# Patient Record
Sex: FEMALE | Race: BLACK OR AFRICAN AMERICAN | NOT HISPANIC OR LATINO | Employment: FULL TIME | URBAN - METROPOLITAN AREA
[De-identification: names, ages, dates, MRNs, and addresses within clinical notes are randomized per-mention and may not be internally consistent; named-entity substitution may affect disease eponyms.]

---

## 2019-05-25 ENCOUNTER — HOSPITAL ENCOUNTER (INPATIENT)
Facility: HOSPITAL | Age: 49
LOS: 3 days | Discharge: HOME HEALTH | DRG: 337 | End: 2019-05-28
Attending: EMERGENCY MEDICINE | Admitting: SURGERY
Payer: COMMERCIAL

## 2019-05-25 ENCOUNTER — ANESTHESIA EVENT (OUTPATIENT)
Dept: SURGERY | Facility: HOSPITAL | Age: 49
DRG: 337 | End: 2019-05-25
Payer: COMMERCIAL

## 2019-05-25 DIAGNOSIS — R53.83 FATIGUE, UNSPECIFIED TYPE: ICD-10-CM

## 2019-05-25 DIAGNOSIS — R53.1 WEAKNESS: ICD-10-CM

## 2019-05-25 DIAGNOSIS — K65.1 INTRA-ABDOMINAL ABSCESS: ICD-10-CM

## 2019-05-25 DIAGNOSIS — R10.84 GENERALIZED ABDOMINAL PAIN: ICD-10-CM

## 2019-05-25 DIAGNOSIS — K65.1 ABSCESS OF ABDOMINAL CAVITY: Primary | ICD-10-CM

## 2019-05-25 DIAGNOSIS — R52 GENERALIZED BODY ACHES: ICD-10-CM

## 2019-05-25 PROBLEM — T81.43XA POSTPROCEDURAL INTRAABDOMINAL ABSCESS: Status: ACTIVE | Noted: 2019-05-25

## 2019-05-25 LAB
ABO + RH BLD: NORMAL
ALBUMIN SERPL BCP-MCNC: 1.7 G/DL (ref 3.5–5.2)
ALP SERPL-CCNC: 95 U/L (ref 55–135)
ALT SERPL W/O P-5'-P-CCNC: 17 U/L (ref 10–44)
ANION GAP SERPL CALC-SCNC: 14 MMOL/L (ref 8–16)
APTT BLDCRRT: 34.3 SEC (ref 21–32)
AST SERPL-CCNC: 23 U/L (ref 10–40)
B-HCG UR QL: NEGATIVE
BACTERIA #/AREA URNS HPF: ABNORMAL /HPF
BASOPHILS # BLD AUTO: ABNORMAL K/UL (ref 0–0.2)
BASOPHILS NFR BLD: 0 % (ref 0–1.9)
BILIRUB SERPL-MCNC: 0.5 MG/DL (ref 0.1–1)
BILIRUB UR QL STRIP: NEGATIVE
BLD GP AB SCN CELLS X3 SERPL QL: NORMAL
BUN SERPL-MCNC: 11 MG/DL (ref 6–20)
CALCIUM SERPL-MCNC: 9 MG/DL (ref 8.7–10.5)
CHLORIDE SERPL-SCNC: 95 MMOL/L (ref 95–110)
CLARITY UR: ABNORMAL
CO2 SERPL-SCNC: 24 MMOL/L (ref 23–29)
COLOR UR: YELLOW
CREAT SERPL-MCNC: 0.8 MG/DL (ref 0.5–1.4)
CTP QC/QA: YES
DIFFERENTIAL METHOD: ABNORMAL
EOSINOPHIL # BLD AUTO: ABNORMAL K/UL (ref 0–0.5)
EOSINOPHIL NFR BLD: 0 % (ref 0–8)
ERYTHROCYTE [DISTWIDTH] IN BLOOD BY AUTOMATED COUNT: 21.7 % (ref 11.5–14.5)
EST. GFR  (AFRICAN AMERICAN): >60 ML/MIN/1.73 M^2
EST. GFR  (NON AFRICAN AMERICAN): >60 ML/MIN/1.73 M^2
GLUCOSE SERPL-MCNC: 208 MG/DL (ref 70–110)
GLUCOSE UR QL STRIP: ABNORMAL
HCT VFR BLD AUTO: 29.1 % (ref 37–48.5)
HGB BLD-MCNC: 7.8 G/DL (ref 12–16)
HGB UR QL STRIP: ABNORMAL
HYALINE CASTS #/AREA URNS LPF: 0 /LPF
HYPOCHROMIA BLD QL SMEAR: ABNORMAL
INR PPP: 1.2 (ref 0.8–1.2)
KETONES UR QL STRIP: NEGATIVE
LEUKOCYTE ESTERASE UR QL STRIP: ABNORMAL
LIPASE SERPL-CCNC: 14 U/L (ref 4–60)
LYMPHOCYTES # BLD AUTO: ABNORMAL K/UL (ref 1–4.8)
LYMPHOCYTES NFR BLD: 22 % (ref 18–48)
MAGNESIUM SERPL-MCNC: 1.7 MG/DL (ref 1.6–2.6)
MCH RBC QN AUTO: 17.9 PG (ref 27–31)
MCHC RBC AUTO-ENTMCNC: 26.8 G/DL (ref 32–36)
MCV RBC AUTO: 67 FL (ref 82–98)
METAMYELOCYTES NFR BLD MANUAL: 4 %
MICROSCOPIC COMMENT: ABNORMAL
MONOCYTES # BLD AUTO: ABNORMAL K/UL (ref 0.3–1)
MONOCYTES NFR BLD: 5 % (ref 4–15)
NEUTROPHILS NFR BLD: 37 % (ref 38–73)
NEUTS BAND NFR BLD MANUAL: 32 %
NITRITE UR QL STRIP: NEGATIVE
PH UR STRIP: 5 [PH] (ref 5–8)
PLATELET # BLD AUTO: 501 K/UL (ref 150–350)
PLATELET BLD QL SMEAR: ABNORMAL
PMV BLD AUTO: 9.9 FL (ref 9.2–12.9)
POTASSIUM SERPL-SCNC: 4 MMOL/L (ref 3.5–5.1)
PROT SERPL-MCNC: 8.5 G/DL (ref 6–8.4)
PROT UR QL STRIP: ABNORMAL
PROTHROMBIN TIME: 12.6 SEC (ref 9–12.5)
RBC # BLD AUTO: 4.35 M/UL (ref 4–5.4)
RBC #/AREA URNS HPF: >100 /HPF (ref 0–4)
SODIUM SERPL-SCNC: 133 MMOL/L (ref 136–145)
SP GR UR STRIP: 1.02 (ref 1–1.03)
SQUAMOUS #/AREA URNS HPF: 10 /HPF
URN SPEC COLLECT METH UR: ABNORMAL
UROBILINOGEN UR STRIP-ACNC: ABNORMAL EU/DL
WBC # BLD AUTO: 13.73 K/UL (ref 3.9–12.7)
WBC #/AREA URNS HPF: 8 /HPF (ref 0–5)
WBC TOXIC VACUOLES BLD QL SMEAR: PRESENT

## 2019-05-25 PROCEDURE — 25500020 PHARM REV CODE 255: Performed by: SURGERY

## 2019-05-25 PROCEDURE — 25000003 PHARM REV CODE 250: Performed by: EMERGENCY MEDICINE

## 2019-05-25 PROCEDURE — 86920 COMPATIBILITY TEST SPIN: CPT

## 2019-05-25 PROCEDURE — 85025 COMPLETE CBC W/AUTO DIFF WBC: CPT

## 2019-05-25 PROCEDURE — 25000003 PHARM REV CODE 250: Performed by: STUDENT IN AN ORGANIZED HEALTH CARE EDUCATION/TRAINING PROGRAM

## 2019-05-25 PROCEDURE — 86301 IMMUNOASSAY TUMOR CA 19-9: CPT

## 2019-05-25 PROCEDURE — 81025 URINE PREGNANCY TEST: CPT | Performed by: EMERGENCY MEDICINE

## 2019-05-25 PROCEDURE — 86304 IMMUNOASSAY TUMOR CA 125: CPT

## 2019-05-25 PROCEDURE — 81000 URINALYSIS NONAUTO W/SCOPE: CPT

## 2019-05-25 PROCEDURE — 82378 CARCINOEMBRYONIC ANTIGEN: CPT

## 2019-05-25 PROCEDURE — 80053 COMPREHEN METABOLIC PANEL: CPT

## 2019-05-25 PROCEDURE — 96361 HYDRATE IV INFUSION ADD-ON: CPT

## 2019-05-25 PROCEDURE — 99291 CRITICAL CARE FIRST HOUR: CPT | Mod: 25

## 2019-05-25 PROCEDURE — 11000001 HC ACUTE MED/SURG PRIVATE ROOM

## 2019-05-25 PROCEDURE — 63600175 PHARM REV CODE 636 W HCPCS: Performed by: EMERGENCY MEDICINE

## 2019-05-25 PROCEDURE — 96375 TX/PRO/DX INJ NEW DRUG ADDON: CPT

## 2019-05-25 PROCEDURE — 85610 PROTHROMBIN TIME: CPT

## 2019-05-25 PROCEDURE — 82105 ALPHA-FETOPROTEIN SERUM: CPT

## 2019-05-25 PROCEDURE — 83735 ASSAY OF MAGNESIUM: CPT

## 2019-05-25 PROCEDURE — 83690 ASSAY OF LIPASE: CPT

## 2019-05-25 PROCEDURE — 96365 THER/PROPH/DIAG IV INF INIT: CPT

## 2019-05-25 PROCEDURE — 63600175 PHARM REV CODE 636 W HCPCS: Performed by: STUDENT IN AN ORGANIZED HEALTH CARE EDUCATION/TRAINING PROGRAM

## 2019-05-25 PROCEDURE — 87040 BLOOD CULTURE FOR BACTERIA: CPT

## 2019-05-25 PROCEDURE — 85730 THROMBOPLASTIN TIME PARTIAL: CPT

## 2019-05-25 PROCEDURE — 86850 RBC ANTIBODY SCREEN: CPT

## 2019-05-25 RX ORDER — HEPARIN SODIUM 5000 [USP'U]/ML
5000 INJECTION, SOLUTION INTRAVENOUS; SUBCUTANEOUS EVERY 8 HOURS
Status: DISCONTINUED | OUTPATIENT
Start: 2019-05-25 | End: 2019-05-27

## 2019-05-25 RX ORDER — MORPHINE SULFATE 10 MG/ML
2 INJECTION INTRAMUSCULAR; INTRAVENOUS; SUBCUTANEOUS EVERY 4 HOURS PRN
Status: DISCONTINUED | OUTPATIENT
Start: 2019-05-25 | End: 2019-05-27

## 2019-05-25 RX ORDER — SODIUM CHLORIDE, SODIUM LACTATE, POTASSIUM CHLORIDE, CALCIUM CHLORIDE 600; 310; 30; 20 MG/100ML; MG/100ML; MG/100ML; MG/100ML
INJECTION, SOLUTION INTRAVENOUS CONTINUOUS
Status: DISCONTINUED | OUTPATIENT
Start: 2019-05-25 | End: 2019-05-28

## 2019-05-25 RX ORDER — SODIUM CHLORIDE 0.9 % (FLUSH) 0.9 %
10 SYRINGE (ML) INJECTION
Status: DISCONTINUED | OUTPATIENT
Start: 2019-05-25 | End: 2019-05-28 | Stop reason: HOSPADM

## 2019-05-25 RX ORDER — ONDANSETRON 2 MG/ML
8 INJECTION INTRAMUSCULAR; INTRAVENOUS
Status: COMPLETED | OUTPATIENT
Start: 2019-05-25 | End: 2019-05-25

## 2019-05-25 RX ORDER — METOPROLOL TARTRATE 1 MG/ML
5 INJECTION, SOLUTION INTRAVENOUS EVERY 6 HOURS PRN
Status: DISCONTINUED | OUTPATIENT
Start: 2019-05-25 | End: 2019-05-28 | Stop reason: HOSPADM

## 2019-05-25 RX ORDER — LOSARTAN POTASSIUM 50 MG/1
50 TABLET ORAL DAILY
COMMUNITY
End: 2021-09-12

## 2019-05-25 RX ORDER — RAMELTEON 8 MG/1
8 TABLET ORAL NIGHTLY PRN
Status: DISCONTINUED | OUTPATIENT
Start: 2019-05-25 | End: 2019-05-27

## 2019-05-25 RX ORDER — ONDANSETRON 8 MG/1
8 TABLET, ORALLY DISINTEGRATING ORAL EVERY 8 HOURS PRN
Status: DISCONTINUED | OUTPATIENT
Start: 2019-05-25 | End: 2019-05-28 | Stop reason: HOSPADM

## 2019-05-25 RX ORDER — ACETAMINOPHEN 325 MG/1
650 TABLET ORAL EVERY 8 HOURS PRN
Status: DISCONTINUED | OUTPATIENT
Start: 2019-05-25 | End: 2019-05-27

## 2019-05-25 RX ORDER — DIPHENHYDRAMINE HYDROCHLORIDE 50 MG/ML
25 INJECTION INTRAMUSCULAR; INTRAVENOUS EVERY 4 HOURS PRN
Status: DISCONTINUED | OUTPATIENT
Start: 2019-05-25 | End: 2019-05-27

## 2019-05-25 RX ADMIN — PIPERACILLIN AND TAZOBACTAM 4.5 G: 4; .5 INJECTION, POWDER, LYOPHILIZED, FOR SOLUTION INTRAVENOUS; PARENTERAL at 03:05

## 2019-05-25 RX ADMIN — SODIUM CHLORIDE, SODIUM LACTATE, POTASSIUM CHLORIDE, AND CALCIUM CHLORIDE: .6; .31; .03; .02 INJECTION, SOLUTION INTRAVENOUS at 04:05

## 2019-05-25 RX ADMIN — SODIUM CHLORIDE 2000 ML: 0.9 INJECTION, SOLUTION INTRAVENOUS at 11:05

## 2019-05-25 RX ADMIN — ONDANSETRON 8 MG: 2 INJECTION INTRAMUSCULAR; INTRAVENOUS at 11:05

## 2019-05-25 RX ADMIN — IOHEXOL: 350 INJECTION, SOLUTION INTRAVENOUS at 05:05

## 2019-05-25 RX ADMIN — PIPERACILLIN AND TAZOBACTAM 4.5 G: 4; .5 INJECTION, POWDER, LYOPHILIZED, FOR SOLUTION INTRAVENOUS; PARENTERAL at 10:05

## 2019-05-25 RX ADMIN — HEPARIN SODIUM 5000 UNITS: 5000 INJECTION, SOLUTION INTRAVENOUS; SUBCUTANEOUS at 10:05

## 2019-05-25 RX ADMIN — SODIUM CHLORIDE 1000 ML: 0.9 INJECTION, SOLUTION INTRAVENOUS at 03:05

## 2019-05-25 RX ADMIN — SODIUM CHLORIDE, SODIUM LACTATE, POTASSIUM CHLORIDE, AND CALCIUM CHLORIDE 1000 ML: .6; .31; .03; .02 INJECTION, SOLUTION INTRAVENOUS at 05:05

## 2019-05-25 RX ADMIN — IOHEXOL: 300 INJECTION, SOLUTION INTRAVENOUS at 05:05

## 2019-05-25 RX ADMIN — IOHEXOL 30 ML: 300 INJECTION, SOLUTION INTRAVENOUS at 05:05

## 2019-05-25 RX ADMIN — SODIUM CHLORIDE, SODIUM LACTATE, POTASSIUM CHLORIDE, AND CALCIUM CHLORIDE: .6; .31; .03; .02 INJECTION, SOLUTION INTRAVENOUS at 10:05

## 2019-05-25 NOTE — ED NOTES
Pt resting comfortably and independently repositioned in stretcher with bed locked in lowest position for safety. NAD noted at this time. Respirations even and unlabored and visible chest rise noted.  Patient offered bathroom assistance and voided. Pt instructed to call if assistance is needed. Pt on continuous cardiac, BP, and O2 monitoring. Call light within reach. No needs at this time. Will continue to monitor.

## 2019-05-25 NOTE — ED PROVIDER NOTES
Encounter Date: 5/25/2019    SCRIBE #1 NOTE: I, Xiomara Pozo, am scribing for, and in the presence of,  Jer Albarado MD. I have scribed the following portions of the note - Other sections scribed: HPI, ROS, PE.       History     Chief Complaint   Patient presents with    Generalized Body Aches     Pt complaining of body aches and presents with shivering. Pulse = 130s upon arrival     CC: Generalized Body Aches    HPI: This 48 y.o female who has HTN presents to the ED for an evaluation of acute onset, constant, 6/10 generalized body aches with associated fatigue and generalized weakness for the past week.  She reports for the past week, she has been having intermittent abdominal pain as well.  She reports she was evaluated for a sore throat and fever with a temp max of 105 prior to onset of her current symptoms.  She reports during that evaluation she received an injection of penicillin and states she was diagnosed with a viral syndrome.  Patient denies nausea, emesis, diarrhea, dysuria, rash, chest pain, back pain, or any other associated symptoms.  No prior tx.  No alleviating factors.    The history is provided by the patient. No  was used.     Review of patient's allergies indicates:  No Known Allergies  Past Medical History:   Diagnosis Date    Hypertension      History reviewed. No pertinent surgical history.  History reviewed. No pertinent family history.  Social History     Tobacco Use    Smoking status: Never Smoker   Substance Use Topics    Alcohol use: Not Currently    Drug use: Not Currently     Review of Systems   Constitutional: Positive for fatigue. Negative for chills and fever.   HENT: Positive for sore throat. Negative for ear pain.    Eyes: Negative for pain.   Respiratory: Negative for cough and shortness of breath.    Cardiovascular: Negative for chest pain.   Gastrointestinal: Positive for abdominal pain. Negative for diarrhea, nausea and vomiting.   Genitourinary:  Negative for dysuria.   Musculoskeletal: Positive for myalgias. Negative for back pain.        (-) arm or leg problems   Skin: Negative for rash.   Neurological: Negative for headaches.       Physical Exam     Initial Vitals [05/25/19 1140]   BP Pulse Resp Temp SpO2   (!) 141/98 (!) 134 (!) 22 99.1 °F (37.3 °C) 96 %      MAP       --         Physical Exam  Physical Exam  The patient was examined specifically for the following:   General:No significant distress, Good color, Warm and dry. Head and neck:Scalp atraumatic, Neck supple. Neurological:Appropriate conversation, Gross motor deficits. Eyes:Conjugate gaze, Clear corneas. ENT: No epistaxis. Cardiac: Regular rate and rhythm, Grossly normal heart tones. Pulmonary: Wheezing, Rales. Gastrointestinal: Abdominal tenderness, Abdominal distention. Musculoskeletal: Extremity deformity, Apparent pain with range of motion of the joints. Skin: Rash.   The findings on examination were normal except for the following:  The patient has dry lips and mucous membranes.  The patient's heart rate is 134.  She seems nervous and anxious.  She will not permit a pharynx examination the neck is supple. Cranial nerves, motor and sensory examination grossly unremarkable. The lungs are clear.  The heart tones are remarkable for the regular tachycardia.  The abdomen is diffusely tender.  It may be slightly distended.  Extremities are nontender.  There is no pain with range of motion of any joints.    ED Course   Critical Care  Date/Time: 5/25/2019 4:09 PM  Performed by: Jer Albarado MD  Authorized by: Jose Luis Hanson MD   Direct patient critical care time: 22 minutes  Additional history critical care time: 11 minutes  Ordering / reviewing critical care time: 11 minutes  Documentation critical care time: 11 minutes  Consulting other physicians critical care time: 5 minutes  Consult with family critical care time: 3 minutes  Total critical care time (exclusive of procedural time) : 63  minutes  Critical care time was exclusive of separately billable procedures and treating other patients and teaching time.  Critical care was necessary to treat or prevent imminent or life-threatening deterioration of the following conditions: dehydration.  Critical care was time spent personally by me on the following activities: discussions with primary provider, evaluation of patient's response to treatment, obtaining history from patient or surrogate, ordering and review of laboratory studies, pulse oximetry, review of old charts, development of treatment plan with patient or surrogate, examination of patient, ordering and performing treatments and interventions, ordering and review of radiographic studies and re-evaluation of patient's condition.        Labs Reviewed   COMPREHENSIVE METABOLIC PANEL - Abnormal; Notable for the following components:       Result Value    Sodium 133 (*)     Glucose 208 (*)     Total Protein 8.5 (*)     Albumin 1.7 (*)     All other components within normal limits   URINALYSIS, REFLEX TO URINE CULTURE - Abnormal; Notable for the following components:    Appearance, UA Hazy (*)     Protein, UA 1+ (*)     Glucose, UA 1+ (*)     Occult Blood UA 3+ (*)     Urobilinogen, UA 4.0-6.0 (*)     Leukocytes, UA Trace (*)     All other components within normal limits    Narrative:     Preferred Collection Type->Urine, Clean Catch   CBC W/ AUTO DIFFERENTIAL - Abnormal; Notable for the following components:    WBC 13.73 (*)     Hemoglobin 7.8 (*)     Hematocrit 29.1 (*)     Mean Corpuscular Volume 67 (*)     Mean Corpuscular Hemoglobin 17.9 (*)     Mean Corpuscular Hemoglobin Conc 26.8 (*)     RDW 21.7 (*)     Platelets 501 (*)     Gran% 37.0 (*)     Platelet Estimate Increased (*)     All other components within normal limits   URINALYSIS MICROSCOPIC - Abnormal; Notable for the following components:    RBC, UA >100 (*)     WBC, UA 8 (*)     Bacteria Few (*)     All other components within normal  limits    Narrative:     Preferred Collection Type->Urine, Clean Catch   CULTURE, BLOOD   CULTURE, BLOOD   MAGNESIUM   LIPASE   PROTIME-INR   APTT   CEA   CANCER ANTIGEN 125   CANCER ANTIGEN 19-9   AFP TUMOR MARKER   POCT URINE PREGNANCY   TYPE & SCREEN   PREPARE RBC SOFT          Imaging Results          X-Ray Chest 1 View (In process)                CT Abdomen Pelvis  Without Contrast (Final result)  Result time 05/25/19 14:15:07    Final result by Terry Cook MD (05/25/19 14:15:07)                 Impression:      Abnormal examination findings of a midline 20.4 x 11.0 cm air-fluid collection with associated foci of free air in the abdomen and mass effect on adjacent structures.  The source remains unknown.  This is concerning for an abscess.  Component of tumor necrosis can have a similar appearance.   Surgical consultation and follow-up with both IV and oral contrast examination is suggested.    Distention of the small bowel loops no apparent transition point.  The findings may represent component of enteritis secondary to the inflammatory changes in the abdomen.    Case discussed with Dr. Albarado on 04/25/2019 at 14:11 hours.      Electronically signed by: Terry Cook MD  Date:    05/25/2019  Time:    14:15             Narrative:    EXAMINATION:  CT ABDOMEN PELVIS WITHOUT CONTRAST    CLINICAL HISTORY:  Abdominal pain, unspecified;Acute abdominal pain;    TECHNIQUE:  Axial CT scan of the abdomen and pelvis was obtained from the level of the hemidiaphragms to the pubic symphysis without intravenous contrast. Coronal and sagittal reformats were obtained.    COMPARISON:  None.    FINDINGS:  There are no pleural effusions.  There is no evidence of a pneumothorax.  There are ground-glass airspace opacities in the lung bases.  No discrete pulmonary nodule is identified.    The heart is unremarkable.  There is normal tapering of the abdominal aorta.  The aortic branch vessels are unremarkable.  Evaluation for  lymphadenopathy is significantly degraded given motion limitations.    The esophagus, stomach, and duodenum are within normal limits.  The small bowel loops are distended with no apparent transition point.  The appendix is not visualized.  There are no inflammatory changes in the space location of the appendix.  The large bowel is within normal limits.    The liver is within normal limits.  The gallbladder is unremarkable.  The biliary tree is within normal limits.  The spleen is unremarkable.  The pancreas is within normal limits.    The adrenal glands are unremarkable.  The kidneys are within normal limits.  There is no evidence of nephrolithiasis.  The ureters and urinary bladder are within normal limits.    There is heterogeneous appearance of the uterus.  Multiple uterine fibroids are present.  Calcified fibroids are present.  The adnexal structures appear unremarkable.    There is a large 20.4 x 11.0 cm air-fluid collection in the mid abdomen with mass effect on adjacent structures.  Associated foci of free air throughout the abdomen.  The source of this collection remains unknown.  There is also some free fluid in the pelvis.    The psoas margins are unremarkable.  The abdominal wall is within normal limits.  There are degenerative changes in the osseous structures.  There is no evidence of a fracture.                              Medical decision making:  Given the above, this patient presents to the emergency room with a history of fever and chills.  The patient was treated with penicillin for sore throat.  The patient has sore throat today.  She had a very tender abdomen.  CT reveals a large abscess.  We do not know the cause the abscess.  The patient is being evaluated and admitted by surgery.  The patient does have a slight leukocytosis.  She was seen and evaluated by , in the emergency room.  The patient will be re-scanned with oral IV and rectal contrast.                Scribe Attestation:    Scribe #1: I performed the above scribed service and the documentation accurately describes the services I performed. I attest to the accuracy of the note.    Attending Attestation:           Physician Attestation for Scribe:  Physician Attestation Statement for Scribe #1: I, Jer Albarado MD, reviewed documentation, as scribed by Xiomara Pozo in my presence, and it is both accurate and complete.                    Clinical Impression:       ICD-10-CM ICD-9-CM   1. Abscess of abdominal cavity K65.1 567.22   2. Generalized abdominal pain R10.84 789.07   3. Intra-abdominal abscess K65.1 567.22   4. Generalized body aches R52 780.96   5. Weakness R53.1 780.79   6. Fatigue, unspecified type R53.83 780.79                                Jer Albarado MD  05/25/19 1610       Jer Albarado MD  05/25/19 1614

## 2019-05-25 NOTE — ED NOTES
Pt resting comfortably and independently repositioned in stretcher with bed locked in lowest position for safety. NAD noted at this time. Respirations even and unlabored and visible chest rise noted.  Patient offered bathroom assistance and notified of need of urine sample to proceed with imaging, but denies need at this time. Pt instructed to call if assistance is needed. Pt on continuous cardiac, BP, and O2 monitoring. Call light within reach. No needs at this time. Will continue to monitor.

## 2019-05-25 NOTE — ED TRIAGE NOTES
"Pt reports to ED with c/o generalized weakness and body aches with nausea x1 week. Pt reports she was seen by family doctor, was told she had viral infection, and was given PCN shot and "hasn't felt right since." Pt denies fever/chills, abdominal pain/diarrhea, cough, changes in urinary habits.    Patient identifiers verified and correct for April Carter.    LOC: The patient is awake, alert and aware of environment with an appropriate affect, the patient is oriented x 3 and speaking appropriately.  APPEARANCE: Pt did not come to ED with shoes and also smells strongly of urine.   SKIN: The skin is warm and dry, color consistent with ethnicity, patient has normal skin turgor and moist mucus membranes, skin intact, no breakdown or bruising noted.  MUSCULOSKELETAL: Patient moving all extremities spontaneously, no obvious swelling or deformities noted.  RESPIRATORY: Airway is open and patent, respirations are spontaneous, patient has a normal effort and rate, no accessory muscle use noted, bilateral breath sounds **.  CARDIAC: Pt noted to be tachycardic with rate in 130's.  ABDOMEN: Soft and non tender to palpation, no distention noted, normoactive bowel sounds present in all four quadrants. Reports nausea.  NEUROLOGIC: PERRL, 3mm bilaterally, eyes open spontaneously, behavior appropriate to situation, follows commands, facial expression symmetrical, bilateral hand grasp equal and even, purposeful motor response noted, normal sensation in all extremities when touched with a finger.    "

## 2019-05-25 NOTE — H&P
CC: Generalized Body Aches     HPI: This 48 y.o otherwise healthy female who has HTN presents to the ED with CC of body aches however she was found to have a very large intraabdominal abscess on workup for her mild abdominal pain.  She denies abnormal bleeding although she is anemic as well, and reports recent weight loss.  Reports no hx of colonoscopy, trouble with bowel movements, and denies any significant family history of cancer or other medical issues.      She reports for the past week, she has been having intermittent abdominal pain as well.  She reports she was evaluated for a sore throat and fever with a temp max of 105 prior to onset of her current symptoms.  She reports during that evaluation she received an injection of penicillin and states she was diagnosed with a viral syndrome.  Patient denies nausea, emesis, diarrhea, dysuria, rash, chest pain, back pain, or any other associated symptoms.  No prior tx.  No alleviating factors.        History reviewed. No pertinent surgical history.    History reviewed. No pertinent family history.    Social History     Socioeconomic History    Marital status: Single     Spouse name: Not on file    Number of children: Not on file    Years of education: Not on file    Highest education level: Not on file   Occupational History    Not on file   Social Needs    Financial resource strain: Not on file    Food insecurity:     Worry: Not on file     Inability: Not on file    Transportation needs:     Medical: Not on file     Non-medical: Not on file   Tobacco Use    Smoking status: Never Smoker   Substance and Sexual Activity    Alcohol use: Not Currently    Drug use: Not Currently    Sexual activity: Not on file   Lifestyle    Physical activity:     Days per week: Not on file     Minutes per session: Not on file    Stress: Not on file   Relationships    Social connections:     Talks on phone: Not on file     Gets together: Not on file     Attends [employer]  service: Not on file     Active member of club or organization: Not on file     Attends meetings of clubs or organizations: Not on file     Relationship status: Not on file   Other Topics Concern    Not on file   Social History Narrative    Not on file       Current Facility-Administered Medications   Medication Dose Route Frequency Provider Last Rate Last Dose    acetaminophen tablet 650 mg  650 mg Oral Q8H PRN Sarabjit Moran MD        diphenhydrAMINE injection 25 mg  25 mg Intravenous Q4H PRN Sarabjit Moran MD        heparin (porcine) injection 5,000 Units  5,000 Units Subcutaneous Q8H Sarabjit Moran MD        lactated ringers bolus 1,000 mL  1,000 mL Intravenous Once Sarabjit Moran MD        lactated ringers infusion   Intravenous Continuous Sarabjit Moran MD        morphine injection 2 mg  2 mg Intravenous Q4H PRN Sarabjit Moran MD        ondansetron disintegrating tablet 8 mg  8 mg Oral Q8H PRN Sarabjit Moran MD        piperacillin-tazobactam 4.5 g in sodium chloride 0.9% 100 mL IVPB (ready to mix system)  4.5 g Intravenous Q8H Sarabjit Moran MD        promethazine (PHENERGAN) 6.25 mg in dextrose 5 % 50 mL IVPB  6.25 mg Intravenous Q6H PRN Sarabjit Moran MD        ramelteon tablet 8 mg  8 mg Oral Nightly PRN Sarabjit Moran MD        sodium chloride 0.9% flush 10 mL  10 mL Intravenous PRN Sarabjit Moran MD         Current Outpatient Medications   Medication Sig Dispense Refill    losartan (COZAAR) 50 MG tablet Take 50 mg by mouth once daily.      UNKNOWN TO PATIENT          Review of patient's allergies indicates:  No Known Allergies     Physical Exam:  Alert/oriented.  Somewhat flattened affect.    Pulmonary exam clear/BL with some tachypnea  CV exam with RRR, no MRG  Abdomen is distended, firm, without peritonitis. There is mild tenderness to deep palpation.      Normal skin turgor and color  Extremity with good distal pulses, no edema      A/P    49yo F with very large intraabdominal abscess found on  non-contrast CT scan.  Differential diagnosis is broad at this point, although she indeed warrants admission given her tachycardia, fevers, leukocytosis, and the air/fluid levels suggesting infection or at least innoculation of this very large cavity.      -obtain CT with IV/PO/Rectal contrast  -Perform diagnostic laparoscopy tommorow (around noon), will drain the abscess and biopsy any abnormal tissue  -send for tumor markers, coags, type and hold 2 units for OR   -admit to inpatient, start IVF, NPO, zosyn    Case discussed with Dr. Hanson, the attending.

## 2019-05-25 NOTE — NURSING
Pt arrived to floor via stretcher, aaox4. able to walk to bathroom with stand by assist. Denies pain, abdomen noted to be distended. Pt has multiple loose bowel movements. Call made to surgeon regarding pts VS, metropolol iv prn ordered. Call light w/i reach, bed in lowest position

## 2019-05-26 ENCOUNTER — ANESTHESIA (OUTPATIENT)
Dept: SURGERY | Facility: HOSPITAL | Age: 49
DRG: 337 | End: 2019-05-26
Payer: COMMERCIAL

## 2019-05-26 LAB
AFP SERPL-MCNC: 1.2 NG/ML (ref 0–8.4)
ANION GAP SERPL CALC-SCNC: 7 MMOL/L (ref 8–16)
ANISOCYTOSIS BLD QL SMEAR: ABNORMAL
BASOPHILS NFR BLD: 0 % (ref 0–1.9)
BLD PROD TYP BPU: NORMAL
BLD PROD TYP BPU: NORMAL
BLOOD UNIT EXPIRATION DATE: NORMAL
BLOOD UNIT EXPIRATION DATE: NORMAL
BLOOD UNIT TYPE CODE: 6200
BLOOD UNIT TYPE CODE: 6200
BLOOD UNIT TYPE: NORMAL
BLOOD UNIT TYPE: NORMAL
BUN SERPL-MCNC: 12 MG/DL (ref 6–20)
CALCIUM SERPL-MCNC: 8.2 MG/DL (ref 8.7–10.5)
CANCER AG125 SERPL-ACNC: 35 U/ML (ref 0–30)
CANCER AG19-9 SERPL-ACNC: 49 U/ML (ref 2–40)
CHLORIDE SERPL-SCNC: 106 MMOL/L (ref 95–110)
CO2 SERPL-SCNC: 27 MMOL/L (ref 23–29)
CODING SYSTEM: NORMAL
CODING SYSTEM: NORMAL
CREAT SERPL-MCNC: 0.7 MG/DL (ref 0.5–1.4)
DIFFERENTIAL METHOD: ABNORMAL
DISPENSE STATUS: NORMAL
DISPENSE STATUS: NORMAL
EOSINOPHIL NFR BLD: 1 % (ref 0–8)
ERYTHROCYTE [DISTWIDTH] IN BLOOD BY AUTOMATED COUNT: 21.6 % (ref 11.5–14.5)
EST. GFR  (AFRICAN AMERICAN): >60 ML/MIN/1.73 M^2
EST. GFR  (NON AFRICAN AMERICAN): >60 ML/MIN/1.73 M^2
GLUCOSE SERPL-MCNC: 108 MG/DL (ref 70–110)
HCG INTACT+B SERPL-ACNC: <1.2 MIU/ML
HCT VFR BLD AUTO: 24.2 % (ref 37–48.5)
HGB BLD-MCNC: 6.4 G/DL (ref 12–16)
HYPOCHROMIA BLD QL SMEAR: ABNORMAL
LYMPHOCYTES NFR BLD: 18 % (ref 18–48)
MCH RBC QN AUTO: 17.8 PG (ref 27–31)
MCHC RBC AUTO-ENTMCNC: 26.4 G/DL (ref 32–36)
MCV RBC AUTO: 67 FL (ref 82–98)
METAMYELOCYTES NFR BLD MANUAL: 3 %
MONOCYTES NFR BLD: 12 % (ref 4–15)
NEUTROPHILS NFR BLD: 16 % (ref 38–73)
NEUTS BAND NFR BLD MANUAL: 50 %
PLATELET # BLD AUTO: 420 K/UL (ref 150–350)
PLATELET BLD QL SMEAR: ABNORMAL
PMV BLD AUTO: 9.9 FL (ref 9.2–12.9)
POTASSIUM SERPL-SCNC: 3.5 MMOL/L (ref 3.5–5.1)
RBC # BLD AUTO: 3.59 M/UL (ref 4–5.4)
SODIUM SERPL-SCNC: 140 MMOL/L (ref 136–145)
TRANS ERYTHROCYTES VOL PATIENT: NORMAL ML
TRANS ERYTHROCYTES VOL PATIENT: NORMAL ML
WBC # BLD AUTO: 11.65 K/UL (ref 3.9–12.7)

## 2019-05-26 PROCEDURE — 27201423 OPTIME MED/SURG SUP & DEVICES STERILE SUPPLY: Performed by: SURGERY

## 2019-05-26 PROCEDURE — 80048 BASIC METABOLIC PNL TOTAL CA: CPT

## 2019-05-26 PROCEDURE — 11000001 HC ACUTE MED/SURG PRIVATE ROOM

## 2019-05-26 PROCEDURE — 63600175 PHARM REV CODE 636 W HCPCS: Performed by: STUDENT IN AN ORGANIZED HEALTH CARE EDUCATION/TRAINING PROGRAM

## 2019-05-26 PROCEDURE — D9220A PRA ANESTHESIA: ICD-10-PCS | Mod: CRNA,,, | Performed by: NURSE ANESTHETIST, CERTIFIED REGISTERED

## 2019-05-26 PROCEDURE — 37000008 HC ANESTHESIA 1ST 15 MINUTES: Performed by: SURGERY

## 2019-05-26 PROCEDURE — 63600175 PHARM REV CODE 636 W HCPCS: Performed by: ANESTHESIOLOGY

## 2019-05-26 PROCEDURE — 87205 SMEAR GRAM STAIN: CPT

## 2019-05-26 PROCEDURE — 25000003 PHARM REV CODE 250: Performed by: NURSE ANESTHETIST, CERTIFIED REGISTERED

## 2019-05-26 PROCEDURE — 25000003 PHARM REV CODE 250: Performed by: SURGERY

## 2019-05-26 PROCEDURE — 25000003 PHARM REV CODE 250: Performed by: STUDENT IN AN ORGANIZED HEALTH CARE EDUCATION/TRAINING PROGRAM

## 2019-05-26 PROCEDURE — 87077 CULTURE AEROBIC IDENTIFY: CPT

## 2019-05-26 PROCEDURE — 87075 CULTR BACTERIA EXCEPT BLOOD: CPT

## 2019-05-26 PROCEDURE — 37000009 HC ANESTHESIA EA ADD 15 MINS: Performed by: SURGERY

## 2019-05-26 PROCEDURE — 87186 SC STD MICRODIL/AGAR DIL: CPT

## 2019-05-26 PROCEDURE — D9220A PRA ANESTHESIA: Mod: ANES,,, | Performed by: ANESTHESIOLOGY

## 2019-05-26 PROCEDURE — 85007 BL SMEAR W/DIFF WBC COUNT: CPT

## 2019-05-26 PROCEDURE — 84702 CHORIONIC GONADOTROPIN TEST: CPT

## 2019-05-26 PROCEDURE — D9220A PRA ANESTHESIA: Mod: CRNA,,, | Performed by: NURSE ANESTHETIST, CERTIFIED REGISTERED

## 2019-05-26 PROCEDURE — 36415 COLL VENOUS BLD VENIPUNCTURE: CPT

## 2019-05-26 PROCEDURE — D9220A PRA ANESTHESIA: ICD-10-PCS | Mod: ANES,,, | Performed by: ANESTHESIOLOGY

## 2019-05-26 PROCEDURE — P9021 RED BLOOD CELLS UNIT: HCPCS

## 2019-05-26 PROCEDURE — 71000033 HC RECOVERY, INTIAL HOUR: Performed by: SURGERY

## 2019-05-26 PROCEDURE — C1729 CATH, DRAINAGE: HCPCS | Performed by: SURGERY

## 2019-05-26 PROCEDURE — 63600175 PHARM REV CODE 636 W HCPCS: Performed by: NURSE ANESTHETIST, CERTIFIED REGISTERED

## 2019-05-26 PROCEDURE — 36000709 HC OR TIME LEV III EA ADD 15 MIN: Performed by: SURGERY

## 2019-05-26 PROCEDURE — 85027 COMPLETE CBC AUTOMATED: CPT

## 2019-05-26 PROCEDURE — 87070 CULTURE OTHR SPECIMN AEROBIC: CPT | Mod: 59

## 2019-05-26 PROCEDURE — 36000708 HC OR TIME LEV III 1ST 15 MIN: Performed by: SURGERY

## 2019-05-26 RX ORDER — ACETAMINOPHEN 325 MG/1
650 TABLET ORAL EVERY 8 HOURS
Status: DISCONTINUED | OUTPATIENT
Start: 2019-05-26 | End: 2019-05-28 | Stop reason: HOSPADM

## 2019-05-26 RX ORDER — MIDAZOLAM HYDROCHLORIDE 1 MG/ML
INJECTION, SOLUTION INTRAMUSCULAR; INTRAVENOUS
Status: DISCONTINUED | OUTPATIENT
Start: 2019-05-26 | End: 2019-05-26

## 2019-05-26 RX ORDER — SUCCINYLCHOLINE CHLORIDE 20 MG/ML
INJECTION INTRAMUSCULAR; INTRAVENOUS
Status: DISCONTINUED | OUTPATIENT
Start: 2019-05-26 | End: 2019-05-26

## 2019-05-26 RX ORDER — FENTANYL CITRATE 50 UG/ML
INJECTION, SOLUTION INTRAMUSCULAR; INTRAVENOUS
Status: DISCONTINUED | OUTPATIENT
Start: 2019-05-26 | End: 2019-05-26

## 2019-05-26 RX ORDER — SODIUM CHLORIDE 9 MG/ML
INJECTION, SOLUTION INTRAVENOUS CONTINUOUS PRN
Status: DISCONTINUED | OUTPATIENT
Start: 2019-05-26 | End: 2019-05-26

## 2019-05-26 RX ORDER — LIDOCAINE HCL/PF 100 MG/5ML
SYRINGE (ML) INTRAVENOUS
Status: DISCONTINUED | OUTPATIENT
Start: 2019-05-26 | End: 2019-05-26

## 2019-05-26 RX ORDER — ONDANSETRON 2 MG/ML
INJECTION INTRAMUSCULAR; INTRAVENOUS
Status: DISCONTINUED | OUTPATIENT
Start: 2019-05-26 | End: 2019-05-26

## 2019-05-26 RX ORDER — BUPIVACAINE HYDROCHLORIDE AND EPINEPHRINE 2.5; 5 MG/ML; UG/ML
INJECTION, SOLUTION EPIDURAL; INFILTRATION; INTRACAUDAL; PERINEURAL
Status: DISCONTINUED | OUTPATIENT
Start: 2019-05-26 | End: 2019-05-26 | Stop reason: HOSPADM

## 2019-05-26 RX ORDER — ACETAMINOPHEN 10 MG/ML
INJECTION, SOLUTION INTRAVENOUS
Status: DISCONTINUED | OUTPATIENT
Start: 2019-05-26 | End: 2019-05-26

## 2019-05-26 RX ORDER — GLYCOPYRROLATE 0.2 MG/ML
INJECTION INTRAMUSCULAR; INTRAVENOUS
Status: DISCONTINUED | OUTPATIENT
Start: 2019-05-26 | End: 2019-05-26

## 2019-05-26 RX ORDER — KETOROLAC TROMETHAMINE 30 MG/ML
15 INJECTION, SOLUTION INTRAMUSCULAR; INTRAVENOUS EVERY 8 HOURS
Status: DISPENSED | OUTPATIENT
Start: 2019-05-26 | End: 2019-05-27

## 2019-05-26 RX ORDER — SODIUM CHLORIDE 0.9 % (FLUSH) 0.9 %
3 SYRINGE (ML) INJECTION
Status: DISCONTINUED | OUTPATIENT
Start: 2019-05-26 | End: 2019-05-27

## 2019-05-26 RX ORDER — HYDROCODONE BITARTRATE AND ACETAMINOPHEN 500; 5 MG/1; MG/1
TABLET ORAL
Status: DISCONTINUED | OUTPATIENT
Start: 2019-05-26 | End: 2019-05-28 | Stop reason: HOSPADM

## 2019-05-26 RX ORDER — FENTANYL CITRATE 50 UG/ML
25 INJECTION, SOLUTION INTRAMUSCULAR; INTRAVENOUS EVERY 5 MIN PRN
Status: DISCONTINUED | OUTPATIENT
Start: 2019-05-26 | End: 2019-05-27

## 2019-05-26 RX ORDER — SODIUM CHLORIDE, SODIUM LACTATE, POTASSIUM CHLORIDE, CALCIUM CHLORIDE 600; 310; 30; 20 MG/100ML; MG/100ML; MG/100ML; MG/100ML
INJECTION, SOLUTION INTRAVENOUS CONTINUOUS PRN
Status: DISCONTINUED | OUTPATIENT
Start: 2019-05-26 | End: 2019-05-26

## 2019-05-26 RX ORDER — NEOSTIGMINE METHYLSULFATE 1 MG/ML
INJECTION, SOLUTION INTRAVENOUS
Status: DISCONTINUED | OUTPATIENT
Start: 2019-05-26 | End: 2019-05-26

## 2019-05-26 RX ORDER — PROPOFOL 10 MG/ML
VIAL (ML) INTRAVENOUS
Status: DISCONTINUED | OUTPATIENT
Start: 2019-05-26 | End: 2019-05-26

## 2019-05-26 RX ORDER — HYDROMORPHONE HYDROCHLORIDE 2 MG/ML
0.2 INJECTION, SOLUTION INTRAMUSCULAR; INTRAVENOUS; SUBCUTANEOUS EVERY 5 MIN PRN
Status: DISCONTINUED | OUTPATIENT
Start: 2019-05-26 | End: 2019-05-27

## 2019-05-26 RX ORDER — ROCURONIUM BROMIDE 10 MG/ML
INJECTION, SOLUTION INTRAVENOUS
Status: DISCONTINUED | OUTPATIENT
Start: 2019-05-26 | End: 2019-05-26

## 2019-05-26 RX ADMIN — HYDROMORPHONE HYDROCHLORIDE 0.2 MG: 2 INJECTION, SOLUTION INTRAMUSCULAR; INTRAVENOUS; SUBCUTANEOUS at 07:05

## 2019-05-26 RX ADMIN — GLYCOPYRROLATE 0.8 MG: 0.2 INJECTION, SOLUTION INTRAMUSCULAR; INTRAVENOUS at 06:05

## 2019-05-26 RX ADMIN — LIDOCAINE HYDROCHLORIDE 100 MG: 20 INJECTION, SOLUTION INTRAVENOUS at 05:05

## 2019-05-26 RX ADMIN — NEOSTIGMINE METHYLSULFATE 4 MG: 1 INJECTION INTRAVENOUS at 06:05

## 2019-05-26 RX ADMIN — ACETAMINOPHEN 1000 MG: 10 INJECTION, SOLUTION INTRAVENOUS at 05:05

## 2019-05-26 RX ADMIN — SODIUM CHLORIDE, SODIUM LACTATE, POTASSIUM CHLORIDE, AND CALCIUM CHLORIDE: .6; .31; .03; .02 INJECTION, SOLUTION INTRAVENOUS at 05:05

## 2019-05-26 RX ADMIN — SUCCINYLCHOLINE CHLORIDE 120 MG: 20 INJECTION, SOLUTION INTRAMUSCULAR; INTRAVENOUS at 05:05

## 2019-05-26 RX ADMIN — ONDANSETRON 4 MG: 2 INJECTION, SOLUTION INTRAMUSCULAR; INTRAVENOUS at 05:05

## 2019-05-26 RX ADMIN — ROCURONIUM BROMIDE 20 MG: 10 INJECTION, SOLUTION INTRAVENOUS at 05:05

## 2019-05-26 RX ADMIN — HEPARIN SODIUM 5000 UNITS: 5000 INJECTION, SOLUTION INTRAVENOUS; SUBCUTANEOUS at 09:05

## 2019-05-26 RX ADMIN — HEPARIN SODIUM 5000 UNITS: 5000 INJECTION, SOLUTION INTRAVENOUS; SUBCUTANEOUS at 05:05

## 2019-05-26 RX ADMIN — FENTANYL CITRATE 50 MCG: 50 INJECTION INTRAMUSCULAR; INTRAVENOUS at 06:05

## 2019-05-26 RX ADMIN — FENTANYL CITRATE 50 MCG: 50 INJECTION INTRAMUSCULAR; INTRAVENOUS at 05:05

## 2019-05-26 RX ADMIN — SODIUM CHLORIDE: 0.9 INJECTION, SOLUTION INTRAVENOUS at 05:05

## 2019-05-26 RX ADMIN — PIPERACILLIN AND TAZOBACTAM 4.5 G: 4; .5 INJECTION, POWDER, LYOPHILIZED, FOR SOLUTION INTRAVENOUS; PARENTERAL at 07:05

## 2019-05-26 RX ADMIN — PIPERACILLIN AND TAZOBACTAM 4.5 G: 4; .5 INJECTION, POWDER, LYOPHILIZED, FOR SOLUTION INTRAVENOUS; PARENTERAL at 05:05

## 2019-05-26 RX ADMIN — MIDAZOLAM HYDROCHLORIDE 2 MG: 1 INJECTION, SOLUTION INTRAMUSCULAR; INTRAVENOUS at 05:05

## 2019-05-26 RX ADMIN — ACETAMINOPHEN 650 MG: 325 TABLET, FILM COATED ORAL at 09:05

## 2019-05-26 RX ADMIN — ROCURONIUM BROMIDE 10 MG: 10 INJECTION, SOLUTION INTRAVENOUS at 05:05

## 2019-05-26 RX ADMIN — SODIUM CHLORIDE, SODIUM LACTATE, POTASSIUM CHLORIDE, AND CALCIUM CHLORIDE: .6; .31; .03; .02 INJECTION, SOLUTION INTRAVENOUS at 07:05

## 2019-05-26 RX ADMIN — PROPOFOL 160 MG: 10 INJECTION, EMULSION INTRAVENOUS at 05:05

## 2019-05-26 NOTE — PLAN OF CARE
Problem: Adult Inpatient Plan of Care  Goal: Plan of Care Review  Outcome: Ongoing (interventions implemented as appropriate)     05/26/19 5243   Plan of Care Review   Plan of Care Reviewed With patient   Patient remains free from fall or injury. No c/o pain at this time. Patient ambulating to restroom with standby assist. IVF infusing as ordered. IV antibiotics administered as ordered. Patient remains NPO for procedure today. All due meds administered as ordered. Safety maintained. Bed in low locked position, bed alarm armed and audible with call light in reach. Will continue to monitor.

## 2019-05-26 NOTE — TRANSFER OF CARE
"Anesthesia Transfer of Care Note    Patient: April Jordan    Procedure(s) Performed: Procedure(s) (LRB):  LAPAROSCOPY, DIAGNOSTIC Drainage of abscess Possible Biopsy (N/A)    Patient location: PACU    Anesthesia Type: general    Transport from OR: Transported from OR on room air with adequate spontaneous ventilation    Post pain: adequate analgesia    Post assessment: no apparent anesthetic complications and tolerated procedure well    Post vital signs: stable    Level of consciousness: awake, alert and oriented    Nausea/Vomiting: no nausea/vomiting    Complications: none    Transfer of care protocol was followed      Last vitals:   Visit Vitals  /63 (BP Location: Left arm, Patient Position: Lying)   Pulse 110   Temp 37.1 °C (98.8 °F) (Oral)   Resp 19   Ht 5' 7" (1.702 m)   Wt 90.3 kg (199 lb)   LMP 05/11/2019   SpO2 100%   Breastfeeding? No   BMI 31.17 kg/m²     "

## 2019-05-26 NOTE — CONSULTS
See interop consult.  Dictation #1  MRN:[id number]  CSN:[id number]  [id number]    Summary:  Large cavity of puss in the mid abdomen.  Great deal of scarring.  Unable to determine source.  General surgery was able to drain abscess and place drain and perform some DASIA.  ID of bacteria will be helpful to know.  Will continue to work up this patient with further imaging.  My team will contininue to follow along with general surgery.        Tyrese Jodran MD  [phone]

## 2019-05-26 NOTE — ANESTHESIA PREPROCEDURE EVALUATION
05/25/2019  Shahida Ortega is a 48 y.o., female.    Anesthesia Evaluation     I have reviewed the Nursing Notes.      Review of Systems  Social:  Non-Smoker   Hematology/Oncology:         -- Anemia:   Cardiovascular:   Denies Pacemaker. Hypertension  Denies Valvular problems/Murmurs.  Denies MI.  Denies CAD.    Denies CABG/stent.  Denies Dysrhythmias.   Denies Angina.             denies PVD    Pulmonary:  Pulmonary Normal    Renal/:  Renal/ Normal     Hepatic/GI:   Denies PUD. Denies Liver Disease. Denies Hepatitis. Likely abdominal abscess   Neurological:  Neurology Normal    Endocrine:  Endocrine Normal        Physical Exam  General:  Obesity    Airway/Jaw/Neck:  AIRWAY FINDINGS: Normal      Chest/Lungs:  Chest/Lungs Clear    Heart/Vascular:  Heart Findings: Rate: Tachycardia  Rhythm: Regular Rhythm  Sounds: Normal        Mental Status:  Mental Status Findings: Normal        Anesthesia Plan  Type of Anesthesia, risks & benefits discussed:  Anesthesia Type:  general  Patient's Preference:   Intra-op Monitoring Plan: standard ASA monitors  Intra-op Monitoring Plan Comments:   Post Op Pain Control Plan:   Post Op Pain Control Plan Comments:   Induction:   IV  Beta Blocker:  Patient is on a Beta-Blocker and has received one dose within the past 24 hours (No further documentation required).       Informed Consent: Patient understands risks and agrees with Anesthesia plan.  Questions answered. Anesthesia consent signed with patient.  ASA Score: 3     Day of Surgery Review of History & Physical:  There are no significant changes.  H&P update referred to the provider.     Anesthesia Plan Notes: 48 yr old woman w/ PMH HTN presents w/ abdominal pain and CT evidence of Abdominal abscess.  Pt tachycardic now, oxygenation on room air is mid 90's, CT notes ground glass opacities. Pt is anemic and   2 units have been  cross-matched.        Ready For Surgery From Anesthesia Perspective.

## 2019-05-26 NOTE — NURSING
Pt transported to OR via stretcher with approximately 33ml of bood needing to be infused. No signs of distress noted

## 2019-05-26 NOTE — OR NURSING
Patient received in PACU, placed on simple face mask and cardiac monitiors. 3  abdominal dressings to abdomen with gauze and tegaderm in place. No drainage at site.

## 2019-05-26 NOTE — OP NOTE
This is Dr. Jose Luis Hanson  dictating an operative note on patient April Jordan, MRN [id number]    LOCATION  [facility] [address]  [place] [address]    DATE OF PROCEDURE  05/26/2019    PROCEDURE  1. Diagnostic laparoscopy  2. Lysis of adhesions  3. Drainage of intraabdominal abscess  4. Drain placement    PREOP DIAGNOSIS  1. Intraabdominal abscess, etiology unknown    POSTOP DX  1. same    SURGEON:  Jose Luis Hanson MD    ASSISTANT:  Avelino Moran MD (resident)    ANESTHESIA:  General anesthetic    SUMMARY OF SIGNIFICANT EVENTS & FINDINGS:  - Large cavity with copious foul-smelling milky purulent fluid  - No implicative gynecologic or gastrointestinal source; laparotomy deferred in lieu of continuing work-up    OPERATIVE DETAIL:  After review of appropriate documents and consents, the patient was brought to the operating room. They were sedated and intubated under general anesthesia. A surgical safety checklist was performed. A urinary catheter was placed. A preoperative dose of antibiotic medication was administered. A time out was performed. Abdominal insufflation was achieved via Veress needle technique in the left upper quadrant. A 5 mm trocar was placed here under direct vision via optical trocar technique; there was no visceral injury. An 11-mm trocar was placed in the left mid-abdomen and a 5mm trocar in the epigastrium. There were copious adhesions throughout the abdomen, excluding only the left upper quadrant. The liver was adhered directly to the anterior abdominal wall. The large mid-lower abscess cavity was bluntly entered and its contents suctioned. The foul-smelling milky white fluid was collected and sent for culture. Careful to avoid injury to adnexal or gastrointestinal organs, I gently probed the dense inflammatory tissue and was able to identify the uterus (with large fibroid), the adjacent sigmoid colon, and the cecum. Even these were difficult to further dissect due to  the dense rind over the entire lower abdomen and pelvis. Conferring with Dr Tyrese Jordan (Gynecology) on intraoperative consult, we elected to defer laparotomy at this time given our achievement of source control following placement of a 19 Fr Anselmo drain into the now drained abscess cavity; this was exteriorized through the left upper quadrant incision. The abdomen was deflated, local anesthetic injected, and the skin closed with 4 0 monocryl. The patient was awoken from anesthesia, extubated and transported to recovery in stable condition.    EBL:  < 50 mL    DRAINS:  19 Fr anselmo drain    SPECIMEN:  Abscess Fluid    COMPLICATIONS:  None    DISPOSITION  To PACU

## 2019-05-26 NOTE — PLAN OF CARE
05/26/19 1444   Discharge Assessment   Assessment Type Discharge Planning Assessment   Confirmed/corrected address and phone number on facesheet? Yes   Assessment information obtained from? Patient;Medical Record   Prior to hospitilization cognitive status: Alert/Oriented   Prior to hospitalization functional status: Independent   Current cognitive status: Alert/Oriented   Current Functional Status: Independent   Lives With parent(s)   Able to Return to Prior Arrangements yes   Is patient able to care for self after discharge? Yes   Patient's perception of discharge disposition admitted as an inpatient   Readmission Within the Last 30 Days no previous admission in last 30 days   Patient currently being followed by outpatient case management? No   Patient currently receives any other outside agency services? No   Equipment Currently Used at Home none   Do you have any problems affording any of your prescribed medications? No   Is the patient taking medications as prescribed? yes   Does the patient have transportation home? Yes   Transportation Anticipated family or friend will provide   Does the patient receive services at the [facility]? No   Discharge Plan A Home with family   Discharge Plan B Home with family   DME Needed Upon Discharge  none

## 2019-05-26 NOTE — CONSULTS
Consulted for 47 y/o AAF G0 with pelvic / Abdominal abscess.  See H&P from general surgery.  No surgical history.  No GYN history.  Scheduled for exploratory laparotomy later today.  Visited with patient and consented for possible ROCK / BSO (essentially consented to remove any pathological pelvic organ).  Will be happy to participate in this patient's care.  Consents are signed, witnessed and on the chart.  Patient is anemic and has blood products on reserve.       Tyrese Jordan MD

## 2019-05-27 LAB
ANION GAP SERPL CALC-SCNC: 6 MMOL/L (ref 8–16)
ANISOCYTOSIS BLD QL SMEAR: ABNORMAL
BASOPHILS NFR BLD: 0 % (ref 0–1.9)
BUN SERPL-MCNC: 9 MG/DL (ref 6–20)
CALCIUM SERPL-MCNC: 8.1 MG/DL (ref 8.7–10.5)
CEA SERPL-MCNC: <1 NG/ML (ref 0–5)
CHLORIDE SERPL-SCNC: 103 MMOL/L (ref 95–110)
CO2 SERPL-SCNC: 26 MMOL/L (ref 23–29)
CREAT SERPL-MCNC: 0.6 MG/DL (ref 0.5–1.4)
DIFFERENTIAL METHOD: ABNORMAL
EOSINOPHIL NFR BLD: 1 % (ref 0–8)
ERYTHROCYTE [DISTWIDTH] IN BLOOD BY AUTOMATED COUNT: 23.3 % (ref 11.5–14.5)
EST. GFR  (AFRICAN AMERICAN): >60 ML/MIN/1.73 M^2
EST. GFR  (NON AFRICAN AMERICAN): >60 ML/MIN/1.73 M^2
GIANT PLATELETS BLD QL SMEAR: PRESENT
GLUCOSE SERPL-MCNC: 136 MG/DL (ref 70–110)
GRAM STN SPEC: NORMAL
HCT VFR BLD AUTO: 33.2 % (ref 37–48.5)
HGB BLD-MCNC: 9.6 G/DL (ref 12–16)
HYPOCHROMIA BLD QL SMEAR: ABNORMAL
LYMPHOCYTES NFR BLD: 22 % (ref 18–48)
MAGNESIUM SERPL-MCNC: 1.2 MG/DL (ref 1.6–2.6)
MCH RBC QN AUTO: 20.5 PG (ref 27–31)
MCHC RBC AUTO-ENTMCNC: 28.9 G/DL (ref 32–36)
MCV RBC AUTO: 71 FL (ref 82–98)
METAMYELOCYTES NFR BLD MANUAL: 3 %
MONOCYTES NFR BLD: 5 % (ref 4–15)
MYELOCYTES NFR BLD MANUAL: 1 %
NEUTROPHILS NFR BLD: 33 % (ref 38–73)
NEUTS BAND NFR BLD MANUAL: 35 %
PHOSPHATE SERPL-MCNC: 2.7 MG/DL (ref 2.7–4.5)
PLATELET # BLD AUTO: 352 K/UL (ref 150–350)
PLATELET BLD QL SMEAR: ABNORMAL
PMV BLD AUTO: 9.8 FL (ref 9.2–12.9)
POIKILOCYTOSIS BLD QL SMEAR: SLIGHT
POLYCHROMASIA BLD QL SMEAR: ABNORMAL
POTASSIUM SERPL-SCNC: 3.6 MMOL/L (ref 3.5–5.1)
RBC # BLD AUTO: 4.69 M/UL (ref 4–5.4)
SODIUM SERPL-SCNC: 135 MMOL/L (ref 136–145)
WBC # BLD AUTO: 14.45 K/UL (ref 3.9–12.7)

## 2019-05-27 PROCEDURE — 63600175 PHARM REV CODE 636 W HCPCS: Performed by: STUDENT IN AN ORGANIZED HEALTH CARE EDUCATION/TRAINING PROGRAM

## 2019-05-27 PROCEDURE — 99900035 HC TECH TIME PER 15 MIN (STAT)

## 2019-05-27 PROCEDURE — 97165 OT EVAL LOW COMPLEX 30 MIN: CPT

## 2019-05-27 PROCEDURE — 94761 N-INVAS EAR/PLS OXIMETRY MLT: CPT

## 2019-05-27 PROCEDURE — 11000001 HC ACUTE MED/SURG PRIVATE ROOM

## 2019-05-27 PROCEDURE — 85027 COMPLETE CBC AUTOMATED: CPT

## 2019-05-27 PROCEDURE — 94799 UNLISTED PULMONARY SVC/PX: CPT

## 2019-05-27 PROCEDURE — 84100 ASSAY OF PHOSPHORUS: CPT

## 2019-05-27 PROCEDURE — 25000003 PHARM REV CODE 250: Performed by: STUDENT IN AN ORGANIZED HEALTH CARE EDUCATION/TRAINING PROGRAM

## 2019-05-27 PROCEDURE — 80048 BASIC METABOLIC PNL TOTAL CA: CPT

## 2019-05-27 PROCEDURE — 36415 COLL VENOUS BLD VENIPUNCTURE: CPT

## 2019-05-27 PROCEDURE — 83735 ASSAY OF MAGNESIUM: CPT

## 2019-05-27 PROCEDURE — 97161 PT EVAL LOW COMPLEX 20 MIN: CPT

## 2019-05-27 PROCEDURE — 85007 BL SMEAR W/DIFF WBC COUNT: CPT

## 2019-05-27 RX ORDER — ENOXAPARIN SODIUM 100 MG/ML
40 INJECTION SUBCUTANEOUS EVERY 24 HOURS
Status: DISCONTINUED | OUTPATIENT
Start: 2019-05-27 | End: 2019-05-28 | Stop reason: HOSPADM

## 2019-05-27 RX ORDER — MORPHINE SULFATE 10 MG/ML
2 INJECTION INTRAMUSCULAR; INTRAVENOUS; SUBCUTANEOUS EVERY 4 HOURS PRN
Status: DISCONTINUED | OUTPATIENT
Start: 2019-05-27 | End: 2019-05-28 | Stop reason: HOSPADM

## 2019-05-27 RX ORDER — OXYCODONE HYDROCHLORIDE 5 MG/1
10 TABLET ORAL EVERY 4 HOURS PRN
Status: DISCONTINUED | OUTPATIENT
Start: 2019-05-27 | End: 2019-05-28 | Stop reason: HOSPADM

## 2019-05-27 RX ORDER — POTASSIUM CHLORIDE 20 MEQ/1
40 TABLET, EXTENDED RELEASE ORAL ONCE
Status: COMPLETED | OUTPATIENT
Start: 2019-05-27 | End: 2019-05-27

## 2019-05-27 RX ORDER — OXYCODONE HYDROCHLORIDE 5 MG/1
5 TABLET ORAL EVERY 4 HOURS PRN
Status: DISCONTINUED | OUTPATIENT
Start: 2019-05-27 | End: 2019-05-28 | Stop reason: HOSPADM

## 2019-05-27 RX ADMIN — ACETAMINOPHEN 650 MG: 325 TABLET, FILM COATED ORAL at 09:05

## 2019-05-27 RX ADMIN — SODIUM CHLORIDE, SODIUM LACTATE, POTASSIUM CHLORIDE, AND CALCIUM CHLORIDE: .6; .31; .03; .02 INJECTION, SOLUTION INTRAVENOUS at 02:05

## 2019-05-27 RX ADMIN — MAGNESIUM SULFATE HEPTAHYDRATE 3 G: 500 INJECTION, SOLUTION INTRAMUSCULAR; INTRAVENOUS at 10:05

## 2019-05-27 RX ADMIN — ACETAMINOPHEN 650 MG: 325 TABLET, FILM COATED ORAL at 01:05

## 2019-05-27 RX ADMIN — HEPARIN SODIUM 5000 UNITS: 5000 INJECTION, SOLUTION INTRAVENOUS; SUBCUTANEOUS at 05:05

## 2019-05-27 RX ADMIN — PIPERACILLIN AND TAZOBACTAM 4.5 G: 4; .5 INJECTION, POWDER, LYOPHILIZED, FOR SOLUTION INTRAVENOUS; PARENTERAL at 09:05

## 2019-05-27 RX ADMIN — ACETAMINOPHEN 650 MG: 325 TABLET, FILM COATED ORAL at 05:05

## 2019-05-27 RX ADMIN — KETOROLAC TROMETHAMINE 15 MG: 30 INJECTION, SOLUTION INTRAMUSCULAR; INTRAVENOUS at 05:05

## 2019-05-27 RX ADMIN — PIPERACILLIN AND TAZOBACTAM 4.5 G: 4; .5 INJECTION, POWDER, LYOPHILIZED, FOR SOLUTION INTRAVENOUS; PARENTERAL at 05:05

## 2019-05-27 RX ADMIN — KETOROLAC TROMETHAMINE 15 MG: 30 INJECTION, SOLUTION INTRAMUSCULAR; INTRAVENOUS at 01:05

## 2019-05-27 RX ADMIN — POTASSIUM CHLORIDE 40 MEQ: 1500 TABLET, EXTENDED RELEASE ORAL at 09:05

## 2019-05-27 RX ADMIN — ENOXAPARIN SODIUM 40 MG: 100 INJECTION SUBCUTANEOUS at 05:05

## 2019-05-27 RX ADMIN — PIPERACILLIN AND TAZOBACTAM 4.5 G: 4; .5 INJECTION, POWDER, LYOPHILIZED, FOR SOLUTION INTRAVENOUS; PARENTERAL at 01:05

## 2019-05-27 NOTE — PLAN OF CARE
Problem: Occupational Therapy Goal  Goal: Occupational Therapy Goal  Goals to be met by: 6/1/19    Patient will increase functional independence with ADLs by performing:    UE Dressing with Modified Manly.  LE Dressing with Modified Man[place].  Grooming while standing at sink with Modified Man[place].  Toileting from toilet with Modified Manly for hygiene and clothing management.   Supine to sit with Modified Man[place].  Step transfer with Modified Manly  Toilet transfer to toilet with Modified Manly.    Outcome: Ongoing (interventions implemented as appropriate)  OT eval is complete. The patient is able to amb and perform self care with CGA. The patient with c/o dizziness and decreased enduranc ewith ambulation. Patient will benefit from OT to address functional deficits.    Comments: The patient was encouraged to sit in the chair and amb to the bathroom with nursing (S).

## 2019-05-27 NOTE — CONSULTS
DATE OF CONSULTATION:  05/26/2019    PREOPERATIVE DIAGNOSES:  1.  Intra-abdominal abscess, large.  2.  Unknown source of infection.  3.  Fibroid uterus.    POSTOPERATIVE DIAGNOSES:  1.  Intra-abdominal abscess, large.  2.  Unknown source of infection.  3.  Fibroid uterus.    PROCEDURE:  Diagnostic laparoscopy with drainage of abdominal abscess.    FINDINGS:  Very large intra-abdominal abscess in the mid abdomen at   approximately the umbilical level.    INDICATION:  This patient was counseled and consented for a diagnostic   laparoscopy by General Surgery for exploration and drainage of an   intra-abdominal abscess.  The source of the abscess was unclear and there were   some concerns that there may be a GYN possibility for organ of infection.  I was   consulted earlier today and asked to be present during the laparoscopy in case   there was any insight I could provide with the surgical team.  Please see Dr. Jose Luis Hanson's operative report regarding the surgery.  I did not scrub into   this case.  However, we did witness a great amount of pus in the abdomen, much   greater than 1 L.  This is in the mid abdomen potentially involving the pelvis.    Pelvic exam prior to the operation revealed some fullness in the posterior   cul-de-sac consistent with a possible pelvic mass or fibroid uterus.  During the   case, the abscess was drained and the pelvis was copiously washed.  We were   unable to determine if this was a GYN problem or a diverticular abscess or   potentially an appendiceal abscess.  However, we were successful in   decompressing the abscess, washing out the pelvis and placing a definitive   drain.  This patient will continue to have workup postoperatively and will   continue to receive antibiotics.  My team will be available to continue to   follow this patient.  We will follow up on the pathogen aspirated from the   abscess.  We will also order a pelvic ultrasound.      FERNANDO  dd: 05/26/2019 18:21:08  (CDT)  td: 05/26/2019 20:45:23 (PEDRO)  Doc ID   #[id number]  Job ID #[id number]    CC:

## 2019-05-27 NOTE — PLAN OF CARE
Problem: Adult Inpatient Plan of Care  Goal: Plan of Care Review  Outcome: Ongoing (interventions implemented as appropriate)  Pt free of accidental injury during shift. No s/s of distress noted. Denies pain at present. Able to make needs known. Pt ambulated x 2. IVF and antibiotics infusing as ordered. MARLEEN drain noted with copious amount of drainage. MD aware. Safety measures maintained. Call bell within reach. Will continue to monitor

## 2019-05-27 NOTE — NURSING
Patient arrived back to unit in bed with transport. AAOX4, no c/o pain. Jernigan catheter in place. Incisions to abdomen covered with gauze and tegaderm, no drainage to gauze noted. MARLEEN drain in place with moderate drainage. IVF infusing as ordered. Bed in low locked position, bed alarm armed and audible, with call light in reach. Will continue to monitor.

## 2019-05-27 NOTE — PT/OT/SLP EVAL
Occupational Therapy   Evaluation    Name: Shahida Ortega  MRN: [id number]  Admitting Diagnosis:  Postprocedural intraabdominal abscess 1 Day Post-Op    Recommendations:     Discharge Recommendations: home(with family assist)  Discharge Equipment Recommendations:  none  Barriers to discharge:  None    Assessment:     hSahida Ortega is a 48 y.o. female with a medical diagnosis of Postprocedural intraabdominal abscess.  She presents with decreased endurance and dizziness with activity.  Performance deficits affecting function: weakness, impaired endurance, impaired self care skills, impaired balance, gait instability, impaired functional mobilty, decreased safety awareness, impaired skin.      Rehab Prognosis: Good; patient would benefit from acute skilled OT services to address these deficits and reach maximum level of function.       Plan:     Patient to be seen 3 x/week to address the above listed problems via self-care/home management, therapeutic activities, therapeutic exercises  · Plan of Care Expires: 06/01/19  · Plan of Care Reviewed with: patient    Subjective     Chief Complaint: feels dizzy with amb  Patient/Family Comments/goals: return to PLOF    Occupational Profile:  Living Environment: The patient lives her mother in a SS house with no concerns.   Previous level of function: The patient was (I) with self care and amb without AD  Roles and Routines: The patient does not drive. The patient's sisiter provides transportation.  Equipment Used at Home:  none  Assistance upon Discharge: mother and sister    Pain/Comfort:  Pain Rating 1: 0/10    Patients cultural, spiritual, Alevism conflicts given the current situation: no    Objective:     Communicated with: nurse prior to session.  Patient found HOB elevated with MARLEEN drain, pulse ox (continuous), peripheral IV upon OT entry to room.    General Precautions: Standard, fall   Orthopedic Precautions:N/A   Braces: N/A     Occupational Performance:    Bed Mobility:     · Patient completed Supine to Sit with contact guard assistance and and verbal cues for log roll for s/p abdominal surgery safety and comfort    Functional Mobility/Transfers:  · Patient completed Sit <> Stand Transfer with stand by assistance  with  no assistive device   · Functional Mobility: The patient amb without AD with CGA in the hallway with PT. The patient c/o some dizziness while ambulating and fatigue.    Activities of Daily Living:  · Grooming: The patient declined to stand at the sink to brush her teeth 2* c/o fatigue after ambulating with PT. The patient was given a basin and toiletry item to brush her teeth from sitting.   · Upper Body Dressing: minimum assistance limited by IV line in the RUE    Cognitive/Visual Perceptual:  Cognitive/Psychosocial Skills:     -       Oriented to: Person, Place and Situation   -       Follows Commands/attention:Follows two-step commands  -       Communication: clear/fluent  -       Memory: No Deficits noted  -       Safety awareness/insight to disability: impaired   -       Mood/Affect/Coping skills/emotional control: Appropriate to situation    Physical Exam:  Balance: -       fair+/good  Postural examination/scapula alignment:    -       No postural abnormalities identified  Skin integrity: Visible skin intact and MARLEEN drain present in left abdomin and abdominal incision not visualized  Upper Extremity Range of Motion:     -       Right Upper Extremity: WFL  -       Left Upper Extremity: WFL  Upper Extremity Strength:    -       Right Upper Extremity: WFL  -       Left Upper Extremity: WFL    AMPAC 6 Click ADL:  AMPAC Total Score: 21    Treatment & Education:  The patient participated in OT eval. The patient was educated re: the need to sit in the chair for meals and request nursing assist to amb to the bathroom.  Education:    Patient left up in chair with all lines intact, call button in reach and nurseAnahi notified    GOALS:   Multidisciplinary Problems      Occupational Therapy Goals        Problem: Occupational Therapy Goal    Goal Priority Disciplines Outcome Interventions   Occupational Therapy Goal     OT, PT/OT Ongoing (interventions implemented as appropriate)    Description:  Goals to be met by: 6/1/19    Patient will increase functional independence with ADLs by performing:    UE Dressing with Modified Cole.  LE Dressing with Modified Cole.  Grooming while standing at sink with Modified Cole.  Toileting from toilet with Modified Cole for hygiene and clothing management.   Supine to sit with Modified Cole.  Step transfer with Modified Cole  Toilet transfer to toilet with Modified Cole.                      History:     Past Medical History:   Diagnosis Date    Hypertension          Past Surgical History:   Procedure Laterality Date    LAPAROSCOPY, DIAGNOSTIC Drainage of abscess  N/A 5/26/2019    Performed by Jose Luis [facility]on MD at [facility] OR    LYSIS, ADHESIONS, LAPAROSCOPIC  5/26/2019    Performed by Jose Luis Hanson MD at [facility] OR       Time Tracking:     OT Date of Treatment: 05/27/19  OT Start Time: 0941  OT Stop Time: 0954  OT Total Time (min): 13 min    Billable Minutes:Evaluation 13 (with PT)    Cheri Ramirez, OT  5/27/2019

## 2019-05-27 NOTE — PROGRESS NOTES
Received call from laboratory regarding pathology specimen received with no lab orders.  Telephone Dr. Hanson office and spoke with Dr. Avelino Moran who gave new orders for tissue specimen:    Gram stain  Aerobice culture  Anaerobe culture    Lab Requisitions printed and brought to the lab.

## 2019-05-27 NOTE — PROGRESS NOTES
[facility]  General Surgery  Progress Note     Subjective:      Interval History: Patient tolerated surgery well. Reports her abdominal pain is significantly improved. Tolerated clears. No nausea/vomiting. Afebrile. MARLEEN drain with 275 mL output.      Post-Op Info:  Diagnostic laparoscopy, drainage of intra-abdominal abscess, POD #1      Medications:    Current Facility-Administered Medications:     0.9%  NaCl infusion (for blood administration), , Intravenous, Q24H PRN, Sarabjit Moran MD    acetaminophen tablet 650 mg, 650 mg, Oral, Q8H PRN, Sarabjit Moran MD    acetaminophen tablet 650 mg, 650 mg, Oral, Q8H, Sarabjit Moran MD, 650 mg at 05/27/19 0516    diphenhydrAMINE injection 25 mg, 25 mg, Intravenous, Q4H PRN, Sarabjit Moran MD    heparin (porcine) injection 5,000 Units, 5,000 Units, Subcutaneous, Q8H, Sarabjit Moran MD, 5,000 Units at 05/27/19 0516    ketorolac injection 15 mg, 15 mg, Intravenous, Q8H, Sarabjit Moran MD, 15 mg at 05/27/19 0516    lactated ringers infusion, , Intravenous, Continuous, Mckenzie Carlos MD, Last Rate: 125 mL/hr at 05/27/19 0200    metoprolol injection 5 mg, 5 mg, Intravenous, Q6H PRN, Sarabjit Moran MD    morphine injection 2 mg, 2 mg, Intravenous, Q4H PRN, Sarabjit Moran MD    ondansetron disintegrating tablet 8 mg, 8 mg, Oral, Q8H PRN, Sarabjit Moran MD    piperacillin-tazobactam 4.5 g in sodium chloride 0.9% 100 mL IVPB (ready to mix system), 4.5 g, Intravenous, Q8H, Sarabjit Moran MD, Last Rate: 25 mL/hr at 05/27/19 0154, 4.5 g at 05/27/19 0154    promethazine (PHENERGAN) 6.25 mg in dextrose 5 % 50 mL IVPB, 6.25 mg, Intravenous, Q6H PRN, Sarabjit Moran MD    sodium chloride 0.9% flush 10 mL, 10 mL, Intravenous, PRN, Sarabjit Moran MD    sodium chloride 0.9% flush 3 mL, 3 mL, Intravenous, PRN, Jeet Rocha MD      Objective:   Vital Signs:   Vitals:    05/26/19 2019 05/26/19 2320 05/27/19 0504 05/27/19 0749   BP: 133/77 126/71 128/78 130/80   BP Location:  " Left arm Left arm Left arm   Patient Position: Lying Lying Lying Lying   Pulse: 102 108 105 98   Resp: 19 19 19 18   Temp: 97.9 °F (36.6 °C) 97.9 °F (36.6 °C) 99.1 °F (37.3 °C) 98.5 °F (36.9 °C)   TempSrc: Oral Oral Oral Oral   SpO2: 98% 99% 98% 95%   Weight: 92.2 kg (203 lb 4.2 oz)      Height: 5' 7" (1.702 m)        Intake/Output Summary (Last 24 hours):    Intake/Output Summary (Last 24 hours) at 5/27/2019 0811  Last data filed at 5/27/2019 0700  Gross per 24 hour   Intake 3400 ml   Output 1460 ml   Net 1940 ml     Physical Exam:  General appearance: awake, alert, no acute distress  HEENT: EOMI, anicteric, moist oral mucosal membranes  Pulm: symmetric chest expansion, normal WOB  Abd: soft, NTND, incision sites clean/dry, MARLEEN drain with cloudy serous drainage  Extremities: warm, well-perfused, no edema  Neuro: CN II-XII grossly intact, normal speech  Skin: warm, dry, no rashes  Psych: appropriate mood and affect     Significant Labs:  WBC 14.5, Hgb 9.6, Hct 33.2, plt 352  Na 135, K 3.6, BUN 9, Cr 0.6     Assessment/Plan:   48 year old woman with no significant past medical history who presented to the ED with a large intra-abdominal abscess, s/p diagnostic laparoscopy, lysis of adhesions, drainage of intra-abdominal abscess, POD #1    -Advance to regular diet  -Decrease IV fluids, will d/c once more reliably tolerating po intake  -Pelvic ultrasound, G/C pending  -ObGyn consulted, appreciate recommendations  -Continue Zosyn, day #3, f/u cultures  -Continue MARLEEN drain  -Multi-modality pain control  -Lovenox/SCDs for DVT prophylaxis  -OOB tid, ambulate in hallway, PT/OT consult  -Will need outpatient colonoscopy     Mckenzie Carlos MD  General Surgery  [facility]    "

## 2019-05-27 NOTE — NURSING
Pt noted with a significant amount of brown tinged drainage from MARLEEN drain. 500 ml total collected. Notified MD

## 2019-05-27 NOTE — PLAN OF CARE
Problem: Adult Inpatient Plan of Care  Goal: Plan of Care Review  Outcome: Ongoing (interventions implemented as appropriate)     05/27/19 1170   Plan of Care Review   Plan of Care Reviewed With patient   Patient resting most of this shift. Pain remains at a 3/10 on pain scale. Patient refused IV toradol X1. Dressings to abdomen remain c/d/i. MARLEEN drain draining and emptied as needed. IVF insusing as ordered. Remains on IV antibiotics as ordered. Safety maintained. Bed in low locked position, bed alarm armed and audible with call light in reach. Will continue to monitor.

## 2019-05-27 NOTE — PLAN OF CARE
Problem: Physical Therapy Goal  Goal: Physical Therapy Goal  Goals to be met by: 6/10/19     Patient will increase functional independence with mobility by performin. Supine to sit with Modified Sean[place]  2. Rolling to Left and Right with Modified Sean[place]  4. Sit to stand transfer with Sean[place]  5. Bed to chair transfer with Sean[place]   6. Gait >500 feet with Sean[place]   7. Lower extremity exercise program 3 sets x10 reps per handout, with independence    Pt ambulated ~250 ft with HHA and pushing IV pole.  Pt with c/o dizziness during ambulation.

## 2019-05-27 NOTE — PT/OT/SLP EVAL
Physical Therapy Evaluation    Patient Name:  Shahida Ortega   MRN:  [id number]    Recommendations:     Discharge Recommendations:  home   Discharge Equipment Recommendations: none   Barriers to discharge: None    Assessment:     Shahida Ortega is a 48 y.o. female admitted with a medical diagnosis of Postprocedural intraabdominal abscess.  She presents with the following impairments/functional limitations:  weakness, gait instability, impaired balance, impaired skin.    Rehab Prognosis: Good; patient would benefit from acute skilled PT services to address these deficits and reach maximum level of function.    Recent Surgery: Procedure(s) (LRB):  LAPAROSCOPY, DIAGNOSTIC Drainage of abscess  (N/A)  LYSIS, ADHESIONS, LAPAROSCOPIC 1 Day Post-Op    Plan:     During this hospitalization, patient to be seen 3 x/week(M-F) to address the identified rehab impairments via gait training, therapeutic activities, therapeutic exercises and progress toward the following goals:    · Plan of Care Expires:  06/10/19    Subjective     Chief Complaint: dizziness  Patient/Family Comments/goals: to get well   Pain/Comfort:  · Pain Rating 1: 0/10    Living Environment:  Pt lives with mother in a SS house with no concerns.   Prior to admission, patients level of function was independent.  Pt does not drive, sister provides transportation.  Equipment used at home: none.  Upon discharge, patient will have assistance from family.    Objective:     Patient found HOB elevated with peripheral IV, pulse ox (continuous), MARLEEN drain upon PT entry to room.    General Precautions: Standard, fall   Orthopedic Precautions:(abdominal precautions)   Braces: N/A     Exams:  · Cognitive Exam:  Patient was able to follow multiple commands.   · Gross Motor Coordination:  WFL  · Postural Exam:  Patient presented with the following abnormalities:    · -       No postural abnormalities identified  · Sensation:    · -       Intact  light/touch BLE  · Skin  Integrity/Edema:      · -       Skin integrity: Visible skin intact and abdominal dressings intact/dry  · -       Edema: None noted BLE  · BLE ROM: WFL  · BLE Strength: WFL    Functional Mobility:  · Bed Mobility:     · Rolling Right: contact guard assistance  · Scooting: stand by assistance  · Supine to Sit: contact guard assistance with HOB elevated   · Transfers:     · Sit to Stand:  stand by assistance with no AD  · Bed to Chair: stand by assistance with  no AD  using  Step Transfer  · Gait: Pt ambulated ~250 ft with HHA and using IV pole.  Pt with c/o dizziness, displayed mild unsteadiness otherwise no major gait deviations.  spO2 RA ~98% and HR ~110 bpm after ambulation.    · Balance: Pt with fair+ dynamic standing balance.       Therapeutic Activities and Exercises:  Pt encouraged to be OOB>chair and to ambulate in the hallway with nursing supervision while in the hospital.    AM-PAC 6 CLICK MOBILITY  Total Score:20     Patient left up in chair with all lines intact, call button in reach and nurse Anahi notified.    GOALS:   Multidisciplinary Problems     Physical Therapy Goals        Problem: Physical Therapy Goal    Goal Priority Disciplines Outcome Goal Variances Interventions   Physical Therapy Goal     PT, PT/OT      Description:  Goals to be met by: 6/10/19     Patient will increase functional independence with mobility by performin. Supine to sit with Modified Reva  2. Rolling to Left and Right with Modified Reva  4. Sit to stand transfer with Reva  5. Bed to chair transfer with Reva   6. Gait >500 feet with Reva   7. Lower extremity exercise program 3 sets x10 reps per handout, with independence                      History:     Past Medical History:   Diagnosis Date    Hypertension        Past Surgical History:   Procedure Laterality Date    LAPAROSCOPY, DIAGNOSTIC Drainage of abscess  N/A 2019    Performed by [facility]on MD at [facility] OR     LYSIS, ADHESIONS, LAPAROSCOPIC  5/26/2019    Performed by Jose Luis [facility]on MD at [facility] OR       Time Tracking:     PT Received On: 05/27/19  PT Start Time: 0941     PT Stop Time: 0954  PT Total Time (min): 13 min     Billable Minutes: Evaluation 13 min co-eval with OT      Brooke Lyman, PT  05/27/2019

## 2019-05-28 VITALS
HEIGHT: 67 IN | OXYGEN SATURATION: 98 % | RESPIRATION RATE: 18 BRPM | TEMPERATURE: 99 F | SYSTOLIC BLOOD PRESSURE: 118 MMHG | WEIGHT: 203.25 LBS | BODY MASS INDEX: 31.9 KG/M2 | DIASTOLIC BLOOD PRESSURE: 71 MMHG | HEART RATE: 98 BPM

## 2019-05-28 PROBLEM — K65.1 POSTPROCEDURAL INTRAABDOMINAL ABSCESS: Status: RESOLVED | Noted: 2019-05-25 | Resolved: 2019-05-28

## 2019-05-28 PROBLEM — T81.43XA POSTPROCEDURAL INTRAABDOMINAL ABSCESS: Status: RESOLVED | Noted: 2019-05-25 | Resolved: 2019-05-28

## 2019-05-28 LAB
ANION GAP SERPL CALC-SCNC: 6 MMOL/L (ref 8–16)
ANISOCYTOSIS BLD QL SMEAR: ABNORMAL
BACTERIA SPEC AEROBE CULT: NORMAL
BASOPHILS # BLD AUTO: 0.04 K/UL (ref 0–0.2)
BASOPHILS NFR BLD: 0.2 % (ref 0–1.9)
BUN SERPL-MCNC: 9 MG/DL (ref 6–20)
CALCIUM SERPL-MCNC: 8.1 MG/DL (ref 8.7–10.5)
CHLORIDE SERPL-SCNC: 103 MMOL/L (ref 95–110)
CO2 SERPL-SCNC: 27 MMOL/L (ref 23–29)
CREAT SERPL-MCNC: 0.7 MG/DL (ref 0.5–1.4)
DIFFERENTIAL METHOD: ABNORMAL
EOSINOPHIL # BLD AUTO: 0.1 K/UL (ref 0–0.5)
EOSINOPHIL NFR BLD: 0.5 % (ref 0–8)
ERYTHROCYTE [DISTWIDTH] IN BLOOD BY AUTOMATED COUNT: 23.8 % (ref 11.5–14.5)
EST. GFR  (AFRICAN AMERICAN): >60 ML/MIN/1.73 M^2
EST. GFR  (NON AFRICAN AMERICAN): >60 ML/MIN/1.73 M^2
GLUCOSE SERPL-MCNC: 90 MG/DL (ref 70–110)
HCT VFR BLD AUTO: 30.3 % (ref 37–48.5)
HGB BLD-MCNC: 8.8 G/DL (ref 12–16)
HYPOCHROMIA BLD QL SMEAR: ABNORMAL
LYMPHOCYTES # BLD AUTO: 2.4 K/UL (ref 1–4.8)
LYMPHOCYTES NFR BLD: 13.6 % (ref 18–48)
MAGNESIUM SERPL-MCNC: 1.9 MG/DL (ref 1.6–2.6)
MCH RBC QN AUTO: 20.7 PG (ref 27–31)
MCHC RBC AUTO-ENTMCNC: 29 G/DL (ref 32–36)
MCV RBC AUTO: 71 FL (ref 82–98)
MONOCYTES # BLD AUTO: 1.4 K/UL (ref 0.3–1)
MONOCYTES NFR BLD: 8.3 % (ref 4–15)
NEUTROPHILS # BLD AUTO: 13.4 K/UL (ref 1.8–7.7)
NEUTROPHILS NFR BLD: 82 % (ref 38–73)
PHOSPHATE SERPL-MCNC: 3.2 MG/DL (ref 2.7–4.5)
PLATELET # BLD AUTO: 324 K/UL (ref 150–350)
PLATELET BLD QL SMEAR: ABNORMAL
PMV BLD AUTO: 9.5 FL (ref 9.2–12.9)
POIKILOCYTOSIS BLD QL SMEAR: SLIGHT
POLYCHROMASIA BLD QL SMEAR: ABNORMAL
POTASSIUM SERPL-SCNC: 3.8 MMOL/L (ref 3.5–5.1)
RBC # BLD AUTO: 4.25 M/UL (ref 4–5.4)
SODIUM SERPL-SCNC: 136 MMOL/L (ref 136–145)
WBC # BLD AUTO: 17.32 K/UL (ref 3.9–12.7)

## 2019-05-28 PROCEDURE — 84100 ASSAY OF PHOSPHORUS: CPT

## 2019-05-28 PROCEDURE — 97116 GAIT TRAINING THERAPY: CPT

## 2019-05-28 PROCEDURE — 80048 BASIC METABOLIC PNL TOTAL CA: CPT

## 2019-05-28 PROCEDURE — 97535 SELF CARE MNGMENT TRAINING: CPT

## 2019-05-28 PROCEDURE — 94761 N-INVAS EAR/PLS OXIMETRY MLT: CPT

## 2019-05-28 PROCEDURE — 36415 COLL VENOUS BLD VENIPUNCTURE: CPT

## 2019-05-28 PROCEDURE — 94799 UNLISTED PULMONARY SVC/PX: CPT

## 2019-05-28 PROCEDURE — 25000003 PHARM REV CODE 250: Performed by: STUDENT IN AN ORGANIZED HEALTH CARE EDUCATION/TRAINING PROGRAM

## 2019-05-28 PROCEDURE — 83735 ASSAY OF MAGNESIUM: CPT

## 2019-05-28 PROCEDURE — 85025 COMPLETE CBC W/AUTO DIFF WBC: CPT

## 2019-05-28 PROCEDURE — 63600175 PHARM REV CODE 636 W HCPCS: Performed by: STUDENT IN AN ORGANIZED HEALTH CARE EDUCATION/TRAINING PROGRAM

## 2019-05-28 RX ORDER — OXYCODONE AND ACETAMINOPHEN 5; 325 MG/1; MG/1
1 TABLET ORAL EVERY 4 HOURS PRN
Qty: 20 TABLET | Refills: 0 | Status: SHIPPED | OUTPATIENT
Start: 2019-05-28 | End: 2021-01-08

## 2019-05-28 RX ORDER — CIPROFLOXACIN 500 MG/1
500 TABLET ORAL EVERY 12 HOURS
Qty: 20 TABLET | Refills: 0 | Status: SHIPPED | OUTPATIENT
Start: 2019-05-28 | End: 2020-12-30

## 2019-05-28 RX ORDER — METRONIDAZOLE 500 MG/1
500 TABLET ORAL EVERY 8 HOURS
Qty: 30 TABLET | Refills: 0 | Status: SHIPPED | OUTPATIENT
Start: 2019-05-28 | End: 2020-12-30

## 2019-05-28 RX ADMIN — ACETAMINOPHEN 650 MG: 325 TABLET, FILM COATED ORAL at 05:05

## 2019-05-28 RX ADMIN — PIPERACILLIN AND TAZOBACTAM 4.5 G: 4; .5 INJECTION, POWDER, LYOPHILIZED, FOR SOLUTION INTRAVENOUS; PARENTERAL at 09:05

## 2019-05-28 RX ADMIN — ACETAMINOPHEN 650 MG: 325 TABLET, FILM COATED ORAL at 02:05

## 2019-05-28 RX ADMIN — PIPERACILLIN AND TAZOBACTAM 4.5 G: 4; .5 INJECTION, POWDER, LYOPHILIZED, FOR SOLUTION INTRAVENOUS; PARENTERAL at 02:05

## 2019-05-28 NOTE — PROGRESS NOTES
[facility]  General Surgery  Progress Note     Subjective:      Interval History: No acute events overnight. Patient is tolerating regular diet. Passing flatus, no BM. Ambulating well. MARLEEN drain with 620 mL output. Afebrile.       Post-Op Info:  Diagnostic laparoscopy, drainage of intra-abdominal abscess, POD #2      Medications:    Current Facility-Administered Medications:     0.9%  NaCl infusion (for blood administration), , Intravenous, Q24H PRN, Sarabjit Moran MD    acetaminophen tablet 650 mg, 650 mg, Oral, Q8H, Sarabjit Moran MD, 650 mg at 05/28/19 0504    enoxaparin injection 40 mg, 40 mg, Subcutaneous, Daily, Mckenzie Carlos MD, 40 mg at 05/27/19 1758    metoprolol injection 5 mg, 5 mg, Intravenous, Q6H PRN, Sarabjit Moran MD    morphine injection 2 mg, 2 mg, Intravenous, Q4H PRN, Mckenzie Carlos MD    ondansetron disintegrating tablet 8 mg, 8 mg, Oral, Q8H PRN, Sarabjit Moran MD    oxyCODONE immediate release tablet 10 mg, 10 mg, Oral, Q4H PRN, Mckenzie Carlos MD    oxyCODONE immediate release tablet 5 mg, 5 mg, Oral, Q4H PRN, Mckenzie Carlos MD    piperacillin-tazobactam 4.5 g in sodium chloride 0.9% 100 mL IVPB (ready to mix system), 4.5 g, Intravenous, Q8H, Sarabjit Moran MD, Last Rate: 25 mL/hr at 05/28/19 0203, 4.5 g at 05/28/19 0203    sodium chloride 0.9% flush 10 mL, 10 mL, Intravenous, PRN, Sarabjit Moran MD      Objective:   Vital Signs:   Vitals:    05/27/19 2025 05/28/19 0021 05/28/19 0509 05/28/19 0716   BP: 135/76 127/71 125/70 (!) 151/79   BP Location: Left arm Left arm Left arm Left arm   Patient Position: Lying Lying Lying Lying   Pulse: 104 103 92 90   Resp: (!) 28 20 18 18   Temp: 99.1 °F (37.3 °C) 99.7 °F (37.6 °C) 98.4 °F (36.9 °C) 97.5 °F (36.4 °C)   TempSrc: Oral Oral Oral Oral   SpO2: 95% 98% 98% (!) 92%   Weight:       Height:         Intake/Output Summary (Last 24 hours):    Intake/Output Summary (Last 24 hours) at 5/28/2019 0829  Last data filed at  5/28/2019 0203  Gross per 24 hour   Intake 320 ml   Output 620 ml   Net -300 ml     Physical Exam:  General appearance: awake, alert, no acute distress  HEENT: EOMI, anicteric, moist oral mucosal membranes  Pulm: symmetric chest expansion, normal WOB  Abd: soft, NTND, incision sites clean/dry, MARLEEN drain with cloudy serous drainage  Extremities: warm, well-perfused, no edema  Neuro: CN II-XII grossly intact, normal speech  Skin: warm, dry, no rashes  Psych: appropriate mood and affect     Significant Labs:  WBC 17.3, Hgb 8.8, Hct 30.3, plt 324  Na 136, K 3.8, BUN 9, Cr 0.7  Phos 3.2, Mg 1.9     Assessment/Plan:   48 year old woman with no significant past medical history who presented to the ED with a large intra-abdominal abscess, s/p diagnostic laparoscopy, lysis of adhesions, drainage of intra-abdominal abscess, POD #2    -Regular diet  -D/c IV fluids  -F/u pelvic ultrasound, G/C pending  -ObGyn consulted, appreciate recommendations  -Continue Zosyn, day #4, f/u culture/sensitivities of intra-op culture  -Continue MARLEEN drain, monitor output  -Multi-modality pain control  -Lovenox/SCDs for DVT prophylaxis  -OOB tid, ambulate in hallway, PT/OT consulted  -Will need outpatient colonoscopy  -Case management consult for home health  -Likely d/c home today     Mckenzie Carlos MD  General Surgery  [facility] [place]

## 2019-05-28 NOTE — NURSING
Discharge instructions given to patient verbalized understanding. Patient left with transport by wheelchair to the family vehicle. No distress noted.

## 2019-05-28 NOTE — PT/OT/SLP PROGRESS
Occupational Therapy   Treatment/Discharge    Name: Shahida Ortega  MRN: [id number]  Admitting Diagnosis:  Postprocedural intraabdominal abscess  2 Days Post-Op    Recommendations:     Discharge Recommendations: home(with family assist)  Discharge Equipment Recommendations:  none  Barriers to discharge:  None    Assessment:     Shahida Ortega is a 48 y.o. female with a medical diagnosis of Postprocedural intraabdominal abscess. The patient was able to amb to the bathroom and sink Mod I. Performance deficits affecting function are (none).     Rehab Prognosis:  Good; patient would benefit from acute skilled OT services to address these deficits and reach maximum level of function.       Plan:     Patient will be D/C from OT  · Plan of Care Reviewed with: patient    Subjective     Pain/Comfort:  · Pain Rating 1: 0/10(at rest)  · Pain Addressed 1: Pre-medicate for activity(minimal c/o abdominal pain with mobility)    Objective:     Communicated with: nurse prior to session.  Patient found up in chair with peripheral IV, MARLEEN drain upon OT entry to room.    General Precautions: Standard, fall   Orthopedic Precautions:N/A   Braces: N/A     Occupational Performance:     Bed Mobility:    · N/T     Functional Mobility/Transfers:  · Patient completed Sit <> Stand Transfer with modified independence  with  no assistive device   · Patient completed Toilet Transfer with modified independence with  no AD. The patient was able to squat over the toilet to urinate.  · Functional Mobility: The patient amb mod I from the chair to the toilet and sink. The patient was able to manage the IV pole while ambulating.    Activities of Daily Living:  · Grooming: modified independence    · Toileting: modified independence to squat over the toilet and use wipes      [facility] 6 Click ADL: 24    Treatment & Education:  The patient was educated re: OT plan for D/C. The patient states she is ready for D/C to home.    Patient left up in chair with all lines  intact and call button in reachEducation:      GOALS:   Multidisciplinary Problems     Occupational Therapy Goals     Not on file          Multidisciplinary Problems (Resolved)        Problem: Occupational Therapy Goal    Goal Priority Disciplines Outcome Interventions   Occupational Therapy Goal   (Resolved)     OT, PT/OT Outcome(s) achieved    Description:  Goals to be met by: 6/1/19    Patient will increase functional independence with ADLs by performing:    UE Dressing with Modified [place].  LE Dressing with Modified [place].  Grooming while standing at sink with Modified [place].  Toileting from toilet with Modified [place] for hygiene and clothing management.   Supine to sit with Modified [place].  Step transfer with Modified [place]  Toilet transfer to toilet with Modified [place].                      Time Tracking:     OT Date of Treatment: 05/28/19  OT Start Time: 1155  OT Stop Time: 1203  OT Total Time (min): 8 min    Billable Minutes:Self Care/Home Management 8    Cheribebeto Ramirez OT  5/28/2019

## 2019-05-28 NOTE — PT/OT/SLP PROGRESS
Physical Therapy Treatment    Patient Name:  Shahida Ortega   MRN:  [id number]    Recommendations:     Discharge Recommendations:  home   Discharge Equipment Recommendations: none   Barriers to discharge: None    Assessment:     Shahida Ortega is a 48 y.o. female admitted with a medical diagnosis of Postprocedural intraabdominal abscess.  She presents with the following impairments/functional limitations:  weakness, gait instability, impaired balance, impaired skin.    Rehab Prognosis: Good; patient would benefit from acute skilled PT services to address these deficits and reach maximum level of function.    Recent Surgery: Procedure(s) (LRB):  LAPAROSCOPY, DIAGNOSTIC Drainage of abscess  (N/A)  LYSIS, ADHESIONS, LAPAROSCOPIC 2 Days Post-Op    Plan:     During this hospitalization, patient to be seen 3 x/week(M-F) to address the identified rehab impairments via gait training, therapeutic activities, therapeutic exercises and progress toward the following goals:    · Plan of Care Expires:  06/10/19    Subjective     Chief Complaint: Pt reported that she had a bowel accident.  Patient/Family Comments/goals: Pt stated that she's being D/C'ed today.   Pain/Comfort:  · Pain Rating 1: 4/10  · Location 1: abdomen  · Pain Addressed 1: (Pt declined pain meds. )      Objective:     Patient found HOB elevated with peripheral IV MARLEEN drain upon PT entry to room.     General Precautions: Standard, fall   Orthopedic Precautions:(abdominal precautions)   Braces: N/A    Functional Mobility:  · Bed Mobility:     · Scooting: modified independence  · Supine to Sit: modified independence with HOB elevated   · Transfers:     · Sit to Stand:  supervision with no AD  · Bed to Chair: supervision with  no AD  using  Step Transfer  · Gait: Pt ambulated ~500 ft with SBA using no AD.  Pt with mild unsteadiness and decreased yves.    · Balance: Pt with fair+ balance.       AM-PAC 6 CLICK MOBILITY  Turning over in bed (including adjusting bedclothes,  sheets and blankets)?: 4  Sitting down on and standing up from a chair with arms (e.g., wheelchair, bedside commode, etc.): 4  Moving from lying on back to sitting on the side of the bed?: 4  Moving to and from a bed to a chair (including a wheelchair)?: 4  Need to walk in hospital room?: 3  Climbing 3-5 steps with a railing?: 3  Basic Mobility Total Score: 22       Therapeutic Activities and Exercises:  BLE seated therex 20 reps: AP, LAQ, hip flex, and pillow squeezes/GS    Pt issued HEP for LE seated/supine therex.  Pt able to demonstrate LE SLR, hip abd/add, and HS.  Pt verbalized good understanding.     Patient left up in chair with all lines intact, call button in reach and nurse China notified.    GOALS:   Multidisciplinary Problems     Physical Therapy Goals        Problem: Physical Therapy Goal    Goal Priority Disciplines Outcome Goal Variances Interventions   Physical Therapy Goal     PT, PT/OT      Description:  Goals to be met by: 6/10/19     Patient will increase functional independence with mobility by performin. Supine to sit with Modified [place]  2. Rolling to Left and Right with Modified [place]  4. Sit to stand transfer with [place]  5. Bed to chair transfer with [place]   6. Gait >500 feet with [place]   7. Lower extremity exercise program 3 sets x10 reps per handout, with independence                      Time Tracking:     PT Received On: 19  PT Start Time: 1452     PT Stop Time: 1505  PT Total Time (min): 13 min     Billable Minutes: Gait Training 13 min                   Brooke Lyman, PT  2019

## 2019-05-28 NOTE — PROGRESS NOTES
[facility] CASE MANAGEMENT                  WRITTEN DISCHARGE INFORMATION      APPOINTMENTS AND RESOURCES TO HELP YOU MANAGE YOUR CARE AT HOME BASED ON YOUR PREFERENCES:  (If an appointment is not scheduled for you when you leave the hospital, call your doctor to schedule a follow up visit within a week)        Healthy Living Instructions to HELP MANAGE YOUR CARE AT HOME:  Things You are responsible for:  1.    Getting your prescriptions filled   2.    Taking your medications as directed, DO NOT MISS ANY DOSES!  3.    Following the diet and exercise recommended by your doctor  4.    Going to your follow-up doctor appointment. This is important because it allows the doctor to monitor your progress and determine if any changes need to made to your treatment plan.  5. If you have any questions about MANAGING YOUR CARE AT HOME Call the Nurse Care Line for 24/7 Assistance [phone]       Please answer any calls you may receive from [facility]. We want to continue to support you as you manage your healthcare needs. [facility] is happy to have the opportunity to serve you.      Thank you for choosing [facility] for your healthcare needs!  Your [facility] Case Management Team,    Lis Green   II  Care Management  [phone]                    Follow-up Information     Jose Luis Hanson MD. Go on 6/10/2019.    Specialty:  General Surgery  Why:  Post-op visit, at 2:15 pm   Contact information:  [address] [place] [address]  [address]  [facility]UR[facility]ICAL CLINIC [facility] [address]  [phone]             Jason Paul MD. Go on 6/7/2019.    Specialty:  Gastroenterology  Why:  for colonoscopy at 1:15 pm  Contact information:  [address] [place] [address]  [address]  [facility]  [facility] [id number] [phone]             Tyrese Jordan MD In 1 week.    Specialty:  Obstetrics and Gynecology  Why:  Possible ovarian  abscess, hospital discharge please call to schedule appointment as soon as possible  Contact information:  [address] [facility]  [address]  [place] [id number] [phone]

## 2019-05-28 NOTE — CONSULTS
SW met with patient to discuss preference for home health agency. Patient stated that she did not have any knowledge of home health agencies. SW informed patient that's SW  can look at her insurance and choose one that is in network. Patient was in agreement and said ok. JAY JAY sent referral to [facility]. Patient accepted by [facility].

## 2019-05-28 NOTE — CONSULTS
SW met with patient to discuss preference for home health agency. Patient stated that she did not have any knowledge of home health agencies. SW informed patient that's SW  can look at her insurance and choose one that is in network. Patient was in agreement and said ok. JAY JAY sent referral to [facility]. Patient accepted by [facility].

## 2019-05-28 NOTE — PLAN OF CARE
Problem: Adult Inpatient Plan of Care  Goal: Plan of Care Review  Outcome: Ongoing (interventions implemented as appropriate)     05/28/19 5034   Plan of Care Review   Plan of Care Reviewed With patient   Patient remains free from fall or injury. No c/o pain. Remains on IVF and IV antibiotics. MARLEEN drain remains in place with minimal drainage this shift. Lap sites remain c/d/i with gauze and tegaderm in place with no drainage noted. Patient resting majority of the shift. Safety maintained. Bed in low locked position, bed alarm armed and audible with call light in reach. Will continue to monitor.

## 2019-05-28 NOTE — PLAN OF CARE
Problem: Physical Therapy Goal  Goal: Physical Therapy Goal  Goals to be met by: 6/10/19     Patient will increase functional independence with mobility by performin. Supine to sit with Modified [place]  2. Rolling to Left and Right with Modified [place]  4. Sit to stand transfer with [place]  5. Bed to chair transfer with [place]   6. Gait >500 feet with [place]   7. Lower extremity exercise program 3 sets x10 reps per handout, with independence     Outcome: Ongoing (interventions implemented as appropriate)  Pt ambulated ~500 ft with SBA using no AD.

## 2019-05-28 NOTE — PLAN OF CARE
Problem: Occupational Therapy Goal  Goal: Occupational Therapy Goal  Goals to be met by: 6/1/19    Patient will increase functional independence with ADLs by performing:    UE Dressing with Modified [place].  LE Dressing with Modified [place].  Grooming while standing at sink with Modified [place].  Toileting from toilet with Modified [place] for hygiene and clothing management.   Supine to sit with Modified [place].  Step transfer with Modified [place]  Toilet transfer to toilet with Modified [place].     Outcome: Outcome(s) achieved Date Met: 05/28/19  The patient has met her OT goals and will be D/C from OT. The patient states she is ready for D/C to home.

## 2019-05-28 NOTE — PLAN OF CARE
"EDUCATION:  SW provided patient with educational information on Post Op.  Information was reviewed and placed in "My Healthcare Packet" to be brought home  to use as a resource after discharge.  Information included: signs and symptoms to look for and call the doctor if experiencing, and symptoms that may indicate a medical emergency: CALL 911.  All questions answered.  Teach back method used. Patient stated that diarrhea and fever are signs and symptoms to look for. SW and patient discussed follow up appointments. SW spoke with nurse Atkinson and informed her that patient was ready for discharge from  standpoint.        05/28/19 7495   Final Note   Assessment Type Final Discharge Note   Anticipated Discharge Disposition [facility]   Hospital Follow Up  Appt(s) scheduled? Yes   Discharge plans and expectations educations in teach back method with documentation complete? Yes   Right Care Referral Info   Post Acute Recommendation Home-care   Referral Type Home Health   Facility Name [facility] [address] [place] [id number]                                                   "

## 2019-05-28 NOTE — PLAN OF CARE
Problem: Adult Inpatient Plan of Care  Goal: Plan of Care Review  Outcome: Ongoing (interventions implemented as appropriate)     05/28/19 1455   Plan of Care Review   Plan of Care Reviewed With patient       Problem: Fall Injury Risk  Goal: Absence of Fall and Fall-Related Injury    Intervention: Identify and Manage Contributors to Fall Injury Risk     05/28/19 1455   Manage Acute Allergic Reaction   Medication Review/Management medications reviewed   Identify and Manage Contributors to Fall Injury Risk   Self-Care Promotion BADL personal objects within reach;BADL personal routines maintained     Intervention: Promote Injury-Free Environment     05/28/19 1455   Optimize [place] and Functional Mobility   Environmental Safety Modification assistive device/personal items within reach;clutter free environment maintained;room organization consistent   Optimize Balance and Safe Activity   Safety Promotion/Fall Prevention assistive device/personal item within reach;Fall Risk reviewed with patient/family;Fall Risk signage in place;medications reviewed;nonskid shoes/socks when out of bed;side rails raised x 2

## 2019-05-29 LAB
BLD PROD TYP BPU: NORMAL
BLD PROD TYP BPU: NORMAL
BLOOD UNIT EXPIRATION DATE: NORMAL
BLOOD UNIT EXPIRATION DATE: NORMAL
BLOOD UNIT TYPE CODE: 6200
BLOOD UNIT TYPE CODE: 6200
BLOOD UNIT TYPE: NORMAL
BLOOD UNIT TYPE: NORMAL
CODING SYSTEM: NORMAL
CODING SYSTEM: NORMAL
DISPENSE STATUS: NORMAL
DISPENSE STATUS: NORMAL
TRANS ERYTHROCYTES VOL PATIENT: NORMAL ML
TRANS ERYTHROCYTES VOL PATIENT: NORMAL ML

## 2019-05-29 NOTE — PT/OT/SLP DISCHARGE
Physical Therapy Discharge Summary    Name: Shahida Ortega  MRN: [id number]   Principal Problem: Postprocedural intraabdominal abscess     Patient Discharged from acute Physical Therapy on 19.  Please refer to prior PT notes for functional status.     Assessment:     Patient appropriate for care in another setting.    Objective:     GOALS:   Multidisciplinary Problems     Physical Therapy Goals        Problem: Physical Therapy Goal    Goal Priority Disciplines Outcome Goal Variances Interventions   Physical Therapy Goal     PT, PT/OT Ongoing (interventions implemented as appropriate)     Description:  Goals to be met by: 6/10/19     Patient will increase functional independence with mobility by performin. Supine to sit with Modified [place]  2. Rolling to Left and Right with Modified [place]  4. Sit to stand transfer with [place]  5. Bed to chair transfer with [place]   6. Gait >500 feet with [place]   7. Lower extremity exercise program 3 sets x10 reps per handout, with independence                      Reasons for Discontinuation of Therapy Services  Transfer to alternate level of care.      Plan:     Patient Discharged to: Home with Home Health Service.    Brooke Lyman, PT  2019

## 2019-05-30 LAB
BACTERIA BLD CULT: NORMAL
BACTERIA BLD CULT: NORMAL
BACTERIA SPEC AEROBE CULT: NO GROWTH
BACTERIA SPEC ANAEROBE CULT: NORMAL
BACTERIA SPEC ANAEROBE CULT: NORMAL

## 2019-05-30 NOTE — ANESTHESIA POSTPROCEDURE EVALUATION
Anesthesia Post Evaluation    Patient: April Carter    Procedure(s) Performed: Procedure(s) (LRB):  LAPAROSCOPY, DIAGNOSTIC Drainage of abscess  (N/A)  LYSIS, ADHESIONS, LAPAROSCOPIC    Final Anesthesia Type: general  Patient location during evaluation: PACU  Patient participation: Yes- Able to Participate  Level of consciousness: awake and alert  Post-procedure vital signs: reviewed and stable  Pain management: adequate  Airway patency: patent  PONV status at discharge: No PONV  Anesthetic complications: no      Cardiovascular status: blood pressure returned to baseline and hemodynamically stable  Respiratory status: unassisted and spontaneous ventilation  Hydration status: euvolemic  Follow-up not needed.          Vitals Value Taken Time   /71 5/28/2019 12:06 PM   Temp 37 °C (98.6 °F) 5/28/2019 12:06 PM   Pulse 98 5/28/2019 12:06 PM   Resp 18 5/28/2019 12:06 PM   SpO2 98 % 5/28/2019 12:06 PM         Event Time     Out of Recovery 05/26/2019 19:21:13          Pain/Celsa Score: No data recorded

## 2019-12-09 NOTE — DISCHARGE SUMMARY
[facility]  Discharge Summary     Patient ID: [id number]    Admit Date: 5/25/19    Discharge Date: 5/28/19    Admitting Physician: Jose Luis Hanson MD    Discharge Provider: Jose Luis Hanson MD    Reason for Admission: Intra-abdominal abscess    Admission Condition: Stable    Procedures Performed: Diagnostic laparoscopy, lysis of adhesions, drainage of intra-abdominal abscess- 5/26/19    Hospital Course: Ms. Ortega is a 48 year old woman with history oh HTN who presented to the ED with a large intra-abdominal abscess of unclear etiology. She underwent a diagnostic laparoscopy, lysis of adhesions, drainage of intra-abdominal abscess.  ObGyn was consulted intra-operatively and recommended additional imaging after surgery. She tolerated the procedure well. Please see full operative report for further details. She had a transvaginal ultrasound which showed large uterine fibroids, and a left ovarian cyst. The patient progressed well in the post-operative period. Her pain was well-controlled. She was tolerating oral intake. Her intra-operative cultures grew out E. coli which was sensitive to Cipro/flagyl, which she wad discharged home with an additional 10 day course.  She will be discharged home with her drain in place and home health for assistance. She will need additional work up as an outpatient, including a colonoscopy.      Final Diagnoses:    Principal Problem: Intra-abdominal abscess   Secondary Diagnoses: Fibroids, HTN    Discharged Condition: stable    Discharge Exam:  General appearance: awake, alert, no acute distress  HEENT: EOMI, anicteric, moist oral mucosal membranes  Pulm: symmetric chest expansion, normal WOB  Abd: soft, NTND, incision sites clean/dry, MARLEEN drain with cloudy serous drainage  Extremities: warm, well-perfused, no edema  Neuro: CN II-XII grossly intact, normal speech  Skin: warm, dry, no rashes  Psych: appropriate mood and affect    Disposition: Home or Self Care    Emergency  Instructions:   -Contact clinic immediately if you have any fever/chills, redness/drainage from your incision site, nausea/vomiting, inability to tolerate oral intake, chest pain, or shortness of breath  -No driving while taking narcotics    Medications:   Jordan, April   Home Medication Instructions BLARI:[id number]    Printed on:05/28/19 1002   Medication Information                      ciprofloxacin HCl (CIPRO) 500 MG tablet  Take 1 tablet (500 mg total) by mouth every 12 (twelve) hours.             losartan (COZAAR) 50 MG tablet  Take 50 mg by mouth once daily.             metroNIDAZOLE (FLAGYL) 500 MG tablet  Take 1 tablet (500 mg total) by mouth every 8 (eight) hours.             oxyCODONE-acetaminophen (PERCOCET) 5-325 mg per tablet  Take 1 tablet by mouth every 4 (four) hours as needed for Pain.             UNKNOWN TO PATIENT                 Activity: no lifting >10 lbs, driving, or strenuous exercise until seen in clinic  Diet: Heart healthy  Wound Care: keep wound clean and dry, ok to shower. No soaking in bath tubs or pools. Empty MARLEEN drain as needed and record output.    Follow-up with Dr. Hanson in 1 week  Follow-up with Dr. Jordan in 1 week  Follow-up with GI for colonoscopy, request submitted to [facility]    Signed:  Mckenzie Carlos MD  General Surgery       Normal

## 2020-12-26 ENCOUNTER — HOSPITAL ENCOUNTER (INPATIENT)
Facility: HOSPITAL | Age: 50
LOS: 3 days | Discharge: HOME | DRG: 358 | End: 2020-12-29
Attending: EMERGENCY MEDICINE | Admitting: SURGERY
Payer: COMMERCIAL

## 2020-12-26 DIAGNOSIS — R06.02 SOB (SHORTNESS OF BREATH): ICD-10-CM

## 2020-12-26 DIAGNOSIS — R10.13 EPIGASTRIC PAIN: ICD-10-CM

## 2020-12-26 DIAGNOSIS — K65.1 INTRA-ABDOMINAL ABSCESS: Primary | ICD-10-CM

## 2020-12-26 DIAGNOSIS — Z01.810 PREOP CARDIOVASCULAR EXAM: ICD-10-CM

## 2020-12-26 LAB
ALBUMIN SERPL BCP-MCNC: 2.4 G/DL (ref 3.5–5.2)
ALP SERPL-CCNC: 139 U/L (ref 55–135)
ALT SERPL W/O P-5'-P-CCNC: 20 U/L (ref 10–44)
ANION GAP SERPL CALC-SCNC: 14 MMOL/L (ref 8–16)
AST SERPL-CCNC: 24 U/L (ref 10–40)
B-HCG UR QL: NEGATIVE
BACTERIA #/AREA URNS HPF: ABNORMAL /HPF
BASOPHILS # BLD AUTO: 0.16 K/UL (ref 0–0.2)
BASOPHILS NFR BLD: 0.6 % (ref 0–1.9)
BILIRUB SERPL-MCNC: 0.3 MG/DL (ref 0.1–1)
BILIRUB UR QL STRIP: NEGATIVE
BUN SERPL-MCNC: 18 MG/DL (ref 6–20)
CALCIUM SERPL-MCNC: 8.9 MG/DL (ref 8.7–10.5)
CEA SERPL-MCNC: <1 NG/ML (ref 0–5)
CHLORIDE SERPL-SCNC: 89 MMOL/L (ref 95–110)
CLARITY UR: ABNORMAL
CO2 SERPL-SCNC: 29 MMOL/L (ref 23–29)
COLOR UR: ABNORMAL
CREAT SERPL-MCNC: 1 MG/DL (ref 0.5–1.4)
CTP QC/QA: YES
CTP QC/QA: YES
DIFFERENTIAL METHOD: ABNORMAL
EOSINOPHIL # BLD AUTO: 0.1 K/UL (ref 0–0.5)
EOSINOPHIL NFR BLD: 0.2 % (ref 0–8)
ERYTHROCYTE [DISTWIDTH] IN BLOOD BY AUTOMATED COUNT: 14.8 % (ref 11.5–14.5)
EST. GFR  (AFRICAN AMERICAN): >60 ML/MIN/1.73 M^2
EST. GFR  (NON AFRICAN AMERICAN): >60 ML/MIN/1.73 M^2
GLUCOSE SERPL-MCNC: 118 MG/DL (ref 70–110)
GLUCOSE UR QL STRIP: ABNORMAL
HCT VFR BLD AUTO: 37.1 % (ref 37–48.5)
HGB BLD-MCNC: 12 G/DL (ref 12–16)
HGB UR QL STRIP: ABNORMAL
HYALINE CASTS #/AREA URNS LPF: 15 /LPF
IMM GRANULOCYTES # BLD AUTO: 1.14 K/UL (ref 0–0.04)
IMM GRANULOCYTES NFR BLD AUTO: 4.5 % (ref 0–0.5)
INR PPP: 1.1 (ref 0.8–1.2)
KETONES UR QL STRIP: NEGATIVE
LACTATE SERPL-SCNC: 1.2 MMOL/L (ref 0.5–2.2)
LEUKOCYTE ESTERASE UR QL STRIP: ABNORMAL
LIPASE SERPL-CCNC: 11 U/L (ref 4–60)
LYMPHOCYTES # BLD AUTO: 2.4 K/UL (ref 1–4.8)
LYMPHOCYTES NFR BLD: 9.6 % (ref 18–48)
MCH RBC QN AUTO: 26.7 PG (ref 27–31)
MCHC RBC AUTO-ENTMCNC: 32.3 G/DL (ref 32–36)
MCV RBC AUTO: 83 FL (ref 82–98)
MICROSCOPIC COMMENT: ABNORMAL
MONOCYTES # BLD AUTO: 1.6 K/UL (ref 0.3–1)
MONOCYTES NFR BLD: 6.4 % (ref 4–15)
NEUTROPHILS # BLD AUTO: 19.9 K/UL (ref 1.8–7.7)
NEUTROPHILS NFR BLD: 78.7 % (ref 38–73)
NITRITE UR QL STRIP: NEGATIVE
NON-SQ EPI CELLS #/AREA URNS HPF: 5 /HPF
NRBC BLD-RTO: 0 /100 WBC
PH UR STRIP: 5 [PH] (ref 5–8)
PLATELET # BLD AUTO: 295 K/UL (ref 150–350)
PMV BLD AUTO: 10 FL (ref 9.2–12.9)
POTASSIUM SERPL-SCNC: 3.8 MMOL/L (ref 3.5–5.1)
PROT SERPL-MCNC: 9.5 G/DL (ref 6–8.4)
PROT UR QL STRIP: ABNORMAL
PROTHROMBIN TIME: 12.5 SEC (ref 9–12.5)
RBC # BLD AUTO: 4.49 M/UL (ref 4–5.4)
RBC #/AREA URNS HPF: 5 /HPF (ref 0–4)
SARS-COV-2 RDRP RESP QL NAA+PROBE: NEGATIVE
SODIUM SERPL-SCNC: 132 MMOL/L (ref 136–145)
SP GR UR STRIP: 1.02 (ref 1–1.03)
SQUAMOUS #/AREA URNS HPF: 17 /HPF
URN SPEC COLLECT METH UR: ABNORMAL
UROBILINOGEN UR STRIP-ACNC: ABNORMAL EU/DL
WBC # BLD AUTO: 25.27 K/UL (ref 3.9–12.7)
WBC #/AREA URNS HPF: 15 /HPF (ref 0–5)

## 2020-12-26 PROCEDURE — 87086 URINE CULTURE/COLONY COUNT: CPT

## 2020-12-26 PROCEDURE — 83605 ASSAY OF LACTIC ACID: CPT

## 2020-12-26 PROCEDURE — 85610 PROTHROMBIN TIME: CPT

## 2020-12-26 PROCEDURE — 86304 IMMUNOASSAY TUMOR CA 125: CPT

## 2020-12-26 PROCEDURE — 96361 HYDRATE IV INFUSION ADD-ON: CPT

## 2020-12-26 PROCEDURE — 96375 TX/PRO/DX INJ NEW DRUG ADDON: CPT

## 2020-12-26 PROCEDURE — 25000003 PHARM REV CODE 250: Performed by: PHYSICIAN ASSISTANT

## 2020-12-26 PROCEDURE — 96365 THER/PROPH/DIAG IV INF INIT: CPT

## 2020-12-26 PROCEDURE — 82378 CARCINOEMBRYONIC ANTIGEN: CPT

## 2020-12-26 PROCEDURE — U0002 COVID-19 LAB TEST NON-CDC: HCPCS | Performed by: PHYSICIAN ASSISTANT

## 2020-12-26 PROCEDURE — 63600175 PHARM REV CODE 636 W HCPCS: Performed by: STUDENT IN AN ORGANIZED HEALTH CARE EDUCATION/TRAINING PROGRAM

## 2020-12-26 PROCEDURE — 63600175 PHARM REV CODE 636 W HCPCS: Performed by: PHYSICIAN ASSISTANT

## 2020-12-26 PROCEDURE — 93010 EKG 12-LEAD: ICD-10-PCS | Mod: ,,, | Performed by: INTERNAL MEDICINE

## 2020-12-26 PROCEDURE — 80053 COMPREHEN METABOLIC PANEL: CPT

## 2020-12-26 PROCEDURE — 85025 COMPLETE CBC W/AUTO DIFF WBC: CPT

## 2020-12-26 PROCEDURE — 99285 EMERGENCY DEPT VISIT HI MDM: CPT | Mod: 25

## 2020-12-26 PROCEDURE — 87040 BLOOD CULTURE FOR BACTERIA: CPT | Mod: 59

## 2020-12-26 PROCEDURE — 25000003 PHARM REV CODE 250: Performed by: STUDENT IN AN ORGANIZED HEALTH CARE EDUCATION/TRAINING PROGRAM

## 2020-12-26 PROCEDURE — 25500020 PHARM REV CODE 255: Performed by: EMERGENCY MEDICINE

## 2020-12-26 PROCEDURE — 93010 ELECTROCARDIOGRAM REPORT: CPT | Mod: ,,, | Performed by: INTERNAL MEDICINE

## 2020-12-26 PROCEDURE — 81000 URINALYSIS NONAUTO W/SCOPE: CPT

## 2020-12-26 PROCEDURE — 93005 ELECTROCARDIOGRAM TRACING: CPT

## 2020-12-26 PROCEDURE — 86301 IMMUNOASSAY TUMOR CA 19-9: CPT

## 2020-12-26 PROCEDURE — 83690 ASSAY OF LIPASE: CPT

## 2020-12-26 PROCEDURE — 21400001 HC TELEMETRY ROOM

## 2020-12-26 RX ORDER — ENOXAPARIN SODIUM 100 MG/ML
40 INJECTION SUBCUTANEOUS EVERY 24 HOURS
Status: DISCONTINUED | OUTPATIENT
Start: 2020-12-27 | End: 2020-12-29 | Stop reason: HOSPADM

## 2020-12-26 RX ORDER — ACETAMINOPHEN 325 MG/1
650 TABLET ORAL EVERY 6 HOURS
Status: DISCONTINUED | OUTPATIENT
Start: 2020-12-27 | End: 2020-12-29 | Stop reason: HOSPADM

## 2020-12-26 RX ORDER — MORPHINE SULFATE 4 MG/ML
4 INJECTION, SOLUTION INTRAMUSCULAR; INTRAVENOUS
Status: COMPLETED | OUTPATIENT
Start: 2020-12-26 | End: 2020-12-26

## 2020-12-26 RX ORDER — SODIUM CHLORIDE 0.9 % (FLUSH) 0.9 %
10 SYRINGE (ML) INJECTION
Status: DISCONTINUED | OUTPATIENT
Start: 2020-12-26 | End: 2020-12-27

## 2020-12-26 RX ORDER — TALC
6 POWDER (GRAM) TOPICAL NIGHTLY PRN
Status: DISCONTINUED | OUTPATIENT
Start: 2020-12-26 | End: 2020-12-26 | Stop reason: SDUPTHER

## 2020-12-26 RX ORDER — ONDANSETRON 2 MG/ML
4 INJECTION INTRAMUSCULAR; INTRAVENOUS EVERY 6 HOURS PRN
Status: DISCONTINUED | OUTPATIENT
Start: 2020-12-26 | End: 2020-12-29 | Stop reason: HOSPADM

## 2020-12-26 RX ORDER — HYDROMORPHONE HYDROCHLORIDE 2 MG/ML
0.5 INJECTION, SOLUTION INTRAMUSCULAR; INTRAVENOUS; SUBCUTANEOUS EVERY 4 HOURS PRN
Status: DISCONTINUED | OUTPATIENT
Start: 2020-12-26 | End: 2020-12-29 | Stop reason: HOSPADM

## 2020-12-26 RX ORDER — HYDROCHLOROTHIAZIDE 12.5 MG/1
12.5 TABLET ORAL DAILY
COMMUNITY
End: 2022-04-18

## 2020-12-26 RX ORDER — ONDANSETRON 2 MG/ML
4 INJECTION INTRAMUSCULAR; INTRAVENOUS
Status: COMPLETED | OUTPATIENT
Start: 2020-12-26 | End: 2020-12-26

## 2020-12-26 RX ORDER — TALC
6 POWDER (GRAM) TOPICAL NIGHTLY PRN
Status: DISCONTINUED | OUTPATIENT
Start: 2020-12-26 | End: 2020-12-29 | Stop reason: HOSPADM

## 2020-12-26 RX ORDER — IPRATROPIUM BROMIDE AND ALBUTEROL SULFATE 2.5; .5 MG/3ML; MG/3ML
3 SOLUTION RESPIRATORY (INHALATION) EVERY 6 HOURS PRN
Status: DISCONTINUED | OUTPATIENT
Start: 2020-12-26 | End: 2020-12-29 | Stop reason: HOSPADM

## 2020-12-26 RX ORDER — MEROPENEM AND SODIUM CHLORIDE 1 G/50ML
1 INJECTION, SOLUTION INTRAVENOUS
Status: DISCONTINUED | OUTPATIENT
Start: 2020-12-26 | End: 2020-12-29 | Stop reason: HOSPADM

## 2020-12-26 RX ORDER — SODIUM CHLORIDE 9 MG/ML
INJECTION, SOLUTION INTRAVENOUS CONTINUOUS
Status: DISCONTINUED | OUTPATIENT
Start: 2020-12-26 | End: 2020-12-29 | Stop reason: HOSPADM

## 2020-12-26 RX ADMIN — MEROPENEM AND SODIUM CHLORIDE 1 G: 1 INJECTION, SOLUTION INTRAVENOUS at 11:12

## 2020-12-26 RX ADMIN — ONDANSETRON 4 MG: 2 INJECTION INTRAMUSCULAR; INTRAVENOUS at 08:12

## 2020-12-26 RX ADMIN — IOHEXOL 100 ML: 350 INJECTION, SOLUTION INTRAVENOUS at 06:12

## 2020-12-26 RX ADMIN — VANCOMYCIN HYDROCHLORIDE 2000 MG: 10 INJECTION, POWDER, LYOPHILIZED, FOR SOLUTION INTRAVENOUS at 10:12

## 2020-12-26 RX ADMIN — SODIUM CHLORIDE 100 ML/HR: 0.9 INJECTION, SOLUTION INTRAVENOUS at 11:12

## 2020-12-26 RX ADMIN — CEFTRIAXONE 1 G: 1 INJECTION, SOLUTION INTRAVENOUS at 05:12

## 2020-12-26 RX ADMIN — MORPHINE SULFATE 4 MG: 4 INJECTION INTRAVENOUS at 08:12

## 2020-12-26 RX ADMIN — SODIUM CHLORIDE 1000 ML: 0.9 INJECTION, SOLUTION INTRAVENOUS at 04:12

## 2020-12-26 RX ADMIN — ACETAMINOPHEN 650 MG: 325 TABLET ORAL at 11:12

## 2020-12-26 RX ADMIN — SODIUM CHLORIDE 1000 ML: 0.9 INJECTION, SOLUTION INTRAVENOUS at 10:12

## 2020-12-27 ENCOUNTER — ANESTHESIA EVENT (OUTPATIENT)
Dept: SURGERY | Facility: HOSPITAL | Age: 50
DRG: 358 | End: 2020-12-27
Payer: COMMERCIAL

## 2020-12-27 ENCOUNTER — ANESTHESIA (OUTPATIENT)
Dept: SURGERY | Facility: HOSPITAL | Age: 50
DRG: 358 | End: 2020-12-27
Payer: COMMERCIAL

## 2020-12-27 LAB
ANION GAP SERPL CALC-SCNC: 9 MMOL/L (ref 8–16)
BASOPHILS # BLD AUTO: 0.06 K/UL (ref 0–0.2)
BASOPHILS NFR BLD: 0.4 % (ref 0–1.9)
BUN SERPL-MCNC: 15 MG/DL (ref 6–20)
CALCIUM SERPL-MCNC: 7.9 MG/DL (ref 8.7–10.5)
CANCER AG125 SERPL-ACNC: 9 U/ML (ref 0–30)
CANCER AG19-9 SERPL-ACNC: 24 U/ML (ref 2–40)
CHLORIDE SERPL-SCNC: 97 MMOL/L (ref 95–110)
CO2 SERPL-SCNC: 27 MMOL/L (ref 23–29)
CREAT SERPL-MCNC: 0.7 MG/DL (ref 0.5–1.4)
DIFFERENTIAL METHOD: ABNORMAL
EOSINOPHIL # BLD AUTO: 0 K/UL (ref 0–0.5)
EOSINOPHIL NFR BLD: 0.2 % (ref 0–8)
ERYTHROCYTE [DISTWIDTH] IN BLOOD BY AUTOMATED COUNT: 14.9 % (ref 11.5–14.5)
EST. GFR  (AFRICAN AMERICAN): >60 ML/MIN/1.73 M^2
EST. GFR  (NON AFRICAN AMERICAN): >60 ML/MIN/1.73 M^2
GLUCOSE SERPL-MCNC: 116 MG/DL (ref 70–110)
GRAM STN SPEC: NORMAL
GRAM STN SPEC: NORMAL
HCT VFR BLD AUTO: 29.6 % (ref 37–48.5)
HGB BLD-MCNC: 9.8 G/DL (ref 12–16)
IMM GRANULOCYTES # BLD AUTO: 0.54 K/UL (ref 0–0.04)
IMM GRANULOCYTES NFR BLD AUTO: 3.6 % (ref 0–0.5)
LYMPHOCYTES # BLD AUTO: 1.4 K/UL (ref 1–4.8)
LYMPHOCYTES NFR BLD: 9.4 % (ref 18–48)
MAGNESIUM SERPL-MCNC: 2.1 MG/DL (ref 1.6–2.6)
MCH RBC QN AUTO: 26.8 PG (ref 27–31)
MCHC RBC AUTO-ENTMCNC: 33.1 G/DL (ref 32–36)
MCV RBC AUTO: 81 FL (ref 82–98)
MONOCYTES # BLD AUTO: 1 K/UL (ref 0.3–1)
MONOCYTES NFR BLD: 6.8 % (ref 4–15)
NEUTROPHILS # BLD AUTO: 11.9 K/UL (ref 1.8–7.7)
NEUTROPHILS NFR BLD: 79.6 % (ref 38–73)
NRBC BLD-RTO: 0 /100 WBC
PHOSPHATE SERPL-MCNC: 2.2 MG/DL (ref 2.7–4.5)
PLATELET # BLD AUTO: 238 K/UL (ref 150–350)
PMV BLD AUTO: 10.2 FL (ref 9.2–12.9)
POTASSIUM SERPL-SCNC: 3.3 MMOL/L (ref 3.5–5.1)
RBC # BLD AUTO: 3.65 M/UL (ref 4–5.4)
SODIUM SERPL-SCNC: 133 MMOL/L (ref 136–145)
WBC # BLD AUTO: 14.94 K/UL (ref 3.9–12.7)

## 2020-12-27 PROCEDURE — 63600175 PHARM REV CODE 636 W HCPCS: Performed by: ANESTHESIOLOGY

## 2020-12-27 PROCEDURE — 80048 BASIC METABOLIC PNL TOTAL CA: CPT

## 2020-12-27 PROCEDURE — 87070 CULTURE OTHR SPECIMN AEROBIC: CPT

## 2020-12-27 PROCEDURE — D9220A PRA ANESTHESIA: Mod: ANES,,, | Performed by: ANESTHESIOLOGY

## 2020-12-27 PROCEDURE — 85025 COMPLETE CBC W/AUTO DIFF WBC: CPT

## 2020-12-27 PROCEDURE — 49320 PR LAP,DIAGNOSTIC ABDOMEN: ICD-10-PCS | Mod: ,,, | Performed by: SURGERY

## 2020-12-27 PROCEDURE — 83735 ASSAY OF MAGNESIUM: CPT

## 2020-12-27 PROCEDURE — 36000708 HC OR TIME LEV III 1ST 15 MIN: Performed by: SURGERY

## 2020-12-27 PROCEDURE — 37000008 HC ANESTHESIA 1ST 15 MINUTES: Performed by: SURGERY

## 2020-12-27 PROCEDURE — 63600175 PHARM REV CODE 636 W HCPCS: Performed by: NURSE ANESTHETIST, CERTIFIED REGISTERED

## 2020-12-27 PROCEDURE — 87075 CULTR BACTERIA EXCEPT BLOOD: CPT

## 2020-12-27 PROCEDURE — 25000003 PHARM REV CODE 250: Performed by: SURGERY

## 2020-12-27 PROCEDURE — D9220A PRA ANESTHESIA: Mod: CRNA,,, | Performed by: NURSE ANESTHETIST, CERTIFIED REGISTERED

## 2020-12-27 PROCEDURE — 25000003 PHARM REV CODE 250: Performed by: STUDENT IN AN ORGANIZED HEALTH CARE EDUCATION/TRAINING PROGRAM

## 2020-12-27 PROCEDURE — D9220A PRA ANESTHESIA: ICD-10-PCS | Mod: CRNA,,, | Performed by: NURSE ANESTHETIST, CERTIFIED REGISTERED

## 2020-12-27 PROCEDURE — 63600175 PHARM REV CODE 636 W HCPCS: Performed by: STUDENT IN AN ORGANIZED HEALTH CARE EDUCATION/TRAINING PROGRAM

## 2020-12-27 PROCEDURE — D9220A PRA ANESTHESIA: ICD-10-PCS | Mod: ANES,,, | Performed by: ANESTHESIOLOGY

## 2020-12-27 PROCEDURE — 84100 ASSAY OF PHOSPHORUS: CPT

## 2020-12-27 PROCEDURE — 87205 SMEAR GRAM STAIN: CPT

## 2020-12-27 PROCEDURE — 37000009 HC ANESTHESIA EA ADD 15 MINS: Performed by: SURGERY

## 2020-12-27 PROCEDURE — 36415 COLL VENOUS BLD VENIPUNCTURE: CPT

## 2020-12-27 PROCEDURE — 49320 DIAG LAPARO SEPARATE PROC: CPT | Mod: ,,, | Performed by: SURGERY

## 2020-12-27 PROCEDURE — 21400001 HC TELEMETRY ROOM

## 2020-12-27 PROCEDURE — 87102 FUNGUS ISOLATION CULTURE: CPT

## 2020-12-27 PROCEDURE — 63600175 PHARM REV CODE 636 W HCPCS: Performed by: SURGERY

## 2020-12-27 PROCEDURE — 71000033 HC RECOVERY, INTIAL HOUR: Performed by: SURGERY

## 2020-12-27 PROCEDURE — 25000003 PHARM REV CODE 250: Performed by: NURSE ANESTHETIST, CERTIFIED REGISTERED

## 2020-12-27 PROCEDURE — 36000709 HC OR TIME LEV III EA ADD 15 MIN: Performed by: SURGERY

## 2020-12-27 RX ORDER — DEXAMETHASONE SODIUM PHOSPHATE 4 MG/ML
INJECTION, SOLUTION INTRA-ARTICULAR; INTRALESIONAL; INTRAMUSCULAR; INTRAVENOUS; SOFT TISSUE
Status: DISCONTINUED | OUTPATIENT
Start: 2020-12-27 | End: 2020-12-27

## 2020-12-27 RX ORDER — NEOSTIGMINE METHYLSULFATE 0.5 MG/ML
INJECTION, SOLUTION INTRAVENOUS
Status: DISCONTINUED | OUTPATIENT
Start: 2020-12-27 | End: 2020-12-27

## 2020-12-27 RX ORDER — PROPOFOL 10 MG/ML
VIAL (ML) INTRAVENOUS
Status: DISCONTINUED | OUTPATIENT
Start: 2020-12-27 | End: 2020-12-27

## 2020-12-27 RX ORDER — BUPIVACAINE HYDROCHLORIDE AND EPINEPHRINE 2.5; 5 MG/ML; UG/ML
INJECTION, SOLUTION EPIDURAL; INFILTRATION; INTRACAUDAL; PERINEURAL
Status: DISCONTINUED | OUTPATIENT
Start: 2020-12-27 | End: 2020-12-27 | Stop reason: HOSPADM

## 2020-12-27 RX ORDER — MIDAZOLAM HYDROCHLORIDE 1 MG/ML
INJECTION, SOLUTION INTRAMUSCULAR; INTRAVENOUS
Status: DISCONTINUED | OUTPATIENT
Start: 2020-12-27 | End: 2020-12-27

## 2020-12-27 RX ORDER — OXYCODONE HYDROCHLORIDE 5 MG/1
10 TABLET ORAL EVERY 4 HOURS PRN
Status: DISCONTINUED | OUTPATIENT
Start: 2020-12-27 | End: 2020-12-29 | Stop reason: HOSPADM

## 2020-12-27 RX ORDER — OXYCODONE HYDROCHLORIDE 5 MG/1
5 TABLET ORAL EVERY 4 HOURS PRN
Status: DISCONTINUED | OUTPATIENT
Start: 2020-12-27 | End: 2020-12-29 | Stop reason: HOSPADM

## 2020-12-27 RX ORDER — SODIUM CHLORIDE 9 MG/ML
INJECTION, SOLUTION INTRAVENOUS CONTINUOUS PRN
Status: DISCONTINUED | OUTPATIENT
Start: 2020-12-27 | End: 2020-12-27

## 2020-12-27 RX ORDER — ACETAMINOPHEN 10 MG/ML
1000 INJECTION, SOLUTION INTRAVENOUS ONCE
Status: COMPLETED | OUTPATIENT
Start: 2020-12-27 | End: 2020-12-27

## 2020-12-27 RX ORDER — HYDROMORPHONE HYDROCHLORIDE 2 MG/ML
0.2 INJECTION, SOLUTION INTRAMUSCULAR; INTRAVENOUS; SUBCUTANEOUS EVERY 5 MIN PRN
Status: DISCONTINUED | OUTPATIENT
Start: 2020-12-27 | End: 2020-12-27

## 2020-12-27 RX ORDER — LIDOCAINE HYDROCHLORIDE 20 MG/ML
INJECTION INTRAVENOUS
Status: DISCONTINUED | OUTPATIENT
Start: 2020-12-27 | End: 2020-12-27

## 2020-12-27 RX ORDER — MORPHINE SULFATE 4 MG/ML
2 INJECTION, SOLUTION INTRAMUSCULAR; INTRAVENOUS EVERY 5 MIN PRN
Status: DISCONTINUED | OUTPATIENT
Start: 2020-12-27 | End: 2020-12-27

## 2020-12-27 RX ORDER — ONDANSETRON 2 MG/ML
INJECTION INTRAMUSCULAR; INTRAVENOUS
Status: DISCONTINUED | OUTPATIENT
Start: 2020-12-27 | End: 2020-12-27

## 2020-12-27 RX ORDER — ROCURONIUM BROMIDE 10 MG/ML
INJECTION, SOLUTION INTRAVENOUS
Status: DISCONTINUED | OUTPATIENT
Start: 2020-12-27 | End: 2020-12-27

## 2020-12-27 RX ORDER — SODIUM CHLORIDE 0.9 % (FLUSH) 0.9 %
3 SYRINGE (ML) INJECTION
Status: DISCONTINUED | OUTPATIENT
Start: 2020-12-27 | End: 2020-12-27

## 2020-12-27 RX ORDER — SUCCINYLCHOLINE CHLORIDE 20 MG/ML
INJECTION INTRAMUSCULAR; INTRAVENOUS
Status: DISCONTINUED | OUTPATIENT
Start: 2020-12-27 | End: 2020-12-27

## 2020-12-27 RX ORDER — FENTANYL CITRATE 50 UG/ML
INJECTION, SOLUTION INTRAMUSCULAR; INTRAVENOUS
Status: DISCONTINUED | OUTPATIENT
Start: 2020-12-27 | End: 2020-12-27

## 2020-12-27 RX ADMIN — VANCOMYCIN HYDROCHLORIDE 1250 MG: 10 INJECTION, POWDER, LYOPHILIZED, FOR SOLUTION INTRAVENOUS at 11:12

## 2020-12-27 RX ADMIN — GLYCOPYRROLATE 0.6 MG: 0.2 INJECTION, SOLUTION INTRAMUSCULAR; INTRAVITREAL at 01:12

## 2020-12-27 RX ADMIN — FENTANYL CITRATE 100 MCG: 50 INJECTION INTRAMUSCULAR; INTRAVENOUS at 12:12

## 2020-12-27 RX ADMIN — MIDAZOLAM HYDROCHLORIDE 2 MG: 1 INJECTION, SOLUTION INTRAMUSCULAR; INTRAVENOUS at 12:12

## 2020-12-27 RX ADMIN — ENOXAPARIN SODIUM 40 MG: 40 INJECTION SUBCUTANEOUS at 04:12

## 2020-12-27 RX ADMIN — MEROPENEM AND SODIUM CHLORIDE 1 G: 1 INJECTION, SOLUTION INTRAVENOUS at 03:12

## 2020-12-27 RX ADMIN — ACETAMINOPHEN 650 MG: 325 TABLET ORAL at 11:12

## 2020-12-27 RX ADMIN — SODIUM CHLORIDE: 0.9 INJECTION, SOLUTION INTRAVENOUS at 12:12

## 2020-12-27 RX ADMIN — PROPOFOL 50 MG: 10 INJECTION, EMULSION INTRAVENOUS at 01:12

## 2020-12-27 RX ADMIN — SUCCINYLCHOLINE CHLORIDE 140 MG: 20 INJECTION, SOLUTION INTRAMUSCULAR; INTRAVENOUS at 12:12

## 2020-12-27 RX ADMIN — VANCOMYCIN HYDROCHLORIDE 1250 MG: 10 INJECTION, POWDER, LYOPHILIZED, FOR SOLUTION INTRAVENOUS at 10:12

## 2020-12-27 RX ADMIN — PROPOFOL 200 MG: 10 INJECTION, EMULSION INTRAVENOUS at 12:12

## 2020-12-27 RX ADMIN — Medication 100 MG: at 12:12

## 2020-12-27 RX ADMIN — DEXAMETHASONE SODIUM PHOSPHATE 4 MG: 4 INJECTION, SOLUTION INTRA-ARTICULAR; INTRALESIONAL; INTRAMUSCULAR; INTRAVENOUS; SOFT TISSUE at 12:12

## 2020-12-27 RX ADMIN — ONDANSETRON 4 MG: 2 INJECTION INTRAMUSCULAR; INTRAVENOUS at 12:12

## 2020-12-27 RX ADMIN — FENTANYL CITRATE 50 MCG: 50 INJECTION INTRAMUSCULAR; INTRAVENOUS at 12:12

## 2020-12-27 RX ADMIN — ACETAMINOPHEN 1000 MG: 10 INJECTION, SOLUTION INTRAVENOUS at 02:12

## 2020-12-27 RX ADMIN — ROCURONIUM BROMIDE 10 MG: 10 INJECTION, SOLUTION INTRAVENOUS at 12:12

## 2020-12-27 RX ADMIN — FENTANYL CITRATE 25 MCG: 50 INJECTION INTRAMUSCULAR; INTRAVENOUS at 01:12

## 2020-12-27 RX ADMIN — ROCURONIUM BROMIDE 20 MG: 10 INJECTION, SOLUTION INTRAVENOUS at 12:12

## 2020-12-27 RX ADMIN — NEOSTIGMINE METHYLSULFATE 5 MG: 0.5 INJECTION INTRAVENOUS at 01:12

## 2020-12-27 RX ADMIN — MEROPENEM AND SODIUM CHLORIDE 1 G: 1 INJECTION, SOLUTION INTRAVENOUS at 11:12

## 2020-12-27 RX ADMIN — MEROPENEM AND SODIUM CHLORIDE 1 G: 1 INJECTION, SOLUTION INTRAVENOUS at 08:12

## 2020-12-27 RX ADMIN — ACETAMINOPHEN 650 MG: 325 TABLET ORAL at 05:12

## 2020-12-27 RX ADMIN — SODIUM CHLORIDE 100 ML/HR: 0.9 INJECTION, SOLUTION INTRAVENOUS at 06:12

## 2020-12-27 NOTE — ANESTHESIA PROCEDURE NOTES
Intubation  Performed by: Mary Medrano CRNA  Authorized by: Jeet Rocha MD     Intubation:     Induction:  Intravenous    Intubated:  Postinduction    Mask Ventilation:  Easy with oral airway    Attempts:  1    Attempted By:  CRNA    Method of Intubation:  Direct    Blade:  Nieto 2    Laryngeal View Grade: Grade I - full view of chords      Difficult Airway Encountered?: No      Complications:  None    Airway Device:  Oral endotracheal tube    Airway Device Size:  7.0    Style/Cuff Inflation:  Cuffed (inflated to minimal occlusive pressure)    Tube secured:  22    Secured at:  The lips    Placement Verified By:  Capnometry    Complicating Factors:  None    Findings Post-Intubation:  BS equal bilateral and atraumatic/condition of teeth unchanged

## 2020-12-27 NOTE — ANESTHESIA PREPROCEDURE EVALUATION
12/27/2020  Shahida Ortega is a 50 y.o., female for laparascopic intraabdominal abscess drainage.  Pt presented to ED with 2 days abdominal pain, distention, nausea w/o vomiting.  These symptoms are similar to symptoms she experienced with May 2019 where an intra abdominal abscess of unknown source was found.    Past Medical History:   Diagnosis Date    Hypertension          Anesthesia Evaluation    I have reviewed the Patient Summary Reports.    I have reviewed the Nursing Notes.    I have reviewed the Medications.     Review of Systems  Anesthesia Hx:  No problems with previous Anesthesia Denies Hx of Anesthetic complications  Neg history of prior surgery. Denies Family Hx of Anesthesia complications.   Denies Personal Hx of Anesthesia complications.   Social:  Non-Smoker, No Alcohol Use    Hematology/Oncology:  Hematology Normal   Oncology Normal     EENT/Dental:EENT/Dental Normal   Cardiovascular:   Exercise tolerance: good Hypertension    Pulmonary:  Pulmonary Normal    Renal/:  Renal/ Normal     Hepatic/GI:   No Bowel Prep. Denies PUD. Denies Hiatal Hernia.  Denies GERD. Denies Liver Disease.  Denies Hepatitis. Intra abdominal abscess   Musculoskeletal:  Musculoskeletal Normal    Neurological:  Neurology Normal    Endocrine:  Endocrine Normal    Dermatological:  Skin Normal    Psych:  Psychiatric Normal           Chemistry        Component Value Date/Time     (L) 12/26/2020 1600    K 3.8 12/26/2020 1600    CL 89 (L) 12/26/2020 1600    CO2 29 12/26/2020 1600    BUN 18 12/26/2020 1600    CREATININE 1.0 12/26/2020 1600     (H) 12/26/2020 1600        Component Value Date/Time    CALCIUM 8.9 12/26/2020 1600    ALKPHOS 139 (H) 12/26/2020 1600    AST 24 12/26/2020 1600    ALT 20 12/26/2020 1600    BILITOT 0.3 12/26/2020 1600    ESTGFRAFRICA >60 12/26/2020 1600    EGFRNONAA >60 12/26/2020 1600         Lab Results   Component Value Date    WBC 25.27 (H) 12/26/2020    HGB 12.0 12/26/2020    HCT 37.1 12/26/2020    MCV 83 12/26/2020     12/26/2020           Physical Exam  General:  Obesity    Airway/Jaw/Neck:  AIRWAY FINDINGS: Normal      Chest/Lungs:  Chest/Lungs Clear    Heart/Vascular:  Heart Findings: Normal       Mental Status:  Mental Status Findings: Normal        Anesthesia Plan  Type of Anesthesia, risks & benefits discussed:  Anesthesia Type:  spinal, general  Patient's Preference:   Intra-op Monitoring Plan: standard ASA monitors  Intra-op Monitoring Plan Comments:   Post Op Pain Control Plan:   Post Op Pain Control Plan Comments:   Induction:   IV  Beta Blocker:  Patient is not currently on a Beta-Blocker (No further documentation required).       Informed Consent: Patient understands risks and agrees with Anesthesia plan.  Questions answered. Anesthesia consent signed with patient.  ASA Score: 2     Day of Surgery Review of History & Physical:  There are no significant changes.  H&P update referred to the provider.         Ready For Surgery From Anesthesia Perspective.

## 2020-12-27 NOTE — TRANSFER OF CARE
"Anesthesia Transfer of Care Note    Patient: April Jordan    Procedure(s) Performed: Procedure(s) (LRB):  Diagnostic laparoscopy, drainage of intra-abdominal abscess (N/A)    Patient location: PACU    Anesthesia Type: general    Transport from OR: Transported from OR on 6-10 L/min O2 by face mask with adequate spontaneous ventilation    Post pain: adequate analgesia    Post assessment: no apparent anesthetic complications and tolerated procedure well    Post vital signs: stable    Level of consciousness: sedated    Nausea/Vomiting: no nausea/vomiting    Complications: none    Transfer of care protocol was followed      Last vitals:   Visit Vitals  /71 (Patient Position: Lying)   Pulse 87   Temp 36.9 °C (98.5 °F) (Oral)   Resp 16   Ht 5' 7" (1.702 m)   Wt 96 kg (211 lb 10.3 oz)   LMP 12/24/2020   SpO2 (!) 93%   Breastfeeding No   BMI 33.15 kg/m²     "

## 2020-12-28 LAB
ANION GAP SERPL CALC-SCNC: 11 MMOL/L (ref 8–16)
BACTERIA UR CULT: NORMAL
BASOPHILS # BLD AUTO: ABNORMAL K/UL (ref 0–0.2)
BASOPHILS NFR BLD: 0 % (ref 0–1.9)
BUN SERPL-MCNC: 12 MG/DL (ref 6–20)
CALCIUM SERPL-MCNC: 8.3 MG/DL (ref 8.7–10.5)
CHLORIDE SERPL-SCNC: 99 MMOL/L (ref 95–110)
CO2 SERPL-SCNC: 29 MMOL/L (ref 23–29)
CREAT SERPL-MCNC: 0.7 MG/DL (ref 0.5–1.4)
DIFFERENTIAL METHOD: ABNORMAL
EOSINOPHIL # BLD AUTO: ABNORMAL K/UL (ref 0–0.5)
EOSINOPHIL NFR BLD: 0 % (ref 0–8)
ERYTHROCYTE [DISTWIDTH] IN BLOOD BY AUTOMATED COUNT: 14.9 % (ref 11.5–14.5)
EST. GFR  (AFRICAN AMERICAN): >60 ML/MIN/1.73 M^2
EST. GFR  (NON AFRICAN AMERICAN): >60 ML/MIN/1.73 M^2
GLUCOSE SERPL-MCNC: 112 MG/DL (ref 70–110)
HCT VFR BLD AUTO: 32.9 % (ref 37–48.5)
HGB BLD-MCNC: 10.8 G/DL (ref 12–16)
HYPOCHROMIA BLD QL SMEAR: ABNORMAL
IMM GRANULOCYTES # BLD AUTO: ABNORMAL K/UL (ref 0–0.04)
IMM GRANULOCYTES NFR BLD AUTO: ABNORMAL % (ref 0–0.5)
LYMPHOCYTES # BLD AUTO: ABNORMAL K/UL (ref 1–4.8)
LYMPHOCYTES NFR BLD: 5 % (ref 18–48)
MAGNESIUM SERPL-MCNC: 2.3 MG/DL (ref 1.6–2.6)
MCH RBC QN AUTO: 26.9 PG (ref 27–31)
MCHC RBC AUTO-ENTMCNC: 32.8 G/DL (ref 32–36)
MCV RBC AUTO: 82 FL (ref 82–98)
MONOCYTES # BLD AUTO: ABNORMAL K/UL (ref 0.3–1)
MONOCYTES NFR BLD: 3 % (ref 4–15)
MYELOCYTES NFR BLD MANUAL: 1 %
NEUTROPHILS NFR BLD: 88 % (ref 38–73)
NEUTS BAND NFR BLD MANUAL: 3 %
NRBC BLD-RTO: 0 /100 WBC
PHOSPHATE SERPL-MCNC: 3.3 MG/DL (ref 2.7–4.5)
PLATELET # BLD AUTO: 253 K/UL (ref 150–350)
PLATELET BLD QL SMEAR: ABNORMAL
PMV BLD AUTO: 10.5 FL (ref 9.2–12.9)
POTASSIUM SERPL-SCNC: 3.8 MMOL/L (ref 3.5–5.1)
RBC # BLD AUTO: 4.01 M/UL (ref 4–5.4)
SODIUM SERPL-SCNC: 139 MMOL/L (ref 136–145)
VANCOMYCIN TROUGH SERPL-MCNC: 12.1 UG/ML (ref 10–22)
WBC # BLD AUTO: 15.18 K/UL (ref 3.9–12.7)

## 2020-12-28 PROCEDURE — 63600175 PHARM REV CODE 636 W HCPCS: Performed by: SURGERY

## 2020-12-28 PROCEDURE — 84100 ASSAY OF PHOSPHORUS: CPT

## 2020-12-28 PROCEDURE — 21400001 HC TELEMETRY ROOM

## 2020-12-28 PROCEDURE — 85027 COMPLETE CBC AUTOMATED: CPT

## 2020-12-28 PROCEDURE — 63600175 PHARM REV CODE 636 W HCPCS: Performed by: STUDENT IN AN ORGANIZED HEALTH CARE EDUCATION/TRAINING PROGRAM

## 2020-12-28 PROCEDURE — 94799 UNLISTED PULMONARY SVC/PX: CPT

## 2020-12-28 PROCEDURE — 94761 N-INVAS EAR/PLS OXIMETRY MLT: CPT

## 2020-12-28 PROCEDURE — 36415 COLL VENOUS BLD VENIPUNCTURE: CPT

## 2020-12-28 PROCEDURE — 83735 ASSAY OF MAGNESIUM: CPT

## 2020-12-28 PROCEDURE — 80202 ASSAY OF VANCOMYCIN: CPT

## 2020-12-28 PROCEDURE — 25000003 PHARM REV CODE 250: Performed by: SURGERY

## 2020-12-28 PROCEDURE — 25000003 PHARM REV CODE 250: Performed by: STUDENT IN AN ORGANIZED HEALTH CARE EDUCATION/TRAINING PROGRAM

## 2020-12-28 PROCEDURE — 80048 BASIC METABOLIC PNL TOTAL CA: CPT

## 2020-12-28 PROCEDURE — 85007 BL SMEAR W/DIFF WBC COUNT: CPT

## 2020-12-28 RX ADMIN — VANCOMYCIN HYDROCHLORIDE 1250 MG: 10 INJECTION, POWDER, LYOPHILIZED, FOR SOLUTION INTRAVENOUS at 10:12

## 2020-12-28 RX ADMIN — SODIUM CHLORIDE 75 ML/HR: 0.9 INJECTION, SOLUTION INTRAVENOUS at 07:12

## 2020-12-28 RX ADMIN — ACETAMINOPHEN 650 MG: 325 TABLET ORAL at 12:12

## 2020-12-28 RX ADMIN — ACETAMINOPHEN 650 MG: 325 TABLET ORAL at 05:12

## 2020-12-28 RX ADMIN — ACETAMINOPHEN 650 MG: 325 TABLET ORAL at 11:12

## 2020-12-28 RX ADMIN — MEROPENEM AND SODIUM CHLORIDE 1 G: 1 INJECTION, SOLUTION INTRAVENOUS at 08:12

## 2020-12-28 RX ADMIN — ENOXAPARIN SODIUM 40 MG: 40 INJECTION SUBCUTANEOUS at 04:12

## 2020-12-28 RX ADMIN — MEROPENEM AND SODIUM CHLORIDE 1 G: 1 INJECTION, SOLUTION INTRAVENOUS at 04:12

## 2020-12-28 RX ADMIN — MEROPENEM AND SODIUM CHLORIDE 1 G: 1 INJECTION, SOLUTION INTRAVENOUS at 10:12

## 2020-12-28 RX ADMIN — VANCOMYCIN HYDROCHLORIDE 1250 MG: 10 INJECTION, POWDER, LYOPHILIZED, FOR SOLUTION INTRAVENOUS at 09:12

## 2020-12-28 NOTE — ANESTHESIA POSTPROCEDURE EVALUATION
Anesthesia Post Evaluation    Patient: April Carter    Procedure(s) Performed: Procedure(s) (LRB):  Diagnostic laparoscopy, drainage of intra-abdominal abscess (N/A)    Final Anesthesia Type: general      Patient location during evaluation: PACU  Patient participation: Yes- Able to Participate  Level of consciousness: awake and alert  Post-procedure vital signs: reviewed and stable  Pain management: adequate  Airway patency: patent    PONV status at discharge: No PONV  Anesthetic complications: no      Cardiovascular status: blood pressure returned to baseline and hemodynamically stable  Respiratory status: unassisted and spontaneous ventilation  Hydration status: euvolemic  Follow-up not needed.          Vitals Value Taken Time   /70 12/28/20 0732   Temp 36.7 °C (98 °F) 12/28/20 0732   Pulse 70 12/28/20 0732   Resp 18 12/28/20 0732   SpO2 96 % 12/28/20 0808         Event Time   Out of Recovery 12/27/2020 14:41:03         Pain/Celsa Score: Pain Rating Prior to Med Admin: 2 (12/28/2020  5:13 AM)  Celsa Score: 10 (12/27/2020  2:15 PM)

## 2020-12-29 VITALS
HEIGHT: 67 IN | HEART RATE: 67 BPM | RESPIRATION RATE: 16 BRPM | OXYGEN SATURATION: 97 % | TEMPERATURE: 98 F | BODY MASS INDEX: 35.98 KG/M2 | DIASTOLIC BLOOD PRESSURE: 80 MMHG | WEIGHT: 229.25 LBS | SYSTOLIC BLOOD PRESSURE: 143 MMHG

## 2020-12-29 LAB
ANION GAP SERPL CALC-SCNC: 7 MMOL/L (ref 8–16)
BASOPHILS # BLD AUTO: ABNORMAL K/UL (ref 0–0.2)
BASOPHILS NFR BLD: 0 % (ref 0–1.9)
BUN SERPL-MCNC: 12 MG/DL (ref 6–20)
CALCIUM SERPL-MCNC: 7.8 MG/DL (ref 8.7–10.5)
CHLORIDE SERPL-SCNC: 104 MMOL/L (ref 95–110)
CO2 SERPL-SCNC: 29 MMOL/L (ref 23–29)
CREAT SERPL-MCNC: 0.7 MG/DL (ref 0.5–1.4)
DIFFERENTIAL METHOD: ABNORMAL
EOSINOPHIL # BLD AUTO: ABNORMAL K/UL (ref 0–0.5)
EOSINOPHIL NFR BLD: 0 % (ref 0–8)
ERYTHROCYTE [DISTWIDTH] IN BLOOD BY AUTOMATED COUNT: 15 % (ref 11.5–14.5)
EST. GFR  (AFRICAN AMERICAN): >60 ML/MIN/1.73 M^2
EST. GFR  (NON AFRICAN AMERICAN): >60 ML/MIN/1.73 M^2
GLUCOSE SERPL-MCNC: 110 MG/DL (ref 70–110)
HCT VFR BLD AUTO: 33.6 % (ref 37–48.5)
HGB BLD-MCNC: 10.4 G/DL (ref 12–16)
IMM GRANULOCYTES # BLD AUTO: ABNORMAL K/UL (ref 0–0.04)
IMM GRANULOCYTES NFR BLD AUTO: ABNORMAL % (ref 0–0.5)
LYMPHOCYTES # BLD AUTO: ABNORMAL K/UL (ref 1–4.8)
LYMPHOCYTES NFR BLD: 28 % (ref 18–48)
MAGNESIUM SERPL-MCNC: 2.1 MG/DL (ref 1.6–2.6)
MCH RBC QN AUTO: 26.5 PG (ref 27–31)
MCHC RBC AUTO-ENTMCNC: 31 G/DL (ref 32–36)
MCV RBC AUTO: 86 FL (ref 82–98)
METAMYELOCYTES NFR BLD MANUAL: 2 %
MONOCYTES # BLD AUTO: ABNORMAL K/UL (ref 0.3–1)
MONOCYTES NFR BLD: 10 % (ref 4–15)
MYELOCYTES NFR BLD MANUAL: 1 %
NEUTROPHILS # BLD AUTO: ABNORMAL K/UL (ref 1.8–7.7)
NEUTROPHILS NFR BLD: 58 % (ref 38–73)
NEUTS BAND NFR BLD MANUAL: 1 %
NRBC BLD-RTO: 0 /100 WBC
PHOSPHATE SERPL-MCNC: 2.8 MG/DL (ref 2.7–4.5)
PLATELET # BLD AUTO: 252 K/UL (ref 150–350)
PLATELET BLD QL SMEAR: ABNORMAL
PMV BLD AUTO: 10.1 FL (ref 9.2–12.9)
POTASSIUM SERPL-SCNC: 3.5 MMOL/L (ref 3.5–5.1)
RBC # BLD AUTO: 3.92 M/UL (ref 4–5.4)
SODIUM SERPL-SCNC: 140 MMOL/L (ref 136–145)
VANCOMYCIN TROUGH SERPL-MCNC: 12.5 UG/ML (ref 10–22)
WBC # BLD AUTO: 10.08 K/UL (ref 3.9–12.7)

## 2020-12-29 PROCEDURE — 63600175 PHARM REV CODE 636 W HCPCS: Performed by: STUDENT IN AN ORGANIZED HEALTH CARE EDUCATION/TRAINING PROGRAM

## 2020-12-29 PROCEDURE — 90471 IMMUNIZATION ADMIN: CPT | Performed by: SURGERY

## 2020-12-29 PROCEDURE — 25000003 PHARM REV CODE 250: Performed by: SURGERY

## 2020-12-29 PROCEDURE — 80202 ASSAY OF VANCOMYCIN: CPT

## 2020-12-29 PROCEDURE — 85007 BL SMEAR W/DIFF WBC COUNT: CPT

## 2020-12-29 PROCEDURE — 84100 ASSAY OF PHOSPHORUS: CPT

## 2020-12-29 PROCEDURE — 36415 COLL VENOUS BLD VENIPUNCTURE: CPT

## 2020-12-29 PROCEDURE — 80048 BASIC METABOLIC PNL TOTAL CA: CPT

## 2020-12-29 PROCEDURE — 83735 ASSAY OF MAGNESIUM: CPT

## 2020-12-29 PROCEDURE — 85027 COMPLETE CBC AUTOMATED: CPT

## 2020-12-29 PROCEDURE — 94761 N-INVAS EAR/PLS OXIMETRY MLT: CPT

## 2020-12-29 PROCEDURE — 25000003 PHARM REV CODE 250: Performed by: STUDENT IN AN ORGANIZED HEALTH CARE EDUCATION/TRAINING PROGRAM

## 2020-12-29 PROCEDURE — 90670 PCV13 VACCINE IM: CPT | Performed by: SURGERY

## 2020-12-29 PROCEDURE — 63600175 PHARM REV CODE 636 W HCPCS: Performed by: SURGERY

## 2020-12-29 RX ORDER — OXYCODONE AND ACETAMINOPHEN 7.5; 325 MG/1; MG/1
1 TABLET ORAL EVERY 4 HOURS PRN
Status: DISCONTINUED | OUTPATIENT
Start: 2020-12-29 | End: 2020-12-29 | Stop reason: HOSPADM

## 2020-12-29 RX ADMIN — MEROPENEM AND SODIUM CHLORIDE 1 G: 1 INJECTION, SOLUTION INTRAVENOUS at 08:12

## 2020-12-29 RX ADMIN — ACETAMINOPHEN 650 MG: 325 TABLET ORAL at 06:12

## 2020-12-29 RX ADMIN — VANCOMYCIN HYDROCHLORIDE 1250 MG: 10 INJECTION, POWDER, LYOPHILIZED, FOR SOLUTION INTRAVENOUS at 10:12

## 2020-12-29 RX ADMIN — PNEUMOCOCCAL 13-VALENT CONJUGATE VACCINE 0.5 ML: 2.2; 2.2; 2.2; 2.2; 2.2; 4.4; 2.2; 2.2; 2.2; 2.2; 2.2; 2.2; 2.2 INJECTION, SUSPENSION INTRAMUSCULAR at 12:12

## 2020-12-30 RX ORDER — METRONIDAZOLE 500 MG/1
500 TABLET ORAL EVERY 8 HOURS
Qty: 21 TABLET | Refills: 0 | Status: SHIPPED | OUTPATIENT
Start: 2020-12-30 | End: 2021-01-08

## 2020-12-30 RX ORDER — CIPROFLOXACIN 500 MG/1
500 TABLET ORAL EVERY 12 HOURS
Qty: 14 TABLET | Refills: 0 | Status: SHIPPED | OUTPATIENT
Start: 2020-12-30 | End: 2021-01-08

## 2020-12-31 ENCOUNTER — PATIENT OUTREACH (OUTPATIENT)
Dept: ADMINISTRATIVE | Facility: CLINIC | Age: 50
End: 2020-12-31

## 2020-12-31 LAB
BACTERIA BLD CULT: NORMAL
BACTERIA BLD CULT: NORMAL
BACTERIA SPEC AEROBE CULT: NO GROWTH
BACTERIA SPEC ANAEROBE CULT: NORMAL

## 2020-12-31 NOTE — PROGRESS NOTES
C3 nurse attempted to contact patient. No answer. The following message was left for the patient to return the call:  Good morning I am a nurse calling on behalf of [facility] from the [facility].  This is a Transitional Care Call for April. When you have a moment please contact us at [phone] or [phone] Monday through Friday, between the hours of 8 am to 4 pm. We look forward to speaking with you. On behalf of [facility] have a nice day.    The patient does not have a scheduled HOSFU appointment within 7-14 days post hospital discharge date 12/29/20. Message sent to Physician staff to assist with HOSFU appointment scheduling.

## 2021-01-04 NOTE — PATIENT INSTRUCTIONS
Unknown Causes of Abdominal Pain (Female)    The exact cause of your abdominal (stomach) pain is not clear. This does not mean that this is something to worry about. Everyone likes to know the exact cause of the problem, but sometimes with abdominal pain, there is no clear-cut cause, and this could be a good thing. The good news is that your symptoms can be treated, and you will feel better.   Your condition does not seem serious now; however, sometimes the signs of a serious problem may take more time to appear. For this reason, it is important for you to watch for any new symptoms, problems, or worsening of your condition.  Over the next few days, the abdominal pain may come and go, or be continuous. Other common symptoms can include nausea and vomiting. Sometimes it can be difficult to tell if you feel nauseous, you may just feel bad and not associate that feeling with nausea. Constipation, diarrhea, and a fever may go along with the pain.  The pain may continue even if treated correctly over the following days. Depending on how things go, sometimes the cause can become clear and may require further or different treatment. Additional evaluations, medications, or tests may also be needed.  Home care  Your healthcare provider may prescribe medicine for pain, symptoms, or an infection.  Follow the healthcare provider's instructions for taking these medicines.  General care  · Rest as much as you can until your next exam. No strenuous activities.  · Try to find positions that ease discomfort. A small pillow placed on the abdomen may help relieve pain.  · Something warm on your abdomen (such as a heating pad) may help, but be careful not to burn yourself.  Diet  · Do not force yourself to eat, especially if having cramps, vomiting, or diarrhea.  · Water is important so you do not get dehydrated. Soup may also be good. Sports drinks may also help, especially if they are not too acidic. Make sure you don't drink  sugary drinks as this can make things worse. Take liquids in small amounts. Do not guzzle them.  · Caffeine sometimes makes the pain and cramping worse.  · Avoid dairy products if you have vomiting or diarrhea.  · Don't eat large amounts at a time. Wait a few minutes between bites.  · Eat a diet low in fiber (called a low-residue diet). Foods allowed include refined breads, white rice, fruit and vegetable juices without pulp, tender meats. These foods will pass more easily through the intestine.  · Avoid whole-grain foods, whole fruits and vegetables, meats, seeds and nuts, fried or fatty foods, dairy, alcohol and spicy foods until your symptoms go away.  Follow-up care  Follow up with your healthcare provider, or as advised, if your pain does not begin to improve in the next 24 hours.  Call 911  Call 911 if any of these occur:  · Trouble breathing  · Confusion  · Fainting or loss of consciousness  · Rapid heart rate  · Seizure  When to seek medical advice  Call your healthcare provider right away if any of these occur:  · Pain gets worse or moves to the right lower abdomen  · New or worsening vomiting or diarrhea  · Swelling of the abdomen  · Unable to pass stool for more than 3 days  · Fever of 100.4ºF (38ºC) or higher, or as directed by your healthcare provider.  · Blood in vomit or bowel movements (dark red or black color)  · Jaundice (yellow color of eyes and skin)  · Weakness, dizziness  · Chest, arm, back, neck or jaw pain  · Unexpected vaginal bleeding or missed period  · Can't keep down liquids or water and are getting dehydrated  Date Last Reviewed: 12/30/2015  © 1909-8012 [employer] [address] [place] [address], [place] [address]. All rights reserved. This information is not intended as a substitute for professional medical care. Always follow your healthcare professional's instructions.

## 2021-01-08 ENCOUNTER — OFFICE VISIT (OUTPATIENT)
Dept: SURGERY | Facility: CLINIC | Age: 51
End: 2021-01-08
Payer: COMMERCIAL

## 2021-01-08 VITALS
WEIGHT: 229 LBS | HEIGHT: 67 IN | BODY MASS INDEX: 35.94 KG/M2 | DIASTOLIC BLOOD PRESSURE: 83 MMHG | HEART RATE: 92 BPM | SYSTOLIC BLOOD PRESSURE: 147 MMHG

## 2021-01-08 DIAGNOSIS — K65.1 INTRA-ABDOMINAL ABSCESS: Primary | ICD-10-CM

## 2021-01-08 PROCEDURE — 3008F BODY MASS INDEX DOCD: CPT | Mod: CPTII,S$GLB,, | Performed by: SURGERY

## 2021-01-08 PROCEDURE — 99999 PR PBB SHADOW E&M-EST. PATIENT-LVL III: CPT | Mod: PBBFAC,,, | Performed by: SURGERY

## 2021-01-08 PROCEDURE — 1126F AMNT PAIN NOTED NONE PRSNT: CPT | Mod: S$GLB,,, | Performed by: SURGERY

## 2021-01-08 PROCEDURE — 1126F PR PAIN SEVERITY QUANTIFIED, NO PAIN PRESENT: ICD-10-PCS | Mod: S$GLB,,, | Performed by: SURGERY

## 2021-01-08 PROCEDURE — 99999 PR PBB SHADOW E&M-EST. PATIENT-LVL III: ICD-10-PCS | Mod: PBBFAC,,, | Performed by: SURGERY

## 2021-01-08 PROCEDURE — 99024 PR POST-OP FOLLOW-UP VISIT: ICD-10-PCS | Mod: S$GLB,,, | Performed by: SURGERY

## 2021-01-08 PROCEDURE — 99024 POSTOP FOLLOW-UP VISIT: CPT | Mod: S$GLB,,, | Performed by: SURGERY

## 2021-01-08 PROCEDURE — 3008F PR BODY MASS INDEX (BMI) DOCUMENTED: ICD-10-PCS | Mod: CPTII,S$GLB,, | Performed by: SURGERY

## 2021-01-21 ENCOUNTER — NURSE TRIAGE (OUTPATIENT)
Dept: ADMINISTRATIVE | Facility: CLINIC | Age: 51
End: 2021-01-21

## 2021-01-22 ENCOUNTER — TELEPHONE (OUTPATIENT)
Dept: SURGERY | Facility: CLINIC | Age: 51
End: 2021-01-22

## 2021-01-26 LAB — FUNGUS SPEC CULT: NORMAL

## 2021-04-05 ENCOUNTER — HOSPITAL ENCOUNTER (EMERGENCY)
Facility: HOSPITAL | Age: 51
Discharge: HOME | End: 2021-04-05
Attending: EMERGENCY MEDICINE
Payer: COMMERCIAL

## 2021-04-05 VITALS
SYSTOLIC BLOOD PRESSURE: 155 MMHG | HEIGHT: 67 IN | WEIGHT: 220 LBS | HEART RATE: 94 BPM | RESPIRATION RATE: 18 BRPM | TEMPERATURE: 100 F | DIASTOLIC BLOOD PRESSURE: 78 MMHG | OXYGEN SATURATION: 96 % | BODY MASS INDEX: 34.53 KG/M2

## 2021-04-05 DIAGNOSIS — D72.829 LEUKOCYTOSIS, UNSPECIFIED TYPE: ICD-10-CM

## 2021-04-05 DIAGNOSIS — R14.0 BLOATING: Primary | ICD-10-CM

## 2021-04-05 DIAGNOSIS — K43.9 HERNIA OF ABDOMINAL WALL: ICD-10-CM

## 2021-04-05 LAB
ALBUMIN SERPL BCP-MCNC: 3.8 G/DL (ref 3.5–5.2)
ALP SERPL-CCNC: 86 U/L (ref 55–135)
ALT SERPL W/O P-5'-P-CCNC: 13 U/L (ref 10–44)
ANION GAP SERPL CALC-SCNC: 11 MMOL/L (ref 8–16)
AST SERPL-CCNC: 13 U/L (ref 10–40)
B-HCG UR QL: NEGATIVE
BACTERIA #/AREA URNS HPF: ABNORMAL /HPF
BASOPHILS # BLD AUTO: 0.02 K/UL (ref 0–0.2)
BASOPHILS NFR BLD: 0.1 % (ref 0–1.9)
BILIRUB SERPL-MCNC: 0.5 MG/DL (ref 0.1–1)
BILIRUB UR QL STRIP: NEGATIVE
BUN SERPL-MCNC: 13 MG/DL (ref 6–20)
CALCIUM SERPL-MCNC: 9.8 MG/DL (ref 8.7–10.5)
CHLORIDE SERPL-SCNC: 99 MMOL/L (ref 95–110)
CLARITY UR: ABNORMAL
CO2 SERPL-SCNC: 27 MMOL/L (ref 23–29)
COLOR UR: ABNORMAL
CREAT SERPL-MCNC: 0.8 MG/DL (ref 0.5–1.4)
DIFFERENTIAL METHOD: ABNORMAL
EOSINOPHIL # BLD AUTO: 0 K/UL (ref 0–0.5)
EOSINOPHIL NFR BLD: 0 % (ref 0–8)
ERYTHROCYTE [DISTWIDTH] IN BLOOD BY AUTOMATED COUNT: 15.6 % (ref 11.5–14.5)
EST. GFR  (AFRICAN AMERICAN): >60 ML/MIN/1.73 M^2
EST. GFR  (NON AFRICAN AMERICAN): >60 ML/MIN/1.73 M^2
GLUCOSE SERPL-MCNC: 131 MG/DL (ref 70–110)
GLUCOSE UR QL STRIP: ABNORMAL
HCT VFR BLD AUTO: 34.9 % (ref 37–48.5)
HGB BLD-MCNC: 11.1 G/DL (ref 12–16)
HGB UR QL STRIP: ABNORMAL
IMM GRANULOCYTES # BLD AUTO: 0.07 K/UL (ref 0–0.04)
IMM GRANULOCYTES NFR BLD AUTO: 0.4 % (ref 0–0.5)
KETONES UR QL STRIP: NEGATIVE
LEUKOCYTE ESTERASE UR QL STRIP: ABNORMAL
LIPASE SERPL-CCNC: 12 U/L (ref 4–60)
LYMPHOCYTES # BLD AUTO: 1 K/UL (ref 1–4.8)
LYMPHOCYTES NFR BLD: 6.4 % (ref 18–48)
MCH RBC QN AUTO: 27.3 PG (ref 27–31)
MCHC RBC AUTO-ENTMCNC: 31.8 G/DL (ref 32–36)
MCV RBC AUTO: 86 FL (ref 82–98)
MICROSCOPIC COMMENT: ABNORMAL
MONOCYTES # BLD AUTO: 0.7 K/UL (ref 0.3–1)
MONOCYTES NFR BLD: 4.1 % (ref 4–15)
NEUTROPHILS # BLD AUTO: 14.5 K/UL (ref 1.8–7.7)
NEUTROPHILS NFR BLD: 89 % (ref 38–73)
NITRITE UR QL STRIP: NEGATIVE
NRBC BLD-RTO: 0 /100 WBC
PH UR STRIP: 6 [PH] (ref 5–8)
PLATELET # BLD AUTO: 222 K/UL (ref 150–450)
PMV BLD AUTO: 10.6 FL (ref 9.2–12.9)
POTASSIUM SERPL-SCNC: 3.8 MMOL/L (ref 3.5–5.1)
PROT SERPL-MCNC: 8.8 G/DL (ref 6–8.4)
PROT UR QL STRIP: ABNORMAL
RBC # BLD AUTO: 4.07 M/UL (ref 4–5.4)
RBC #/AREA URNS HPF: 0 /HPF (ref 0–4)
SODIUM SERPL-SCNC: 137 MMOL/L (ref 136–145)
SP GR UR STRIP: 1.02 (ref 1–1.03)
SQUAMOUS #/AREA URNS HPF: 3 /HPF
UNIDENT CRYS URNS QL MICRO: ABNORMAL
URN SPEC COLLECT METH UR: ABNORMAL
UROBILINOGEN UR STRIP-ACNC: NEGATIVE EU/DL
WBC # BLD AUTO: 16.3 K/UL (ref 3.9–12.7)
WBC #/AREA URNS HPF: 0 /HPF (ref 0–5)

## 2021-04-05 PROCEDURE — 25500020 PHARM REV CODE 255: Performed by: EMERGENCY MEDICINE

## 2021-04-05 PROCEDURE — 81000 URINALYSIS NONAUTO W/SCOPE: CPT | Performed by: PHYSICIAN ASSISTANT

## 2021-04-05 PROCEDURE — 96361 HYDRATE IV INFUSION ADD-ON: CPT

## 2021-04-05 PROCEDURE — 81025 URINE PREGNANCY TEST: CPT | Performed by: PHYSICIAN ASSISTANT

## 2021-04-05 PROCEDURE — 80053 COMPREHEN METABOLIC PANEL: CPT | Performed by: PHYSICIAN ASSISTANT

## 2021-04-05 PROCEDURE — 25000003 PHARM REV CODE 250: Performed by: PHYSICIAN ASSISTANT

## 2021-04-05 PROCEDURE — 96360 HYDRATION IV INFUSION INIT: CPT

## 2021-04-05 PROCEDURE — 99285 EMERGENCY DEPT VISIT HI MDM: CPT | Mod: 25

## 2021-04-05 PROCEDURE — 83690 ASSAY OF LIPASE: CPT | Performed by: PHYSICIAN ASSISTANT

## 2021-04-05 PROCEDURE — 85025 COMPLETE CBC W/AUTO DIFF WBC: CPT | Performed by: PHYSICIAN ASSISTANT

## 2021-04-05 RX ADMIN — SODIUM CHLORIDE 1000 ML: 0.9 INJECTION, SOLUTION INTRAVENOUS at 08:04

## 2021-04-05 RX ADMIN — IOHEXOL 75 ML: 350 INJECTION, SOLUTION INTRAVENOUS at 09:04

## 2021-04-12 ENCOUNTER — OFFICE VISIT (OUTPATIENT)
Dept: SURGERY | Facility: CLINIC | Age: 51
End: 2021-04-12
Payer: COMMERCIAL

## 2021-04-12 VITALS
WEIGHT: 221.56 LBS | DIASTOLIC BLOOD PRESSURE: 70 MMHG | HEART RATE: 130 BPM | HEIGHT: 67 IN | OXYGEN SATURATION: 99 % | BODY MASS INDEX: 34.78 KG/M2 | SYSTOLIC BLOOD PRESSURE: 118 MMHG

## 2021-04-12 DIAGNOSIS — K65.1 INTRA-ABDOMINAL ABSCESS: Primary | ICD-10-CM

## 2021-04-12 PROCEDURE — 99999 PR PBB SHADOW E&M-EST. PATIENT-LVL III: ICD-10-PCS | Mod: PBBFAC,,, | Performed by: SURGERY

## 2021-04-12 PROCEDURE — 1125F PR PAIN SEVERITY QUANTIFIED, PAIN PRESENT: ICD-10-PCS | Mod: S$GLB,,, | Performed by: SURGERY

## 2021-04-12 PROCEDURE — 1125F AMNT PAIN NOTED PAIN PRSNT: CPT | Mod: S$GLB,,, | Performed by: SURGERY

## 2021-04-12 PROCEDURE — 99024 POSTOP FOLLOW-UP VISIT: CPT | Mod: S$GLB,,, | Performed by: SURGERY

## 2021-04-12 PROCEDURE — 3008F PR BODY MASS INDEX (BMI) DOCUMENTED: ICD-10-PCS | Mod: CPTII,S$GLB,, | Performed by: SURGERY

## 2021-04-12 PROCEDURE — 3008F BODY MASS INDEX DOCD: CPT | Mod: CPTII,S$GLB,, | Performed by: SURGERY

## 2021-04-12 PROCEDURE — 99024 PR POST-OP FOLLOW-UP VISIT: ICD-10-PCS | Mod: S$GLB,,, | Performed by: SURGERY

## 2021-04-12 PROCEDURE — 99999 PR PBB SHADOW E&M-EST. PATIENT-LVL III: CPT | Mod: PBBFAC,,, | Performed by: SURGERY

## 2021-04-12 RX ORDER — TRIAMTERENE AND HYDROCHLOROTHIAZIDE 37.5; 25 MG/1; MG/1
1 CAPSULE ORAL
Status: ON HOLD | COMMUNITY
Start: 2020-10-16 | End: 2023-06-01 | Stop reason: HOSPADM

## 2021-09-12 ENCOUNTER — HOSPITAL ENCOUNTER (INPATIENT)
Facility: HOSPITAL | Age: 51
LOS: 3 days | Discharge: HOME | DRG: 862 | End: 2021-09-15
Attending: EMERGENCY MEDICINE | Admitting: SURGERY
Payer: COMMERCIAL

## 2021-09-12 DIAGNOSIS — N17.9 AKI (ACUTE KIDNEY INJURY): ICD-10-CM

## 2021-09-12 DIAGNOSIS — R10.13 EPIGASTRIC PAIN: ICD-10-CM

## 2021-09-12 DIAGNOSIS — R97.8 ELEVATED CA 19-9 LEVEL: ICD-10-CM

## 2021-09-12 DIAGNOSIS — N30.00 ACUTE CYSTITIS WITHOUT HEMATURIA: ICD-10-CM

## 2021-09-12 DIAGNOSIS — E86.0 DEHYDRATION: ICD-10-CM

## 2021-09-12 DIAGNOSIS — T81.43XA POSTPROCEDURAL INTRAABDOMINAL ABSCESS: ICD-10-CM

## 2021-09-12 DIAGNOSIS — E87.6 HYPOKALEMIA: ICD-10-CM

## 2021-09-12 DIAGNOSIS — N83.209 CYST OF OVARY, UNSPECIFIED LATERALITY: ICD-10-CM

## 2021-09-12 DIAGNOSIS — T81.43XA ABSCESS, INTRA-ABDOMINAL, POSTOPERATIVE: Primary | ICD-10-CM

## 2021-09-12 DIAGNOSIS — K65.1 INTRA-ABDOMINAL ABSCESS: ICD-10-CM

## 2021-09-12 DIAGNOSIS — R10.9 ABDOMINAL PAIN: ICD-10-CM

## 2021-09-12 DIAGNOSIS — N91.5 OLIGOMENORRHEA, UNSPECIFIED TYPE: ICD-10-CM

## 2021-09-12 LAB
ALBUMIN SERPL BCP-MCNC: 2.5 G/DL (ref 3.5–5.2)
ALP SERPL-CCNC: 105 U/L (ref 55–135)
ALT SERPL W/O P-5'-P-CCNC: 13 U/L (ref 10–44)
ANION GAP SERPL CALC-SCNC: 11 MMOL/L (ref 8–16)
AST SERPL-CCNC: 12 U/L (ref 10–40)
B-HCG UR QL: NEGATIVE
BACTERIA #/AREA URNS HPF: ABNORMAL /HPF
BASOPHILS # BLD AUTO: 0.07 K/UL (ref 0–0.2)
BASOPHILS NFR BLD: 0.4 % (ref 0–1.9)
BILIRUB SERPL-MCNC: 0.6 MG/DL (ref 0.1–1)
BILIRUB UR QL STRIP: NEGATIVE
BUN SERPL-MCNC: 20 MG/DL (ref 6–20)
CALCIUM SERPL-MCNC: 9.9 MG/DL (ref 8.7–10.5)
CHLORIDE SERPL-SCNC: 96 MMOL/L (ref 95–110)
CLARITY UR: CLEAR
CO2 SERPL-SCNC: 27 MMOL/L (ref 23–29)
COLOR UR: YELLOW
CREAT SERPL-MCNC: 1.2 MG/DL (ref 0.5–1.4)
CTP QC/QA: YES
CTP QC/QA: YES
DIFFERENTIAL METHOD: ABNORMAL
EOSINOPHIL # BLD AUTO: 0 K/UL (ref 0–0.5)
EOSINOPHIL NFR BLD: 0.1 % (ref 0–8)
ERYTHROCYTE [DISTWIDTH] IN BLOOD BY AUTOMATED COUNT: 14.7 % (ref 11.5–14.5)
EST. GFR  (AFRICAN AMERICAN): >60 ML/MIN/1.73 M^2
EST. GFR  (NON AFRICAN AMERICAN): 54 ML/MIN/1.73 M^2
GLUCOSE SERPL-MCNC: 125 MG/DL (ref 70–110)
GLUCOSE UR QL STRIP: ABNORMAL
HCT VFR BLD AUTO: 38.2 % (ref 37–48.5)
HGB BLD-MCNC: 12.5 G/DL (ref 12–16)
HGB UR QL STRIP: NEGATIVE
HYALINE CASTS #/AREA URNS LPF: 9 /LPF
IMM GRANULOCYTES # BLD AUTO: 0.64 K/UL (ref 0–0.04)
IMM GRANULOCYTES NFR BLD AUTO: 3.2 % (ref 0–0.5)
KETONES UR QL STRIP: ABNORMAL
LEUKOCYTE ESTERASE UR QL STRIP: NEGATIVE
LIPASE SERPL-CCNC: 7 U/L (ref 4–60)
LYMPHOCYTES # BLD AUTO: 1.9 K/UL (ref 1–4.8)
LYMPHOCYTES NFR BLD: 9.7 % (ref 18–48)
MCH RBC QN AUTO: 27.2 PG (ref 27–31)
MCHC RBC AUTO-ENTMCNC: 32.7 G/DL (ref 32–36)
MCV RBC AUTO: 83 FL (ref 82–98)
MICROSCOPIC COMMENT: ABNORMAL
MONOCYTES # BLD AUTO: 1.4 K/UL (ref 0.3–1)
MONOCYTES NFR BLD: 7.1 % (ref 4–15)
NEUTROPHILS # BLD AUTO: 15.8 K/UL (ref 1.8–7.7)
NEUTROPHILS NFR BLD: 79.5 % (ref 38–73)
NITRITE UR QL STRIP: NEGATIVE
NRBC BLD-RTO: 0 /100 WBC
PH UR STRIP: 6 [PH] (ref 5–8)
PLATELET # BLD AUTO: 291 K/UL (ref 150–450)
PMV BLD AUTO: 10.4 FL (ref 9.2–12.9)
POTASSIUM SERPL-SCNC: 3.2 MMOL/L (ref 3.5–5.1)
PROT SERPL-MCNC: 8.5 G/DL (ref 6–8.4)
PROT UR QL STRIP: ABNORMAL
RBC # BLD AUTO: 4.6 M/UL (ref 4–5.4)
RBC #/AREA URNS HPF: 5 /HPF (ref 0–4)
SARS-COV-2 RDRP RESP QL NAA+PROBE: NEGATIVE
SODIUM SERPL-SCNC: 134 MMOL/L (ref 136–145)
SP GR UR STRIP: >1.03 (ref 1–1.03)
URN SPEC COLLECT METH UR: ABNORMAL
UROBILINOGEN UR STRIP-ACNC: NEGATIVE EU/DL
WBC # BLD AUTO: 19.85 K/UL (ref 3.9–12.7)
WBC #/AREA URNS HPF: 12 /HPF (ref 0–5)

## 2021-09-12 PROCEDURE — 63600175 PHARM REV CODE 636 W HCPCS: Performed by: EMERGENCY MEDICINE

## 2021-09-12 PROCEDURE — 83690 ASSAY OF LIPASE: CPT | Performed by: EMERGENCY MEDICINE

## 2021-09-12 PROCEDURE — 11000001 HC ACUTE MED/SURG PRIVATE ROOM

## 2021-09-12 PROCEDURE — U0002 COVID-19 LAB TEST NON-CDC: HCPCS | Performed by: EMERGENCY MEDICINE

## 2021-09-12 PROCEDURE — 25000003 PHARM REV CODE 250: Performed by: EMERGENCY MEDICINE

## 2021-09-12 PROCEDURE — 85025 COMPLETE CBC W/AUTO DIFF WBC: CPT | Performed by: EMERGENCY MEDICINE

## 2021-09-12 PROCEDURE — 81025 URINE PREGNANCY TEST: CPT | Performed by: EMERGENCY MEDICINE

## 2021-09-12 PROCEDURE — 82378 CARCINOEMBRYONIC ANTIGEN: CPT | Performed by: EMERGENCY MEDICINE

## 2021-09-12 PROCEDURE — 86304 IMMUNOASSAY TUMOR CA 125: CPT | Performed by: EMERGENCY MEDICINE

## 2021-09-12 PROCEDURE — 80053 COMPREHEN METABOLIC PANEL: CPT | Performed by: EMERGENCY MEDICINE

## 2021-09-12 PROCEDURE — 87186 SC STD MICRODIL/AGAR DIL: CPT | Performed by: EMERGENCY MEDICINE

## 2021-09-12 PROCEDURE — 87086 URINE CULTURE/COLONY COUNT: CPT | Performed by: EMERGENCY MEDICINE

## 2021-09-12 PROCEDURE — S0030 INJECTION, METRONIDAZOLE: HCPCS | Performed by: EMERGENCY MEDICINE

## 2021-09-12 PROCEDURE — 27000207 HC ISOLATION

## 2021-09-12 PROCEDURE — 93005 ELECTROCARDIOGRAM TRACING: CPT

## 2021-09-12 PROCEDURE — 87088 URINE BACTERIA CULTURE: CPT | Performed by: EMERGENCY MEDICINE

## 2021-09-12 PROCEDURE — 87077 CULTURE AEROBIC IDENTIFY: CPT | Performed by: EMERGENCY MEDICINE

## 2021-09-12 PROCEDURE — 93010 ELECTROCARDIOGRAM REPORT: CPT | Mod: ,,, | Performed by: INTERNAL MEDICINE

## 2021-09-12 PROCEDURE — 81000 URINALYSIS NONAUTO W/SCOPE: CPT | Performed by: EMERGENCY MEDICINE

## 2021-09-12 PROCEDURE — 99285 EMERGENCY DEPT VISIT HI MDM: CPT | Mod: 25

## 2021-09-12 PROCEDURE — 96365 THER/PROPH/DIAG IV INF INIT: CPT

## 2021-09-12 PROCEDURE — 86301 IMMUNOASSAY TUMOR CA 19-9: CPT | Performed by: EMERGENCY MEDICINE

## 2021-09-12 PROCEDURE — 93010 EKG 12-LEAD: ICD-10-PCS | Mod: ,,, | Performed by: INTERNAL MEDICINE

## 2021-09-12 RX ORDER — METRONIDAZOLE 500 MG/100ML
500 INJECTION, SOLUTION INTRAVENOUS
Status: COMPLETED | OUTPATIENT
Start: 2021-09-12 | End: 2021-09-12

## 2021-09-12 RX ORDER — METRONIDAZOLE 500 MG/100ML
500 INJECTION, SOLUTION INTRAVENOUS
Status: DISCONTINUED | OUTPATIENT
Start: 2021-09-12 | End: 2021-09-14

## 2021-09-12 RX ORDER — SODIUM CHLORIDE 9 MG/ML
INJECTION, SOLUTION INTRAVENOUS CONTINUOUS
Status: DISCONTINUED | OUTPATIENT
Start: 2021-09-12 | End: 2021-09-15 | Stop reason: HOSPADM

## 2021-09-12 RX ORDER — SODIUM CHLORIDE 9 MG/ML
INJECTION, SOLUTION INTRAVENOUS
Status: COMPLETED | OUTPATIENT
Start: 2021-09-12 | End: 2021-09-12

## 2021-09-12 RX ORDER — ONDANSETRON 2 MG/ML
4 INJECTION INTRAMUSCULAR; INTRAVENOUS
Status: COMPLETED | OUTPATIENT
Start: 2021-09-12 | End: 2021-09-12

## 2021-09-12 RX ORDER — ACETAMINOPHEN 325 MG/1
325 TABLET ORAL EVERY 6 HOURS PRN
COMMUNITY

## 2021-09-12 RX ORDER — CIPROFLOXACIN 2 MG/ML
400 INJECTION, SOLUTION INTRAVENOUS
Status: DISCONTINUED | OUTPATIENT
Start: 2021-09-12 | End: 2021-09-14

## 2021-09-12 RX ORDER — HYDROMORPHONE HYDROCHLORIDE 2 MG/ML
1 INJECTION, SOLUTION INTRAMUSCULAR; INTRAVENOUS; SUBCUTANEOUS
Status: COMPLETED | OUTPATIENT
Start: 2021-09-12 | End: 2021-09-12

## 2021-09-12 RX ORDER — POTASSIUM CHLORIDE 20 MEQ/1
40 TABLET, EXTENDED RELEASE ORAL ONCE
Status: COMPLETED | OUTPATIENT
Start: 2021-09-12 | End: 2021-09-12

## 2021-09-12 RX ADMIN — POTASSIUM CHLORIDE 40 MEQ: 20 TABLET, EXTENDED RELEASE ORAL at 07:09

## 2021-09-12 RX ADMIN — SODIUM CHLORIDE: 0.9 INJECTION, SOLUTION INTRAVENOUS at 06:09

## 2021-09-12 RX ADMIN — CIPROFLOXACIN 400 MG: 2 INJECTION, SOLUTION INTRAVENOUS at 07:09

## 2021-09-12 RX ADMIN — SODIUM CHLORIDE 1000 ML: 0.9 INJECTION, SOLUTION INTRAVENOUS at 02:09

## 2021-09-12 RX ADMIN — METRONIDAZOLE 500 MG: 500 INJECTION, SOLUTION INTRAVENOUS at 02:09

## 2021-09-12 RX ADMIN — HYDROMORPHONE HYDROCHLORIDE 1 MG: 2 INJECTION INTRAMUSCULAR; INTRAVENOUS; SUBCUTANEOUS at 06:09

## 2021-09-12 RX ADMIN — ONDANSETRON 4 MG: 2 INJECTION INTRAMUSCULAR; INTRAVENOUS at 06:09

## 2021-09-13 PROBLEM — R10.13 EPIGASTRIC PAIN: Status: ACTIVE | Noted: 2021-09-13

## 2021-09-13 PROBLEM — R97.8 ELEVATED CA 19-9 LEVEL: Status: ACTIVE | Noted: 2021-09-13

## 2021-09-13 LAB
ANION GAP SERPL CALC-SCNC: 11 MMOL/L (ref 8–16)
BASOPHILS # BLD AUTO: 0.04 K/UL (ref 0–0.2)
BASOPHILS NFR BLD: 0.3 % (ref 0–1.9)
BUN SERPL-MCNC: 13 MG/DL (ref 6–20)
CALCIUM SERPL-MCNC: 9.1 MG/DL (ref 8.7–10.5)
CANCER AG125 SERPL-ACNC: 14 U/ML (ref 0–30)
CANCER AG19-9 SERPL-ACNC: 60.8 U/ML (ref 0–40)
CEA SERPL-MCNC: <1 NG/ML (ref 0–5)
CHLORIDE SERPL-SCNC: 103 MMOL/L (ref 95–110)
CO2 SERPL-SCNC: 22 MMOL/L (ref 23–29)
CREAT SERPL-MCNC: 0.7 MG/DL (ref 0.5–1.4)
DIFFERENTIAL METHOD: ABNORMAL
EOSINOPHIL # BLD AUTO: 0 K/UL (ref 0–0.5)
EOSINOPHIL NFR BLD: 0.1 % (ref 0–8)
ERYTHROCYTE [DISTWIDTH] IN BLOOD BY AUTOMATED COUNT: 15 % (ref 11.5–14.5)
EST. GFR  (AFRICAN AMERICAN): >60 ML/MIN/1.73 M^2
EST. GFR  (NON AFRICAN AMERICAN): >60 ML/MIN/1.73 M^2
GLUCOSE SERPL-MCNC: 108 MG/DL (ref 70–110)
HCT VFR BLD AUTO: 34 % (ref 37–48.5)
HGB BLD-MCNC: 10.6 G/DL (ref 12–16)
IMM GRANULOCYTES # BLD AUTO: 0.51 K/UL (ref 0–0.04)
IMM GRANULOCYTES NFR BLD AUTO: 3.5 % (ref 0–0.5)
LYMPHOCYTES # BLD AUTO: 2 K/UL (ref 1–4.8)
LYMPHOCYTES NFR BLD: 13.4 % (ref 18–48)
MAGNESIUM SERPL-MCNC: 1.9 MG/DL (ref 1.6–2.6)
MCH RBC QN AUTO: 26.8 PG (ref 27–31)
MCHC RBC AUTO-ENTMCNC: 31.2 G/DL (ref 32–36)
MCV RBC AUTO: 86 FL (ref 82–98)
MONOCYTES # BLD AUTO: 1.2 K/UL (ref 0.3–1)
MONOCYTES NFR BLD: 8.3 % (ref 4–15)
NEUTROPHILS # BLD AUTO: 11 K/UL (ref 1.8–7.7)
NEUTROPHILS NFR BLD: 74.4 % (ref 38–73)
NRBC BLD-RTO: 0 /100 WBC
PHOSPHATE SERPL-MCNC: 2.3 MG/DL (ref 2.7–4.5)
PLATELET # BLD AUTO: 284 K/UL (ref 150–450)
PMV BLD AUTO: 10.4 FL (ref 9.2–12.9)
POTASSIUM SERPL-SCNC: 3.7 MMOL/L (ref 3.5–5.1)
RBC # BLD AUTO: 3.96 M/UL (ref 4–5.4)
SODIUM SERPL-SCNC: 136 MMOL/L (ref 136–145)
WBC # BLD AUTO: 14.75 K/UL (ref 3.9–12.7)

## 2021-09-13 PROCEDURE — 84100 ASSAY OF PHOSPHORUS: CPT | Performed by: STUDENT IN AN ORGANIZED HEALTH CARE EDUCATION/TRAINING PROGRAM

## 2021-09-13 PROCEDURE — 36415 COLL VENOUS BLD VENIPUNCTURE: CPT | Performed by: STUDENT IN AN ORGANIZED HEALTH CARE EDUCATION/TRAINING PROGRAM

## 2021-09-13 PROCEDURE — 85025 COMPLETE CBC W/AUTO DIFF WBC: CPT | Performed by: STUDENT IN AN ORGANIZED HEALTH CARE EDUCATION/TRAINING PROGRAM

## 2021-09-13 PROCEDURE — 80048 BASIC METABOLIC PNL TOTAL CA: CPT | Performed by: STUDENT IN AN ORGANIZED HEALTH CARE EDUCATION/TRAINING PROGRAM

## 2021-09-13 PROCEDURE — S0030 INJECTION, METRONIDAZOLE: HCPCS | Performed by: STUDENT IN AN ORGANIZED HEALTH CARE EDUCATION/TRAINING PROGRAM

## 2021-09-13 PROCEDURE — 99233 SBSQ HOSP IP/OBS HIGH 50: CPT | Mod: ,,, | Performed by: OBSTETRICS & GYNECOLOGY

## 2021-09-13 PROCEDURE — 11000001 HC ACUTE MED/SURG PRIVATE ROOM

## 2021-09-13 PROCEDURE — 25000003 PHARM REV CODE 250: Performed by: STUDENT IN AN ORGANIZED HEALTH CARE EDUCATION/TRAINING PROGRAM

## 2021-09-13 PROCEDURE — 27000207 HC ISOLATION

## 2021-09-13 PROCEDURE — 63600175 PHARM REV CODE 636 W HCPCS: Performed by: EMERGENCY MEDICINE

## 2021-09-13 PROCEDURE — 99233 PR SUBSEQUENT HOSPITAL CARE,LEVL III: ICD-10-PCS | Mod: ,,, | Performed by: OBSTETRICS & GYNECOLOGY

## 2021-09-13 PROCEDURE — 83735 ASSAY OF MAGNESIUM: CPT | Performed by: STUDENT IN AN ORGANIZED HEALTH CARE EDUCATION/TRAINING PROGRAM

## 2021-09-13 RX ORDER — TALC
6 POWDER (GRAM) TOPICAL NIGHTLY PRN
Status: DISCONTINUED | OUTPATIENT
Start: 2021-09-13 | End: 2021-09-15 | Stop reason: HOSPADM

## 2021-09-13 RX ORDER — KETOROLAC TROMETHAMINE 30 MG/ML
15 INJECTION, SOLUTION INTRAMUSCULAR; INTRAVENOUS EVERY 6 HOURS PRN
Status: DISCONTINUED | OUTPATIENT
Start: 2021-09-13 | End: 2021-09-15 | Stop reason: HOSPADM

## 2021-09-13 RX ORDER — METRONIDAZOLE 500 MG/100ML
500 INJECTION, SOLUTION INTRAVENOUS
Status: DISCONTINUED | OUTPATIENT
Start: 2021-09-13 | End: 2021-09-13 | Stop reason: SDUPTHER

## 2021-09-13 RX ORDER — HYDROMORPHONE HYDROCHLORIDE 2 MG/ML
1 INJECTION, SOLUTION INTRAMUSCULAR; INTRAVENOUS; SUBCUTANEOUS EVERY 4 HOURS PRN
Status: DISCONTINUED | OUTPATIENT
Start: 2021-09-13 | End: 2021-09-15 | Stop reason: HOSPADM

## 2021-09-13 RX ORDER — SODIUM CHLORIDE 0.9 % (FLUSH) 0.9 %
10 SYRINGE (ML) INJECTION
Status: DISCONTINUED | OUTPATIENT
Start: 2021-09-13 | End: 2021-09-15 | Stop reason: HOSPADM

## 2021-09-13 RX ORDER — ONDANSETRON 2 MG/ML
4 INJECTION INTRAMUSCULAR; INTRAVENOUS EVERY 8 HOURS PRN
Status: DISCONTINUED | OUTPATIENT
Start: 2021-09-13 | End: 2021-09-15 | Stop reason: HOSPADM

## 2021-09-13 RX ADMIN — METRONIDAZOLE 500 MG: 500 INJECTION, SOLUTION INTRAVENOUS at 09:09

## 2021-09-13 RX ADMIN — KETOROLAC TROMETHAMINE 15 MG: 30 INJECTION, SOLUTION INTRAMUSCULAR at 05:09

## 2021-09-13 RX ADMIN — SODIUM CHLORIDE: 0.9 INJECTION, SOLUTION INTRAVENOUS at 04:09

## 2021-09-13 RX ADMIN — METRONIDAZOLE 500 MG: 500 INJECTION, SOLUTION INTRAVENOUS at 04:09

## 2021-09-13 RX ADMIN — METRONIDAZOLE 500 MG: 500 INJECTION, SOLUTION INTRAVENOUS at 12:09

## 2021-09-13 RX ADMIN — CIPROFLOXACIN 400 MG: 2 INJECTION, SOLUTION INTRAVENOUS at 07:09

## 2021-09-13 RX ADMIN — CIPROFLOXACIN 400 MG: 2 INJECTION, SOLUTION INTRAVENOUS at 10:09

## 2021-09-14 LAB
ANION GAP SERPL CALC-SCNC: 9 MMOL/L (ref 8–16)
BACTERIA UR CULT: ABNORMAL
BASOPHILS # BLD AUTO: ABNORMAL K/UL (ref 0–0.2)
BASOPHILS NFR BLD: 0 % (ref 0–1.9)
BUN SERPL-MCNC: 7 MG/DL (ref 6–20)
CALCIUM SERPL-MCNC: 8.8 MG/DL (ref 8.7–10.5)
CHLORIDE SERPL-SCNC: 104 MMOL/L (ref 95–110)
CO2 SERPL-SCNC: 24 MMOL/L (ref 23–29)
CREAT SERPL-MCNC: 0.7 MG/DL (ref 0.5–1.4)
DIFFERENTIAL METHOD: ABNORMAL
EOSINOPHIL # BLD AUTO: ABNORMAL K/UL (ref 0–0.5)
EOSINOPHIL NFR BLD: 0 % (ref 0–8)
ERYTHROCYTE [DISTWIDTH] IN BLOOD BY AUTOMATED COUNT: 15 % (ref 11.5–14.5)
EST. GFR  (AFRICAN AMERICAN): >60 ML/MIN/1.73 M^2
EST. GFR  (NON AFRICAN AMERICAN): >60 ML/MIN/1.73 M^2
ESTRADIOL SERPL-MCNC: 93 PG/ML
FSH SERPL-ACNC: 24.48 MIU/ML
GLUCOSE SERPL-MCNC: 138 MG/DL (ref 70–110)
HCT VFR BLD AUTO: 34.6 % (ref 37–48.5)
HGB BLD-MCNC: 11 G/DL (ref 12–16)
IMM GRANULOCYTES # BLD AUTO: ABNORMAL K/UL (ref 0–0.04)
IMM GRANULOCYTES NFR BLD AUTO: ABNORMAL % (ref 0–0.5)
LH SERPL-ACNC: 15.2 MIU/ML
LYMPHOCYTES # BLD AUTO: ABNORMAL K/UL (ref 1–4.8)
LYMPHOCYTES NFR BLD: 12 % (ref 18–48)
MAGNESIUM SERPL-MCNC: 1.9 MG/DL (ref 1.6–2.6)
MCH RBC QN AUTO: 27 PG (ref 27–31)
MCHC RBC AUTO-ENTMCNC: 31.8 G/DL (ref 32–36)
MCV RBC AUTO: 85 FL (ref 82–98)
METAMYELOCYTES NFR BLD MANUAL: 2 %
MONOCYTES # BLD AUTO: ABNORMAL K/UL (ref 0.3–1)
MONOCYTES NFR BLD: 5 % (ref 4–15)
MYELOCYTES NFR BLD MANUAL: 1 %
NEUTROPHILS NFR BLD: 78 % (ref 38–73)
NEUTS BAND NFR BLD MANUAL: 2 %
NRBC BLD-RTO: 0 /100 WBC
PHOSPHATE SERPL-MCNC: 2.6 MG/DL (ref 2.7–4.5)
PLATELET # BLD AUTO: 329 K/UL (ref 150–450)
PMV BLD AUTO: 10.2 FL (ref 9.2–12.9)
POTASSIUM SERPL-SCNC: 3.5 MMOL/L (ref 3.5–5.1)
RBC # BLD AUTO: 4.07 M/UL (ref 4–5.4)
SODIUM SERPL-SCNC: 137 MMOL/L (ref 136–145)
TOXIC GRANULES BLD QL SMEAR: PRESENT
WBC # BLD AUTO: 13.7 K/UL (ref 3.9–12.7)

## 2021-09-14 PROCEDURE — 84100 ASSAY OF PHOSPHORUS: CPT | Performed by: STUDENT IN AN ORGANIZED HEALTH CARE EDUCATION/TRAINING PROGRAM

## 2021-09-14 PROCEDURE — 36415 COLL VENOUS BLD VENIPUNCTURE: CPT | Performed by: OBSTETRICS & GYNECOLOGY

## 2021-09-14 PROCEDURE — 83002 ASSAY OF GONADOTROPIN (LH): CPT | Performed by: OBSTETRICS & GYNECOLOGY

## 2021-09-14 PROCEDURE — 85027 COMPLETE CBC AUTOMATED: CPT | Performed by: STUDENT IN AN ORGANIZED HEALTH CARE EDUCATION/TRAINING PROGRAM

## 2021-09-14 PROCEDURE — 25000003 PHARM REV CODE 250: Performed by: STUDENT IN AN ORGANIZED HEALTH CARE EDUCATION/TRAINING PROGRAM

## 2021-09-14 PROCEDURE — 36415 COLL VENOUS BLD VENIPUNCTURE: CPT | Performed by: STUDENT IN AN ORGANIZED HEALTH CARE EDUCATION/TRAINING PROGRAM

## 2021-09-14 PROCEDURE — 63600175 PHARM REV CODE 636 W HCPCS: Performed by: EMERGENCY MEDICINE

## 2021-09-14 PROCEDURE — 80048 BASIC METABOLIC PNL TOTAL CA: CPT | Performed by: STUDENT IN AN ORGANIZED HEALTH CARE EDUCATION/TRAINING PROGRAM

## 2021-09-14 PROCEDURE — 82670 ASSAY OF TOTAL ESTRADIOL: CPT | Performed by: OBSTETRICS & GYNECOLOGY

## 2021-09-14 PROCEDURE — 85007 BL SMEAR W/DIFF WBC COUNT: CPT | Performed by: STUDENT IN AN ORGANIZED HEALTH CARE EDUCATION/TRAINING PROGRAM

## 2021-09-14 PROCEDURE — S0030 INJECTION, METRONIDAZOLE: HCPCS | Performed by: STUDENT IN AN ORGANIZED HEALTH CARE EDUCATION/TRAINING PROGRAM

## 2021-09-14 PROCEDURE — 83001 ASSAY OF GONADOTROPIN (FSH): CPT | Performed by: OBSTETRICS & GYNECOLOGY

## 2021-09-14 PROCEDURE — 83735 ASSAY OF MAGNESIUM: CPT | Performed by: STUDENT IN AN ORGANIZED HEALTH CARE EDUCATION/TRAINING PROGRAM

## 2021-09-14 PROCEDURE — 11000001 HC ACUTE MED/SURG PRIVATE ROOM

## 2021-09-14 RX ORDER — TRIAMTERENE AND HYDROCHLOROTHIAZIDE 37.5; 25 MG/1; MG/1
1 CAPSULE ORAL DAILY
Status: DISCONTINUED | OUTPATIENT
Start: 2021-09-15 | End: 2021-09-15 | Stop reason: HOSPADM

## 2021-09-14 RX ORDER — METRONIDAZOLE 500 MG/1
500 TABLET ORAL EVERY 8 HOURS
Status: DISCONTINUED | OUTPATIENT
Start: 2021-09-14 | End: 2021-09-15 | Stop reason: HOSPADM

## 2021-09-14 RX ORDER — CIPROFLOXACIN 500 MG/1
500 TABLET ORAL EVERY 12 HOURS
Status: DISCONTINUED | OUTPATIENT
Start: 2021-09-14 | End: 2021-09-15 | Stop reason: HOSPADM

## 2021-09-14 RX ORDER — HYDRALAZINE HYDROCHLORIDE 10 MG/1
10 TABLET, FILM COATED ORAL EVERY 8 HOURS PRN
Status: DISCONTINUED | OUTPATIENT
Start: 2021-09-14 | End: 2021-09-15 | Stop reason: HOSPADM

## 2021-09-14 RX ADMIN — HYDRALAZINE HYDROCHLORIDE 10 MG: 10 TABLET, FILM COATED ORAL at 08:09

## 2021-09-14 RX ADMIN — CIPROFLOXACIN HYDROCHLORIDE 500 MG: 500 TABLET, FILM COATED ORAL at 08:09

## 2021-09-14 RX ADMIN — SODIUM CHLORIDE: 0.9 INJECTION, SOLUTION INTRAVENOUS at 09:09

## 2021-09-14 RX ADMIN — METRONIDAZOLE 500 MG: 500 INJECTION, SOLUTION INTRAVENOUS at 10:09

## 2021-09-14 RX ADMIN — CIPROFLOXACIN HYDROCHLORIDE 500 MG: 500 TABLET, FILM COATED ORAL at 12:09

## 2021-09-14 RX ADMIN — SODIUM CHLORIDE: 0.9 INJECTION, SOLUTION INTRAVENOUS at 10:09

## 2021-09-14 RX ADMIN — ONDANSETRON 4 MG: 2 INJECTION INTRAMUSCULAR; INTRAVENOUS at 04:09

## 2021-09-14 RX ADMIN — METRONIDAZOLE 500 MG: 500 INJECTION, SOLUTION INTRAVENOUS at 12:09

## 2021-09-14 RX ADMIN — POTASSIUM PHOSPHATE, MONOBASIC AND POTASSIUM PHOSPHATE, DIBASIC 15 MMOL: 224; 236 INJECTION, SOLUTION, CONCENTRATE INTRAVENOUS at 04:09

## 2021-09-14 RX ADMIN — METRONIDAZOLE 500 MG: 500 TABLET ORAL at 09:09

## 2021-09-14 RX ADMIN — METRONIDAZOLE 500 MG: 500 TABLET ORAL at 01:09

## 2021-09-15 VITALS
RESPIRATION RATE: 20 BRPM | BODY MASS INDEX: 29.07 KG/M2 | TEMPERATURE: 98 F | SYSTOLIC BLOOD PRESSURE: 168 MMHG | WEIGHT: 185.19 LBS | DIASTOLIC BLOOD PRESSURE: 88 MMHG | HEIGHT: 67 IN | HEART RATE: 80 BPM | OXYGEN SATURATION: 97 %

## 2021-09-15 PROBLEM — R10.13 EPIGASTRIC PAIN: Chronic | Status: ACTIVE | Noted: 2021-09-13

## 2021-09-15 PROBLEM — N30.00 ACUTE CYSTITIS WITHOUT HEMATURIA: Status: ACTIVE | Noted: 2021-09-15

## 2021-09-15 LAB
ANION GAP SERPL CALC-SCNC: 8 MMOL/L (ref 8–16)
BASOPHILS # BLD AUTO: 0.06 K/UL (ref 0–0.2)
BASOPHILS NFR BLD: 0.4 % (ref 0–1.9)
BUN SERPL-MCNC: 5 MG/DL (ref 6–20)
CALCIUM SERPL-MCNC: 8.7 MG/DL (ref 8.7–10.5)
CHLORIDE SERPL-SCNC: 105 MMOL/L (ref 95–110)
CO2 SERPL-SCNC: 25 MMOL/L (ref 23–29)
CREAT SERPL-MCNC: 0.7 MG/DL (ref 0.5–1.4)
DIFFERENTIAL METHOD: ABNORMAL
EOSINOPHIL # BLD AUTO: 0 K/UL (ref 0–0.5)
EOSINOPHIL NFR BLD: 0.2 % (ref 0–8)
ERYTHROCYTE [DISTWIDTH] IN BLOOD BY AUTOMATED COUNT: 15.1 % (ref 11.5–14.5)
EST. GFR  (AFRICAN AMERICAN): >60 ML/MIN/1.73 M^2
EST. GFR  (NON AFRICAN AMERICAN): >60 ML/MIN/1.73 M^2
GLUCOSE SERPL-MCNC: 111 MG/DL (ref 70–110)
HCT VFR BLD AUTO: 33.5 % (ref 37–48.5)
HGB BLD-MCNC: 10.9 G/DL (ref 12–16)
IMM GRANULOCYTES # BLD AUTO: 0.66 K/UL (ref 0–0.04)
IMM GRANULOCYTES NFR BLD AUTO: 4.5 % (ref 0–0.5)
LYMPHOCYTES # BLD AUTO: 1.7 K/UL (ref 1–4.8)
LYMPHOCYTES NFR BLD: 11.4 % (ref 18–48)
MAGNESIUM SERPL-MCNC: 1.7 MG/DL (ref 1.6–2.6)
MCH RBC QN AUTO: 27.2 PG (ref 27–31)
MCHC RBC AUTO-ENTMCNC: 32.5 G/DL (ref 32–36)
MCV RBC AUTO: 84 FL (ref 82–98)
MONOCYTES # BLD AUTO: 1 K/UL (ref 0.3–1)
MONOCYTES NFR BLD: 6.9 % (ref 4–15)
NEUTROPHILS # BLD AUTO: 11.2 K/UL (ref 1.8–7.7)
NEUTROPHILS NFR BLD: 76.6 % (ref 38–73)
NRBC BLD-RTO: 0 /100 WBC
PHOSPHATE SERPL-MCNC: 2.7 MG/DL (ref 2.7–4.5)
PLATELET # BLD AUTO: 311 K/UL (ref 150–450)
PMV BLD AUTO: 9.8 FL (ref 9.2–12.9)
POTASSIUM SERPL-SCNC: 3.5 MMOL/L (ref 3.5–5.1)
RBC # BLD AUTO: 4.01 M/UL (ref 4–5.4)
SODIUM SERPL-SCNC: 138 MMOL/L (ref 136–145)
WBC # BLD AUTO: 14.61 K/UL (ref 3.9–12.7)

## 2021-09-15 PROCEDURE — 85025 COMPLETE CBC W/AUTO DIFF WBC: CPT | Performed by: STUDENT IN AN ORGANIZED HEALTH CARE EDUCATION/TRAINING PROGRAM

## 2021-09-15 PROCEDURE — 84100 ASSAY OF PHOSPHORUS: CPT | Performed by: STUDENT IN AN ORGANIZED HEALTH CARE EDUCATION/TRAINING PROGRAM

## 2021-09-15 PROCEDURE — 83735 ASSAY OF MAGNESIUM: CPT | Performed by: STUDENT IN AN ORGANIZED HEALTH CARE EDUCATION/TRAINING PROGRAM

## 2021-09-15 PROCEDURE — 63600175 PHARM REV CODE 636 W HCPCS: Performed by: EMERGENCY MEDICINE

## 2021-09-15 PROCEDURE — 99238 PR HOSPITAL DISCHARGE DAY,<30 MIN: ICD-10-PCS | Mod: ,,, | Performed by: SURGERY

## 2021-09-15 PROCEDURE — 36415 COLL VENOUS BLD VENIPUNCTURE: CPT | Performed by: STUDENT IN AN ORGANIZED HEALTH CARE EDUCATION/TRAINING PROGRAM

## 2021-09-15 PROCEDURE — 25000003 PHARM REV CODE 250: Performed by: STUDENT IN AN ORGANIZED HEALTH CARE EDUCATION/TRAINING PROGRAM

## 2021-09-15 PROCEDURE — 99238 HOSP IP/OBS DSCHRG MGMT 30/<: CPT | Mod: ,,, | Performed by: SURGERY

## 2021-09-15 PROCEDURE — 80048 BASIC METABOLIC PNL TOTAL CA: CPT | Performed by: STUDENT IN AN ORGANIZED HEALTH CARE EDUCATION/TRAINING PROGRAM

## 2021-09-15 RX ORDER — METRONIDAZOLE 500 MG/1
500 TABLET ORAL EVERY 8 HOURS
Qty: 30 TABLET | Refills: 0 | Status: SHIPPED | OUTPATIENT
Start: 2021-09-15 | End: 2021-09-25

## 2021-09-15 RX ORDER — CIPROFLOXACIN 500 MG/1
500 TABLET ORAL EVERY 12 HOURS
Qty: 20 TABLET | Refills: 0 | Status: SHIPPED | OUTPATIENT
Start: 2021-09-15 | End: 2021-09-25

## 2021-09-15 RX ADMIN — HYDRALAZINE HYDROCHLORIDE 10 MG: 10 TABLET, FILM COATED ORAL at 05:09

## 2021-09-15 RX ADMIN — CIPROFLOXACIN HYDROCHLORIDE 500 MG: 500 TABLET, FILM COATED ORAL at 09:09

## 2021-09-15 RX ADMIN — TRIAMTERENE AND HYDROCHLOROTHIAZIDE 1 CAPSULE: 37.5; 25 CAPSULE ORAL at 08:09

## 2021-09-15 RX ADMIN — KETOROLAC TROMETHAMINE 15 MG: 30 INJECTION, SOLUTION INTRAMUSCULAR at 05:09

## 2021-09-15 RX ADMIN — METRONIDAZOLE 500 MG: 500 TABLET ORAL at 05:09

## 2021-09-19 ENCOUNTER — NURSE TRIAGE (OUTPATIENT)
Dept: ADMINISTRATIVE | Facility: CLINIC | Age: 51
End: 2021-09-19

## 2021-09-29 ENCOUNTER — OFFICE VISIT (OUTPATIENT)
Dept: SURGERY | Facility: CLINIC | Age: 51
End: 2021-09-29
Payer: COMMERCIAL

## 2021-09-29 VITALS
DIASTOLIC BLOOD PRESSURE: 88 MMHG | HEIGHT: 67 IN | HEART RATE: 88 BPM | SYSTOLIC BLOOD PRESSURE: 141 MMHG | WEIGHT: 185 LBS | BODY MASS INDEX: 29.03 KG/M2

## 2021-09-29 DIAGNOSIS — K65.1 INTRA-ABDOMINAL ABSCESS: Primary | ICD-10-CM

## 2021-09-29 PROCEDURE — 3079F DIAST BP 80-89 MM HG: CPT | Mod: CPTII,S$GLB,, | Performed by: SURGERY

## 2021-09-29 PROCEDURE — 99212 OFFICE O/P EST SF 10 MIN: CPT | Mod: S$GLB,,, | Performed by: SURGERY

## 2021-09-29 PROCEDURE — 3008F PR BODY MASS INDEX (BMI) DOCUMENTED: ICD-10-PCS | Mod: CPTII,S$GLB,, | Performed by: SURGERY

## 2021-09-29 PROCEDURE — 3077F PR MOST RECENT SYSTOLIC BLOOD PRESSURE >= 140 MM HG: ICD-10-PCS | Mod: CPTII,S$GLB,, | Performed by: SURGERY

## 2021-09-29 PROCEDURE — 3079F PR MOST RECENT DIASTOLIC BLOOD PRESSURE 80-89 MM HG: ICD-10-PCS | Mod: CPTII,S$GLB,, | Performed by: SURGERY

## 2021-09-29 PROCEDURE — 3077F SYST BP >= 140 MM HG: CPT | Mod: CPTII,S$GLB,, | Performed by: SURGERY

## 2021-09-29 PROCEDURE — 3008F BODY MASS INDEX DOCD: CPT | Mod: CPTII,S$GLB,, | Performed by: SURGERY

## 2021-09-29 PROCEDURE — 99999 PR PBB SHADOW E&M-EST. PATIENT-LVL III: CPT | Mod: PBBFAC,,, | Performed by: SURGERY

## 2021-09-29 PROCEDURE — 1159F MED LIST DOCD IN RCRD: CPT | Mod: CPTII,S$GLB,, | Performed by: SURGERY

## 2021-09-29 PROCEDURE — 99212 PR OFFICE/OUTPT VISIT, EST, LEVL II, 10-19 MIN: ICD-10-PCS | Mod: S$GLB,,, | Performed by: SURGERY

## 2021-09-29 PROCEDURE — 99999 PR PBB SHADOW E&M-EST. PATIENT-LVL III: ICD-10-PCS | Mod: PBBFAC,,, | Performed by: SURGERY

## 2021-09-29 PROCEDURE — 1159F PR MEDICATION LIST DOCUMENTED IN MEDICAL RECORD: ICD-10-PCS | Mod: CPTII,S$GLB,, | Performed by: SURGERY

## 2022-01-01 NOTE — NURSING
Pt aaox3 ambulates to bathroom independently. Denies pain, n/v. She is currently getting second unit of blood. Remains NPO. Awaiting to go to OR. States she feels anxious. Call light w/i reach, bed in lowest position   13

## 2022-02-10 ENCOUNTER — TELEPHONE (OUTPATIENT)
Dept: SURGERY | Facility: CLINIC | Age: 52
End: 2022-02-10
Payer: COMMERCIAL

## 2022-02-10 NOTE — TELEPHONE ENCOUNTER
----- Message from Radha Lincoln sent at 2/10/2022 12:14 PM CST -----  Regarding: self  .Type:  Needs Medical Advice    Who Called: self   Symptoms (please be specific): bloated, can eat but only soup and mashed potatoes   How long has patient had these symptoms:  2/7/22 - neg covid test   Pharmacy name and phone #:       [id number] [place] [address] [facility] [address] [facility]  [address]  [place] [address]  Phone: [phone] Fax: [phone]      Would the patient rather a call back or a response via [facility]? Call  Best Call Back Number:  [phone]

## 2022-03-16 ENCOUNTER — HOSPITAL ENCOUNTER (EMERGENCY)
Facility: HOSPITAL | Age: 52
Discharge: HOME | End: 2022-03-16
Attending: EMERGENCY MEDICINE
Payer: COMMERCIAL

## 2022-03-16 VITALS
WEIGHT: 225 LBS | RESPIRATION RATE: 18 BRPM | TEMPERATURE: 97 F | SYSTOLIC BLOOD PRESSURE: 170 MMHG | DIASTOLIC BLOOD PRESSURE: 85 MMHG | HEART RATE: 80 BPM | OXYGEN SATURATION: 98 % | BODY MASS INDEX: 35.24 KG/M2

## 2022-03-16 DIAGNOSIS — R73.9 HYPERGLYCEMIA: Primary | ICD-10-CM

## 2022-03-16 DIAGNOSIS — E11.65 UNCONTROLLED TYPE 2 DIABETES MELLITUS WITH HYPERGLYCEMIA: ICD-10-CM

## 2022-03-16 LAB
ALBUMIN SERPL BCP-MCNC: 3.9 G/DL (ref 3.5–5.2)
ALLENS TEST: ABNORMAL
ALP SERPL-CCNC: 128 U/L (ref 55–135)
ALT SERPL W/O P-5'-P-CCNC: 21 U/L (ref 10–44)
ANION GAP SERPL CALC-SCNC: 11 MMOL/L (ref 8–16)
AST SERPL-CCNC: 15 U/L (ref 10–40)
B-OH-BUTYR BLD STRIP-SCNC: 0.6 MMOL/L (ref 0–0.5)
BACTERIA #/AREA URNS HPF: ABNORMAL /HPF
BASOPHILS # BLD AUTO: 0.03 K/UL (ref 0–0.2)
BASOPHILS NFR BLD: 0.4 % (ref 0–1.9)
BILIRUB SERPL-MCNC: 0.5 MG/DL (ref 0.1–1)
BILIRUB UR QL STRIP: NEGATIVE
BUN SERPL-MCNC: 11 MG/DL (ref 6–20)
CALCIUM SERPL-MCNC: 10.3 MG/DL (ref 8.7–10.5)
CHLORIDE SERPL-SCNC: 100 MMOL/L (ref 95–110)
CLARITY UR: CLEAR
CO2 SERPL-SCNC: 31 MMOL/L (ref 23–29)
COLOR UR: YELLOW
CREAT SERPL-MCNC: 1.1 MG/DL (ref 0.5–1.4)
DELSYS: ABNORMAL
DIFFERENTIAL METHOD: NORMAL
EOSINOPHIL # BLD AUTO: 0.2 K/UL (ref 0–0.5)
EOSINOPHIL NFR BLD: 2 % (ref 0–8)
ERYTHROCYTE [DISTWIDTH] IN BLOOD BY AUTOMATED COUNT: 13.7 % (ref 11.5–14.5)
EST. GFR  (AFRICAN AMERICAN): >60 ML/MIN/1.73 M^2
EST. GFR  (NON AFRICAN AMERICAN): 59 ML/MIN/1.73 M^2
GLUCOSE SERPL-MCNC: 365 MG/DL (ref 70–110)
GLUCOSE UR QL STRIP: ABNORMAL
HCO3 UR-SCNC: 38.1 MMOL/L (ref 24–28)
HCT VFR BLD AUTO: 42.2 % (ref 37–48.5)
HGB BLD-MCNC: 13.8 G/DL (ref 12–16)
HGB UR QL STRIP: ABNORMAL
IMM GRANULOCYTES # BLD AUTO: 0.03 K/UL (ref 0–0.04)
IMM GRANULOCYTES NFR BLD AUTO: 0.4 % (ref 0–0.5)
KETONES UR QL STRIP: ABNORMAL
LEUKOCYTE ESTERASE UR QL STRIP: ABNORMAL
LYMPHOCYTES # BLD AUTO: 2 K/UL (ref 1–4.8)
LYMPHOCYTES NFR BLD: 24.8 % (ref 18–48)
MCH RBC QN AUTO: 28.3 PG (ref 27–31)
MCHC RBC AUTO-ENTMCNC: 32.7 G/DL (ref 32–36)
MCV RBC AUTO: 87 FL (ref 82–98)
MICROSCOPIC COMMENT: ABNORMAL
MONOCYTES # BLD AUTO: 0.5 K/UL (ref 0.3–1)
MONOCYTES NFR BLD: 6.5 % (ref 4–15)
NEUTROPHILS # BLD AUTO: 5.4 K/UL (ref 1.8–7.7)
NEUTROPHILS NFR BLD: 65.9 % (ref 38–73)
NITRITE UR QL STRIP: NEGATIVE
NRBC BLD-RTO: 0 /100 WBC
PCO2 BLDA: 55.4 MMHG (ref 35–45)
PH SMN: 7.45 [PH] (ref 7.35–7.45)
PH UR STRIP: 6 [PH] (ref 5–8)
PLATELET # BLD AUTO: 197 K/UL (ref 150–450)
PMV BLD AUTO: 11.6 FL (ref 9.2–12.9)
PO2 BLDA: 27 MMHG (ref 40–60)
POC BE: 12 MMOL/L
POC SATURATED O2: 51 % (ref 95–100)
POC TCO2: 40 MMOL/L (ref 24–29)
POCT GLUCOSE: 261 MG/DL (ref 70–110)
POCT GLUCOSE: 358 MG/DL (ref 70–110)
POCT GLUCOSE: 390 MG/DL (ref 70–110)
POTASSIUM SERPL-SCNC: 4.4 MMOL/L (ref 3.5–5.1)
PROT SERPL-MCNC: 8.9 G/DL (ref 6–8.4)
PROT UR QL STRIP: ABNORMAL
RBC # BLD AUTO: 4.87 M/UL (ref 4–5.4)
RBC #/AREA URNS HPF: 72 /HPF (ref 0–4)
SAMPLE: ABNORMAL
SITE: ABNORMAL
SODIUM SERPL-SCNC: 142 MMOL/L (ref 136–145)
SP GR UR STRIP: >1.03 (ref 1–1.03)
SQUAMOUS #/AREA URNS HPF: 2 /HPF
URN SPEC COLLECT METH UR: ABNORMAL
UROBILINOGEN UR STRIP-ACNC: NEGATIVE EU/DL
WBC # BLD AUTO: 8.19 K/UL (ref 3.9–12.7)
WBC #/AREA URNS HPF: 49 /HPF (ref 0–5)
WBC CLUMPS URNS QL MICRO: ABNORMAL
YEAST URNS QL MICRO: ABNORMAL

## 2022-03-16 PROCEDURE — 83036 HEMOGLOBIN GLYCOSYLATED A1C: CPT | Performed by: EMERGENCY MEDICINE

## 2022-03-16 PROCEDURE — 93005 ELECTROCARDIOGRAM TRACING: CPT

## 2022-03-16 PROCEDURE — 93010 ELECTROCARDIOGRAM REPORT: CPT | Mod: ,,, | Performed by: INTERNAL MEDICINE

## 2022-03-16 PROCEDURE — 63600175 PHARM REV CODE 636 W HCPCS: Performed by: EMERGENCY MEDICINE

## 2022-03-16 PROCEDURE — 25000003 PHARM REV CODE 250: Performed by: EMERGENCY MEDICINE

## 2022-03-16 PROCEDURE — 94761 N-INVAS EAR/PLS OXIMETRY MLT: CPT

## 2022-03-16 PROCEDURE — 82962 GLUCOSE BLOOD TEST: CPT

## 2022-03-16 PROCEDURE — 99900035 HC TECH TIME PER 15 MIN (STAT)

## 2022-03-16 PROCEDURE — 87086 URINE CULTURE/COLONY COUNT: CPT | Performed by: EMERGENCY MEDICINE

## 2022-03-16 PROCEDURE — 82010 KETONE BODYS QUAN: CPT | Performed by: EMERGENCY MEDICINE

## 2022-03-16 PROCEDURE — 81000 URINALYSIS NONAUTO W/SCOPE: CPT | Performed by: EMERGENCY MEDICINE

## 2022-03-16 PROCEDURE — 96361 HYDRATE IV INFUSION ADD-ON: CPT

## 2022-03-16 PROCEDURE — 80053 COMPREHEN METABOLIC PANEL: CPT | Performed by: EMERGENCY MEDICINE

## 2022-03-16 PROCEDURE — 93010 EKG 12-LEAD: ICD-10-PCS | Mod: ,,, | Performed by: INTERNAL MEDICINE

## 2022-03-16 PROCEDURE — 96374 THER/PROPH/DIAG INJ IV PUSH: CPT

## 2022-03-16 PROCEDURE — 85025 COMPLETE CBC W/AUTO DIFF WBC: CPT | Performed by: EMERGENCY MEDICINE

## 2022-03-16 PROCEDURE — 99284 EMERGENCY DEPT VISIT MOD MDM: CPT | Mod: 25

## 2022-03-16 PROCEDURE — 82800 BLOOD PH: CPT

## 2022-03-16 RX ORDER — METFORMIN HYDROCHLORIDE 500 MG/1
500 TABLET ORAL
Qty: 30 TABLET | Refills: 0 | Status: SHIPPED | OUTPATIENT
Start: 2022-03-16 | End: 2022-03-16 | Stop reason: SDUPTHER

## 2022-03-16 RX ORDER — METFORMIN HYDROCHLORIDE 500 MG/1
500 TABLET ORAL 2 TIMES DAILY WITH MEALS
Qty: 60 TABLET | Refills: 1 | Status: SHIPPED | OUTPATIENT
Start: 2022-03-16 | End: 2022-04-18 | Stop reason: SDUPTHER

## 2022-03-16 RX ADMIN — INSULIN HUMAN 4 UNITS: 100 INJECTION, SOLUTION PARENTERAL at 02:03

## 2022-03-16 RX ADMIN — SODIUM CHLORIDE 2000 ML: 0.9 INJECTION, SOLUTION INTRAVENOUS at 11:03

## 2022-03-16 NOTE — ED PROVIDER NOTES
Encounter Date: 3/16/2022    SCRIBE #1 NOTE: I, Jason Carlos, am scribing for, and in the presence of,  Spring Cui MD. I have scribed the following portions of the note - Other sections scribed: HPI, ROS, PE.       History     Chief Complaint   Patient presents with    Hyperglycemia     Patient was seen today at urgent care for a UTI. Upon assessment, patient's  blood glucose was 416. Patient advised to come into ED for further evaluation. Patient's CBG in triage is 390. Patient has not been diagnosed with diabetes. Paternal family history of diabetes.      51 y.o. female with a PMHx of HTN presents to the ED for hyperglycemia. Patient reports she had glycosuria at an urgent care visit today and endorses 2 days of polyuria, polydipsia and dry oral mucosa. Patient was prescribed Macrobid while at urgent care. Pt states she did not take her HTN medications today (losartan, triamterene-HCTZ) . Reports a paternal history of diabetes mellitus. Denies fever, dysuria, or other associated symptoms. No other exacerbating or alleviating factors.     The history is provided by the patient.     Review of patient's allergies indicates:   Allergen Reactions    Penicillins Hives    Amoxicillin     Grass pollen-yun grass standard Other (See Comments)     Past Medical History:   Diagnosis Date    Hypertension      Past Surgical History:   Procedure Laterality Date    ABDOMINAL SURGERY      DIAGNOSTIC LAPAROSCOPY N/A 5/26/2019    Procedure: LAPAROSCOPY, DIAGNOSTIC Drainage of abscess ;  Surgeon: Jose Luis Hanson MD;  Location: [facility] OR;  Service: General;  Laterality: N/A;  Drain Placement    DIAGNOSTIC LAPAROSCOPY N/A 12/27/2020    Procedure: Diagnostic laparoscopy, drainage of intra-abdominal abscess;  Surgeon: Bhupendra Banda MD;  Location: [facility] OR;  Service: General;  Laterality: N/A;    LAPAROSCOPIC LYSIS OF ADHESIONS  5/26/2019    Procedure: LYSIS, ADHESIONS, LAPAROSCOPIC;  Surgeon: Jose Luis Hanson MD;   Location: [facility] OR;  Service: General;;     No family history on file.  Social History     Tobacco Use    Smoking status: Never Smoker    Smokeless tobacco: Never Used   Substance Use Topics    Alcohol use: Not Currently    Drug use: Not Currently     Review of Systems   Constitutional: Negative for chills and fever.   HENT: Negative for congestion and sore throat.    Eyes: Negative for visual disturbance.   Respiratory: Negative for cough and shortness of breath.    Cardiovascular: Negative for chest pain.   Gastrointestinal: Negative for abdominal pain, nausea and vomiting.   Endocrine: Positive for polydipsia and polyuria.   Genitourinary: Positive for frequency. Negative for dysuria.   Skin: Negative for rash.   Neurological: Negative for headaches.   Psychiatric/Behavioral: Negative for decreased concentration.       Physical Exam     Initial Vitals [03/16/22 1101]   BP Pulse Resp Temp SpO2   (!) 145/101 99 17 98.8 °F (37.1 °C) 99 %      MAP       --         Physical Exam    Nursing note and vitals reviewed.  Constitutional: She appears well-developed and well-nourished. She is not diaphoretic. No distress.   HENT:   Mouth/Throat: Oropharynx is clear and moist.   Eyes: Conjunctivae are normal. Pupils are equal, round, and reactive to light.   Neck: Neck supple.   Cardiovascular: Normal rate and regular rhythm.   Pulmonary/Chest: Breath sounds normal.   Abdominal: Abdomen is soft. There is no abdominal tenderness.   Musculoskeletal:         General: No edema.      Cervical back: Neck supple.     Neurological: She is alert and oriented to person, place, and time. GCS score is 15. GCS eye subscore is 4. GCS verbal subscore is 5. GCS motor subscore is 6.   Skin: Skin is warm and dry.   Psychiatric: She has a normal mood and affect.         ED Course   Procedures  Labs Reviewed   BETA - HYDROXYBUTYRATE, SERUM - Abnormal; Notable for the following components:       Result Value    Beta-Hydroxybutyrate 0.6 (*)      All other components within normal limits   URINALYSIS, REFLEX TO URINE CULTURE - Abnormal; Notable for the following components:    Specific Gravity, UA >1.030 (*)     Protein, UA Trace (*)     Glucose, UA 4+ (*)     Ketones, UA Trace (*)     Occult Blood UA Trace (*)     Leukocytes, UA 3+ (*)     All other components within normal limits    Narrative:     Specimen Source->Urine   COMPREHENSIVE METABOLIC PANEL - Abnormal; Notable for the following components:    CO2 31 (*)     Glucose 365 (*)     Total Protein 8.9 (*)     eGFR if non  59 (*)     All other components within normal limits   URINALYSIS MICROSCOPIC - Abnormal; Notable for the following components:    RBC, UA 72 (*)     WBC, UA 49 (*)     Yeast, UA Rare (*)     All other components within normal limits    Narrative:     Specimen Source->Urine   POCT GLUCOSE - Abnormal; Notable for the following components:    POCT Glucose 390 (*)     All other components within normal limits   ISTAT PROCEDURE - Abnormal; Notable for the following components:    POC PCO2 55.4 (*)     POC PO2 27 (*)     POC HCO3 38.1 (*)     POC SATURATED O2 51 (*)     POC TCO2 40 (*)     All other components within normal limits   POCT GLUCOSE - Abnormal; Notable for the following components:    POCT Glucose 358 (*)     All other components within normal limits   POCT GLUCOSE - Abnormal; Notable for the following components:    POCT Glucose 261 (*)     All other components within normal limits   CULTURE, URINE   CBC W/ AUTO DIFFERENTIAL   HEMOGLOBIN A1C        ECG Results          EKG 12-lead (Preliminary result)  Result time 03/16/22 12:07:29    ED Interpretation by Spring Cui MD (03/16/22 12:07:29, [facility], Emergency Medicine)    Normal sinus rhythm, rate 91 beats per minute, normal [place] interval,  milliseconds, no STEMI.  There are T-wave inversions in lead 3 and AVF.                            Imaging Results    None           Medications   sodium chloride 0.9% bolus 2,000 mL (0 mLs Intravenous Stopped 3/16/22 1356)   insulin regular injection 4 Units (4 Units Intravenous Given 3/16/22 9594)     Medical Decision Making:   Initial Assessment:   51-year-old female presents from urgent care for evaluation of hyperglycemia.  She reports she went to the urgent care due to frequent urination over the past 2 days, symptoms associated with increased thirst.  She denies dysuria, abdominal pain, nausea, vomiting, fever.  Her blood sugar was elevated at the Urgent Care, she was started on Macrobid and sent to the ER for further assessment.  She reports that her primary care physician has been monitoring her for prediabetes.  On exam, the patient is very well-appearing in no distress.  Heart rate in the low 100s.  Blood pressure is elevated.  Otherwise, exam nonfocal.  I suspect new onset diabetes.  Will get labs to ensure no evidence of DKA or severe electrolyte derangement.  ED Management:  Patient with hyperglycemia, no elevated ion gap, pH wnl. Hydrated and small dose of insulin with improvement in blood glucose. Counseled on dietary changes, importance of follow up. Lemon tart metformin, reviewed possible side effects. Advised to continue BP meds and macrobid rx from Urgent Care. Patient states she understands and agrees with plan.           Scribe Attestation:   Scribe #1: I performed the above scribed service and the documentation accurately describes the services I performed. I attest to the accuracy of the note.        ED Course as of 03/16/22 2049   Wed Mar 16, 2022   1251 CMP hemoloyzed and needs recollect. [LH]      ED Course User Index  [LH] Spring Cui MD             Clinical Impression:   Final diagnoses:  [R73.9] Hyperglycemia (Primary)  [E11.65] Uncontrolled type 2 diabetes mellitus with hyperglycemia          ED Disposition Condition    Discharge Stable        ED Prescriptions     Medication Sig Dispense Start Date End Date  Auth. Provider    metFORMIN (GLUCOPHAGE) 500 MG tablet  (Status: Discontinued) Take 1 tablet (500 mg total) by mouth daily with breakfast. 30 tablet 3/16/2022 3/16/2022 Spring Cui MD    metFORMIN (GLUCOPHAGE) 500 MG tablet Take 1 tablet (500 mg total) by mouth 2 (two) times daily with meals. 60 tablet 3/16/2022 3/16/2023 Spring Cui MD        Follow-up Information     Follow up With Specialties Details Why Contact Info    Janusz Staton MD Internal Medicine Schedule an appointment as soon as possible for a visit   [address] [facility] [address] [phone]      Angie Hunter MD Endocrinology Schedule an appointment as soon as possible for a visit   [address] [facility] [address]  [facility] [address] [phone]      [facility] Emergency Dept Emergency Medicine  As needed, If symptoms worsen [address] [facility] [address] [phone]       I, Spring Cui MD, personally performed the services described in this documentation. All medical record entries made by the scribe were at my direction and in my presence. I have reviewed the chart and agree that the record reflects my personal performance and is accurate and complete.    This dictation has been generated using M-Modal Fluency Direct dictation; some phonetic errors may occur.          Spring Cui MD  03/16/22 2049

## 2022-03-16 NOTE — ED TRIAGE NOTES
"Pt reports to the ED with C/O hyperglycemia,  frequent urination and increased thirst x3 days. Pt reports "I was seen at urgent care and they told me my blood sugar was 416." Pt denies any hx of diabetes. Pt denies N/V, ABD pain, chest pain, SOB, pain with urination, changes in bowel habits, or blurry vision.  Pt AAOx4, RESP easy and unlabored, NSR, HTN. ED workup in progress.   "

## 2022-03-16 NOTE — CARE UPDATE
Latest Reference Range & Units 03/16/22 11:38   POC PH 7.35 - 7.45  7.445   POC PCO2 35 - 45 mmHg 55.4 (H)   POC PO2 40 - 60 mmHg 27 (L)   POC BE -2 to 2 mmol/L 12   POC HCO3 24 - 28 mmol/L 38.1 (H)   POC SATURATED O2 95 - 100 % 51 (L)   POC TCO2 24 - 29 mmol/L 40 (H)   Sample  VENOUS   DelSys  Room Air   Allens Test  N/A   Site  Other   (H): Data is abnormally high  (L): Data is abnormally low

## 2022-03-17 LAB
ESTIMATED AVG GLUCOSE: 278 MG/DL (ref 68–131)
HBA1C MFR BLD: 11.3 % (ref 4–5.6)

## 2022-03-18 LAB — BACTERIA UR CULT: NORMAL

## 2022-04-18 ENCOUNTER — OFFICE VISIT (OUTPATIENT)
Dept: ENDOCRINOLOGY | Facility: CLINIC | Age: 52
End: 2022-04-18
Payer: COMMERCIAL

## 2022-04-18 VITALS
TEMPERATURE: 98 F | HEART RATE: 73 BPM | WEIGHT: 227.88 LBS | DIASTOLIC BLOOD PRESSURE: 81 MMHG | BODY MASS INDEX: 35.69 KG/M2 | SYSTOLIC BLOOD PRESSURE: 130 MMHG

## 2022-04-18 DIAGNOSIS — R73.9 HYPERGLYCEMIA: ICD-10-CM

## 2022-04-18 DIAGNOSIS — I10 HYPERTENSION, UNSPECIFIED TYPE: ICD-10-CM

## 2022-04-18 DIAGNOSIS — E11.9 NEW ONSET TYPE 2 DIABETES MELLITUS: Primary | ICD-10-CM

## 2022-04-18 DIAGNOSIS — E66.01 SEVERE OBESITY (BMI 35.0-39.9) WITH COMORBIDITY: ICD-10-CM

## 2022-04-18 LAB — GLUCOSE SERPL-MCNC: 124 MG/DL (ref 70–110)

## 2022-04-18 PROCEDURE — 3075F SYST BP GE 130 - 139MM HG: CPT | Mod: CPTII,S$GLB,, | Performed by: NURSE PRACTITIONER

## 2022-04-18 PROCEDURE — 4010F PR ACE/ARB THEARPY RXD/TAKEN: ICD-10-PCS | Mod: CPTII,S$GLB,, | Performed by: NURSE PRACTITIONER

## 2022-04-18 PROCEDURE — 4010F ACE/ARB THERAPY RXD/TAKEN: CPT | Mod: CPTII,S$GLB,, | Performed by: NURSE PRACTITIONER

## 2022-04-18 PROCEDURE — 3046F PR MOST RECENT HEMOGLOBIN A1C LEVEL > 9.0%: ICD-10-PCS | Mod: CPTII,S$GLB,, | Performed by: NURSE PRACTITIONER

## 2022-04-18 PROCEDURE — 1160F PR REVIEW ALL MEDS BY PRESCRIBER/CLIN PHARMACIST DOCUMENTED: ICD-10-PCS | Mod: CPTII,S$GLB,, | Performed by: NURSE PRACTITIONER

## 2022-04-18 PROCEDURE — 3008F BODY MASS INDEX DOCD: CPT | Mod: CPTII,S$GLB,, | Performed by: NURSE PRACTITIONER

## 2022-04-18 PROCEDURE — 99204 OFFICE O/P NEW MOD 45 MIN: CPT | Mod: S$GLB,,, | Performed by: NURSE PRACTITIONER

## 2022-04-18 PROCEDURE — 3075F PR MOST RECENT SYSTOLIC BLOOD PRESS GE 130-139MM HG: ICD-10-PCS | Mod: CPTII,S$GLB,, | Performed by: NURSE PRACTITIONER

## 2022-04-18 PROCEDURE — 1160F RVW MEDS BY RX/DR IN RCRD: CPT | Mod: CPTII,S$GLB,, | Performed by: NURSE PRACTITIONER

## 2022-04-18 PROCEDURE — 1159F MED LIST DOCD IN RCRD: CPT | Mod: CPTII,S$GLB,, | Performed by: NURSE PRACTITIONER

## 2022-04-18 PROCEDURE — 3046F HEMOGLOBIN A1C LEVEL >9.0%: CPT | Mod: CPTII,S$GLB,, | Performed by: NURSE PRACTITIONER

## 2022-04-18 PROCEDURE — 82962 GLUCOSE BLOOD TEST: CPT | Mod: S$GLB,,, | Performed by: NURSE PRACTITIONER

## 2022-04-18 PROCEDURE — 1159F PR MEDICATION LIST DOCUMENTED IN MEDICAL RECORD: ICD-10-PCS | Mod: CPTII,S$GLB,, | Performed by: NURSE PRACTITIONER

## 2022-04-18 PROCEDURE — 3079F DIAST BP 80-89 MM HG: CPT | Mod: CPTII,S$GLB,, | Performed by: NURSE PRACTITIONER

## 2022-04-18 PROCEDURE — 3008F PR BODY MASS INDEX (BMI) DOCUMENTED: ICD-10-PCS | Mod: CPTII,S$GLB,, | Performed by: NURSE PRACTITIONER

## 2022-04-18 PROCEDURE — 82962 POCT GLUCOSE, HAND-HELD DEVICE: ICD-10-PCS | Mod: S$GLB,,, | Performed by: NURSE PRACTITIONER

## 2022-04-18 PROCEDURE — 99999 PR PBB SHADOW E&M-EST. PATIENT-LVL V: CPT | Mod: PBBFAC,,, | Performed by: NURSE PRACTITIONER

## 2022-04-18 PROCEDURE — 99204 PR OFFICE/OUTPT VISIT, NEW, LEVL IV, 45-59 MIN: ICD-10-PCS | Mod: S$GLB,,, | Performed by: NURSE PRACTITIONER

## 2022-04-18 PROCEDURE — 3079F PR MOST RECENT DIASTOLIC BLOOD PRESSURE 80-89 MM HG: ICD-10-PCS | Mod: CPTII,S$GLB,, | Performed by: NURSE PRACTITIONER

## 2022-04-18 PROCEDURE — 99999 PR PBB SHADOW E&M-EST. PATIENT-LVL V: ICD-10-PCS | Mod: PBBFAC,,, | Performed by: NURSE PRACTITIONER

## 2022-04-18 RX ORDER — METFORMIN HYDROCHLORIDE 500 MG/1
500 TABLET ORAL 2 TIMES DAILY WITH MEALS
Qty: 180 TABLET | Refills: 0 | Status: SHIPPED | OUTPATIENT
Start: 2022-04-18 | End: 2022-09-01

## 2022-04-18 RX ORDER — LANCETS
EACH MISCELLANEOUS
Qty: 200 EACH | Refills: 3 | Status: ON HOLD | OUTPATIENT
Start: 2022-04-18 | End: 2023-06-01 | Stop reason: HOSPADM

## 2022-04-18 RX ORDER — INSULIN PUMP SYRINGE, 3 ML
EACH MISCELLANEOUS
Qty: 1 EACH | Refills: 0 | Status: SHIPPED | OUTPATIENT
Start: 2022-04-18 | End: 2023-04-18

## 2022-04-18 NOTE — PROGRESS NOTES
CC: This 51 y.o. Black or  female  is here for evaluation of  T2DM along with comorbidities indicated in the Visit Diagnosis section of this encounter.    HPI: Shahida Ortega was diagnosed with T2DM in March 20222 upon presentation to ED, upon workup for polyuria, polydipsia, dry mouth, and UTI.      DM COMPLICATIONS: none      New to Endocrine. Referred by ED provider after pt was dx'd with DM there. She was started on metformin in the hospital and feels better now after starting metformin and improving diet.     LAST DIABETES EDUCATION: none     HOSPITALIZED FOR DIABETES OR RELATED COMPLICATIONS -  No. Presented to ED upon diagnosis     SIGNIFICANT DIABETES MED HISTORY: n/a     PRESCRIBED DIABETES MEDICATIONS:   metformin 500 mg bid       Misses medication doses - No    SELF MONITORING BLOOD GLUCOSE: Checks blood glucose at home -none. She does not know how to test BG.    poct glucose 124 fasting     HYPOGLYCEMIC EPISODES:      CURRENT DIET: drinking beverages without sugar. Confused about how to eat and limit carbs.   Eats 3 meals/day. Has given up potato chips and other junk food.       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       BP (!) 140/83   Pulse 77   Temp 98.2 °F (36.8 °C)   Wt 103.4 kg (227 lb 14.4 oz)   LMP 12/24/2020   BMI 35.69 kg/m²       ROS:   CONSTITUTIONAL: Appetite good, denies fatigue  SKIN: No rash or pruritis   EYES: No visual disturbances  RESPIRATORY: No shortness of breath or cough  CARDIAC: No chest pain or palpitations  GI: No nausea, vomiting, or diarrhea  : No urinary frequency or dysuria   MS: No arthralgias or mylagias   NEURO: No paresthesias or tremors.   OTHER: no polydipsia     PHYSICAL EXAM:  GENERAL: Well developed, well nourished. No acute distress.   PSYCH: AAOx3, appropriate mood and affect, conversant, well-groomed. Judgement and insight good.   NEURO: Cranial nerves grossly intact.  Speech clear, no tremor.   NECK: Trachea midline, no thyromegaly or lymphadenopathy.   CHEST: Respirations even and unlabored. CTA bilaterally.  CARDIOVASCULAR: Regular rate and rhythm. No bruits. No murmur. No edema.   ABDOMEN: Soft, non-tender, non-distended. Bowel sounds present.   MS: Gait steady. No clubbing.   SKIN: Normal skin turgor. Skin warm and dry. No areas of breakdown. + nuchal acanthosis nigricans.  FEET: Footware appropriate.       Hemoglobin A1C   Date Value Ref Range Status   03/16/2022 11.3 (H) 4.0 - 5.6 % Final     Comment:     ADA Screening Guidelines:  5.7-6.4%  Consistent with prediabetes  >or=6.5%  Consistent with diabetes    High levels of fetal hemoglobin interfere with the HbA1C  assay. Heterozygous hemoglobin variants (HbS, HgC, etc)do  not significantly interfere with this assay.   However, presence of multiple variants may affect accuracy.         No results found for: CPEPTIDE, GLUTAMICACID, ISLETCELLANT, FRUCTOSAMINE     No results found for: CHOL  No results found for: HDL  No results found for: LDLCALC  No results found for: TRIG  No results found for: CHOLHDL      Chemistry        Component Value Date/Time     03/16/2022 1239    K 4.4 03/16/2022 1239     03/16/2022 1239    CO2 31 (H) 03/16/2022 1239    BUN 11 03/16/2022 1239    CREATININE 1.1 03/16/2022 1239     (H) 03/16/2022 1239        Component Value Date/Time    CALCIUM 10.3 03/16/2022 1239    ALKPHOS 128 03/16/2022 1239    AST 15 03/16/2022 1239    ALT 21 03/16/2022 1239    BILITOT 0.5 03/16/2022 1239    ESTGFRAFRICA >60 03/16/2022 1239    EGFRNONAA 59 (A) 03/16/2022 1239              No results found for: LABMICR, CREATRANDUR, MICALBCREAT            ASSESSMENT and PLAN:    A1C GOAL: < 7 %     1. New onset type 2 diabetes mellitus  Discussed progression of DM disease, long term complications, and tx options. Reviewed A1c and BG goals.     Continue metformin 500 mg twice daily.   Test glucose 1x per day  before or 2 hours after a meal. Keep a log. Patient declines Freestyle Daisy sensors.   See Diabetes Educator/Registered Dietician for Medical Nutrition Therapy.   Return to clinic in 2 months with labs prior.       Ambulatory referral/consult to Endocrinology    Ambulatory referral/consult to Diabetes Education    POCT Glucose, Hand-Held Device    Hemoglobin A1C    Microalbumin/Creatinine Ratio, Urine   2. Hyperglycemia  Ambulatory referral/consult to Endocrinology   3. Severe obesity (BMI 35.0-39.9) with comorbidity  Increases insulin resistance.   MNT   4. Hypertension, unspecified type  Microalbumin/Creatinine Ratio, Urine       Orders Placed This Encounter   Procedures    Hemoglobin A1C     Standing Status:   Future     Standing Expiration Date:   6/17/2023    Microalbumin/Creatinine Ratio, Urine     Standing Status:   Future     Standing Expiration Date:   6/17/2023     Order Specific Question:   Specimen Source     Answer:   Urine    Ambulatory referral/consult to Diabetes Education     Standing Status:   Future     Standing Expiration Date:   4/18/2023     Referral Priority:   Routine     Referral Type:   Consultation     Referral Reason:   Specialty Services Required     Requested Specialty:   Endocrinology     Number of Visits Requested:   1     Expiration Date:   4/18/2023    POCT Glucose, Hand-Held Device        Follow up in about 2 months (around 6/18/2022).     Thank you very much for allowing me to participate in Shahida Ortega's care.

## 2022-04-25 ENCOUNTER — CLINICAL SUPPORT (OUTPATIENT)
Dept: DIABETES | Facility: CLINIC | Age: 52
End: 2022-04-25
Payer: COMMERCIAL

## 2022-04-25 VITALS — WEIGHT: 231.5 LBS | BODY MASS INDEX: 36.26 KG/M2

## 2022-04-25 DIAGNOSIS — E11.9 NEW ONSET TYPE 2 DIABETES MELLITUS: ICD-10-CM

## 2022-04-25 PROCEDURE — G0108 DIAB MANAGE TRN  PER INDIV: HCPCS | Mod: S$GLB,,, | Performed by: DIETITIAN, REGISTERED

## 2022-04-25 PROCEDURE — 99999 PR PBB SHADOW E&M-EST. PATIENT-LVL II: ICD-10-PCS | Mod: PBBFAC,,, | Performed by: DIETITIAN, REGISTERED

## 2022-04-25 PROCEDURE — G0108 PR DIAB MANAGE TRN  PER INDIV: ICD-10-PCS | Mod: S$GLB,,, | Performed by: DIETITIAN, REGISTERED

## 2022-04-25 PROCEDURE — 99999 PR PBB SHADOW E&M-EST. PATIENT-LVL II: CPT | Mod: PBBFAC,,, | Performed by: DIETITIAN, REGISTERED

## 2022-04-25 NOTE — PROGRESS NOTES
Diabetes Care Specialist Progress Note  Author: Genevieve Phillips RD  Date: 4/25/2022    Program Intake  Reason for Diabetes Program Visit:: Initial Diabetes Assessment (New DM dx)  Current diabetes risk level:: high  In the last 12 months, have you:: been admitted to a hospital  Was the ER or hospital admission related to diabetes?: Yes  Permission to speak with others about care:: yes    Lab Results   Component Value Date    HGBA1C 11.3 (H) 03/16/2022     Clinical  Problem Review  Reviewed Problem List with Patient: yes  Active comorbidities affecting diabetes self-care.: no    Clinical Assessment  Current Diabetes Treatment: Diet, Oral Medication  Have you ever experienced hypoglycemia (low blood sugar)?: no  Have you ever experienced hyperglycemia (high blood sugar)?: yes  Have you ever been hospitalized because your blood sugar was high?: yes (see comments)    Medication Information  How do you obtain your medications?: Patient drives    Labs  Lab Compliance Barriers: No    Nutritional Status  Diet: Regular, Diabetic diet (states eats 3 meals but had removed simple sugar, SSB from meals)  Change in appetite?: Yes  Dentation:: Intact  Recent Changes in Weight: No Recent Weight Change  Current nutritional status an area of need that is impacting patient's ability to self-manage diabetes?: No    Additional Social History    Support  Does anyone support you with your diabetes care?: yes  Who supports you?: family member  Who takes you to your medical appointments?: self  Does the current support meet the patient's needs?: Yes  Is Support an area impacting ability to self-manage diabetes?: No    Access to Mass Media & Technology  Does the patient have access to any of the following devices or technologies?: Smart phone, Home computer  Media or technology needs impacting ability to self-manage diabetes?: No    Cognitive/Behavioral Health  Alert and Oriented: Yes  Difficulty Thinking: No  Requires Prompting: No  Requires  assistance for routine expression?: No  Cognitive or behavioral barriers impacting ability to self-manage diabetes?: No    Culture/Quaker  Culture or Sikh beliefs that may impact ability to access healthcare: No    Communication  Language preference: English  Hearing Problems: No  Communication needs impacting ability to self-manage diabetes?: No    Health Literacy  Preferred Learning Method: Face to Face, Demonstration, Reading Materials, Group Education  How often do you need to have someone help you read instructions, pamphlets, or written material from your doctor or pharmacy?: Never  Health literacy needs impacting ability to self-manage diabetes?: No    Diabetes Self-Management Skills Assessment    Diabetes Disease Process/Treatment Options  Patient/caregiver able to state what happens when someone has diabetes.: somewhat  Patient/caregiver knows what type of diabetes they have.: yes  Diabetes Type : Type II  Diabetes Disease Process/Treatment Options: Skills Assessment Completed: Yes  Assessment indicates:: Adequate understanding  Area of need?: No    Nutrition/Healthy Eating  Challenges to healthy eating:: portion control, snacking between meals and at night  Method of carbohydrate measurement:: eyeballing/guessing, no knowledge of what carbs are, no method  Patient can identify foods that impact blood sugar.: no (see comments)  Nutrition/Healthy Eating Skills Assessment Completed:: Yes  Assessment indicates:: Knowledge deficit, Instruction Needed  Area of need?: Yes    Physical Activity/Exercise  Patient's daily activity level:: lightly active  Patient formally exercises outside of work.: yes  Exercise Type: walking  Intensity: Low  Frequency: three times a week  Duration: 30 min  Physical Activity/Exercise Skills Assessment Completed: : Yes  Assessment indicates:: Adequate understanding  Area of need?: Yes    Medications  Patient is able to describe current diabetes management routine.:  yes  Diabetes management routine:: oral medications  Patient is able to identify current diabetes medications, dosages, and appropriate timing of medications.: yes  Patient understands the purpose of the medications taken for diabetes.: yes  Patient reports problems or concerns with current medication regimen.: no  Medication Skills Assessment Completed:: Yes  Assessment indicates:: Adequate understanding  Area of need?: No    Home Blood Glucose Monitoring  Patient states that blood sugar is checked at home daily.: yes  Monitoring Method:: home glucometer  How often do you check your blood sugar?: Once a day  When do you check your blood sugar?: Before breakfast  Blood glucose logs reviewed today?: no  Area of need?: No    Acute Complications  Patient is able to identify types of acute complications: No  Acute Complications Skills Assessment Completed: : Yes  Assessment indicates:: Adequate understanding, Knowledge deficit  Area of need?: No    Chronic Complications  Chronic Complications Skills Assessment Completed: : Yes  Assessment indicates:: Adequate understanding  Area of need?: No    Psychosocial/Coping  Psychosocial/Coping Skills Assessment Completed: : No  Deffered due to:: Time    Diabetes Self Support Plan  Assessment Summary and Plan    Based on today's diabetes care assessment, the following areas of need were identified:      Diabetes Self-Management Skills 4/25/2022   Diabetes Disease Process/Treatment Options No   Nutrition/Healthy Eating Yes-very carb unaware; not lorene labels; see care plan   Physical Activity/Exercise Yes-low intensity PA currently; stressed increase; see care plan   Medication No   Home Blood Glucose Monitoring No   Acute Complications No   Chronic Complications No      Today's interventions were provided through individual discussion, instruction, and written materials were provided.      Patient verbalized understanding of instruction and written materials.  Pt was able to  "return back demonstration of instructions today. Patient understood key points, needs reinforcement and further instruction.     Diabetes Self-Management Care Plan:    Today's Diabetes Self-Management Care Plan was developed with April's input. April has agreed to work toward the following goal(s) to improve his/her overall diabetes control.      Care Plan: Diabetes Management   Updates made since 3/26/2022 12:00 AM      Problem: Healthy Eating       Goal: To achieve long-term weight loss and improved BG , pt agrees to eat 3 evenly spaced meals/day containing 30-45 g carb/2-3 servings of carb/each meal. Snacks limited to 0-15 g carb each.    Start Date: 4/25/2022   Priority: High   Barriers: No Barriers Identified   Note:    Diet recall notes pt is very carb unaware sighting mostly sugar and "white stuff" as carb sources. Pt does not currently read food labelsfor carb info nor does she carb count. She is trying to add veg to meals; does like fruit and milk     -Discussed carb vs non-carb foods, appropriate amount of carbs to have at meals/snacks and acceptable serving sizes of individual carb items.  - Instructed on CONSISTENT CARB INTAKE with appropriate label reading and serving sizes of specific carb containing foods., portion control (hand) and using the plate method of meal planning.   -Encouraged carb sources primarily from whole grains, fresh/frozen fruits, and low-fat milk and yogurt.   -Discussed meal plans and snack ideas amenable to pt.    -Instructed pt to aim for 3 evenly spaced meals with 30-45 gm carb/meal and 0-15 gm at snacks.  Discussed carb counting apps and using internet for carb info when eating out or meal planning  Emphasized importance of eliminating all SSB. Discussed SF drinks and low carb juice options.      Task: Provided visual examples using dry measuring cups, food models, and other familiar objects such as computer mouse, deck or cards, tennis ball etc. to help with visualization of " "portions. Completed 4/25/2022      Task: Explained how to count carbohydrates using the food label and the use of dry measuring cups for accurate carb counting. Completed 4/25/2022      Task: Discussed strategies for choosing healthier menu options when dining out. Completed 4/25/2022      Task: Recommended replacing beverages containing high sugar content with noncaloric/sugar free options and/or water. Completed 4/25/2022      Task: Review the importance of balancing carbohydrates with each meal using portion control techniques to count servings of carbohydrate and label reading to identify serving size and amount of total carbs per serving. Completed 4/25/2022      Problem: Physical Activity and Exercise       Goal: Patient agrees to increase physical activity to a goal of 3-5 times per week for 30 minutes/session    Priority: High   Barriers: No Barriers Identified   Note:    Pt states she has started walking around the school [employer] later in day with yard duty    Reviewed difference between active lifestyle and structured physical activity and role exercise plays in weight loss and blood glucose control. Discussed benefits of physical activity on BG control and encouraged pt to start a walking program for a total of 150 minutes per week while keeping 3 exercise components in mind: Frequency- 3-5x/wk, Duration- 30 min, Intensity- can say name but "not sing a song"     Task: Discussed role of physical activity as it relates to weight loss Completed 4/25/2022      Task: Offered suggestions on how patient could increase their regular physical activity Completed 4/25/2022          Follow Up Plan     Follow up in about 3 months (around 7/25/2022).    Today's care plan and follow up schedule was discussed with patient.  April verbalized understanding of the care plan, goals, and agrees to follow up plan.        The patient was encouraged to communicate with his/her health care provider/physician and care team regarding " his/her condition(s) and treatment.  I provided the patient with my contact information today and encouraged to contact me via phone or [facility] Patient Portal as needed.     Length of Visit   Total Time: 60 Minutes

## 2022-06-20 ENCOUNTER — PATIENT MESSAGE (OUTPATIENT)
Dept: ENDOCRINOLOGY | Facility: CLINIC | Age: 52
End: 2022-06-20
Payer: COMMERCIAL

## 2022-09-01 RX ORDER — METFORMIN HYDROCHLORIDE 500 MG/1
TABLET ORAL
Qty: 180 TABLET | Refills: 0 | Status: SHIPPED | OUTPATIENT
Start: 2022-09-01 | End: 2022-12-06

## 2022-12-06 RX ORDER — METFORMIN HYDROCHLORIDE 500 MG/1
TABLET ORAL
Qty: 180 TABLET | Refills: 0 | Status: ON HOLD | OUTPATIENT
Start: 2022-12-06 | End: 2023-06-01 | Stop reason: SDUPTHER

## 2023-01-15 ENCOUNTER — NURSE TRIAGE (OUTPATIENT)
Dept: ADMINISTRATIVE | Facility: CLINIC | Age: 53
End: 2023-01-15
Payer: COMMERCIAL

## 2023-01-15 NOTE — TELEPHONE ENCOUNTER
Pt stated that she was constipated and had a BM today. No c/o chills, abdominal swelling and temp 97.3. Denies abdominal pain. Care advice to be seen within 24 hours per protocol. [facility] offered and declined. Pt stated that she will go to urgent care on tomorrow.       Reason for Disposition   Abdomen is more swollen than usual    Additional Information   Negative: Patient sounds very sick or weak to the triager   Negative: [1] Vomiting AND [2] abdomen looks much more swollen than usual   Negative: [1] Vomiting AND [2] contains bile (green color)   Negative: [1] Constant abdominal pain AND [2] present > 2 hours   Negative: [1] Rectal pain or fullness from fecal impaction (rectum full of stool) AND [2] NOT better after SITZ bath, suppository or enema   Negative: [1] Intermittent mild abdominal pain AND [2] fever    Protocols used: Constipation-A-AH

## 2023-05-28 ENCOUNTER — HOSPITAL ENCOUNTER (INPATIENT)
Facility: HOSPITAL | Age: 53
LOS: 3 days | Discharge: HOME | DRG: 872 | End: 2023-06-01
Attending: EMERGENCY MEDICINE | Admitting: HOSPITALIST
Payer: COMMERCIAL

## 2023-05-28 DIAGNOSIS — R07.9 CHEST PAIN: ICD-10-CM

## 2023-05-28 DIAGNOSIS — N12 PYELONEPHRITIS: Primary | ICD-10-CM

## 2023-05-28 DIAGNOSIS — Z01.810 PREOP CARDIOVASCULAR EXAM: ICD-10-CM

## 2023-05-28 DIAGNOSIS — R97.8 ELEVATED CA 19-9 LEVEL: ICD-10-CM

## 2023-05-28 DIAGNOSIS — I48.91 A-FIB: ICD-10-CM

## 2023-05-28 DIAGNOSIS — A41.9 SEPSIS: ICD-10-CM

## 2023-05-28 PROBLEM — N39.0 SEPSIS DUE TO URINARY TRACT INFECTION: Status: ACTIVE | Noted: 2023-05-28

## 2023-05-28 PROBLEM — R79.89 ELEVATED D-DIMER: Status: ACTIVE | Noted: 2023-05-28

## 2023-05-28 PROBLEM — E11.9 DM2 (DIABETES MELLITUS, TYPE 2): Status: ACTIVE | Noted: 2023-05-28

## 2023-05-28 PROBLEM — R79.89 ELEVATED PROCALCITONIN: Status: ACTIVE | Noted: 2023-05-28

## 2023-05-28 PROBLEM — I10 HYPERTENSION: Status: ACTIVE | Noted: 2023-05-28

## 2023-05-28 LAB
ALBUMIN SERPL BCP-MCNC: 3.4 G/DL (ref 3.5–5.2)
ALLENS TEST: NORMAL
ALP SERPL-CCNC: 84 U/L (ref 55–135)
ALT SERPL W/O P-5'-P-CCNC: 19 U/L (ref 10–44)
ANION GAP SERPL CALC-SCNC: 11 MMOL/L (ref 8–16)
AST SERPL-CCNC: 23 U/L (ref 10–40)
B-OH-BUTYR BLD STRIP-SCNC: 0.3 MMOL/L (ref 0–0.5)
BACTERIA #/AREA URNS HPF: ABNORMAL /HPF
BASOPHILS # BLD AUTO: 0.01 K/UL (ref 0–0.2)
BASOPHILS NFR BLD: 0.2 % (ref 0–1.9)
BILIRUB SERPL-MCNC: 0.6 MG/DL (ref 0.1–1)
BILIRUB UR QL STRIP: NEGATIVE
BUN SERPL-MCNC: 13 MG/DL (ref 6–20)
CALCIUM SERPL-MCNC: 9.3 MG/DL (ref 8.7–10.5)
CHLORIDE SERPL-SCNC: 102 MMOL/L (ref 95–110)
CLARITY UR: ABNORMAL
CO2 SERPL-SCNC: 24 MMOL/L (ref 23–29)
COLOR UR: YELLOW
CREAT SERPL-MCNC: 0.9 MG/DL (ref 0.5–1.4)
CTP QC/QA: YES
CTP QC/QA: YES
D DIMER PPP IA.FEU-MCNC: 1.56 MG/L FEU
DIFFERENTIAL METHOD: ABNORMAL
EOSINOPHIL # BLD AUTO: 0 K/UL (ref 0–0.5)
EOSINOPHIL NFR BLD: 0.6 % (ref 0–8)
ERYTHROCYTE [DISTWIDTH] IN BLOOD BY AUTOMATED COUNT: 13.2 % (ref 11.5–14.5)
EST. GFR  (NO RACE VARIABLE): >60 ML/MIN/1.73 M^2
GLUCOSE SERPL-MCNC: 226 MG/DL (ref 70–110)
GLUCOSE UR QL STRIP: ABNORMAL
HCT VFR BLD AUTO: 36.4 % (ref 37–48.5)
HGB BLD-MCNC: 12.2 G/DL (ref 12–16)
HGB UR QL STRIP: ABNORMAL
IMM GRANULOCYTES # BLD AUTO: 0.07 K/UL (ref 0–0.04)
IMM GRANULOCYTES NFR BLD AUTO: 1.1 % (ref 0–0.5)
KETONES UR QL STRIP: ABNORMAL
LACTATE SERPL-SCNC: 0.9 MMOL/L (ref 0.5–2.2)
LACTATE SERPL-SCNC: 1.2 MMOL/L (ref 0.5–2.2)
LDH SERPL L TO P-CCNC: 1.03 MMOL/L (ref 0.5–2.2)
LEUKOCYTE ESTERASE UR QL STRIP: ABNORMAL
LYMPHOCYTES # BLD AUTO: 1 K/UL (ref 1–4.8)
LYMPHOCYTES NFR BLD: 16.1 % (ref 18–48)
MCH RBC QN AUTO: 28.1 PG (ref 27–31)
MCHC RBC AUTO-ENTMCNC: 33.5 G/DL (ref 32–36)
MCV RBC AUTO: 84 FL (ref 82–98)
MICROSCOPIC COMMENT: ABNORMAL
MONOCYTES # BLD AUTO: 0.1 K/UL (ref 0.3–1)
MONOCYTES NFR BLD: 1.3 % (ref 4–15)
NEUTROPHILS # BLD AUTO: 5.2 K/UL (ref 1.8–7.7)
NEUTROPHILS NFR BLD: 80.7 % (ref 38–73)
NITRITE UR QL STRIP: NEGATIVE
NRBC BLD-RTO: 0 /100 WBC
PH UR STRIP: 6 [PH] (ref 5–8)
PLATELET # BLD AUTO: 118 K/UL (ref 150–450)
PMV BLD AUTO: 10.9 FL (ref 9.2–12.9)
POC MOLECULAR INFLUENZA A AGN: NEGATIVE
POC MOLECULAR INFLUENZA B AGN: NEGATIVE
POTASSIUM SERPL-SCNC: 4.1 MMOL/L (ref 3.5–5.1)
PROCALCITONIN SERPL IA-MCNC: 10.56 NG/ML
PROT SERPL-MCNC: 8.2 G/DL (ref 6–8.4)
PROT UR QL STRIP: NEGATIVE
RBC # BLD AUTO: 4.34 M/UL (ref 4–5.4)
RBC #/AREA URNS HPF: 7 /HPF (ref 0–4)
SAMPLE: NORMAL
SARS-COV-2 RDRP RESP QL NAA+PROBE: NEGATIVE
SITE: NORMAL
SODIUM SERPL-SCNC: 137 MMOL/L (ref 136–145)
SP GR UR STRIP: 1.03 (ref 1–1.03)
TROPONIN I SERPL DL<=0.01 NG/ML-MCNC: <0.006 NG/ML (ref 0–0.03)
URN SPEC COLLECT METH UR: ABNORMAL
UROBILINOGEN UR STRIP-ACNC: NEGATIVE EU/DL
WBC # BLD AUTO: 6.38 K/UL (ref 3.9–12.7)
WBC #/AREA URNS HPF: 6 /HPF (ref 0–5)
YEAST URNS QL MICRO: ABNORMAL

## 2023-05-28 PROCEDURE — 96365 THER/PROPH/DIAG IV INF INIT: CPT

## 2023-05-28 PROCEDURE — 85025 COMPLETE CBC W/AUTO DIFF WBC: CPT

## 2023-05-28 PROCEDURE — 99285 EMERGENCY DEPT VISIT HI MDM: CPT | Mod: 25

## 2023-05-28 PROCEDURE — 87077 CULTURE AEROBIC IDENTIFY: CPT

## 2023-05-28 PROCEDURE — 87186 SC STD MICRODIL/AGAR DIL: CPT

## 2023-05-28 PROCEDURE — 96361 HYDRATE IV INFUSION ADD-ON: CPT

## 2023-05-28 PROCEDURE — 83605 ASSAY OF LACTIC ACID: CPT

## 2023-05-28 PROCEDURE — 93005 ELECTROCARDIOGRAM TRACING: CPT

## 2023-05-28 PROCEDURE — 96366 THER/PROPH/DIAG IV INF ADDON: CPT

## 2023-05-28 PROCEDURE — G0378 HOSPITAL OBSERVATION PER HR: HCPCS

## 2023-05-28 PROCEDURE — 25000003 PHARM REV CODE 250

## 2023-05-28 PROCEDURE — 80053 COMPREHEN METABOLIC PANEL: CPT

## 2023-05-28 PROCEDURE — 84145 PROCALCITONIN (PCT): CPT

## 2023-05-28 PROCEDURE — 96367 TX/PROPH/DG ADDL SEQ IV INF: CPT

## 2023-05-28 PROCEDURE — 87502 INFLUENZA DNA AMP PROBE: CPT

## 2023-05-28 PROCEDURE — 25500020 PHARM REV CODE 255: Performed by: HOSPITALIST

## 2023-05-28 PROCEDURE — 93010 EKG 12-LEAD: ICD-10-PCS | Mod: ,,, | Performed by: INTERNAL MEDICINE

## 2023-05-28 PROCEDURE — 84484 ASSAY OF TROPONIN QUANT: CPT

## 2023-05-28 PROCEDURE — 25000003 PHARM REV CODE 250: Performed by: NURSE PRACTITIONER

## 2023-05-28 PROCEDURE — 82010 KETONE BODYS QUAN: CPT

## 2023-05-28 PROCEDURE — 99900035 HC TECH TIME PER 15 MIN (STAT)

## 2023-05-28 PROCEDURE — 96375 TX/PRO/DX INJ NEW DRUG ADDON: CPT

## 2023-05-28 PROCEDURE — 83605 ASSAY OF LACTIC ACID: CPT | Mod: 91

## 2023-05-28 PROCEDURE — 63600175 PHARM REV CODE 636 W HCPCS

## 2023-05-28 PROCEDURE — 81000 URINALYSIS NONAUTO W/SCOPE: CPT

## 2023-05-28 PROCEDURE — S0030 INJECTION, METRONIDAZOLE: HCPCS

## 2023-05-28 PROCEDURE — 85379 FIBRIN DEGRADATION QUANT: CPT

## 2023-05-28 PROCEDURE — 87040 BLOOD CULTURE FOR BACTERIA: CPT | Mod: 59

## 2023-05-28 PROCEDURE — 93010 ELECTROCARDIOGRAM REPORT: CPT | Mod: ,,, | Performed by: INTERNAL MEDICINE

## 2023-05-28 PROCEDURE — S0073 INJECTION, AZTREONAM, 500 MG: HCPCS

## 2023-05-28 PROCEDURE — 25500020 PHARM REV CODE 255: Performed by: INTERNAL MEDICINE

## 2023-05-28 RX ORDER — GLUCAGON 1 MG
1 KIT INJECTION
Status: DISCONTINUED | OUTPATIENT
Start: 2023-05-28 | End: 2023-06-01 | Stop reason: HOSPADM

## 2023-05-28 RX ORDER — IBUPROFEN 200 MG
24 TABLET ORAL
Status: DISCONTINUED | OUTPATIENT
Start: 2023-05-28 | End: 2023-06-01 | Stop reason: HOSPADM

## 2023-05-28 RX ORDER — CLONIDINE HYDROCHLORIDE 0.1 MG/1
0.1 TABLET ORAL
Status: DISCONTINUED | OUTPATIENT
Start: 2023-05-28 | End: 2023-05-28

## 2023-05-28 RX ORDER — IBUPROFEN 200 MG
16 TABLET ORAL
Status: DISCONTINUED | OUTPATIENT
Start: 2023-05-28 | End: 2023-06-01 | Stop reason: HOSPADM

## 2023-05-28 RX ORDER — INSULIN ASPART 100 [IU]/ML
0-5 INJECTION, SOLUTION INTRAVENOUS; SUBCUTANEOUS
Status: DISCONTINUED | OUTPATIENT
Start: 2023-05-28 | End: 2023-06-01 | Stop reason: HOSPADM

## 2023-05-28 RX ORDER — MAG HYDROX/ALUMINUM HYD/SIMETH 200-200-20
30 SUSPENSION, ORAL (FINAL DOSE FORM) ORAL EVERY 6 HOURS PRN
Status: DISCONTINUED | OUTPATIENT
Start: 2023-05-28 | End: 2023-06-01 | Stop reason: HOSPADM

## 2023-05-28 RX ORDER — IPRATROPIUM BROMIDE AND ALBUTEROL SULFATE 2.5; .5 MG/3ML; MG/3ML
3 SOLUTION RESPIRATORY (INHALATION) EVERY 4 HOURS PRN
Status: DISCONTINUED | OUTPATIENT
Start: 2023-05-28 | End: 2023-06-01 | Stop reason: HOSPADM

## 2023-05-28 RX ORDER — METRONIDAZOLE 500 MG/100ML
500 INJECTION, SOLUTION INTRAVENOUS
Status: COMPLETED | OUTPATIENT
Start: 2023-05-28 | End: 2023-05-29

## 2023-05-28 RX ORDER — SODIUM CHLORIDE 0.9 % (FLUSH) 0.9 %
10 SYRINGE (ML) INJECTION EVERY 12 HOURS PRN
Status: DISCONTINUED | OUTPATIENT
Start: 2023-05-28 | End: 2023-06-01 | Stop reason: HOSPADM

## 2023-05-28 RX ORDER — NALOXONE HCL 0.4 MG/ML
0.02 VIAL (ML) INJECTION
Status: DISCONTINUED | OUTPATIENT
Start: 2023-05-28 | End: 2023-06-01 | Stop reason: HOSPADM

## 2023-05-28 RX ORDER — ONDANSETRON 2 MG/ML
8 INJECTION INTRAMUSCULAR; INTRAVENOUS
Status: COMPLETED | OUTPATIENT
Start: 2023-05-28 | End: 2023-05-28

## 2023-05-28 RX ORDER — AMOXICILLIN 250 MG
1 CAPSULE ORAL DAILY PRN
Status: DISCONTINUED | OUTPATIENT
Start: 2023-05-28 | End: 2023-06-01 | Stop reason: HOSPADM

## 2023-05-28 RX ORDER — ONDANSETRON 2 MG/ML
4 INJECTION INTRAMUSCULAR; INTRAVENOUS EVERY 6 HOURS PRN
Status: DISCONTINUED | OUTPATIENT
Start: 2023-05-28 | End: 2023-05-31

## 2023-05-28 RX ORDER — ACETAMINOPHEN 500 MG
1000 TABLET ORAL
Status: COMPLETED | OUTPATIENT
Start: 2023-05-28 | End: 2023-05-28

## 2023-05-28 RX ORDER — ACETAMINOPHEN 325 MG/1
650 TABLET ORAL EVERY 6 HOURS PRN
Status: DISCONTINUED | OUTPATIENT
Start: 2023-05-28 | End: 2023-06-01 | Stop reason: HOSPADM

## 2023-05-28 RX ORDER — SODIUM CHLORIDE 9 MG/ML
INJECTION, SOLUTION INTRAVENOUS CONTINUOUS
Status: DISCONTINUED | OUTPATIENT
Start: 2023-05-28 | End: 2023-05-30

## 2023-05-28 RX ADMIN — SODIUM CHLORIDE, POTASSIUM CHLORIDE, SODIUM LACTATE AND CALCIUM CHLORIDE 1848 ML: 600; 310; 30; 20 INJECTION, SOLUTION INTRAVENOUS at 05:05

## 2023-05-28 RX ADMIN — METRONIDAZOLE 500 MG: 500 INJECTION, SOLUTION INTRAVENOUS at 05:05

## 2023-05-28 RX ADMIN — AZTREONAM 2000 MG: 2 INJECTION, POWDER, LYOPHILIZED, FOR SOLUTION INTRAMUSCULAR; INTRAVENOUS at 05:05

## 2023-05-28 RX ADMIN — IOHEXOL 75 ML: 350 INJECTION, SOLUTION INTRAVENOUS at 10:05

## 2023-05-28 RX ADMIN — IOHEXOL 75 ML: 350 INJECTION, SOLUTION INTRAVENOUS at 06:05

## 2023-05-28 RX ADMIN — VANCOMYCIN HYDROCHLORIDE 2000 MG: 500 INJECTION, POWDER, LYOPHILIZED, FOR SOLUTION INTRAVENOUS at 07:05

## 2023-05-28 RX ADMIN — ONDANSETRON 8 MG: 2 INJECTION INTRAMUSCULAR; INTRAVENOUS at 05:05

## 2023-05-28 RX ADMIN — ACETAMINOPHEN 1000 MG: 500 TABLET ORAL at 05:05

## 2023-05-28 RX ADMIN — SODIUM CHLORIDE: 9 INJECTION, SOLUTION INTRAVENOUS at 11:05

## 2023-05-29 LAB
ALBUMIN SERPL BCP-MCNC: 3 G/DL (ref 3.5–5.2)
ALP SERPL-CCNC: 70 U/L (ref 55–135)
ALT SERPL W/O P-5'-P-CCNC: 16 U/L (ref 10–44)
ANION GAP SERPL CALC-SCNC: 7 MMOL/L (ref 8–16)
AST SERPL-CCNC: 18 U/L (ref 10–40)
BASOPHILS # BLD AUTO: 0.02 K/UL (ref 0–0.2)
BASOPHILS NFR BLD: 0.2 % (ref 0–1.9)
BILIRUB SERPL-MCNC: 0.4 MG/DL (ref 0.1–1)
BUN SERPL-MCNC: 8 MG/DL (ref 6–20)
CALCIUM SERPL-MCNC: 8.9 MG/DL (ref 8.7–10.5)
CHLORIDE SERPL-SCNC: 104 MMOL/L (ref 95–110)
CO2 SERPL-SCNC: 28 MMOL/L (ref 23–29)
CREAT SERPL-MCNC: 0.8 MG/DL (ref 0.5–1.4)
DIFFERENTIAL METHOD: ABNORMAL
EOSINOPHIL # BLD AUTO: 0 K/UL (ref 0–0.5)
EOSINOPHIL NFR BLD: 0.2 % (ref 0–8)
ERYTHROCYTE [DISTWIDTH] IN BLOOD BY AUTOMATED COUNT: 13.4 % (ref 11.5–14.5)
EST. GFR  (NO RACE VARIABLE): >60 ML/MIN/1.73 M^2
GLUCOSE SERPL-MCNC: 181 MG/DL (ref 70–110)
HCT VFR BLD AUTO: 35.1 % (ref 37–48.5)
HGB BLD-MCNC: 11.3 G/DL (ref 12–16)
IMM GRANULOCYTES # BLD AUTO: 0.03 K/UL (ref 0–0.04)
IMM GRANULOCYTES NFR BLD AUTO: 0.3 % (ref 0–0.5)
LYMPHOCYTES # BLD AUTO: 1 K/UL (ref 1–4.8)
LYMPHOCYTES NFR BLD: 10.8 % (ref 18–48)
MAGNESIUM SERPL-MCNC: 1.6 MG/DL (ref 1.6–2.6)
MCH RBC QN AUTO: 28.2 PG (ref 27–31)
MCHC RBC AUTO-ENTMCNC: 32.2 G/DL (ref 32–36)
MCV RBC AUTO: 88 FL (ref 82–98)
MONOCYTES # BLD AUTO: 0.8 K/UL (ref 0.3–1)
MONOCYTES NFR BLD: 8.2 % (ref 4–15)
NEUTROPHILS # BLD AUTO: 7.4 K/UL (ref 1.8–7.7)
NEUTROPHILS NFR BLD: 80.3 % (ref 38–73)
NRBC BLD-RTO: 0 /100 WBC
PHOSPHATE SERPL-MCNC: 3.7 MG/DL (ref 2.7–4.5)
PLATELET # BLD AUTO: 120 K/UL (ref 150–450)
PMV BLD AUTO: 10.6 FL (ref 9.2–12.9)
POCT GLUCOSE: 127 MG/DL (ref 70–110)
POCT GLUCOSE: 153 MG/DL (ref 70–110)
POCT GLUCOSE: 163 MG/DL (ref 70–110)
POCT GLUCOSE: 171 MG/DL (ref 70–110)
POCT GLUCOSE: 231 MG/DL (ref 70–110)
POTASSIUM SERPL-SCNC: 3.2 MMOL/L (ref 3.5–5.1)
PROT SERPL-MCNC: 6.7 G/DL (ref 6–8.4)
RBC # BLD AUTO: 4.01 M/UL (ref 4–5.4)
SODIUM SERPL-SCNC: 139 MMOL/L (ref 136–145)
WBC # BLD AUTO: 9.19 K/UL (ref 3.9–12.7)

## 2023-05-29 PROCEDURE — 96367 TX/PROPH/DG ADDL SEQ IV INF: CPT

## 2023-05-29 PROCEDURE — 25000003 PHARM REV CODE 250: Performed by: STUDENT IN AN ORGANIZED HEALTH CARE EDUCATION/TRAINING PROGRAM

## 2023-05-29 PROCEDURE — 87086 URINE CULTURE/COLONY COUNT: CPT | Performed by: STUDENT IN AN ORGANIZED HEALTH CARE EDUCATION/TRAINING PROGRAM

## 2023-05-29 PROCEDURE — S0073 INJECTION, AZTREONAM, 500 MG: HCPCS

## 2023-05-29 PROCEDURE — 87088 URINE BACTERIA CULTURE: CPT | Performed by: STUDENT IN AN ORGANIZED HEALTH CARE EDUCATION/TRAINING PROGRAM

## 2023-05-29 PROCEDURE — 84100 ASSAY OF PHOSPHORUS: CPT | Performed by: NURSE PRACTITIONER

## 2023-05-29 PROCEDURE — 63600175 PHARM REV CODE 636 W HCPCS: Performed by: STUDENT IN AN ORGANIZED HEALTH CARE EDUCATION/TRAINING PROGRAM

## 2023-05-29 PROCEDURE — 96361 HYDRATE IV INFUSION ADD-ON: CPT

## 2023-05-29 PROCEDURE — 87040 BLOOD CULTURE FOR BACTERIA: CPT | Performed by: STUDENT IN AN ORGANIZED HEALTH CARE EDUCATION/TRAINING PROGRAM

## 2023-05-29 PROCEDURE — 96366 THER/PROPH/DIAG IV INF ADDON: CPT

## 2023-05-29 PROCEDURE — 63600175 PHARM REV CODE 636 W HCPCS: Performed by: HOSPITALIST

## 2023-05-29 PROCEDURE — S0030 INJECTION, METRONIDAZOLE: HCPCS

## 2023-05-29 PROCEDURE — 80053 COMPREHEN METABOLIC PANEL: CPT | Performed by: NURSE PRACTITIONER

## 2023-05-29 PROCEDURE — 36415 COLL VENOUS BLD VENIPUNCTURE: CPT | Performed by: NURSE PRACTITIONER

## 2023-05-29 PROCEDURE — 25000003 PHARM REV CODE 250: Performed by: NURSE PRACTITIONER

## 2023-05-29 PROCEDURE — 25000003 PHARM REV CODE 250: Performed by: HOSPITALIST

## 2023-05-29 PROCEDURE — 36415 COLL VENOUS BLD VENIPUNCTURE: CPT | Performed by: STUDENT IN AN ORGANIZED HEALTH CARE EDUCATION/TRAINING PROGRAM

## 2023-05-29 PROCEDURE — 83735 ASSAY OF MAGNESIUM: CPT | Performed by: NURSE PRACTITIONER

## 2023-05-29 PROCEDURE — 25000003 PHARM REV CODE 250

## 2023-05-29 PROCEDURE — 96372 THER/PROPH/DIAG INJ SC/IM: CPT | Performed by: NURSE PRACTITIONER

## 2023-05-29 PROCEDURE — 85025 COMPLETE CBC W/AUTO DIFF WBC: CPT | Performed by: NURSE PRACTITIONER

## 2023-05-29 PROCEDURE — 11000001 HC ACUTE MED/SURG PRIVATE ROOM

## 2023-05-29 PROCEDURE — 63600175 PHARM REV CODE 636 W HCPCS: Performed by: NURSE PRACTITIONER

## 2023-05-29 RX ORDER — POTASSIUM CHLORIDE 20 MEQ/1
40 TABLET, EXTENDED RELEASE ORAL ONCE
Status: COMPLETED | OUTPATIENT
Start: 2023-05-29 | End: 2023-05-29

## 2023-05-29 RX ORDER — HYDRALAZINE HYDROCHLORIDE 20 MG/ML
10 INJECTION INTRAMUSCULAR; INTRAVENOUS EVERY 8 HOURS PRN
Status: DISCONTINUED | OUTPATIENT
Start: 2023-05-29 | End: 2023-06-01 | Stop reason: HOSPADM

## 2023-05-29 RX ORDER — CEFTRIAXONE 2 G/50ML
2 INJECTION, SOLUTION INTRAVENOUS
Status: DISCONTINUED | OUTPATIENT
Start: 2023-05-29 | End: 2023-06-01 | Stop reason: HOSPADM

## 2023-05-29 RX ORDER — BISACODYL 5 MG
10 TABLET, DELAYED RELEASE (ENTERIC COATED) ORAL DAILY PRN
Status: DISCONTINUED | OUTPATIENT
Start: 2023-05-29 | End: 2023-06-01 | Stop reason: HOSPADM

## 2023-05-29 RX ORDER — DOCUSATE SODIUM 100 MG/1
100 CAPSULE, LIQUID FILLED ORAL DAILY
Status: DISCONTINUED | OUTPATIENT
Start: 2023-05-29 | End: 2023-06-01 | Stop reason: HOSPADM

## 2023-05-29 RX ORDER — POLYETHYLENE GLYCOL 3350 17 G/17G
17 POWDER, FOR SOLUTION ORAL DAILY
Status: DISCONTINUED | OUTPATIENT
Start: 2023-05-29 | End: 2023-06-01 | Stop reason: HOSPADM

## 2023-05-29 RX ADMIN — DOCUSATE SODIUM 100 MG: 100 CAPSULE, LIQUID FILLED ORAL at 02:05

## 2023-05-29 RX ADMIN — POTASSIUM CHLORIDE 40 MEQ: 1500 TABLET, EXTENDED RELEASE ORAL at 08:05

## 2023-05-29 RX ADMIN — INSULIN ASPART 1 UNITS: 100 INJECTION, SOLUTION INTRAVENOUS; SUBCUTANEOUS at 12:05

## 2023-05-29 RX ADMIN — VANCOMYCIN HYDROCHLORIDE 1500 MG: 1.5 INJECTION, POWDER, LYOPHILIZED, FOR SOLUTION INTRAVENOUS at 07:05

## 2023-05-29 RX ADMIN — AZTREONAM 2000 MG: 2 INJECTION, POWDER, LYOPHILIZED, FOR SOLUTION INTRAMUSCULAR; INTRAVENOUS at 03:05

## 2023-05-29 RX ADMIN — SODIUM CHLORIDE: 9 INJECTION, SOLUTION INTRAVENOUS at 04:05

## 2023-05-29 RX ADMIN — AZTREONAM 2000 MG: 2 INJECTION, POWDER, LYOPHILIZED, FOR SOLUTION INTRAMUSCULAR; INTRAVENOUS at 10:05

## 2023-05-29 RX ADMIN — METRONIDAZOLE 500 MG: 500 INJECTION, SOLUTION INTRAVENOUS at 12:05

## 2023-05-29 RX ADMIN — VANCOMYCIN HYDROCHLORIDE 1500 MG: 1.5 INJECTION, POWDER, LYOPHILIZED, FOR SOLUTION INTRAVENOUS at 06:05

## 2023-05-29 RX ADMIN — CEFTRIAXONE 2 G: 2 INJECTION, SOLUTION INTRAVENOUS at 08:05

## 2023-05-29 RX ADMIN — POLYETHYLENE GLYCOL 3350 17 G: 17 POWDER, FOR SOLUTION ORAL at 02:05

## 2023-05-29 RX ADMIN — METRONIDAZOLE 500 MG: 500 INJECTION, SOLUTION INTRAVENOUS at 09:05

## 2023-05-29 RX ADMIN — ACETAMINOPHEN 650 MG: 325 TABLET ORAL at 08:05

## 2023-05-29 NOTE — H&P
[facility] Medicine  History & Physical    Patient Name: Shahida Ortega  MRN: [id number]  Patient Class: OP- Observation  Admission Date: 5/28/2023  Attending Physician: Roshni Lyman DO   Primary Care Provider: Janusz Staton MD         Patient information was obtained from patient, caregiver / friend, past medical records and ER records.     Subjective:     Principal Problem:Sepsis due to urinary tract infection    Chief Complaint:   Chief Complaint   Patient presents with    Vomiting     Patient comes in with N/V that started today states vomited 4 x's today.         HPI: 52-year-old female with past medical history of hypertension and type 2 diabetes who presents to the emergency department with a chief complaint of emesis.  She was accompanied by her daughter who lives with her and helps provide this history.  She was in her normal state of health until earlier yesterday morning when she started having nausea.  Subsequently, she had 4 episodes of nonbloody, nonbilious emesis at work and her daughter was called to come and take her to the emergency department.  She denies any abdominal pain, chest pain, shortness of breath, or urinary symptoms including frequency, urgency, or dysuria. She has had cough for the last week or so. Daughter reports that yesterday she was acting at her baseline and she has been compliant with hypertension and diabetes medications as prescribed.  Daughter reports that at baseline patient is awake alert oriented x4.  Patient was initially lethargic, slow to answer questions, not at baseline. Initially patient with temp of 103.3, tachycardia 110s-114s, tachypnea 26-30s, elevated BP. No medications prior to arrival to attempt to alleviate symptoms. On my examination of patient, patient now at baseline, AAOx4.  Vitals signs improved, now afebrile HR and RR WNL. Workup CT of head negative for acute findings, revealed no leukocytosis, h/h stable, platelets 118,  procalcitonin 10.56, d-dimer elevated 1.56, glucose 226, lactic WNL, covid negative, u/a concerning for infectious process, blood cultures pending, CT of abdomen/pelvis without acute or infectious findings, question fecal impaction.  She reports last BM yesterday, no issues with BM pattern.  CTA of chest negative for PE, chest xray without acute process.  Patient admitted to hospital medicine observation unit for further medical management.        Past Medical History:   Diagnosis Date    DM2 (diabetes mellitus, type 2) 5/28/2023    Hypertension        Past Surgical History:   Procedure Laterality Date    ABDOMINAL SURGERY      DIAGNOSTIC LAPAROSCOPY N/A 5/26/2019    Procedure: LAPAROSCOPY, DIAGNOSTIC Drainage of abscess ;  Surgeon: Jose Luis Hanson MD;  Location: [facility] OR;  Service: General;  Laterality: N/A;  Drain Placement    DIAGNOSTIC LAPAROSCOPY N/A 12/27/2020    Procedure: Diagnostic laparoscopy, drainage of intra-abdominal abscess;  Surgeon: Bhupendra Banda MD;  Location: [facility] OR;  Service: General;  Laterality: N/A;    LAPAROSCOPIC LYSIS OF ADHESIONS  5/26/2019    Procedure: LYSIS, ADHESIONS, LAPAROSCOPIC;  Surgeon: Jose Luis Hanson MD;  Location: [facility] OR;  Service: General;;       Review of patient's allergies indicates:   Allergen Reactions    Penicillins Hives    Amoxicillin     Grass pollen-june grass standard Other (See Comments)       No current facility-administered medications on file prior to encounter.     Current Outpatient Medications on File Prior to Encounter   Medication Sig    LOSARTAN POTASSIUM, BULK, MISC 100 mg by Misc.(Non-Drug; Combo Route) route.    metFORMIN (GLUCOPHAGE) 500 MG tablet TAKE 1 TABLET BY MOUTH TWICE DAILY WITH MEALS    triamterene-hydrochlorothiazide 37.5-25 mg (DYAZIDE) 37.5-25 mg per capsule Take 1 capsule by mouth.    acetaminophen (TYLENOL) 325 MG tablet Take 325 mg by mouth every 6 (six) hours as needed for Pain.    blood sugar diagnostic Strp To  check BG 2 times daily, to use with insurance preferred meter    blood-glucose meter kit To check BG 2 times daily, to use with insurance preferred meter    lancets Misc To check BG 2 times daily, to use with insurance preferred meter     Family History       Problem Relation (Age of Onset)    Diabetes Father          Tobacco Use    Smoking status: Never    Smokeless tobacco: Never   Substance and Sexual Activity    Alcohol use: Not Currently    Drug use: Not Currently    Sexual activity: Not Currently     Review of Systems   Constitutional:  Negative for chills and fever.   HENT:  Negative for congestion, postnasal drip and rhinorrhea.    Eyes:  Negative for visual disturbance.   Respiratory:  Negative for chest tightness and shortness of breath.    Gastrointestinal:  Positive for nausea and vomiting. Negative for abdominal pain, constipation and diarrhea.   Genitourinary:  Negative for difficulty urinating.   Musculoskeletal:  Negative for arthralgias and myalgias.   Skin:  Negative for color change.   Neurological:  Negative for dizziness, light-headedness and headaches.   Hematological:  Does not bruise/bleed easily.   Psychiatric/Behavioral:  Negative for agitation.    Objective:     Vital Signs (Most Recent):  Temp: 97.5 °F (36.4 °C) (05/28/23 2356)  Pulse: 81 (05/28/23 2356)  Resp: 16 (05/28/23 2356)  BP: (!) 155/72 (05/28/23 2356)  SpO2: 100 % (05/28/23 2356) Vital Signs (24h Range):  Temp:  [97.5 °F (36.4 °C)-103.3 °F (39.6 °C)] 97.5 °F (36.4 °C)  Pulse:  [] 81  Resp:  [16-30] 16  SpO2:  [94 %-100 %] 100 %  BP: (146-195)/(69-96) 155/72     Weight: 108 kg (238 lb 1.6 oz)  Body mass index is 37.29 kg/m².     Physical Exam  Constitutional:       Appearance: She is ill-appearing.   HENT:      Head: Normocephalic and atraumatic.      Nose: No congestion or rhinorrhea.      Mouth/Throat:      Mouth: Mucous membranes are moist.   Cardiovascular:      Rate and Rhythm: Normal rate.      Heart sounds:  Murmur heard.   Pulmonary:      Effort: No respiratory distress.      Comments: Diminished breath sound throughout, no respiratory distress.  Abdominal:      General: Bowel sounds are normal.      Palpations: Abdomen is soft.   Skin:     General: Skin is warm.   Neurological:      Mental Status: She is alert and oriented to person, place, and time.   Psychiatric:         Mood and Affect: Mood normal.              Significant Labs: All pertinent labs within the past 24 hours have been reviewed.    Significant Imaging: I have reviewed all pertinent imaging results/findings within the past 24 hours.    Assessment/Plan:     * Sepsis due to urinary tract infection  Elevated procalcitonin    procalcitonin elevated  Tachycardia, tachypnea and fever on admission but now resolved  Chest xray and CT of chest negative   U/A concerning for infectious process  Blood cultures pending   Continue vanc and aztreonam   Continue IVFs  Monitor labs   Prn antiemetics  Prn antipyretics       DM2 (diabetes mellitus, type 2)  Recent A1c 3/2022 at 11.3  Repeat A1c pending  Treat with SSI ac/hs during hospital stay  Hold oral antihyperglycemic agents  Resume home regimen at discharge  Hypoglycemia protocol PRN      Hypertension  Hold bp meds secondary to possible sepsis  Hydralazine prn       Elevated d-dimer  CTA of chest negative for PE  Currently denies chest pain, SOB        VTE Risk Mitigation (From admission, onward)         Ordered     IP VTE HIGH RISK PATIENT  Once         05/28/23 2205     Place sequential compression device  Until discontinued         05/28/23 2205                     On 05/29/2023, patient should be placed in hospital observation services under my care in collaboration with Dr. castellano.      Hedy Woodson NP  Department of Hospital Medicine  [facility]

## 2023-05-29 NOTE — PROGRESS NOTES
Pharmacokinetic Initial Assessment: IV Vancomycin    Assessment/Plan:    Initiate intravenous vancomycin with loading dose of 2000 mg once followed by a maintenance dose of vancomycin 1500 mg IV every 12 hours  Desired empiric serum trough concentration is 10 to 20 mcg/mL  Draw vancomycin trough level 60 min prior to fourth dose on 5/30/23 at approximately 0600  Pharmacy will continue to follow and monitor vancomycin.      Please contact pharmacy at extension [phone] with any questions regarding this assessment.     Thank you for the consult,   wKame Steele       Patient brief summary:  Shahida Ortega is a 52 y.o. female initiated on antimicrobial therapy with IV Vancomycin for treatment of suspected sepsis    Drug Allergies:   Review of patient's allergies indicates:   Allergen Reactions    Penicillins Hives    Amoxicillin     Grass pollen-june grass standard Other (See Comments)       Actual Body Weight:   104.3 kg    Renal Function:   Estimated Creatinine Clearance: 92.9 mL/min (based on SCr of 0.9 mg/dL).,     Dialysis Method (if applicable):  N/A    CBC (last 72 hours):  Recent Labs   Lab Result Units 05/28/23  1650   WBC K/uL 6.38   Hemoglobin g/dL 12.2   Hematocrit % 36.4*   Platelets K/uL 118*   Gran % % 80.7*   Lymph % % 16.1*   Mono % % 1.3*   Eosinophil % % 0.6   Basophil % % 0.2   Differential Method  Automated       Metabolic Panel (last 72 hours):  Recent Labs   Lab Result Units 05/28/23  1650 05/28/23  2054   Sodium mmol/L 137  --    Potassium mmol/L 4.1  --    Chloride mmol/L 102  --    CO2 mmol/L 24  --    Glucose mg/dL 226*  --    Glucose, UA   --  4+*   BUN mg/dL 13  --    Creatinine mg/dL 0.9  --    Albumin g/dL 3.4*  --    Total Bilirubin mg/dL 0.6  --    Alkaline Phosphatase U/L 84  --    AST U/L 23  --    ALT U/L 19  --        Drug levels (last 3 results):  No results for input(s): VANCOMYCINRA, VANCORANDOM, VANCOMYCINPE, VANCOPEAK, VANCOMYCINTR, VANCOTROUGH in the last 72  hours.    Microbiologic Results:  Microbiology Results (last 7 days)       Procedure Component Value Units Date/Time    Blood culture x two cultures. Draw prior to antibiotics. [[id number]] Collected: 05/28/23 1703    Order Status: Sent Specimen: Blood from Peripheral, Hand, Left Updated: 05/28/23 1707    Blood culture x two cultures. Draw prior to antibiotics. [[id number]] Collected: 05/28/23 1647    Order Status: Sent Specimen: Blood from Peripheral, Antecubital, Right Updated: 05/28/23 1650

## 2023-05-29 NOTE — NURSING
[facility], [facility]  Nurses Note -- 4 Eyes      5/29/2023       Skin assessed on: Admit      [x] No Pressure Injuries Present    []Prevention Measures Documented    [] Yes LDA  for Pressure Injury Previously documented     [] Yes New Pressure Injury Discovered   [] LDA for New Pressure Injury Added      Attending RN:  Clarice Ko, RN     Second RN:  Aretha Camacho RN

## 2023-05-29 NOTE — CARE UPDATE
52-year-old female with past medical history of hypertension and type 2 diabetes admitted to observation for further evaluation of nausea and vomiting on 05/29/23. Found to be febrile, tachycardic and elevated procalcitonin. UA with +1 leukocytosis, does not appear grossly infected. Urine cx pending. Blood cx now with GNR. Antibiotics changed to Rocephin 2g daily.  Imaging with no acute process but noted constipation. Started bowel regimen.      Will advance diet as tolerated. Continue IV CTX and vanc. Repeat blood cultures ordered.  F/u urine and blood cultures.     I reviewed the patient's medications, past medical/surgical history and the H&P note. I agree with the physical exam and assessment and plan.

## 2023-05-29 NOTE — ASSESSMENT & PLAN NOTE
Recent A1c 3/2022 at 11.3  Repeat A1c pending  Treat with SSI ac/hs during hospital stay  Hold oral antihyperglycemic agents  Resume home regimen at discharge  Hypoglycemia protocol PRN

## 2023-05-29 NOTE — ED PROVIDER NOTES
Encounter Date: 5/28/2023       History     Chief Complaint   Patient presents with    Vomiting     Patient comes in with N/V that started today states vomited 4 x's today.      Shahida Ortega Is a 52-year-old female with past medical history of hypertension and type 2 diabetes who presents to the emergency department with a chief complaint of emesis.  She was accompanied by her daughter who lives with the patient who helped to provide this history.  She was in her normal state of health until earlier this morning when she started having nausea.  Subsequently, she had 4 episodes of nonbloody, nonbilious emesis at work and her daughter was called to come and take her to the emergency department.  She denies any abdominal pain, chest pain, shortness of breath, or urinary symptoms including frequency, urgency, or dysuria. The only symptom the daughter can think of is an occasional cough for the last week or so. Daughter reports that yesterday she was acting at her baseline and she has been taking all of her hypertension and diabetes medications as prescribed.  Reports that currently her mother is not acting at her normal mental baseline.  No medications prior to arrival to attempt to alleviate symptoms.    The history is provided by the patient and a relative. No  was used.    Review of patient's allergies indicates:   Allergen Reactions    Penicillins Hives    Amoxicillin     Grass pollen-june grass standard Other (See Comments)     Past Medical History:   Diagnosis Date    DM2 (diabetes mellitus, type 2) 5/28/2023    Hypertension      Past Surgical History:   Procedure Laterality Date    ABDOMINAL SURGERY      DIAGNOSTIC LAPAROSCOPY N/A 5/26/2019    Procedure: LAPAROSCOPY, DIAGNOSTIC Drainage of abscess ;  Surgeon: Jose Luis Hanson MD;  Location: [facility];  Service: General;  Laterality: N/A;  Drain Placement    DIAGNOSTIC LAPAROSCOPY N/A 12/27/2020    Procedure: Diagnostic laparoscopy, drainage of  intra-abdominal abscess;  Surgeon: Bhupendra Banda MD;  Location: [facility] OR;  Service: General;  Laterality: N/A;    LAPAROSCOPIC LYSIS OF ADHESIONS  5/26/2019    Procedure: LYSIS, ADHESIONS, LAPAROSCOPIC;  Surgeon: Jose Luis Hanson MD;  Location: [facility] OR;  Service: General;;     Family History   Problem Relation Age of Onset    Diabetes Father      Social History     Tobacco Use    Smoking status: Never    Smokeless tobacco: Never   Substance Use Topics    Alcohol use: Not Currently    Drug use: Not Currently     Review of Systems   Constitutional:  Negative for chills and fever.   HENT:  Negative for sore throat.    Respiratory:  Negative for shortness of breath and wheezing.    Cardiovascular:  Negative for chest pain and palpitations.   Gastrointestinal:  Positive for nausea and vomiting. Negative for abdominal pain.   Genitourinary:  Negative for dysuria, flank pain, frequency and urgency.   Musculoskeletal:  Negative for back pain.   Skin:  Negative for rash.   Neurological:  Negative for weakness.   Hematological:  Does not bruise/bleed easily.     Physical Exam     Initial Vitals [05/28/23 1621]   BP Pulse Resp Temp SpO2   (!) 195/96 110 20 (!) 103.3 °F (39.6 °C) 95 %      MAP       --         Physical Exam    Nursing note and vitals reviewed.  Constitutional: Vital signs are normal. She appears well-developed and well-nourished. She is not diaphoretic. She is active and cooperative. She appears ill. No distress.   Patient is not diaphoretic but she was sweaty.  Warm to the touch.   HENT:   Head: Normocephalic and atraumatic.   Right Ear: Hearing, tympanic membrane, external ear and ear canal normal.   Left Ear: Hearing, tympanic membrane, external ear and ear canal normal.   Nose: No mucosal edema, rhinorrhea or sinus tenderness. Right sinus exhibits no maxillary sinus tenderness and no frontal sinus tenderness. Left sinus exhibits no maxillary sinus tenderness and no frontal sinus tenderness.    Mouth/Throat: Uvula is midline, oropharynx is clear and moist and mucous membranes are normal. Mucous membranes are not dry. No posterior oropharyngeal edema, posterior oropharyngeal erythema or tonsillar abscesses.   Eyes: Conjunctivae and EOM are normal. Pupils are equal, round, and reactive to light.   Neck: Neck supple.    Full passive range of motion without pain.     Cardiovascular:  Normal rate, regular rhythm, S1 normal, S2 normal and normal pulses.     Exam reveals no distant heart sounds and no friction rub.       Murmur heard.  Systolic murmur is present with a grade of 4/6.  Pulses:       Radial pulses are 2+ on the right side and 2+ on the left side.        Dorsalis pedis pulses are 2+ on the right side and 2+ on the left side.   Early systolic 4/6 murmur, best heard at the left upper sternal border.   Pulmonary/Chest: Effort normal. No accessory muscle usage. Tachypnea noted. No respiratory distress. She has no decreased breath sounds. She has no wheezes. She has rhonchi in the left middle field and the left lower field.   Lots of upper airway sounds.  Questionable rhonchi in the left middle and lower lung fields.  Slightly diminished air movement.  No respiratory distress.  Patient coughed multiple times during my exam.   Abdominal: Abdomen is soft and flat. Bowel sounds are normal. She exhibits no distension. There is no abdominal tenderness.   Normal abdominal exam.  Abdomen is soft, nontender, and nondistended.  Bowel sounds are present.  No rebound or guarding.  No CVA tenderness.   No right CVA tenderness.  No left CVA tenderness. There is no rebound, no guarding and negative Troncoso's sign.   Musculoskeletal:      Cervical back: Full passive range of motion without pain and neck supple. No edema or rigidity. No muscular tenderness. Normal range of motion.      Right lower leg: No tenderness. No edema.      Left lower leg: No tenderness. No edema.      Right ankle: No swelling. No tenderness.       Left ankle: No swelling. No tenderness.      Comments: No lower extremity swelling.  DP and PT pulses are intact and symmetrical bilaterally.     Neurological: She is alert.   Skin: Skin is warm and dry. Capillary refill takes less than 2 seconds. No abrasion, no lesion and no rash noted.       ED Course   Critical Care    Date/Time: 5/28/2023 10:33 PM  Performed by: Darinel Lewis PA-C  Authorized by: Jer Wiggins MD   Direct patient critical care time: 8 minutes  Additional history critical care time: 8 minutes  Ordering / reviewing critical care time: 8 minutes  Documentation critical care time: 8 minutes  Other critical care time: 8 minutes  Total critical care time (exclusive of procedural time) : 40 minutes  Critical care time was exclusive of separately billable procedures and treating other patients and teaching time.  Critical care was necessary to treat or prevent imminent or life-threatening deterioration of the following conditions: sepsis.  Critical care was time spent personally by me on the following activities: evaluation of patient's response to treatment, examination of patient, obtaining history from patient or surrogate, ordering and performing treatments and interventions, ordering and review of laboratory studies, ordering and review of radiographic studies, re-evaluation of patient's condition and review of old charts.      Labs Reviewed   CBC W/ AUTO DIFFERENTIAL - Abnormal; Notable for the following components:       Result Value    Hematocrit 36.4 (*)     Platelets 118 (*)     Immature Granulocytes 1.1 (*)     Immature Grans (Abs) 0.07 (*)     Mono # 0.1 (*)     Gran % 80.7 (*)     Lymph % 16.1 (*)     Mono % 1.3 (*)     All other components within normal limits   COMPREHENSIVE METABOLIC PANEL - Abnormal; Notable for the following components:    Glucose 226 (*)     Albumin 3.4 (*)     All other components within normal limits   URINALYSIS, REFLEX TO URINE CULTURE - Abnormal; Notable  for the following components:    Appearance, UA Hazy (*)     Glucose, UA 4+ (*)     Ketones, UA Trace (*)     Occult Blood UA 2+ (*)     Leukocytes, UA 1+ (*)     All other components within normal limits    Narrative:     Specimen Source->Urine   D DIMER, QUANTITATIVE - Abnormal; Notable for the following components:    D-Dimer 1.56 (*)     All other components within normal limits   PROCALCITONIN - Abnormal; Notable for the following components:    Procalcitonin 10.56 (*)     All other components within normal limits   URINALYSIS MICROSCOPIC - Abnormal; Notable for the following components:    RBC, UA 7 (*)     WBC, UA 6 (*)     All other components within normal limits    Narrative:     Specimen Source->Urine   CULTURE, BLOOD   CULTURE, BLOOD   LACTIC ACID, PLASMA   LACTIC ACID, PLASMA   TROPONIN I   BETA - HYDROXYBUTYRATE, SERUM   PROCALCITONIN   SARS-COV-2 RDRP GENE   POCT INFLUENZA A/B MOLECULAR   ISTAT LACTATE     EKG Readings: (Independently Interpreted)   Initial Reading: No STEMI. Previous EKG: Compared with most recent EKG Previous EKG Date: March 26, 2022. Rhythm: Sinus Tachycardia.   Sinus tachycardia with a ventricular rate of 101 beats per minute.  Intervals are all within normal limits.  T-wave inversions in leads V4 V5 and V6.  No ST segment elevations.  No STEMI.     Imaging Results              CTA Chest Non-Coronary (PE Studies) (In process)  Result time 05/28/23 22:52:24                     CT Abdomen Pelvis With Contrast (Final result)  Result time 05/28/23 18:39:34      Final result by Jenna De Dios MD (05/28/23 18:39:34)                   Impression:      No acute abdominal or pelvic pathology CT of the abdomen and pelvis with contrast.    Mildly elongated spleen.    Moderate fat containing ventral abdominal hernia.  Small fat containing umbilical hernia.    Fibroid uterus.    Moderately large stool in the patulous rectum.  Question fecal impaction.      Electronically signed by: Jenna  Lanre  Date:    05/28/2023  Time:    18:39               Narrative:    EXAMINATION:  CT OF ABDOMEN PELVIS WITH    CLINICAL HISTORY:  Sepsis;    TECHNIQUE:  5 mm enhanced axial images were obtained from the lung bases through the greater trochanters.  Seventy-five mL of Omnipaque 350 was injected.    COMPARISON:  09/12/2021    FINDINGS:  The liver, pancreas, kidneys, and adrenal glands are unremarkable. The gallbladder contains no calcified gallstones.    The spleen is mildly elongated measuring 12.4 x 3.9 cm (series 2 axial image 42).    There is no definite evidence for abdominal adenopathy or ascites.  A moderate fat containing midline ventral abdominal hernia is present.  A small fat containing umbilical hernia is again seen.    There is a moderately large lobular uterus containing multiple calcified and noncalcified uterine fibroids.  The appendix is not enlarged.  The rectum is patulous and contains partly large stool..    There is no free fluid in the pelvis.    There is mild bibasilar atelectasis.                                       CT Head Without Contrast (Final result)  Result time 05/28/23 18:21:49      Final result by Jenna De Dios MD (05/28/23 18:21:49)                   Impression:      No acute intracranial abnormality detected.    Mild sinus disease.      Electronically signed by: Jenna De Dios  Date:    05/28/2023  Time:    18:21               Narrative:    EXAMINATION:  CT OF THE HEAD WITHOUT    CLINICAL HISTORY:  Mental status change, unknown cause;    TECHNIQUE:  5 mm unenhanced axial images were obtained from the skull base to the vertex.    COMPARISON:  None.    FINDINGS:  The ventricles, basal cisterns, and cortical sulci are within normal limits for patient's stated age. There is no acute intracranial hemorrhage, territorial infarct or mass effect, or midline shift. In the visualized paranasal sinuses, there is mild mucoperiosteal thickening in bilateral maxillary sinuses and in  the left ethmoid air cells.                                       X-Ray Chest AP Portable (Final result)  Result time 05/28/23 17:29:30      Final result by Terry Cook MD (05/28/23 17:29:30)                   Impression:      No acute process.      Electronically signed by: Terry Cook MD  Date:    05/28/2023  Time:    17:29               Narrative:    EXAMINATION:  XR CHEST AP PORTABLE    CLINICAL HISTORY:  Sepsis;    TECHNIQUE:  Single frontal view of the chest was performed.    COMPARISON:  09/12/2021.    FINDINGS:  Monitoring EKG leads are present.  The trachea is unremarkable.  The cardiomediastinal silhouette is within normal limits.  There is no evidence of free air beneath the hemidiaphragms.  There are no pleural effusions.  There is no evidence of a pneumothorax.  There is no evidence of pneumomediastinum.  No airspace opacity is present.  The osseous structures are unremarkable.                                    X-Rays:   Independently Interpreted Readings:   Chest X-Ray: Normal heart size. Trachea is midline.  Lungs symmetrically expanded.  No focal consolidative process.  Bilateral costophrenic angles are clear.  Cardiac silhouette is within normal limits.  Bones and soft tissues are unremarkable.    Medications   aztreonam (AZACTAM) 2,000 mg in dextrose 5 % in water (D5W) 5 % 100 mL IVPB (MB+) (0 mg Intravenous Stopped 5/28/23 1857)   metronidazole IVPB 500 mg (0 mg Intravenous Stopped 5/28/23 1857)   vancomycin - pharmacy to dose (has no administration in time range)   albuterol-ipratropium 2.5 mg-0.5 mg/3 mL nebulizer solution 3 mL (has no administration in time range)   acetaminophen tablet 650 mg (has no administration in time range)   senna-docusate 8.6-50 mg per tablet 1 tablet (has no administration in time range)   aluminum-magnesium hydroxide-simethicone 200-200-20 mg/5 mL suspension 30 mL (has no administration in time range)   ondansetron injection 4 mg (has no administration in time  range)   sodium chloride 0.9% flush 10 mL (has no administration in time range)   naloxone 0.4 mg/mL injection 0.02 mg (has no administration in time range)   glucose chewable tablet 16 g (has no administration in time range)   glucose chewable tablet 24 g (has no administration in time range)   dextrose 50% injection 12.5 g (has no administration in time range)   dextrose 50% injection 25 g (has no administration in time range)   glucagon (human recombinant) injection 1 mg (has no administration in time range)   insulin aspart U-100 pen 0-5 Units (has no administration in time range)   0.9%  NaCl infusion (has no administration in time range)   vancomycin 1,500 mg in dextrose 5 % (D5W) 250 mL IVPB (Vial-Mate) (1,500 mg Intravenous Trough Due As Scheduled Before Dose 5/30/23 0600)   lactated ringers bolus 1,848 mL (0 mLs Intravenous Stopped 5/28/23 2103)   vancomycin (VANCOCIN) 2,000 mg in dextrose 5 % (D5W) 500 mL IVPB (0 mg Intravenous Stopped 5/28/23 2141)   ondansetron injection 8 mg (8 mg Intravenous Given 5/28/23 1711)   acetaminophen tablet 1,000 mg (1,000 mg Oral Given 5/28/23 1725)   iohexoL (OMNIPAQUE 350) injection 75 mL (75 mLs Intravenous Given 5/28/23 1800)   iohexoL (OMNIPAQUE 350) injection 100 mL (75 mLs Intravenous Given 5/28/23 2249)     Medical Decision Making:   Initial Assessment:   52-year-old female presenting to the emergency department with a chief complaint of emesis.  Symptoms present for 1 day, no known sick contacts, abdominal pain, fever, chest pain, or shortness of breath.  On physical exam, patient was ill-appearing and sweaty.  Warm to the touch.  Febrile, tachycardic, and tachypneic.  Oxygen saturations between 93 and 94%, although hypoxia was highly dependent on patient positioning.  No abdominal tenderness to palpation.  Occasional rhonchi in the left middle and lower lung fields.  Cardiac exam with early systolic murmur.  Differential Diagnosis:   Differential diagnosis is broad  "and includes but is not limited to sepsis, cystitis, pyelonephritis, gastroenteritis, gastritis, appendicitis, cholecystitis, pulmonary embolism, acute coronary syndrome  Independently Interpreted Test(s):   I have ordered and independently interpreted X-rays - see prior notes.  I have ordered and independently interpreted EKG Reading(s) - see prior notes  Clinical Tests:   Lab Tests: Ordered and Reviewed       <> Summary of Lab: CBC with no leukocytosis, minimal anemia with hematocrit of 36.4%.    CMP with glucose 226, albumin 3.4.  POCT lactate 1.03   Beta hydroxybutyrate within normal limits.    Procalcitonin 10.56  Quantitative D-dimer 1.56.    Urinalysis with hazy appearance, 4+ glucose, trace ketones, 2+ blood, 1+ leukocytes.    Microscopic UA with 7 RBCs and 6 WBCs per high-powered field.  Troponin within normal limits.  COVID and flu negative.  Blood cultures and urine culture pending.  Radiological Study: Ordered and Reviewed  Medical Tests: Ordered and Reviewed  Sepsis Perfusion Assessment: "I attest a sepsis perfusion exam was performed within 6 hours of sepsis, severe sepsis, or septic shock presentation, following fluid resuscitation."    Sepsis Perfusion Assessment Complete: 5/28/2023 9:04 PM    ED Management:  Patient presenting to the emergency department with chief complaint of 4 episodes of emesis.  History and physical exam findings as above.  Patient was ill-appearing and somewhat lethargic when she arrived.  Initial concern was for sepsis, sepsis workup was initiated, IV fluids, empiric antibiotics, and Tylenol given.  Labs with no white count but elevated procalcitonin.  CMP essentially within normal limits.  Troponin and lactate both within normal limits.  With an ill-appearing patient and no obvious abnormalities on very early workup, CT of the abdomen pelvis, CT of the head, and chest x-ray were obtained but all returned essentially within normal limits or at least unchanged from prior " exams.    At this point in the workup, patient was still febrile, tachycardic, and tachypneic.  Oxygenation 93-94% on room air.  Reassess the patient and repositioned the patient with almost immediate increase of blood oxygen to % on room air.  I believe that her hypoxia throughout her stay in the emergency department was primarily secondary to patient positioning.  However, patient was still tachycardic and tachypneic, raising some concern for pulmonary embolism.  Obtained D-dimer, D-dimer was elevated.  However, I still did not scan this patient's chest because shortly after ordering D-dimer, new vitals showed resolution of fever and tachycardia.  With this development, it appears that the patient's vital sign abnormalities were secondary to fever, which is likely secondary to an infectious process given elevated procalcitonin.  Patient never reported chest pain.  No signs of DVT on exam.  Wells score for pulmonary embolism is 1.5, which is low risk for pulmonary embolism.    Patient's urinalysis was delayed for almost 4 hours of her stay in the emergency department.  Patient had urinated just before she came to the emergency department.  Her 1st urination in the emergency department was on the bedside commode and was contaminated by a large bowel movement.  When urinalysis was finally obtained, some signs of infection but no clear urinary tract infection.  1+ leukocytes, nitrite negative.  With this equivocal urinalysis, unsure the exact source of this patient's fever, tachycardia, and tachypnea.  We will treat as sepsis of unknown source versus urosepsis versus pyelonephritis.  Patient would benefit from continued monitoring and IV fluids.    Patient will be placed in observation, Dr. Jer iWggins as attending physician.  Discussed this case with Hedy Woodson hospitalist NP.    This patient does have evidence of infective focus  My overall impression is sepsis.  Source: Unknown, possibly  "urinary  Antibiotics given- Antibiotics (72h ago, onward)    Start     Stop Route Frequency Ordered    05/29/23 0700  vancomycin 1,500 mg in dextrose 5 % (D5W) 250 mL IVPB   (Vial-Mate)         -- IV Every 12 hours (non-standard times) 05/28/23 2214 05/28/23 2300  vancomycin - pharmacy to dose  (vancomycin IVPB)        See Hyperspace for full Linked Orders Report.    -- IV pharmacy to manage frequency 05/28/23 2203 05/28/23 1800  aztreonam (AZACTAM) 2,000 mg in dextrose 5 % in water   (D5W) 5 % 100 mL IVPB (MB+)  (ED Adult Sepsis Treatment)         05/29 1759 IV Every 8 hours (non-standard times) 05/28/23 1642    05/28/23 1700  metronidazole IVPB 500 mg  (ED Adult Sepsis Treatment)           05/29 1659 IV Every 8 hours (non-standard times) 05/28/23 1642      Latest lactate reviewed-  @[id number](lactate:3,)@  Organ dysfunction indicated by fever, elevated D-dimer    Fluid challenge Ideal Body Weight- The patient's ideal body weight is [unfilled] which will be used to calculate fluid bolus of 30 ml/kg for treatment of septic shock.      Post- resuscitation assessment Yes Perfusion exam was performed within 6 hours of septic shock presentation after bolus shows Adequate tissue perfusion assessed by non-invasive monitoring       Will Not start Pressors- Levophed for MAP of 65  Source control achieved by:  Intravenous antibiotics.    Other:   I have discussed this case with another health care provider.       <> Summary of the Discussion: Discussed this case with Dr. Alo Sethi, ED attending physician.           Sepsis Perfusion Assessment: "I attest a sepsis perfusion exam was performed within 6 hours of sepsis, severe sepsis, or septic shock presentation, following fluid resuscitation."            Clinical Impression:   Final diagnoses:  [A41.9] Sepsis  [N12] Pyelonephritis (Primary)        ED Disposition Condition    Observation Stable                Darinel Lewis PA-C  05/28/23 8642    "

## 2023-05-29 NOTE — PLAN OF CARE
NPO. IV Fluids and IV antibiotics as ordered. Bed alarm on, call light in reach. Instructed to call for assistance. Free of falls. Continue with plan of care as ordered. No distress noted  Problem: Adult Inpatient Plan of Care  Goal: Plan of Care Review  Outcome: Ongoing, Progressing  Goal: Optimal Comfort and Wellbeing  Outcome: Ongoing, Progressing  Goal: Readiness for Transition of Care  Outcome: Ongoing, Progressing     Problem: Diabetes Comorbidity  Goal: Blood Glucose Level Within Targeted Range  Outcome: Ongoing, Progressing     Problem: Infection Progression (Sepsis/Septic Shock)  Goal: Absence of Infection Signs and Symptoms  Outcome: Ongoing, Progressing

## 2023-05-29 NOTE — NURSING
[facility]  Nurses Note -- 4 Eyes      5/29/2023       Skin assessed on: Q Shift      [x] No Pressure Injuries Present    []Prevention Measures Documented    [] Yes LDA  for Pressure Injury Previously documented     [] Yes New Pressure Injury Discovered   [] LDA for New Pressure Injury Added      Attending RN:  Yulia Robin RN     Second RN:  Clarice ROACH

## 2023-05-29 NOTE — SUBJECTIVE & OBJECTIVE
Past Medical History:   Diagnosis Date    DM2 (diabetes mellitus, type 2) 5/28/2023    Hypertension        Past Surgical History:   Procedure Laterality Date    ABDOMINAL SURGERY      DIAGNOSTIC LAPAROSCOPY N/A 5/26/2019    Procedure: LAPAROSCOPY, DIAGNOSTIC Drainage of abscess ;  Surgeon: Jose Luis Hanson MD;  Location: [facility] OR;  Service: General;  Laterality: N/A;  Drain Placement    DIAGNOSTIC LAPAROSCOPY N/A 12/27/2020    Procedure: Diagnostic laparoscopy, drainage of intra-abdominal abscess;  Surgeon: Bhupendra Banda MD;  Location: [facility] OR;  Service: General;  Laterality: N/A;    LAPAROSCOPIC LYSIS OF ADHESIONS  5/26/2019    Procedure: LYSIS, ADHESIONS, LAPAROSCOPIC;  Surgeon: Jose Luis Hanson MD;  Location: [facility] OR;  Service: General;;       Review of patient's allergies indicates:   Allergen Reactions    Penicillins Hives    Amoxicillin     Grass pollen-june grass standard Other (See Comments)       No current facility-administered medications on file prior to encounter.     Current Outpatient Medications on File Prior to Encounter   Medication Sig    LOSARTAN POTASSIUM, BULK, MISC 100 mg by Misc.(Non-Drug; Combo Route) route.    metFORMIN (GLUCOPHAGE) 500 MG tablet TAKE 1 TABLET BY MOUTH TWICE DAILY WITH MEALS    triamterene-hydrochlorothiazide 37.5-25 mg (DYAZIDE) 37.5-25 mg per capsule Take 1 capsule by mouth.    acetaminophen (TYLENOL) 325 MG tablet Take 325 mg by mouth every 6 (six) hours as needed for Pain.    blood sugar diagnostic Strp To check BG 2 times daily, to use with insurance preferred meter    blood-glucose meter kit To check BG 2 times daily, to use with insurance preferred meter    lancets Misc To check BG 2 times daily, to use with insurance preferred meter     Family History       Problem Relation (Age of Onset)    Diabetes Father          Tobacco Use    Smoking status: Never    Smokeless tobacco: Never   Substance and Sexual Activity    Alcohol use: Not Currently    Drug use: Not  Currently    Sexual activity: Not Currently     Review of Systems   Constitutional:  Negative for chills and fever.   HENT:  Negative for congestion, postnasal drip and rhinorrhea.    Eyes:  Negative for visual disturbance.   Respiratory:  Negative for chest tightness and shortness of breath.    Gastrointestinal:  Positive for nausea and vomiting. Negative for abdominal pain, constipation and diarrhea.   Genitourinary:  Negative for difficulty urinating.   Musculoskeletal:  Negative for arthralgias and myalgias.   Skin:  Negative for color change.   Neurological:  Negative for dizziness, light-headedness and headaches.   Hematological:  Does not bruise/bleed easily.   Psychiatric/Behavioral:  Negative for agitation.    Objective:     Vital Signs (Most Recent):  Temp: 97.5 °F (36.4 °C) (05/28/23 2356)  Pulse: 81 (05/28/23 2356)  Resp: 16 (05/28/23 2356)  BP: (!) 155/72 (05/28/23 2356)  SpO2: 100 % (05/28/23 2356) Vital Signs (24h Range):  Temp:  [97.5 °F (36.4 °C)-103.3 °F (39.6 °C)] 97.5 °F (36.4 °C)  Pulse:  [] 81  Resp:  [16-30] 16  SpO2:  [94 %-100 %] 100 %  BP: (146-195)/(69-96) 155/72     Weight: 108 kg (238 lb 1.6 oz)  Body mass index is 37.29 kg/m².     Physical Exam  Constitutional:       Appearance: She is ill-appearing.   HENT:      Head: Normocephalic and atraumatic.      Nose: No congestion or rhinorrhea.      Mouth/Throat:      Mouth: Mucous membranes are moist.   Cardiovascular:      Rate and Rhythm: Normal rate.      Heart sounds: Murmur heard.   Pulmonary:      Effort: No respiratory distress.      Comments: Diminished breath sound throughout, no respiratory distress.  Abdominal:      General: Bowel sounds are normal.      Palpations: Abdomen is soft.   Skin:     General: Skin is warm.   Neurological:      Mental Status: She is alert and oriented to person, place, and time.   Psychiatric:         Mood and Affect: Mood normal.              Significant Labs: All pertinent labs within the past 24  hours have been reviewed.    Significant Imaging: I have reviewed all pertinent imaging results/findings within the past 24 hours.

## 2023-05-29 NOTE — PLAN OF CARE
Problem: Glycemic Control Impaired (Sepsis/Septic Shock)  Goal: Blood Glucose Level Within Desired Range  Outcome: Ongoing, Progressing  Intervention: Optimize Glycemic Control  Flowsheets (Taken 5/29/2023 1633)  Glycemic Management:   blood glucose monitored   supplemental insulin given     Problem: Glycemic Control Impaired (Sepsis/Septic Shock)  Goal: Blood Glucose Level Within Desired Range  Outcome: Ongoing, Progressing  Intervention: Optimize Glycemic Control  Flowsheets (Taken 5/29/2023 1633)  Glycemic Management:   blood glucose monitored   supplemental insulin given     Problem: Glycemic Control Impaired (Sepsis/Septic Shock)  Goal: Blood Glucose Level Within Desired Range  Intervention: Optimize Glycemic Control  Flowsheets (Taken 5/29/2023 1633)  Glycemic Management:   blood glucose monitored   supplemental insulin given     Problem: Infection Progression (Sepsis/Septic Shock)  Goal: Absence of Infection Signs and Symptoms  Outcome: Ongoing, Progressing  Intervention: Promote Stabilization  Flowsheets (Taken 5/29/2023 1633)  Fluid/Electrolyte Management:   electrolyte-binding therapy initiated   fluids provided   oral rehydration therapy initiated   electrolyte supplement initiated  Intervention: Promote Recovery  Flowsheets (Taken 5/29/2023 1633)  Activity Management:   Ambulated -L4   Ambulated to bathroom - L4     Problem: Infection Progression (Sepsis/Septic Shock)  Goal: Absence of Infection Signs and Symptoms  Outcome: Ongoing, Progressing  Intervention: Promote Stabilization  Flowsheets (Taken 5/29/2023 1633)  Fluid/Electrolyte Management:   electrolyte-binding therapy initiated   fluids provided   oral rehydration therapy initiated   electrolyte supplement initiated  Intervention: Promote Recovery  Flowsheets (Taken 5/29/2023 1633)  Activity Management:   Ambulated -L4   Ambulated to bathroom - L4     Problem: Infection Progression (Sepsis/Septic Shock)  Goal: Absence of Infection Signs and  Symptoms  Intervention: Promote Stabilization  Flowsheets (Taken 5/29/2023 1633)  Fluid/Electrolyte Management:   electrolyte-binding therapy initiated   fluids provided   oral rehydration therapy initiated   electrolyte supplement initiated     Problem: Infection Progression (Sepsis/Septic Shock)  Goal: Absence of Infection Signs and Symptoms  Intervention: Promote Recovery  Flowsheets (Taken 5/29/2023 1633)  Activity Management:   Ambulated -L4   Ambulated to bathroom - L4

## 2023-05-29 NOTE — PLAN OF CARE
05/29/23 1008   Discharge Planning   Assessment Type Discharge Planning Brief Assessment   Resource/Environmental Concerns none   Support Systems Family members;Parent   Equipment Currently Used at Home none   Patient/Family Anticipates Transition to home   Patient/Family Anticipated Services at Transition none   DME Needed Upon Discharge  none   Discharge Plan A Home with family       [id number] [place] - [address]  [address]  [place] [address]  Phone: [phone] Fax: [phone]

## 2023-05-29 NOTE — PROGRESS NOTES
Vancomycin consult follow-up:    Patient reviewed, renal function stable, no new levels, continue current therapy; Next levels due: trough due 5/30/2023 at 0600

## 2023-05-29 NOTE — ASSESSMENT & PLAN NOTE
Elevated procalcitonin    procalcitonin elevated  Tachycardia, tachypnea and fever on admission but now resolved  Chest xray and CT of chest negative   U/A concerning for infectious process  Blood cultures pending   Continue vanc and aztreonam   Continue IVFs  Monitor labs   Prn antiemetics  Prn antipyretics

## 2023-05-29 NOTE — HPI
52-year-old female with past medical history of hypertension and type 2 diabetes who presents to the emergency department with a chief complaint of emesis.  She was accompanied by her daughter who lives with her and helps provide this history.  She was in her normal state of health until earlier yesterday morning when she started having nausea.  Subsequently, she had 4 episodes of nonbloody, nonbilious emesis at work and her daughter was called to come and take her to the emergency department.  She denies any abdominal pain, chest pain, shortness of breath, or urinary symptoms including frequency, urgency, or dysuria. She has had cough for the last week or so. Daughter reports that yesterday she was acting at her baseline and she has been compliant with hypertension and diabetes medications as prescribed.  Daughter reports that at baseline patient is awake alert oriented x4.  Patient was initially lethargic, slow to answer questions, not at baseline. Initially patient with temp of 103.3, tachycardia 110s-114s, tachypnea 26-30s, elevated BP. No medications prior to arrival to attempt to alleviate symptoms. On my examination of patient, patient now at baseline, AAOx4.  Vitals signs improved, now afebrile HR and RR WNL. Workup CT of head negative for acute findings, revealed no leukocytosis, h/h stable, platelets 118, procalcitonin 10.56, d-dimer elevated 1.56, glucose 226, lactic WNL, covid negative, u/a concerning for infectious process, blood cultures pending, CT of abdomen/pelvis without acute or infectious findings, question fecal impaction.  She reports last BM yesterday, no issues with BM pattern.  CTA of chest negative for PE, chest xray without acute process.  Patient admitted to hospital medicine observation unit for further medical management.

## 2023-05-30 PROBLEM — A41.51 SEPSIS DUE TO ESCHERICHIA COLI (E. COLI): Status: ACTIVE | Noted: 2023-05-30

## 2023-05-30 LAB
ALBUMIN SERPL BCP-MCNC: 2.9 G/DL (ref 3.5–5.2)
ALP SERPL-CCNC: 80 U/L (ref 55–135)
ALT SERPL W/O P-5'-P-CCNC: 17 U/L (ref 10–44)
ANION GAP SERPL CALC-SCNC: 7 MMOL/L (ref 8–16)
AST SERPL-CCNC: 17 U/L (ref 10–40)
BASOPHILS # BLD AUTO: 0.01 K/UL (ref 0–0.2)
BASOPHILS NFR BLD: 0.2 % (ref 0–1.9)
BILIRUB SERPL-MCNC: 0.4 MG/DL (ref 0.1–1)
BUN SERPL-MCNC: 6 MG/DL (ref 6–20)
CALCIUM SERPL-MCNC: 8.8 MG/DL (ref 8.7–10.5)
CANCER AG19-9 SERPL-ACNC: 142.7 U/ML (ref 0–40)
CHLORIDE SERPL-SCNC: 103 MMOL/L (ref 95–110)
CO2 SERPL-SCNC: 28 MMOL/L (ref 23–29)
CREAT SERPL-MCNC: 0.7 MG/DL (ref 0.5–1.4)
DIFFERENTIAL METHOD: ABNORMAL
EOSINOPHIL # BLD AUTO: 0.1 K/UL (ref 0–0.5)
EOSINOPHIL NFR BLD: 1.8 % (ref 0–8)
ERYTHROCYTE [DISTWIDTH] IN BLOOD BY AUTOMATED COUNT: 13.3 % (ref 11.5–14.5)
EST. GFR  (NO RACE VARIABLE): >60 ML/MIN/1.73 M^2
GLUCOSE SERPL-MCNC: 128 MG/DL (ref 70–110)
HCT VFR BLD AUTO: 34.5 % (ref 37–48.5)
HGB BLD-MCNC: 11.3 G/DL (ref 12–16)
IMM GRANULOCYTES # BLD AUTO: 0.03 K/UL (ref 0–0.04)
IMM GRANULOCYTES NFR BLD AUTO: 0.5 % (ref 0–0.5)
LYMPHOCYTES # BLD AUTO: 1.4 K/UL (ref 1–4.8)
LYMPHOCYTES NFR BLD: 24.4 % (ref 18–48)
MAGNESIUM SERPL-MCNC: 1.7 MG/DL (ref 1.6–2.6)
MCH RBC QN AUTO: 28.1 PG (ref 27–31)
MCHC RBC AUTO-ENTMCNC: 32.8 G/DL (ref 32–36)
MCV RBC AUTO: 86 FL (ref 82–98)
MONOCYTES # BLD AUTO: 0.5 K/UL (ref 0.3–1)
MONOCYTES NFR BLD: 9.1 % (ref 4–15)
NEUTROPHILS # BLD AUTO: 3.6 K/UL (ref 1.8–7.7)
NEUTROPHILS NFR BLD: 64 % (ref 38–73)
NRBC BLD-RTO: 0 /100 WBC
PHOSPHATE SERPL-MCNC: 2.2 MG/DL (ref 2.7–4.5)
PLATELET # BLD AUTO: 131 K/UL (ref 150–450)
PMV BLD AUTO: 10.2 FL (ref 9.2–12.9)
POCT GLUCOSE: 115 MG/DL (ref 70–110)
POCT GLUCOSE: 119 MG/DL (ref 70–110)
POCT GLUCOSE: 122 MG/DL (ref 70–110)
POCT GLUCOSE: 136 MG/DL (ref 70–110)
POTASSIUM SERPL-SCNC: 3.1 MMOL/L (ref 3.5–5.1)
PROT SERPL-MCNC: 7.1 G/DL (ref 6–8.4)
RBC # BLD AUTO: 4.02 M/UL (ref 4–5.4)
SODIUM SERPL-SCNC: 138 MMOL/L (ref 136–145)
VANCOMYCIN TROUGH SERPL-MCNC: 6.2 UG/ML (ref 10–22)
WBC # BLD AUTO: 5.69 K/UL (ref 3.9–12.7)

## 2023-05-30 PROCEDURE — 94761 N-INVAS EAR/PLS OXIMETRY MLT: CPT

## 2023-05-30 PROCEDURE — 86301 IMMUNOASSAY TUMOR CA 19-9: CPT | Performed by: STUDENT IN AN ORGANIZED HEALTH CARE EDUCATION/TRAINING PROGRAM

## 2023-05-30 PROCEDURE — 80053 COMPREHEN METABOLIC PANEL: CPT | Performed by: STUDENT IN AN ORGANIZED HEALTH CARE EDUCATION/TRAINING PROGRAM

## 2023-05-30 PROCEDURE — 25000003 PHARM REV CODE 250: Performed by: STUDENT IN AN ORGANIZED HEALTH CARE EDUCATION/TRAINING PROGRAM

## 2023-05-30 PROCEDURE — 36415 COLL VENOUS BLD VENIPUNCTURE: CPT | Performed by: STUDENT IN AN ORGANIZED HEALTH CARE EDUCATION/TRAINING PROGRAM

## 2023-05-30 PROCEDURE — 63600175 PHARM REV CODE 636 W HCPCS: Performed by: NURSE PRACTITIONER

## 2023-05-30 PROCEDURE — 11000001 HC ACUTE MED/SURG PRIVATE ROOM

## 2023-05-30 PROCEDURE — 63600175 PHARM REV CODE 636 W HCPCS: Performed by: STUDENT IN AN ORGANIZED HEALTH CARE EDUCATION/TRAINING PROGRAM

## 2023-05-30 PROCEDURE — 36415 COLL VENOUS BLD VENIPUNCTURE: CPT | Performed by: HOSPITALIST

## 2023-05-30 PROCEDURE — 84100 ASSAY OF PHOSPHORUS: CPT | Performed by: STUDENT IN AN ORGANIZED HEALTH CARE EDUCATION/TRAINING PROGRAM

## 2023-05-30 PROCEDURE — 83735 ASSAY OF MAGNESIUM: CPT | Performed by: STUDENT IN AN ORGANIZED HEALTH CARE EDUCATION/TRAINING PROGRAM

## 2023-05-30 PROCEDURE — 85025 COMPLETE CBC W/AUTO DIFF WBC: CPT | Performed by: STUDENT IN AN ORGANIZED HEALTH CARE EDUCATION/TRAINING PROGRAM

## 2023-05-30 PROCEDURE — 80202 ASSAY OF VANCOMYCIN: CPT | Performed by: HOSPITALIST

## 2023-05-30 PROCEDURE — 25000003 PHARM REV CODE 250: Performed by: NURSE PRACTITIONER

## 2023-05-30 RX ORDER — LOSARTAN POTASSIUM 25 MG/1
50 TABLET ORAL ONCE
Status: COMPLETED | OUTPATIENT
Start: 2023-05-30 | End: 2023-05-30

## 2023-05-30 RX ORDER — LOSARTAN POTASSIUM 25 MG/1
100 TABLET ORAL DAILY
Status: DISCONTINUED | OUTPATIENT
Start: 2023-05-31 | End: 2023-06-01 | Stop reason: HOSPADM

## 2023-05-30 RX ORDER — HYDROCHLOROTHIAZIDE 25 MG/1
25 TABLET ORAL DAILY
Status: DISCONTINUED | OUTPATIENT
Start: 2023-05-30 | End: 2023-06-01 | Stop reason: HOSPADM

## 2023-05-30 RX ORDER — LOSARTAN POTASSIUM 25 MG/1
25 TABLET ORAL DAILY
Status: DISCONTINUED | OUTPATIENT
Start: 2023-05-30 | End: 2023-05-30

## 2023-05-30 RX ADMIN — LOSARTAN POTASSIUM 50 MG: 25 TABLET, FILM COATED ORAL at 03:05

## 2023-05-30 RX ADMIN — HYDRALAZINE HYDROCHLORIDE 10 MG: 20 INJECTION INTRAMUSCULAR; INTRAVENOUS at 03:05

## 2023-05-30 RX ADMIN — DOCUSATE SODIUM 100 MG: 100 CAPSULE, LIQUID FILLED ORAL at 09:05

## 2023-05-30 RX ADMIN — SODIUM CHLORIDE: 9 INJECTION, SOLUTION INTRAVENOUS at 04:05

## 2023-05-30 RX ADMIN — LOSARTAN POTASSIUM 25 MG: 25 TABLET, FILM COATED ORAL at 09:05

## 2023-05-30 RX ADMIN — CEFTRIAXONE 2 G: 2 INJECTION, SOLUTION INTRAVENOUS at 07:05

## 2023-05-30 RX ADMIN — HYDROCHLOROTHIAZIDE 25 MG: 25 TABLET ORAL at 09:05

## 2023-05-30 NOTE — ASSESSMENT & PLAN NOTE
This patient does have evidence of infective focus  My overall impression is sepsis.  Source: Abdominal  Antibiotics given-   Antibiotics (72h ago, onward)    Start     Stop Route Frequency Ordered    05/29/23 0753  cefTRIAXone 2 gram/50 mL IVPB 2 g         -- IV Every 24 hours (non-standard times) 05/29/23 0754        Latest lactate reviewed-  Fluid challenge Actual Body weight- Patient will receive 30ml/kg actual body weight to calculate fluid bolus for treatment of septic shock.   Post- resuscitation assessment Yes Perfusion exam was performed within 6 hours of septic shock presentation after bolus shows Adequate tissue perfusion assessed by non-invasive monitoring   Will Not start Pressors- Levophed for MAP of 65    · Patient admitted with 4/4 sirs, sepsis without septic shock, bacteremic with GNR.    · 103.3 F, , RR 30, WBC WNL.  Procalcitonin 10.6.    · UA does not look particularly infectious, +4 glucose, diabetes uncontrolled, which makes her susceptible to infections however.    · She appears to have fecal impaction/constipation, likely a bit obstructed and caused translocation of bacteria into the blood stream.  · GNR presumptive E coli, will discontinue vancomycin.  · Has had intra-abdominal abscesses in the past, CT with contrast did not reveal any abscesses  · Her CA 19 9 has oddly been elevated since 2019, no pancreatic lesions seen on CT

## 2023-05-30 NOTE — SUBJECTIVE & OBJECTIVE
Interval History:   NAEON.  No new issues.   Denies complaints.  All questions answered and updates on care given.       Review of Systems   Constitutional:  Positive for activity change and fever.   Respiratory:  Negative for shortness of breath.    Cardiovascular:  Negative for chest pain.     Objective:     Vital Signs (Most Recent):  Temp: 99 °F (37.2 °C) (05/30/23 0747)  Pulse: 90 (05/30/23 0747)  Resp: 18 (05/30/23 0747)  BP: (!) 194/96 (05/30/23 0747)  SpO2: 98 % (05/30/23 0747) Vital Signs (24h Range):  Temp:  [98.1 °F (36.7 °C)-99.4 °F (37.4 °C)] 99 °F (37.2 °C)  Pulse:  [85-97] 90  Resp:  [18-20] 18  SpO2:  [93 %-98 %] 98 %  BP: (131-194)/(81-96) 194/96     Weight: 108 kg (238 lb 1.6 oz)  Body mass index is 37.29 kg/m².    Intake/Output Summary (Last 24 hours) at 5/30/2023 0849  Last data filed at 5/30/2023 0635  Gross per 24 hour   Intake 1477.28 ml   Output 850 ml   Net 627.28 ml         Physical Exam  Vitals and nursing note reviewed.   Constitutional:       General: She is not in acute distress.     Appearance: She is well-developed and normal weight. She is ill-appearing. She is not diaphoretic.   HENT:      Head: Normocephalic and atraumatic.   Eyes:      General: No scleral icterus.     Pupils: Pupils are equal, round, and reactive to light.   Neck:      Thyroid: No thyromegaly.   Cardiovascular:      Rate and Rhythm: Normal rate and regular rhythm.      Heart sounds: Murmur heard.   Pulmonary:      Effort: Pulmonary effort is normal.      Breath sounds: Normal breath sounds. No stridor. No wheezing or rales.   Abdominal:      General: There is no distension.      Palpations: Abdomen is soft. There is no mass.      Tenderness: There is no guarding.   Musculoskeletal:         General: No swelling or deformity. Normal range of motion.      Cervical back: Normal range of motion and neck supple.   Skin:     General: Skin is warm and dry.      Capillary Refill: Capillary refill takes less than 2 seconds.       Coloration: Skin is not jaundiced.      Findings: No lesion.   Neurological:      Mental Status: She is alert and oriented to person, place, and time. Mental status is at baseline.      Cranial Nerves: No cranial nerve deficit.      Gait: Gait normal.   Psychiatric:         Mood and Affect: Mood normal.         Behavior: Behavior normal.               Recent Results (from the past 24 hour(s))   POCT glucose    Collection Time: 05/29/23 11:24 AM   Result Value Ref Range    POCT Glucose 171 (H) 70 - 110 mg/dL   POCT glucose    Collection Time: 05/29/23  4:33 PM   Result Value Ref Range    POCT Glucose 127 (H) 70 - 110 mg/dL   POCT glucose    Collection Time: 05/29/23  7:26 PM   Result Value Ref Range    POCT Glucose 153 (H) 70 - 110 mg/dL   VANCOMYCIN, TROUGH    Collection Time: 05/30/23  6:02 AM   Result Value Ref Range    Vancomycin-Trough 6.2 (L) 10.0 - 22.0 ug/mL   POCT glucose    Collection Time: 05/30/23  7:46 AM   Result Value Ref Range    POCT Glucose 122 (H) 70 - 110 mg/dL       Microbiology Results (last 7 days)       Procedure Component Value Units Date/Time    Blood culture x two cultures. Draw prior to antibiotics. [[id number]]  (Abnormal) Collected: 05/28/23 1703    Order Status: Completed Specimen: Blood from Peripheral, Hand, Left Updated: 05/30/23 0800     Blood Culture, Routine Gram stain cameron bottle: Gram negative rods      Results called to and read back by: Yulia Robin 05/29/2023  07:38      PRESUMPTIVE E COLI  Identification and susceptibility pending      Narrative:      Aerobic and anaerobic    Blood culture x two cultures. Draw prior to antibiotics. [[id number]] Collected: 05/28/23 1647    Order Status: Completed Specimen: Blood from Peripheral, Antecubital, Right Updated: 05/29/23 1703     Blood Culture, Routine No Growth to date      No Growth to date    Narrative:      Aerobic and anaerobic    Blood culture [[id number]] Collected: 05/29/23 0834    Order Status: Completed Specimen: Blood  Updated: 05/29/23 1512     Blood Culture, Routine No Growth to date    Blood culture [[id number]] Collected: 05/29/23 0834    Order Status: Completed Specimen: Blood Updated: 05/29/23 1512     Blood Culture, Routine No Growth to date    Urine Culture High Risk [[id number]] Collected: 05/29/23 1034    Order Status: Sent Specimen: Urine, Clean Catch Updated: 05/29/23 1043             Imaging Results              CTA Chest Non-Coronary (PE Studies) (Final result)  Result time 05/28/23 23:10:01      Final result by Jenna De Dios MD (05/28/23 23:10:01)                   Impression:      No evidence of acute pulmonary embolism. Mosaic pattern of the lung parenchyma in the lower lung fields.    Trace pericardial effusion versus mild pericardial wall thickening.    Hepatic steatosis.    Prominent spleen.      Electronically signed by: Jenna De Dios  Date:    05/28/2023  Time:    23:10               Narrative:    EXAMINATION:  CT PULMONARY ANGIOGRAM WITH CONTRAST    CLINICAL HISTORY:  Nausea vomiting.  Positive D-dimer.    TECHNIQUE:  CT of the chest with intravenous contrast for pulmonary artery angiogram was performed. Contiguous axial 1.25 mm images followed by 10 mm reconstructions with multiplanar and MIP reformations of the pulmonary arteries. No 3D post-angiographic imaging was performed on an independent workstation and reviewed.  75 ml of Omnipaque 350 was injected.    COMPARISON:  None.    FINDINGS:  There is no evidence of pulmonary artery filling defect to suggest pulmonary embolism. There is no aortic aneurysm or aortic dissection.  Mosaic pattern of the lung parenchyma in lower lung fields.    There is no evidence of mediastinal, hilar, or axillary adenopathy.    There is trace pericardial effusion versus mild pericardial wall thickening.  No pleural effusion is detected.    The heart size is within normal limits.    In the visualized upper abdomen, there is fatty infiltration of the liver.  The spleen is  prominent.    Spondylitic changes are present.                                       CT Abdomen Pelvis With Contrast (Final result)  Result time 05/28/23 18:39:34      Final result by Jenna De Dios MD (05/28/23 18:39:34)                   Impression:      No acute abdominal or pelvic pathology CT of the abdomen and pelvis with contrast.    Mildly elongated spleen.    Moderate fat containing ventral abdominal hernia.  Small fat containing umbilical hernia.    Fibroid uterus.    Moderately large stool in the patulous rectum.  Question fecal impaction.      Electronically signed by: Jenna De Dios  Date:    05/28/2023  Time:    18:39               Narrative:    EXAMINATION:  CT OF ABDOMEN PELVIS WITH    CLINICAL HISTORY:  Sepsis;    TECHNIQUE:  5 mm enhanced axial images were obtained from the lung bases through the greater trochanters.  Seventy-five mL of Omnipaque 350 was injected.    COMPARISON:  09/12/2021    FINDINGS:  The liver, pancreas, kidneys, and adrenal glands are unremarkable. The gallbladder contains no calcified gallstones.    The spleen is mildly elongated measuring 12.4 x 3.9 cm (series 2 axial image 42).    There is no definite evidence for abdominal adenopathy or ascites.  A moderate fat containing midline ventral abdominal hernia is present.  A small fat containing umbilical hernia is again seen.    There is a moderately large lobular uterus containing multiple calcified and noncalcified uterine fibroids.  The appendix is not enlarged.  The rectum is patulous and contains partly large stool..    There is no free fluid in the pelvis.    There is mild bibasilar atelectasis.                                       CT Head Without Contrast (Final result)  Result time 05/28/23 18:21:49      Final result by Jenna De Dios MD (05/28/23 18:21:49)                   Impression:      No acute intracranial abnormality detected.    Mild sinus disease.      Electronically signed by: Jenna  Lanre  Date:    05/28/2023  Time:    18:21               Narrative:    EXAMINATION:  CT OF THE HEAD WITHOUT    CLINICAL HISTORY:  Mental status change, unknown cause;    TECHNIQUE:  5 mm unenhanced axial images were obtained from the skull base to the vertex.    COMPARISON:  None.    FINDINGS:  The ventricles, basal cisterns, and cortical sulci are within normal limits for patient's stated age. There is no acute intracranial hemorrhage, territorial infarct or mass effect, or midline shift. In the visualized paranasal sinuses, there is mild mucoperiosteal thickening in bilateral maxillary sinuses and in the left ethmoid air cells.                                       X-Ray Chest AP Portable (Final result)  Result time 05/28/23 17:29:30      Final result by Terry Cook MD (05/28/23 17:29:30)                   Impression:      No acute process.      Electronically signed by: Terry Cook MD  Date:    05/28/2023  Time:    17:29               Narrative:    EXAMINATION:  XR CHEST AP PORTABLE    CLINICAL HISTORY:  Sepsis;    TECHNIQUE:  Single frontal view of the chest was performed.    COMPARISON:  09/12/2021.    FINDINGS:  Monitoring EKG leads are present.  The trachea is unremarkable.  The cardiomediastinal silhouette is within normal limits.  There is no evidence of free air beneath the hemidiaphragms.  There are no pleural effusions.  There is no evidence of a pneumothorax.  There is no evidence of pneumomediastinum.  No airspace opacity is present.  The osseous structures are unremarkable.

## 2023-05-30 NOTE — PROGRESS NOTES
Therapy with vancomycin complete and/or consult discontinued by provider.  Pharmacy will sign off, please re-consult as needed.    Thank you for the consult,   Gema Young  05/30/2023

## 2023-05-30 NOTE — HOSPITAL COURSE
52-year-old female with past medical history of hypertension and type 2 diabetes admitted to observation for further evaluation of nausea and vomiting on 05/29/23. Found to be febrile, tachycardic and elevated procalcitonin. UA with +1 leukocytosis, does not appear grossly infected. Urine cx pending. Blood cx now with GNR. Antibiotics changed to Rocephin 2g daily.  Imaging with no acute process but noted constipation. Started bowel regimen. Will advance diet as tolerated. Continue IV CTX and vanc. Repeat blood cultures ordered.  F/u urine and blood cultures.    Patient admitted with 3/4 sirs, sepsis without septic shock, bacteremic with GNR.  103.3 F, , RR 30, WBC WNL.  Procalcitonin 10.6.  UA does not look particularly infectious, +4 glucose, diabetes uncontrolled, which makes her susceptible to infections however.  She appears to have fecal impaction/constipation, likely a bit obstructed and caused translocation of bacteria into the blood stream. GNR presumptive E coli, will discontinue vancomycin.    Patient's CA 19 9 elevated at 142, concerned that it has been elevated for 4 years now.  Nothing seen on CT with contrast, perhaps an MRCP.  Will consult Oncology and GI for recommendations.    Patient only had 1 episode of Afib that self resolved in the setting of GNR bacteremia/sepsis, now back in NSR and stable, would favor aspirin at discharge, with follow-up with Cardiology to wear a Holter monitor, if AFib is appreciated again then would initiate anticoagulation. C-scope today 6/1/23, can discharge afterward pending results.

## 2023-05-30 NOTE — PROGRESS NOTES
[facility]  Progress Note    Patient Name: Shahida Ortega  MRN: [id number]  Patient Class: IP- Inpatient   Admission Date: 5/28/2023  Length of Stay: 1 days  Attending Physician: Tyrese Wang MD  Primary Care Provider: Janusz Staton MD        Subjective:     Principal Problem:Sepsis due to Escherichia coli (E. coli)        HPI:  52-year-old female with past medical history of hypertension and type 2 diabetes who presents to the emergency department with a chief complaint of emesis.  She was accompanied by her daughter who lives with her and helps provide this history.  She was in her normal state of health until earlier yesterday morning when she started having nausea.  Subsequently, she had 4 episodes of nonbloody, nonbilious emesis at work and her daughter was called to come and take her to the emergency department.  She denies any abdominal pain, chest pain, shortness of breath, or urinary symptoms including frequency, urgency, or dysuria. She has had cough for the last week or so. Daughter reports that yesterday she was acting at her baseline and she has been compliant with hypertension and diabetes medications as prescribed.  Daughter reports that at baseline patient is awake alert oriented x4.  Patient was initially lethargic, slow to answer questions, not at baseline. Initially patient with temp of 103.3, tachycardia 110s-114s, tachypnea 26-30s, elevated BP. No medications prior to arrival to attempt to alleviate symptoms. On my examination of patient, patient now at baseline, AAOx4.  Vitals signs improved, now afebrile HR and RR WNL. Workup CT of head negative for acute findings, revealed no leukocytosis, h/h stable, platelets 118, procalcitonin 10.56, d-dimer elevated 1.56, glucose 226, lactic WNL, covid negative, u/a concerning for infectious process, blood cultures pending, CT of abdomen/pelvis without acute or infectious findings, question fecal impaction.  She reports last BM  yesterday, no issues with BM pattern.  CTA of chest negative for PE, chest xray without acute process.  Patient admitted to hospital medicine observation unit for further medical management.        Overview/Hospital Course:  52-year-old female with past medical history of hypertension and type 2 diabetes admitted to observation for further evaluation of nausea and vomiting on 05/29/23. Found to be febrile, tachycardic and elevated procalcitonin. UA with +1 leukocytosis, does not appear grossly infected. Urine cx pending. Blood cx now with GNR. Antibiotics changed to Rocephin 2g daily.  Imaging with no acute process but noted constipation. Started bowel regimen. Will advance diet as tolerated. Continue IV CTX and vanc. Repeat blood cultures ordered.  F/u urine and blood cultures.    Patient admitted with 3/4 sirs, sepsis without septic shock, bacteremic with GNR.  103.3 F, , RR 30, WBC WNL.  Procalcitonin 10.6.  UA does not look particularly infectious, +4 glucose, diabetes uncontrolled, which makes her susceptible to infections however.  She appears to have fecal impaction/constipation, likely a bit obstructed and caused translocation of bacteria into the blood stream. GNR presumptive E coli, will discontinue vancomycin.           Interval History:   NAEON.  No new issues.   Denies complaints.  All questions answered and updates on care given.       Review of Systems   Constitutional:  Positive for activity change and fever.   Respiratory:  Negative for shortness of breath.    Cardiovascular:  Negative for chest pain.     Objective:     Vital Signs (Most Recent):  Temp: 99 °F (37.2 °C) (05/30/23 0747)  Pulse: 90 (05/30/23 0747)  Resp: 18 (05/30/23 0747)  BP: (!) 194/96 (05/30/23 0747)  SpO2: 98 % (05/30/23 0747) Vital Signs (24h Range):  Temp:  [98.1 °F (36.7 °C)-99.4 °F (37.4 °C)] 99 °F (37.2 °C)  Pulse:  [85-97] 90  Resp:  [18-20] 18  SpO2:  [93 %-98 %] 98 %  BP: (131-194)/(81-96) 194/96     Weight: 108 kg  (238 lb 1.6 oz)  Body mass index is 37.29 kg/m².    Intake/Output Summary (Last 24 hours) at 5/30/2023 0849  Last data filed at 5/30/2023 0635  Gross per 24 hour   Intake 1477.28 ml   Output 850 ml   Net 627.28 ml         Physical Exam  Vitals and nursing note reviewed.   Constitutional:       General: She is not in acute distress.     Appearance: She is well-developed and normal weight. She is ill-appearing. She is not diaphoretic.   HENT:      Head: Normocephalic and atraumatic.   Eyes:      General: No scleral icterus.     Pupils: Pupils are equal, round, and reactive to light.   Neck:      Thyroid: No thyromegaly.   Cardiovascular:      Rate and Rhythm: Normal rate and regular rhythm.      Heart sounds: Murmur heard.   Pulmonary:      Effort: Pulmonary effort is normal.      Breath sounds: Normal breath sounds. No stridor. No wheezing or rales.   Abdominal:      General: There is no distension.      Palpations: Abdomen is soft. There is no mass.      Tenderness: There is no guarding.   Musculoskeletal:         General: No swelling or deformity. Normal range of motion.      Cervical back: Normal range of motion and neck supple.   Skin:     General: Skin is warm and dry.      Capillary Refill: Capillary refill takes less than 2 seconds.      Coloration: Skin is not jaundiced.      Findings: No lesion.   Neurological:      Mental Status: She is alert and oriented to person, place, and time. Mental status is at baseline.      Cranial Nerves: No cranial nerve deficit.      Gait: Gait normal.   Psychiatric:         Mood and Affect: Mood normal.         Behavior: Behavior normal.               Recent Results (from the past 24 hour(s))   POCT glucose    Collection Time: 05/29/23 11:24 AM   Result Value Ref Range    POCT Glucose 171 (H) 70 - 110 mg/dL   POCT glucose    Collection Time: 05/29/23  4:33 PM   Result Value Ref Range    POCT Glucose 127 (H) 70 - 110 mg/dL   POCT glucose    Collection Time: 05/29/23  7:26 PM    Result Value Ref Range    POCT Glucose 153 (H) 70 - 110 mg/dL   VANCOMYCIN, TROUGH    Collection Time: 05/30/23  6:02 AM   Result Value Ref Range    Vancomycin-Trough 6.2 (L) 10.0 - 22.0 ug/mL   POCT glucose    Collection Time: 05/30/23  7:46 AM   Result Value Ref Range    POCT Glucose 122 (H) 70 - 110 mg/dL       Microbiology Results (last 7 days)       Procedure Component Value Units Date/Time    Blood culture x two cultures. Draw prior to antibiotics. [[id number]]  (Abnormal) Collected: 05/28/23 1703    Order Status: Completed Specimen: Blood from Peripheral, Hand, Left Updated: 05/30/23 0800     Blood Culture, Routine Gram stain cameron bottle: Gram negative rods      Results called to and read back by: Yulia Roibn 05/29/2023  07:38      PRESUMPTIVE E COLI  Identification and susceptibility pending      Narrative:      Aerobic and anaerobic    Blood culture x two cultures. Draw prior to antibiotics. [[id number]] Collected: 05/28/23 1647    Order Status: Completed Specimen: Blood from Peripheral, Antecubital, Right Updated: 05/29/23 1703     Blood Culture, Routine No Growth to date      No Growth to date    Narrative:      Aerobic and anaerobic    Blood culture [[id number]] Collected: 05/29/23 0834    Order Status: Completed Specimen: Blood Updated: 05/29/23 1512     Blood Culture, Routine No Growth to date    Blood culture [[id number]] Collected: 05/29/23 0834    Order Status: Completed Specimen: Blood Updated: 05/29/23 1512     Blood Culture, Routine No Growth to date    Urine Culture High Risk [[id number]] Collected: 05/29/23 1034    Order Status: Sent Specimen: Urine, Clean Catch Updated: 05/29/23 1043             Imaging Results              CTA Chest Non-Coronary (PE Studies) (Final result)  Result time 05/28/23 23:10:01      Final result by Jenna De Dios MD (05/28/23 23:10:01)                   Impression:      No evidence of acute pulmonary embolism. Mosaic pattern of the lung parenchyma in the lower  lung fields.    Trace pericardial effusion versus mild pericardial wall thickening.    Hepatic steatosis.    Prominent spleen.      Electronically signed by: Jenna De Dios  Date:    05/28/2023  Time:    23:10               Narrative:    EXAMINATION:  CT PULMONARY ANGIOGRAM WITH CONTRAST    CLINICAL HISTORY:  Nausea vomiting.  Positive D-dimer.    TECHNIQUE:  CT of the chest with intravenous contrast for pulmonary artery angiogram was performed. Contiguous axial 1.25 mm images followed by 10 mm reconstructions with multiplanar and MIP reformations of the pulmonary arteries. No 3D post-angiographic imaging was performed on an independent workstation and reviewed.  75 ml of Omnipaque 350 was injected.    COMPARISON:  None.    FINDINGS:  There is no evidence of pulmonary artery filling defect to suggest pulmonary embolism. There is no aortic aneurysm or aortic dissection.  Mosaic pattern of the lung parenchyma in lower lung fields.    There is no evidence of mediastinal, hilar, or axillary adenopathy.    There is trace pericardial effusion versus mild pericardial wall thickening.  No pleural effusion is detected.    The heart size is within normal limits.    In the visualized upper abdomen, there is fatty infiltration of the liver.  The spleen is prominent.    Spondylitic changes are present.                                       CT Abdomen Pelvis With Contrast (Final result)  Result time 05/28/23 18:39:34      Final result by Jenna De Dios MD (05/28/23 18:39:34)                   Impression:      No acute abdominal or pelvic pathology CT of the abdomen and pelvis with contrast.    Mildly elongated spleen.    Moderate fat containing ventral abdominal hernia.  Small fat containing umbilical hernia.    Fibroid uterus.    Moderately large stool in the patulous rectum.  Question fecal impaction.      Electronically signed by: Jenna De Dios  Date:    05/28/2023  Time:    18:39               Narrative:     EXAMINATION:  CT OF ABDOMEN PELVIS WITH    CLINICAL HISTORY:  Sepsis;    TECHNIQUE:  5 mm enhanced axial images were obtained from the lung bases through the greater trochanters.  Seventy-five mL of Omnipaque 350 was injected.    COMPARISON:  09/12/2021    FINDINGS:  The liver, pancreas, kidneys, and adrenal glands are unremarkable. The gallbladder contains no calcified gallstones.    The spleen is mildly elongated measuring 12.4 x 3.9 cm (series 2 axial image 42).    There is no definite evidence for abdominal adenopathy or ascites.  A moderate fat containing midline ventral abdominal hernia is present.  A small fat containing umbilical hernia is again seen.    There is a moderately large lobular uterus containing multiple calcified and noncalcified uterine fibroids.  The appendix is not enlarged.  The rectum is patulous and contains partly large stool..    There is no free fluid in the pelvis.    There is mild bibasilar atelectasis.                                       CT Head Without Contrast (Final result)  Result time 05/28/23 18:21:49      Final result by Jenna De Dios MD (05/28/23 18:21:49)                   Impression:      No acute intracranial abnormality detected.    Mild sinus disease.      Electronically signed by: Jenna De Dios  Date:    05/28/2023  Time:    18:21               Narrative:    EXAMINATION:  CT OF THE HEAD WITHOUT    CLINICAL HISTORY:  Mental status change, unknown cause;    TECHNIQUE:  5 mm unenhanced axial images were obtained from the skull base to the vertex.    COMPARISON:  None.    FINDINGS:  The ventricles, basal cisterns, and cortical sulci are within normal limits for patient's stated age. There is no acute intracranial hemorrhage, territorial infarct or mass effect, or midline shift. In the visualized paranasal sinuses, there is mild mucoperiosteal thickening in bilateral maxillary sinuses and in the left ethmoid air cells.                                       X-Ray  Chest AP Portable (Final result)  Result time 05/28/23 17:29:30      Final result by Terry Cook MD (05/28/23 17:29:30)                   Impression:      No acute process.      Electronically signed by: Terry Cook MD  Date:    05/28/2023  Time:    17:29               Narrative:    EXAMINATION:  XR CHEST AP PORTABLE    CLINICAL HISTORY:  Sepsis;    TECHNIQUE:  Single frontal view of the chest was performed.    COMPARISON:  09/12/2021.    FINDINGS:  Monitoring EKG leads are present.  The trachea is unremarkable.  The cardiomediastinal silhouette is within normal limits.  There is no evidence of free air beneath the hemidiaphragms.  There are no pleural effusions.  There is no evidence of a pneumothorax.  There is no evidence of pneumomediastinum.  No airspace opacity is present.  The osseous structures are unremarkable.                                            Assessment/Plan:      * Sepsis due to Escherichia coli (E. coli)  This patient does have evidence of infective focus  My overall impression is sepsis.  Source: Abdominal  Antibiotics given-   Antibiotics (72h ago, onward)      Start     Stop Route Frequency Ordered    05/29/23 0753  cefTRIAXone 2 gram/50 mL IVPB 2 g         -- IV Every 24 hours (non-standard times) 05/29/23 0754          Latest lactate reviewed-  Fluid challenge Actual Body weight- Patient will receive 30ml/kg actual body weight to calculate fluid bolus for treatment of septic shock.   Post- resuscitation assessment Yes Perfusion exam was performed within 6 hours of septic shock presentation after bolus shows Adequate tissue perfusion assessed by non-invasive monitoring   Will Not start Pressors- Levophed for MAP of 65    Patient admitted with 3/4 sirs, sepsis without septic shock, bacteremic with GNR.    103.3 F, , RR 30, WBC WNL.  Procalcitonin 10.6.    UA does not look particularly infectious, +4 glucose, diabetes uncontrolled, which makes her susceptible to infections however.     She appears to have fecal impaction/constipation, likely a bit obstructed and caused translocation of bacteria into the blood stream.  GNR presumptive E coli, will discontinue vancomycin.  Has had intra-abdominal abscesses in the past, CT with contrast did not reveal any abscesses  Her CA 19 9 has oddly been elevated since 2019, no pancreatic lesions seen on CT    DM2 (diabetes mellitus, type 2)  Recent A1c 3/2022 at 11.3  Repeat A1c pending  Treat with SSI ac/hs during hospital stay  Hold oral antihyperglycemic agents  Resume home regimen at discharge  Hypoglycemia protocol PRN      Hypertension  Hold bp meds secondary to possible sepsis  Hydralazine prn       Elevated d-dimer  CTA of chest negative for PE  Currently denies chest pain, SOB        VTE Risk Mitigation (From admission, onward)           Ordered     IP VTE HIGH RISK PATIENT  Once         05/28/23 2205     Place sequential compression device  Until discontinued         05/28/23 2205                    Discharge Planning   NAYELI:      Code Status: Full Code   Is the patient medically ready for discharge?:     Reason for patient still in hospital (select all that apply): Patient trending condition and Treatment  Discharge Plan A: Home with family                  Tyrese Wang MD  Department of Hospital Medicine   [facility] [place] [facility] [place]

## 2023-05-30 NOTE — ASSESSMENT & PLAN NOTE
This patient does have evidence of infective focus  My overall impression is sepsis.  Source: Abdominal  Antibiotics given-   Antibiotics (72h ago, onward)    Start     Stop Route Frequency Ordered    05/29/23 0753  cefTRIAXone 2 gram/50 mL IVPB 2 g         -- IV Every 24 hours (non-standard times) 05/29/23 0754        Latest lactate reviewed-  Fluid challenge Actual Body weight- Patient will receive 30ml/kg actual body weight to calculate fluid bolus for treatment of septic shock.   Post- resuscitation assessment Yes Perfusion exam was performed within 6 hours of septic shock presentation after bolus shows Adequate tissue perfusion assessed by non-invasive monitoring   Will Not start Pressors- Levophed for MAP of 65    · Patient admitted with 3/4 sirs, sepsis without septic shock, bacteremic with GNR.    · 103.3 F, , RR 30, WBC WNL.  Procalcitonin 10.6.    · UA does not look particularly infectious, +4 glucose, diabetes uncontrolled, which makes her susceptible to infections however.    · She appears to have fecal impaction/constipation, likely a bit obstructed and caused translocation of bacteria into the blood stream.  · GNR presumptive E coli, will discontinue vancomycin.  · Has had intra-abdominal abscesses in the past, CT with contrast did not reveal any abscesses  · Her CA 19 9 has oddly been elevated since 2019, no pancreatic lesions seen on CT

## 2023-05-30 NOTE — PLAN OF CARE
Case Management Assessment     PCP: Janusz Staton MD  Pharmacy: [facility] [place] [address]     Patient Arrived From: home  Existing Help at Home: Kwame Ortega (mother) Brooklynn Rojas (sister)    Barriers to Discharge: none    Discharge Plan:    A. home   B. Home with family    Pt from home and lives with mother and sister. Pt has previously scheduled pcp appointment, listed on avs. [place] to continue to follow for dc needs.      05/30/23 1017   Discharge Assessment   Assessment Type Discharge Planning Assessment   Confirmed/corrected address, phone number and insurance Yes   Confirmed Demographics Correct on Facesheet   Source of Information patient   Communicated NAYELI with patient/caregiver Date not available/Unable to determine   People in Home parent(s);sibling(s)   Do you expect to return to your current living situation? Yes   Do you have help at home or someone to help you manage your care at home? Yes   Who are your caregiver(s) and their phone number(s)? kwame ortega (Mother)   [phone]   Prior to hospitilization cognitive status: Alert/Oriented   Current cognitive status: Alert/Oriented   Equipment Currently Used at Home none   Readmission within 30 days? No   Patient currently being followed by outpatient case management? No   Do you currently have service(s) that help you manage your care at home? No   Do you take prescription medications? Yes   Do you have prescription coverage? Yes   Coverage [employer]   Do you have any problems affording any of your prescribed medications? No   Is the patient taking medications as prescribed? yes   Who is going to help you get home at discharge? Brooklynn Rojas (sister) [phone]   How do you get to doctors appointments? family or friend will provide   Are you on dialysis? No   Do you take coumadin? No   DME Needed Upon Discharge  none   Discharge Plan discussed with: Patient   Transition of Care Barriers None   Physical Activity   On average, how many days per  week do you engage in moderate to strenuous exercise (like a brisk walk)? 7 days   On average, how many minutes do you engage in exercise at this level? 30 min   Financial Resource Strain   How hard is it for you to pay for the very basics like food, housing, medical care, and heating? Somewhat   Housing Stability   In the last 12 months, was there a time when you were not able to pay the mortgage or rent on time? N   In the last 12 months, how many places have you lived? 1   In the last 12 months, was there a time when you did not have a steady place to sleep or slept in a shelter (including now)? N   Transportation Needs   In the past 12 months, has lack of transportation kept you from medical appointments or from getting medications? no   In the past 12 months, has lack of transportation kept you from meetings, work, or from getting things needed for daily living? No   Food Insecurity   Within the past 12 months, you worried that your food would run out before you got the money to buy more. Never true   Within the past 12 months, the food you bought just didn't last and you didn't have money to get more. Never true   Stress   Do you feel stress - tense, restless, nervous, or anxious, or unable to sleep at night because your mind is troubled all the time - these days? Not at all   Social Connections   In a typical week, how many times do you talk on the phone with family, friends, or neighbors? More than 3   How often do you get together with friends or relatives? Once   How often do you attend Temple or Baptism services? More than 4   Do you belong to any clubs or organizations such as Temple groups, unions, fraternal or athletic groups, or school groups? No   How often do you attend meetings of the clubs or organizations you belong to? Never   Are you , , , , never , or living with a partner? Never marrie   Alcohol Use   Q1: How often do you have a drink containing  alcohol? Monthly or l   Q2: How many drinks containing alcohol do you have on a typical day when you are drinking? 1 or 2   Q3: How often do you have six or more drinks on one occasion? Never

## 2023-05-31 ENCOUNTER — ANESTHESIA EVENT (OUTPATIENT)
Dept: ENDOSCOPY | Facility: HOSPITAL | Age: 53
DRG: 872 | End: 2023-05-31
Payer: COMMERCIAL

## 2023-05-31 PROBLEM — R94.31 PROLONGED Q-T INTERVAL ON ECG: Status: ACTIVE | Noted: 2023-05-31

## 2023-05-31 PROBLEM — I48.91 A-FIB: Status: ACTIVE | Noted: 2023-05-31

## 2023-05-31 LAB
ALBUMIN SERPL BCP-MCNC: 2.8 G/DL (ref 3.5–5.2)
ALP SERPL-CCNC: 65 U/L (ref 55–135)
ALT SERPL W/O P-5'-P-CCNC: 16 U/L (ref 10–44)
ANION GAP SERPL CALC-SCNC: 11 MMOL/L (ref 8–16)
AST SERPL-CCNC: 16 U/L (ref 10–40)
BACTERIA BLD CULT: ABNORMAL
BACTERIA UR CULT: ABNORMAL
BASOPHILS # BLD AUTO: 0.01 K/UL (ref 0–0.2)
BASOPHILS NFR BLD: 0.2 % (ref 0–1.9)
BILIRUB SERPL-MCNC: 0.6 MG/DL (ref 0.1–1)
BUN SERPL-MCNC: 5 MG/DL (ref 6–20)
CALCIUM SERPL-MCNC: 9.3 MG/DL (ref 8.7–10.5)
CHLORIDE SERPL-SCNC: 100 MMOL/L (ref 95–110)
CO2 SERPL-SCNC: 26 MMOL/L (ref 23–29)
CREAT SERPL-MCNC: 0.6 MG/DL (ref 0.5–1.4)
DIFFERENTIAL METHOD: ABNORMAL
EOSINOPHIL # BLD AUTO: 0.1 K/UL (ref 0–0.5)
EOSINOPHIL NFR BLD: 2.3 % (ref 0–8)
ERYTHROCYTE [DISTWIDTH] IN BLOOD BY AUTOMATED COUNT: 13.2 % (ref 11.5–14.5)
EST. GFR  (NO RACE VARIABLE): >60 ML/MIN/1.73 M^2
GLUCOSE SERPL-MCNC: 142 MG/DL (ref 70–110)
HCT VFR BLD AUTO: 34 % (ref 37–48.5)
HGB BLD-MCNC: 10.9 G/DL (ref 12–16)
IMM GRANULOCYTES # BLD AUTO: 0.02 K/UL (ref 0–0.04)
IMM GRANULOCYTES NFR BLD AUTO: 0.4 % (ref 0–0.5)
LYMPHOCYTES # BLD AUTO: 1.3 K/UL (ref 1–4.8)
LYMPHOCYTES NFR BLD: 26.2 % (ref 18–48)
MAGNESIUM SERPL-MCNC: 1.7 MG/DL (ref 1.6–2.6)
MCH RBC QN AUTO: 27 PG (ref 27–31)
MCHC RBC AUTO-ENTMCNC: 32.1 G/DL (ref 32–36)
MCV RBC AUTO: 84 FL (ref 82–98)
MONOCYTES # BLD AUTO: 0.5 K/UL (ref 0.3–1)
MONOCYTES NFR BLD: 10.7 % (ref 4–15)
NEUTROPHILS # BLD AUTO: 2.9 K/UL (ref 1.8–7.7)
NEUTROPHILS NFR BLD: 60.2 % (ref 38–73)
NRBC BLD-RTO: 0 /100 WBC
PHOSPHATE SERPL-MCNC: 2.9 MG/DL (ref 2.7–4.5)
PLATELET # BLD AUTO: 139 K/UL (ref 150–450)
PMV BLD AUTO: 10.3 FL (ref 9.2–12.9)
POCT GLUCOSE: 108 MG/DL (ref 70–110)
POCT GLUCOSE: 141 MG/DL (ref 70–110)
POCT GLUCOSE: 142 MG/DL (ref 70–110)
POCT GLUCOSE: 145 MG/DL (ref 70–110)
POTASSIUM SERPL-SCNC: 2.6 MMOL/L (ref 3.5–5.1)
PROT SERPL-MCNC: 6.7 G/DL (ref 6–8.4)
RBC # BLD AUTO: 4.04 M/UL (ref 4–5.4)
SODIUM SERPL-SCNC: 137 MMOL/L (ref 136–145)
WBC # BLD AUTO: 4.88 K/UL (ref 3.9–12.7)

## 2023-05-31 PROCEDURE — 99900035 HC TECH TIME PER 15 MIN (STAT)

## 2023-05-31 PROCEDURE — 83735 ASSAY OF MAGNESIUM: CPT | Performed by: STUDENT IN AN ORGANIZED HEALTH CARE EDUCATION/TRAINING PROGRAM

## 2023-05-31 PROCEDURE — 93010 EKG 12-LEAD: ICD-10-PCS | Mod: ,,, | Performed by: INTERNAL MEDICINE

## 2023-05-31 PROCEDURE — 63600175 PHARM REV CODE 636 W HCPCS: Performed by: NURSE PRACTITIONER

## 2023-05-31 PROCEDURE — 63600175 PHARM REV CODE 636 W HCPCS: Performed by: STUDENT IN AN ORGANIZED HEALTH CARE EDUCATION/TRAINING PROGRAM

## 2023-05-31 PROCEDURE — 93010 ELECTROCARDIOGRAM REPORT: CPT | Mod: ,,, | Performed by: INTERNAL MEDICINE

## 2023-05-31 PROCEDURE — 84100 ASSAY OF PHOSPHORUS: CPT | Performed by: STUDENT IN AN ORGANIZED HEALTH CARE EDUCATION/TRAINING PROGRAM

## 2023-05-31 PROCEDURE — 11000001 HC ACUTE MED/SURG PRIVATE ROOM

## 2023-05-31 PROCEDURE — 63600175 PHARM REV CODE 636 W HCPCS

## 2023-05-31 PROCEDURE — 94760 N-INVAS EAR/PLS OXIMETRY 1: CPT

## 2023-05-31 PROCEDURE — 99223 PR INITIAL HOSPITAL CARE,LEVL III: ICD-10-PCS | Mod: ,,, | Performed by: INTERNAL MEDICINE

## 2023-05-31 PROCEDURE — 85025 COMPLETE CBC W/AUTO DIFF WBC: CPT | Performed by: STUDENT IN AN ORGANIZED HEALTH CARE EDUCATION/TRAINING PROGRAM

## 2023-05-31 PROCEDURE — 25000003 PHARM REV CODE 250: Performed by: STUDENT IN AN ORGANIZED HEALTH CARE EDUCATION/TRAINING PROGRAM

## 2023-05-31 PROCEDURE — 25000003 PHARM REV CODE 250: Performed by: INTERNAL MEDICINE

## 2023-05-31 PROCEDURE — 80053 COMPREHEN METABOLIC PANEL: CPT | Performed by: STUDENT IN AN ORGANIZED HEALTH CARE EDUCATION/TRAINING PROGRAM

## 2023-05-31 PROCEDURE — 99223 1ST HOSP IP/OBS HIGH 75: CPT | Mod: ,,, | Performed by: INTERNAL MEDICINE

## 2023-05-31 PROCEDURE — 36415 COLL VENOUS BLD VENIPUNCTURE: CPT | Performed by: STUDENT IN AN ORGANIZED HEALTH CARE EDUCATION/TRAINING PROGRAM

## 2023-05-31 PROCEDURE — 25000003 PHARM REV CODE 250

## 2023-05-31 PROCEDURE — 93005 ELECTROCARDIOGRAM TRACING: CPT

## 2023-05-31 RX ORDER — CLONIDINE HYDROCHLORIDE 0.1 MG/1
0.2 TABLET ORAL 3 TIMES DAILY
Status: DISCONTINUED | OUTPATIENT
Start: 2023-05-31 | End: 2023-05-31

## 2023-05-31 RX ORDER — CARVEDILOL 3.12 MG/1
3.12 TABLET ORAL 2 TIMES DAILY
Status: DISCONTINUED | OUTPATIENT
Start: 2023-05-31 | End: 2023-06-01 | Stop reason: HOSPADM

## 2023-05-31 RX ORDER — POTASSIUM CHLORIDE 7.45 MG/ML
10 INJECTION INTRAVENOUS
Status: COMPLETED | OUTPATIENT
Start: 2023-05-31 | End: 2023-05-31

## 2023-05-31 RX ORDER — ENOXAPARIN SODIUM 100 MG/ML
40 INJECTION SUBCUTANEOUS EVERY 24 HOURS
Status: DISCONTINUED | OUTPATIENT
Start: 2023-05-31 | End: 2023-06-01 | Stop reason: HOSPADM

## 2023-05-31 RX ORDER — POLYETHYLENE GLYCOL 3350, SODIUM SULFATE ANHYDROUS, SODIUM BICARBONATE, SODIUM CHLORIDE, POTASSIUM CHLORIDE 236; 22.74; 6.74; 5.86; 2.97 G/4L; G/4L; G/4L; G/4L; G/4L
4000 POWDER, FOR SOLUTION ORAL ONCE
Status: COMPLETED | OUTPATIENT
Start: 2023-05-31 | End: 2023-05-31

## 2023-05-31 RX ORDER — POTASSIUM CHLORIDE 20 MEQ/1
40 TABLET, EXTENDED RELEASE ORAL ONCE
Status: COMPLETED | OUTPATIENT
Start: 2023-05-31 | End: 2023-05-31

## 2023-05-31 RX ORDER — POTASSIUM CHLORIDE 20 MEQ/1
40 TABLET, EXTENDED RELEASE ORAL
Status: COMPLETED | OUTPATIENT
Start: 2023-05-31 | End: 2023-05-31

## 2023-05-31 RX ORDER — MAGNESIUM SULFATE HEPTAHYDRATE 40 MG/ML
2 INJECTION, SOLUTION INTRAVENOUS ONCE
Status: COMPLETED | OUTPATIENT
Start: 2023-05-31 | End: 2023-05-31

## 2023-05-31 RX ORDER — ASPIRIN 81 MG/1
81 TABLET ORAL DAILY
Status: DISCONTINUED | OUTPATIENT
Start: 2023-05-31 | End: 2023-06-01 | Stop reason: HOSPADM

## 2023-05-31 RX ORDER — CLONIDINE HYDROCHLORIDE 0.1 MG/1
0.1 TABLET ORAL 3 TIMES DAILY
Status: DISCONTINUED | OUTPATIENT
Start: 2023-06-01 | End: 2023-06-01 | Stop reason: HOSPADM

## 2023-05-31 RX ADMIN — SODIUM PHOSPHATE, DIBASIC AND SODIUM PHOSPHATE, MONOBASIC 1 ENEMA: 7; 19 ENEMA RECTAL at 12:05

## 2023-05-31 RX ADMIN — ENOXAPARIN SODIUM 40 MG: 40 INJECTION SUBCUTANEOUS at 09:05

## 2023-05-31 RX ADMIN — ASPIRIN 81 MG: 81 TABLET, COATED ORAL at 06:05

## 2023-05-31 RX ADMIN — MAGNESIUM SULFATE HEPTAHYDRATE 2 G: 40 INJECTION, SOLUTION INTRAVENOUS at 11:05

## 2023-05-31 RX ADMIN — POLYETHYLENE GLYCOL 3350, SODIUM SULFATE ANHYDROUS, SODIUM BICARBONATE, SODIUM CHLORIDE, POTASSIUM CHLORIDE 4000 ML: 236; 22.74; 6.74; 5.86; 2.97 POWDER, FOR SOLUTION ORAL at 06:05

## 2023-05-31 RX ADMIN — POTASSIUM CHLORIDE 40 MEQ: 1500 TABLET, EXTENDED RELEASE ORAL at 10:05

## 2023-05-31 RX ADMIN — POTASSIUM CHLORIDE 40 MEQ: 1500 TABLET, EXTENDED RELEASE ORAL at 08:05

## 2023-05-31 RX ADMIN — LOSARTAN POTASSIUM 100 MG: 25 TABLET, FILM COATED ORAL at 08:05

## 2023-05-31 RX ADMIN — HYDROCHLOROTHIAZIDE 25 MG: 25 TABLET ORAL at 08:05

## 2023-05-31 RX ADMIN — CEFTRIAXONE 2 G: 2 INJECTION, SOLUTION INTRAVENOUS at 06:05

## 2023-05-31 RX ADMIN — CARVEDILOL 3.12 MG: 3.12 TABLET, FILM COATED ORAL at 04:05

## 2023-05-31 RX ADMIN — HYDRALAZINE HYDROCHLORIDE 10 MG: 20 INJECTION INTRAMUSCULAR; INTRAVENOUS at 11:05

## 2023-05-31 RX ADMIN — POTASSIUM CHLORIDE 10 MEQ: 7.46 INJECTION, SOLUTION INTRAVENOUS at 08:05

## 2023-05-31 RX ADMIN — CLONIDINE HYDROCHLORIDE 0.2 MG: 0.1 TABLET ORAL at 08:05

## 2023-05-31 RX ADMIN — POTASSIUM CHLORIDE 10 MEQ: 7.46 INJECTION, SOLUTION INTRAVENOUS at 07:05

## 2023-05-31 RX ADMIN — DOCUSATE SODIUM 100 MG: 100 CAPSULE, LIQUID FILLED ORAL at 08:05

## 2023-05-31 RX ADMIN — CLONIDINE HYDROCHLORIDE 0.2 MG: 0.1 TABLET ORAL at 06:05

## 2023-05-31 NOTE — ANESTHESIA PREPROCEDURE EVALUATION
06/01/2023  Shahida Ortega is a 52 y.o., female.  Pre-operative evaluation for Procedure(s) (LRB):  COLONOSCOPY (N/A)    NPO >8 instruction        Vitals:    05/31/23 1638 05/31/23 2017 05/31/23 2347 06/01/23 0443   BP: (!) 183/88 127/71 (!) 144/76 (!) 144/68   BP Location:  Right arm Right arm Right arm   Patient Position:  Lying Lying Lying   Pulse:  74 80 74   Resp:  18 17 18   Temp:  36.9 °C (98.4 °F) 37.1 °C (98.7 °F) 36.8 °C (98.2 °F)   TempSrc:  Oral Oral Oral   SpO2:  100% 98% 98%   Weight:       Height:           Patient Active Problem List   Diagnosis    Intra-abdominal abscess    Postprocedural intraabdominal abscess    Epigastric pain    Elevated CA 19-9 level    Acute cystitis without hematuria    Elevated d-dimer    Elevated procalcitonin    Hypertension    DM2 (diabetes mellitus, type 2)    Sepsis due to Escherichia coli (E. coli)    Prolonged Q-T interval on ECG    A-fib       Review of patient's allergies indicates:   Allergen Reactions    Penicillins Hives    Amoxicillin     Grass pollen-june grass standard Other (See Comments)       No current facility-administered medications on file prior to encounter.     Current Outpatient Medications on File Prior to Encounter   Medication Sig Dispense Refill    LOSARTAN POTASSIUM, BULK, MISC 100 mg by Misc.(Non-Drug; Combo Route) route.      metFORMIN (GLUCOPHAGE) 500 MG tablet TAKE 1 TABLET BY MOUTH TWICE DAILY WITH MEALS 180 tablet 0    triamterene-hydrochlorothiazide 37.5-25 mg (DYAZIDE) 37.5-25 mg per capsule Take 1 capsule by mouth.      acetaminophen (TYLENOL) 325 MG tablet Take 325 mg by mouth every 6 (six) hours as needed for Pain.      blood sugar diagnostic Strp To check BG 2 times daily, to use with insurance preferred meter 200 each 3    blood-glucose meter kit To check BG 2 times daily, to use with insurance preferred  meter 1 each 0    lancets Misc To check BG 2 times daily, to use with insurance preferred meter 200 each 3       Past Surgical History:   Procedure Laterality Date    ABDOMINAL SURGERY      DIAGNOSTIC LAPAROSCOPY N/A 5/26/2019    Procedure: LAPAROSCOPY, DIAGNOSTIC Drainage of abscess ;  Surgeon: Jose Luis Hanson MD;  Location: [facility] OR;  Service: General;  Laterality: N/A;  Drain Placement    DIAGNOSTIC LAPAROSCOPY N/A 12/27/2020    Procedure: Diagnostic laparoscopy, drainage of intra-abdominal abscess;  Surgeon: Bhupendra Banda MD;  Location: [facility] OR;  Service: General;  Laterality: N/A;    LAPAROSCOPIC LYSIS OF ADHESIONS  5/26/2019    Procedure: LYSIS, ADHESIONS, LAPAROSCOPIC;  Surgeon: Jose Luis Hanson MD;  Location: [facility] OR;  Service: General;;     LABS  CBC:   Recent Labs     05/31/23 0428 06/01/23 0348   WBC 4.88 7.02   RBC 4.04 4.34   HGB 10.9* 12.0   HCT 34.0* 36.3*   * 162   MCV 84 84   MCH 27.0 27.6   MCHC 32.1 33.1       CMP:   Recent Labs     05/31/23 0428 06/01/23 0348    136   K 2.6* 3.7    100   CO2 26 24   BUN 5* 6   CREATININE 0.6 0.7   * 134*   MG 1.7 1.8   PHOS 2.9 3.0   CALCIUM 9.3 9.5   ALBUMIN 2.8* 3.2*   PROT 6.7 7.7   ALKPHOS 65 91   ALT 16 18   AST 16 18   BILITOT 0.6 0.6       Hgb A1c 11.3%      D- Dimer 1.56 5/28/23    CA 19-9 Elevated @ 142.7      Diagnostic Studies:   CTA chest. PE study 5/28/23  Impression:     No evidence of acute pulmonary embolism. Mosaic pattern of the lung parenchyma in the lower lung fields.     Trace pericardial effusion versus mild pericardial wall thickening.     Hepatic steatosis.     Prominent spleen.    EKG:  Vent. Rate : 118 BPM     Atrial Rate : 258 BPM      P-R Int : 000 ms          QRS Dur : 100 ms       QT Int : 322 ms       P-R-T Axes : 000 -12 029 degrees      QTc Int : 451 ms     Atrial fibrillation with rapid ventricular response   Nonspecific ST abnormality   Abnormal ECG   When compared with ECG of  28-MAY-2023 17:05,   Significant changes have occurred   Confirmed by Yuri Rojas MD ([id number]) on 5/30/2023 10:19:08 PM     2D Echo:  No results found for this or any previous visit.      Pre-op Assessment    I have reviewed the Patient Summary Reports.       I have reviewed the Medications.     Review of Systems  Anesthesia Hx:  No problems with previous Anesthesia  History of prior surgery of interest to airway management or planning: Denies Family Hx of Anesthesia complications.   Denies Personal Hx of Anesthesia complications.   Social:  Non-Smoker, No Alcohol Use    Hematology/Oncology:         -- Anemia:   Cardiovascular:   Hypertension Dysrhythmias atrial fibrillation ECG has been reviewed.    Hepatic/GI:   Bowel Prep. +E. Coli sepsis, fecal impaction, Hx mult Ex laps for adhesions   Musculoskeletal:  Musculoskeletal Normal    Neurological:  Neurology Normal    Endocrine:   Diabetes, poorly controlled, type 2  Obesity / BMI > 30  Dermatological:  Skin Normal    Psych:  Psychiatric Normal           Physical Exam  General: Well nourished, Cooperative, Alert and Oriented    Airway:  Mallampati: II   Mouth Opening: Normal  TM Distance: Normal  Tongue: Normal  Neck ROM: Normal ROM    Dental:  Intact    Chest/Lungs:  Normal Respiratory Rate    Heart:  Rate: Normal  Rhythm: Regular Rhythm  Sounds: Normal        Anesthesia Plan  Type of Anesthesia, risks & benefits discussed:    Anesthesia Type: Gen Natural Airway  Intra-op Monitoring Plan: Standard ASA Monitors  Induction:  IV  Informed Consent: Informed consent signed with the Patient and all parties understand the risks and agree with anesthesia plan.  All questions answered.   ASA Score: 3  Anesthesia Plan Notes:       Ready For Surgery From Anesthesia Perspective.     .

## 2023-05-31 NOTE — CARE UPDATE
Patient in NSR now, but had an episode on 5/29/23 of Afib/flutter  GNR sepsis with high procal load, prone to incite new onset Afib due to catecholamine release.   Also with biphasic p-wave, so likely with enlarged left atria making her prone to afib as well. ECHO ordered to assess structure.  Given patient is now in NSR, would favor holding an anticoagulation until after GI procedure, aspirin okay.   Does have a prolonged QTc at 525, likely from hypokalemia, K 2.6 this morning. Has since received 80 mEq this am, and will receive another 40 this evening. Should be at goal K in the am. Antinausea PRNs removed.   Low dose correg added, do not want to control SBP too much in setting of bacteremia, Goal -180.       Will consult Cardiology, per Anesthesiology request.         Tyrese Wang MD  Internal Medicine Staff

## 2023-05-31 NOTE — PROGRESS NOTES
[facility] Medicine  Progress Note    Patient Name: Shahida Ortega  MRN: [id number]  Patient Class: IP- Inpatient   Admission Date: 5/28/2023  Length of Stay: 2 days  Attending Physician: Tyrese Wang MD  Primary Care Provider: Janusz Staton MD        Subjective:     Principal Problem:Sepsis due to Escherichia coli (E. coli)        HPI:  52-year-old female with past medical history of hypertension and type 2 diabetes who presents to the emergency department with a chief complaint of emesis.  She was accompanied by her daughter who lives with her and helps provide this history.  She was in her normal state of health until earlier yesterday morning when she started having nausea.  Subsequently, she had 4 episodes of nonbloody, nonbilious emesis at work and her daughter was called to come and take her to the emergency department.  She denies any abdominal pain, chest pain, shortness of breath, or urinary symptoms including frequency, urgency, or dysuria. She has had cough for the last week or so. Daughter reports that yesterday she was acting at her baseline and she has been compliant with hypertension and diabetes medications as prescribed.  Daughter reports that at baseline patient is awake alert oriented x4.  Patient was initially lethargic, slow to answer questions, not at baseline. Initially patient with temp of 103.3, tachycardia 110s-114s, tachypnea 26-30s, elevated BP. No medications prior to arrival to attempt to alleviate symptoms. On my examination of patient, patient now at baseline, AAOx4.  Vitals signs improved, now afebrile HR and RR WNL. Workup CT of head negative for acute findings, revealed no leukocytosis, h/h stable, platelets 118, procalcitonin 10.56, d-dimer elevated 1.56, glucose 226, lactic WNL, covid negative, u/a concerning for infectious process, blood cultures pending, CT of abdomen/pelvis without acute or infectious findings, question fecal impaction.  She reports last BM  yesterday, no issues with BM pattern.  CTA of chest negative for PE, chest xray without acute process.  Patient admitted to hospital medicine observation unit for further medical management.        Overview/Hospital Course:  52-year-old female with past medical history of hypertension and type 2 diabetes admitted to observation for further evaluation of nausea and vomiting on 05/29/23. Found to be febrile, tachycardic and elevated procalcitonin. UA with +1 leukocytosis, does not appear grossly infected. Urine cx pending. Blood cx now with GNR. Antibiotics changed to Rocephin 2g daily.  Imaging with no acute process but noted constipation. Started bowel regimen. Will advance diet as tolerated. Continue IV CTX and vanc. Repeat blood cultures ordered.  F/u urine and blood cultures.    Patient admitted with 3/4 sirs, sepsis without septic shock, bacteremic with GNR.  103.3 F, , RR 30, WBC WNL.  Procalcitonin 10.6.  UA does not look particularly infectious, +4 glucose, diabetes uncontrolled, which makes her susceptible to infections however.  She appears to have fecal impaction/constipation, likely a bit obstructed and caused translocation of bacteria into the blood stream. GNR presumptive E coli, will discontinue vancomycin.    Patient's CA 19 9 elevated at 142, concerned that it has been elevated for 4 years now.  Nothing seen on CT with contrast, perhaps an MRCP.  Will consult Oncology and GI for recommendations.      Interval History:   NAEON.  No new issues.   Denies complaints.  All questions answered and updates on care given.       Review of Systems   Constitutional:  Positive for activity change and fever.   Respiratory:  Negative for shortness of breath.    Cardiovascular:  Negative for chest pain.     Objective:     Vital Signs (Most Recent):  Temp: 98.4 °F (36.9 °C) (05/31/23 0709)  Pulse: 72 (05/31/23 0709)  Resp: 18 (05/31/23 0709)  BP: (!) 168/88 (05/31/23 0709)  SpO2: 97 % (05/31/23 0709) Vital  Signs (24h Range):  Temp:  [98.1 °F (36.7 °C)-99.1 °F (37.3 °C)] 98.4 °F (36.9 °C)  Pulse:  [72-88] 72  Resp:  [18-20] 18  SpO2:  [96 %-98 %] 97 %  BP: (160-185)/(75-95) 168/88     Weight: 108 kg (238 lb 1.6 oz)  Body mass index is 37.29 kg/m².    Intake/Output Summary (Last 24 hours) at 5/31/2023 0848  Last data filed at 5/30/2023 1730  Gross per 24 hour   Intake 1200 ml   Output 1000 ml   Net 200 ml           Physical Exam  Vitals and nursing note reviewed.   Constitutional:       General: She is not in acute distress.     Appearance: She is well-developed and normal weight. She is ill-appearing. She is not diaphoretic.   HENT:      Head: Normocephalic and atraumatic.   Eyes:      General: No scleral icterus.     Pupils: Pupils are equal, round, and reactive to light.   Neck:      Thyroid: No thyromegaly.   Cardiovascular:      Rate and Rhythm: Normal rate and regular rhythm.      Heart sounds: Murmur heard.   Pulmonary:      Effort: Pulmonary effort is normal.      Breath sounds: Normal breath sounds. No stridor. No wheezing or rales.   Abdominal:      General: There is no distension.      Palpations: Abdomen is soft. There is no mass.      Tenderness: There is no guarding.   Musculoskeletal:         General: No swelling or deformity. Normal range of motion.      Cervical back: Normal range of motion and neck supple.   Skin:     General: Skin is warm and dry.      Capillary Refill: Capillary refill takes less than 2 seconds.      Coloration: Skin is not jaundiced.      Findings: No lesion.   Neurological:      Mental Status: She is alert and oriented to person, place, and time. Mental status is at baseline.      Cranial Nerves: No cranial nerve deficit.      Gait: Gait normal.   Psychiatric:         Mood and Affect: Mood normal.         Behavior: Behavior normal.               Recent Results (from the past 24 hour(s))   Cancer antigen 19-9    Collection Time: 05/30/23 11:35 AM   Result Value Ref Range    CA 19-9  142.7 (H) 0.0 - 40.0 U/mL   CBC Auto Differential    Collection Time: 05/30/23 11:35 AM   Result Value Ref Range    WBC 5.69 3.90 - 12.70 K/uL    RBC 4.02 4.00 - 5.40 M/uL    Hemoglobin 11.3 (L) 12.0 - 16.0 g/dL    Hematocrit 34.5 (L) 37.0 - 48.5 %    MCV 86 82 - 98 fL    MCH 28.1 27.0 - 31.0 pg    MCHC 32.8 32.0 - 36.0 g/dL    RDW 13.3 11.5 - 14.5 %    Platelets 131 (L) 150 - 450 K/uL    MPV 10.2 9.2 - 12.9 fL    Immature Granulocytes 0.5 0.0 - 0.5 %    Gran # (ANC) 3.6 1.8 - 7.7 K/uL    Immature Grans (Abs) 0.03 0.00 - 0.04 K/uL    Lymph # 1.4 1.0 - 4.8 K/uL    Mono # 0.5 0.3 - 1.0 K/uL    Eos # 0.1 0.0 - 0.5 K/uL    Baso # 0.01 0.00 - 0.20 K/uL    nRBC 0 0 /100 WBC    Gran % 64.0 38.0 - 73.0 %    Lymph % 24.4 18.0 - 48.0 %    Mono % 9.1 4.0 - 15.0 %    Eosinophil % 1.8 0.0 - 8.0 %    Basophil % 0.2 0.0 - 1.9 %    Differential Method Automated    Comprehensive Metabolic Panel    Collection Time: 05/30/23 11:35 AM   Result Value Ref Range    Sodium 138 136 - 145 mmol/L    Potassium 3.1 (L) 3.5 - 5.1 mmol/L    Chloride 103 95 - 110 mmol/L    CO2 28 23 - 29 mmol/L    Glucose 128 (H) 70 - 110 mg/dL    BUN 6 6 - 20 mg/dL    Creatinine 0.7 0.5 - 1.4 mg/dL    Calcium 8.8 8.7 - 10.5 mg/dL    Total Protein 7.1 6.0 - 8.4 g/dL    Albumin 2.9 (L) 3.5 - 5.2 g/dL    Total Bilirubin 0.4 0.1 - 1.0 mg/dL    Alkaline Phosphatase 80 55 - 135 U/L    AST 17 10 - 40 U/L    ALT 17 10 - 44 U/L    Anion Gap 7 (L) 8 - 16 mmol/L    eGFR >60 >60 mL/min/1.73 m^2   Magnesium    Collection Time: 05/30/23 11:35 AM   Result Value Ref Range    Magnesium 1.7 1.6 - 2.6 mg/dL   Phosphorus    Collection Time: 05/30/23 11:35 AM   Result Value Ref Range    Phosphorus 2.2 (L) 2.7 - 4.5 mg/dL   POCT glucose    Collection Time: 05/30/23 11:59 AM   Result Value Ref Range    POCT Glucose 119 (H) 70 - 110 mg/dL   POCT glucose    Collection Time: 05/30/23  4:38 PM   Result Value Ref Range    POCT Glucose 115 (H) 70 - 110 mg/dL   POCT glucose    Collection  Time: 05/30/23  7:16 PM   Result Value Ref Range    POCT Glucose 136 (H) 70 - 110 mg/dL   CBC Auto Differential    Collection Time: 05/31/23  4:28 AM   Result Value Ref Range    WBC 4.88 3.90 - 12.70 K/uL    RBC 4.04 4.00 - 5.40 M/uL    Hemoglobin 10.9 (L) 12.0 - 16.0 g/dL    Hematocrit 34.0 (L) 37.0 - 48.5 %    MCV 84 82 - 98 fL    MCH 27.0 27.0 - 31.0 pg    MCHC 32.1 32.0 - 36.0 g/dL    RDW 13.2 11.5 - 14.5 %    Platelets 139 (L) 150 - 450 K/uL    MPV 10.3 9.2 - 12.9 fL    Immature Granulocytes 0.4 0.0 - 0.5 %    Gran # (ANC) 2.9 1.8 - 7.7 K/uL    Immature Grans (Abs) 0.02 0.00 - 0.04 K/uL    Lymph # 1.3 1.0 - 4.8 K/uL    Mono # 0.5 0.3 - 1.0 K/uL    Eos # 0.1 0.0 - 0.5 K/uL    Baso # 0.01 0.00 - 0.20 K/uL    nRBC 0 0 /100 WBC    Gran % 60.2 38.0 - 73.0 %    Lymph % 26.2 18.0 - 48.0 %    Mono % 10.7 4.0 - 15.0 %    Eosinophil % 2.3 0.0 - 8.0 %    Basophil % 0.2 0.0 - 1.9 %    Differential Method Automated    Comprehensive Metabolic Panel    Collection Time: 05/31/23  4:28 AM   Result Value Ref Range    Sodium 137 136 - 145 mmol/L    Potassium 2.6 (LL) 3.5 - 5.1 mmol/L    Chloride 100 95 - 110 mmol/L    CO2 26 23 - 29 mmol/L    Glucose 142 (H) 70 - 110 mg/dL    BUN 5 (L) 6 - 20 mg/dL    Creatinine 0.6 0.5 - 1.4 mg/dL    Calcium 9.3 8.7 - 10.5 mg/dL    Total Protein 6.7 6.0 - 8.4 g/dL    Albumin 2.8 (L) 3.5 - 5.2 g/dL    Total Bilirubin 0.6 0.1 - 1.0 mg/dL    Alkaline Phosphatase 65 55 - 135 U/L    AST 16 10 - 40 U/L    ALT 16 10 - 44 U/L    Anion Gap 11 8 - 16 mmol/L    eGFR >60 >60 mL/min/1.73 m^2   Magnesium    Collection Time: 05/31/23  4:28 AM   Result Value Ref Range    Magnesium 1.7 1.6 - 2.6 mg/dL   Phosphorus    Collection Time: 05/31/23  4:28 AM   Result Value Ref Range    Phosphorus 2.9 2.7 - 4.5 mg/dL   POCT glucose    Collection Time: 05/31/23  7:11 AM   Result Value Ref Range    POCT Glucose 141 (H) 70 - 110 mg/dL       Microbiology Results (last 7 days)       Procedure Component Value Units Date/Time     Urine Culture High Risk [[id number]]  (Abnormal) Collected: 05/29/23 1034    Order Status: Completed Specimen: Urine, Clean Catch Updated: 05/31/23 0751     Urine Culture, Routine AURELIA ALBICANS  10,000 - 49,999 cfu/ml  Treatment of asymptomatic candiduria is not recommended (except for   specific populations). Candida isolated in the urine typically   represents colonization. If an indwelling urinary catheter is present  it should be removed or replaced.      Narrative:      Indicated criteria for high risk culture:->Other  Other (specify):->altered mental status    Blood culture x two cultures. Draw prior to antibiotics. [[id number]]  (Abnormal)  (Susceptibility) Collected: 05/28/23 1703    Order Status: Completed Specimen: Blood from Peripheral, Hand, Left Updated: 05/31/23 0716     Blood Culture, Routine Gram stain cameron bottle: Gram negative rods      Results called to and read back by: Yulia Robin 05/29/2023  07:38      ESCHERICHIA COLI    Narrative:      Aerobic and anaerobic    Blood culture x two cultures. Draw prior to antibiotics. [[id number]] Collected: 05/28/23 1647    Order Status: Completed Specimen: Blood from Peripheral, Antecubital, Right Updated: 05/30/23 1703     Blood Culture, Routine No Growth to date      No Growth to date      No Growth to date    Narrative:      Aerobic and anaerobic    Blood culture [[id number]] Collected: 05/29/23 0834    Order Status: Completed Specimen: Blood Updated: 05/30/23 0903     Blood Culture, Routine No Growth to date      No Growth to date    Blood culture [[id number]] Collected: 05/29/23 0834    Order Status: Completed Specimen: Blood Updated: 05/30/23 0903     Blood Culture, Routine No Growth to date      No Growth to date             Imaging Results              CTA Chest Non-Coronary (PE Studies) (Final result)  Result time 05/28/23 23:10:01      Final result by Jenna De Dios MD (05/28/23 23:10:01)                   Impression:      No evidence of  acute pulmonary embolism. Mosaic pattern of the lung parenchyma in the lower lung fields.    Trace pericardial effusion versus mild pericardial wall thickening.    Hepatic steatosis.    Prominent spleen.      Electronically signed by: Jenna De Dios  Date:    05/28/2023  Time:    23:10               Narrative:    EXAMINATION:  CT PULMONARY ANGIOGRAM WITH CONTRAST    CLINICAL HISTORY:  Nausea vomiting.  Positive D-dimer.    TECHNIQUE:  CT of the chest with intravenous contrast for pulmonary artery angiogram was performed. Contiguous axial 1.25 mm images followed by 10 mm reconstructions with multiplanar and MIP reformations of the pulmonary arteries. No 3D post-angiographic imaging was performed on an independent workstation and reviewed.  75 ml of Omnipaque 350 was injected.    COMPARISON:  None.    FINDINGS:  There is no evidence of pulmonary artery filling defect to suggest pulmonary embolism. There is no aortic aneurysm or aortic dissection.  Mosaic pattern of the lung parenchyma in lower lung fields.    There is no evidence of mediastinal, hilar, or axillary adenopathy.    There is trace pericardial effusion versus mild pericardial wall thickening.  No pleural effusion is detected.    The heart size is within normal limits.    In the visualized upper abdomen, there is fatty infiltration of the liver.  The spleen is prominent.    Spondylitic changes are present.                                       CT Abdomen Pelvis With Contrast (Final result)  Result time 05/28/23 18:39:34      Final result by Jenna De Dios MD (05/28/23 18:39:34)                   Impression:      No acute abdominal or pelvic pathology CT of the abdomen and pelvis with contrast.    Mildly elongated spleen.    Moderate fat containing ventral abdominal hernia.  Small fat containing umbilical hernia.    Fibroid uterus.    Moderately large stool in the patulous rectum.  Question fecal impaction.      Electronically signed by: Jenna  Lanre  Date:    05/28/2023  Time:    18:39               Narrative:    EXAMINATION:  CT OF ABDOMEN PELVIS WITH    CLINICAL HISTORY:  Sepsis;    TECHNIQUE:  5 mm enhanced axial images were obtained from the lung bases through the greater trochanters.  Seventy-five mL of Omnipaque 350 was injected.    COMPARISON:  09/12/2021    FINDINGS:  The liver, pancreas, kidneys, and adrenal glands are unremarkable. The gallbladder contains no calcified gallstones.    The spleen is mildly elongated measuring 12.4 x 3.9 cm (series 2 axial image 42).    There is no definite evidence for abdominal adenopathy or ascites.  A moderate fat containing midline ventral abdominal hernia is present.  A small fat containing umbilical hernia is again seen.    There is a moderately large lobular uterus containing multiple calcified and noncalcified uterine fibroids.  The appendix is not enlarged.  The rectum is patulous and contains partly large stool..    There is no free fluid in the pelvis.    There is mild bibasilar atelectasis.                                       CT Head Without Contrast (Final result)  Result time 05/28/23 18:21:49      Final result by Jenna De Dios MD (05/28/23 18:21:49)                   Impression:      No acute intracranial abnormality detected.    Mild sinus disease.      Electronically signed by: Jenna De Dios  Date:    05/28/2023  Time:    18:21               Narrative:    EXAMINATION:  CT OF THE HEAD WITHOUT    CLINICAL HISTORY:  Mental status change, unknown cause;    TECHNIQUE:  5 mm unenhanced axial images were obtained from the skull base to the vertex.    COMPARISON:  None.    FINDINGS:  The ventricles, basal cisterns, and cortical sulci are within normal limits for patient's stated age. There is no acute intracranial hemorrhage, territorial infarct or mass effect, or midline shift. In the visualized paranasal sinuses, there is mild mucoperiosteal thickening in bilateral maxillary sinuses and in  the left ethmoid air cells.                                       X-Ray Chest AP Portable (Final result)  Result time 05/28/23 17:29:30      Final result by Terry Cook MD (05/28/23 17:29:30)                   Impression:      No acute process.      Electronically signed by: Terry Cook MD  Date:    05/28/2023  Time:    17:29               Narrative:    EXAMINATION:  XR CHEST AP PORTABLE    CLINICAL HISTORY:  Sepsis;    TECHNIQUE:  Single frontal view of the chest was performed.    COMPARISON:  09/12/2021.    FINDINGS:  Monitoring EKG leads are present.  The trachea is unremarkable.  The cardiomediastinal silhouette is within normal limits.  There is no evidence of free air beneath the hemidiaphragms.  There are no pleural effusions.  There is no evidence of a pneumothorax.  There is no evidence of pneumomediastinum.  No airspace opacity is present.  The osseous structures are unremarkable.                                            Assessment/Plan:      * Sepsis due to Escherichia coli (E. coli)  This patient does have evidence of infective focus  My overall impression is sepsis.  Source: Abdominal  Antibiotics given-   Antibiotics (72h ago, onward)    Start     Stop Route Frequency Ordered    05/29/23 0753  cefTRIAXone 2 gram/50 mL IVPB 2 g         -- IV Every 24 hours (non-standard times) 05/29/23 0754        Latest lactate reviewed-  Fluid challenge Actual Body weight- Patient will receive 30ml/kg actual body weight to calculate fluid bolus for treatment of septic shock.   Post- resuscitation assessment Yes Perfusion exam was performed within 6 hours of septic shock presentation after bolus shows Adequate tissue perfusion assessed by non-invasive monitoring   Will Not start Pressors- Levophed for MAP of 65    · Patient admitted with 3/4 sirs, sepsis without septic shock, bacteremic with GNR.    · 103.3 F, , RR 30, WBC WNL.  Procalcitonin 10.6.    · UA does not look particularly infectious, +4 glucose,  diabetes uncontrolled, which makes her susceptible to infections however.    · She appears to have fecal impaction/constipation, likely a bit obstructed and caused translocation of bacteria into the blood stream.  · GNR presumptive E coli, will discontinue vancomycin.  · Has had intra-abdominal abscesses in the past, CT with contrast did not reveal any abscesses  · Her CA 19 9 has oddly been elevated since 2019, no pancreatic lesions seen on CT    DM2 (diabetes mellitus, type 2)  Recent A1c 3/2022 at 11.3  Repeat A1c pending  Treat with SSI ac/hs during hospital stay  Hold oral antihyperglycemic agents  Resume home regimen at discharge  Hypoglycemia protocol PRN      Hypertension  Hold bp meds secondary to possible sepsis  Hydralazine prn       Elevated d-dimer  CTA of chest negative for PE  Currently denies chest pain, SOB      Elevated CA 19-9 level  · Patient's CA 19 9 elevated at 142, concerned that it has been elevated for 4 years now.    · Nothing seen on CT with contrast, perhaps an MRCP.    · Will consult Oncology and GI for recommendations.        VTE Risk Mitigation (From admission, onward)         Ordered     enoxaparin injection 40 mg  Every 24 hours         05/31/23 0850     IP VTE HIGH RISK PATIENT  Once         05/28/23 2205     Place sequential compression device  Until discontinued         05/28/23 2205                Discharge Planning   NAYELI: 5/31/2023     Code Status: Full Code   Is the patient medically ready for discharge?: No    Reason for patient still in hospital (select all that apply): Patient trending condition and Treatment  Discharge Plan A: Home with family                  Tyrese Wang MD  Department of Hospital Medicine   [facility]

## 2023-05-31 NOTE — ASSESSMENT & PLAN NOTE
· Patient's CA 19 9 elevated at 142, concerned that it has been elevated for 4 years now.    · Nothing seen on CT with contrast, perhaps an MRCP.    · Will consult Oncology and GI for recommendations.

## 2023-05-31 NOTE — SUBJECTIVE & OBJECTIVE
Interval History:   NAEON.  No new issues.   Denies complaints.  All questions answered and updates on care given.       Review of Systems   Constitutional:  Positive for activity change and fever.   Respiratory:  Negative for shortness of breath.    Cardiovascular:  Negative for chest pain.     Objective:     Vital Signs (Most Recent):  Temp: 98.4 °F (36.9 °C) (05/31/23 0709)  Pulse: 72 (05/31/23 0709)  Resp: 18 (05/31/23 0709)  BP: (!) 168/88 (05/31/23 0709)  SpO2: 97 % (05/31/23 0709) Vital Signs (24h Range):  Temp:  [98.1 °F (36.7 °C)-99.1 °F (37.3 °C)] 98.4 °F (36.9 °C)  Pulse:  [72-88] 72  Resp:  [18-20] 18  SpO2:  [96 %-98 %] 97 %  BP: (160-185)/(75-95) 168/88     Weight: 108 kg (238 lb 1.6 oz)  Body mass index is 37.29 kg/m².    Intake/Output Summary (Last 24 hours) at 5/31/2023 0848  Last data filed at 5/30/2023 1730  Gross per 24 hour   Intake 1200 ml   Output 1000 ml   Net 200 ml           Physical Exam  Vitals and nursing note reviewed.   Constitutional:       General: She is not in acute distress.     Appearance: She is well-developed and normal weight. She is ill-appearing. She is not diaphoretic.   HENT:      Head: Normocephalic and atraumatic.   Eyes:      General: No scleral icterus.     Pupils: Pupils are equal, round, and reactive to light.   Neck:      Thyroid: No thyromegaly.   Cardiovascular:      Rate and Rhythm: Normal rate and regular rhythm.      Heart sounds: Murmur heard.   Pulmonary:      Effort: Pulmonary effort is normal.      Breath sounds: Normal breath sounds. No stridor. No wheezing or rales.   Abdominal:      General: There is no distension.      Palpations: Abdomen is soft. There is no mass.      Tenderness: There is no guarding.   Musculoskeletal:         General: No swelling or deformity. Normal range of motion.      Cervical back: Normal range of motion and neck supple.   Skin:     General: Skin is warm and dry.      Capillary Refill: Capillary refill takes less than 2  seconds.      Coloration: Skin is not jaundiced.      Findings: No lesion.   Neurological:      Mental Status: She is alert and oriented to person, place, and time. Mental status is at baseline.      Cranial Nerves: No cranial nerve deficit.      Gait: Gait normal.   Psychiatric:         Mood and Affect: Mood normal.         Behavior: Behavior normal.               Recent Results (from the past 24 hour(s))   Cancer antigen 19-9    Collection Time: 05/30/23 11:35 AM   Result Value Ref Range    CA 19-9 142.7 (H) 0.0 - 40.0 U/mL   CBC Auto Differential    Collection Time: 05/30/23 11:35 AM   Result Value Ref Range    WBC 5.69 3.90 - 12.70 K/uL    RBC 4.02 4.00 - 5.40 M/uL    Hemoglobin 11.3 (L) 12.0 - 16.0 g/dL    Hematocrit 34.5 (L) 37.0 - 48.5 %    MCV 86 82 - 98 fL    MCH 28.1 27.0 - 31.0 pg    MCHC 32.8 32.0 - 36.0 g/dL    RDW 13.3 11.5 - 14.5 %    Platelets 131 (L) 150 - 450 K/uL    MPV 10.2 9.2 - 12.9 fL    Immature Granulocytes 0.5 0.0 - 0.5 %    Gran # (ANC) 3.6 1.8 - 7.7 K/uL    Immature Grans (Abs) 0.03 0.00 - 0.04 K/uL    Lymph # 1.4 1.0 - 4.8 K/uL    Mono # 0.5 0.3 - 1.0 K/uL    Eos # 0.1 0.0 - 0.5 K/uL    Baso # 0.01 0.00 - 0.20 K/uL    nRBC 0 0 /100 WBC    Gran % 64.0 38.0 - 73.0 %    Lymph % 24.4 18.0 - 48.0 %    Mono % 9.1 4.0 - 15.0 %    Eosinophil % 1.8 0.0 - 8.0 %    Basophil % 0.2 0.0 - 1.9 %    Differential Method Automated    Comprehensive Metabolic Panel    Collection Time: 05/30/23 11:35 AM   Result Value Ref Range    Sodium 138 136 - 145 mmol/L    Potassium 3.1 (L) 3.5 - 5.1 mmol/L    Chloride 103 95 - 110 mmol/L    CO2 28 23 - 29 mmol/L    Glucose 128 (H) 70 - 110 mg/dL    BUN 6 6 - 20 mg/dL    Creatinine 0.7 0.5 - 1.4 mg/dL    Calcium 8.8 8.7 - 10.5 mg/dL    Total Protein 7.1 6.0 - 8.4 g/dL    Albumin 2.9 (L) 3.5 - 5.2 g/dL    Total Bilirubin 0.4 0.1 - 1.0 mg/dL    Alkaline Phosphatase 80 55 - 135 U/L    AST 17 10 - 40 U/L    ALT 17 10 - 44 U/L    Anion Gap 7 (L) 8 - 16 mmol/L    eGFR >60 >60  mL/min/1.73 m^2   Magnesium    Collection Time: 05/30/23 11:35 AM   Result Value Ref Range    Magnesium 1.7 1.6 - 2.6 mg/dL   Phosphorus    Collection Time: 05/30/23 11:35 AM   Result Value Ref Range    Phosphorus 2.2 (L) 2.7 - 4.5 mg/dL   POCT glucose    Collection Time: 05/30/23 11:59 AM   Result Value Ref Range    POCT Glucose 119 (H) 70 - 110 mg/dL   POCT glucose    Collection Time: 05/30/23  4:38 PM   Result Value Ref Range    POCT Glucose 115 (H) 70 - 110 mg/dL   POCT glucose    Collection Time: 05/30/23  7:16 PM   Result Value Ref Range    POCT Glucose 136 (H) 70 - 110 mg/dL   CBC Auto Differential    Collection Time: 05/31/23  4:28 AM   Result Value Ref Range    WBC 4.88 3.90 - 12.70 K/uL    RBC 4.04 4.00 - 5.40 M/uL    Hemoglobin 10.9 (L) 12.0 - 16.0 g/dL    Hematocrit 34.0 (L) 37.0 - 48.5 %    MCV 84 82 - 98 fL    MCH 27.0 27.0 - 31.0 pg    MCHC 32.1 32.0 - 36.0 g/dL    RDW 13.2 11.5 - 14.5 %    Platelets 139 (L) 150 - 450 K/uL    MPV 10.3 9.2 - 12.9 fL    Immature Granulocytes 0.4 0.0 - 0.5 %    Gran # (ANC) 2.9 1.8 - 7.7 K/uL    Immature Grans (Abs) 0.02 0.00 - 0.04 K/uL    Lymph # 1.3 1.0 - 4.8 K/uL    Mono # 0.5 0.3 - 1.0 K/uL    Eos # 0.1 0.0 - 0.5 K/uL    Baso # 0.01 0.00 - 0.20 K/uL    nRBC 0 0 /100 WBC    Gran % 60.2 38.0 - 73.0 %    Lymph % 26.2 18.0 - 48.0 %    Mono % 10.7 4.0 - 15.0 %    Eosinophil % 2.3 0.0 - 8.0 %    Basophil % 0.2 0.0 - 1.9 %    Differential Method Automated    Comprehensive Metabolic Panel    Collection Time: 05/31/23  4:28 AM   Result Value Ref Range    Sodium 137 136 - 145 mmol/L    Potassium 2.6 (LL) 3.5 - 5.1 mmol/L    Chloride 100 95 - 110 mmol/L    CO2 26 23 - 29 mmol/L    Glucose 142 (H) 70 - 110 mg/dL    BUN 5 (L) 6 - 20 mg/dL    Creatinine 0.6 0.5 - 1.4 mg/dL    Calcium 9.3 8.7 - 10.5 mg/dL    Total Protein 6.7 6.0 - 8.4 g/dL    Albumin 2.8 (L) 3.5 - 5.2 g/dL    Total Bilirubin 0.6 0.1 - 1.0 mg/dL    Alkaline Phosphatase 65 55 - 135 U/L    AST 16 10 - 40 U/L    ALT  16 10 - 44 U/L    Anion Gap 11 8 - 16 mmol/L    eGFR >60 >60 mL/min/1.73 m^2   Magnesium    Collection Time: 05/31/23  4:28 AM   Result Value Ref Range    Magnesium 1.7 1.6 - 2.6 mg/dL   Phosphorus    Collection Time: 05/31/23  4:28 AM   Result Value Ref Range    Phosphorus 2.9 2.7 - 4.5 mg/dL   POCT glucose    Collection Time: 05/31/23  7:11 AM   Result Value Ref Range    POCT Glucose 141 (H) 70 - 110 mg/dL       Microbiology Results (last 7 days)       Procedure Component Value Units Date/Time    Urine Culture High Risk [[id number]]  (Abnormal) Collected: 05/29/23 1034    Order Status: Completed Specimen: Urine, Clean Catch Updated: 05/31/23 0751     Urine Culture, Routine AURELIA ALBICANS  10,000 - 49,999 cfu/ml  Treatment of asymptomatic candiduria is not recommended (except for   specific populations). Candida isolated in the urine typically   represents colonization. If an indwelling urinary catheter is present  it should be removed or replaced.      Narrative:      Indicated criteria for high risk culture:->Other  Other (specify):->altered mental status    Blood culture x two cultures. Draw prior to antibiotics. [[id number]]  (Abnormal)  (Susceptibility) Collected: 05/28/23 1703    Order Status: Completed Specimen: Blood from Peripheral, Hand, Left Updated: 05/31/23 0716     Blood Culture, Routine Gram stain cameron bottle: Gram negative rods      Results called to and read back by: Yulia Robin 05/29/2023  07:38      ESCHERICHIA COLI    Narrative:      Aerobic and anaerobic    Blood culture x two cultures. Draw prior to antibiotics. [[id number]] Collected: 05/28/23 1647    Order Status: Completed Specimen: Blood from Peripheral, Antecubital, Right Updated: 05/30/23 1703     Blood Culture, Routine No Growth to date      No Growth to date      No Growth to date    Narrative:      Aerobic and anaerobic    Blood culture [[id number]] Collected: 05/29/23 0834    Order Status: Completed Specimen: Blood Updated: 05/30/23  0903     Blood Culture, Routine No Growth to date      No Growth to date    Blood culture [[id number]] Collected: 05/29/23 0834    Order Status: Completed Specimen: Blood Updated: 05/30/23 0903     Blood Culture, Routine No Growth to date      No Growth to date             Imaging Results              CTA Chest Non-Coronary (PE Studies) (Final result)  Result time 05/28/23 23:10:01      Final result by Jenna De Dios MD (05/28/23 23:10:01)                   Impression:      No evidence of acute pulmonary embolism. Mosaic pattern of the lung parenchyma in the lower lung fields.    Trace pericardial effusion versus mild pericardial wall thickening.    Hepatic steatosis.    Prominent spleen.      Electronically signed by: Jenna De Dios  Date:    05/28/2023  Time:    23:10               Narrative:    EXAMINATION:  CT PULMONARY ANGIOGRAM WITH CONTRAST    CLINICAL HISTORY:  Nausea vomiting.  Positive D-dimer.    TECHNIQUE:  CT of the chest with intravenous contrast for pulmonary artery angiogram was performed. Contiguous axial 1.25 mm images followed by 10 mm reconstructions with multiplanar and MIP reformations of the pulmonary arteries. No 3D post-angiographic imaging was performed on an independent workstation and reviewed.  75 ml of Omnipaque 350 was injected.    COMPARISON:  None.    FINDINGS:  There is no evidence of pulmonary artery filling defect to suggest pulmonary embolism. There is no aortic aneurysm or aortic dissection.  Mosaic pattern of the lung parenchyma in lower lung fields.    There is no evidence of mediastinal, hilar, or axillary adenopathy.    There is trace pericardial effusion versus mild pericardial wall thickening.  No pleural effusion is detected.    The heart size is within normal limits.    In the visualized upper abdomen, there is fatty infiltration of the liver.  The spleen is prominent.    Spondylitic changes are present.                                       CT Abdomen Pelvis With  Contrast (Final result)  Result time 05/28/23 18:39:34      Final result by Jenna De Dios MD (05/28/23 18:39:34)                   Impression:      No acute abdominal or pelvic pathology CT of the abdomen and pelvis with contrast.    Mildly elongated spleen.    Moderate fat containing ventral abdominal hernia.  Small fat containing umbilical hernia.    Fibroid uterus.    Moderately large stool in the patulous rectum.  Question fecal impaction.      Electronically signed by: Jenna De Dios  Date:    05/28/2023  Time:    18:39               Narrative:    EXAMINATION:  CT OF ABDOMEN PELVIS WITH    CLINICAL HISTORY:  Sepsis;    TECHNIQUE:  5 mm enhanced axial images were obtained from the lung bases through the greater trochanters.  Seventy-five mL of Omnipaque 350 was injected.    COMPARISON:  09/12/2021    FINDINGS:  The liver, pancreas, kidneys, and adrenal glands are unremarkable. The gallbladder contains no calcified gallstones.    The spleen is mildly elongated measuring 12.4 x 3.9 cm (series 2 axial image 42).    There is no definite evidence for abdominal adenopathy or ascites.  A moderate fat containing midline ventral abdominal hernia is present.  A small fat containing umbilical hernia is again seen.    There is a moderately large lobular uterus containing multiple calcified and noncalcified uterine fibroids.  The appendix is not enlarged.  The rectum is patulous and contains partly large stool..    There is no free fluid in the pelvis.    There is mild bibasilar atelectasis.                                       CT Head Without Contrast (Final result)  Result time 05/28/23 18:21:49      Final result by Jenna De Dios MD (05/28/23 18:21:49)                   Impression:      No acute intracranial abnormality detected.    Mild sinus disease.      Electronically signed by: Jenna De Dios  Date:    05/28/2023  Time:    18:21               Narrative:    EXAMINATION:  CT OF THE HEAD  WITHOUT    CLINICAL HISTORY:  Mental status change, unknown cause;    TECHNIQUE:  5 mm unenhanced axial images were obtained from the skull base to the vertex.    COMPARISON:  None.    FINDINGS:  The ventricles, basal cisterns, and cortical sulci are within normal limits for patient's stated age. There is no acute intracranial hemorrhage, territorial infarct or mass effect, or midline shift. In the visualized paranasal sinuses, there is mild mucoperiosteal thickening in bilateral maxillary sinuses and in the left ethmoid air cells.                                       X-Ray Chest AP Portable (Final result)  Result time 05/28/23 17:29:30      Final result by Terry Cook MD (05/28/23 17:29:30)                   Impression:      No acute process.      Electronically signed by: Terry Cook MD  Date:    05/28/2023  Time:    17:29               Narrative:    EXAMINATION:  XR CHEST AP PORTABLE    CLINICAL HISTORY:  Sepsis;    TECHNIQUE:  Single frontal view of the chest was performed.    COMPARISON:  09/12/2021.    FINDINGS:  Monitoring EKG leads are present.  The trachea is unremarkable.  The cardiomediastinal silhouette is within normal limits.  There is no evidence of free air beneath the hemidiaphragms.  There are no pleural effusions.  There is no evidence of a pneumothorax.  There is no evidence of pneumomediastinum.  No airspace opacity is present.  The osseous structures are unremarkable.

## 2023-05-31 NOTE — PLAN OF CARE
Plan for discharge with home health and home iv infusion. Pt will need 2 weeks iv abx. GI and hem/onc consulted.     Referrals sent to [facility] and home infusion for review. [place] to continue to follow for dc needs.    05/31/23 1137   Post-Acute Status   Post-Acute Authorization IV Infusion;Home Health   Home Health Status Pending medical clearance/testing   Coverage [employer]BS   IV Infusion Status Post acute provider reviewing for acceptance   Discharge Delays None known at this time   Discharge Plan   Discharge Plan A Home Health   Discharge Plan B Home with family

## 2023-05-31 NOTE — CONSULTS
[facility] Surg  Gastroenterology  Consult Note    Patient Name: Shahida Ortega  MRN: [id number]  Admission Date: 5/28/2023  Hospital Length of Stay: 2 days  Code Status: Full Code   Attending Provider: Dr Wang  Consulting Provider: Thaddeus Carlos MD  Primary Care Physician: Janusz Staton MD  Principal Problem:Sepsis due to Escherichia coli (E. coli)    Inpatient consult to Gastroenterology  Consult performed by: Thaddeus Carlos MD  Consult ordered by: Tyrese Wang MD  Reason for consult: Sepsis and GNR bacteremia  Assessment/Recommendations: Admitted with bacteremia, now feeling better  CT scan demonstrates possible fecal impaction.  She is never had colonoscopy, might be reasonable to do while she is in the hospital      Subjective:     HPI:  52-year-old woman admitted with the sudden onset of emesis with high fever to 103.  She thought this was food poisoning based on the sudden onset of the vomiting.  There was no diarrhea.  Again she had a high fever upon presentation to the emergency room with some changes in mental status.  She denies any abdominal pain.  She denies any change in bowel habits and says she has normal formed stools on a regular basis.  Denies any issues with constipation.  And does not normally have heartburn indigestion nausea or vomiting.    While in the emergency room she was found to have positive blood cultures with Gram-negative louie.  Started on antibiotics.  Had some electrolyte difficulties including hypokalemia and that is being addressed now.  She is had improvement with the IV antibiotics and IV fluids and feels much better today.  She did have a CT scan which demonstrated possible fecal impaction.    Curiously in her history there is a 2019 exploratory surgery for intra-abdominal abscess and was never determine where the abscess was coming from.  And likewise a 2nd exploratory surgery where a large amount of intra-abdominal fluid was removed.  Both of these laparotomies report  extensive abdominal adhesions.    Apparent at that time she was referred to a gastroenterologist at [facility].  No additional tests were done and she does not know who that doctor was and we do not have those records.    Past Medical History:   Diagnosis Date    DM2 (diabetes mellitus, type 2) 5/28/2023    Hypertension        Past Surgical History:   Procedure Laterality Date    ABDOMINAL SURGERY      DIAGNOSTIC LAPAROSCOPY N/A 5/26/2019    Procedure: LAPAROSCOPY, DIAGNOSTIC Drainage of abscess ;  Surgeon: Jose Luis Hanson MD;  Location: [facility] [place] OR;  Service: General;  Laterality: N/A;  Drain Placement    DIAGNOSTIC LAPAROSCOPY N/A 12/27/2020    Procedure: Diagnostic laparoscopy, drainage of intra-abdominal abscess;  Surgeon: Bhupendra Banda MD;  Location: [facility] [place] OR;  Service: General;  Laterality: N/A;    LAPAROSCOPIC LYSIS OF ADHESIONS  5/26/2019    Procedure: LYSIS, ADHESIONS, LAPAROSCOPIC;  Surgeon: Jose Luis Hanson MD;  Location: [facility] [place] OR;  Service: General;;       Review of patient's allergies indicates:   Allergen Reactions    Penicillins Hives    Amoxicillin     Grass pollen-yun grass standard Other (See Comments)     Family History       Problem Relation (Age of Onset)    Diabetes Father          Tobacco Use    Smoking status: Never    Smokeless tobacco: Never   Substance and Sexual Activity    Alcohol use: Not Currently    Drug use: Not Currently    Sexual activity: Not Currently     Review of Systems   Constitutional:  Positive for fever.   Respiratory: Negative.     Cardiovascular: Negative.    Objective:     Vital Signs (Most Recent):  Temp: 98.4 °F (36.9 °C) (05/31/23 0709)  Pulse: 72 (05/31/23 0900)  Resp: 18 (05/31/23 0709)  BP: (!) 168/88 (05/31/23 0709)  SpO2: 97 % (05/31/23 0900) Vital Signs (24h Range):  Temp:  [98.1 °F (36.7 °C)-99.1 °F (37.3 °C)] 98.4 °F (36.9 °C)  Pulse:  [72-88] 72  Resp:  [18-20] 18  SpO2:  [96 %-98 %] 97 %  BP: (160-185)/(75-95) 168/88     Weight: 108 kg (238 lb 1.6 oz)  (05/28/23 2322)  Body mass index is 37.29 kg/m².      Intake/Output Summary (Last 24 hours) at 5/31/2023 1030  Last data filed at 5/31/2023 0800  Gross per 24 hour   Intake 1320 ml   Output 1000 ml   Net 320 ml       Lines/Drains/Airways       Peripheral Intravenous Line  Duration                  Peripheral IV - Single Lumen 05/30/23 2245 22 G Left Antecubital <1 day                    Physical Exam  Abdominal:      General: Abdomen is protuberant. Bowel sounds are normal. There is no distension or abdominal bruit. There are no signs of injury.      Palpations: Abdomen is soft. There is no shifting dullness, hepatomegaly or mass.      Tenderness: There is no abdominal tenderness.       Significant Labs:  Blood Culture: No results for input(s): LABBLOO in the last 48 hours.  CBC:   Recent Labs   Lab 05/30/23  1135 05/31/23  0428   WBC 5.69 4.88   HGB 11.3* 10.9*   HCT 34.5* 34.0*   * 139*     BMP:   Recent Labs   Lab 05/31/23  0428   *      K 2.6*      CO2 26   BUN 5*   CREATININE 0.6   CALCIUM 9.3   MG 1.7       Significant Imaging:  CT: I have reviewed all results within the past 24 hours and my personal findings are:  Possible fecal impaction    Assessment/Plan:     Active Diagnoses:    Diagnosis Date Noted POA    PRINCIPAL PROBLEM:  Sepsis due to Escherichia coli (E. coli) [A41.51] 05/30/2023 Yes    Elevated d-dimer [R79.89] 05/28/2023 Yes    Elevated procalcitonin [R79.89] 05/28/2023 Yes    Hypertension [I10] 05/28/2023 Yes    DM2 (diabetes mellitus, type 2) [E11.9] 05/28/2023 Yes    Elevated CA 19-9 level [R97.8] 09/13/2021 Yes      Problems Resolved During this Admission:       Assessment.  Admission for sepsis and Gram-negative louie bacteremia unknown source of infection.  CT scan suggestive fecal impaction although the patient reports having normal bowel movements.  I think it is reasonable to do a colonoscopy which she is never had while she is in the hospital, she is very  concerned about the possibility of cancer.  Apparently a doctor checked a CA 19 9 level on her years ago and it has been abnormal since that time.  It was checked again in the emergency room here and was quite elevated but that may reflects elevation because of the infection.  There is no indication on the CT scan that the pancreas is abnormal.  Will discuss this plan with the primary team    Thank you for your consult. I will follow-up with patient. Please contact us if you have any additional questions.    Thaddeus Carlos MD  Gastroenterology  [facility] S[facility]

## 2023-05-31 NOTE — NURSING
[facility] [place]  Nurses Note -- 4 Eyes      5/30/2023      Skin assessed on: Q Shift      [x] No Pressure Injuries Present    [x]Prevention Measures Documented    [] Yes LDA  for Pressure Injury Previously documented     [] Yes New Pressure Injury Discovered   [] LDA for New Pressure Injury Added      Attending RN:  Frankie Herzog RN     Second RN:  Shonna Norman LPN

## 2023-05-31 NOTE — PLAN OF CARE
Patient slept well overnight. Patient did not meet parameters for IV blood pressure medication overnight, see flowsheets. Potassium level low this morning, provider notified. Awaiting new orders.     Problem: Adult Inpatient Plan of Care  Goal: Absence of Hospital-Acquired Illness or Injury  Outcome: Ongoing, Progressing  Goal: Optimal Comfort and Wellbeing  Outcome: Ongoing, Progressing     Problem: Diabetes Comorbidity  Goal: Blood Glucose Level Within Targeted Range  Outcome: Ongoing, Progressing     Problem: Bleeding (Sepsis/Septic Shock)  Goal: Absence of Bleeding  Outcome: Ongoing, Progressing

## 2023-05-31 NOTE — PROGRESS NOTES
[facility] nurse educator met with patient to discuss discharge plan for home IVATB. Shahida Ortega will dc home with family support. Patient will infuse medication via Elastomeric Pump. Patient educated on S.A.S.H procedure. Written instruction on S.A.S.H mat provided and reviewed. Patient also sent instruction video for home infusion. Patient education checklist reviewed and acknowledged by above person(s) and are agreeable to discharge with home infusion plan of care. IV administration process using aspetic technique was reviewed with successful return demonstration. Patient feels comfortable with infusion. Patient will dc home with Ceftriaxone 2 grams daily. Pending final orders with exact stop date but was told patient will need CTX for approximately 2 weeks. She pending line placement. Tube gauze and extension tubing left at bedside. [facility]  will follow patient for weekly dressing changes and lab draws. Time allotted for questions. Patients nurse and case management team notified teaching has been completed. Will continue to follow patient.     Medication delivery will be made to home    Millie Rasheed RN, BSN  Clinical Liaison   [facility]  Cell [phone]  Available M-F 8:30-5pm  Office [phone]  Available 24/7

## 2023-06-01 ENCOUNTER — ANESTHESIA (OUTPATIENT)
Dept: ENDOSCOPY | Facility: HOSPITAL | Age: 53
DRG: 872 | End: 2023-06-01
Payer: COMMERCIAL

## 2023-06-01 VITALS
DIASTOLIC BLOOD PRESSURE: 73 MMHG | RESPIRATION RATE: 20 BRPM | OXYGEN SATURATION: 99 % | HEART RATE: 81 BPM | WEIGHT: 238 LBS | TEMPERATURE: 98 F | SYSTOLIC BLOOD PRESSURE: 157 MMHG | HEIGHT: 67 IN | BODY MASS INDEX: 37.35 KG/M2

## 2023-06-01 LAB
ALBUMIN SERPL BCP-MCNC: 3.2 G/DL (ref 3.5–5.2)
ALP SERPL-CCNC: 91 U/L (ref 55–135)
ALT SERPL W/O P-5'-P-CCNC: 18 U/L (ref 10–44)
ANION GAP SERPL CALC-SCNC: 12 MMOL/L (ref 8–16)
AORTIC ROOT ANNULUS: 3.24 CM
AORTIC VALVE CUSP SEPERATION: 2.63 CM
ASCENDING AORTA: 2.73 CM
AST SERPL-CCNC: 18 U/L (ref 10–40)
AV INDEX (PROSTH): 0.75
AV MEAN GRADIENT: 7 MMHG
AV PEAK GRADIENT: 14 MMHG
AV VALVE AREA: 2.94 CM2
AV VELOCITY RATIO: 0.63
BACTERIA BLD CULT: NORMAL
BASOPHILS # BLD AUTO: 0.01 K/UL (ref 0–0.2)
BASOPHILS NFR BLD: 0.1 % (ref 0–1.9)
BILIRUB SERPL-MCNC: 0.6 MG/DL (ref 0.1–1)
BSA FOR ECHO PROCEDURE: 2.26 M2
BUN SERPL-MCNC: 6 MG/DL (ref 6–20)
CALCIUM SERPL-MCNC: 9.5 MG/DL (ref 8.7–10.5)
CHLORIDE SERPL-SCNC: 100 MMOL/L (ref 95–110)
CO2 SERPL-SCNC: 24 MMOL/L (ref 23–29)
CREAT SERPL-MCNC: 0.7 MG/DL (ref 0.5–1.4)
CV ECHO LV RWT: 0.45 CM
DIFFERENTIAL METHOD: ABNORMAL
DOP CALC AO PEAK VEL: 1.86 M/S
DOP CALC AO VTI: 32.5 CM
DOP CALC LVOT AREA: 3.9 CM2
DOP CALC LVOT DIAMETER: 2.23 CM
DOP CALC LVOT PEAK VEL: 1.18 M/S
DOP CALC LVOT STROKE VOLUME: 95.64 CM3
DOP CALCLVOT PEAK VEL VTI: 24.5 CM
E WAVE DECELERATION TIME: 196.45 MSEC
E/A RATIO: 0.79
E/E' RATIO: 8.63 M/S
ECHO LV POSTERIOR WALL: 1.12 CM (ref 0.6–1.1)
EJECTION FRACTION: 65 %
EOSINOPHIL # BLD AUTO: 0.1 K/UL (ref 0–0.5)
EOSINOPHIL NFR BLD: 0.9 % (ref 0–8)
ERYTHROCYTE [DISTWIDTH] IN BLOOD BY AUTOMATED COUNT: 13.2 % (ref 11.5–14.5)
EST. GFR  (NO RACE VARIABLE): >60 ML/MIN/1.73 M^2
FRACTIONAL SHORTENING: 28 % (ref 28–44)
GLUCOSE SERPL-MCNC: 134 MG/DL (ref 70–110)
HCT VFR BLD AUTO: 36.3 % (ref 37–48.5)
HGB BLD-MCNC: 12 G/DL (ref 12–16)
IMM GRANULOCYTES # BLD AUTO: 0.03 K/UL (ref 0–0.04)
IMM GRANULOCYTES NFR BLD AUTO: 0.4 % (ref 0–0.5)
INTERVENTRICULAR SEPTUM: 1.08 CM (ref 0.6–1.1)
IVRT: 97.05 MSEC
LA MAJOR: 6.05 CM
LA MINOR: 5.66 CM
LA WIDTH: 4.6 CM
LEFT ATRIUM SIZE: 3.37 CM
LEFT ATRIUM VOLUME INDEX: 35.4 ML/M2
LEFT ATRIUM VOLUME: 77.06 CM3
LEFT INTERNAL DIMENSION IN SYSTOLE: 3.61 CM (ref 2.1–4)
LEFT VENTRICLE DIASTOLIC VOLUME INDEX: 55.07 ML/M2
LEFT VENTRICLE DIASTOLIC VOLUME: 120.05 ML
LEFT VENTRICLE MASS INDEX: 96 G/M2
LEFT VENTRICLE SYSTOLIC VOLUME INDEX: 25.2 ML/M2
LEFT VENTRICLE SYSTOLIC VOLUME: 54.9 ML
LEFT VENTRICULAR INTERNAL DIMENSION IN DIASTOLE: 5.03 CM (ref 3.5–6)
LEFT VENTRICULAR MASS: 209.16 G
LV LATERAL E/E' RATIO: 8.63 M/S
LV SEPTAL E/E' RATIO: 8.63 M/S
LVOT MG: 2.94 MMHG
LVOT MV: 0.79 CM/S
LYMPHOCYTES # BLD AUTO: 1.4 K/UL (ref 1–4.8)
LYMPHOCYTES NFR BLD: 19.8 % (ref 18–48)
MAGNESIUM SERPL-MCNC: 1.8 MG/DL (ref 1.6–2.6)
MCH RBC QN AUTO: 27.6 PG (ref 27–31)
MCHC RBC AUTO-ENTMCNC: 33.1 G/DL (ref 32–36)
MCV RBC AUTO: 84 FL (ref 82–98)
MONOCYTES # BLD AUTO: 0.5 K/UL (ref 0.3–1)
MONOCYTES NFR BLD: 7.4 % (ref 4–15)
MV PEAK A VEL: 0.87 M/S
MV PEAK E VEL: 0.69 M/S
MV STENOSIS PRESSURE HALF TIME: 56.97 MS
MV VALVE AREA P 1/2 METHOD: 3.86 CM2
NEUTROPHILS # BLD AUTO: 5 K/UL (ref 1.8–7.7)
NEUTROPHILS NFR BLD: 71.4 % (ref 38–73)
NRBC BLD-RTO: 0 /100 WBC
PHOSPHATE SERPL-MCNC: 3 MG/DL (ref 2.7–4.5)
PISA TR MAX VEL: 1.75 M/S
PLATELET # BLD AUTO: 162 K/UL (ref 150–450)
PMV BLD AUTO: 11 FL (ref 9.2–12.9)
POCT GLUCOSE: 104 MG/DL (ref 70–110)
POTASSIUM SERPL-SCNC: 3.7 MMOL/L (ref 3.5–5.1)
PROT SERPL-MCNC: 7.7 G/DL (ref 6–8.4)
PULM VEIN S/D RATIO: 1.33
PV PEAK D VEL: 0.51 M/S
PV PEAK S VEL: 0.68 M/S
PV PEAK VELOCITY: 0.89 CM/S
RA MAJOR: 5.29 CM
RA PRESSURE: 3 MMHG
RA WIDTH: 3.4 CM
RBC # BLD AUTO: 4.34 M/UL (ref 4–5.4)
RIGHT VENTRICULAR END-DIASTOLIC DIMENSION: 3.42 CM
RV TISSUE DOPPLER FREE WALL SYSTOLIC VELOCITY 1 (APICAL 4 CHAMBER VIEW): 0.02 CM/S
SINUS: 3.17 CM
SODIUM SERPL-SCNC: 136 MMOL/L (ref 136–145)
STJ: 2.8 CM
TDI LATERAL: 0.08 M/S
TDI SEPTAL: 0.08 M/S
TDI: 0.08 M/S
TR MAX PG: 12 MMHG
TRICUSPID ANNULAR PLANE SYSTOLIC EXCURSION: 2.36 CM
TV PEAK GRADIENT: 1.49 MMHG
TV REST PULMONARY ARTERY PRESSURE: 15 MMHG
WBC # BLD AUTO: 7.02 K/UL (ref 3.9–12.7)

## 2023-06-01 PROCEDURE — D9220A PRA ANESTHESIA: Mod: 33,CRNA,, | Performed by: STUDENT IN AN ORGANIZED HEALTH CARE EDUCATION/TRAINING PROGRAM

## 2023-06-01 PROCEDURE — 80053 COMPREHEN METABOLIC PANEL: CPT | Performed by: STUDENT IN AN ORGANIZED HEALTH CARE EDUCATION/TRAINING PROGRAM

## 2023-06-01 PROCEDURE — 45385 PR COLONOSCOPY,REMV LESN,SNARE: ICD-10-PCS | Mod: 33,,, | Performed by: INTERNAL MEDICINE

## 2023-06-01 PROCEDURE — D9220A PRA ANESTHESIA: ICD-10-PCS | Mod: 33,ANES,, | Performed by: ANESTHESIOLOGY

## 2023-06-01 PROCEDURE — 63600175 PHARM REV CODE 636 W HCPCS: Performed by: STUDENT IN AN ORGANIZED HEALTH CARE EDUCATION/TRAINING PROGRAM

## 2023-06-01 PROCEDURE — D9220A PRA ANESTHESIA: Mod: 33,ANES,, | Performed by: ANESTHESIOLOGY

## 2023-06-01 PROCEDURE — 45385 COLONOSCOPY W/LESION REMOVAL: CPT | Mod: 33,,, | Performed by: INTERNAL MEDICINE

## 2023-06-01 PROCEDURE — 36415 COLL VENOUS BLD VENIPUNCTURE: CPT | Performed by: STUDENT IN AN ORGANIZED HEALTH CARE EDUCATION/TRAINING PROGRAM

## 2023-06-01 PROCEDURE — 25000003 PHARM REV CODE 250: Performed by: STUDENT IN AN ORGANIZED HEALTH CARE EDUCATION/TRAINING PROGRAM

## 2023-06-01 PROCEDURE — 37000008 HC ANESTHESIA 1ST 15 MINUTES: Performed by: INTERNAL MEDICINE

## 2023-06-01 PROCEDURE — 99233 PR SUBSEQUENT HOSPITAL CARE,LEVL III: ICD-10-PCS | Mod: 25,,, | Performed by: INTERNAL MEDICINE

## 2023-06-01 PROCEDURE — 88305 TISSUE EXAM BY PATHOLOGIST: ICD-10-PCS | Mod: 26,,, | Performed by: PATHOLOGY

## 2023-06-01 PROCEDURE — 37000009 HC ANESTHESIA EA ADD 15 MINS: Performed by: INTERNAL MEDICINE

## 2023-06-01 PROCEDURE — 25000003 PHARM REV CODE 250: Performed by: INTERNAL MEDICINE

## 2023-06-01 PROCEDURE — 63600175 PHARM REV CODE 636 W HCPCS: Performed by: NURSE PRACTITIONER

## 2023-06-01 PROCEDURE — 88305 TISSUE EXAM BY PATHOLOGIST: CPT | Mod: 26,,, | Performed by: PATHOLOGY

## 2023-06-01 PROCEDURE — 99233 SBSQ HOSP IP/OBS HIGH 50: CPT | Mod: 25,,, | Performed by: INTERNAL MEDICINE

## 2023-06-01 PROCEDURE — 83735 ASSAY OF MAGNESIUM: CPT | Performed by: STUDENT IN AN ORGANIZED HEALTH CARE EDUCATION/TRAINING PROGRAM

## 2023-06-01 PROCEDURE — 84100 ASSAY OF PHOSPHORUS: CPT | Performed by: STUDENT IN AN ORGANIZED HEALTH CARE EDUCATION/TRAINING PROGRAM

## 2023-06-01 PROCEDURE — 99900035 HC TECH TIME PER 15 MIN (STAT)

## 2023-06-01 PROCEDURE — 85025 COMPLETE CBC W/AUTO DIFF WBC: CPT | Performed by: STUDENT IN AN ORGANIZED HEALTH CARE EDUCATION/TRAINING PROGRAM

## 2023-06-01 PROCEDURE — 88305 TISSUE EXAM BY PATHOLOGIST: CPT | Performed by: PATHOLOGY

## 2023-06-01 PROCEDURE — D9220A PRA ANESTHESIA: ICD-10-PCS | Mod: 33,CRNA,, | Performed by: STUDENT IN AN ORGANIZED HEALTH CARE EDUCATION/TRAINING PROGRAM

## 2023-06-01 PROCEDURE — 45385 COLONOSCOPY W/LESION REMOVAL: CPT | Mod: PT | Performed by: INTERNAL MEDICINE

## 2023-06-01 PROCEDURE — 27201089 HC SNARE, DISP (ANY): Performed by: INTERNAL MEDICINE

## 2023-06-01 RX ORDER — HYDROCHLOROTHIAZIDE 25 MG/1
25 TABLET ORAL DAILY
Qty: 90 TABLET | Refills: 3 | Status: SHIPPED | OUTPATIENT
Start: 2023-06-01 | End: 2024-05-31

## 2023-06-01 RX ORDER — METFORMIN HYDROCHLORIDE 500 MG/1
500 TABLET ORAL 2 TIMES DAILY WITH MEALS
Qty: 180 TABLET | Refills: 3 | Status: SHIPPED | OUTPATIENT
Start: 2023-06-01 | End: 2024-05-31

## 2023-06-01 RX ORDER — LOSARTAN POTASSIUM 100 MG/1
100 TABLET ORAL DAILY
Qty: 90 TABLET | Refills: 3 | Status: SHIPPED | OUTPATIENT
Start: 2023-06-01 | End: 2024-05-31

## 2023-06-01 RX ORDER — CARVEDILOL 3.12 MG/1
6.25 TABLET ORAL 2 TIMES DAILY
Qty: 360 TABLET | Refills: 3 | Status: SHIPPED | OUTPATIENT
Start: 2023-06-01 | End: 2024-05-31

## 2023-06-01 RX ORDER — ASPIRIN 81 MG/1
81 TABLET ORAL DAILY
Qty: 90 TABLET | Refills: 3 | Status: SHIPPED | OUTPATIENT
Start: 2023-06-01 | End: 2024-05-31

## 2023-06-01 RX ORDER — PROPOFOL 10 MG/ML
VIAL (ML) INTRAVENOUS
Status: DISCONTINUED | OUTPATIENT
Start: 2023-06-01 | End: 2023-06-01

## 2023-06-01 RX ORDER — CEFDINIR 300 MG/1
300 CAPSULE ORAL 2 TIMES DAILY
Qty: 20 CAPSULE | Refills: 0 | Status: SHIPPED | OUTPATIENT
Start: 2023-06-02 | End: 2023-06-12

## 2023-06-01 RX ORDER — PROPOFOL 10 MG/ML
INJECTION, EMULSION INTRAVENOUS
Status: DISCONTINUED
Start: 2023-06-01 | End: 2023-06-01 | Stop reason: HOSPADM

## 2023-06-01 RX ORDER — LIDOCAINE HYDROCHLORIDE 20 MG/ML
INJECTION, SOLUTION EPIDURAL; INFILTRATION; INTRACAUDAL; PERINEURAL
Status: DISCONTINUED
Start: 2023-06-01 | End: 2023-06-01 | Stop reason: HOSPADM

## 2023-06-01 RX ORDER — LIDOCAINE HYDROCHLORIDE 20 MG/ML
INJECTION INTRAVENOUS
Status: DISCONTINUED | OUTPATIENT
Start: 2023-06-01 | End: 2023-06-01

## 2023-06-01 RX ORDER — HYDRALAZINE HYDROCHLORIDE 20 MG/ML
INJECTION INTRAMUSCULAR; INTRAVENOUS
Status: DISCONTINUED
Start: 2023-06-01 | End: 2023-06-01 | Stop reason: HOSPADM

## 2023-06-01 RX ORDER — SODIUM CHLORIDE 9 MG/ML
INJECTION, SOLUTION INTRAVENOUS CONTINUOUS
Status: DISCONTINUED | OUTPATIENT
Start: 2023-06-01 | End: 2023-06-01 | Stop reason: HOSPADM

## 2023-06-01 RX ADMIN — PROPOFOL 30 MG: 10 INJECTION, EMULSION INTRAVENOUS at 09:06

## 2023-06-01 RX ADMIN — CLONIDINE HYDROCHLORIDE 0.1 MG: 0.1 TABLET ORAL at 11:06

## 2023-06-01 RX ADMIN — SODIUM CHLORIDE: 9 INJECTION, SOLUTION INTRAVENOUS at 08:06

## 2023-06-01 RX ADMIN — HYDRALAZINE HYDROCHLORIDE 10 MG: 20 INJECTION INTRAMUSCULAR; INTRAVENOUS at 10:06

## 2023-06-01 RX ADMIN — HYDROCHLOROTHIAZIDE 25 MG: 25 TABLET ORAL at 11:06

## 2023-06-01 RX ADMIN — PROPOFOL 20 MG: 10 INJECTION, EMULSION INTRAVENOUS at 09:06

## 2023-06-01 RX ADMIN — LOSARTAN POTASSIUM 100 MG: 25 TABLET, FILM COATED ORAL at 11:06

## 2023-06-01 RX ADMIN — CARVEDILOL 3.12 MG: 3.12 TABLET, FILM COATED ORAL at 11:06

## 2023-06-01 RX ADMIN — LIDOCAINE HYDROCHLORIDE 60 MG: 20 INJECTION, SOLUTION INTRAVENOUS at 09:06

## 2023-06-01 RX ADMIN — ASPIRIN 81 MG: 81 TABLET, COATED ORAL at 11:06

## 2023-06-01 RX ADMIN — CEFTRIAXONE 2 G: 2 INJECTION, SOLUTION INTRAVENOUS at 11:06

## 2023-06-01 RX ADMIN — PROPOFOL 100 MG: 10 INJECTION, EMULSION INTRAVENOUS at 09:06

## 2023-06-01 NOTE — SUBJECTIVE & OBJECTIVE
Interval History:  No further AFib noted on tele monitoring.      Past Medical History:   Diagnosis Date    DM2 (diabetes mellitus, type 2) 5/28/2023    Hypertension     Prolonged Q-T interval on ECG 5/31/2023    Qtc 525 5/31/23       Past Surgical History:   Procedure Laterality Date    ABDOMINAL SURGERY      DIAGNOSTIC LAPAROSCOPY N/A 5/26/2019    Procedure: LAPAROSCOPY, DIAGNOSTIC Drainage of abscess ;  Surgeon: Jose Luis Hanson MD;  Location: [facility] OR;  Service: General;  Laterality: N/A;  Drain Placement    DIAGNOSTIC LAPAROSCOPY N/A 12/27/2020    Procedure: Diagnostic laparoscopy, drainage of intra-abdominal abscess;  Surgeon: Bhupendra Banda MD;  Location: [facility] OR;  Service: General;  Laterality: N/A;    LAPAROSCOPIC LYSIS OF ADHESIONS  5/26/2019    Procedure: LYSIS, ADHESIONS, LAPAROSCOPIC;  Surgeon: Jose Luis Hanson MD;  Location: [facility] OR;  Service: General;;       Review of patient's allergies indicates:   Allergen Reactions    Penicillins Hives    Amoxicillin     Grass pollen-yun grass standard Other (See Comments)       No current facility-administered medications on file prior to encounter.     Current Outpatient Medications on File Prior to Encounter   Medication Sig    [DISCONTINUED] LOSARTAN POTASSIUM, BULK, MISC 100 mg by Misc.(Non-Drug; Combo Route) route.    [DISCONTINUED] metFORMIN (GLUCOPHAGE) 500 MG tablet TAKE 1 TABLET BY MOUTH TWICE DAILY WITH MEALS    [DISCONTINUED] triamterene-hydrochlorothiazide 37.5-25 mg (DYAZIDE) 37.5-25 mg per capsule Take 1 capsule by mouth.    acetaminophen (TYLENOL) 325 MG tablet Take 325 mg by mouth every 6 (six) hours as needed for Pain.    blood-glucose meter kit To check BG 2 times daily, to use with insurance preferred meter    [DISCONTINUED] blood sugar diagnostic Strp To check BG 2 times daily, to use with insurance preferred meter    [DISCONTINUED] lancets Misc To check BG 2 times daily, to use with insurance preferred meter     Family History        Problem Relation (Age of Onset)    Diabetes Father          Tobacco Use    Smoking status: Never    Smokeless tobacco: Never   Substance and Sexual Activity    Alcohol use: Not Currently    Drug use: Not Currently    Sexual activity: Not Currently     Review of Systems   Constitutional: Positive for malaise/fatigue.   HENT: Negative.     Eyes: Negative.    Cardiovascular: Negative.    Respiratory: Negative.     Endocrine: Negative.    Hematologic/Lymphatic: Negative.    Skin: Negative.    Musculoskeletal: Negative.    Gastrointestinal: Negative.    Genitourinary: Negative.    Neurological: Negative.    Psychiatric/Behavioral: Negative.     Allergic/Immunologic: Negative.    Objective:     Vital Signs (Most Recent):  Temp: 98 °F (36.7 °C) (06/01/23 0845)  Pulse: 76 (06/01/23 0845)  Resp: 19 (06/01/23 0845)  BP: (!) 147/73 (06/01/23 0845)  SpO2: 96 % (06/01/23 0845) Vital Signs (24h Range):  Temp:  [98 °F (36.7 °C)-98.7 °F (37.1 °C)] 98 °F (36.7 °C)  Pulse:  [67-84] 76  Resp:  [17-20] 19  SpO2:  [93 %-100 %] 96 %  BP: (127-183)/(68-92) 147/73     Weight: 108 kg (238 lb 1.6 oz)  Body mass index is 37.29 kg/m².    SpO2: 96 %       No intake or output data in the 24 hours ending 06/01/23 0914      Lines/Drains/Airways       Peripheral Intravenous Line  Duration                  Peripheral IV - Single Lumen 05/30/23 2245 22 G Left Antecubital 1 day                     Physical Exam  Constitutional:       Appearance: Normal appearance. She is well-developed.   HENT:      Head: Normocephalic.   Eyes:      Pupils: Pupils are equal, round, and reactive to light.   Cardiovascular:      Rate and Rhythm: Normal rate and regular rhythm.   Pulmonary:      Effort: Pulmonary effort is normal.      Breath sounds: Normal breath sounds.   Abdominal:      General: Bowel sounds are normal.      Palpations: Abdomen is soft.      Tenderness: There is no abdominal tenderness.   Musculoskeletal:         General: Normal range of motion.       Cervical back: Normal range of motion and neck supple.   Skin:     General: Skin is warm.   Neurological:      Mental Status: She is alert and oriented to person, place, and time.        Significant Labs: BMP:   Recent Labs   Lab 05/30/23 1135 05/31/23 0428 06/01/23  0348   * 142* 134*    137 136   K 3.1* 2.6* 3.7    100 100   CO2 28 26 24   BUN 6 5* 6   CREATININE 0.7 0.6 0.7   CALCIUM 8.8 9.3 9.5   MG 1.7 1.7 1.8     , CMP   Recent Labs   Lab 05/30/23  1135 05/31/23 0428 06/01/23  0348    137 136   K 3.1* 2.6* 3.7    100 100   CO2 28 26 24   * 142* 134*   BUN 6 5* 6   CREATININE 0.7 0.6 0.7   CALCIUM 8.8 9.3 9.5   PROT 7.1 6.7 7.7   ALBUMIN 2.9* 2.8* 3.2*   BILITOT 0.4 0.6 0.6   ALKPHOS 80 65 91   AST 17 16 18   ALT 17 16 18   ANIONGAP 7* 11 12     , CBC   Recent Labs   Lab 05/30/23 1135 05/31/23 0428 06/01/23 0348   WBC 5.69 4.88 7.02   HGB 11.3* 10.9* 12.0   HCT 34.5* 34.0* 36.3*   * 139* 162     , INR No results for input(s): INR, PROTIME in the last 48 hours., Lipid Panel No results for input(s): CHOL, HDL, LDLCALC, TRIG, CHOLHDL in the last 48 hours., Troponin No results for input(s): TROPONINI in the last 48 hours., and All pertinent lab results from the last 24 hours have been reviewed.    Significant Imaging: Echocardiogram: Transthoracic echo (TTE) complete (Cupid Only): No results found for this or any previous visit.

## 2023-06-01 NOTE — SUBJECTIVE & OBJECTIVE
Past Medical History:   Diagnosis Date    DM2 (diabetes mellitus, type 2) 5/28/2023    Hypertension     Prolonged Q-T interval on ECG 5/31/2023    Qtc 525 5/31/23       Past Surgical History:   Procedure Laterality Date    ABDOMINAL SURGERY      DIAGNOSTIC LAPAROSCOPY N/A 5/26/2019    Procedure: LAPAROSCOPY, DIAGNOSTIC Drainage of abscess ;  Surgeon: Jose Luis Hanson MD;  Location: [facility] OR;  Service: General;  Laterality: N/A;  Drain Placement    DIAGNOSTIC LAPAROSCOPY N/A 12/27/2020    Procedure: Diagnostic laparoscopy, drainage of intra-abdominal abscess;  Surgeon: Bhupendra Banda MD;  Location: [facility] OR;  Service: General;  Laterality: N/A;    LAPAROSCOPIC LYSIS OF ADHESIONS  5/26/2019    Procedure: LYSIS, ADHESIONS, LAPAROSCOPIC;  Surgeon: Jose Luis Hanson MD;  Location: [facility] OR;  Service: General;;       Review of patient's allergies indicates:   Allergen Reactions    Penicillins Hives    Amoxicillin     Grass pollen-june grass standard Other (See Comments)       No current facility-administered medications on file prior to encounter.     Current Outpatient Medications on File Prior to Encounter   Medication Sig    LOSARTAN POTASSIUM, BULK, MISC 100 mg by Misc.(Non-Drug; Combo Route) route.    metFORMIN (GLUCOPHAGE) 500 MG tablet TAKE 1 TABLET BY MOUTH TWICE DAILY WITH MEALS    triamterene-hydrochlorothiazide 37.5-25 mg (DYAZIDE) 37.5-25 mg per capsule Take 1 capsule by mouth.    acetaminophen (TYLENOL) 325 MG tablet Take 325 mg by mouth every 6 (six) hours as needed for Pain.    blood sugar diagnostic Strp To check BG 2 times daily, to use with insurance preferred meter    blood-glucose meter kit To check BG 2 times daily, to use with insurance preferred meter    lancets Misc To check BG 2 times daily, to use with insurance preferred meter     Family History       Problem Relation (Age of Onset)    Diabetes Father          Tobacco Use    Smoking status: Never    Smokeless tobacco: Never   Substance and  Sexual Activity    Alcohol use: Not Currently    Drug use: Not Currently    Sexual activity: Not Currently     Review of Systems   Constitutional: Positive for malaise/fatigue.   HENT: Negative.     Eyes: Negative.    Cardiovascular: Negative.    Respiratory: Negative.     Endocrine: Negative.    Hematologic/Lymphatic: Negative.    Skin: Negative.    Musculoskeletal: Negative.    Gastrointestinal: Negative.    Genitourinary: Negative.    Neurological: Negative.    Psychiatric/Behavioral: Negative.     Allergic/Immunologic: Negative.    Objective:     Vital Signs (Most Recent):  Temp: 98.7 °F (37.1 °C) (05/31/23 1608)  Pulse: 84 (05/31/23 1608)  Resp: 20 (05/31/23 1608)  BP: (!) 183/88 (05/31/23 1638)  SpO2: 97 % (05/31/23 1608) Vital Signs (24h Range):  Temp:  [98.4 °F (36.9 °C)-99.1 °F (37.3 °C)] 98.7 °F (37.1 °C)  Pulse:  [67-85] 84  Resp:  [18-20] 20  SpO2:  [96 %-98 %] 97 %  BP: (160-185)/(75-92) 183/88     Weight: 108 kg (238 lb 1.6 oz)  Body mass index is 37.29 kg/m².    SpO2: 97 %         Intake/Output Summary (Last 24 hours) at 5/31/2023 1915  Last data filed at 5/31/2023 0800  Gross per 24 hour   Intake 120 ml   Output --   Net 120 ml       Lines/Drains/Airways       Peripheral Intravenous Line  Duration                  Peripheral IV - Single Lumen 05/30/23 2245 22 G Left Antecubital <1 day                     Physical Exam  Constitutional:       Appearance: Normal appearance. She is well-developed.   HENT:      Head: Normocephalic.   Eyes:      Pupils: Pupils are equal, round, and reactive to light.   Cardiovascular:      Rate and Rhythm: Normal rate and regular rhythm.   Pulmonary:      Effort: Pulmonary effort is normal.      Breath sounds: Normal breath sounds.   Abdominal:      General: Bowel sounds are normal.      Palpations: Abdomen is soft.      Tenderness: There is no abdominal tenderness.   Musculoskeletal:         General: Normal range of motion.      Cervical back: Normal range of motion and  neck supple.   Skin:     General: Skin is warm.   Neurological:      Mental Status: She is alert and oriented to person, place, and time.        Significant Labs: BMP:   Recent Labs   Lab 05/30/23  1135 05/31/23  0428   * 142*    137   K 3.1* 2.6*    100   CO2 28 26   BUN 6 5*   CREATININE 0.7 0.6   CALCIUM 8.8 9.3   MG 1.7 1.7   , CMP   Recent Labs   Lab 05/30/23  1135 05/31/23  0428    137   K 3.1* 2.6*    100   CO2 28 26   * 142*   BUN 6 5*   CREATININE 0.7 0.6   CALCIUM 8.8 9.3   PROT 7.1 6.7   ALBUMIN 2.9* 2.8*   BILITOT 0.4 0.6   ALKPHOS 80 65   AST 17 16   ALT 17 16   ANIONGAP 7* 11   , CBC   Recent Labs   Lab 05/30/23  1135 05/31/23  0428   WBC 5.69 4.88   HGB 11.3* 10.9*   HCT 34.5* 34.0*   * 139*   , INR No results for input(s): INR, PROTIME in the last 48 hours., Lipid Panel No results for input(s): CHOL, HDL, LDLCALC, TRIG, CHOLHDL in the last 48 hours., Troponin No results for input(s): TROPONINI in the last 48 hours., and All pertinent lab results from the last 24 hours have been reviewed.    Significant Imaging: Echocardiogram: Transthoracic echo (TTE) complete (Cupid Only): No results found for this or any previous visit.

## 2023-06-01 NOTE — PROVATION PATIENT INSTRUCTIONS
Discharge Summary/Instructions after an Endoscopic Procedure  Patient Name: Shahida Ortega  Patient MRN: [id number]  Patient YOB: 1970  Thursday, June 1, 2023  Thaddeus Carlos MD  Dear patient,  As a result of recent federal legislation (The Federal Cures Act), you may   receive lab or pathology results from your procedure in your [facility]   account before your physician is able to contact you. Your physician or   their representative will relay the results to you with their   recommendations at their soonest availability.  Thank you,  RESTRICTIONS:  During your procedure today, you received medications for sedation.  These   medications may affect your judgment, balance and coordination.  Therefore,   for 24 hours, you have the following restrictions:   - DO NOT drive a car, operate machinery, make legal/financial decisions,   sign important papers or drink alcohol.    ACTIVITY:  Today: no heavy lifting, straining or running due to procedural   sedation/anesthesia.  The following day: return to full activity including work.  DIET:  Eat and drink normally unless instructed otherwise.     TREATMENT FOR COMMON SIDE EFFECTS:  - Mild abdominal pain, nausea, belching, bloating or excessive gas:  rest,   eat lightly and use a heating pad.  - Sore Throat: treat with throat lozenges and/or gargle with warm salt   water.  - Because air was used during the procedure, expelling large amounts of air   from your rectum or belching is normal.  - If a bowel prep was taken, you may not have a bowel movement for 1-3 days.    This is normal.  SYMPTOMS TO WATCH FOR AND REPORT TO YOUR PHYSICIAN:  1. Abdominal pain or bloating, other than gas cramps.  2. Chest pain.  3. Back pain.  4. Signs of infection such as: chills or fever occurring within 24 hours   after the procedure.  5. Rectal bleeding, which would show as bright red, maroon, or black stools.   (A tablespoon of blood from the rectum is not serious, especially if    hemorrhoids are present.)  6. Vomiting.  7. Weakness or dizziness.  GO DIRECTLY TO THE NEAREST EMERGENCY ROOM IF YOU HAVE ANY OF THE FOLLOWING:      Difficulty breathing              Chills and/or fever over 101 F   Persistent vomiting and/or vomiting blood   Severe abdominal pain   Severe chest pain   Black, tarry stools   Bleeding- more than one tablespoon   Any other symptom or condition that you feel may need urgent attention  Your doctor recommends these additional instructions:  If any biopsies were taken, your doctors clinic will contact you in 1 to 2   weeks with any results.  - Discharge patient to home (ambulatory).   - Patient has a contact number available for emergencies.  The signs and   symptoms of potential delayed complications were discussed with the   patient.  Return to normal activities tomorrow.  Written discharge   instructions were provided to the patient.   - Resume previous diet.   - Continue present medications.   - Return to primary care physician as previously scheduled.   - Repeat colonoscopy in 7 years for surveillance.   - Return to GI clinic at appointment to be scheduled.  For questions, problems or results please call your physician - Thaddeus Carlos MD at Work:  [phone].  [facility] can be reached at [phone]     IF A COMPLICATION OR EMERGENCY SITUATION ARISES AND YOU ARE UNABLE TO REACH   YOUR PHYSICIAN - GO DIRECTLY TO THE EMERGENCY ROOM.  Thaddeus Carlos MD  6/1/2023 10:02:06 AM  This report has been verified and signed electronically.  Dear patient,  As a result of recent federal legislation (The Federal Cures Act), you may   receive lab or pathology results from your procedure in your [facility]   account before your physician is able to contact you. Your physician or   their representative will relay the results to you with their   recommendations at their soonest availability.  Thank you,  PROVATION

## 2023-06-01 NOTE — ANESTHESIA POSTPROCEDURE EVALUATION
Anesthesia Post Evaluation    Patient: April Carter    Procedure(s) Performed: Procedure(s) (LRB):  COLONOSCOPY (N/A)    Final Anesthesia Type: general      Patient location during evaluation: GI PACU  Patient participation: Yes- Able to Participate  Level of consciousness: awake and alert and oriented  Post-procedure vital signs: reviewed and stable  Pain management: adequate  Airway patency: patent    PONV status at discharge: No PONV  Anesthetic complications: no      Cardiovascular status: blood pressure returned to baseline and hemodynamically stable  Respiratory status: unassisted, spontaneous ventilation and room air  Hydration status: euvolemic  Follow-up not needed.          Vitals Value Taken Time   /63 06/01/23 1017   Temp 36.2 °C (97.1 °F) 06/01/23 1002   Pulse 76 06/01/23 1017   Resp 19 06/01/23 1017   SpO2 99 % 06/01/23 1017         No case tracking events are documented in the log.      Pain/Celsa Score: Celsa Score: 10 (6/1/2023 10:17 AM)

## 2023-06-01 NOTE — ASSESSMENT & PLAN NOTE
Brief episodes of paroxysmal AFib in the setting of sepsis.  Currently in sinus rhythm.  Continue to monitor on tele monitoring.  If after resolution of sepsis, patient continues to have paroxysmal AFib, may benefit from long-term anticoagulation.  For the current time because she is undergoing colonoscopy during hospital stay, okay to hold off anticoagulation and continue to monitor on tele monitoring.   Event monitoring as an outpatient

## 2023-06-01 NOTE — ASSESSMENT & PLAN NOTE
Keep potassium around 4 and magnesium around 2.  Avoid QT prolonging agents.  monitor with regular EKGs

## 2023-06-01 NOTE — NURSING
Report received and care assumed. Discussed plan of care and safety with patient . Reviewed call system. No acute distress noted Denies pain Cardiac monitor intact

## 2023-06-01 NOTE — CONSULTS
[facility]  Cardiology  Consult Note    Patient Name: Shahida Ortega  MRN: [id number]  Admission Date: 5/28/2023  Hospital Length of Stay: 2 days  Code Status: Full Code   Attending Provider: Tyrese Wang MD   Consulting Provider: Yuri Rojas MD  Primary Care Physician: Janusz Staton MD  Principal Problem:Sepsis due to Escherichia coli (E. coli)    Patient information was obtained from patient and ER records.     Inpatient consult to Cardiology  Consult performed by: Yuri Rojas MD  Consult ordered by: Tyrese Wang MD        Subjective:     Chief Complaint:  afib     HPI:   Cardiology has been consulted for paroxysmal atrial fibrillation in this patient.  Patient is a pleasant 52-year-old lady.  Has been admitted for Gram-negative bacteremia.  Was noted to have AFib on 05/29.  Brief episode.  Resolved on its own.  Patient did not feel it.  Patient denies any past history of strokes or mini strokes.  Main complaint has been dyspnea on exertion which has been progressing over the past few months.  Needs to go for colonoscopy tomorrow.  Cardiology has been consulted for management of paroxysmal AFib.         HPI:  52-year-old female with past medical history of hypertension and type 2 diabetes who presents to the emergency department with a chief complaint of emesis.  She was accompanied by her daughter who lives with her and helps provide this history.  She was in her normal state of health until earlier yesterday morning when she started having nausea.  Subsequently, she had 4 episodes of nonbloody, nonbilious emesis at work and her daughter was called to come and take her to the emergency department.  She denies any abdominal pain, chest pain, shortness of breath, or urinary symptoms including frequency, urgency, or dysuria. She has had cough for the last week or so. Daughter reports that yesterday she was acting at her baseline and she has been compliant with hypertension and diabetes medications as  prescribed.  Daughter reports that at baseline patient is awake alert oriented x4.  Patient was initially lethargic, slow to answer questions, not at baseline. Initially patient with temp of 103.3, tachycardia 110s-114s, tachypnea 26-30s, elevated BP. No medications prior to arrival to attempt to alleviate symptoms. On my examination of patient, patient now at baseline, AAOx4.  Vitals signs improved, now afebrile HR and RR WNL. Workup CT of head negative for acute findings, revealed no leukocytosis, h/h stable, platelets 118, procalcitonin 10.56, d-dimer elevated 1.56, glucose 226, lactic WNL, covid negative, u/a concerning for infectious process, blood cultures pending, CT of abdomen/pelvis without acute or infectious findings, question fecal impaction.  She reports last BM yesterday, no issues with BM pattern.  CTA of chest negative for PE, chest xray without acute process.  Patient admitted to hospital medicine observation unit for further medical management.          Overview/Hospital Course:  52-year-old female with past medical history of hypertension and type 2 diabetes admitted to observation for further evaluation of nausea and vomiting on 05/29/23. Found to be febrile, tachycardic and elevated procalcitonin. UA with +1 leukocytosis, does not appear grossly infected. Urine cx pending. Blood cx now with GNR. Antibiotics changed to Rocephin 2g daily.  Imaging with no acute process but noted constipation. Started bowel regimen. Will advance diet as tolerated. Continue IV CTX and vanc. Repeat blood cultures ordered.  F/u urine and blood cultures.     Patient admitted with 3/4 sirs, sepsis without septic shock, bacteremic with GNR.  103.3 F, , RR 30, WBC WNL.  Procalcitonin 10.6.  UA does not look particularly infectious, +4 glucose, diabetes uncontrolled, which makes her susceptible to infections however.  She appears to have fecal impaction/constipation, likely a bit obstructed and caused translocation  of bacteria into the blood stream. GNR presumptive E coli, will discontinue vancomycin.         Past Medical History:   Diagnosis Date    DM2 (diabetes mellitus, type 2) 5/28/2023    Hypertension     Prolonged Q-T interval on ECG 5/31/2023    Qtc 525 5/31/23       Past Surgical History:   Procedure Laterality Date    ABDOMINAL SURGERY      DIAGNOSTIC LAPAROSCOPY N/A 5/26/2019    Procedure: LAPAROSCOPY, DIAGNOSTIC Drainage of abscess ;  Surgeon: Jose Luis Hanson MD;  Location: [facility] OR;  Service: General;  Laterality: N/A;  Drain Placement    DIAGNOSTIC LAPAROSCOPY N/A 12/27/2020    Procedure: Diagnostic laparoscopy, drainage of intra-abdominal abscess;  Surgeon: Bhupendra Banda MD;  Location: [facility] OR;  Service: General;  Laterality: N/A;    LAPAROSCOPIC LYSIS OF ADHESIONS  5/26/2019    Procedure: LYSIS, ADHESIONS, LAPAROSCOPIC;  Surgeon: Jose Luis Hanson MD;  Location: [facility] OR;  Service: General;;       Review of patient's allergies indicates:   Allergen Reactions    Penicillins Hives    Amoxicillin     Grass pollen-june grass standard Other (See Comments)       No current facility-administered medications on file prior to encounter.     Current Outpatient Medications on File Prior to Encounter   Medication Sig    LOSARTAN POTASSIUM, BULK, MISC 100 mg by Misc.(Non-Drug; Combo Route) route.    metFORMIN (GLUCOPHAGE) 500 MG tablet TAKE 1 TABLET BY MOUTH TWICE DAILY WITH MEALS    triamterene-hydrochlorothiazide 37.5-25 mg (DYAZIDE) 37.5-25 mg per capsule Take 1 capsule by mouth.    acetaminophen (TYLENOL) 325 MG tablet Take 325 mg by mouth every 6 (six) hours as needed for Pain.    blood sugar diagnostic Strp To check BG 2 times daily, to use with insurance preferred meter    blood-glucose meter kit To check BG 2 times daily, to use with insurance preferred meter    lancets Misc To check BG 2 times daily, to use with insurance preferred meter     Family History       Problem Relation (Age of  Onset)    Diabetes Father          Tobacco Use    Smoking status: Never    Smokeless tobacco: Never   Substance and Sexual Activity    Alcohol use: Not Currently    Drug use: Not Currently    Sexual activity: Not Currently     Review of Systems   Constitutional: Positive for malaise/fatigue.   HENT: Negative.     Eyes: Negative.    Cardiovascular: Negative.    Respiratory: Negative.     Endocrine: Negative.    Hematologic/Lymphatic: Negative.    Skin: Negative.    Musculoskeletal: Negative.    Gastrointestinal: Negative.    Genitourinary: Negative.    Neurological: Negative.    Psychiatric/Behavioral: Negative.     Allergic/Immunologic: Negative.    Objective:     Vital Signs (Most Recent):  Temp: 98.7 °F (37.1 °C) (05/31/23 1608)  Pulse: 84 (05/31/23 1608)  Resp: 20 (05/31/23 1608)  BP: (!) 183/88 (05/31/23 1638)  SpO2: 97 % (05/31/23 1608) Vital Signs (24h Range):  Temp:  [98.4 °F (36.9 °C)-99.1 °F (37.3 °C)] 98.7 °F (37.1 °C)  Pulse:  [67-85] 84  Resp:  [18-20] 20  SpO2:  [96 %-98 %] 97 %  BP: (160-185)/(75-92) 183/88     Weight: 108 kg (238 lb 1.6 oz)  Body mass index is 37.29 kg/m².    SpO2: 97 %         Intake/Output Summary (Last 24 hours) at 5/31/2023 1915  Last data filed at 5/31/2023 0800  Gross per 24 hour   Intake 120 ml   Output --   Net 120 ml       Lines/Drains/Airways       Peripheral Intravenous Line  Duration                  Peripheral IV - Single Lumen 05/30/23 2245 22 G Left Antecubital <1 day                     Physical Exam  Constitutional:       Appearance: Normal appearance. She is well-developed.   HENT:      Head: Normocephalic.   Eyes:      Pupils: Pupils are equal, round, and reactive to light.   Cardiovascular:      Rate and Rhythm: Normal rate and regular rhythm.   Pulmonary:      Effort: Pulmonary effort is normal.      Breath sounds: Normal breath sounds.   Abdominal:      General: Bowel sounds are normal.      Palpations: Abdomen is soft.      Tenderness: There is no abdominal  tenderness.   Musculoskeletal:         General: Normal range of motion.      Cervical back: Normal range of motion and neck supple.   Skin:     General: Skin is warm.   Neurological:      Mental Status: She is alert and oriented to person, place, and time.        Significant Labs: BMP:   Recent Labs   Lab 05/30/23  1135 05/31/23  0428   * 142*    137   K 3.1* 2.6*    100   CO2 28 26   BUN 6 5*   CREATININE 0.7 0.6   CALCIUM 8.8 9.3   MG 1.7 1.7   , CMP   Recent Labs   Lab 05/30/23  1135 05/31/23  0428    137   K 3.1* 2.6*    100   CO2 28 26   * 142*   BUN 6 5*   CREATININE 0.7 0.6   CALCIUM 8.8 9.3   PROT 7.1 6.7   ALBUMIN 2.9* 2.8*   BILITOT 0.4 0.6   ALKPHOS 80 65   AST 17 16   ALT 17 16   ANIONGAP 7* 11   , CBC   Recent Labs   Lab 05/30/23  1135 05/31/23  0428   WBC 5.69 4.88   HGB 11.3* 10.9*   HCT 34.5* 34.0*   * 139*   , INR No results for input(s): INR, PROTIME in the last 48 hours., Lipid Panel No results for input(s): CHOL, HDL, LDLCALC, TRIG, CHOLHDL in the last 48 hours., Troponin No results for input(s): TROPONINI in the last 48 hours., and All pertinent lab results from the last 24 hours have been reviewed.    Significant Imaging: Echocardiogram: Transthoracic echo (TTE) complete (Cupid Only): No results found for this or any previous visit.    Assessment and Plan:     * Sepsis due to Escherichia coli (E. coli)        A-fib  Brief episodes of paroxysmal AFib in the setting of sepsis.  Currently in sinus rhythm.  Continue to monitor on tele monitoring.  If after resolution of sepsis, patient continues to have paroxysmal AFib, may benefit from long-term anticoagulation.  For the current time because she is undergoing colonoscopy during hospital stay, okay to hold off anticoagulation and continue to monitor on tele monitoring.   Event monitoring as an outpatient    Prolonged Q-T interval on ECG  Keep potassium around 4 and magnesium around 2.  Avoid QT  prolonging agents.  monitor with regular EKGs     DM2 (diabetes mellitus, type 2)            VTE Risk Mitigation (From admission, onward)         Ordered     enoxaparin injection 40 mg  Every 24 hours         05/31/23 0850     IP VTE HIGH RISK PATIENT  Once         05/28/23 2205     Place sequential compression device  Until discontinued         05/28/23 2205                Thank you for your consult. I will follow-up with patient. Please contact us if you have any additional questions.    Yuri Rojas MD  Cardiology   [facility]

## 2023-06-01 NOTE — SUBJECTIVE & OBJECTIVE
Interval History:   NAEON.  No new issues.   Denies complaints.  All questions answered and updates on care given.       Review of Systems   Respiratory:  Negative for shortness of breath.    Cardiovascular:  Negative for chest pain.   Gastrointestinal:  Negative for abdominal pain.     Objective:     Vital Signs (Most Recent):  Temp: 98.3 °F (36.8 °C) (06/01/23 0738)  Pulse: 73 (06/01/23 0738)  Resp: 20 (06/01/23 0738)  BP: (!) 166/90 (06/01/23 0738)  SpO2: (!) 93 % (06/01/23 0738) Vital Signs (24h Range):  Temp:  [98.2 °F (36.8 °C)-98.7 °F (37.1 °C)] 98.3 °F (36.8 °C)  Pulse:  [67-84] 73  Resp:  [17-20] 20  SpO2:  [93 %-100 %] 93 %  BP: (127-183)/(68-92) 166/90     Weight: 108 kg (238 lb 1.6 oz)  Body mass index is 37.29 kg/m².  No intake or output data in the 24 hours ending 06/01/23 0845        Physical Exam  Vitals and nursing note reviewed.   Constitutional:       General: She is not in acute distress.     Appearance: She is well-developed and normal weight. She is ill-appearing. She is not diaphoretic.   HENT:      Head: Normocephalic and atraumatic.   Eyes:      General: No scleral icterus.     Pupils: Pupils are equal, round, and reactive to light.   Neck:      Thyroid: No thyromegaly.   Cardiovascular:      Rate and Rhythm: Normal rate and regular rhythm.      Heart sounds: Murmur heard.   Pulmonary:      Effort: Pulmonary effort is normal.      Breath sounds: Normal breath sounds. No stridor. No wheezing or rales.   Abdominal:      General: There is no distension.      Palpations: Abdomen is soft. There is no mass.      Tenderness: There is no guarding.   Musculoskeletal:         General: No swelling or deformity. Normal range of motion.      Cervical back: Normal range of motion and neck supple.   Skin:     General: Skin is warm and dry.      Capillary Refill: Capillary refill takes less than 2 seconds.      Coloration: Skin is not jaundiced.      Findings: No lesion.   Neurological:      Mental Status:  She is alert and oriented to person, place, and time. Mental status is at baseline.      Cranial Nerves: No cranial nerve deficit.      Gait: Gait normal.   Psychiatric:         Mood and Affect: Mood normal.         Behavior: Behavior normal.               Recent Results (from the past 24 hour(s))   POCT glucose    Collection Time: 05/31/23 11:04 AM   Result Value Ref Range    POCT Glucose 142 (H) 70 - 110 mg/dL   POCT glucose    Collection Time: 05/31/23  4:05 PM   Result Value Ref Range    POCT Glucose 108 70 - 110 mg/dL   POCT glucose    Collection Time: 05/31/23  8:19 PM   Result Value Ref Range    POCT Glucose 145 (H) 70 - 110 mg/dL   CBC Auto Differential    Collection Time: 06/01/23  3:48 AM   Result Value Ref Range    WBC 7.02 3.90 - 12.70 K/uL    RBC 4.34 4.00 - 5.40 M/uL    Hemoglobin 12.0 12.0 - 16.0 g/dL    Hematocrit 36.3 (L) 37.0 - 48.5 %    MCV 84 82 - 98 fL    MCH 27.6 27.0 - 31.0 pg    MCHC 33.1 32.0 - 36.0 g/dL    RDW 13.2 11.5 - 14.5 %    Platelets 162 150 - 450 K/uL    MPV 11.0 9.2 - 12.9 fL    Immature Granulocytes 0.4 0.0 - 0.5 %    Gran # (ANC) 5.0 1.8 - 7.7 K/uL    Immature Grans (Abs) 0.03 0.00 - 0.04 K/uL    Lymph # 1.4 1.0 - 4.8 K/uL    Mono # 0.5 0.3 - 1.0 K/uL    Eos # 0.1 0.0 - 0.5 K/uL    Baso # 0.01 0.00 - 0.20 K/uL    nRBC 0 0 /100 WBC    Gran % 71.4 38.0 - 73.0 %    Lymph % 19.8 18.0 - 48.0 %    Mono % 7.4 4.0 - 15.0 %    Eosinophil % 0.9 0.0 - 8.0 %    Basophil % 0.1 0.0 - 1.9 %    Differential Method Automated    Comprehensive Metabolic Panel    Collection Time: 06/01/23  3:48 AM   Result Value Ref Range    Sodium 136 136 - 145 mmol/L    Potassium 3.7 3.5 - 5.1 mmol/L    Chloride 100 95 - 110 mmol/L    CO2 24 23 - 29 mmol/L    Glucose 134 (H) 70 - 110 mg/dL    BUN 6 6 - 20 mg/dL    Creatinine 0.7 0.5 - 1.4 mg/dL    Calcium 9.5 8.7 - 10.5 mg/dL    Total Protein 7.7 6.0 - 8.4 g/dL    Albumin 3.2 (L) 3.5 - 5.2 g/dL    Total Bilirubin 0.6 0.1 - 1.0 mg/dL    Alkaline Phosphatase 91 55  - 135 U/L    AST 18 10 - 40 U/L    ALT 18 10 - 44 U/L    Anion Gap 12 8 - 16 mmol/L    eGFR >60 >60 mL/min/1.73 m^2   Magnesium    Collection Time: 06/01/23  3:48 AM   Result Value Ref Range    Magnesium 1.8 1.6 - 2.6 mg/dL   Phosphorus    Collection Time: 06/01/23  3:48 AM   Result Value Ref Range    Phosphorus 3.0 2.7 - 4.5 mg/dL   POCT glucose    Collection Time: 06/01/23  7:39 AM   Result Value Ref Range    POCT Glucose 104 70 - 110 mg/dL       Microbiology Results (last 7 days)       Procedure Component Value Units Date/Time    Blood culture x two cultures. Draw prior to antibiotics. [[id number]] Collected: 05/28/23 1647    Order Status: Completed Specimen: Blood from Peripheral, Antecubital, Right Updated: 05/31/23 1703     Blood Culture, Routine No Growth to date      No Growth to date      No Growth to date      No Growth to date    Narrative:      Aerobic and anaerobic    Blood culture [[id number]] Collected: 05/29/23 0834    Order Status: Completed Specimen: Blood Updated: 05/31/23 0903     Blood Culture, Routine No Growth to date      No Growth to date      No Growth to date    Blood culture [[id number]] Collected: 05/29/23 0834    Order Status: Completed Specimen: Blood Updated: 05/31/23 0903     Blood Culture, Routine No Growth to date      No Growth to date      No Growth to date    Urine Culture High Risk [[id number]]  (Abnormal) Collected: 05/29/23 1034    Order Status: Completed Specimen: Urine, Clean Catch Updated: 05/31/23 0751     Urine Culture, Routine AURELIA ALBICANS  10,000 - 49,999 cfu/ml  Treatment of asymptomatic candiduria is not recommended (except for   specific populations). Candida isolated in the urine typically   represents colonization. If an indwelling urinary catheter is present  it should be removed or replaced.      Narrative:      Indicated criteria for high risk culture:->Other  Other (specify):->altered mental status    Blood culture x two cultures. Draw prior to antibiotics.  [[id number]]  (Abnormal)  (Susceptibility) Collected: 05/28/23 1703    Order Status: Completed Specimen: Blood from Peripheral, Hand, Left Updated: 05/31/23 0716     Blood Culture, Routine Gram stain cameron bottle: Gram negative rods      Results called to and read back by: Yulia Robin 05/29/2023  07:38      ESCHERICHIA COLI    Narrative:      Aerobic and anaerobic             Imaging Results              CTA Chest Non-Coronary (PE Studies) (Final result)  Result time 05/28/23 23:10:01      Final result by Jenna De Dios MD (05/28/23 23:10:01)                   Impression:      No evidence of acute pulmonary embolism. Mosaic pattern of the lung parenchyma in the lower lung fields.    Trace pericardial effusion versus mild pericardial wall thickening.    Hepatic steatosis.    Prominent spleen.      Electronically signed by: Jenna De Dios  Date:    05/28/2023  Time:    23:10               Narrative:    EXAMINATION:  CT PULMONARY ANGIOGRAM WITH CONTRAST    CLINICAL HISTORY:  Nausea vomiting.  Positive D-dimer.    TECHNIQUE:  CT of the chest with intravenous contrast for pulmonary artery angiogram was performed. Contiguous axial 1.25 mm images followed by 10 mm reconstructions with multiplanar and MIP reformations of the pulmonary arteries. No 3D post-angiographic imaging was performed on an independent workstation and reviewed.  75 ml of Omnipaque 350 was injected.    COMPARISON:  None.    FINDINGS:  There is no evidence of pulmonary artery filling defect to suggest pulmonary embolism. There is no aortic aneurysm or aortic dissection.  Mosaic pattern of the lung parenchyma in lower lung fields.    There is no evidence of mediastinal, hilar, or axillary adenopathy.    There is trace pericardial effusion versus mild pericardial wall thickening.  No pleural effusion is detected.    The heart size is within normal limits.    In the visualized upper abdomen, there is fatty infiltration of the liver.  The spleen is  prominent.    Spondylitic changes are present.                                       CT Abdomen Pelvis With Contrast (Final result)  Result time 05/28/23 18:39:34      Final result by Jenna De Dios MD (05/28/23 18:39:34)                   Impression:      No acute abdominal or pelvic pathology CT of the abdomen and pelvis with contrast.    Mildly elongated spleen.    Moderate fat containing ventral abdominal hernia.  Small fat containing umbilical hernia.    Fibroid uterus.    Moderately large stool in the patulous rectum.  Question fecal impaction.      Electronically signed by: Jenna De Dios  Date:    05/28/2023  Time:    18:39               Narrative:    EXAMINATION:  CT OF ABDOMEN PELVIS WITH    CLINICAL HISTORY:  Sepsis;    TECHNIQUE:  5 mm enhanced axial images were obtained from the lung bases through the greater trochanters.  Seventy-five mL of Omnipaque 350 was injected.    COMPARISON:  09/12/2021    FINDINGS:  The liver, pancreas, kidneys, and adrenal glands are unremarkable. The gallbladder contains no calcified gallstones.    The spleen is mildly elongated measuring 12.4 x 3.9 cm (series 2 axial image 42).    There is no definite evidence for abdominal adenopathy or ascites.  A moderate fat containing midline ventral abdominal hernia is present.  A small fat containing umbilical hernia is again seen.    There is a moderately large lobular uterus containing multiple calcified and noncalcified uterine fibroids.  The appendix is not enlarged.  The rectum is patulous and contains partly large stool..    There is no free fluid in the pelvis.    There is mild bibasilar atelectasis.                                       CT Head Without Contrast (Final result)  Result time 05/28/23 18:21:49      Final result by Jenna De Dios MD (05/28/23 18:21:49)                   Impression:      No acute intracranial abnormality detected.    Mild sinus disease.      Electronically signed by: Jenna  Lanre  Date:    05/28/2023  Time:    18:21               Narrative:    EXAMINATION:  CT OF THE HEAD WITHOUT    CLINICAL HISTORY:  Mental status change, unknown cause;    TECHNIQUE:  5 mm unenhanced axial images were obtained from the skull base to the vertex.    COMPARISON:  None.    FINDINGS:  The ventricles, basal cisterns, and cortical sulci are within normal limits for patient's stated age. There is no acute intracranial hemorrhage, territorial infarct or mass effect, or midline shift. In the visualized paranasal sinuses, there is mild mucoperiosteal thickening in bilateral maxillary sinuses and in the left ethmoid air cells.                                       X-Ray Chest AP Portable (Final result)  Result time 05/28/23 17:29:30      Final result by Terry Cook MD (05/28/23 17:29:30)                   Impression:      No acute process.      Electronically signed by: Terry Cook MD  Date:    05/28/2023  Time:    17:29               Narrative:    EXAMINATION:  XR CHEST AP PORTABLE    CLINICAL HISTORY:  Sepsis;    TECHNIQUE:  Single frontal view of the chest was performed.    COMPARISON:  09/12/2021.    FINDINGS:  Monitoring EKG leads are present.  The trachea is unremarkable.  The cardiomediastinal silhouette is within normal limits.  There is no evidence of free air beneath the hemidiaphragms.  There are no pleural effusions.  There is no evidence of a pneumothorax.  There is no evidence of pneumomediastinum.  No airspace opacity is present.  The osseous structures are unremarkable.

## 2023-06-01 NOTE — PROGRESS NOTES
[facility] Surg  Cardiology  Progress Note    Patient Name: Shahida Ortega  MRN: [id number]  Admission Date: 5/28/2023  Hospital Length of Stay: 3 days  Code Status: Full Code   Attending Physician: Tyrese Wang MD   Primary Care Physician: Janusz Staton MD  Expected Discharge Date: 6/1/2023  Principal Problem:Sepsis due to Escherichia coli (E. coli)    Subjective:       Interval History:  No further AFib noted on tele monitoring.      Past Medical History:   Diagnosis Date    DM2 (diabetes mellitus, type 2) 5/28/2023    Hypertension     Prolonged Q-T interval on ECG 5/31/2023    Qtc 525 5/31/23       Past Surgical History:   Procedure Laterality Date    ABDOMINAL SURGERY      DIAGNOSTIC LAPAROSCOPY N/A 5/26/2019    Procedure: LAPAROSCOPY, DIAGNOSTIC Drainage of abscess ;  Surgeon: Jose Luis Hanson MD;  Location: [facility] OR;  Service: General;  Laterality: N/A;  Drain Placement    DIAGNOSTIC LAPAROSCOPY N/A 12/27/2020    Procedure: Diagnostic laparoscopy, drainage of intra-abdominal abscess;  Surgeon: Bhupendra Banda MD;  Location: [facility] OR;  Service: General;  Laterality: N/A;    LAPAROSCOPIC LYSIS OF ADHESIONS  5/26/2019    Procedure: LYSIS, ADHESIONS, LAPAROSCOPIC;  Surgeon: Jose Luis Hanson MD;  Location: [facility] OR;  Service: General;;       Review of patient's allergies indicates:   Allergen Reactions    Penicillins Hives    Amoxicillin     Grass pollen-yun grass standard Other (See Comments)       No current facility-administered medications on file prior to encounter.     Current Outpatient Medications on File Prior to Encounter   Medication Sig    [DISCONTINUED] LOSARTAN POTASSIUM, BULK, MISC 100 mg by Misc.(Non-Drug; Combo Route) route.    [DISCONTINUED] metFORMIN (GLUCOPHAGE) 500 MG tablet TAKE 1 TABLET BY MOUTH TWICE DAILY WITH MEALS    [DISCONTINUED] triamterene-hydrochlorothiazide 37.5-25 mg (DYAZIDE) 37.5-25 mg per capsule Take 1 capsule by mouth.    acetaminophen (TYLENOL) 325 MG  tablet Take 325 mg by mouth every 6 (six) hours as needed for Pain.    blood-glucose meter kit To check BG 2 times daily, to use with insurance preferred meter    [DISCONTINUED] blood sugar diagnostic Strp To check BG 2 times daily, to use with insurance preferred meter    [DISCONTINUED] lancets Misc To check BG 2 times daily, to use with insurance preferred meter     Family History       Problem Relation (Age of Onset)    Diabetes Father          Tobacco Use    Smoking status: Never    Smokeless tobacco: Never   Substance and Sexual Activity    Alcohol use: Not Currently    Drug use: Not Currently    Sexual activity: Not Currently     Review of Systems   Constitutional: Positive for malaise/fatigue.   HENT: Negative.     Eyes: Negative.    Cardiovascular: Negative.    Respiratory: Negative.     Endocrine: Negative.    Hematologic/Lymphatic: Negative.    Skin: Negative.    Musculoskeletal: Negative.    Gastrointestinal: Negative.    Genitourinary: Negative.    Neurological: Negative.    Psychiatric/Behavioral: Negative.     Allergic/Immunologic: Negative.    Objective:     Vital Signs (Most Recent):  Temp: 98 °F (36.7 °C) (06/01/23 0845)  Pulse: 76 (06/01/23 0845)  Resp: 19 (06/01/23 0845)  BP: (!) 147/73 (06/01/23 0845)  SpO2: 96 % (06/01/23 0845) Vital Signs (24h Range):  Temp:  [98 °F (36.7 °C)-98.7 °F (37.1 °C)] 98 °F (36.7 °C)  Pulse:  [67-84] 76  Resp:  [17-20] 19  SpO2:  [93 %-100 %] 96 %  BP: (127-183)/(68-92) 147/73     Weight: 108 kg (238 lb 1.6 oz)  Body mass index is 37.29 kg/m².    SpO2: 96 %       No intake or output data in the 24 hours ending 06/01/23 0914      Lines/Drains/Airways       Peripheral Intravenous Line  Duration                  Peripheral IV - Single Lumen 05/30/23 2245 22 G Left Antecubital 1 day                     Physical Exam  Constitutional:       Appearance: Normal appearance. She is well-developed.   HENT:      Head: Normocephalic.   Eyes:      Pupils: Pupils are equal,  round, and reactive to light.   Cardiovascular:      Rate and Rhythm: Normal rate and regular rhythm.   Pulmonary:      Effort: Pulmonary effort is normal.      Breath sounds: Normal breath sounds.   Abdominal:      General: Bowel sounds are normal.      Palpations: Abdomen is soft.      Tenderness: There is no abdominal tenderness.   Musculoskeletal:         General: Normal range of motion.      Cervical back: Normal range of motion and neck supple.   Skin:     General: Skin is warm.   Neurological:      Mental Status: She is alert and oriented to person, place, and time.        Significant Labs: BMP:   Recent Labs   Lab 05/30/23 1135 05/31/23 0428 06/01/23 0348   * 142* 134*    137 136   K 3.1* 2.6* 3.7    100 100   CO2 28 26 24   BUN 6 5* 6   CREATININE 0.7 0.6 0.7   CALCIUM 8.8 9.3 9.5   MG 1.7 1.7 1.8     , CMP   Recent Labs   Lab 05/30/23 1135 05/31/23 0428 06/01/23 0348    137 136   K 3.1* 2.6* 3.7    100 100   CO2 28 26 24   * 142* 134*   BUN 6 5* 6   CREATININE 0.7 0.6 0.7   CALCIUM 8.8 9.3 9.5   PROT 7.1 6.7 7.7   ALBUMIN 2.9* 2.8* 3.2*   BILITOT 0.4 0.6 0.6   ALKPHOS 80 65 91   AST 17 16 18   ALT 17 16 18   ANIONGAP 7* 11 12     , CBC   Recent Labs   Lab 05/30/23 1135 05/31/23 0428 06/01/23 0348   WBC 5.69 4.88 7.02   HGB 11.3* 10.9* 12.0   HCT 34.5* 34.0* 36.3*   * 139* 162     , INR No results for input(s): INR, PROTIME in the last 48 hours., Lipid Panel No results for input(s): CHOL, HDL, LDLCALC, TRIG, CHOLHDL in the last 48 hours., Troponin No results for input(s): TROPONINI in the last 48 hours., and All pertinent lab results from the last 24 hours have been reviewed.    Significant Imaging: Echocardiogram: Transthoracic echo (TTE) complete (Cupid Only): No results found for this or any previous visit.    Assessment and Plan:     Brief HPI:     * Sepsis due to Escherichia coli (E. coli)        A-fib  Brief episodes of paroxysmal AFib in the  setting of sepsis.  Currently in sinus rhythm.  Continue to monitor on tele monitoring.  If after resolution of sepsis, patient continues to have paroxysmal AFib, may benefit from long-term anticoagulation.  For the current time because she is undergoing colonoscopy during hospital stay, okay to hold off anticoagulation and continue to monitor on tele monitoring.   Event monitoring as an outpatient    Prolonged Q-T interval on ECG  Keep potassium around 4 and magnesium around 2.  Avoid QT prolonging agents.  monitor with regular EKGs     DM2 (diabetes mellitus, type 2)            VTE Risk Mitigation (From admission, onward)         Ordered     enoxaparin injection 40 mg  Every 24 hours         05/31/23 0850     IP VTE HIGH RISK PATIENT  Once         05/28/23 2205     Place sequential compression device  Until discontinued         05/28/23 2205                Yuri Rojas MD  Cardiology  [facility]

## 2023-06-01 NOTE — PROGRESS NOTES
[facility] Medicine  Progress Note    Patient Name: Shahida Ortega  MRN: [id number]  Patient Class: IP- Inpatient   Admission Date: 5/28/2023  Length of Stay: 3 days  Attending Physician: Tyrese Wang MD  Primary Care Provider: Janusz Staton MD        Subjective:     Principal Problem:Sepsis due to Escherichia coli (E. coli)        HPI:  52-year-old female with past medical history of hypertension and type 2 diabetes who presents to the emergency department with a chief complaint of emesis.  She was accompanied by her daughter who lives with her and helps provide this history.  She was in her normal state of health until earlier yesterday morning when she started having nausea.  Subsequently, she had 4 episodes of nonbloody, nonbilious emesis at work and her daughter was called to come and take her to the emergency department.  She denies any abdominal pain, chest pain, shortness of breath, or urinary symptoms including frequency, urgency, or dysuria. She has had cough for the last week or so. Daughter reports that yesterday she was acting at her baseline and she has been compliant with hypertension and diabetes medications as prescribed.  Daughter reports that at baseline patient is awake alert oriented x4.  Patient was initially lethargic, slow to answer questions, not at baseline. Initially patient with temp of 103.3, tachycardia 110s-114s, tachypnea 26-30s, elevated BP. No medications prior to arrival to attempt to alleviate symptoms. On my examination of patient, patient now at baseline, AAOx4.  Vitals signs improved, now afebrile HR and RR WNL. Workup CT of head negative for acute findings, revealed no leukocytosis, h/h stable, platelets 118, procalcitonin 10.56, d-dimer elevated 1.56, glucose 226, lactic WNL, covid negative, u/a concerning for infectious process, blood cultures pending, CT of abdomen/pelvis without acute or infectious findings, question fecal impaction.  She reports last BM  yesterday, no issues with BM pattern.  CTA of chest negative for PE, chest xray without acute process.  Patient admitted to hospital medicine observation unit for further medical management.        Overview/Hospital Course:  52-year-old female with past medical history of hypertension and type 2 diabetes admitted to observation for further evaluation of nausea and vomiting on 05/29/23. Found to be febrile, tachycardic and elevated procalcitonin. UA with +1 leukocytosis, does not appear grossly infected. Urine cx pending. Blood cx now with GNR. Antibiotics changed to Rocephin 2g daily.  Imaging with no acute process but noted constipation. Started bowel regimen. Will advance diet as tolerated. Continue IV CTX and vanc. Repeat blood cultures ordered.  F/u urine and blood cultures.    Patient admitted with 3/4 sirs, sepsis without septic shock, bacteremic with GNR.  103.3 F, , RR 30, WBC WNL.  Procalcitonin 10.6.  UA does not look particularly infectious, +4 glucose, diabetes uncontrolled, which makes her susceptible to infections however.  She appears to have fecal impaction/constipation, likely a bit obstructed and caused translocation of bacteria into the blood stream. GNR presumptive E coli, will discontinue vancomycin.    Patient's CA 19 9 elevated at 142, concerned that it has been elevated for 4 years now.  Nothing seen on CT with contrast, perhaps an MRCP.  Will consult Oncology and GI for recommendations.    Patient only had 1 episode of Afib that self resolved in the setting of GNR bacteremia/sepsis, now back in NSR and stable, would favor aspirin at discharge, with follow-up with Cardiology to wear a Holter monitor, if AFib is appreciated again then would initiate anticoagulation. C-scope today 6/1/23, can discharge afterward pending results.       Interval History:   NAEON.  No new issues.   Denies complaints.  All questions answered and updates on care given.       Review of Systems   Respiratory:   Negative for shortness of breath.    Cardiovascular:  Negative for chest pain.   Gastrointestinal:  Negative for abdominal pain.     Objective:     Vital Signs (Most Recent):  Temp: 98.3 °F (36.8 °C) (06/01/23 0738)  Pulse: 73 (06/01/23 0738)  Resp: 20 (06/01/23 0738)  BP: (!) 166/90 (06/01/23 0738)  SpO2: (!) 93 % (06/01/23 0738) Vital Signs (24h Range):  Temp:  [98.2 °F (36.8 °C)-98.7 °F (37.1 °C)] 98.3 °F (36.8 °C)  Pulse:  [67-84] 73  Resp:  [17-20] 20  SpO2:  [93 %-100 %] 93 %  BP: (127-183)/(68-92) 166/90     Weight: 108 kg (238 lb 1.6 oz)  Body mass index is 37.29 kg/m².  No intake or output data in the 24 hours ending 06/01/23 0845        Physical Exam  Vitals and nursing note reviewed.   Constitutional:       General: She is not in acute distress.     Appearance: She is well-developed and normal weight. She is ill-appearing. She is not diaphoretic.   HENT:      Head: Normocephalic and atraumatic.   Eyes:      General: No scleral icterus.     Pupils: Pupils are equal, round, and reactive to light.   Neck:      Thyroid: No thyromegaly.   Cardiovascular:      Rate and Rhythm: Normal rate and regular rhythm.      Heart sounds: Murmur heard.   Pulmonary:      Effort: Pulmonary effort is normal.      Breath sounds: Normal breath sounds. No stridor. No wheezing or rales.   Abdominal:      General: There is no distension.      Palpations: Abdomen is soft. There is no mass.      Tenderness: There is no guarding.   Musculoskeletal:         General: No swelling or deformity. Normal range of motion.      Cervical back: Normal range of motion and neck supple.   Skin:     General: Skin is warm and dry.      Capillary Refill: Capillary refill takes less than 2 seconds.      Coloration: Skin is not jaundiced.      Findings: No lesion.   Neurological:      Mental Status: She is alert and oriented to person, place, and time. Mental status is at baseline.      Cranial Nerves: No cranial nerve deficit.      Gait: Gait normal.    Psychiatric:         Mood and Affect: Mood normal.         Behavior: Behavior normal.               Recent Results (from the past 24 hour(s))   POCT glucose    Collection Time: 05/31/23 11:04 AM   Result Value Ref Range    POCT Glucose 142 (H) 70 - 110 mg/dL   POCT glucose    Collection Time: 05/31/23  4:05 PM   Result Value Ref Range    POCT Glucose 108 70 - 110 mg/dL   POCT glucose    Collection Time: 05/31/23  8:19 PM   Result Value Ref Range    POCT Glucose 145 (H) 70 - 110 mg/dL   CBC Auto Differential    Collection Time: 06/01/23  3:48 AM   Result Value Ref Range    WBC 7.02 3.90 - 12.70 K/uL    RBC 4.34 4.00 - 5.40 M/uL    Hemoglobin 12.0 12.0 - 16.0 g/dL    Hematocrit 36.3 (L) 37.0 - 48.5 %    MCV 84 82 - 98 fL    MCH 27.6 27.0 - 31.0 pg    MCHC 33.1 32.0 - 36.0 g/dL    RDW 13.2 11.5 - 14.5 %    Platelets 162 150 - 450 K/uL    MPV 11.0 9.2 - 12.9 fL    Immature Granulocytes 0.4 0.0 - 0.5 %    Gran # (ANC) 5.0 1.8 - 7.7 K/uL    Immature Grans (Abs) 0.03 0.00 - 0.04 K/uL    Lymph # 1.4 1.0 - 4.8 K/uL    Mono # 0.5 0.3 - 1.0 K/uL    Eos # 0.1 0.0 - 0.5 K/uL    Baso # 0.01 0.00 - 0.20 K/uL    nRBC 0 0 /100 WBC    Gran % 71.4 38.0 - 73.0 %    Lymph % 19.8 18.0 - 48.0 %    Mono % 7.4 4.0 - 15.0 %    Eosinophil % 0.9 0.0 - 8.0 %    Basophil % 0.1 0.0 - 1.9 %    Differential Method Automated    Comprehensive Metabolic Panel    Collection Time: 06/01/23  3:48 AM   Result Value Ref Range    Sodium 136 136 - 145 mmol/L    Potassium 3.7 3.5 - 5.1 mmol/L    Chloride 100 95 - 110 mmol/L    CO2 24 23 - 29 mmol/L    Glucose 134 (H) 70 - 110 mg/dL    BUN 6 6 - 20 mg/dL    Creatinine 0.7 0.5 - 1.4 mg/dL    Calcium 9.5 8.7 - 10.5 mg/dL    Total Protein 7.7 6.0 - 8.4 g/dL    Albumin 3.2 (L) 3.5 - 5.2 g/dL    Total Bilirubin 0.6 0.1 - 1.0 mg/dL    Alkaline Phosphatase 91 55 - 135 U/L    AST 18 10 - 40 U/L    ALT 18 10 - 44 U/L    Anion Gap 12 8 - 16 mmol/L    eGFR >60 >60 mL/min/1.73 m^2   Magnesium    Collection Time:  06/01/23  3:48 AM   Result Value Ref Range    Magnesium 1.8 1.6 - 2.6 mg/dL   Phosphorus    Collection Time: 06/01/23  3:48 AM   Result Value Ref Range    Phosphorus 3.0 2.7 - 4.5 mg/dL   POCT glucose    Collection Time: 06/01/23  7:39 AM   Result Value Ref Range    POCT Glucose 104 70 - 110 mg/dL       Microbiology Results (last 7 days)       Procedure Component Value Units Date/Time    Blood culture x two cultures. Draw prior to antibiotics. [[id number]] Collected: 05/28/23 1647    Order Status: Completed Specimen: Blood from Peripheral, Antecubital, Right Updated: 05/31/23 1703     Blood Culture, Routine No Growth to date      No Growth to date      No Growth to date      No Growth to date    Narrative:      Aerobic and anaerobic    Blood culture [[id number]] Collected: 05/29/23 0834    Order Status: Completed Specimen: Blood Updated: 05/31/23 0903     Blood Culture, Routine No Growth to date      No Growth to date      No Growth to date    Blood culture [[id number]] Collected: 05/29/23 0834    Order Status: Completed Specimen: Blood Updated: 05/31/23 0903     Blood Culture, Routine No Growth to date      No Growth to date      No Growth to date    Urine Culture High Risk [[id number]]  (Abnormal) Collected: 05/29/23 1034    Order Status: Completed Specimen: Urine, Clean Catch Updated: 05/31/23 0751     Urine Culture, Routine AURELIA ALBICANS  10,000 - 49,999 cfu/ml  Treatment of asymptomatic candiduria is not recommended (except for   specific populations). Candida isolated in the urine typically   represents colonization. If an indwelling urinary catheter is present  it should be removed or replaced.      Narrative:      Indicated criteria for high risk culture:->Other  Other (specify):->altered mental status    Blood culture x two cultures. Draw prior to antibiotics. [[id number]]  (Abnormal)  (Susceptibility) Collected: 05/28/23 1703    Order Status: Completed Specimen: Blood from Peripheral, Hand, Left Updated:  05/31/23 0716     Blood Culture, Routine Gram stain cameron bottle: Gram negative rods      Results called to and read back by: Yulia Robin 05/29/2023  07:38      ESCHERICHIA COLI    Narrative:      Aerobic and anaerobic             Imaging Results              CTA Chest Non-Coronary (PE Studies) (Final result)  Result time 05/28/23 23:10:01      Final result by Jenna De Dios MD (05/28/23 23:10:01)                   Impression:      No evidence of acute pulmonary embolism. Mosaic pattern of the lung parenchyma in the lower lung fields.    Trace pericardial effusion versus mild pericardial wall thickening.    Hepatic steatosis.    Prominent spleen.      Electronically signed by: Jenna De Dios  Date:    05/28/2023  Time:    23:10               Narrative:    EXAMINATION:  CT PULMONARY ANGIOGRAM WITH CONTRAST    CLINICAL HISTORY:  Nausea vomiting.  Positive D-dimer.    TECHNIQUE:  CT of the chest with intravenous contrast for pulmonary artery angiogram was performed. Contiguous axial 1.25 mm images followed by 10 mm reconstructions with multiplanar and MIP reformations of the pulmonary arteries. No 3D post-angiographic imaging was performed on an independent workstation and reviewed.  75 ml of Omnipaque 350 was injected.    COMPARISON:  None.    FINDINGS:  There is no evidence of pulmonary artery filling defect to suggest pulmonary embolism. There is no aortic aneurysm or aortic dissection.  Mosaic pattern of the lung parenchyma in lower lung fields.    There is no evidence of mediastinal, hilar, or axillary adenopathy.    There is trace pericardial effusion versus mild pericardial wall thickening.  No pleural effusion is detected.    The heart size is within normal limits.    In the visualized upper abdomen, there is fatty infiltration of the liver.  The spleen is prominent.    Spondylitic changes are present.                                       CT Abdomen Pelvis With Contrast (Final result)  Result time  05/28/23 18:39:34      Final result by Jenna De Dios MD (05/28/23 18:39:34)                   Impression:      No acute abdominal or pelvic pathology CT of the abdomen and pelvis with contrast.    Mildly elongated spleen.    Moderate fat containing ventral abdominal hernia.  Small fat containing umbilical hernia.    Fibroid uterus.    Moderately large stool in the patulous rectum.  Question fecal impaction.      Electronically signed by: Jenna De Dios  Date:    05/28/2023  Time:    18:39               Narrative:    EXAMINATION:  CT OF ABDOMEN PELVIS WITH    CLINICAL HISTORY:  Sepsis;    TECHNIQUE:  5 mm enhanced axial images were obtained from the lung bases through the greater trochanters.  Seventy-five mL of Omnipaque 350 was injected.    COMPARISON:  09/12/2021    FINDINGS:  The liver, pancreas, kidneys, and adrenal glands are unremarkable. The gallbladder contains no calcified gallstones.    The spleen is mildly elongated measuring 12.4 x 3.9 cm (series 2 axial image 42).    There is no definite evidence for abdominal adenopathy or ascites.  A moderate fat containing midline ventral abdominal hernia is present.  A small fat containing umbilical hernia is again seen.    There is a moderately large lobular uterus containing multiple calcified and noncalcified uterine fibroids.  The appendix is not enlarged.  The rectum is patulous and contains partly large stool..    There is no free fluid in the pelvis.    There is mild bibasilar atelectasis.                                       CT Head Without Contrast (Final result)  Result time 05/28/23 18:21:49      Final result by Jenna De Dios MD (05/28/23 18:21:49)                   Impression:      No acute intracranial abnormality detected.    Mild sinus disease.      Electronically signed by: Jenna De Dios  Date:    05/28/2023  Time:    18:21               Narrative:    EXAMINATION:  CT OF THE HEAD WITHOUT    CLINICAL HISTORY:  Mental status change,  unknown cause;    TECHNIQUE:  5 mm unenhanced axial images were obtained from the skull base to the vertex.    COMPARISON:  None.    FINDINGS:  The ventricles, basal cisterns, and cortical sulci are within normal limits for patient's stated age. There is no acute intracranial hemorrhage, territorial infarct or mass effect, or midline shift. In the visualized paranasal sinuses, there is mild mucoperiosteal thickening in bilateral maxillary sinuses and in the left ethmoid air cells.                                       X-Ray Chest AP Portable (Final result)  Result time 05/28/23 17:29:30      Final result by Terry Cook MD (05/28/23 17:29:30)                   Impression:      No acute process.      Electronically signed by: Terry Cook MD  Date:    05/28/2023  Time:    17:29               Narrative:    EXAMINATION:  XR CHEST AP PORTABLE    CLINICAL HISTORY:  Sepsis;    TECHNIQUE:  Single frontal view of the chest was performed.    COMPARISON:  09/12/2021.    FINDINGS:  Monitoring EKG leads are present.  The trachea is unremarkable.  The cardiomediastinal silhouette is within normal limits.  There is no evidence of free air beneath the hemidiaphragms.  There are no pleural effusions.  There is no evidence of a pneumothorax.  There is no evidence of pneumomediastinum.  No airspace opacity is present.  The osseous structures are unremarkable.                                            Assessment/Plan:      * Sepsis due to Escherichia coli (E. coli)  This patient does have evidence of infective focus  My overall impression is sepsis.  Source: Abdominal  Antibiotics given-   Antibiotics (72h ago, onward)      Start     Stop Route Frequency Ordered    05/29/23 0753  cefTRIAXone 2 gram/50 mL IVPB 2 g         -- IV Every 24 hours (non-standard times) 05/29/23 0754          Latest lactate reviewed-  Fluid challenge Actual Body weight- Patient will receive 30ml/kg actual body weight to calculate fluid bolus for treatment of  septic shock.   Post- resuscitation assessment Yes Perfusion exam was performed within 6 hours of septic shock presentation after bolus shows Adequate tissue perfusion assessed by non-invasive monitoring   Will Not start Pressors- Levophed for MAP of 65    Patient admitted with 3/4 sirs, sepsis without septic shock, bacteremic with GNR.    103.3 F, , RR 30, WBC WNL.  Procalcitonin 10.6.    UA does not look particularly infectious, +4 glucose, diabetes uncontrolled, which makes her susceptible to infections however.    She appears to have fecal impaction/constipation, likely a bit obstructed and caused translocation of bacteria into the blood stream.  GNR presumptive E coli, will discontinue vancomycin.  Has had intra-abdominal abscesses in the past, CT with contrast did not reveal any abscesses  Her CA 19 9 has oddly been elevated since 2019, no pancreatic lesions seen on CT    A-fib  Patient with Paroxysmal (<7 days) atrial fibrillation which is controlled currently with Beta Blocker. Patient is currently in sinus rhythm.[id number] Score: 2. HASBLED Score: ?. Anticoagulation not indicated due to low score.  Patient in NSR now, but had an episode on 5/29/23 of Afib/flutter  GNR sepsis with high procal load, prone to incite new onset Afib due to catecholamine release.   Also with biphasic p-wave, so likely with enlarged left atria making her prone to afib as well. ECHO ordered to assess structure.  Given patient is now in NSR, would favor holding an anticoagulation until after GI procedure, aspirin okay.   Does have a prolonged QTc at 525, likely from hypokalemia, K 2.6 this morning. Has since received 80 mEq this am, and will receive another 40 this evening. Should be at goal K in the am. Antinausea PRNs removed.   Low dose correg added, do not want to control SBP too much in setting of bacteremia, Goal -180.   Will consult Cardiology, per Anesthesiology request.   Low-dose Coreg added  Patient only had  1 episode in the setting of GNR bacteremia/sepsis, now back in NSR and stable, would favor aspirin at discharge, with follow-up with Cardiology to wear a Holter monitor, if AFib is appreciated again then would initiate anticoagulation.        Prolonged Q-T interval on ECG  Qtc 525 5/31/23  Fix electrolytes  Zofran removed  Avoid prolonging agents      DM2 (diabetes mellitus, type 2)  Recent A1c 3/2022 at 11.3  Repeat A1c pending  Treat with SSI ac/hs during hospital stay  Hold oral antihyperglycemic agents  Resume home regimen at discharge  Hypoglycemia protocol PRN      Hypertension  Hold bp meds secondary to possible sepsis  Hydralazine prn       Elevated d-dimer  CTA of chest negative for PE  Currently denies chest pain, SOB      Elevated CA 19-9 level  Patient's CA 19 9 elevated at 142, concerned that it has been elevated for 4 years now.    Nothing seen on CT with contrast, perhaps an MRCP.    Will consult Oncology and GI for recommendations.      VTE Risk Mitigation (From admission, onward)           Ordered     enoxaparin injection 40 mg  Every 24 hours         05/31/23 0850     IP VTE HIGH RISK PATIENT  Once         05/28/23 2205     Place sequential compression device  Until discontinued         05/28/23 2205                    Discharge Planning   NAYELI: 6/1/2023     Code Status: Full Code   Is the patient medically ready for discharge?: No    Reason for patient still in hospital (select all that apply): Patient trending condition and Treatment  Discharge Plan A: Home Health   Discharge Delays: None known at this time              Tyrese Wang MD  Department of Hospital Medicine   [facility]

## 2023-06-01 NOTE — PLAN OF CARE
Procedure and recovery complete. Awake and alert. No c/o pain or discomfort. Resp. Even and unlabored. SAT's 97% on room air. Report given to primary nurse Louisa. No acute distress noted.

## 2023-06-01 NOTE — TRANSFER OF CARE
"Anesthesia Transfer of Care Note    Patient: April Jordan    Procedure(s) Performed: Procedure(s) (LRB):  COLONOSCOPY (N/A)    Patient location: GI    Anesthesia Type: general    Transport from OR: Transported from OR on room air with adequate spontaneous ventilation    Post pain: adequate analgesia    Post assessment: no apparent anesthetic complications and tolerated procedure well    Post vital signs: stable    Level of consciousness: awake and alert    Nausea/Vomiting: no nausea/vomiting    Complications: none    Transfer of care protocol was followed      Last vitals:   Visit Vitals  BP (!) 144/75   Pulse 78   Temp 36.2 °C (97.1 °F) (Oral)   Resp 18   Ht 5' 7" (1.702 m)   Wt 108 kg (238 lb 1.6 oz)   LMP 12/14/2020   SpO2 96%   BMI 37.29 kg/m²     "

## 2023-06-01 NOTE — DISCHARGE INSTRUCTIONS
You have an arrhythmia of the heart called AFib, start taking aspirin and Coreg  Follow-up with Cardiology to wear a monitor, to see if it is ongoing  You may need a blood thinner if the heart monitor picks up on this outpatient  Take losartan 100 mg  Take hydrochlorothiazide 25 mg  Finish the antibiotic cefdinir 300 mg twice a day for 10 more days  The infection was in your blood, so please finish out all these antibiotic pills  Follow-up with your PCP in Hematology/Oncology as well    Thank you for trusting [facility] and me with your care.  We are honored that you entrusted us with your healthcare needs. Your satisfaction is very important to us and we hope you have been very pleased with your experience at [facility]. After your discharge you may receive a survey asking you to rate your hospital experience. We encourage you to take the time to complete the survey as your feedback allows us to identify areas for improvement as well as recognize our staff for their care. We hope that you have received the very best care possible during your hospitalization at [facility], as your satisfaction is our top priority.    Let me know if there is anything more I can do!!        Tyrese Wang MD  Internal Medicine Staff        PATIENT GENERAL DISCHARGE INFORMATION   Things that YOU are responsible for to Manage Your Care At Home:  1. Getting your prescriptions filled.  2. Taking you medications as directed. (DO NOT MISS ANY DOSES!)  3. Going to your follow-up doctor appointments.                 *This is important because it allows the doctor to monitor your progress and make changes.      If you are unable to make your follow up appointments, please call the number listed and reschedule this appointment.   After discharge, if you need assistance, you can call [facility] On [facility] Nurse Care Line for 24/7 assistance at [phone]  If you are experience any signs or symptoms, Call your doctor or  Call 911 and come to your nearest Emergency Room.    You should receive a call from [facility] Discharge [facility] within 48-72 hours to help manage your care after discharge.   Please try to make sure that you answer your phone for this important phone call.

## 2023-06-01 NOTE — HPI
Cardiology has been consulted for paroxysmal atrial fibrillation in this patient.  Patient is a pleasant 52-year-old lady.  Has been admitted for Gram-negative bacteremia.  Was noted to have AFib on 05/29.  Brief episode.  Resolved on its own.  Patient did not feel it.  Patient denies any past history of strokes or mini strokes.  Main complaint has been dyspnea on exertion which has been progressing over the past few months.  Needs to go for colonoscopy tomorrow.  Cardiology has been consulted for management of paroxysmal AFib.         HPI:  52-year-old female with past medical history of hypertension and type 2 diabetes who presents to the emergency department with a chief complaint of emesis.  She was accompanied by her daughter who lives with her and helps provide this history.  She was in her normal state of health until earlier yesterday morning when she started having nausea.  Subsequently, she had 4 episodes of nonbloody, nonbilious emesis at work and her daughter was called to come and take her to the emergency department.  She denies any abdominal pain, chest pain, shortness of breath, or urinary symptoms including frequency, urgency, or dysuria. She has had cough for the last week or so. Daughter reports that yesterday she was acting at her baseline and she has been compliant with hypertension and diabetes medications as prescribed.  Daughter reports that at baseline patient is awake alert oriented x4.  Patient was initially lethargic, slow to answer questions, not at baseline. Initially patient with temp of 103.3, tachycardia 110s-114s, tachypnea 26-30s, elevated BP. No medications prior to arrival to attempt to alleviate symptoms. On my examination of patient, patient now at baseline, AAOx4.  Vitals signs improved, now afebrile HR and RR WNL. Workup CT of head negative for acute findings, revealed no leukocytosis, h/h stable, platelets 118, procalcitonin 10.56, d-dimer elevated 1.56, glucose 226, lactic  WNL, covid negative, u/a concerning for infectious process, blood cultures pending, CT of abdomen/pelvis without acute or infectious findings, question fecal impaction.  She reports last BM yesterday, no issues with BM pattern.  CTA of chest negative for PE, chest xray without acute process.  Patient admitted to hospital medicine observation unit for further medical management.          Overview/Hospital Course:  52-year-old female with past medical history of hypertension and type 2 diabetes admitted to observation for further evaluation of nausea and vomiting on 05/29/23. Found to be febrile, tachycardic and elevated procalcitonin. UA with +1 leukocytosis, does not appear grossly infected. Urine cx pending. Blood cx now with GNR. Antibiotics changed to Rocephin 2g daily.  Imaging with no acute process but noted constipation. Started bowel regimen. Will advance diet as tolerated. Continue IV CTX and vanc. Repeat blood cultures ordered.  F/u urine and blood cultures.     Patient admitted with 3/4 sirs, sepsis without septic shock, bacteremic with GNR.  103.3 F, , RR 30, WBC WNL.  Procalcitonin 10.6.  UA does not look particularly infectious, +4 glucose, diabetes uncontrolled, which makes her susceptible to infections however.  She appears to have fecal impaction/constipation, likely a bit obstructed and caused translocation of bacteria into the blood stream. GNR presumptive E coli, will discontinue vancomycin.

## 2023-06-01 NOTE — PLAN OF CARE
Patient called and said that the last few days her blood pressure has been between 150-155/90-95 and she can't seem to get it down. Patient also said that it is making her anxious which she knows is not helping. Patient would like a call back from Nora to let her know what she should do or if she should increase her medication.   Patient discharged per MD order. Patient afebrile, vitals stable. IV to be removed. Education to be provided on discharge.

## 2023-06-01 NOTE — ASSESSMENT & PLAN NOTE
· Patient with Paroxysmal (<7 days) atrial fibrillation which is controlled currently with Beta Blocker. Patient is currently in sinus rhythm.[id number] Score: 2. HASBLED Score: ?. Anticoagulation not indicated due to low score.   Patient in NSR now, but had an episode on 5/29/23 of Afib/flutter   GNR sepsis with high procal load, prone to incite new onset Afib due to catecholamine release.    Also with biphasic p-wave, so likely with enlarged left atria making her prone to afib as well. ECHO ordered to assess structure.   Given patient is now in NSR, would favor holding an anticoagulation until after GI procedure, aspirin okay.    Does have a prolonged QTc at 525, likely from hypokalemia, K 2.6 this morning. Has since received 80 mEq this am, and will receive another 40 this evening. Should be at goal K in the am. Antinausea PRNs removed.    Low dose correg added, do not want to control SBP too much in setting of bacteremia, Goal -180.    Will consult Cardiology, per Anesthesiology request.   · Low-dose Coreg added  · Patient only had 1 episode in the setting of GNR bacteremia/sepsis, now back in NSR and stable, would favor aspirin at discharge, with follow-up with Cardiology to wear a Holter monitor, if AFib is appreciated again then would initiate anticoagulation.

## 2023-06-01 NOTE — PLAN OF CARE
[facility] - Med Surg  Discharge Final Note    Pt cleared from case management stand point. Pt f/u hattie with cardio and PCP has been scheduled in basket message sent to Hemo/Onc for f/u hattie. Pt care plan changed pt will sent home with PO ABX pt will no longer need home health and  home iv infusion    Primary Care Provider: Janusz Staton MD    Expected Discharge Date: 6/1/2023    Final Discharge Note (most recent)       Final Note - 06/01/23 1248          Final Note    Assessment Type Final Discharge Note (P)      Anticipated Discharge Disposition Home or Self Care (P)      What phone number can be called within the next 1-3 days to see how you are doing after discharge? [phone] (P)      Hospital Resources/Appts/Education Provided Provided patient/caregiver with written discharge plan information;Appointments scheduled and added to AVS (P)         Post-Acute Status    Discharge Delays None known at this time (P)                      Important Message from Medicare             Contact Info       Janusz Staton MD   Specialty: Internal Medicine   Relationship: PCP - General    [address] [place] [address]  [place] [id number]   Phone: [phone]       Next Steps: Follow up on 6/2/2023    Instructions: Appointment scheduled for 9:45am   Arrive 15 mins prior

## 2023-06-02 LAB
BACTERIA BLD CULT: NORMAL
BACTERIA BLD CULT: NORMAL
FINAL PATHOLOGIC DIAGNOSIS: NORMAL
GROSS: NORMAL
Lab: NORMAL

## 2023-06-06 ENCOUNTER — TELEPHONE (OUTPATIENT)
Dept: HEMATOLOGY/ONCOLOGY | Facility: CLINIC | Age: 53
End: 2023-06-06
Payer: COMMERCIAL

## 2023-06-06 NOTE — TELEPHONE ENCOUNTER
Ashleigh benson MD    Upon receipt of your referral Dr Carmona reviewed chart and is recommending that this patient be referred to GI to determine if an oncological  evaluation is indicated.  Please refer to GI Thank you Debbie Gonzalez RN Hem/Onc [facility]     Ashleigh Barrios, MSW  Debbie Gonzalez, RN  I spoke with Ely in  PCP's office (Dr. Staton) to advise her of the need for a GI Referral.  Stated that she would pass the information on to Dr. Staton.  6/6/2023

## 2023-06-07 ENCOUNTER — PATIENT MESSAGE (OUTPATIENT)
Dept: GASTROENTEROLOGY | Facility: HOSPITAL | Age: 53
End: 2023-06-07
Payer: COMMERCIAL

## 2023-07-10 ENCOUNTER — OFFICE VISIT (OUTPATIENT)
Dept: CARDIOLOGY | Facility: CLINIC | Age: 53
End: 2023-07-10
Payer: COMMERCIAL

## 2023-07-10 VITALS
RESPIRATION RATE: 15 BRPM | BODY MASS INDEX: 36.59 KG/M2 | HEART RATE: 73 BPM | HEIGHT: 67 IN | DIASTOLIC BLOOD PRESSURE: 92 MMHG | SYSTOLIC BLOOD PRESSURE: 162 MMHG | OXYGEN SATURATION: 98 % | WEIGHT: 233.13 LBS

## 2023-07-10 DIAGNOSIS — R79.89 ELEVATED PROCALCITONIN: ICD-10-CM

## 2023-07-10 DIAGNOSIS — I10 HYPERTENSION, UNSPECIFIED TYPE: ICD-10-CM

## 2023-07-10 DIAGNOSIS — R94.31 PROLONGED Q-T INTERVAL ON ECG: ICD-10-CM

## 2023-07-10 DIAGNOSIS — I48.91 ATRIAL FIBRILLATION, UNSPECIFIED TYPE: Primary | ICD-10-CM

## 2023-07-10 DIAGNOSIS — R97.8 ELEVATED CA 19-9 LEVEL: ICD-10-CM

## 2023-07-10 PROCEDURE — 1159F PR MEDICATION LIST DOCUMENTED IN MEDICAL RECORD: ICD-10-PCS | Mod: CPTII,S$GLB,, | Performed by: INTERNAL MEDICINE

## 2023-07-10 PROCEDURE — 3077F SYST BP >= 140 MM HG: CPT | Mod: CPTII,S$GLB,, | Performed by: INTERNAL MEDICINE

## 2023-07-10 PROCEDURE — 4010F ACE/ARB THERAPY RXD/TAKEN: CPT | Mod: CPTII,S$GLB,, | Performed by: INTERNAL MEDICINE

## 2023-07-10 PROCEDURE — 4010F PR ACE/ARB THEARPY RXD/TAKEN: ICD-10-PCS | Mod: CPTII,S$GLB,, | Performed by: INTERNAL MEDICINE

## 2023-07-10 PROCEDURE — 99214 PR OFFICE/OUTPT VISIT, EST, LEVL IV, 30-39 MIN: ICD-10-PCS | Mod: S$GLB,,, | Performed by: INTERNAL MEDICINE

## 2023-07-10 PROCEDURE — 3008F PR BODY MASS INDEX (BMI) DOCUMENTED: ICD-10-PCS | Mod: CPTII,S$GLB,, | Performed by: INTERNAL MEDICINE

## 2023-07-10 PROCEDURE — 1159F MED LIST DOCD IN RCRD: CPT | Mod: CPTII,S$GLB,, | Performed by: INTERNAL MEDICINE

## 2023-07-10 PROCEDURE — 99214 OFFICE O/P EST MOD 30 MIN: CPT | Mod: S$GLB,,, | Performed by: INTERNAL MEDICINE

## 2023-07-10 PROCEDURE — 3080F PR MOST RECENT DIASTOLIC BLOOD PRESSURE >= 90 MM HG: ICD-10-PCS | Mod: CPTII,S$GLB,, | Performed by: INTERNAL MEDICINE

## 2023-07-10 PROCEDURE — 3080F DIAST BP >= 90 MM HG: CPT | Mod: CPTII,S$GLB,, | Performed by: INTERNAL MEDICINE

## 2023-07-10 PROCEDURE — 1160F RVW MEDS BY RX/DR IN RCRD: CPT | Mod: CPTII,S$GLB,, | Performed by: INTERNAL MEDICINE

## 2023-07-10 PROCEDURE — 3008F BODY MASS INDEX DOCD: CPT | Mod: CPTII,S$GLB,, | Performed by: INTERNAL MEDICINE

## 2023-07-10 PROCEDURE — 99999 PR PBB SHADOW E&M-EST. PATIENT-LVL IV: ICD-10-PCS | Mod: PBBFAC,,, | Performed by: INTERNAL MEDICINE

## 2023-07-10 PROCEDURE — 3077F PR MOST RECENT SYSTOLIC BLOOD PRESSURE >= 140 MM HG: ICD-10-PCS | Mod: CPTII,S$GLB,, | Performed by: INTERNAL MEDICINE

## 2023-07-10 PROCEDURE — 1160F PR REVIEW ALL MEDS BY PRESCRIBER/CLIN PHARMACIST DOCUMENTED: ICD-10-PCS | Mod: CPTII,S$GLB,, | Performed by: INTERNAL MEDICINE

## 2023-07-10 PROCEDURE — 99999 PR PBB SHADOW E&M-EST. PATIENT-LVL IV: CPT | Mod: PBBFAC,,, | Performed by: INTERNAL MEDICINE

## 2023-07-10 NOTE — PROGRESS NOTES
CARDIOVASCULAR CONSULTATION    REASON FOR CONSULT:   Shahida Ortega is a 52 y.o. female who presents for patient here for follow-up    HISTORY OF PRESENT ILLNESS:     Patient is a pleasant 52-year-old lady.  Recently admitted with sepsis.  Found to have atrial fibrillation.  Paroxysmal.  Brief episodes of AFib.  Does not feel any palpitations.  Walks around regularly at work and does not have any chest pain shortness of breath or palpitations        The left ventricle is normal in size with normal systolic function.  Moderate left atrial enlargement.  The estimated ejection fraction is 65%.  Indeterminate left ventricular diastolic function.  Normal right ventricular size with normal right ventricular systolic function.  Mild right atrial enlargement.  Normal central venous pressure (3 mmHg).  The estimated PA systolic pressure is 15 mmHg.      PAST MEDICAL HISTORY:     Past Medical History:   Diagnosis Date    DM2 (diabetes mellitus, type 2) 5/28/2023    Hypertension     Prolonged Q-T interval on ECG 5/31/2023    Qtc 525 5/31/23       PAST SURGICAL HISTORY:     Past Surgical History:   Procedure Laterality Date    ABDOMINAL SURGERY      COLONOSCOPY N/A 6/1/2023    Procedure: COLONOSCOPY;  Surgeon: Thaddeus Carlos MD;  [facility];  Service: Endoscopy;  Laterality: N/A;    DIAGNOSTIC LAPAROSCOPY N/A 5/26/2019    Procedure: LAPAROSCOPY, DIAGNOSTIC Drainage of abscess ;  Surgeon: Jose Luis Hanson MD;  Location: [facility] OR;  Service: General;  Laterality: N/A;  Drain Placement    DIAGNOSTIC LAPAROSCOPY N/A 12/27/2020    Procedure: Diagnostic laparoscopy, drainage of intra-abdominal abscess;  Surgeon: Bhupendra Banda MD;  Location: [facility] OR;  Service: General;  Laterality: N/A;    LAPAROSCOPIC LYSIS OF ADHESIONS  5/26/2019    Procedure: LYSIS, ADHESIONS, LAPAROSCOPIC;  Surgeon: Jose Luis Hanson MD;  Location: [facility] OR;  Service: General;;       ALLERGIES AND MEDICATION:     Review of patient's allergies indicates:    Allergen Reactions    Penicillins Hives    Amoxicillin     Grass pollen-june grass standard Other (See Comments)        Medication List            Accurate as of July 10, 2023  9:12 AM. If you have any questions, ask your nurse or doctor.                CONTINUE taking these medications      acetaminophen 325 MG tablet  Commonly known as: TYLENOL     aspirin 81 MG EC tablet  Commonly known as: ECOTRIN  Take 1 tablet (81 mg total) by mouth once daily.     blood-glucose meter kit  To check BG 2 times daily, to use with insurance preferred meter     carvediloL 3.125 MG tablet  Commonly known as: COREG  Take 2 tablets (6.25 mg total) by mouth 2 (two) times daily.     hydroCHLOROthiazide 25 MG tablet  Commonly known as: HYDRODIURIL  Take 1 tablet (25 mg total) by mouth once daily.     losartan 100 MG tablet  Commonly known as: COZAAR  Take 1 tablet (100 mg total) by mouth once daily.     metFORMIN 500 MG tablet  Commonly known as: GLUCOPHAGE  Take 1 tablet (500 mg total) by mouth 2 (two) times daily with meals.              SOCIAL HISTORY:     Social History     Socioeconomic History    Marital status: Single   Tobacco Use    Smoking status: Never    Smokeless tobacco: Never   Substance and Sexual Activity    Alcohol use: Not Currently    Drug use: Not Currently    Sexual activity: Not Currently     Social Determinants of Health     Financial Resource Strain: Medium Risk    Difficulty of Paying Living Expenses: Somewhat hard   Food Insecurity: No Food Insecurity    Worried About Running Out of Food in the Last Year: Never true    Ran Out of Food in the Last Year: Never true   Transportation Needs: No Transportation Needs    Lack of Transportation (Medical): No    Lack of Transportation (Non-Medical): No   Physical Activity: Sufficiently Active    Days of Exercise per Week: 7 days    Minutes of Exercise per Session: 30 min   Stress: No Stress Concern Present    Feeling of Stress : Not at all   Social Connections:  "Moderately Isolated    Frequency of Communication with Friends and Family: More than three times a week    Frequency of Social Gatherings with Friends and Family: Once a week    Attends Synagogue Services: More than 4 times per year    Active Member of Clubs or Organizations: No    Attends Club or Organization Meetings: Never    Marital Status: Never    Housing Stability: Low Risk     Unable to Pay for Housing in the Last Year: No    Number of Places Lived in the Last Year: 1    Unstable Housing in the Last Year: No       FAMILY HISTORY:     Family History   Problem Relation Age of Onset    Diabetes Father        REVIEW OF SYSTEMS:   Review of Systems   Constitutional: Negative.   HENT: Negative.     Eyes: Negative.    Cardiovascular: Negative.    Respiratory: Negative.     Endocrine: Negative.    Hematologic/Lymphatic: Negative.    Skin: Negative.    Musculoskeletal: Negative.    Gastrointestinal: Negative.    Genitourinary: Negative.    Neurological: Negative.    Psychiatric/Behavioral: Negative.     Allergic/Immunologic: Negative.      A 10 point review of systems was performed and all the pertinent positives have been mentioned. Rest of review of systems was negative.        PHYSICAL EXAM:     Vitals:    07/10/23 0835   BP: (!) 162/92   Pulse: 73   Resp: 15    Body mass index is 36.51 kg/m².  Weight: 105.7 kg (233 lb 2.2 oz)   Height: 5' 7" (170.2 cm)     Physical Exam  Constitutional:       Appearance: Normal appearance. She is well-developed.   HENT:      Head: Normocephalic.   Eyes:      Pupils: Pupils are equal, round, and reactive to light.   Cardiovascular:      Rate and Rhythm: Normal rate and regular rhythm.   Pulmonary:      Effort: Pulmonary effort is normal.      Breath sounds: Normal breath sounds.   Abdominal:      General: Bowel sounds are normal.      Palpations: Abdomen is soft.      Tenderness: There is no abdominal tenderness.   Musculoskeletal:         General: Normal range of motion. "      Cervical back: Normal range of motion and neck supple.   Skin:     General: Skin is warm.   Neurological:      Mental Status: She is alert and oriented to person, place, and time.         DATA:     Laboratory:  CBC:  Recent Labs   Lab 05/30/23  1135 05/31/23  0428 06/01/23  0348   WBC 5.69 4.88 7.02   Hemoglobin 11.3 L 10.9 L 12.0   Hematocrit 34.5 L 34.0 L 36.3 L   Platelets 131 L 139 L 162       CHEMISTRIES:  Recent Labs   Lab 09/14/21  1050 09/15/21  0503 03/16/22  1239 05/28/23  1650 05/30/23  1135 05/31/23  0428 06/01/23  0348   Glucose 138 H 111 H 365 H   < > 128 H 142 H 134 H   Sodium 137 138 142   < > 138 137 136   Potassium 3.5 3.5 4.4   < > 3.1 L 2.6 LL 3.7   BUN 7 5 L 11   < > 6 5 L 6   Creatinine 0.7 0.7 1.1   < > 0.7 0.6 0.7   eGFR if  >60 >60 >60  --   --   --   --    eGFR if non  >60 >60 59 A  --   --   --   --    Calcium 8.8 8.7 10.3   < > 8.8 9.3 9.5   Magnesium 1.9 1.7  --    < > 1.7 1.7 1.8    < > = values in this interval not displayed.       CARDIAC BIOMARKERS:  Recent Labs   Lab 05/28/23  1650   Troponin I <0.006       COAGS:  Recent Labs   Lab 12/26/20  2118   INR 1.1       LIPIDS/LFTS:  Recent Labs   Lab 05/30/23  1135 05/31/23  0428 06/01/23  0348   AST 17 16 18   ALT 17 16 18       Hemoglobin A1C   Date Value Ref Range Status   03/16/2022 11.3 (H) 4.0 - 5.6 % Final     Comment:     ADA Screening Guidelines:  5.7-6.4%  Consistent with prediabetes  >or=6.5%  Consistent with diabetes    High levels of fetal hemoglobin interfere with the HbA1C  assay. Heterozygous hemoglobin variants (HbS, HgC, etc)do  not significantly interfere with this assay.   However, presence of multiple variants may affect accuracy.         TSH        The ASCVD Risk score (Marie MODI, et al., 2019) failed to calculate for the following reasons:    Cannot find a previous HDL lab    Cannot find a previous total cholesterol lab       BNP    No results found for: BNP          ASSESSMENT  AND PLAN     Patient Active Problem List   Diagnosis    Intra-abdominal abscess    Postprocedural intraabdominal abscess    Epigastric pain    Elevated CA 19-9 level    Acute cystitis without hematuria    Elevated d-dimer    Elevated procalcitonin    Hypertension    DM2 (diabetes mellitus, type 2)    Sepsis due to Escherichia coli (E. coli)    Prolonged Q-T interval on ECG    A-fib       Paroxysmal AFib.  During episode of sepsis.  Not on anticoagulation because it was a brief episode.  We will look for recurrence with the help of event monitor.  If a recurrence noted on event monitor, consider adding anticoagulation    Follow-up in 2-3 months          Thank you very much for involving me in the care of your patient.  Please do not hesitate to contact me if there are any questions.      Yuri Rojas MD, FACC, [facility]  Interventional Cardiologist, [facility].           This note was dictated with the help of speech recognition software.  There might be un-intended errors and/or substitutions.

## 2023-09-04 PROBLEM — A41.51 SEPSIS DUE TO ESCHERICHIA COLI (E. COLI): Status: RESOLVED | Noted: 2023-05-30 | Resolved: 2023-09-04

## 2023-12-10 NOTE — DISCHARGE SUMMARY
[facility]  Discharge Summary      Patient Name: Shahiad Ortega  MRN: [id number]  [facility]: [id number]  Patient Class: IP- Inpatient  Admission Date: 5/28/2023  Hospital Length of Stay: 3 days  Discharge Date and Time:  06/01/2023 8:58 AM  Attending Physician: Tyrese Wang MD   Discharging Provider: Tyrese Wang MD  Primary Care Provider: Janusz Staton MD    Primary Care Team: Networked reference to record PCT     HPI:   52-year-old female with past medical history of hypertension and type 2 diabetes who presents to the emergency department with a chief complaint of emesis.  She was accompanied by her daughter who lives with her and helps provide this history.  She was in her normal state of health until earlier yesterday morning when she started having nausea.  Subsequently, she had 4 episodes of nonbloody, nonbilious emesis at work and her daughter was called to come and take her to the emergency department.  She denies any abdominal pain, chest pain, shortness of breath, or urinary symptoms including frequency, urgency, or dysuria. She has had cough for the last week or so. Daughter reports that yesterday she was acting at her baseline and she has been compliant with hypertension and diabetes medications as prescribed.  Daughter reports that at baseline patient is awake alert oriented x4.  Patient was initially lethargic, slow to answer questions, not at baseline. Initially patient with temp of 103.3, tachycardia 110s-114s, tachypnea 26-30s, elevated BP. No medications prior to arrival to attempt to alleviate symptoms. On my examination of patient, patient now at baseline, AAOx4.  Vitals signs improved, now afebrile HR and RR WNL. Workup CT of head negative for acute findings, revealed no leukocytosis, h/h stable, platelets 118, procalcitonin 10.56, d-dimer elevated 1.56, glucose 226, lactic WNL, covid negative, u/a concerning for infectious process, blood cultures pending, CT of  abdomen/pelvis without acute or infectious findings, question fecal impaction.  She reports last BM yesterday, no issues with BM pattern.  CTA of chest negative for PE, chest xray without acute process.  Patient admitted to hospital medicine observation unit for further medical management.        Procedure(s) (LRB):  COLONOSCOPY (N/A)      Hospital Course:   52-year-old female with past medical history of hypertension and type 2 diabetes admitted to observation for further evaluation of nausea and vomiting on 05/29/23. Found to be febrile, tachycardic and elevated procalcitonin. UA with +1 leukocytosis, does not appear grossly infected. Urine cx pending. Blood cx now with GNR. Antibiotics changed to Rocephin 2g daily.  Imaging with no acute process but noted constipation. Started bowel regimen. Will advance diet as tolerated. Continue IV CTX and vanc. Repeat blood cultures ordered.  F/u urine and blood cultures.    Patient admitted with 3/4 sirs, sepsis without septic shock, bacteremic with GNR.  103.3 F, , RR 30, WBC WNL.  Procalcitonin 10.6.  UA does not look particularly infectious, +4 glucose, diabetes uncontrolled, which makes her susceptible to infections however.  She appears to have fecal impaction/constipation, likely a bit obstructed and caused translocation of bacteria into the blood stream. GNR presumptive E coli, will discontinue vancomycin.    Patient's CA 19 9 elevated at 142, concerned that it has been elevated for 4 years now.  Nothing seen on CT with contrast, perhaps an MRCP.  Will consult Oncology and GI for recommendations.    Patient only had 1 episode of Afib that self resolved in the setting of GNR bacteremia/sepsis, now back in NSR and stable, would favor aspirin at discharge, with follow-up with Cardiology to wear a Holter monitor, if AFib is appreciated again then would initiate anticoagulation. C-scope today 6/1/23, can discharge afterward pending results.      You have an  arrhythmia of the heart called AFib, start taking aspirin and Coreg  Follow-up with Cardiology to wear a monitor, to see if it is ongoing  You may need a blood thinner if the heart monitor picks up on this outpatient  Take losartan 100 mg  Take hydrochlorothiazide 25 mg  Finish the antibiotic cefdinir 300 mg twice a day for 10 more days  The infection was in your blood, so please finish out all these antibiotic pills  Follow-up with your PCP in Hematology/Oncology as well    Thank you for trusting [facility] and me with your care.  We are honored that you entrusted us with your healthcare needs. Your satisfaction is very important to us and we hope you have been very pleased with your experience at [facility]. After your discharge you may receive a survey asking you to rate your hospital experience. We encourage you to take the time to complete the survey as your feedback allows us to identify areas for improvement as well as recognize our staff for their care. We hope that you have received the very best care possible during your hospitalization at [facility], as your satisfaction is our top priority.    Let me know if there is anything more I can do!!        Tyrese Wang MD  Internal Medicine Staff      Goals of Care Treatment Preferences:  Code Status: Full Code    ROS:  General: Negative for fevers or chills.  Cardiac: Negative for chest pain or orthopnea   Pulmonary: Negative for dyspnea or wheezing.  GI: Negative for abdominal distention or pain     Vitals:    05/31/23 2347 06/01/23 0443 06/01/23 0738 06/01/23 0845   BP: (!) 144/76 (!) 144/68 (!) 166/90 (!) 147/73   BP Location: Right arm Right arm Right arm Right arm   Patient Position: Lying Lying Lying Lying   Pulse: 80 74 73 76   Resp: 17 18 20 19   Temp: 98.7 °F (37.1 °C) 98.2 °F (36.8 °C) 98.3 °F (36.8 °C) 98 °F (36.7 °C)   TempSrc: Oral Oral Oral Oral   SpO2: 98% 98% (!) 93% 96%   Weight:       Height:              Body  mass index is 37.29 kg/m².      PHYSICAL EXAM:  GENERAL APPEARANCE: alert and cooperative, and appears to be in no acute distress.  HEENT:     HEAD: NC/AT     EYES: PERRL, EOMI.  Vision is grossly intact.  NECK: Neck supple, non-tender without LAD, masses or thyromegaly.  CARDIAC: There is no peripheral edema, cyanosis or pallor.   LUNGS: Clear to auscultation and percussion without rales or wheezing  ABDOMEN: Non-distended. No guarding.  MSK: No joint erythema or tenderness.   EXTREMITIES: No significant deformity or joint abnormality. No edema.   NEUROLOGICAL: CN II-XII grossly intact.   SKIN: Skin normal color, texture and turgor with no lesions or eruptions.  PSYCHIATRIC: Oriented. No tangential speech. No Hyperactive features.    Consults:   Consults (From admission, onward)          Status Ordering Provider     Inpatient consult to Cardiology  Once        Provider:  Yuri Rojas MD    Completed CHARI SUTTON     Inpatient consult to Gastroenterology  Once        Provider:  Thaddeus Carlos MD    Completed CHARI SUTTON            No new Assessment & Plan notes have been filed under this hospital service since the last note was generated.  Service: Hospital Medicine    Final Active Diagnoses:    Diagnosis Date Noted POA    PRINCIPAL PROBLEM:  Sepsis due to Escherichia coli (E. coli) [A41.51] 05/30/2023 Yes    A-fib [I48.91] 05/31/2023 Yes    Prolonged Q-T interval on ECG [R94.31] 05/31/2023 Yes    Elevated d-dimer [R79.89] 05/28/2023 Yes    Elevated procalcitonin [R79.89] 05/28/2023 Yes    Hypertension [I10] 05/28/2023 Yes    DM2 (diabetes mellitus, type 2) [E11.9] 05/28/2023 Yes    Elevated CA 19-9 level [R97.8] 09/13/2021 Yes      Problems Resolved During this Admission:       Discharged Condition: good    Disposition: home    Follow Up:   Follow-up Information       Janusz Staton MD Follow up on 6/2/2023.    Specialty: Internal Medicine  Why: Appointment scheduled for 9:45am   Arrive 15 mins prior  Contact  information:  [address] [facility]  [place] [id number]  [phone]                           Patient Instructions:      Ambulatory referral/consult to Internal Medicine   Standing Status: Future   Referral Priority: Routine Referral Type: Consultation   Referral Reason: Specialty Services Required   Requested Specialty: Internal Medicine   Number of Visits Requested: 1     Ambulatory referral/consult to Oncology   Standing Status: Future   Referral Priority: Urgent Referral Type: Consultation   Referral Reason: Specialty Services Required   Requested Specialty: Oncology   Number of Visits Requested: 1     Ambulatory referral/consult to Cardiology   Standing Status: Future   Referral Priority: Urgent Referral Type: Consultation   Referral Reason: Specialty Services Required   Requested Specialty: Cardiology   Number of Visits Requested: 1     Cardiac Monitor - 3-15 Day Adult (Cupid Only)   Standing Status: Future Standing Exp. Date: 06/01/24     Order Specific Question Answer Comments   Duration: 14 days    Release to patient Immediate        Significant Diagnostic Studies:     Recent Results (from the past 100 hour(s))   Blood culture x two cultures. Draw prior to antibiotics.    Collection Time: 05/28/23  4:47 PM    Specimen: Peripheral, Antecubital, Right; Blood   Result Value Ref Range    Blood Culture, Routine No Growth to date     Blood Culture, Routine No Growth to date     Blood Culture, Routine No Growth to date     Blood Culture, Routine No Growth to date    CBC auto differential    Collection Time: 05/28/23  4:50 PM   Result Value Ref Range    WBC 6.38 3.90 - 12.70 K/uL    RBC 4.34 4.00 - 5.40 M/uL    Hemoglobin 12.2 12.0 - 16.0 g/dL    Hematocrit 36.4 (L) 37.0 - 48.5 %    MCV 84 82 - 98 fL    MCH 28.1 27.0 - 31.0 pg    MCHC 33.5 32.0 - 36.0 g/dL    RDW 13.2 11.5 - 14.5 %    Platelets 118 (L) 150 - 450 K/uL    MPV 10.9 9.2 - 12.9 fL    Immature Granulocytes 1.1 (H) 0.0 - 0.5 %    Gran # (ANC) 5.2 1.8 - 7.7  K/uL    Immature Grans (Abs) 0.07 (H) 0.00 - 0.04 K/uL    Lymph # 1.0 1.0 - 4.8 K/uL    Mono # 0.1 (L) 0.3 - 1.0 K/uL    Eos # 0.0 0.0 - 0.5 K/uL    Baso # 0.01 0.00 - 0.20 K/uL    nRBC 0 0 /100 WBC    Gran % 80.7 (H) 38.0 - 73.0 %    Lymph % 16.1 (L) 18.0 - 48.0 %    Mono % 1.3 (L) 4.0 - 15.0 %    Eosinophil % 0.6 0.0 - 8.0 %    Basophil % 0.2 0.0 - 1.9 %    Differential Method Automated    Comprehensive metabolic panel    Collection Time: 05/28/23  4:50 PM   Result Value Ref Range    Sodium 137 136 - 145 mmol/L    Potassium 4.1 3.5 - 5.1 mmol/L    Chloride 102 95 - 110 mmol/L    CO2 24 23 - 29 mmol/L    Glucose 226 (H) 70 - 110 mg/dL    BUN 13 6 - 20 mg/dL    Creatinine 0.9 0.5 - 1.4 mg/dL    Calcium 9.3 8.7 - 10.5 mg/dL    Total Protein 8.2 6.0 - 8.4 g/dL    Albumin 3.4 (L) 3.5 - 5.2 g/dL    Total Bilirubin 0.6 0.1 - 1.0 mg/dL    Alkaline Phosphatase 84 55 - 135 U/L    AST 23 10 - 40 U/L    ALT 19 10 - 44 U/L    Anion Gap 11 8 - 16 mmol/L    eGFR >60 >60 mL/min/1.73 m^2   Lactic acid, plasma #1    Collection Time: 05/28/23  4:50 PM   Result Value Ref Range    Lactate (Lactic Acid) 1.2 0.5 - 2.2 mmol/L   Troponin I    Collection Time: 05/28/23  4:50 PM   Result Value Ref Range    Troponin I <0.006 0.000 - 0.026 ng/mL   ISTAT Lactate    Collection Time: 05/28/23  4:51 PM   Result Value Ref Range    POC Lactate 1.03 0.5 - 2.2 mmol/L    Sample VENOUS     Site Other     Allens Test N/A    Blood culture x two cultures. Draw prior to antibiotics.    Collection Time: 05/28/23  5:03 PM    Specimen: Peripheral, Hand, Left; Blood   Result Value Ref Range    Blood Culture, Routine Gram stain cameron bottle: Gram negative rods     Blood Culture, Routine       Results called to and read back by: Yulia Robin 05/29/2023  07:38    Blood Culture, Routine ESCHERICHIA COLI (A)        Susceptibility    Escherichia coli - CULTURE, BLOOD     Amp/Sulbactam <=8/4 Sensitive mcg/mL     Ampicillin <=8 Sensitive mcg/mL     Amox/K Clav'ate  <=8/4 Sensitive mcg/mL     Ceftriaxone <=1 Sensitive mcg/mL     Cefazolin <=2 Sensitive mcg/mL     Ciprofloxacin <=1 Sensitive mcg/mL     Cefepime <=2 Sensitive mcg/mL     Ertapenem <=0.5 Sensitive mcg/mL     Gentamicin <=4 Sensitive mcg/mL     Levofloxacin <=2 Sensitive mcg/mL     Meropenem <=1 Sensitive mcg/mL     Minocycline <=4 Sensitive mcg/mL     Piperacillin/Tazo <=16 Sensitive mcg/mL     Trimeth/Sulfa <=2/38 Sensitive mcg/mL     Tetracycline <=4 Sensitive mcg/mL     Tobramycin <=4 Sensitive mcg/mL   Beta - Hydroxybutyrate, Serum    Collection Time: 05/28/23  5:04 PM   Result Value Ref Range    Beta-Hydroxybutyrate 0.3 0.0 - 0.5 mmol/L   POCT COVID-19 Rapid Screening    Collection Time: 05/28/23  5:58 PM   Result Value Ref Range    POC Rapid COVID Negative Negative     Acceptable Yes    POCT Influenza A/B Molecular    Collection Time: 05/28/23  5:58 PM   Result Value Ref Range    POC Molecular Influenza A Ag Negative Negative, Not Reported    POC Molecular Influenza B Ag Negative Negative, Not Reported     Acceptable Yes    D dimer, quantitative    Collection Time: 05/28/23  7:09 PM   Result Value Ref Range    D-Dimer 1.56 (H) <0.50 mg/L FEU   Procalcitonin    Collection Time: 05/28/23  7:09 PM   Result Value Ref Range    Procalcitonin 10.56 (H) <0.25 ng/mL   Lactic acid, plasma #2    Collection Time: 05/28/23  8:36 PM   Result Value Ref Range    Lactate (Lactic Acid) 0.9 0.5 - 2.2 mmol/L   Urinalysis, Reflex to Urine Culture Urine, Clean Catch    Collection Time: 05/28/23  8:54 PM    Specimen: Urine   Result Value Ref Range    Specimen UA Urine, Clean Catch     Color, UA Yellow Yellow, Straw, Rebekah    Appearance, UA Hazy (A) Clear    pH, UA 6.0 5.0 - 8.0    Specific Gravity, UA 1.030 1.005 - 1.030    Protein, UA Negative Negative    Glucose, UA 4+ (A) Negative    Ketones, UA Trace (A) Negative    Bilirubin (UA) Negative Negative    Occult Blood UA 2+ (A) Negative    Nitrite, UA  Negative Negative    Urobilinogen, UA Negative <2.0 EU/dL    Leukocytes, UA 1+ (A) Negative   Urinalysis Microscopic    Collection Time: 05/28/23  8:54 PM   Result Value Ref Range    RBC, UA 7 (H) 0 - 4 /hpf    WBC, UA 6 (H) 0 - 5 /hpf    Bacteria Occasional None-Occ /hpf    Yeast, UA None None    Microscopic Comment SEE COMMENT    POCT glucose    Collection Time: 05/28/23 11:59 PM   Result Value Ref Range    POCT Glucose 231 (H) 70 - 110 mg/dL   CBC Auto Differential    Collection Time: 05/29/23  3:48 AM   Result Value Ref Range    WBC 9.19 3.90 - 12.70 K/uL    RBC 4.01 4.00 - 5.40 M/uL    Hemoglobin 11.3 (L) 12.0 - 16.0 g/dL    Hematocrit 35.1 (L) 37.0 - 48.5 %    MCV 88 82 - 98 fL    MCH 28.2 27.0 - 31.0 pg    MCHC 32.2 32.0 - 36.0 g/dL    RDW 13.4 11.5 - 14.5 %    Platelets 120 (L) 150 - 450 K/uL    MPV 10.6 9.2 - 12.9 fL    Immature Granulocytes 0.3 0.0 - 0.5 %    Gran # (ANC) 7.4 1.8 - 7.7 K/uL    Immature Grans (Abs) 0.03 0.00 - 0.04 K/uL    Lymph # 1.0 1.0 - 4.8 K/uL    Mono # 0.8 0.3 - 1.0 K/uL    Eos # 0.0 0.0 - 0.5 K/uL    Baso # 0.02 0.00 - 0.20 K/uL    nRBC 0 0 /100 WBC    Gran % 80.3 (H) 38.0 - 73.0 %    Lymph % 10.8 (L) 18.0 - 48.0 %    Mono % 8.2 4.0 - 15.0 %    Eosinophil % 0.2 0.0 - 8.0 %    Basophil % 0.2 0.0 - 1.9 %    Differential Method Automated    Comprehensive Metabolic Panel    Collection Time: 05/29/23  3:48 AM   Result Value Ref Range    Sodium 139 136 - 145 mmol/L    Potassium 3.2 (L) 3.5 - 5.1 mmol/L    Chloride 104 95 - 110 mmol/L    CO2 28 23 - 29 mmol/L    Glucose 181 (H) 70 - 110 mg/dL    BUN 8 6 - 20 mg/dL    Creatinine 0.8 0.5 - 1.4 mg/dL    Calcium 8.9 8.7 - 10.5 mg/dL    Total Protein 6.7 6.0 - 8.4 g/dL    Albumin 3.0 (L) 3.5 - 5.2 g/dL    Total Bilirubin 0.4 0.1 - 1.0 mg/dL    Alkaline Phosphatase 70 55 - 135 U/L    AST 18 10 - 40 U/L    ALT 16 10 - 44 U/L    Anion Gap 7 (L) 8 - 16 mmol/L    eGFR >60 >60 mL/min/1.73 m^2   Magnesium    Collection Time: 05/29/23  3:48 AM   Result  Value Ref Range    Magnesium 1.6 1.6 - 2.6 mg/dL   Phosphorus    Collection Time: 05/29/23  3:48 AM   Result Value Ref Range    Phosphorus 3.7 2.7 - 4.5 mg/dL   POCT glucose    Collection Time: 05/29/23  7:21 AM   Result Value Ref Range    POCT Glucose 163 (H) 70 - 110 mg/dL   Blood culture    Collection Time: 05/29/23  8:34 AM    Specimen: Blood   Result Value Ref Range    Blood Culture, Routine No Growth to date     Blood Culture, Routine No Growth to date     Blood Culture, Routine No Growth to date    Blood culture    Collection Time: 05/29/23  8:34 AM    Specimen: Blood   Result Value Ref Range    Blood Culture, Routine No Growth to date     Blood Culture, Routine No Growth to date     Blood Culture, Routine No Growth to date    Urine Culture High Risk    Collection Time: 05/29/23 10:34 AM    Specimen: Urine, Clean Catch   Result Value Ref Range    Urine Culture, Routine (A)      CANDIDA ALBICANS  10,000 - 49,999 cfu/ml  Treatment of asymptomatic candiduria is not recommended (except for   specific populations). Candida isolated in the urine typically   represents colonization. If an indwelling urinary catheter is present  it should be removed or replaced.     POCT glucose    Collection Time: 05/29/23 11:24 AM   Result Value Ref Range    POCT Glucose 171 (H) 70 - 110 mg/dL   POCT glucose    Collection Time: 05/29/23  4:33 PM   Result Value Ref Range    POCT Glucose 127 (H) 70 - 110 mg/dL   POCT glucose    Collection Time: 05/29/23  7:26 PM   Result Value Ref Range    POCT Glucose 153 (H) 70 - 110 mg/dL   VANCOMYCIN, TROUGH    Collection Time: 05/30/23  6:02 AM   Result Value Ref Range    Vancomycin-Trough 6.2 (L) 10.0 - 22.0 ug/mL   POCT glucose    Collection Time: 05/30/23  7:46 AM   Result Value Ref Range    POCT Glucose 122 (H) 70 - 110 mg/dL   Cancer antigen 19-9    Collection Time: 05/30/23 11:35 AM   Result Value Ref Range    CA 19-9 142.7 (H) 0.0 - 40.0 U/mL   CBC Auto Differential    Collection Time:  05/30/23 11:35 AM   Result Value Ref Range    WBC 5.69 3.90 - 12.70 K/uL    RBC 4.02 4.00 - 5.40 M/uL    Hemoglobin 11.3 (L) 12.0 - 16.0 g/dL    Hematocrit 34.5 (L) 37.0 - 48.5 %    MCV 86 82 - 98 fL    MCH 28.1 27.0 - 31.0 pg    MCHC 32.8 32.0 - 36.0 g/dL    RDW 13.3 11.5 - 14.5 %    Platelets 131 (L) 150 - 450 K/uL    MPV 10.2 9.2 - 12.9 fL    Immature Granulocytes 0.5 0.0 - 0.5 %    Gran # (ANC) 3.6 1.8 - 7.7 K/uL    Immature Grans (Abs) 0.03 0.00 - 0.04 K/uL    Lymph # 1.4 1.0 - 4.8 K/uL    Mono # 0.5 0.3 - 1.0 K/uL    Eos # 0.1 0.0 - 0.5 K/uL    Baso # 0.01 0.00 - 0.20 K/uL    nRBC 0 0 /100 WBC    Gran % 64.0 38.0 - 73.0 %    Lymph % 24.4 18.0 - 48.0 %    Mono % 9.1 4.0 - 15.0 %    Eosinophil % 1.8 0.0 - 8.0 %    Basophil % 0.2 0.0 - 1.9 %    Differential Method Automated    Comprehensive Metabolic Panel    Collection Time: 05/30/23 11:35 AM   Result Value Ref Range    Sodium 138 136 - 145 mmol/L    Potassium 3.1 (L) 3.5 - 5.1 mmol/L    Chloride 103 95 - 110 mmol/L    CO2 28 23 - 29 mmol/L    Glucose 128 (H) 70 - 110 mg/dL    BUN 6 6 - 20 mg/dL    Creatinine 0.7 0.5 - 1.4 mg/dL    Calcium 8.8 8.7 - 10.5 mg/dL    Total Protein 7.1 6.0 - 8.4 g/dL    Albumin 2.9 (L) 3.5 - 5.2 g/dL    Total Bilirubin 0.4 0.1 - 1.0 mg/dL    Alkaline Phosphatase 80 55 - 135 U/L    AST 17 10 - 40 U/L    ALT 17 10 - 44 U/L    Anion Gap 7 (L) 8 - 16 mmol/L    eGFR >60 >60 mL/min/1.73 m^2   Magnesium    Collection Time: 05/30/23 11:35 AM   Result Value Ref Range    Magnesium 1.7 1.6 - 2.6 mg/dL   Phosphorus    Collection Time: 05/30/23 11:35 AM   Result Value Ref Range    Phosphorus 2.2 (L) 2.7 - 4.5 mg/dL   POCT glucose    Collection Time: 05/30/23 11:59 AM   Result Value Ref Range    POCT Glucose 119 (H) 70 - 110 mg/dL   POCT glucose    Collection Time: 05/30/23  4:38 PM   Result Value Ref Range    POCT Glucose 115 (H) 70 - 110 mg/dL   POCT glucose    Collection Time: 05/30/23  7:16 PM   Result Value Ref Range    POCT Glucose 136 (H)  70 - 110 mg/dL   CBC Auto Differential    Collection Time: 05/31/23  4:28 AM   Result Value Ref Range    WBC 4.88 3.90 - 12.70 K/uL    RBC 4.04 4.00 - 5.40 M/uL    Hemoglobin 10.9 (L) 12.0 - 16.0 g/dL    Hematocrit 34.0 (L) 37.0 - 48.5 %    MCV 84 82 - 98 fL    MCH 27.0 27.0 - 31.0 pg    MCHC 32.1 32.0 - 36.0 g/dL    RDW 13.2 11.5 - 14.5 %    Platelets 139 (L) 150 - 450 K/uL    MPV 10.3 9.2 - 12.9 fL    Immature Granulocytes 0.4 0.0 - 0.5 %    Gran # (ANC) 2.9 1.8 - 7.7 K/uL    Immature Grans (Abs) 0.02 0.00 - 0.04 K/uL    Lymph # 1.3 1.0 - 4.8 K/uL    Mono # 0.5 0.3 - 1.0 K/uL    Eos # 0.1 0.0 - 0.5 K/uL    Baso # 0.01 0.00 - 0.20 K/uL    nRBC 0 0 /100 WBC    Gran % 60.2 38.0 - 73.0 %    Lymph % 26.2 18.0 - 48.0 %    Mono % 10.7 4.0 - 15.0 %    Eosinophil % 2.3 0.0 - 8.0 %    Basophil % 0.2 0.0 - 1.9 %    Differential Method Automated    Comprehensive Metabolic Panel    Collection Time: 05/31/23  4:28 AM   Result Value Ref Range    Sodium 137 136 - 145 mmol/L    Potassium 2.6 (LL) 3.5 - 5.1 mmol/L    Chloride 100 95 - 110 mmol/L    CO2 26 23 - 29 mmol/L    Glucose 142 (H) 70 - 110 mg/dL    BUN 5 (L) 6 - 20 mg/dL    Creatinine 0.6 0.5 - 1.4 mg/dL    Calcium 9.3 8.7 - 10.5 mg/dL    Total Protein 6.7 6.0 - 8.4 g/dL    Albumin 2.8 (L) 3.5 - 5.2 g/dL    Total Bilirubin 0.6 0.1 - 1.0 mg/dL    Alkaline Phosphatase 65 55 - 135 U/L    AST 16 10 - 40 U/L    ALT 16 10 - 44 U/L    Anion Gap 11 8 - 16 mmol/L    eGFR >60 >60 mL/min/1.73 m^2   Magnesium    Collection Time: 05/31/23  4:28 AM   Result Value Ref Range    Magnesium 1.7 1.6 - 2.6 mg/dL   Phosphorus    Collection Time: 05/31/23  4:28 AM   Result Value Ref Range    Phosphorus 2.9 2.7 - 4.5 mg/dL   POCT glucose    Collection Time: 05/31/23  7:11 AM   Result Value Ref Range    POCT Glucose 141 (H) 70 - 110 mg/dL   POCT glucose    Collection Time: 05/31/23 11:04 AM   Result Value Ref Range    POCT Glucose 142 (H) 70 - 110 mg/dL   POCT glucose    Collection Time: 05/31/23   Initial (On Arrival) 4:05 PM   Result Value Ref Range    POCT Glucose 108 70 - 110 mg/dL   POCT glucose    Collection Time: 05/31/23  8:19 PM   Result Value Ref Range    POCT Glucose 145 (H) 70 - 110 mg/dL   CBC Auto Differential    Collection Time: 06/01/23  3:48 AM   Result Value Ref Range    WBC 7.02 3.90 - 12.70 K/uL    RBC 4.34 4.00 - 5.40 M/uL    Hemoglobin 12.0 12.0 - 16.0 g/dL    Hematocrit 36.3 (L) 37.0 - 48.5 %    MCV 84 82 - 98 fL    MCH 27.6 27.0 - 31.0 pg    MCHC 33.1 32.0 - 36.0 g/dL    RDW 13.2 11.5 - 14.5 %    Platelets 162 150 - 450 K/uL    MPV 11.0 9.2 - 12.9 fL    Immature Granulocytes 0.4 0.0 - 0.5 %    Gran # (ANC) 5.0 1.8 - 7.7 K/uL    Immature Grans (Abs) 0.03 0.00 - 0.04 K/uL    Lymph # 1.4 1.0 - 4.8 K/uL    Mono # 0.5 0.3 - 1.0 K/uL    Eos # 0.1 0.0 - 0.5 K/uL    Baso # 0.01 0.00 - 0.20 K/uL    nRBC 0 0 /100 WBC    Gran % 71.4 38.0 - 73.0 %    Lymph % 19.8 18.0 - 48.0 %    Mono % 7.4 4.0 - 15.0 %    Eosinophil % 0.9 0.0 - 8.0 %    Basophil % 0.1 0.0 - 1.9 %    Differential Method Automated    Comprehensive Metabolic Panel    Collection Time: 06/01/23  3:48 AM   Result Value Ref Range    Sodium 136 136 - 145 mmol/L    Potassium 3.7 3.5 - 5.1 mmol/L    Chloride 100 95 - 110 mmol/L    CO2 24 23 - 29 mmol/L    Glucose 134 (H) 70 - 110 mg/dL    BUN 6 6 - 20 mg/dL    Creatinine 0.7 0.5 - 1.4 mg/dL    Calcium 9.5 8.7 - 10.5 mg/dL    Total Protein 7.7 6.0 - 8.4 g/dL    Albumin 3.2 (L) 3.5 - 5.2 g/dL    Total Bilirubin 0.6 0.1 - 1.0 mg/dL    Alkaline Phosphatase 91 55 - 135 U/L    AST 18 10 - 40 U/L    ALT 18 10 - 44 U/L    Anion Gap 12 8 - 16 mmol/L    eGFR >60 >60 mL/min/1.73 m^2   Magnesium    Collection Time: 06/01/23  3:48 AM   Result Value Ref Range    Magnesium 1.8 1.6 - 2.6 mg/dL   Phosphorus    Collection Time: 06/01/23  3:48 AM   Result Value Ref Range    Phosphorus 3.0 2.7 - 4.5 mg/dL   POCT glucose    Collection Time: 06/01/23  7:39 AM   Result Value Ref Range    POCT Glucose 104 70 - 110 mg/dL        Microbiology Results (last 7 days)       Procedure Component Value Units Date/Time    Blood culture x two cultures. Draw prior to antibiotics. [[id number]] Collected: 05/28/23 1647    Order Status: Completed Specimen: Blood from Peripheral, Antecubital, Right Updated: 05/31/23 1703     Blood Culture, Routine No Growth to date      No Growth to date      No Growth to date      No Growth to date    Narrative:      Aerobic and anaerobic    Blood culture [[id number]] Collected: 05/29/23 0834    Order Status: Completed Specimen: Blood Updated: 05/31/23 0903     Blood Culture, Routine No Growth to date      No Growth to date      No Growth to date    Blood culture [[id number]] Collected: 05/29/23 0834    Order Status: Completed Specimen: Blood Updated: 05/31/23 0903     Blood Culture, Routine No Growth to date      No Growth to date      No Growth to date    Urine Culture High Risk [[id number]]  (Abnormal) Collected: 05/29/23 1034    Order Status: Completed Specimen: Urine, Clean Catch Updated: 05/31/23 0751     Urine Culture, Routine AURELIA ALBICANS  10,000 - 49,999 cfu/ml  Treatment of asymptomatic candiduria is not recommended (except for   specific populations). Candida isolated in the urine typically   represents colonization. If an indwelling urinary catheter is present  it should be removed or replaced.      Narrative:      Indicated criteria for high risk culture:->Other  Other (specify):->altered mental status    Blood culture x two cultures. Draw prior to antibiotics. [[id number]]  (Abnormal)  (Susceptibility) Collected: 05/28/23 1703    Order Status: Completed Specimen: Blood from Peripheral, Hand, Left Updated: 05/31/23 0716     Blood Culture, Routine Gram stain cameron bottle: Gram negative rods      Results called to and read back by: Yulia Robin 05/29/2023  07:38      ESCHERICHIA COLI    Narrative:      Aerobic and anaerobic            Imaging Results              CTA Chest Non-Coronary (PE Studies) (Final  result)  Result time 05/28/23 23:10:01      Final result by Jenna De Dios MD (05/28/23 23:10:01)                   Impression:      No evidence of acute pulmonary embolism. Mosaic pattern of the lung parenchyma in the lower lung fields.    Trace pericardial effusion versus mild pericardial wall thickening.    Hepatic steatosis.    Prominent spleen.      Electronically signed by: Jenna De Dios  Date:    05/28/2023  Time:    23:10               Narrative:    EXAMINATION:  CT PULMONARY ANGIOGRAM WITH CONTRAST    CLINICAL HISTORY:  Nausea vomiting.  Positive D-dimer.    TECHNIQUE:  CT of the chest with intravenous contrast for pulmonary artery angiogram was performed. Contiguous axial 1.25 mm images followed by 10 mm reconstructions with multiplanar and MIP reformations of the pulmonary arteries. No 3D post-angiographic imaging was performed on an independent workstation and reviewed.  75 ml of Omnipaque 350 was injected.    COMPARISON:  None.    FINDINGS:  There is no evidence of pulmonary artery filling defect to suggest pulmonary embolism. There is no aortic aneurysm or aortic dissection.  Mosaic pattern of the lung parenchyma in lower lung fields.    There is no evidence of mediastinal, hilar, or axillary adenopathy.    There is trace pericardial effusion versus mild pericardial wall thickening.  No pleural effusion is detected.    The heart size is within normal limits.    In the visualized upper abdomen, there is fatty infiltration of the liver.  The spleen is prominent.    Spondylitic changes are present.                                       CT Abdomen Pelvis With Contrast (Final result)  Result time 05/28/23 18:39:34      Final result by Jenna De Dios MD (05/28/23 18:39:34)                   Impression:      No acute abdominal or pelvic pathology CT of the abdomen and pelvis with contrast.    Mildly elongated spleen.    Moderate fat containing ventral abdominal hernia.  Small fat containing  umbilical hernia.    Fibroid uterus.    Moderately large stool in the patulous rectum.  Question fecal impaction.      Electronically signed by: Jenna De Dios  Date:    05/28/2023  Time:    18:39               Narrative:    EXAMINATION:  CT OF ABDOMEN PELVIS WITH    CLINICAL HISTORY:  Sepsis;    TECHNIQUE:  5 mm enhanced axial images were obtained from the lung bases through the greater trochanters.  Seventy-five mL of Omnipaque 350 was injected.    COMPARISON:  09/12/2021    FINDINGS:  The liver, pancreas, kidneys, and adrenal glands are unremarkable. The gallbladder contains no calcified gallstones.    The spleen is mildly elongated measuring 12.4 x 3.9 cm (series 2 axial image 42).    There is no definite evidence for abdominal adenopathy or ascites.  A moderate fat containing midline ventral abdominal hernia is present.  A small fat containing umbilical hernia is again seen.    There is a moderately large lobular uterus containing multiple calcified and noncalcified uterine fibroids.  The appendix is not enlarged.  The rectum is patulous and contains partly large stool..    There is no free fluid in the pelvis.    There is mild bibasilar atelectasis.                                       CT Head Without Contrast (Final result)  Result time 05/28/23 18:21:49      Final result by Jenna De Dios MD (05/28/23 18:21:49)                   Impression:      No acute intracranial abnormality detected.    Mild sinus disease.      Electronically signed by: Jenna De Dios  Date:    05/28/2023  Time:    18:21               Narrative:    EXAMINATION:  CT OF THE HEAD WITHOUT    CLINICAL HISTORY:  Mental status change, unknown cause;    TECHNIQUE:  5 mm unenhanced axial images were obtained from the skull base to the vertex.    COMPARISON:  None.    FINDINGS:  The ventricles, basal cisterns, and cortical sulci are within normal limits for patient's stated age. There is no acute intracranial hemorrhage, territorial  infarct or mass effect, or midline shift. In the visualized paranasal sinuses, there is mild mucoperiosteal thickening in bilateral maxillary sinuses and in the left ethmoid air cells.                                       X-Ray Chest AP Portable (Final result)  Result time 05/28/23 17:29:30      Final result by Terry Cook MD (05/28/23 17:29:30)                   Impression:      No acute process.      Electronically signed by: Terry Cook MD  Date:    05/28/2023  Time:    17:29               Narrative:    EXAMINATION:  XR CHEST AP PORTABLE    CLINICAL HISTORY:  Sepsis;    TECHNIQUE:  Single frontal view of the chest was performed.    COMPARISON:  09/12/2021.    FINDINGS:  Monitoring EKG leads are present.  The trachea is unremarkable.  The cardiomediastinal silhouette is within normal limits.  There is no evidence of free air beneath the hemidiaphragms.  There are no pleural effusions.  There is no evidence of a pneumothorax.  There is no evidence of pneumomediastinum.  No airspace opacity is present.  The osseous structures are unremarkable.                                          Pending Diagnostic Studies:       Procedure Component Value Units Date/Time    EKG 12-lead [[id number]]     Order Status: Sent Lab Status: No result     Echo [[id number]]     Order Status: Sent Lab Status: No result     Echo [[id number]]     Order Status: Sent Lab Status: No result            Medications:  Reconciled Home Medications:      Medication List        START taking these medications      aspirin 81 MG EC tablet  Commonly known as: ECOTRIN  Take 1 tablet (81 mg total) by mouth once daily.     carvediloL 3.125 MG tablet  Commonly known as: COREG  Take 2 tablets (6.25 mg total) by mouth 2 (two) times daily.     cefdinir 300 MG capsule  Commonly known as: OMNICEF  Take 1 capsule (300 mg total) by mouth 2 (two) times daily. for 10 days  Start taking on: June 2, 2023     hydroCHLOROthiazide 25 MG tablet  Commonly known as:  HYDRODIURIL  Take 1 tablet (25 mg total) by mouth once daily.     losartan 100 MG tablet  Commonly known as: COZAAR  Take 1 tablet (100 mg total) by mouth once daily.  Replaces: LOSARTAN POTASSIUM (BULK) MISC            CONTINUE taking these medications      acetaminophen 325 MG tablet  Commonly known as: TYLENOL  Take 325 mg by mouth every 6 (six) hours as needed for Pain.     blood-glucose meter kit  To check BG 2 times daily, to use with insurance preferred meter     metFORMIN 500 MG tablet  Commonly known as: GLUCOPHAGE  Take 1 tablet (500 mg total) by mouth 2 (two) times daily with meals.            STOP taking these medications      blood sugar diagnostic Strp     lancets Misc     LOSARTAN POTASSIUM (BULK) MISC  Replaced by: losartan 100 MG tablet     triamterene-hydrochlorothiazide 37.5-25 mg 37.5-25 mg per capsule  Commonly known as: DYAZIDE              Indwelling Lines/Drains at time of discharge:   Lines/Drains/Airways       None                   Time spent on the discharge of patient: 35 minutes         Tyrese Wang MD  Department of Hospital Medicine  [facility] S[facility]

## 2024-01-08 ENCOUNTER — HOSPITAL ENCOUNTER (EMERGENCY)
Facility: HOSPITAL | Age: 54
Discharge: HOME | End: 2024-01-08
Attending: EMERGENCY MEDICINE
Payer: COMMERCIAL

## 2024-01-08 VITALS
OXYGEN SATURATION: 97 % | BODY MASS INDEX: 34.37 KG/M2 | SYSTOLIC BLOOD PRESSURE: 117 MMHG | HEART RATE: 78 BPM | DIASTOLIC BLOOD PRESSURE: 75 MMHG | HEIGHT: 67 IN | WEIGHT: 219 LBS | RESPIRATION RATE: 18 BRPM | TEMPERATURE: 98 F

## 2024-01-08 DIAGNOSIS — R35.89 POLYURIA: ICD-10-CM

## 2024-01-08 DIAGNOSIS — E11.65 TYPE 2 DIABETES MELLITUS WITH HYPERGLYCEMIA, WITHOUT LONG-TERM CURRENT USE OF INSULIN: ICD-10-CM

## 2024-01-08 DIAGNOSIS — N30.00 ACUTE CYSTITIS WITHOUT HEMATURIA: Primary | ICD-10-CM

## 2024-01-08 DIAGNOSIS — R63.1 POLYDIPSIA: ICD-10-CM

## 2024-01-08 DIAGNOSIS — R73.9 HYPERGLYCEMIA: ICD-10-CM

## 2024-01-08 DIAGNOSIS — R53.83 FATIGUE, UNSPECIFIED TYPE: ICD-10-CM

## 2024-01-08 LAB
ALBUMIN SERPL BCP-MCNC: 3.2 G/DL (ref 3.5–5.2)
ALBUMIN SERPL BCP-MCNC: 4 G/DL (ref 3.5–5.2)
ALLENS TEST: ABNORMAL
ALP SERPL-CCNC: 120 U/L (ref 55–135)
ALP SERPL-CCNC: 88 U/L (ref 55–135)
ALT SERPL W/O P-5'-P-CCNC: 20 U/L (ref 10–44)
ALT SERPL W/O P-5'-P-CCNC: 29 U/L (ref 10–44)
ANION GAP SERPL CALC-SCNC: 11 MMOL/L (ref 8–16)
ANION GAP SERPL CALC-SCNC: 15 MMOL/L (ref 8–16)
AST SERPL-CCNC: 16 U/L (ref 10–40)
AST SERPL-CCNC: 21 U/L (ref 10–40)
B-OH-BUTYR BLD STRIP-SCNC: 1.2 MMOL/L (ref 0–0.5)
B-OH-BUTYR BLD STRIP-SCNC: 1.7 MMOL/L (ref 0–0.5)
BACTERIA #/AREA URNS HPF: ABNORMAL /HPF
BASOPHILS # BLD AUTO: 0.02 K/UL (ref 0–0.2)
BASOPHILS NFR BLD: 0.3 % (ref 0–1.9)
BILIRUB SERPL-MCNC: 0.4 MG/DL (ref 0.1–1)
BILIRUB SERPL-MCNC: 0.4 MG/DL (ref 0.1–1)
BILIRUB UR QL STRIP: NEGATIVE
BNP SERPL-MCNC: 12 PG/ML (ref 0–99)
BUN SERPL-MCNC: 10 MG/DL (ref 6–20)
BUN SERPL-MCNC: 12 MG/DL (ref 6–20)
CALCIUM SERPL-MCNC: 10.2 MG/DL (ref 8.7–10.5)
CALCIUM SERPL-MCNC: 8.9 MG/DL (ref 8.7–10.5)
CHLORIDE SERPL-SCNC: 104 MMOL/L (ref 95–110)
CHLORIDE SERPL-SCNC: 94 MMOL/L (ref 95–110)
CLARITY UR: CLEAR
CO2 SERPL-SCNC: 25 MMOL/L (ref 23–29)
CO2 SERPL-SCNC: 26 MMOL/L (ref 23–29)
COLOR UR: COLORLESS
CREAT SERPL-MCNC: 0.9 MG/DL (ref 0.5–1.4)
CREAT SERPL-MCNC: 1.2 MG/DL (ref 0.5–1.4)
DELSYS: ABNORMAL
DIFFERENTIAL METHOD BLD: ABNORMAL
EOSINOPHIL # BLD AUTO: 0.2 K/UL (ref 0–0.5)
EOSINOPHIL NFR BLD: 2.2 % (ref 0–8)
ERYTHROCYTE [DISTWIDTH] IN BLOOD BY AUTOMATED COUNT: 14.2 % (ref 11.5–14.5)
EST. GFR  (NO RACE VARIABLE): 55 ML/MIN/1.73 M^2
EST. GFR  (NO RACE VARIABLE): >60 ML/MIN/1.73 M^2
GLUCOSE SERPL-MCNC: 331 MG/DL (ref 70–110)
GLUCOSE SERPL-MCNC: 610 MG/DL (ref 70–110)
GLUCOSE UR QL STRIP: ABNORMAL
HCO3 UR-SCNC: 34.1 MMOL/L (ref 24–28)
HCT VFR BLD AUTO: 43.7 % (ref 37–48.5)
HGB BLD-MCNC: 14 G/DL (ref 12–16)
HGB UR QL STRIP: NEGATIVE
IMM GRANULOCYTES # BLD AUTO: 0.04 K/UL (ref 0–0.04)
IMM GRANULOCYTES NFR BLD AUTO: 0.6 % (ref 0–0.5)
KETONES UR QL STRIP: ABNORMAL
LEUKOCYTE ESTERASE UR QL STRIP: ABNORMAL
LYMPHOCYTES # BLD AUTO: 2.2 K/UL (ref 1–4.8)
LYMPHOCYTES NFR BLD: 31.5 % (ref 18–48)
MAGNESIUM SERPL-MCNC: 1.9 MG/DL (ref 1.6–2.6)
MAGNESIUM SERPL-MCNC: 2.2 MG/DL (ref 1.6–2.6)
MCH RBC QN AUTO: 27.8 PG (ref 27–31)
MCHC RBC AUTO-ENTMCNC: 32 G/DL (ref 32–36)
MCV RBC AUTO: 87 FL (ref 82–98)
MICROSCOPIC COMMENT: ABNORMAL
MODE: ABNORMAL
MONOCYTES # BLD AUTO: 0.5 K/UL (ref 0.3–1)
MONOCYTES NFR BLD: 6.9 % (ref 4–15)
NEUTROPHILS # BLD AUTO: 4 K/UL (ref 1.8–7.7)
NEUTROPHILS NFR BLD: 58.5 % (ref 38–73)
NITRITE UR QL STRIP: NEGATIVE
NRBC BLD-RTO: 0 /100 WBC
PCO2 BLDA: 55.6 MMHG (ref 35–45)
PH SMN: 7.39 [PH] (ref 7.35–7.45)
PH UR STRIP: 7 [PH] (ref 5–8)
PLATELET # BLD AUTO: 224 K/UL (ref 150–450)
PMV BLD AUTO: 12 FL (ref 9.2–12.9)
PO2 BLDA: 25 MMHG (ref 40–60)
POC BE: 7 MMOL/L
POC SATURATED O2: 44 % (ref 95–100)
POC TCO2: 36 MMOL/L (ref 24–29)
POCT GLUCOSE: 285 MG/DL (ref 70–110)
POCT GLUCOSE: 297 MG/DL (ref 70–110)
POCT GLUCOSE: 301 MG/DL (ref 70–110)
POCT GLUCOSE: >500 MG/DL (ref 70–110)
POTASSIUM SERPL-SCNC: 3.6 MMOL/L (ref 3.5–5.1)
POTASSIUM SERPL-SCNC: 4.4 MMOL/L (ref 3.5–5.1)
PROT SERPL-MCNC: 7.2 G/DL (ref 6–8.4)
PROT SERPL-MCNC: 9 G/DL (ref 6–8.4)
PROT UR QL STRIP: NEGATIVE
RBC # BLD AUTO: 5.03 M/UL (ref 4–5.4)
SAMPLE: ABNORMAL
SITE: ABNORMAL
SODIUM SERPL-SCNC: 135 MMOL/L (ref 136–145)
SODIUM SERPL-SCNC: 140 MMOL/L (ref 136–145)
SP GR UR STRIP: >1.03 (ref 1–1.03)
SP02: 100
TROPONIN I SERPL DL<=0.01 NG/ML-MCNC: <0.006 NG/ML (ref 0–0.03)
URN SPEC COLLECT METH UR: ABNORMAL
UROBILINOGEN UR STRIP-ACNC: NEGATIVE EU/DL
WBC # BLD AUTO: 6.82 K/UL (ref 3.9–12.7)
WBC #/AREA URNS HPF: 12 /HPF (ref 0–5)
YEAST URNS QL MICRO: ABNORMAL

## 2024-01-08 PROCEDURE — 82010 KETONE BODYS QUAN: CPT | Performed by: NURSE PRACTITIONER

## 2024-01-08 PROCEDURE — 25000003 PHARM REV CODE 250: Performed by: EMERGENCY MEDICINE

## 2024-01-08 PROCEDURE — 82803 BLOOD GASES ANY COMBINATION: CPT

## 2024-01-08 PROCEDURE — 87086 URINE CULTURE/COLONY COUNT: CPT | Performed by: NURSE PRACTITIONER

## 2024-01-08 PROCEDURE — 82010 KETONE BODYS QUAN: CPT | Mod: 91 | Performed by: EMERGENCY MEDICINE

## 2024-01-08 PROCEDURE — 99900035 HC TECH TIME PER 15 MIN (STAT)

## 2024-01-08 PROCEDURE — 87088 URINE BACTERIA CULTURE: CPT | Performed by: NURSE PRACTITIONER

## 2024-01-08 PROCEDURE — 96374 THER/PROPH/DIAG INJ IV PUSH: CPT

## 2024-01-08 PROCEDURE — 99285 EMERGENCY DEPT VISIT HI MDM: CPT | Mod: 25

## 2024-01-08 PROCEDURE — 83735 ASSAY OF MAGNESIUM: CPT | Performed by: EMERGENCY MEDICINE

## 2024-01-08 PROCEDURE — 80053 COMPREHEN METABOLIC PANEL: CPT | Mod: 91 | Performed by: EMERGENCY MEDICINE

## 2024-01-08 PROCEDURE — 93010 ELECTROCARDIOGRAM REPORT: CPT | Mod: ,,, | Performed by: INTERNAL MEDICINE

## 2024-01-08 PROCEDURE — 83880 ASSAY OF NATRIURETIC PEPTIDE: CPT | Performed by: EMERGENCY MEDICINE

## 2024-01-08 PROCEDURE — 96361 HYDRATE IV INFUSION ADD-ON: CPT

## 2024-01-08 PROCEDURE — 87186 SC STD MICRODIL/AGAR DIL: CPT | Performed by: NURSE PRACTITIONER

## 2024-01-08 PROCEDURE — 25000003 PHARM REV CODE 250: Performed by: NURSE PRACTITIONER

## 2024-01-08 PROCEDURE — 80053 COMPREHEN METABOLIC PANEL: CPT | Performed by: NURSE PRACTITIONER

## 2024-01-08 PROCEDURE — 63600175 PHARM REV CODE 636 W HCPCS: Performed by: EMERGENCY MEDICINE

## 2024-01-08 PROCEDURE — 96376 TX/PRO/DX INJ SAME DRUG ADON: CPT

## 2024-01-08 PROCEDURE — 81000 URINALYSIS NONAUTO W/SCOPE: CPT | Performed by: NURSE PRACTITIONER

## 2024-01-08 PROCEDURE — 93005 ELECTROCARDIOGRAM TRACING: CPT

## 2024-01-08 PROCEDURE — 85025 COMPLETE CBC W/AUTO DIFF WBC: CPT | Performed by: NURSE PRACTITIONER

## 2024-01-08 PROCEDURE — 82962 GLUCOSE BLOOD TEST: CPT

## 2024-01-08 PROCEDURE — 84484 ASSAY OF TROPONIN QUANT: CPT | Performed by: EMERGENCY MEDICINE

## 2024-01-08 PROCEDURE — 87147 CULTURE TYPE IMMUNOLOGIC: CPT | Performed by: NURSE PRACTITIONER

## 2024-01-08 PROCEDURE — 87077 CULTURE AEROBIC IDENTIFY: CPT | Performed by: NURSE PRACTITIONER

## 2024-01-08 RX ORDER — METFORMIN HYDROCHLORIDE 500 MG/1
500 TABLET ORAL ONCE
Status: COMPLETED | OUTPATIENT
Start: 2024-01-08 | End: 2024-01-08

## 2024-01-08 RX ORDER — NITROFURANTOIN 25; 75 MG/1; MG/1
100 CAPSULE ORAL ONCE
Status: COMPLETED | OUTPATIENT
Start: 2024-01-08 | End: 2024-01-08

## 2024-01-08 RX ORDER — NITROFURANTOIN 25; 75 MG/1; MG/1
100 CAPSULE ORAL 2 TIMES DAILY
Qty: 10 CAPSULE | Refills: 0 | Status: SHIPPED | OUTPATIENT
Start: 2024-01-08 | End: 2024-01-13

## 2024-01-08 RX ADMIN — NITROFURANTOIN MONOHYDRATE/MACROCRYSTALS 100 MG: 25; 75 CAPSULE ORAL at 05:01

## 2024-01-08 RX ADMIN — METFORMIN HYDROCHLORIDE 500 MG: 500 TABLET ORAL at 07:01

## 2024-01-08 RX ADMIN — INSULIN HUMAN 2 UNITS: 100 INJECTION, SOLUTION PARENTERAL at 07:01

## 2024-01-08 RX ADMIN — INSULIN HUMAN 2 UNITS: 100 INJECTION, SOLUTION PARENTERAL at 03:01

## 2024-01-08 RX ADMIN — INSULIN HUMAN 2 UNITS: 100 INJECTION, SOLUTION PARENTERAL at 10:01

## 2024-01-08 RX ADMIN — SODIUM CHLORIDE 1000 ML: 9 INJECTION, SOLUTION INTRAVENOUS at 03:01

## 2024-01-08 RX ADMIN — SODIUM CHLORIDE 1000 ML: 9 INJECTION, SOLUTION INTRAVENOUS at 01:01

## 2024-01-08 NOTE — ED PROVIDER NOTES
"Encounter Date: 1/8/2024    SCRIBE #1 NOTE: I, Mary Braun, am scribing for, and in the presence of,  Lul Wells MD.   SCRIBE #2 NOTE: I, Willow Orr, am scribing for, and in the presence of,  Lul Wells MD.     History     Chief Complaint   Patient presents with    Fatigue     Pt to ER with reports of increased weakness, thirst and urination x 2 days. Pt reports has been out of diabetic medication x 5 days. Pt also reports tip of tongue numb since 6 am. Pt denies any other symptom. No unilateral weakness, blurred vision. Pts GBC greater than 500 in triage      Ms. Ortega reports she has been out of metformin for several days, states for past 3 days, she has been "knowing my sugar is off", feeling "a little woozy" today, "not feeling myself", experiencing urinary frequency and polyuria, xerostomia. She usually endorses adherence w/ her daily medication regimen of Metformin 500 mg BID, HCTZ, losartan, carvedilol, and ASA. Denies nausea, vomiting, dizziness, paresthesias in mouth, paresthesias to all extremities, chest pain, dyspnea, abdominal pain, or other associated symptoms. PMHx significant for DM, HTN.     The history is provided by the patient.     Review of patient's allergies indicates:   Allergen Reactions    Penicillins Hives    Amoxicillin     Grass pollen-yun grass standard Other (See Comments)     Past Medical History:   Diagnosis Date    DM2 (diabetes mellitus, type 2) 05/28/2023    Hypertension     Prolonged Q-T interval on ECG 05/31/2023    Qtc 525 5/31/23     Past Surgical History:   Procedure Laterality Date    ABDOMINAL SURGERY      COLONOSCOPY N/A 06/01/2023    Procedure: COLONOSCOPY;  Surgeon: Thaddeus Carlos MD;  Location: [facility] ENDO;  Service: Endoscopy;  Laterality: N/A;    DIAGNOSTIC LAPAROSCOPY N/A 05/26/2019    Procedure: LAPAROSCOPY, DIAGNOSTIC Drainage of abscess ;  Surgeon: Jose Luis Hanson MD;  Location: [facility] OR;  Service: General;  Laterality: N/A;  Drain Placement    " DIAGNOSTIC LAPAROSCOPY N/A 12/27/2020    Procedure: Diagnostic laparoscopy, drainage of intra-abdominal abscess;  Surgeon: Bhupendra Banda MD;  Location: [facility] OR;  Service: General;  Laterality: N/A;    LAPAROSCOPIC LYSIS OF ADHESIONS  05/26/2019    Procedure: LYSIS, ADHESIONS, LAPAROSCOPIC;  Surgeon: Jose Luis Hanson MD;  Location: [facility] OR;  Service: General;;     Family History   Problem Relation Age of Onset    Diabetes Father      Social History     Tobacco Use    Smoking status: Never    Smokeless tobacco: Never   Substance Use Topics    Alcohol use: Not Currently    Drug use: Not Currently     Review of Systems   HENT:          +xerostomia    Respiratory:  Negative for shortness of breath.    Cardiovascular:  Negative for chest pain.   Gastrointestinal:  Negative for abdominal pain, nausea and vomiting.   Endocrine: Positive for polyuria.   Genitourinary:  Positive for frequency.   Neurological:  Negative for dizziness and weakness.        Negative Paresthesias in mouth and bl lower extremities   All other systems reviewed and are negative.      Physical Exam     Initial Vitals [01/08/24 1134]   BP Pulse Resp Temp SpO2   (!) 168/86 87 18 97.7 °F (36.5 °C) 100 %      MAP       --         Physical Exam    Nursing note and vitals reviewed.  Constitutional: She appears well-developed and well-nourished. She is not diaphoretic. No distress.   Mildly overweight.  Body mass index is 34.3 kg/m².   HENT:   Head: Normocephalic and atraumatic.   Eyes: EOM are normal.   Cardiovascular:  Normal rate, regular rhythm and normal heart sounds.           No murmur heard.  Pulmonary/Chest: Breath sounds normal. No respiratory distress.   Abdominal: Abdomen is soft. Bowel sounds are normal. She exhibits no distension and no mass. There is no abdominal tenderness. There is no guarding.   Musculoskeletal:         General: No tenderness or edema. Normal range of motion.     Neurological: She is oriented to person, place, and  time.   Skin: Skin is warm and dry. No rash noted. No erythema.   Psychiatric: She has a normal mood and affect. Her behavior is normal. Judgment and thought content normal.         ED Course   Procedures  Labs Reviewed   CBC W/ AUTO DIFFERENTIAL - Abnormal; Notable for the following components:       Result Value    Immature Granulocytes 0.6 (*)     All other components within normal limits   COMPREHENSIVE METABOLIC PANEL - Abnormal; Notable for the following components:    Sodium 135 (*)     Chloride 94 (*)     Glucose 610 (*)     Total Protein 9.0 (*)     eGFR 55 (*)     All other components within normal limits    Narrative:     GLUCOSE critical result(s) called and verbal readback obtained from   LETHA PEDRAZA  by [facility] [place] 01/08/2024 14:10   BETA - HYDROXYBUTYRATE, SERUM - Abnormal; Notable for the following components:    Beta-Hydroxybutyrate 1.7 (*)     All other components within normal limits   URINALYSIS, REFLEX TO URINE CULTURE - Abnormal; Notable for the following components:    Color, UA Colorless (*)     Specific Gravity, UA >1.030 (*)     Glucose, UA 4+ (*)     Ketones, UA 1+ (*)     Leukocytes, UA 1+ (*)     All other components within normal limits    Narrative:     Specimen Source->Urine   URINALYSIS MICROSCOPIC - Abnormal; Notable for the following components:    WBC, UA 12 (*)     All other components within normal limits    Narrative:     Specimen Source->Urine   BETA - HYDROXYBUTYRATE, SERUM - Abnormal; Notable for the following components:    Beta-Hydroxybutyrate 1.2 (*)     All other components within normal limits   POCT GLUCOSE - Abnormal; Notable for the following components:    POCT Glucose >500 (*)     All other components within normal limits   ISTAT PROCEDURE - Abnormal; Notable for the following components:    POC PCO2 55.6 (*)     POC PO2 25 (*)     POC HCO3 34.1 (*)     POC BE 7 (*)     POC TCO2 36 (*)     All other components within normal limits   POCT GLUCOSE - Abnormal; Notable  for the following components:    POCT Glucose 301 (*)     All other components within normal limits   POCT GLUCOSE - Abnormal; Notable for the following components:    POCT Glucose 285 (*)     All other components within normal limits   POCT GLUCOSE - Abnormal; Notable for the following components:    POCT Glucose 297 (*)     All other components within normal limits   CULTURE, URINE   TROPONIN I   B-TYPE NATRIURETIC PEPTIDE   MAGNESIUM   MAGNESIUM   COMPREHENSIVE METABOLIC PANEL   ISTAT CHEM8   POCT GLUCOSE MONITORING CONTINUOUS   POCT GLUCOSE MONITORING CONTINUOUS     EKG Readings: (Independently Interpreted)   Initial Reading: No STEMI. Rhythm: Normal Sinus Rhythm. Heart Rate: 88. Ectopy: No Ectopy.        ECG Results              EKG 12-lead (Final result)  Result time 01/08/24 21:35:25      Final result by Interface, Lab In [facility] (01/08/24 21:35:25)                   Narrative:    Test Reason : R73.9,    Vent. Rate : 088 BPM     Atrial Rate : 088 BPM     P-R Int : 166 ms          QRS Dur : 090 ms      QT Int : 390 ms       P-R-T Axes : 041 012 012 degrees     QTc Int : 471 ms    Normal sinus rhythm  Moderate voltage criteria for LVH, may be normal variant  Borderline Abnormal ECG  When compared with ECG of 31-MAY-2023 16:22,  Significant changes have occurred  Confirmed by Crystal BARFIELD, Yuri MARTINEZ ([id number]) on 1/8/2024 9:35:17 PM    Referred By: [email or web address]   SELF           Confirmed By:Yuri Rojas MD                                  Imaging Results              X-Ray Chest AP Portable (Final result)  Result time 01/08/24 12:54:24      Final result by Jer Kaufman MD (01/08/24 12:54:24)                   Impression:      See above      Electronically signed by: Jer Kaufman MD  Date:    01/08/2024  Time:    12:54               Narrative:    EXAMINATION:  XR CHEST AP PORTABLE    CLINICAL HISTORY:  hyperglycemia;    TECHNIQUE:  Single frontal view of the chest was performed.    COMPARISON:  N 05/28/2023  one    FINDINGS:  Heart size normal.  The lungs are clear.  No pleural effusion                                       Medications   insulin regular injection 2 Units 0.02 mL (has no administration in time range)   sodium chloride 0.9% bolus 1,000 mL 1,000 mL (0 mLs Intravenous Stopped 1/8/24 1523)   sodium chloride 0.9% bolus 1,000 mL 1,000 mL (0 mLs Intravenous Stopped 1/8/24 1649)   insulin regular injection 2 Units 0.02 mL (2 Units Intravenous Given 1/8/24 1536)   nitrofurantoin (macrocrystal-monohydrate) 100 MG capsule 100 mg (100 mg Oral Given 1/8/24 1734)   metFORMIN tablet 500 mg (500 mg Oral Given 1/8/24 1904)   insulin regular injection 2 Units 0.02 mL (2 Units Intravenous Given 1/8/24 1939)     Medical Decision Making  Cmp returned, noted.   Of note, pt has been given insulin in ED once in past, per her report. Is otherwise insulin naive. Will continue to hydrate and give low gentle doses of iv insulin at this time. Lul Wells MD, 01/08/2024 2:14 PM    Checked on pt. Of note, RR is 18-20. No complaints. Resting comfortably. RR 40 noted on chart, incorrect, suspect spurious value. Lul Wells MD, 01/08/2024 2:57 PM    Checked on pt. Feels completely better. Has no complaints. Getting 2nd L NS. Watching tv on phone. No complaints. Of note, states she did feel some similar sxs as when she had a uti in the past. Checked prior records, has done well w macrobid. Will recheck glucose, plan to give course. Urine cx pending.     In ED, pt has been given 2L NS, insulin 2mg iv x 3 doses, and macrobid 100mg po x1.   Repeat labs pending. Beta-hydroxybutyrate elevated, noted 1.7. other labs in cmp reassuring. AG 15. She would like to go home.   Pt signed out at change of shift to Dr. Person for follow up labs and final disposition.         Amount and/or Complexity of Data Reviewed  Labs: ordered. Decision-making details documented in ED Course.     Details:       Radiology: ordered. Decision-making details  documented in ED Course.  ECG/medicine tests: ordered and independent interpretation performed. Decision-making details documented in ED Course.    Risk  OTC drugs.  Prescription drug management.            Scribe Attestation:   Scribe #1: I performed the above scribed service and the documentation accurately describes the services I performed. I attest to the accuracy of the note.                           I, Lul Wells MD, personally performed the services described in this documentation. All medical record entries made by the scribe were at my direction and in my presence. I have reviewed the chart and agree that the record reflects my personal performance and is accurate and complete.        Clinical Impression:  Final diagnoses:  [R73.9] Hyperglycemia  [N30.00] Acute cystitis without hematuria (Primary)                 Lul Wells MD  01/08/24 5839

## 2024-01-09 NOTE — PROVIDER PROGRESS NOTES - EMERGENCY DEPT.
Patient received as sign-out from Dr. Wells pending repeat labs and reassessment.    53-year-old female with DM2, HTN, DLD, paroxysmal afib not on anticoagulation, presents via personal transportation to [facility] ER with polyuria, polydipsia, tongue numbness, and wooziness.  She ran out of her Metformin 5 days ago.  She also stated she felt like she might have a UTI.    EKG 13:13: NSR, HR 88. Normal axis. TWI in III. No ectopy. No STEMI. C/w 5/31/23.    CXR NAD.    Labs revealed hyperglycemia with glucose 610.  Chloride 94.  Beta hydroxybutyrate 1.7, borderline.  However, bicarb 26, normal.  1+ ketones and 1+ leukocytes on UA.  PH 7.395.    Patient received IV NS 2 L, p.o. metformin 500 mg, IV insulin 2 units x 3, and p.o. Macrobid 100 mg.    Repeat CMP shows glucose 331.  Chloride has normalized.  Bicarb 25.  Beta hydroxybutyrate 1.2.    Patient states she feels better than on ER arrival.  She states she has metformin prescriptions waiting at pharmacy.    Dr. Wells rx'ed Macrobid for UTI.    D/c'ed.  Vitals reassuring.  Encouraged to take meds as rx'ed.  I have placed a referral to nutrition as patient is curious about dietary management of diabetes.    Mare Person  11:01 PM

## 2024-01-11 LAB
BACTERIA UR CULT: ABNORMAL
BACTERIA UR CULT: ABNORMAL

## 2024-04-15 ENCOUNTER — HOSPITAL ENCOUNTER (INPATIENT)
Facility: HOSPITAL | Age: 54
LOS: 11 days | Discharge: HOME HEALTH | DRG: 853 | End: 2024-04-26
Attending: EMERGENCY MEDICINE | Admitting: STUDENT IN AN ORGANIZED HEALTH CARE EDUCATION/TRAINING PROGRAM
Payer: COMMERCIAL

## 2024-04-15 DIAGNOSIS — M00.211 STREPTOCOCCAL ARTHRITIS OF RIGHT SHOULDER: ICD-10-CM

## 2024-04-15 DIAGNOSIS — M00.011 STAPHYLOCOCCAL ARTHRITIS OF RIGHT SHOULDER: ICD-10-CM

## 2024-04-15 DIAGNOSIS — B95.5 STREPTOCOCCAL BACTEREMIA: ICD-10-CM

## 2024-04-15 DIAGNOSIS — B95.1 BACTEREMIA DUE TO GROUP B STREPTOCOCCUS: ICD-10-CM

## 2024-04-15 DIAGNOSIS — B95.7 COAG NEGATIVE STAPHYLOCOCCUS BACTEREMIA: ICD-10-CM

## 2024-04-15 DIAGNOSIS — R78.81 STREPTOCOCCAL BACTEREMIA: ICD-10-CM

## 2024-04-15 DIAGNOSIS — M00.9 PYOGENIC ARTHRITIS OF RIGHT SHOULDER REGION, DUE TO UNSPECIFIED ORGANISM: ICD-10-CM

## 2024-04-15 DIAGNOSIS — R78.81 BACTEREMIA DUE TO GROUP B STREPTOCOCCUS: ICD-10-CM

## 2024-04-15 DIAGNOSIS — R78.81 COAG NEGATIVE STAPHYLOCOCCUS BACTEREMIA: ICD-10-CM

## 2024-04-15 DIAGNOSIS — M60.9 MYOSITIS OF RIGHT SHOULDER, UNSPECIFIED MYOSITIS TYPE: Primary | ICD-10-CM

## 2024-04-15 DIAGNOSIS — M46.20 VERTEBRAL ABSCESS: ICD-10-CM

## 2024-04-15 DIAGNOSIS — R07.9 CHEST PAIN: ICD-10-CM

## 2024-04-15 DIAGNOSIS — A41.9 SEPSIS: ICD-10-CM

## 2024-04-15 DIAGNOSIS — I38 ENDOCARDITIS, UNSPECIFIED CHRONICITY, UNSPECIFIED ENDOCARDITIS TYPE: ICD-10-CM

## 2024-04-15 LAB
ALBUMIN SERPL BCP-MCNC: 2.7 G/DL (ref 3.5–5.2)
ALLENS TEST: ABNORMAL
ALP SERPL-CCNC: 129 U/L (ref 55–135)
ALT SERPL W/O P-5'-P-CCNC: 16 U/L (ref 10–44)
ANION GAP SERPL CALC-SCNC: 14 MMOL/L (ref 8–16)
AST SERPL-CCNC: 14 U/L (ref 10–40)
B-OH-BUTYR BLD STRIP-SCNC: 4.4 MMOL/L (ref 0–0.5)
BACTERIA #/AREA URNS HPF: ABNORMAL /HPF
BASOPHILS # BLD AUTO: 0.1 K/UL (ref 0–0.2)
BASOPHILS NFR BLD: 0.6 % (ref 0–1.9)
BILIRUB SERPL-MCNC: 0.4 MG/DL (ref 0.1–1)
BILIRUB UR QL STRIP: NEGATIVE
BUN SERPL-MCNC: 13 MG/DL (ref 6–20)
CALCIUM SERPL-MCNC: 10.1 MG/DL (ref 8.7–10.5)
CHLORIDE SERPL-SCNC: 98 MMOL/L (ref 95–110)
CLARITY UR: CLEAR
CO2 SERPL-SCNC: 23 MMOL/L (ref 23–29)
COLOR UR: YELLOW
CREAT SERPL-MCNC: 0.8 MG/DL (ref 0.5–1.4)
CRP SERPL-MCNC: 356.3 MG/L (ref 0–8.2)
DELSYS: ABNORMAL
DIFFERENTIAL METHOD BLD: ABNORMAL
EOSINOPHIL # BLD AUTO: 0.1 K/UL (ref 0–0.5)
EOSINOPHIL NFR BLD: 0.3 % (ref 0–8)
ERYTHROCYTE [DISTWIDTH] IN BLOOD BY AUTOMATED COUNT: 13 % (ref 11.5–14.5)
ERYTHROCYTE [SEDIMENTATION RATE] IN BLOOD BY WESTERGREN METHOD: 112 MM/HR (ref 0–20)
EST. GFR  (NO RACE VARIABLE): >60 ML/MIN/1.73 M^2
GLUCOSE SERPL-MCNC: 327 MG/DL (ref 70–110)
GLUCOSE UR QL STRIP: ABNORMAL
HCO3 UR-SCNC: 27.9 MMOL/L (ref 24–28)
HCT VFR BLD AUTO: 37 % (ref 37–48.5)
HGB BLD-MCNC: 11.8 G/DL (ref 12–16)
HGB UR QL STRIP: ABNORMAL
IMM GRANULOCYTES # BLD AUTO: 0.75 K/UL (ref 0–0.04)
IMM GRANULOCYTES NFR BLD AUTO: 4.9 % (ref 0–0.5)
KETONES UR QL STRIP: ABNORMAL
LACTATE SERPL-SCNC: 1.1 MMOL/L (ref 0.5–2.2)
LEUKOCYTE ESTERASE UR QL STRIP: ABNORMAL
LYMPHOCYTES # BLD AUTO: 1.7 K/UL (ref 1–4.8)
LYMPHOCYTES NFR BLD: 10.9 % (ref 18–48)
MCH RBC QN AUTO: 28.5 PG (ref 27–31)
MCHC RBC AUTO-ENTMCNC: 31.9 G/DL (ref 32–36)
MCV RBC AUTO: 89 FL (ref 82–98)
MICROSCOPIC COMMENT: ABNORMAL
MONOCYTES # BLD AUTO: 1.2 K/UL (ref 0.3–1)
MONOCYTES NFR BLD: 7.5 % (ref 4–15)
NEUTROPHILS # BLD AUTO: 11.7 K/UL (ref 1.8–7.7)
NEUTROPHILS NFR BLD: 75.8 % (ref 38–73)
NITRITE UR QL STRIP: NEGATIVE
NRBC BLD-RTO: 0 /100 WBC
PCO2 BLDA: 51.5 MMHG (ref 35–45)
PH SMN: 7.34 [PH] (ref 7.35–7.45)
PH UR STRIP: 6 [PH] (ref 5–8)
PLATELET # BLD AUTO: 210 K/UL (ref 150–450)
PMV BLD AUTO: 10.1 FL (ref 9.2–12.9)
PO2 BLDA: 13 MMHG (ref 40–60)
POC BE: 1 MMOL/L
POC SATURATED O2: 14 % (ref 95–100)
POC TCO2: 29 MMOL/L (ref 24–29)
POCT GLUCOSE: 207 MG/DL (ref 70–110)
POCT GLUCOSE: 283 MG/DL (ref 70–110)
POTASSIUM SERPL-SCNC: 4.1 MMOL/L (ref 3.5–5.1)
PROCALCITONIN SERPL IA-MCNC: 0.8 NG/ML
PROT SERPL-MCNC: 8.3 G/DL (ref 6–8.4)
PROT UR QL STRIP: ABNORMAL
RBC # BLD AUTO: 4.14 M/UL (ref 4–5.4)
RBC #/AREA URNS HPF: 10 /HPF (ref 0–4)
SAMPLE: ABNORMAL
SITE: ABNORMAL
SODIUM SERPL-SCNC: 135 MMOL/L (ref 136–145)
SP GR UR STRIP: >1.03 (ref 1–1.03)
SQUAMOUS #/AREA URNS HPF: 8 /HPF
URN SPEC COLLECT METH UR: ABNORMAL
UROBILINOGEN UR STRIP-ACNC: NEGATIVE EU/DL
WBC # BLD AUTO: 15.41 K/UL (ref 3.9–12.7)
WBC #/AREA URNS HPF: 10 /HPF (ref 0–5)
YEAST URNS QL MICRO: ABNORMAL

## 2024-04-15 PROCEDURE — 80053 COMPREHEN METABOLIC PANEL: CPT

## 2024-04-15 PROCEDURE — 85025 COMPLETE CBC W/AUTO DIFF WBC: CPT

## 2024-04-15 PROCEDURE — 11000001 HC ACUTE MED/SURG PRIVATE ROOM

## 2024-04-15 PROCEDURE — 63600175 PHARM REV CODE 636 W HCPCS

## 2024-04-15 PROCEDURE — 99285 EMERGENCY DEPT VISIT HI MDM: CPT | Mod: 25

## 2024-04-15 PROCEDURE — 85652 RBC SED RATE AUTOMATED: CPT

## 2024-04-15 PROCEDURE — 63600175 PHARM REV CODE 636 W HCPCS: Performed by: STUDENT IN AN ORGANIZED HEALTH CARE EDUCATION/TRAINING PROGRAM

## 2024-04-15 PROCEDURE — 25000003 PHARM REV CODE 250

## 2024-04-15 PROCEDURE — 96376 TX/PRO/DX INJ SAME DRUG ADON: CPT

## 2024-04-15 PROCEDURE — 82803 BLOOD GASES ANY COMBINATION: CPT

## 2024-04-15 PROCEDURE — 86140 C-REACTIVE PROTEIN: CPT

## 2024-04-15 PROCEDURE — 96374 THER/PROPH/DIAG INJ IV PUSH: CPT

## 2024-04-15 PROCEDURE — 25000003 PHARM REV CODE 250: Performed by: STUDENT IN AN ORGANIZED HEALTH CARE EDUCATION/TRAINING PROGRAM

## 2024-04-15 PROCEDURE — 82010 KETONE BODYS QUAN: CPT | Performed by: EMERGENCY MEDICINE

## 2024-04-15 PROCEDURE — 96361 HYDRATE IV INFUSION ADD-ON: CPT

## 2024-04-15 PROCEDURE — 84145 PROCALCITONIN (PCT): CPT

## 2024-04-15 PROCEDURE — 82962 GLUCOSE BLOOD TEST: CPT

## 2024-04-15 PROCEDURE — 87186 SC STD MICRODIL/AGAR DIL: CPT

## 2024-04-15 PROCEDURE — 87147 CULTURE TYPE IMMUNOLOGIC: CPT | Mod: 59

## 2024-04-15 PROCEDURE — 83605 ASSAY OF LACTIC ACID: CPT

## 2024-04-15 PROCEDURE — 87040 BLOOD CULTURE FOR BACTERIA: CPT

## 2024-04-15 PROCEDURE — 87040 BLOOD CULTURE FOR BACTERIA: CPT | Mod: 59 | Performed by: HOSPITALIST

## 2024-04-15 PROCEDURE — 99900035 HC TECH TIME PER 15 MIN (STAT)

## 2024-04-15 PROCEDURE — 81000 URINALYSIS NONAUTO W/SCOPE: CPT

## 2024-04-15 PROCEDURE — 87070 CULTURE OTHR SPECIMN AEROBIC: CPT

## 2024-04-15 PROCEDURE — 25500020 PHARM REV CODE 255: Performed by: EMERGENCY MEDICINE

## 2024-04-15 RX ORDER — INSULIN ASPART 100 [IU]/ML
0-5 INJECTION, SOLUTION INTRAVENOUS; SUBCUTANEOUS
Status: DISCONTINUED | OUTPATIENT
Start: 2024-04-15 | End: 2024-04-26 | Stop reason: HOSPADM

## 2024-04-15 RX ORDER — GLUCAGON 1 MG
1 KIT INJECTION
Status: DISCONTINUED | OUTPATIENT
Start: 2024-04-15 | End: 2024-04-26 | Stop reason: HOSPADM

## 2024-04-15 RX ORDER — HEPARIN SODIUM 5000 [USP'U]/ML
5000 INJECTION, SOLUTION INTRAVENOUS; SUBCUTANEOUS EVERY 8 HOURS
Status: DISCONTINUED | OUTPATIENT
Start: 2024-04-16 | End: 2024-04-21

## 2024-04-15 RX ORDER — ACETAMINOPHEN 325 MG/1
650 TABLET ORAL EVERY 8 HOURS PRN
Status: DISCONTINUED | OUTPATIENT
Start: 2024-04-15 | End: 2024-04-22

## 2024-04-15 RX ORDER — MORPHINE SULFATE 4 MG/ML
3 INJECTION, SOLUTION INTRAMUSCULAR; INTRAVENOUS
Status: COMPLETED | OUTPATIENT
Start: 2024-04-15 | End: 2024-04-15

## 2024-04-15 RX ORDER — AMOXICILLIN 250 MG
1 CAPSULE ORAL 2 TIMES DAILY
Status: DISCONTINUED | OUTPATIENT
Start: 2024-04-15 | End: 2024-04-26 | Stop reason: HOSPADM

## 2024-04-15 RX ORDER — HYDROMORPHONE HYDROCHLORIDE 1 MG/ML
1 INJECTION, SOLUTION INTRAMUSCULAR; INTRAVENOUS; SUBCUTANEOUS EVERY 4 HOURS PRN
Status: DISCONTINUED | OUTPATIENT
Start: 2024-04-15 | End: 2024-04-17

## 2024-04-15 RX ORDER — OXYCODONE HYDROCHLORIDE 5 MG/1
5 TABLET ORAL EVERY 6 HOURS PRN
Status: DISCONTINUED | OUTPATIENT
Start: 2024-04-15 | End: 2024-04-22

## 2024-04-15 RX ORDER — POLYETHYLENE GLYCOL 3350 17 G/17G
17 POWDER, FOR SOLUTION ORAL DAILY
Status: DISCONTINUED | OUTPATIENT
Start: 2024-04-15 | End: 2024-04-26 | Stop reason: HOSPADM

## 2024-04-15 RX ORDER — LOSARTAN POTASSIUM 50 MG/1
100 TABLET ORAL DAILY
Status: DISCONTINUED | OUTPATIENT
Start: 2024-04-16 | End: 2024-04-26 | Stop reason: HOSPADM

## 2024-04-15 RX ORDER — PROMETHAZINE HYDROCHLORIDE 25 MG/1
25 TABLET ORAL EVERY 8 HOURS PRN
Status: ON HOLD | COMMUNITY
Start: 2024-04-11 | End: 2024-05-26 | Stop reason: HOSPADM

## 2024-04-15 RX ORDER — IBUPROFEN 200 MG
24 TABLET ORAL
Status: DISCONTINUED | OUTPATIENT
Start: 2024-04-15 | End: 2024-04-26 | Stop reason: HOSPADM

## 2024-04-15 RX ORDER — CYCLOBENZAPRINE HCL 10 MG
10 TABLET ORAL 3 TIMES DAILY PRN
COMMUNITY
Start: 2024-04-11

## 2024-04-15 RX ORDER — ONDANSETRON 4 MG/1
4 TABLET, ORALLY DISINTEGRATING ORAL
Status: ON HOLD | COMMUNITY
Start: 2024-04-11 | End: 2024-05-26 | Stop reason: HOSPADM

## 2024-04-15 RX ORDER — CARVEDILOL 6.25 MG/1
6.25 TABLET ORAL 2 TIMES DAILY
Status: DISCONTINUED | OUTPATIENT
Start: 2024-04-15 | End: 2024-04-26 | Stop reason: HOSPADM

## 2024-04-15 RX ORDER — ASPIRIN 81 MG/1
81 TABLET ORAL DAILY
Status: DISCONTINUED | OUTPATIENT
Start: 2024-04-16 | End: 2024-04-21

## 2024-04-15 RX ORDER — ONDANSETRON 4 MG/1
4 TABLET, ORALLY DISINTEGRATING ORAL EVERY 8 HOURS PRN
Status: DISCONTINUED | OUTPATIENT
Start: 2024-04-15 | End: 2024-04-15

## 2024-04-15 RX ORDER — IBUPROFEN 800 MG/1
800 TABLET ORAL EVERY 8 HOURS PRN
Status: ON HOLD | COMMUNITY
Start: 2024-04-11 | End: 2024-05-26 | Stop reason: HOSPADM

## 2024-04-15 RX ORDER — IBUPROFEN 200 MG
16 TABLET ORAL
Status: DISCONTINUED | OUTPATIENT
Start: 2024-04-15 | End: 2024-04-26 | Stop reason: HOSPADM

## 2024-04-15 RX ORDER — OXYCODONE AND ACETAMINOPHEN 5; 325 MG/1; MG/1
1 TABLET ORAL
Status: COMPLETED | OUTPATIENT
Start: 2024-04-15 | End: 2024-04-15

## 2024-04-15 RX ORDER — SODIUM CHLORIDE 0.9 % (FLUSH) 0.9 %
10 SYRINGE (ML) INJECTION
Status: DISCONTINUED | OUTPATIENT
Start: 2024-04-15 | End: 2024-04-26 | Stop reason: HOSPADM

## 2024-04-15 RX ORDER — NALOXONE HCL 0.4 MG/ML
0.02 VIAL (ML) INJECTION
Status: DISCONTINUED | OUTPATIENT
Start: 2024-04-15 | End: 2024-04-26 | Stop reason: HOSPADM

## 2024-04-15 RX ORDER — ACETAMINOPHEN 325 MG/1
650 TABLET ORAL EVERY 4 HOURS PRN
Status: DISCONTINUED | OUTPATIENT
Start: 2024-04-15 | End: 2024-04-22

## 2024-04-15 RX ADMIN — MORPHINE SULFATE 3 MG: 4 INJECTION, SOLUTION INTRAMUSCULAR; INTRAVENOUS at 12:04

## 2024-04-15 RX ADMIN — SODIUM CHLORIDE 1000 ML: 9 INJECTION, SOLUTION INTRAVENOUS at 10:04

## 2024-04-15 RX ADMIN — CEFEPIME 2 G: 2 INJECTION, POWDER, FOR SOLUTION INTRAVENOUS at 02:04

## 2024-04-15 RX ADMIN — OXYCODONE 5 MG: 5 TABLET ORAL at 09:04

## 2024-04-15 RX ADMIN — IOHEXOL 75 ML: 350 INJECTION, SOLUTION INTRAVENOUS at 11:04

## 2024-04-15 RX ADMIN — HYDROMORPHONE HYDROCHLORIDE 1 MG: 1 INJECTION, SOLUTION INTRAMUSCULAR; INTRAVENOUS; SUBCUTANEOUS at 07:04

## 2024-04-15 RX ADMIN — CEFEPIME 2 G: 2 INJECTION, POWDER, FOR SOLUTION INTRAVENOUS at 10:04

## 2024-04-15 RX ADMIN — ACETAMINOPHEN 650 MG: 325 TABLET ORAL at 07:04

## 2024-04-15 RX ADMIN — POLYETHYLENE GLYCOL 3350 17 G: 17 POWDER, FOR SOLUTION ORAL at 03:04

## 2024-04-15 RX ADMIN — MORPHINE SULFATE 3 MG: 4 INJECTION, SOLUTION INTRAMUSCULAR; INTRAVENOUS at 01:04

## 2024-04-15 RX ADMIN — SENNOSIDES AND DOCUSATE SODIUM 1 TABLET: 8.6; 5 TABLET ORAL at 09:04

## 2024-04-15 RX ADMIN — CARVEDILOL 6.25 MG: 6.25 TABLET, FILM COATED ORAL at 09:04

## 2024-04-15 RX ADMIN — INSULIN DETEMIR 10 UNITS: 100 INJECTION, SOLUTION SUBCUTANEOUS at 09:04

## 2024-04-15 RX ADMIN — OXYCODONE HYDROCHLORIDE AND ACETAMINOPHEN 1 TABLET: 5; 325 TABLET ORAL at 08:04

## 2024-04-15 NOTE — H&P
[facility] Medicine  History & Physical    Patient Name: Shhaida Ortega  MRN: [id number]  Patient Class: IP- Inpatient  Admission Date: 4/15/2024  Attending Physician: Juan Cameron III, MD   Primary Care Provider: Janusz Staton MD         Patient information was obtained from patient and ER records.     Subjective:     Principal Problem:Sepsis    Chief Complaint:   Chief Complaint   Patient presents with    Arm Pain     Was given muscle relaxer for back on Friday but continues with right shoulder pain & reddness/heat to right shoulder. States she can't move the arm 2/2 pain.         HPI: 53F with pmh of dm and htn who presents due to 3 day h/o right shoulder pain with associated redness. Patient reports being seen here in the ED for her symptoms on 4/12/24 and was prescribed Valium with relief of back pain but not shoulder. Patient denies taking any medications today for relief. Patient denies any recent injuries or trauma. In ed pt started on abx and fluids for sepsis and ortho consulted who performed arthrocentesis of the should with fluids sent for culture and pending. Pt denies tobacco, alcohol or drug use. Pt denies previous ortho surgeries.     Past Medical History:   Diagnosis Date    DM2 (diabetes mellitus, type 2) 05/28/2023    Hypertension     Prolonged Q-T interval on ECG 05/31/2023    Qtc 525 5/31/23       Past Surgical History:   Procedure Laterality Date    ABDOMINAL SURGERY      COLONOSCOPY N/A 06/01/2023    Procedure: COLONOSCOPY;  Surgeon: Thaddeus Carlos MD;  Location: [facility] ENDO;  Service: Endoscopy;  Laterality: N/A;    DIAGNOSTIC LAPAROSCOPY N/A 05/26/2019    Procedure: LAPAROSCOPY, DIAGNOSTIC Drainage of abscess ;  Surgeon: Jose Luis Hanson MD;  Location: [facility] OR;  Service: General;  Laterality: N/A;  Drain Placement    DIAGNOSTIC LAPAROSCOPY N/A 12/27/2020    Procedure: Diagnostic laparoscopy, drainage of intra-abdominal abscess;  Surgeon: Bhupendra Banda MD;   Location: [facility] OR;  Service: General;  Laterality: N/A;    LAPAROSCOPIC LYSIS OF ADHESIONS  05/26/2019    Procedure: LYSIS, ADHESIONS, LAPAROSCOPIC;  Surgeon: Jose Luis Hanson MD;  Location: [facility] OR;  Service: General;;       Review of patient's allergies indicates:   Allergen Reactions    Penicillins Hives    Amoxicillin     Grass pollen-yun grass standard Other (See Comments)       Current Facility-Administered Medications   Medication Dose Route Frequency Provider Last Rate Last Admin    acetaminophen tablet 650 mg  650 mg Oral Q8H PRN Juan Cameron III, MD        acetaminophen tablet 650 mg  650 mg Oral Q4H PRN Juan Cameron III, MD        [START ON 4/16/2024] aspirin EC tablet 81 mg  81 mg Oral Daily Juan Cameron III, MD        carvediloL tablet 6.25 mg  6.25 mg Oral BID Juan Cameron III, MD        ceFEPIme (MAXIPIME) 2 g in dextrose 5 % in water (D5W) 100 mL IVPB (MB+)  2 g Intravenous Q8H Jamal Jameson PA-C        dextrose 10% bolus 125 mL 125 mL  12.5 g Intravenous PRN Juan Cameron III, MD        dextrose 10% bolus 250 mL 250 mL  25 g Intravenous PRN Juan Cameron III, MD        glucagon (human recombinant) injection 1 mg  1 mg Intramuscular PRN Juan Cameron III, MD        glucose chewable tablet 16 g  16 g Oral PRN Juan Cameron III, MD        glucose chewable tablet 24 g  24 g Oral PRN Juan Cameron III, MD        [START ON 4/16/2024] heparin (porcine) injection 5,000 Units  5,000 Units Subcutaneous Q8H Juan Cameron III, MD        HYDROmorphone injection 1 mg  1 mg Intravenous Q4H PRN Juan Cameron III, MD        insulin aspart U-100 pen 0-5 Units  0-5 Units Subcutaneous QID (AC + HS) PRN Juan Cameron III, MD        [START ON 4/16/2024] losartan tablet 100 mg  100 mg Oral Daily Juan Cameron III, MD        naloxone 0.4 mg/mL injection 0.02 mg  0.02 mg Intravenous PRN Juan Cameron III, MD        ondansetron disintegrating tablet 4 mg  4 mg Oral Q8H PRN  Juan Cameron III, MD        oxyCODONE immediate release tablet 5 mg  5 mg Oral Q6H PRN Juan Cameron III, MD        polyethylene glycol packet 17 g  17 g Oral Daily Juan Cameron III, MD        senna-docusate 8.6-50 mg per tablet 1 tablet  1 tablet Oral BID Juan Cameron III, MD        sodium chloride 0.9% flush 10 mL  10 mL Intravenous PRN Juan Cameron III, MD        vancomycin (VANCOCIN) 2,250 mg in dextrose 5 % (D5W) 500 mL IVPB  2,250 mg Intravenous Once Jamal Jameson PA-C        vancomycin - pharmacy to dose   Intravenous pharmacy to manage frequency Jamal Jameson PA-C         Current Outpatient Medications   Medication Sig Dispense Refill    acetaminophen (TYLENOL) 325 MG tablet Take 325 mg by mouth every 6 (six) hours as needed for Pain.      albuterol (PROVENTIL/VENTOLIN HFA) 90 mcg/actuation inhaler Inhale 2 puffs into the lungs every 6 (six) hours as needed.      aspirin (ECOTRIN) 81 MG EC tablet Take 1 tablet (81 mg total) by mouth once daily. 90 tablet 3    azelastine (ASTELIN) 137 mcg (0.1 %) nasal spray 1 spray by Nasal route.      blood-glucose meter kit To check BG 2 times daily, to use with insurance preferred meter 1 each 0    carvediloL (COREG) 3.125 MG tablet Take 2 tablets (6.25 mg total) by mouth 2 (two) times daily. 360 tablet 3    cetirizine (ZYRTEC) 10 MG tablet Take 1 tablet (10 mg total) by mouth once daily. 30 tablet 1    diazePAM (VALIUM) 2 MG tablet Take 1 tablet (2 mg total) by mouth every 6 (six) hours as needed (pain). 12 tablet 0    doxycycline (VIBRAMYCIN) 100 MG Cap Take 1 capsule (100 mg total) by mouth 2 (two) times daily. for 10 days 20 capsule 0    FREESTYLE HARSHA 14 DAY SENSOR Kit Change every 14 days for blood glucose monitoring.      hydroCHLOROthiazide (HYDRODIURIL) 25 MG tablet Take 1 tablet (25 mg total) by mouth once daily. 90 tablet 3    losartan (COZAAR) 100 MG tablet Take 1 tablet (100 mg total) by mouth once daily. 90 tablet 3    metFORMIN  (GLUCOPHAGE) 500 MG tablet Take 1 tablet (500 mg total) by mouth 2 (two) times daily with meals. 180 tablet 3     Family History       Problem Relation (Age of Onset)    Diabetes Father          Tobacco Use    Smoking status: Never    Smokeless tobacco: Never   Substance and Sexual Activity    Alcohol use: Not Currently    Drug use: Not Currently    Sexual activity: Not Currently     Review of Systems  Objective:     Vital Signs (Most Recent):  Temp: 98.5 °F (36.9 °C) (04/15/24 1151)  Pulse: 100 (04/15/24 1243)  Resp: 18 (04/15/24 1351)  BP: 133/76 (04/15/24 1243)  SpO2: 97 % (04/15/24 1243) Vital Signs (24h Range):  Temp:  [97.7 °F (36.5 °C)-98.5 °F (36.9 °C)] 98.5 °F (36.9 °C)  Pulse:  [100-108] 100  Resp:  [18] 18  SpO2:  [97 %-99 %] 97 %  BP: (133-175)/(74-83) 133/76     Weight: 90.7 kg (200 lb)  Body mass index is 31.32 kg/m².     Physical Exam  Vitals reviewed.   Constitutional:       General: She is not in acute distress.  HENT:      Head: Normocephalic and atraumatic.      Mouth/Throat:      Mouth: Mucous membranes are moist.      Pharynx: Oropharynx is clear.   Eyes:      General: No scleral icterus.     Extraocular Movements: Extraocular movements intact.   Cardiovascular:      Rate and Rhythm: Regular rhythm. Tachycardia present.   Pulmonary:      Effort: Pulmonary effort is normal. No respiratory distress.   Abdominal:      Palpations: Abdomen is soft.      Tenderness: There is no abdominal tenderness.   Musculoskeletal:      Cervical back: Neck supple. No rigidity.   Skin:     General: Skin is warm and dry.      Comments: See pictures   Neurological:      General: No focal deficit present.      Mental Status: She is alert and oriented to person, place, and time.   Psychiatric:         Mood and Affect: Mood normal.         Behavior: Behavior normal.                  Significant Labs: All pertinent labs within the past 24 hours have been reviewed.    Significant Imaging: I have reviewed all pertinent  "imaging results/findings within the past 24 hours.  Assessment/Plan:     * Sepsis  This patient does have evidence of infective focus  My overall impression is sepsis.  Source: Skin and Soft Tissue (location shoulder)  Antibiotics given-   Antibiotics (72h ago, onward)      Start     Stop Route Frequency Ordered    04/15/24 1445  ceFEPIme (MAXIPIME) 2 g in dextrose 5 % in water (D5W) 100 mL IVPB (MB+)         -- IV Every 8 hours (non-standard times) 04/15/24 1338    04/15/24 1445  vancomycin (VANCOCIN) 2,250 mg in dextrose 5 % (D5W) 500 mL IVPB         -- IV Once 04/15/24 1342    04/15/24 1437  vancomycin - pharmacy to dose  (vancomycin IVPB (PEDS and ADULTS))        Placed in "And" Linked Group    -- IV pharmacy to manage frequency 04/15/24 1338          Latest lactate reviewed-  Recent Labs   Lab 04/15/24  0946   LACTATE 1.1     Organ dysfunction indicated by  none    Fluid challenge Ideal Body Weight- The patient's ideal body weight is Ideal body weight: 61.6 kg (135 lb 12.9 oz) which will be used to calculate fluid bolus of 30 ml/kg for treatment of septic shock.      Post- resuscitation assessment Yes Perfusion exam was performed within 6 hours of septic shock presentation after bolus shows Adequate tissue perfusion assessed by non-invasive monitoring       Will Not start Pressors- Levophed for MAP of 65  Source control achieved by: abx ordered. BC and fluid cultures sent and pending    Pyogenic arthritis of shoulder region  Arthrocentesis performed in ED by ortho.  Culture sent and still pending we will follow up.      A-fib  Patient with Paroxysmal (<7 days) atrial fibrillation which is controlled currently with Beta Blocker. Patient is currently in sinus rhythm.[id number] Score: 2.  Anticoagulation:  Patient does not appear to be on home anticoagulation.  We will continue aspirin.    Prolonged Q-T interval on ECG  Noted history.  We will avoid prolonging agents as much as possible      DM2 (diabetes " mellitus, type 2)  Patient's FSGs are uncontrolled due to hyperglycemia on current medication regimen.  Last A1c reviewed-   Lab Results   Component Value Date    HGBA1C 11.3 (H) 03/16/2022     Most recent fingerstick glucose reviewed-   Recent Labs   Lab 04/15/24  1342   POCTGLUCOSE 207*     Current correctional scale  Low  Maintain anti-hyperglycemic dose as follows-   Antihyperglycemics (From admission, onward)      Start     Stop Route Frequency Ordered    04/15/24 1504  insulin aspart U-100 pen 0-5 Units         -- SubQ Before meals & nightly PRN 04/15/24 1404          Hold Oral hypoglycemics while patient is in the hospital.    Hypertension  Chronic, controlled. Latest blood pressure and vitals reviewed-     Temp:  [97.7 °F (36.5 °C)-98.5 °F (36.9 °C)]   Pulse:  [100-108]   Resp:  [18]   BP: (133-175)/(74-83)   SpO2:  [97 %-99 %] .   Home meds for hypertension were reviewed and noted below.   Hypertension Medications               carvediloL (COREG) 3.125 MG tablet Take 2 tablets (6.25 mg total) by mouth 2 (two) times daily.    hydroCHLOROthiazide (HYDRODIURIL) 25 MG tablet Take 1 tablet (25 mg total) by mouth once daily.    losartan (COZAAR) 100 MG tablet Take 1 tablet (100 mg total) by mouth once daily.            While in the hospital, will manage blood pressure as follows; Continue home antihypertensive regimen    Will utilize p.r.n. blood pressure medication only if patient's blood pressure greater than 180/110 and she develops symptoms such as worsening chest pain or shortness of breath.    Elevated procalcitonin  In setting of active infection.  Treatment as stated below        VTE Risk Mitigation (From admission, onward)           Ordered     heparin (porcine) injection 5,000 Units  Every 8 hours         04/15/24 1404     IP VTE HIGH RISK PATIENT  Once         04/15/24 1404     Place sequential compression device  Until discontinued         04/15/24 1404                                AdmissionCare    Guideline: Sepsis (and Other Febrile Illness without Focal Infection) - INPT, Inpatient    Based on the indications selected for the patient, the bed status of Inpatient was determined to be MET    The following indications were selected as present at the time of evaluation of the patient:      - Hemodynamic instability, as indicated by 1 or more of the following:    - Vital sign abnormality not readily corrected by appropriate treatment, as indicated by 1 or more of the following:     - Tachycardia that persists despite appropriate treatment (eg, volume repletion, treatment of pain, treatment of underlying cause)    AdmissionCare documentation entered by: Holli Chiu    [facility], 27th edition, Copyright © 2023 [facility] [place] [employer], [facility] All Rights Reserved.  [id number]:00    Juan Cameron III, MD  Department of Hospital Medicine  [facility] - Emergency Dept

## 2024-04-15 NOTE — SUBJECTIVE & OBJECTIVE
Past Medical History:   Diagnosis Date    DM2 (diabetes mellitus, type 2) 05/28/2023    Hypertension     Prolonged Q-T interval on ECG 05/31/2023    Qtc 525 5/31/23       Past Surgical History:   Procedure Laterality Date    ABDOMINAL SURGERY      COLONOSCOPY N/A 06/01/2023    Procedure: COLONOSCOPY;  Surgeon: Thaddeus Carlos MD;  Location: [facility] ENDO;  Service: Endoscopy;  Laterality: N/A;    DIAGNOSTIC LAPAROSCOPY N/A 05/26/2019    Procedure: LAPAROSCOPY, DIAGNOSTIC Drainage of abscess ;  Surgeon: Jose Luis Hanson MD;  Location: [facility] OR;  Service: General;  Laterality: N/A;  Drain Placement    DIAGNOSTIC LAPAROSCOPY N/A 12/27/2020    Procedure: Diagnostic laparoscopy, drainage of intra-abdominal abscess;  Surgeon: Bhupendra Banda MD;  Location: [facility] OR;  Service: General;  Laterality: N/A;    LAPAROSCOPIC LYSIS OF ADHESIONS  05/26/2019    Procedure: LYSIS, ADHESIONS, LAPAROSCOPIC;  Surgeon: Jose Luis Hanson MD;  Location: [facility] OR;  Service: General;;       Review of patient's allergies indicates:   Allergen Reactions    Penicillins Hives    Amoxicillin     Grass pollen-yun grass standard Other (See Comments)       Current Facility-Administered Medications   Medication Dose Route Frequency Provider Last Rate Last Admin    acetaminophen tablet 650 mg  650 mg Oral Q8H PRN uJan Cameron III, MD        acetaminophen tablet 650 mg  650 mg Oral Q4H PRN Juan Cameron III, MD        [START ON 4/16/2024] aspirin EC tablet 81 mg  81 mg Oral Daily Juan Cameron III, MD        carvediloL tablet 6.25 mg  6.25 mg Oral BID Juan Cameron III, MD        ceFEPIme (MAXIPIME) 2 g in dextrose 5 % in water (D5W) 100 mL IVPB (MB+)  2 g Intravenous Q8H Jamal Jameson PA-C        dextrose 10% bolus 125 mL 125 mL  12.5 g Intravenous PRN Juan Cameron III, MD        dextrose 10% bolus 250 mL 250 mL  25 g Intravenous PRN Juan Cameron III, MD        glucagon (human recombinant) injection 1 mg  1 mg Intramuscular PRN  Juan Cameron III, MD        glucose chewable tablet 16 g  16 g Oral PRN Juan Cameron III, MD        glucose chewable tablet 24 g  24 g Oral PRN Juan Cameron III, MD        [START ON 4/16/2024] heparin (porcine) injection 5,000 Units  5,000 Units Subcutaneous Q8H Juan Cameron III, MD        HYDROmorphone injection 1 mg  1 mg Intravenous Q4H PRN Juan Cameron III, MD        insulin aspart U-100 pen 0-5 Units  0-5 Units Subcutaneous QID (AC + HS) PRN Juan Camerno III, MD        [START ON 4/16/2024] losartan tablet 100 mg  100 mg Oral Daily Juan Cameron III, MD        naloxone 0.4 mg/mL injection 0.02 mg  0.02 mg Intravenous PRN Juan Cameron III, MD        ondansetron disintegrating tablet 4 mg  4 mg Oral Q8H PRN Juan Cameron III, MD        oxyCODONE immediate release tablet 5 mg  5 mg Oral Q6H PRN Juan Cameron III, MD        polyethylene glycol packet 17 g  17 g Oral Daily Juan Cameron III, MD        senna-docusate 8.6-50 mg per tablet 1 tablet  1 tablet Oral BID Juan Cameron III, MD        sodium chloride 0.9% flush 10 mL  10 mL Intravenous PRN Juan Cameron III, MD        vancomycin (VANCOCIN) 2,250 mg in dextrose 5 % (D5W) 500 mL IVPB  2,250 mg Intravenous Once Jamal Jameson PA-C        vancomycin - pharmacy to dose   Intravenous pharmacy to manage frequency Jamal Jameson PA-C         Current Outpatient Medications   Medication Sig Dispense Refill    acetaminophen (TYLENOL) 325 MG tablet Take 325 mg by mouth every 6 (six) hours as needed for Pain.      albuterol (PROVENTIL/VENTOLIN HFA) 90 mcg/actuation inhaler Inhale 2 puffs into the lungs every 6 (six) hours as needed.      aspirin (ECOTRIN) 81 MG EC tablet Take 1 tablet (81 mg total) by mouth once daily. 90 tablet 3    azelastine (ASTELIN) 137 mcg (0.1 %) nasal spray 1 spray by Nasal route.      blood-glucose meter kit To check BG 2 times daily, to use with insurance preferred meter 1 each 0    carvediloL  (COREG) 3.125 MG tablet Take 2 tablets (6.25 mg total) by mouth 2 (two) times daily. 360 tablet 3    cetirizine (ZYRTEC) 10 MG tablet Take 1 tablet (10 mg total) by mouth once daily. 30 tablet 1    diazePAM (VALIUM) 2 MG tablet Take 1 tablet (2 mg total) by mouth every 6 (six) hours as needed (pain). 12 tablet 0    doxycycline (VIBRAMYCIN) 100 MG Cap Take 1 capsule (100 mg total) by mouth 2 (two) times daily. for 10 days 20 capsule 0    FREESTYLE HARSHA 14 DAY SENSOR Kit Change every 14 days for blood glucose monitoring.      hydroCHLOROthiazide (HYDRODIURIL) 25 MG tablet Take 1 tablet (25 mg total) by mouth once daily. 90 tablet 3    losartan (COZAAR) 100 MG tablet Take 1 tablet (100 mg total) by mouth once daily. 90 tablet 3    metFORMIN (GLUCOPHAGE) 500 MG tablet Take 1 tablet (500 mg total) by mouth 2 (two) times daily with meals. 180 tablet 3     Family History       Problem Relation (Age of Onset)    Diabetes Father          Tobacco Use    Smoking status: Never    Smokeless tobacco: Never   Substance and Sexual Activity    Alcohol use: Not Currently    Drug use: Not Currently    Sexual activity: Not Currently     Review of Systems  Objective:     Vital Signs (Most Recent):  Temp: 98.5 °F (36.9 °C) (04/15/24 1151)  Pulse: 100 (04/15/24 1243)  Resp: 18 (04/15/24 1351)  BP: 133/76 (04/15/24 1243)  SpO2: 97 % (04/15/24 1243) Vital Signs (24h Range):  Temp:  [97.7 °F (36.5 °C)-98.5 °F (36.9 °C)] 98.5 °F (36.9 °C)  Pulse:  [100-108] 100  Resp:  [18] 18  SpO2:  [97 %-99 %] 97 %  BP: (133-175)/(74-83) 133/76     Weight: 90.7 kg (200 lb)  Body mass index is 31.32 kg/m².     Physical Exam  Vitals reviewed.   Constitutional:       General: She is not in acute distress.  HENT:      Head: Normocephalic and atraumatic.      Mouth/Throat:      Mouth: Mucous membranes are moist.      Pharynx: Oropharynx is clear.   Eyes:      General: No scleral icterus.     Extraocular Movements: Extraocular movements intact.    Cardiovascular:      Rate and Rhythm: Regular rhythm. Tachycardia present.   Pulmonary:      Effort: Pulmonary effort is normal. No respiratory distress.   Abdominal:      Palpations: Abdomen is soft.      Tenderness: There is no abdominal tenderness.   Musculoskeletal:      Cervical back: Neck supple. No rigidity.   Skin:     General: Skin is warm and dry.      Comments: See pictures   Neurological:      General: No focal deficit present.      Mental Status: She is alert and oriented to person, place, and time.   Psychiatric:         Mood and Affect: Mood normal.         Behavior: Behavior normal.                  Significant Labs: All pertinent labs within the past 24 hours have been reviewed.    Significant Imaging: I have reviewed all pertinent imaging results/findings within the past 24 hours.

## 2024-04-15 NOTE — PHARMACY MED REC
"Admission Medication History     The home medication history was taken by Judy Suazo CPhT.    You may go to "Admission" then "Reconcile Home Medications" tabs to review and/or act upon these items.     The home medication list has been updated by the Pharmacy department.   Please read ALL comments highlighted in yellow.   Please address this information as you see fit.    Feel free to contact us if you have any questions or require assistance.      The medications listed below were removed from the home medication list. Please reorder if appropriate:  Patient reports no longer taking the following medication(s):  Tylenol 325 mg tab  Freestyle Daisy 14 day sensor kit      Medications listed below were obtained from: Patient/family and Analytic software- [employer]ite  Current Facility-Administered Medications   Medication Dose Route Frequency Provider Last Rate Last Admin    acetaminophen tablet 650 mg  650 mg Oral Q8H PRN Juan Cameron III, MD        acetaminophen tablet 650 mg  650 mg Oral Q4H PRN Juan Cameron III, MD        [START ON 4/16/2024] aspirin EC tablet 81 mg  81 mg Oral Daily Juan Cameron III, MD        carvediloL tablet 6.25 mg  6.25 mg Oral BID Juan Cameron III, MD        ceFEPIme (MAXIPIME) 2 g in dextrose 5 % in water (D5W) 100 mL IVPB (MB+)  2 g Intravenous Q8H Jamal Jameson PA-C 200 mL/hr at 04/15/24 1411 2 g at 04/15/24 1411    dextrose 10% bolus 125 mL 125 mL  12.5 g Intravenous PRN Juan Cameron III, MD        dextrose 10% bolus 250 mL 250 mL  25 g Intravenous PRN Juan Cameron III, MD        glucagon (human recombinant) injection 1 mg  1 mg Intramuscular PRN Juan Cameron III, MD        glucose chewable tablet 16 g  16 g Oral PRN Juan Cameron III, MD        glucose chewable tablet 24 g  24 g Oral PRN Juan Cameron III, MD        [START ON 4/16/2024] heparin (porcine) injection 5,000 Units  5,000 Units Subcutaneous Q8H Juan Cameron III, MD        HYDROmorphone " injection 1 mg  1 mg Intravenous Q4H PRN Juan Cameron III, MD        insulin aspart U-100 pen 0-5 Units  0-5 Units Subcutaneous QID (AC + HS) PRN Juan Cameron III, MD        insulin detemir U-100 (Levemir) pen 10 Units  10 Units Subcutaneous QHS Juan Cameron III, MD        [START ON 4/16/2024] losartan tablet 100 mg  100 mg Oral Daily Juan Cameron III, MD        naloxone 0.4 mg/mL injection 0.02 mg  0.02 mg Intravenous PRN Juan Cameron III, MD        oxyCODONE immediate release tablet 5 mg  5 mg Oral Q6H PRN Juan Cameron III, MD        polyethylene glycol packet 17 g  17 g Oral Daily Juan Cameron III, MD        senna-docusate 8.6-50 mg per tablet 1 tablet  1 tablet Oral BID Juan Cameron III, MD        sodium chloride 0.9% flush 10 mL  10 mL Intravenous PRN Juan Cameron III, MD        vancomycin (VANCOCIN) 2,250 mg in dextrose 5 % (D5W) 500 mL IVPB  2,250 mg Intravenous Once Jamal Jameson PA-C        vancomycin - pharmacy to dose   Intravenous pharmacy to manage frequency Jamal Jameson PA-C         Current Outpatient Medications   Medication Sig Dispense Refill    aspirin (ECOTRIN) 81 MG EC tablet Take 1 tablet (81 mg total) by mouth once daily. 90 tablet 3    azelastine (ASTELIN) 137 mcg (0.1 %) nasal spray 1 spray by Nasal route 2 (two) times daily.      cetirizine (ZYRTEC) 10 MG tablet Take 1 tablet (10 mg total) by mouth once daily. 30 tablet 1    cyclobenzaprine (FLEXERIL) 10 MG tablet Take 10 mg by mouth 3 (three) times daily as needed.      diazePAM (VALIUM) 2 MG tablet Take 1 tablet (2 mg total) by mouth every 6 (six) hours as needed (pain). 12 tablet 0    doxycycline (VIBRAMYCIN) 100 MG Cap Take 1 capsule (100 mg total) by mouth 2 (two) times daily. for 10 days 20 capsule 0    hydroCHLOROthiazide (HYDRODIURIL) 25 MG tablet Take 1 tablet (25 mg total) by mouth once daily. 90 tablet 3    ibuprofen (ADVIL,MOTRIN) 800 MG tablet Take 800 mg by mouth every 8 (eight) hours  as needed.      losartan (COZAAR) 100 MG tablet Take 1 tablet (100 mg total) by mouth once daily. 90 tablet 3    metFORMIN (GLUCOPHAGE) 500 MG tablet Take 1 tablet (500 mg total) by mouth 2 (two) times daily with meals. 180 tablet 3    albuterol (PROVENTIL/VENTOLIN HFA) 90 mcg/actuation inhaler Inhale 2 puffs into the lungs every 6 (six) hours as needed.      blood-glucose meter kit To check BG 2 times daily, to use with insurance preferred meter 1 each 0    carvediloL (COREG) 3.125 MG tablet Take 2 tablets (6.25 mg total) by mouth 2 (two) times daily. (Patient not taking: Reported on 4/15/2024) 360 tablet 3    FREESTYLE HARSHA 14 DAY SENSOR Kit Change every 14 days for blood glucose monitoring. (Patient not taking: Reported on 4/15/2024)      ondansetron (ZOFRAN-ODT) 4 MG TbDL Take 4 mg by mouth.      promethazine (PHENERGAN) 25 MG tablet Take 25 mg by mouth every 8 (eight) hours as needed.         Potential issues to be addressed PRIOR TO DISCHARGE  Patient reported not taking the following medications: (carvedilol (Coreg) 3.125 mg tab). These medications remain on the home medication list. Please address accordingly.           Judy Suazo [facility].  [phone]                .

## 2024-04-15 NOTE — ASSESSMENT & PLAN NOTE
Patient's FSGs are uncontrolled due to hyperglycemia on current medication regimen.  Last A1c reviewed-   Lab Results   Component Value Date    HGBA1C 11.3 (H) 03/16/2022     Most recent fingerstick glucose reviewed-   Recent Labs   Lab 04/15/24  1342   POCTGLUCOSE 207*     Current correctional scale  Low  Maintain anti-hyperglycemic dose as follows-   Antihyperglycemics (From admission, onward)      Start     Stop Route Frequency Ordered    04/15/24 1504  insulin aspart U-100 pen 0-5 Units         -- SubQ Before meals & nightly PRN 04/15/24 1404          Hold Oral hypoglycemics while patient is in the hospital.

## 2024-04-15 NOTE — ED PROVIDER NOTES
Encounter Date: 4/15/2024    SCRIBE #1 NOTE: I, Rosa Mart, am scribing for, and in the presence of,  Jamal Jameson PA-C. I have scribed the following portions of the note - Other sections scribed: HPI, ROS, PE, MDM.       History     Chief Complaint   Patient presents with    Arm Pain     Was given muscle relaxer for back on Friday but continues with right shoulder pain & reddness/heat to right shoulder. States she can't move the arm 2/2 pain.      CC: shoulder pain    HPI: This is a 53 y.o.female patient, with a PMHx of DM and HTN, presenting to the ED for further evaluation of right shoulder pain beginning 3 days ago while at work. Patient reports being seen here in the ED for her symptoms on 4/12/24 and was prescribed Valium with relief of back pain but not shoulder. Patient denies taking any medications today for relief. Patient denies any recent injuries or trauma. Patient denies performing any strenuous activity such as lifting overhead. Patient denies any shortness of breath, chest pain, or any other associated symptoms. No alleviating factors.      The history is provided by the patient. No  was used.     Review of patient's allergies indicates:   Allergen Reactions    Penicillins Hives     Tolerated cephalosporins 4/2024    Amoxicillin     Grass pollen-june grass standard Other (See Comments)     Past Medical History:   Diagnosis Date    Anemia 4/22/2024    DM2 (diabetes mellitus, type 2) 05/28/2023    Hypertension     Prolonged Q-T interval on ECG 05/31/2023    Qtc 525 5/31/23     Past Surgical History:   Procedure Laterality Date    ABDOMINAL SURGERY      ARTHROSCOPIC DEBRIDEMENT OF SHOULDER Right 4/24/2024    Procedure: DEBRIDEMENT, SHOULDER, ARTHROSCOPIC; [place], beach chair, 9L NS;  Surgeon: Madi Bailey MD;  Location: [facility] OR [address] [place] [address];  Service: Orthopedics;  Laterality: Right;    ARTHROSCOPIC DEBRIDEMENT OF SHOULDER Right 4/25/2024    Procedure: DEBRIDEMENT,  SHOULDER, ARTHROSCOPIC;  Surgeon: Thaddeus Corral MD;  [facility];  Service: Orthopedics;  Laterality: Right;    COLONOSCOPY N/A 06/01/2023    Procedure: COLONOSCOPY;  Surgeon: Thaddeus Carlos MD;  Location: [facility] ENDO;  Service: Endoscopy;  Laterality: N/A;    DIAGNOSTIC LAPAROSCOPY N/A 05/26/2019    Procedure: LAPAROSCOPY, DIAGNOSTIC Drainage of abscess ;  Surgeon: Jose Luis Hanson MD;  Location: [facility] OR;  Service: General;  Laterality: N/A;  Drain Placement    DIAGNOSTIC LAPAROSCOPY N/A 12/27/2020    Procedure: Diagnostic laparoscopy, drainage of intra-abdominal abscess;  Surgeon: Bhupendra Banda MD;  Location: [facility] OR;  Service: General;  Laterality: N/A;    LAPAROSCOPIC LYSIS OF ADHESIONS  05/26/2019    Procedure: LYSIS, ADHESIONS, LAPAROSCOPIC;  Surgeon: Jose Luis Hanson MD;  Location: [facility] OR;  Service: General;;    VATS, WITH CHEST TUBE INSERTION FOR DRAINAGE OF PLEURAL EFFUSION Right 4/23/2024    Procedure: VATS, WITH CHEST TUBE INSERTION FOR DRAINAGE OF PLEURAL EFFUSION;  Surgeon: Rakesh Blake MD;  Location: [address];  Service: Cardiothoracic;  Laterality: Right;     Family History   Problem Relation Name Age of Onset    Diabetes Father       Social History     Tobacco Use    Smoking status: Never    Smokeless tobacco: Never   Substance Use Topics    Alcohol use: Not Currently    Drug use: Not Currently     Review of Systems   Constitutional:  Negative for chills, diaphoresis, fatigue and fever.   HENT:  Negative for congestion, rhinorrhea and sore throat.    Eyes:  Negative for redness and visual disturbance.   Respiratory:  Negative for cough and shortness of breath.    Cardiovascular:  Negative for chest pain, palpitations and leg swelling.   Gastrointestinal:  Negative for abdominal pain, diarrhea, nausea and vomiting.   Genitourinary:  Negative for difficulty urinating, dysuria, flank pain and frequency.   Musculoskeletal:  Positive for arthralgias (right shoulder  pain). Negative for back pain, myalgias, neck pain and neck stiffness.   Skin:  Negative for rash.   Neurological:  Negative for dizziness, weakness, light-headedness and headaches.   Hematological:  Does not bruise/bleed easily.       Physical Exam     Initial Vitals [04/15/24 0726]   BP Pulse Resp Temp SpO2   (!) 155/74 108 18 97.7 °F (36.5 °C) 99 %      MAP       --         Physical Exam    Nursing note and vitals reviewed.  Constitutional: She appears well-developed and well-nourished. She is not diaphoretic. No distress.   HENT:   Head: Normocephalic and atraumatic.   Right Ear: External ear normal.   Left Ear: External ear normal.   Nose: Nose normal.   Mouth/Throat: Oropharynx is clear and moist.   Eyes: Conjunctivae and EOM are normal. Pupils are equal, round, and reactive to light. Right eye exhibits no discharge. Left eye exhibits no discharge.   Neck: Neck supple.   Normal range of motion.   Full passive range of motion without pain.     Cardiovascular:  Normal rate, regular rhythm, normal heart sounds and normal pulses.     Exam reveals no distant heart sounds and no friction rub.       Pulmonary/Chest: Effort normal and breath sounds normal. No respiratory distress.   Abdominal: Abdomen is soft. Bowel sounds are normal. She exhibits no distension, no pulsatile midline mass and no mass. There is no splenomegaly or hepatomegaly. There is no abdominal tenderness.   No right CVA tenderness.  No left CVA tenderness. There is no rebound and no guarding.   Musculoskeletal:      Right shoulder: Tenderness present. No swelling, deformity, effusion, laceration, bony tenderness or crepitus. Decreased range of motion (2/2 pain). Normal strength. Normal pulse.      Left shoulder: Normal.      Right upper arm: Normal.      Left upper arm: Normal.      Right elbow: Normal.      Left elbow: Normal.      Right forearm: Normal.      Left forearm: Normal.      Right wrist: Normal.      Left wrist: Normal.      Right hand:  Normal.      Left hand: Normal.      Cervical back: Normal, full passive range of motion without pain, normal range of motion and neck supple.      Thoracic back: Normal.      Lumbar back: Normal.      Right lower leg: Normal.      Left lower leg: Normal.      Comments: Tenderness to palpation of the right shoulder. There is limited ROM to 45 degrees abduction active ROM with about 90 degrees passive ROM.      Neurological: She is alert and oriented to person, place, and time. She has normal strength. No cranial nerve deficit or sensory deficit. Gait normal.   Skin: Skin is warm and dry. Capillary refill takes less than 2 seconds. No bruising, no ecchymosis and no rash noted. There is erythema. No pallor.   Area of erythema present to right shoulder, increased warmth to touch   Psychiatric: She has a normal mood and affect. Her speech is normal and behavior is normal. Thought content normal.               ED Course   Critical Care    Date/Time: 5/22/2024 7:56 PM    Performed by: Jamal Jameson PA-C  Authorized by: Og Mcneal MD  Direct patient critical care time: 5 minutes  Additional history critical care time: 5 minutes  Ordering / reviewing critical care time: 5 minutes  Documentation critical care time: 5 minutes  Consulting other physicians critical care time: 5 minutes  Consult with family critical care time: 5 minutes  Other critical care time: 5 minutes  Total critical care time (exclusive of procedural time) : 35 minutes  Critical care was necessary to treat or prevent imminent or life-threatening deterioration of the following conditions: sepsis.  Critical care was time spent personally by me on the following activities: development of treatment plan with patient or surrogate, discussions with consultants, evaluation of patient's response to treatment, interpretation of cardiac output measurements, examination of patient, obtaining history from patient or surrogate, ordering and performing  treatments and interventions, ordering and review of laboratory studies, ordering and review of radiographic studies, re-evaluation of patient's condition and review of old charts.        Labs Reviewed   CBC W/ AUTO DIFFERENTIAL - Abnormal; Notable for the following components:       Result Value    WBC 15.41 (*)     Hemoglobin 11.8 (*)     MCHC 31.9 (*)     Immature Granulocytes 4.9 (*)     Gran # (ANC) 11.7 (*)     Immature Grans (Abs) 0.75 (*)     Mono # 1.2 (*)     Gran % 75.8 (*)     Lymph % 10.9 (*)     All other components within normal limits   COMPREHENSIVE METABOLIC PANEL - Abnormal; Notable for the following components:    Sodium 135 (*)     Glucose 327 (*)     Albumin 2.7 (*)     All other components within normal limits   SEDIMENTATION RATE - Abnormal; Notable for the following components:    Sed Rate 112 (*)     All other components within normal limits   C-REACTIVE PROTEIN - Abnormal; Notable for the following components:    .3 (*)     All other components within normal limits   PROCALCITONIN - Abnormal; Notable for the following components:    Procalcitonin 0.80 (*)     All other components within normal limits   URINALYSIS, REFLEX TO URINE CULTURE - Abnormal; Notable for the following components:    Specific Gravity, UA >1.030 (*)     Protein, UA Trace (*)     Glucose, UA 4+ (*)     Ketones, UA 3+ (*)     Occult Blood UA 1+ (*)     Leukocytes, UA 2+ (*)     All other components within normal limits    Narrative:     Specimen Source->Urine   URINALYSIS MICROSCOPIC - Abnormal; Notable for the following components:    RBC, UA 10 (*)     WBC, UA 10 (*)     Yeast, UA Few (*)     All other components within normal limits    Narrative:     Specimen Source->Urine   BETA - HYDROXYBUTYRATE, SERUM - Abnormal; Notable for the following components:    Beta-Hydroxybutyrate 4.4 (*)     All other components within normal limits   ISTAT PROCEDURE - Abnormal; Notable for the following components:    POC PH  7.341 (*)     POC PCO2 51.5 (*)     POC PO2 13 (*)     All other components within normal limits   POCT GLUCOSE - Abnormal; Notable for the following components:    POCT Glucose 207 (*)     All other components within normal limits   POCT GLUCOSE - Abnormal; Notable for the following components:    POCT Glucose 283 (*)     All other components within normal limits   LACTIC ACID, PLASMA          Imaging Results              CT Shoulder With Contrast Right (Final result)  Result time 04/15/24 12:39:15      Final result by Franklin Patricia MD (04/15/24 12:39:15)                   Impression:      Soft tissue edema in the muscles and subcutaneous fat surrounding the right shoulder with probable small joint effusion.  Findings could be related to cellulitis and/or myositis in this patient with elevated CRP.  No focal osseous erosion.  Consider correlation with arthrocentesis if there is concern for septic joint.    Nonspecific incompletely imaged soft tissue edema and paraspinal soft tissue fullness in the posterior mediastinum along the lower thoracic spine at roughly T8-T10.  Unclear if these findings are exaggerated by wall thickening of the esophagus.  Suggest correlation with symptoms and consider further evaluation with spine MRI if the patient is experiencing significant back pain or concern for discitis/osteomyelitis.    Other findings discussed in the body of the report.      Electronically signed by: Franklin Patricia MD  Date:    04/15/2024  Time:    12:39               Narrative:    EXAMINATION:  CT SHOULDER WITH CONTRAST RIGHT    CLINICAL HISTORY:  Septic arthritis suspected, shoulder, xray done;    TECHNIQUE:  CT images of the right shoulder obtained after the administration of 75 mL Omnipaque 350 IV contrast.  Axial, coronal, and sagittal reconstructions were created from the source data.    COMPARISON:  CTA chest, 05/28/2023.  Right shoulder radiograph, 04/15/2024.    FINDINGS:  Soft tissue edema  throughout the muscles and subcutaneous fat of the right shoulder including the trapezius, supraspinatus, deltoid, and pectoralis major muscles.  Probable small joint effusion and fluid in the subdeltoid bursa.  No rim enhancing organized fluid collection in the right shoulder soft tissues.  The imaged vessels in the right shoulder are grossly patent.  There are small collateral veins in the right upper extremity.    No acute fracture or dislocation.  Mild degenerative changes in the glenohumeral and acromioclavicular joint.  Stable small lucency in the right proximal humerus when compared with prior CTA chest.  No convincing focal bony erosion.    The visualized right lung is unremarkable.  Right internal jugular vein is patent.  No bulky axillary lymphadenopathy.  No central pulmonary embolus in the right pulmonary artery.    Incompletely imaged possible soft tissue edema in the posterior mediastinum at the level of the lower thoracic spine at the level of T8-T10.  Unclear if this is exaggerated by esophageal wall thickening, though this finding is likely worse when compared with the prior CTA chest.  Suggest correlation with symptoms.                                       X-ray Shoulder 2 or More Views Right (Final result)  Result time 04/15/24 08:08:31      Final result by Steven Valderrama MD (04/15/24 08:08:31)                   Impression:      As above.      Electronically signed by: Steven Valderrama  Date:    04/15/2024  Time:    08:08               Narrative:    EXAMINATION:  XR SHOULDER COMPLETE 2 OR MORE VIEWS RIGHT    CLINICAL HISTORY:  shoulder pain, cellulitis;    TECHNIQUE:  Two or three views of the right shoulder were performed.    COMPARISON:  None    FINDINGS:  Right glenohumeral and AC joint articulations appear intact without acute fracture, dislocation, or osseous destruction.                                       Medications   oxyCODONE-acetaminophen 5-325 mg per tablet 1 tablet (1 tablet  Oral Given 4/15/24 0819)   sodium chloride 0.9% bolus 1,000 mL 1,000 mL (0 mLs Intravenous Stopped 4/15/24 1133)   morphine injection 3 mg (3 mg Intravenous Given 4/15/24 1231)   iohexoL (OMNIPAQUE 350) injection 75 mL (75 mLs Intravenous Given 4/15/24 1139)   vancomycin (VANCOCIN) 2,250 mg in dextrose 5 % (D5W) 500 mL IVPB (0 mg Intravenous Stopped 4/15/24 1737)   morphine injection 3 mg (3 mg Intravenous Given 4/15/24 1351)   sodium zirconium cyclosilicate packet 5 g (5 g Oral Given 4/16/24 1600)   HYDROmorphone injection 1 mg (1 mg Intravenous Given 4/18/24 1646)   ketorolac injection 15 mg (15 mg Intravenous Given 4/17/24 2028)   sodium chloride 0.9% bolus 500 mL 500 mL (0 mLs Intravenous Stopped 4/17/24 2153)   potassium bicarbonate disintegrating tablet 25 mEq (25 mEq Oral Given 4/18/24 0841)   gadobutroL (GADAVIST) injection 9 mL (9 mLs Intravenous Given 4/18/24 1852)   HYDROmorphone injection 1 mg (1 mg Intravenous Given 4/19/24 2010)   gadobutroL (GADAVIST) injection 9 mL (9 mLs Intravenous Given 4/19/24 2139)     Medical Decision Making  This is a 53 y.o.female patient, with a PMHx of DM and HTN, presenting to the ED for further evaluation of right shoulder pain beginning 3 days ago while at work. Patient reports being seen here in the ED for her symptoms on 4/12/24 and was prescribed Valium with relief of back pain but not shoulder. Patient denies taking any medications today for relief. Patient denies any recent injuries or trauma. Patient denies performing any strenuous activity such as lifting overhead. Patient denies any shortness of breath, chest pain, or any other associated symptoms. No alleviating factors.  Patient's chart and medical history reviewed.  Patient's vitals reviewed.  They are afebrile, no respiratory distress, nontoxic-appearing in the ED.  Differential diagnosis is considered the following.  - Septic Arthritis, Gout, Osteomyelitis, cellulitis: considered with pain, overlying  erythema without swelling/edema, patient is afebrile. CBC/CMP ESR/CRP ordered for further evaluation along with imaging. No evidence of effusion on exam or imaging. After CBC resulted patient WBC elevated at 15 correlating with HR and cellulitis will order blood cultures for further evaluation.  - Fracture/Dislocation: considered with pain, imaging ordered for further evaluation  - Contusion/Sprain/Strain: considered with pain with ROM   - Compartment Syndrome: unlikely with 2+ distal pulses, no pallor, no paresthesias, no woody induration.   - DKA: considered with fatigue and elevated CBG. Corrected sodium calculated and anion gap of 18-19, will provide IVF and evaluate further with BHB and VBG.   With exam findings as above likely overlying cellulitis of the right shoulder, labs noted in ED course. Consulted Ortho and spoke with Dr. Stapleton who agrees with concern for possible septic joint, states to obtain CT shoulder for further evaluation of possible effusion for potential aspiration prior to antibiotic administration. Shoulder was aspirated and culture sent.  Currently controlling patient pain with morphine.   Discussed with hospital medicine for admission to observation for IV antibiotics, IV fluids and pain control.  Patient will be admitted under the care of Dr. Juan Cameron.          Amount and/or Complexity of Data Reviewed  Labs: ordered. Decision-making details documented in ED Course.  Radiology: ordered.    Risk  Prescription drug management.  Decision regarding hospitalization.            Scribe Attestation:   Scribe #1: I performed the above scribed service and the documentation accurately describes the services I performed. I attest to the accuracy of the note.    Attending Attestation:   Physician Attestation Statement for Resident:  As the supervising MD   Physician Attestation Statement: I have personally seen and examined this patient.   I agree with the above history.  -:   As the supervising  MD I agree with the above PE.     As the supervising MD I agree with the above treatment, course, plan, and disposition.   -: Patient presents with redness and warmth overlying the right shoulder.  Decreased range of motion secondary to pain.  Patient has leukocytosis.  Has significant elevated CRP and sed rate.  Pro count also elevated.  CT imaging suggest myositis and small joint effusion.  Orthopedics Dr. Stapleton came to evaluate.  He put a needle into the shoulder.  He got 0.5 cc of bloody fluid.  This was sent for culture.  Not enough volume for cell count.  Recommends admission for IV antibiotics.  Patient is diabetic and has high cholesterol.  Will bring into hospital medicine with orthopedic consultation.  I was personally present during the critical portions of the procedure(s) performed by the resident and was immediately available in the ED to provide services and assistance as needed during the entire procedure.  I have reviewed and agree with the residents interpretation of the following: lab data, x-rays and CT scans.  I have reviewed the following: old records at this facility.                ED Course as of 05/22/24 1957   Mon Apr 15, 2024   1036 CRP(!): 356.3 [AF]   1037 Sed Rate(!): 112 [AF]   1037 Procalcitonin(!): 0.80 [AF]   1104 Sodium Correction for Hyperglycemia from [email or web address]  on 4/15/2024  ** All calculations should be rechecked by clinician prior to use **    RESULT SUMMARY:  139 mEq/L  Corrected Sodium  (Mina, 1973)    140 mEq/L  Corrected Sodium  (Edgar, 1999)      INPUTS:  Sodium -> 135 mEq/L  Glucose -> 327 mg/dL   [AF]   1110 Corrected anion gap  [AF]   1124 With corrected sodium.    Anion Gap from [email or web address]  on 4/15/2024  ** All calculations should be rechecked by clinician prior to use **    RESULT SUMMARY:  18.0 mEq/L  Anion gap    Delta gap: 6.0 mEq/L    Delta ratio: 6.0; suggests high anion gap acidosis with concurrent metabolic alkalosis (or compensated respiratory  acidosis)    21.3 mEq/L  Albumin corrected anion gap; suggests high anion gap acidosis    Albumin corrected delta gap: 9.3 mEq/L    Albumin corrected delta ratio: 9.3      INPUTS:  Sodium -> 139 mEq/L  Chloride -> 98 mEq/L  Bicarbonate -> 23 mEq/L  Albumin -> 2.7 g/dL   [AF]   1128 Spoke with [facility] recommended obtaining CT shoulder for evaluation of effusion for possible aspiration prior to antibiotics. [AF]   1251 Spoke with Dr. Stapleton again after CT resulted and he will come evaluate patient in the ED [AF]      ED Course User Index  [AF] Jamal Jameson PA-C                             Clinical Impression:  Final diagnoses:  [A41.9] Sepsis  [M60.9] Myositis of right shoulder, unspecified myositis type (Primary)          ED Disposition Condition    Admit              IJamal PA-C, personally performed the services described in this documentation. All medical record entries made by the scribe were at my direction and in my presence. I have reviewed the chart and agree that the record reflects my personal performance and is accurate and complete.       Mark Zaman MD  04/15/24 6868       Jamal Jameson PA-C  05/22/24 4887

## 2024-04-15 NOTE — ED TRIAGE NOTES
Pt reports right arm pain that started Friday while at work. Denies injury/ trauma to right arm. Limited ROM due to pain and soreness. Pain rated 9/10. Reports being seen in ED previously for same problems with medications prescribed with no relief.

## 2024-04-15 NOTE — PROGRESS NOTES
"Pharmacokinetic Initial Assessment: IV Vancomycin    Assessment/Plan:    Initiate intravenous vancomycin with loading dose of 2250 mg once followed by a maintenance dose of vancomycin 1500mg IV every 12 hours  Desired empiric serum trough concentration is 10 to 20 mcg/mL  Draw vancomycin trough level 60 min prior to fourth dose on 4/17 at approximately 0200  Pharmacy will continue to follow and monitor vancomycin.      Please contact pharmacy at extension [phone] with any questions regarding this assessment.     Thank you for the consult,   Ita Delgado       Patient brief summary:  Shahida Ortega is a 53 y.o. female initiated on antimicrobial therapy with IV Vancomycin for treatment of suspected bone/joint infection    Drug Allergies:   Review of patient's allergies indicates:   Allergen Reactions    Penicillins Hives    Amoxicillin     Grass pollen-june grass standard Other (See Comments)       Actual Body Weight:   90.7 kg    Renal Function:   Estimated Creatinine Clearance: 96.1 mL/min (based on SCr of 0.8 mg/dL).,     Dialysis Method (if applicable):  N/A    CBC (last 72 hours):  Recent Labs   Lab Result Units 04/15/24  0839   WBC K/uL 15.41*   Hemoglobin g/dL 11.8*   Hematocrit % 37.0   Platelets K/uL 210   Gran % % 75.8*   Lymph % % 10.9*   Mono % % 7.5   Eosinophil % % 0.3   Basophil % % 0.6   Differential Method  Automated       Metabolic Panel (last 72 hours):  Recent Labs   Lab Result Units 04/12/24  1633 04/15/24  0839 04/15/24  1027   Sodium mmol/L  --  135*  --    Potassium mmol/L  --  4.1  --    Chloride mmol/L  --  98  --    CO2 mmol/L  --  23  --    Glucose mg/dL  --  327*  --    Glucose, UA  4+*  --  4+*   BUN mg/dL  --  13  --    Creatinine mg/dL  --  0.8  --    Albumin g/dL  --  2.7*  --    Total Bilirubin mg/dL  --  0.4  --    Alkaline Phosphatase U/L  --  129  --    AST U/L  --  14  --    ALT U/L  --  16  --        Drug levels (last 3 results):  No results for input(s): "VANCOMYCINRA", " ""VANCORANDOM", "VANCOMYCINPE", "VANCOPEAK", "VANCOMYCINTR", "VANCOTROUGH" in the last 72 hours.    Microbiologic Results:  Microbiology Results (last 7 days)       Procedure Component Value Units Date/Time    Aerobic culture (Specify Source) **CANNOT BE ORDERED AS STAT** [[id number]] Collected: 04/15/24 1346    Order Status: Sent Specimen: Shoulder, Right Updated: 04/15/24 1452    Blood Culture #2 **CANNOT BE ORDERED STAT** [[id number]] Collected: 04/15/24 0946    Order Status: Sent Specimen: Blood Updated: 04/15/24 0951    Blood Culture #1 **CANNOT BE ORDERED STAT** [[id number]] Collected: 04/15/24 0928    Order Status: Sent Specimen: Blood from Peripheral, Antecubital, Left Updated: 04/15/24 0939            "

## 2024-04-15 NOTE — ADMISSIONCARE
AdmissionCare    Guideline: Sepsis (and Other Febrile Illness without Focal Infection) - INPT, Inpatient    Based on the indications selected for the patient, the bed status of Inpatient was determined to be MET    The following indications were selected as present at the time of evaluation of the patient:      - Hemodynamic instability, as indicated by 1 or more of the following:    - Vital sign abnormality not readily corrected by appropriate treatment, as indicated by 1 or more of the following:     - Tachycardia that persists despite appropriate treatment (eg, volume repletion, treatment of pain, treatment of underlying cause)    AdmissionCare documentation entered by: Holli Chiu    [facility], 27th edition, Copyright © 2023 [facility], [facility] All Rights Reserved.  [id number]

## 2024-04-15 NOTE — ASSESSMENT & PLAN NOTE
Chronic, controlled. Latest blood pressure and vitals reviewed-     Temp:  [97.7 °F (36.5 °C)-98.5 °F (36.9 °C)]   Pulse:  [100-108]   Resp:  [18]   BP: (133-175)/(74-83)   SpO2:  [97 %-99 %] .   Home meds for hypertension were reviewed and noted below.   Hypertension Medications               carvediloL (COREG) 3.125 MG tablet Take 2 tablets (6.25 mg total) by mouth 2 (two) times daily.    hydroCHLOROthiazide (HYDRODIURIL) 25 MG tablet Take 1 tablet (25 mg total) by mouth once daily.    losartan (COZAAR) 100 MG tablet Take 1 tablet (100 mg total) by mouth once daily.            While in the hospital, will manage blood pressure as follows; Continue home antihypertensive regimen    Will utilize p.r.n. blood pressure medication only if patient's blood pressure greater than 180/110 and she develops symptoms such as worsening chest pain or shortness of breath.

## 2024-04-15 NOTE — ASSESSMENT & PLAN NOTE
"This patient does have evidence of infective focus  My overall impression is sepsis.  Source: Skin and Soft Tissue (location shoulder)  Antibiotics given-   Antibiotics (72h ago, onward)      Start     Stop Route Frequency Ordered    04/15/24 1445  ceFEPIme (MAXIPIME) 2 g in dextrose 5 % in water (D5W) 100 mL IVPB (MB+)         -- IV Every 8 hours (non-standard times) 04/15/24 1338    04/15/24 1445  vancomycin (VANCOCIN) 2,250 mg in dextrose 5 % (D5W) 500 mL IVPB         -- IV Once 04/15/24 1342    04/15/24 1437  vancomycin - pharmacy to dose  (vancomycin IVPB (PEDS and ADULTS))        Placed in "And" Linked Group    -- IV pharmacy to manage frequency 04/15/24 1338          Latest lactate reviewed-  Recent Labs   Lab 04/15/24  0946   LACTATE 1.1     Organ dysfunction indicated by  none    Fluid challenge Ideal Body Weight- The patient's ideal body weight is Ideal body weight: 61.6 kg (135 lb 12.9 oz) which will be used to calculate fluid bolus of 30 ml/kg for treatment of septic shock.      Post- resuscitation assessment Yes Perfusion exam was performed within 6 hours of septic shock presentation after bolus shows Adequate tissue perfusion assessed by non-invasive monitoring       Will Not start Pressors- Levophed for MAP of 65  Source control achieved by: abx ordered. BC and fluid cultures sent and pending  "

## 2024-04-15 NOTE — HPI
53F with pmh of dm and htn who presents due to 3 day h/o right shoulder pain with associated redness. Patient reports being seen here in the ED for her symptoms on 4/12/24 and was prescribed Valium with relief of back pain but not shoulder. Patient denies taking any medications today for relief. Patient denies any recent injuries or trauma. In ed pt started on abx and fluids for sepsis and ortho consulted who performed arthrocentesis of the should with fluids sent for culture and pending. Pt denies tobacco, alcohol or drug use. Pt denies previous ortho surgerie

## 2024-04-15 NOTE — ASSESSMENT & PLAN NOTE
Patient with Paroxysmal (<7 days) atrial fibrillation which is controlled currently with Beta Blocker. Patient is currently in sinus rhythm.[id number] Score: 2.  Anticoagulation:  Patient does not appear to be on home anticoagulation.  We will continue aspirin.

## 2024-04-15 NOTE — CONSULTS
Ortho Consult    Chief Complaint   Patient presents with    Arm Pain     Was given muscle relaxer for back on Friday but continues with right shoulder pain & reddness/heat to right shoulder. States she can't move the arm 2/2 pain.        History of present illness:    53-year-old female presents with right shoulder pain.  She reports she had pain in her back on Friday.  She denies any history of injury or trauma.  She started having pain in the shoulder on the lateral aspect.  She is some erythema present.  She presented to the emergency room and  Has had leukocytosis.  I was consulted for right shoulder.    Past Medical History:   Diagnosis Date    DM2 (diabetes mellitus, type 2) 05/28/2023    Hypertension     Prolonged Q-T interval on ECG 05/31/2023    Qtc 525 5/31/23       Past Surgical History:   Procedure Laterality Date    ABDOMINAL SURGERY      COLONOSCOPY N/A 06/01/2023    Procedure: COLONOSCOPY;  Surgeon: Thaddeus Carlos MD;  Location: [facility] ENDO;  Service: Endoscopy;  Laterality: N/A;    DIAGNOSTIC LAPAROSCOPY N/A 05/26/2019    Procedure: LAPAROSCOPY, DIAGNOSTIC Drainage of abscess ;  Surgeon: Jose Luis Hanson MD;  Location: [facility] OR;  Service: General;  Laterality: N/A;  Drain Placement    DIAGNOSTIC LAPAROSCOPY N/A 12/27/2020    Procedure: Diagnostic laparoscopy, drainage of intra-abdominal abscess;  Surgeon: Bhupendra Banda MD;  Location: [facility] OR;  Service: General;  Laterality: N/A;    LAPAROSCOPIC LYSIS OF ADHESIONS  05/26/2019    Procedure: LYSIS, ADHESIONS, LAPAROSCOPIC;  Surgeon: Jose Luis Hanson MD;  Location: [facility] OR;  Service: General;;       Review of patient's allergies indicates:   Allergen Reactions    Penicillins Hives    Amoxicillin     Grass pollen-june grass standard Other (See Comments)       Prior to Admission medications    Medication Sig Start Date End Date Taking? Authorizing Provider   aspirin (ECOTRIN) 81 MG EC tablet Take 1 tablet (81 mg total) by mouth once daily. 6/1/23  5/31/24 Yes Tyrese Wang MD   azelastine (ASTELIN) 137 mcg (0.1 %) nasal spray 1 spray by Nasal route 2 (two) times daily. 10/25/23  Yes Provider, Historical   cetirizine (ZYRTEC) 10 MG tablet Take 1 tablet (10 mg total) by mouth once daily. 12/27/23 12/26/24 Yes Ghassan Louis NP   cyclobenzaprine (FLEXERIL) 10 MG tablet Take 10 mg by mouth 3 (three) times daily as needed. 4/11/24  Yes Provider, Historical   diazePAM (VALIUM) 2 MG tablet Take 1 tablet (2 mg total) by mouth every 6 (six) hours as needed (pain). 4/12/24 4/15/24 Yes Bisi Clarke MD   doxycycline (VIBRAMYCIN) 100 MG Cap Take 1 capsule (100 mg total) by mouth 2 (two) times daily. for 10 days 4/12/24 4/22/24 Yes Bisi Clarke MD   hydroCHLOROthiazide (HYDRODIURIL) 25 MG tablet Take 1 tablet (25 mg total) by mouth once daily. 6/1/23 5/31/24 Yes Tyrese Wang MD   ibuprofen (ADVIL,MOTRIN) 800 MG tablet Take 800 mg by mouth every 8 (eight) hours as needed. 4/11/24  Yes Provider, Historical   losartan (COZAAR) 100 MG tablet Take 1 tablet (100 mg total) by mouth once daily. 6/1/23 5/31/24 Yes Tyrese Wang MD   metFORMIN (GLUCOPHAGE) 500 MG tablet Take 1 tablet (500 mg total) by mouth 2 (two) times daily with meals. 6/1/23 5/31/24 Yes Tyrese Wang MD   albuterol (PROVENTIL/VENTOLIN HFA) 90 mcg/actuation inhaler Inhale 2 puffs into the lungs every 6 (six) hours as needed.    Provider, Historical   blood-glucose meter kit To check BG 2 times daily, to use with insurance preferred meter 4/18/22 4/18/23  Natali Sorenson, MARIA GUADALUPE   carvediloL (COREG) 3.125 MG tablet Take 2 tablets (6.25 mg total) by mouth 2 (two) times daily.  Patient not taking: Reported on 4/15/2024 6/1/23 5/31/24  Tyrese Wang MD   FREESTYLE HARSHA 14 DAY SENSOR Kit Change every 14 days for blood glucose monitoring.  Patient not taking: Reported on 4/15/2024 11/13/23   Provider, Historical   ondansetron (ZOFRAN-ODT) 4 MG TbDL Take 4 mg by mouth. 4/11/24   Provider, Historical  "  promethazine (PHENERGAN) 25 MG tablet Take 25 mg by mouth every 8 (eight) hours as needed. 4/11/24   Provider, Historical   acetaminophen (TYLENOL) 325 MG tablet Take 325 mg by mouth every 6 (six) hours as needed for Pain.  4/15/24  Provider, Historical       Social History     Occupational History    Not on file   Tobacco Use    Smoking status: Never    Smokeless tobacco: Never   Substance and Sexual Activity    Alcohol use: Not Currently    Drug use: Not Currently    Sexual activity: Not Currently       Temp:  [97.7 °F (36.5 °C)-99 °F (37.2 °C)] 99 °F (37.2 °C)  Pulse:  [100-112] 112  Resp:  [18] 18  SpO2:  [97 %-99 %] 98 %  BP: (133-175)/(72-83) 159/72  Weight: 90.7 kg (200 lb)    Physical examination:      Alert female sitting in a chair.  She complains of pain in her right shoulder. New line left shoulder reveals no pain with range of motion elevation or abduction.  There is no erythema or tenderness.      Right shoulder reveals tenderness to palpation anterior laterally.  There has an erythematous area lateral to the acromion.  There is no drainage.  There is mild warmth present.  Elevation actively is to 80°.  Passively we can elevate her to 120°.  There is no instability noted.  External rotation is to 50°.  There is no effusion palpable.  There is no fluid palpable in the subdeltoid bursa.  Testing of her rotator cuff reveals some discomfort on attempts at testing.      Recent Labs   Lab 04/15/24  0839   WBC 15.41*   RBC 4.14   HGB 11.8*   HCT 37.0      MCV 89   MCH 28.5   MCHC 31.9*     Recent Labs   Lab 04/15/24  0839   CALCIUM 10.1   ALBUMIN 2.7*   PROT 8.3   *   K 4.1   CO2 23   CL 98   BUN 13   CREATININE 0.8   ALKPHOS 129   ALT 16   AST 14   BILITOT 0.4     No results for input(s): "PT", "INR", "APTT" in the last 24 hours.    Imaging Results              CT Shoulder With Contrast Right (Final result)  Result time 04/15/24 12:39:15      Final result by Franklin Patricia MD (04/15/24 " 12:39:15)                   Impression:      Soft tissue edema in the muscles and subcutaneous fat surrounding the right shoulder with probable small joint effusion.  Findings could be related to cellulitis and/or myositis in this patient with elevated CRP.  No focal osseous erosion.  Consider correlation with arthrocentesis if there is concern for septic joint.    Nonspecific incompletely imaged soft tissue edema and paraspinal soft tissue fullness in the posterior mediastinum along the lower thoracic spine at roughly T8-T10.  Unclear if these findings are exaggerated by wall thickening of the esophagus.  Suggest correlation with symptoms and consider further evaluation with spine MRI if the patient is experiencing significant back pain or concern for discitis/osteomyelitis.    Other findings discussed in the body of the report.      Electronically signed by: Franklin Patricia MD  Date:    04/15/2024  Time:    12:39               Narrative:    EXAMINATION:  CT SHOULDER WITH CONTRAST RIGHT    CLINICAL HISTORY:  Septic arthritis suspected, shoulder, xray done;    TECHNIQUE:  CT images of the right shoulder obtained after the administration of 75 mL Omnipaque 350 IV contrast.  Axial, coronal, and sagittal reconstructions were created from the source data.    COMPARISON:  CTA chest, 05/28/2023.  Right shoulder radiograph, 04/15/2024.    FINDINGS:  Soft tissue edema throughout the muscles and subcutaneous fat of the right shoulder including the trapezius, supraspinatus, deltoid, and pectoralis major muscles.  Probable small joint effusion and fluid in the subdeltoid bursa.  No rim enhancing organized fluid collection in the right shoulder soft tissues.  The imaged vessels in the right shoulder are grossly patent.  There are small collateral veins in the right upper extremity.    No acute fracture or dislocation.  Mild degenerative changes in the glenohumeral and acromioclavicular joint.  Stable small lucency in the right  proximal humerus when compared with prior CTA chest.  No convincing focal bony erosion.    The visualized right lung is unremarkable.  Right internal jugular vein is patent.  No bulky axillary lymphadenopathy.  No central pulmonary embolus in the right pulmonary artery.    Incompletely imaged possible soft tissue edema in the posterior mediastinum at the level of the lower thoracic spine at the level of T8-T10.  Unclear if this is exaggerated by esophageal wall thickening, though this finding is likely worse when compared with the prior CTA chest.  Suggest correlation with symptoms.                                       X-ray Shoulder 2 or More Views Right (Final result)  Result time 04/15/24 08:08:31      Final result by Steven Valderrama MD (04/15/24 08:08:31)                   Impression:      As above.      Electronically signed by: Steven Valderrama  Date:    04/15/2024  Time:    08:08               Narrative:    EXAMINATION:  XR SHOULDER COMPLETE 2 OR MORE VIEWS RIGHT    CLINICAL HISTORY:  shoulder pain, cellulitis;    TECHNIQUE:  Two or three views of the right shoulder were performed.    COMPARISON:  None    FINDINGS:  Right glenohumeral and AC joint articulations appear intact without acute fracture, dislocation, or osseous destruction.                                        Current Facility-Administered Medications:     acetaminophen tablet 650 mg, 650 mg, Oral, Q8H PRN, Juan Cameron III, MD    acetaminophen tablet 650 mg, 650 mg, Oral, Q4H PRN, Juan Cameron III, MD    [START ON 4/16/2024] aspirin EC tablet 81 mg, 81 mg, Oral, Daily, Juan Cameron III, MD    carvediloL tablet 6.25 mg, 6.25 mg, Oral, BID, Juan Cameron III, MD    ceFEPIme (MAXIPIME) 2 g in dextrose 5 % in water (D5W) 100 mL IVPB (MB+), 2 g, Intravenous, Q8H, Jamal Jameson PA-C, Stopped at 04/15/24 1441    dextrose 10% bolus 125 mL 125 mL, 12.5 g, Intravenous, PRN, Juan Cameron III, MD    dextrose 10% bolus 250 mL 250  mL, 25 g, Intravenous, PRN, Juan Cameron III, MD    glucagon (human recombinant) injection 1 mg, 1 mg, Intramuscular, PRN, Juan Cameron III, MD    glucose chewable tablet 16 g, 16 g, Oral, PRN, Juan Cameron III, MD    glucose chewable tablet 24 g, 24 g, Oral, PRN, Juan Cameron III, MD    [START ON 4/16/2024] heparin (porcine) injection 5,000 Units, 5,000 Units, Subcutaneous, Q8H, Juan Cameron III, MD    HYDROmorphone injection 1 mg, 1 mg, Intravenous, Q4H PRN, Juan Cameron III, MD    insulin aspart U-100 pen 0-5 Units, 0-5 Units, Subcutaneous, QID (AC + HS) PRN, Juan Cameron III, MD    insulin detemir U-100 (Levemir) pen 10 Units, 10 Units, Subcutaneous, QHS, Juan Cameron III, MD    [START ON 4/16/2024] losartan tablet 100 mg, 100 mg, Oral, Daily, Juan Cameron III, MD    naloxone 0.4 mg/mL injection 0.02 mg, 0.02 mg, Intravenous, PRN, Juan Cameron III, MD    oxyCODONE immediate release tablet 5 mg, 5 mg, Oral, Q6H PRN, Juan Cameron III, MD    polyethylene glycol packet 17 g, 17 g, Oral, Daily, Juan Cameron III, MD, 17 g at 04/15/24 1525    senna-docusate 8.6-50 mg per tablet 1 tablet, 1 tablet, Oral, BID, Juan Cameron III, MD    sodium chloride 0.9% flush 10 mL, 10 mL, Intravenous, PRN, Juan Cameron III, MD    Pharmacy to dose Vancomycin consult, , , Once **AND** vancomycin - pharmacy to dose, , Intravenous, pharmacy to manage frequency, Jamal Jameson PA-C    [START ON 4/16/2024] vancomycin 1,500 mg in dextrose 5 % (D5W) 250 mL IVPB (Vial-Mate), 1,500 mg, Intravenous, Q12H, Juan Cameron III, MD    Current Outpatient Medications:     aspirin (ECOTRIN) 81 MG EC tablet, Take 1 tablet (81 mg total) by mouth once daily., Disp: 90 tablet, Rfl: 3    azelastine (ASTELIN) 137 mcg (0.1 %) nasal spray, 1 spray by Nasal route 2 (two) times daily., Disp: , Rfl:     cetirizine (ZYRTEC) 10 MG tablet, Take 1 tablet (10 mg total) by mouth once daily., Disp: 30 tablet,  Rfl: 1    cyclobenzaprine (FLEXERIL) 10 MG tablet, Take 10 mg by mouth 3 (three) times daily as needed., Disp: , Rfl:     diazePAM (VALIUM) 2 MG tablet, Take 1 tablet (2 mg total) by mouth every 6 (six) hours as needed (pain)., Disp: 12 tablet, Rfl: 0    doxycycline (VIBRAMYCIN) 100 MG Cap, Take 1 capsule (100 mg total) by mouth 2 (two) times daily. for 10 days, Disp: 20 capsule, Rfl: 0    hydroCHLOROthiazide (HYDRODIURIL) 25 MG tablet, Take 1 tablet (25 mg total) by mouth once daily., Disp: 90 tablet, Rfl: 3    ibuprofen (ADVIL,MOTRIN) 800 MG tablet, Take 800 mg by mouth every 8 (eight) hours as needed., Disp: , Rfl:     losartan (COZAAR) 100 MG tablet, Take 1 tablet (100 mg total) by mouth once daily., Disp: 90 tablet, Rfl: 3    metFORMIN (GLUCOPHAGE) 500 MG tablet, Take 1 tablet (500 mg total) by mouth 2 (two) times daily with meals., Disp: 180 tablet, Rfl: 3    albuterol (PROVENTIL/VENTOLIN HFA) 90 mcg/actuation inhaler, Inhale 2 puffs into the lungs every 6 (six) hours as needed., Disp: , Rfl:     blood-glucose meter kit, To check BG 2 times daily, to use with insurance preferred meter, Disp: 1 each, Rfl: 0    carvediloL (COREG) 3.125 MG tablet, Take 2 tablets (6.25 mg total) by mouth 2 (two) times daily. (Patient not taking: Reported on 4/15/2024), Disp: 360 tablet, Rfl: 3    FREESTYLE HARSHA 14 DAY SENSOR Kit, Change every 14 days for blood glucose monitoring. (Patient not taking: Reported on 4/15/2024), Disp: , Rfl:     ondansetron (ZOFRAN-ODT) 4 MG TbDL, Take 4 mg by mouth., Disp: , Rfl:     promethazine (PHENERGAN) 25 MG tablet, Take 25 mg by mouth every 8 (eight) hours as needed., Disp: , Rfl:     Assessment and Plan:      53-year-old female with  history of diabetes presents with atraumatic right shoulder pain and erythema and leukocytosis.  CT scan was performed of the right shoulder which reveal a small effusion.    Under sterile prep I aspirated the right shoulder joint which was a dry tap.   Subacromial bursa was aspirated and revealed a small bit of bloody fluid.  No purulence was seen.  There was not enough fluid to set up for cell count and thus it was just cultured.    Cellulitis right shoulder lateral aspect.  Does not seem like there is joint involvement but will await cultures. On IV antibiotics.      Samira Whipple MD  [facility]  [phone]  This note has been transcribed with voice recognition software, but not reviewed and may contain unrecognized errors.

## 2024-04-16 PROBLEM — R78.81 BACTEREMIA: Status: ACTIVE | Noted: 2024-04-16

## 2024-04-16 LAB
ALBUMIN SERPL BCP-MCNC: 2.2 G/DL (ref 3.5–5.2)
ALP SERPL-CCNC: 110 U/L (ref 55–135)
ALT SERPL W/O P-5'-P-CCNC: 11 U/L (ref 10–44)
ANION GAP SERPL CALC-SCNC: 15 MMOL/L (ref 8–16)
AST SERPL-CCNC: 14 U/L (ref 10–40)
BASOPHILS # BLD AUTO: 0.09 K/UL (ref 0–0.2)
BASOPHILS NFR BLD: 0.5 % (ref 0–1.9)
BILIRUB SERPL-MCNC: 0.4 MG/DL (ref 0.1–1)
BUN SERPL-MCNC: 8 MG/DL (ref 6–20)
CALCIUM SERPL-MCNC: 9.4 MG/DL (ref 8.7–10.5)
CHLORIDE SERPL-SCNC: 104 MMOL/L (ref 95–110)
CO2 SERPL-SCNC: 14 MMOL/L (ref 23–29)
CREAT SERPL-MCNC: 0.7 MG/DL (ref 0.5–1.4)
DIFFERENTIAL METHOD BLD: ABNORMAL
EOSINOPHIL # BLD AUTO: 0 K/UL (ref 0–0.5)
EOSINOPHIL NFR BLD: 0.2 % (ref 0–8)
ERYTHROCYTE [DISTWIDTH] IN BLOOD BY AUTOMATED COUNT: 13.2 % (ref 11.5–14.5)
EST. GFR  (NO RACE VARIABLE): >60 ML/MIN/1.73 M^2
GLUCOSE SERPL-MCNC: 243 MG/DL (ref 70–110)
HCT VFR BLD AUTO: 36.4 % (ref 37–48.5)
HGB BLD-MCNC: 11.6 G/DL (ref 12–16)
IMM GRANULOCYTES # BLD AUTO: 0.69 K/UL (ref 0–0.04)
IMM GRANULOCYTES NFR BLD AUTO: 4.1 % (ref 0–0.5)
LYMPHOCYTES # BLD AUTO: 1.7 K/UL (ref 1–4.8)
LYMPHOCYTES NFR BLD: 10.2 % (ref 18–48)
MAGNESIUM SERPL-MCNC: 1.8 MG/DL (ref 1.6–2.6)
MCH RBC QN AUTO: 28.2 PG (ref 27–31)
MCHC RBC AUTO-ENTMCNC: 31.9 G/DL (ref 32–36)
MCV RBC AUTO: 89 FL (ref 82–98)
MONOCYTES # BLD AUTO: 1.2 K/UL (ref 0.3–1)
MONOCYTES NFR BLD: 7.2 % (ref 4–15)
NEUTROPHILS # BLD AUTO: 13.2 K/UL (ref 1.8–7.7)
NEUTROPHILS NFR BLD: 77.8 % (ref 38–73)
NRBC BLD-RTO: 0 /100 WBC
PHOSPHATE SERPL-MCNC: 3.2 MG/DL (ref 2.7–4.5)
PLATELET # BLD AUTO: 164 K/UL (ref 150–450)
PLATELET BLD QL SMEAR: ABNORMAL
PMV BLD AUTO: 11.2 FL (ref 9.2–12.9)
POCT GLUCOSE: 221 MG/DL (ref 70–110)
POCT GLUCOSE: 236 MG/DL (ref 70–110)
POCT GLUCOSE: 280 MG/DL (ref 70–110)
POTASSIUM SERPL-SCNC: 5.2 MMOL/L (ref 3.5–5.1)
PROT SERPL-MCNC: 7.5 G/DL (ref 6–8.4)
RBC # BLD AUTO: 4.11 M/UL (ref 4–5.4)
SODIUM SERPL-SCNC: 133 MMOL/L (ref 136–145)
WBC # BLD AUTO: 16.94 K/UL (ref 3.9–12.7)

## 2024-04-16 PROCEDURE — 63600175 PHARM REV CODE 636 W HCPCS

## 2024-04-16 PROCEDURE — 25000003 PHARM REV CODE 250: Performed by: STUDENT IN AN ORGANIZED HEALTH CARE EDUCATION/TRAINING PROGRAM

## 2024-04-16 PROCEDURE — 11000001 HC ACUTE MED/SURG PRIVATE ROOM

## 2024-04-16 PROCEDURE — 36415 COLL VENOUS BLD VENIPUNCTURE: CPT | Performed by: STUDENT IN AN ORGANIZED HEALTH CARE EDUCATION/TRAINING PROGRAM

## 2024-04-16 PROCEDURE — 84100 ASSAY OF PHOSPHORUS: CPT | Performed by: STUDENT IN AN ORGANIZED HEALTH CARE EDUCATION/TRAINING PROGRAM

## 2024-04-16 PROCEDURE — 63600175 PHARM REV CODE 636 W HCPCS: Performed by: STUDENT IN AN ORGANIZED HEALTH CARE EDUCATION/TRAINING PROGRAM

## 2024-04-16 PROCEDURE — 85025 COMPLETE CBC W/AUTO DIFF WBC: CPT | Performed by: STUDENT IN AN ORGANIZED HEALTH CARE EDUCATION/TRAINING PROGRAM

## 2024-04-16 PROCEDURE — 25000003 PHARM REV CODE 250

## 2024-04-16 PROCEDURE — 83735 ASSAY OF MAGNESIUM: CPT | Performed by: STUDENT IN AN ORGANIZED HEALTH CARE EDUCATION/TRAINING PROGRAM

## 2024-04-16 PROCEDURE — 80053 COMPREHEN METABOLIC PANEL: CPT | Performed by: STUDENT IN AN ORGANIZED HEALTH CARE EDUCATION/TRAINING PROGRAM

## 2024-04-16 RX ADMIN — CEFEPIME 2 G: 2 INJECTION, POWDER, FOR SOLUTION INTRAVENOUS at 06:04

## 2024-04-16 RX ADMIN — VANCOMYCIN HYDROCHLORIDE 1500 MG: 1.5 INJECTION, POWDER, LYOPHILIZED, FOR SOLUTION INTRAVENOUS at 02:04

## 2024-04-16 RX ADMIN — OXYCODONE 5 MG: 5 TABLET ORAL at 08:04

## 2024-04-16 RX ADMIN — HEPARIN SODIUM 5000 UNITS: 5000 INJECTION INTRAVENOUS; SUBCUTANEOUS at 02:04

## 2024-04-16 RX ADMIN — HYDROMORPHONE HYDROCHLORIDE 1 MG: 1 INJECTION, SOLUTION INTRAMUSCULAR; INTRAVENOUS; SUBCUTANEOUS at 12:04

## 2024-04-16 RX ADMIN — CARVEDILOL 6.25 MG: 6.25 TABLET, FILM COATED ORAL at 09:04

## 2024-04-16 RX ADMIN — CEFEPIME 2 G: 2 INJECTION, POWDER, FOR SOLUTION INTRAVENOUS at 02:04

## 2024-04-16 RX ADMIN — HYDROMORPHONE HYDROCHLORIDE 1 MG: 1 INJECTION, SOLUTION INTRAMUSCULAR; INTRAVENOUS; SUBCUTANEOUS at 06:04

## 2024-04-16 RX ADMIN — HEPARIN SODIUM 5000 UNITS: 5000 INJECTION INTRAVENOUS; SUBCUTANEOUS at 05:04

## 2024-04-16 RX ADMIN — OXYCODONE 5 MG: 5 TABLET ORAL at 05:04

## 2024-04-16 RX ADMIN — SODIUM ZIRCONIUM CYCLOSILICATE 5 G: 5 POWDER, FOR SUSPENSION ORAL at 04:04

## 2024-04-16 RX ADMIN — VANCOMYCIN HYDROCHLORIDE 1500 MG: 1.5 INJECTION, POWDER, LYOPHILIZED, FOR SOLUTION INTRAVENOUS at 03:04

## 2024-04-16 RX ADMIN — ACETAMINOPHEN 650 MG: 325 TABLET ORAL at 08:04

## 2024-04-16 RX ADMIN — INSULIN ASPART 2 UNITS: 100 INJECTION, SOLUTION INTRAVENOUS; SUBCUTANEOUS at 12:04

## 2024-04-16 RX ADMIN — LOSARTAN POTASSIUM 100 MG: 50 TABLET, FILM COATED ORAL at 09:04

## 2024-04-16 RX ADMIN — INSULIN ASPART 2 UNITS: 100 INJECTION, SOLUTION INTRAVENOUS; SUBCUTANEOUS at 09:04

## 2024-04-16 RX ADMIN — ASPIRIN 81 MG: 81 TABLET, COATED ORAL at 09:04

## 2024-04-16 NOTE — ASSESSMENT & PLAN NOTE
Chronic, controlled. Latest blood pressure and vitals reviewed-     Temp:  [98 °F (36.7 °C)-101.1 °F (38.4 °C)]   Pulse:  []   Resp:  [16-20]   BP: (133-173)/(76-92)   SpO2:  [97 %-99 %] .   Home meds for hypertension were reviewed and noted below.   Hypertension Medications               carvediloL (COREG) 3.125 MG tablet Take 2 tablets (6.25 mg total) by mouth 2 (two) times daily.    hydroCHLOROthiazide (HYDRODIURIL) 25 MG tablet Take 1 tablet (25 mg total) by mouth once daily.    losartan (COZAAR) 100 MG tablet Take 1 tablet (100 mg total) by mouth once daily.            While in the hospital, will manage blood pressure as follows; Continue home antihypertensive regimen    Will utilize p.r.n. blood pressure medication only if patient's blood pressure greater than 180/110 and she develops symptoms such as worsening chest pain or shortness of breath.

## 2024-04-16 NOTE — SUBJECTIVE & OBJECTIVE
Interval History:  Patient reports right shoulder pain and redness is improving.  Continues to have persistent low back pain.  No fever.  No abdominal pain/nausea/vomiting/dysuria.    Review of Systems   Constitutional: Negative.    Respiratory: Negative.     Cardiovascular: Negative.    Gastrointestinal: Negative.    Musculoskeletal:  Positive for arthralgias, back pain and joint swelling (Right shoulder).   Neurological: Negative.    Psychiatric/Behavioral: Negative.       Objective:     Vital Signs (Most Recent):  Temp: 100.3 °F (37.9 °C) (04/16/24 1624)  Pulse: 100 (04/16/24 1624)  Resp: 18 (04/16/24 1624)  BP: (!) 173/88 (04/16/24 1624)  SpO2: 97 % (04/16/24 1624) Vital Signs (24h Range):  Temp:  [98 °F (36.7 °C)-101.1 °F (38.4 °C)] 100.3 °F (37.9 °C)  Pulse:  [] 100  Resp:  [16-20] 18  SpO2:  [97 %-99 %] 97 %  BP: (133-173)/(76-92) 173/88     Weight: 92.5 kg (203 lb 14.8 oz)  Body mass index is 31.94 kg/m².    Intake/Output Summary (Last 24 hours) at 4/16/2024 1651  Last data filed at 4/16/2024 1015  Gross per 24 hour   Intake 120 ml   Output 600 ml   Net -480 ml         Physical Exam  Constitutional:       General: She is not in acute distress.     Appearance: She is normal weight.   Cardiovascular:      Rate and Rhythm: Normal rate.      Pulses: Normal pulses.   Pulmonary:      Effort: No respiratory distress.      Breath sounds: Normal breath sounds. No wheezing.   Abdominal:      General: Bowel sounds are normal. There is no distension.      Palpations: Abdomen is soft.      Tenderness: There is no abdominal tenderness.   Musculoskeletal:         General: Tenderness (Right shoulder and lumbar spinal and paraspinal area) present.      Right lower leg: No edema.      Left lower leg: No edema.   Skin:     General: Skin is warm.   Neurological:      Mental Status: She is alert and oriented to person, place, and time. Mental status is at baseline.   Psychiatric:         Mood and Affect: Mood normal.              Significant Labs: All pertinent labs within the past 24 hours have been reviewed.    Significant Imaging: I have reviewed all pertinent imaging results/findings within the past 24 hours.

## 2024-04-16 NOTE — ASSESSMENT & PLAN NOTE
Patient with Paroxysmal (<7 days) atrial fibrillation which is controlled currently with Beta Blocker. Patient is currently in sinus rhythm.[id number] Score: 3  Anticoagulation:  Patient does not appear to be on home anticoagulation.  We will continue aspirin.      Paroxysmal AFib. During episode of sepsis in 2023. Not on anticoagulation because it was a brief episode per cardiology. Rec to look for recurrence with the help of event monitor. If a recurrence noted on event monitor, consider adding anticoagulation

## 2024-04-16 NOTE — HOSPITAL COURSE
Ms. Ortega was admitted to [facility] for sepsis 2/2 atraumatic right shoulder pain and erythema.  CT right shoulder showed a small effusion.  Ortho was consulted.  Aspiration of the right shoulder 4/15 was dry.  A small amount of blood was aspirated from the subacromial bursa.  Cultures NGTD.  Blood cx were obtained and the patient was started on Cefepime and Vancomycin. They returned positive for Strep agalactiae and antibiotics switched to CTX.  ID was consulted.  Review of records revealed that the patient had a Strep agalactiae UTI in January 2024.  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  MRI right shoulder revealed acromioclavicular joint effusion concerning for AC joint septic arthritis and osteomyelitis, moderate glenohumeral joint effusion and synovitis with subacromial subdeltoid bursitis., as well as cellulitis of the shoulder with myositis of the deltoid and trapezius musculature. Neurosurgery reviewed the images and reported that neurosurgical intervention was not indicated since the infection did not extend into the epidural space.  Her case was discussed with Dr. Newby of CTS at [facility] who requested transfer for VATS.  She was transferred to [facility] on 4/21 for CTS, Ortho, and ID evaluations.  Per CTS, as leukocytosis has resolved and collection is very small on CT, do not recommend surgical intervention at this time.  CURTIS was ordered to evaluate for endocarditis which was negative.  She went to the OR with Ortho for an I&D on 4/25, who felt like she should be treated for septic arthritis, though low concern for active shoulder infection as she has been on IV antibiotics.  ID suggested 6 weeks of IV Ceftriaxone until 5/29, with a repeat MRI thoracic in 2 weeks to evaluate healing.  Attempts were made to discharge her on insulin, but as she doesn't get paid until next week, she was unable to afford it.  She was discharged on higher dose Metformin and Amaryl, and told to monitor her sugars  and followup with her PCP to be started on insulin given her elevated HbA1c.    Ms. Shahida Ortega was deemed appropriate for discharge.  At the time of discharge, the plan of care was discussed with patient/family, who were agreeable and amenable.  All medications were verbally reviewed and discussed with the patient/family.  They endorsed understanding and compliance.  Informed them that these changes will be available on their discharge paperwork, as well.  Outpatient follow-ups were scheduled, or if unable to be scheduled ambulatory referrals were placed, and ER return precautions were given.  All questions were answered to the patient/family's satisfaction.  Ms. Shahida Ortega was subsequently discharged in stable condition.

## 2024-04-16 NOTE — ASSESSMENT & PLAN NOTE
Patient's FSGs are uncontrolled due to hyperglycemia on current medication regimen.  Last A1c reviewed-   Lab Results   Component Value Date    HGBA1C 11.3 (H) 03/16/2022     Most recent fingerstick glucose reviewed-   Recent Labs   Lab 04/16/24  0752 04/16/24  1125   POCTGLUCOSE 221* 236*       Current correctional scale  Low  Maintain anti-hyperglycemic dose as follows-   Antihyperglycemics (From admission, onward)    Start     Stop Route Frequency Ordered    04/16/24 2100  insulin detemir U-100 (Levemir) pen 15 Units         -- SubQ Nightly 04/16/24 0854    04/15/24 1504  insulin aspart U-100 pen 0-5 Units         -- SubQ Before meals & nightly PRN 04/15/24 1404        Hold Oral hypoglycemics while patient is in the hospital.

## 2024-04-16 NOTE — PLAN OF CARE
Problem: Infection Progression (Sepsis/Septic Shock)  Goal: Absence of Infection Signs and Symptoms  Outcome: Ongoing, Not Progressing

## 2024-04-16 NOTE — NURSING
[facility] [place] 4 [place]      4/16/2024       Skin assessed on: Q Shift      [x] No Pressure Injuries Present    []Prevention Measures Documented    [] Yes LDA  for Pressure Injury Previously documented     [] Yes New Pressure Injury Discovered   [] LDA for New Pressure Injury Added      Attending RN:  Radhika Wells RN     Second RN:  Maya Farah LPN

## 2024-04-16 NOTE — ASSESSMENT & PLAN NOTE
2/2 blood cultures with group B Streptococcus agalactiae  On vancomycin and cefepime  Obtain echocardiogram and MRI L-spine given persistent low back pain  Urine with 10 WBCs and 3+ leukocyte esterase patient denied dysuria.  No cough/abdominal pain//chest pain/nausea/vomiting/diarrhea

## 2024-04-16 NOTE — PLAN OF CARE
Problem: Adult Inpatient Plan of Care  Goal: Plan of Care Review  4/16/2024 1845 by Radhika Wells RN  Outcome: Ongoing, Progressing  4/16/2024 0752 by Radhika Wells RN  Outcome: Ongoing, Not Progressing  Goal: Patient-Specific Goal (Individualized)  4/16/2024 1845 by Radhika Wells RN  Outcome: Ongoing, Progressing  4/16/2024 0752 by Radhika Wells RN  Outcome: Ongoing, Not Progressing  Goal: Absence of Hospital-Acquired Illness or Injury  4/16/2024 1845 by Radhika Wells RN  Outcome: Ongoing, Progressing  4/16/2024 0752 by Radhika Wells RN  Outcome: Ongoing, Not Progressing  Goal: Optimal Comfort and Wellbeing  4/16/2024 1845 by Radhika Wells RN  Outcome: Ongoing, Progressing  4/16/2024 0752 by Radhika Wells RN  Outcome: Ongoing, Not Progressing  Goal: Readiness for Transition of Care  4/16/2024 1845 by Radhika Wells RN  Outcome: Ongoing, Progressing  4/16/2024 0752 by Radhika Wells RN  Outcome: Ongoing, Not Progressing

## 2024-04-16 NOTE — PROGRESS NOTES
The patient is in bed.  She reports her shoulder feels better.  The redness has gone down.  She denies any complaints anywhere else.     Temp:  [98 °F (36.7 °C)-101.1 °F (38.4 °C)] 98 °F (36.7 °C)  Pulse:  [] 83  Resp:  [16-20] 18  SpO2:  [97 %-99 %] 98 %  BP: (133-164)/(72-92) 133/76    Physical examination:    Right shoulder reveals the erythematous spot laterally which was marked yesterday in the emergency room has lightened up significantly.  There is no more warmth.  She still has some pain with range of motion of the shoulder.  There is no palpable swelling or effusion.      Recent Labs   Lab 04/16/24  0448   WBC 16.94*   RBC 4.11   HGB 11.6*   HCT 36.4*      MCV 89   MCH 28.2   MCHC 31.9*         Current Facility-Administered Medications:     acetaminophen tablet 650 mg, 650 mg, Oral, Q8H PRN, Juan Cameron III, MD, 650 mg at 04/15/24 1945    acetaminophen tablet 650 mg, 650 mg, Oral, Q4H PRN, Juan Cameron III, MD    aspirin EC tablet 81 mg, 81 mg, Oral, Daily, Juan Cameron III, MD, 81 mg at 04/16/24 0929    carvediloL tablet 6.25 mg, 6.25 mg, Oral, BID, Juan Cameron III, MD, 6.25 mg at 04/16/24 0929    ceFEPIme (MAXIPIME) 2 g in dextrose 5 % in water (D5W) 100 mL IVPB (MB+), 2 g, Intravenous, Q8H, Jamal Jameson PA-C, Stopped at 04/16/24 0643    dextrose 10% bolus 125 mL 125 mL, 12.5 g, Intravenous, PRN, Juan Cameron III, MD    dextrose 10% bolus 250 mL 250 mL, 25 g, Intravenous, PRN, Juan Cameron III, MD    glucagon (human recombinant) injection 1 mg, 1 mg, Intramuscular, PRN, Juan Cameron III, MD    glucose chewable tablet 16 g, 16 g, Oral, PRN, Juan Cameron III, MD    glucose chewable tablet 24 g, 24 g, Oral, PRN, Juan Cameron III, MD    heparin (porcine) injection 5,000 Units, 5,000 Units, Subcutaneous, Q8H, Juan Cameron III, MD, 5,000 Units at 04/16/24 0512    HYDROmorphone injection 1 mg, 1 mg, Intravenous, Q4H PRN, Juan Cameron III, MD, 1 mg at  04/16/24 1226    insulin aspart U-100 pen 0-5 Units, 0-5 Units, Subcutaneous, QID (AC + HS) PRN, Juan Cameron III, MD, 2 Units at 04/16/24 1221    insulin detemir U-100 (Levemir) pen 15 Units, 15 Units, Subcutaneous, QHS, Nima Pompa MD    losartan tablet 100 mg, 100 mg, Oral, Daily, Juan Cameron III, MD, 100 mg at 04/16/24 0929    naloxone 0.4 mg/mL injection 0.02 mg, 0.02 mg, Intravenous, PRN, Juan Cameron III, MD    oxyCODONE immediate release tablet 5 mg, 5 mg, Oral, Q6H PRN, Juan Cameron III, MD, 5 mg at 04/16/24 0512    polyethylene glycol packet 17 g, 17 g, Oral, Daily, Juan Cameron III, MD, 17 g at 04/15/24 1525    senna-docusate 8.6-50 mg per tablet 1 tablet, 1 tablet, Oral, BID, Juan Cameron III, MD, 1 tablet at 04/15/24 2156    sodium chloride 0.9% flush 10 mL, 10 mL, Intravenous, PRN, Juan Cameron III, MD    Pharmacy to dose Vancomycin consult, , , Once **AND** vancomycin - pharmacy to dose, , Intravenous, pharmacy to manage frequency, Jamal Jameson, [facility]    vancomycin 1,500 mg in dextrose 5 % (D5W) 250 mL IVPB (Vial-Mate), 1,500 mg, Intravenous, Q12H, Juan Cameron III, MD, Stopped at 04/16/24 0416    Assessment and Plan:    53-year-old female with  history of diabetes presents with atraumatic right shoulder pain and erythema and leukocytosis.  CT scan was performed of the right shoulder which reveal a small effusion.  Aspiration left shoulder joint revealed dry tap.  Small bit of blood was aspirated from subacromial bursa on 4/15 and sent for cultures but unable to send for cell count..     Right shoulder culture reveals no growth    Right erythema and tenderness and pain felt to be due to cellulitis.  Low suspicion for septic arthritis but we will follow culture.    WBC now 16.9.  Question other source of leukocytosis.       Samira Whipple MD  [facility]  [phone]  This note has been transcribed with voice recognition software, but not  reviewed and may contain unrecognized errors.

## 2024-04-16 NOTE — NURSING
Patient left for MRI via wheel chair. Patient was alert , oriented and voiced little discomfort to right shoulder.

## 2024-04-16 NOTE — PROGRESS NOTES
Vancomycin consult follow-up:    Patient reviewed, renal function stable, no new levels, continue current therapy; Next levels due: trough due 4/17/2024 at 0200

## 2024-04-16 NOTE — PROGRESS NOTES
[facility] Medicine  Progress Note    Patient Name: Shahida Ortega  MRN: [id number]  Patient Class: IP- Inpatient   Admission Date: 4/15/2024  Length of Stay: 1 days  Attending Physician: Nima Pompa,*  Primary Care Provider: Janusz Staton MD        Subjective:     Principal Problem:Sepsis        HPI:  53F with pmh of dm and htn who presents due to 3 day h/o right shoulder pain with associated redness. Patient reports being seen here in the ED for her symptoms on 4/12/24 and was prescribed Valium with relief of back pain but not shoulder. Patient denies taking any medications today for relief. Patient denies any recent injuries or trauma. In ed pt started on abx and fluids for sepsis and ortho consulted who performed arthrocentesis of the should with fluids sent for culture and pending. Pt denies tobacco, alcohol or drug use. Pt denies previous ortho surgerie     Overview/Hospital Course:  53F with pmh of DM and HTN who was admitted for sepsis s/t atraumatic right shoulder pain and erythema and leukocytosis.  CT scan was performed of the right shoulder which reveal a small effusion. Ortho was consulted s/p Aspiration right shoulder joint revealed dry tap.  Small bit of blood was aspirated from subacromial bursa on 4/15 and sent for cultures but unable to send for cell count. Blood cultures 2/2 bottles revealed strep agalactiae.  Repeat blood cultures pending.  Review of records revealed that patient did have strep agalactiae UTI in 01/2024.  On vancomycin and cefepime.  Patient also reports persistent low back pain.  Obtain echo and MRI of L-spine.  Follow leukocytosis trend.  Urinalysis with 10 WBC and 2+ leukocyte esterase  No shortness of breath/cough/dysuria/abdominal pain/nausea/vomiting.    Interval History:  Patient reports right shoulder pain and redness is improving.  Continues to have persistent low back pain.  No fever.  No abdominal pain/nausea/vomiting/dysuria.    Review  of Systems   Constitutional: Negative.    Respiratory: Negative.     Cardiovascular: Negative.    Gastrointestinal: Negative.    Musculoskeletal:  Positive for arthralgias, back pain and joint swelling (Right shoulder).   Neurological: Negative.    Psychiatric/Behavioral: Negative.       Objective:     Vital Signs (Most Recent):  Temp: 100.3 °F (37.9 °C) (04/16/24 1624)  Pulse: 100 (04/16/24 1624)  Resp: 18 (04/16/24 1624)  BP: (!) 173/88 (04/16/24 1624)  SpO2: 97 % (04/16/24 1624) Vital Signs (24h Range):  Temp:  [98 °F (36.7 °C)-101.1 °F (38.4 °C)] 100.3 °F (37.9 °C)  Pulse:  [] 100  Resp:  [16-20] 18  SpO2:  [97 %-99 %] 97 %  BP: (133-173)/(76-92) 173/88     Weight: 92.5 kg (203 lb 14.8 oz)  Body mass index is 31.94 kg/m².    Intake/Output Summary (Last 24 hours) at 4/16/2024 1651  Last data filed at 4/16/2024 1015  Gross per 24 hour   Intake 120 ml   Output 600 ml   Net -480 ml         Physical Exam  Constitutional:       General: She is not in acute distress.     Appearance: She is normal weight.   Cardiovascular:      Rate and Rhythm: Normal rate.      Pulses: Normal pulses.   Pulmonary:      Effort: No respiratory distress.      Breath sounds: Normal breath sounds. No wheezing.   Abdominal:      General: Bowel sounds are normal. There is no distension.      Palpations: Abdomen is soft.      Tenderness: There is no abdominal tenderness.   Musculoskeletal:         General: Tenderness (Right shoulder and lumbar spinal and paraspinal area) present.      Right lower leg: No edema.      Left lower leg: No edema.   Skin:     General: Skin is warm.   Neurological:      Mental Status: She is alert and oriented to person, place, and time. Mental status is at baseline.   Psychiatric:         Mood and Affect: Mood normal.             Significant Labs: All pertinent labs within the past 24 hours have been reviewed.    Significant Imaging: I have reviewed all pertinent imaging results/findings within the past 24  "hours.      Assessment/Plan:      * Sepsis  This patient does have evidence of infective focus  My overall impression is sepsis.  Source: Skin and Soft Tissue (location shoulder)  Antibiotics given-   Antibiotics (72h ago, onward)      Start     Stop Route Frequency Ordered    04/16/24 0300  vancomycin 1,500 mg in dextrose 5 % (D5W) 250 mL IVPB (Vial-Mate)         -- IV Every 12 hours (non-standard times) 04/15/24 1520    04/15/24 1445  ceFEPIme (MAXIPIME) 2 g in dextrose 5 % in water (D5W) 100 mL IVPB (MB+)         -- IV Every 8 hours (non-standard times) 04/15/24 1338    04/15/24 1437  vancomycin - pharmacy to dose  (vancomycin IVPB (PEDS and ADULTS))        Placed in "And" Linked Group    -- IV pharmacy to manage frequency 04/15/24 1338          Latest lactate reviewed-  Recent Labs   Lab 04/15/24  0946   LACTATE 1.1       Organ dysfunction indicated by  none    Fluid challenge Ideal Body Weight- The patient's ideal body weight is Ideal body weight: 61.6 kg (135 lb 12.9 oz) which will be used to calculate fluid bolus of 30 ml/kg for treatment of septic shock.      Post- resuscitation assessment Yes Perfusion exam was performed within 6 hours of septic shock presentation after bolus shows Adequate tissue perfusion assessed by non-invasive monitoring       Will Not start Pressors- Levophed for MAP of 65  Source control achieved by: abx ordered. BC cultures sent and pending    Bacteremia    2/2 blood cultures with group B Streptococcus agalactiae  On vancomycin and cefepime  Obtain echocardiogram and MRI L-spine given persistent low back pain  Urine with 10 WBCs and 3+ leukocyte esterase patient denied dysuria.  No cough/abdominal pain//chest pain/nausea/vomiting/diarrhea    Pyogenic arthritis of shoulder region  Arthrocentesis performed in ED by ortho.  Culture sent and still pending we will follow up.  ContinueEmpiric vanc and cefepime     A-fib  Patient with Paroxysmal (<7 days) atrial fibrillation which is " controlled currently with Beta Blocker. Patient is currently in sinus rhythm.[id number] Score: 3  Anticoagulation:  Patient does not appear to be on home anticoagulation.  We will continue aspirin.      Paroxysmal AFib. During episode of sepsis in 2023. Not on anticoagulation because it was a brief episode per cardiology. Rec to look for recurrence with the help of event monitor. If a recurrence noted on event monitor, consider adding anticoagulation     Prolonged Q-T interval on ECG  Noted history.  We will avoid prolonging agents as much as possible      DM2 (diabetes mellitus, type 2)  Patient's FSGs are uncontrolled due to hyperglycemia on current medication regimen.  Last A1c reviewed-   Lab Results   Component Value Date    HGBA1C 11.3 (H) 03/16/2022     Most recent fingerstick glucose reviewed-   Recent Labs   Lab 04/16/24  0752 04/16/24  1125   POCTGLUCOSE 221* 236*       Current correctional scale  Low  Maintain anti-hyperglycemic dose as follows-   Antihyperglycemics (From admission, onward)      Start     Stop Route Frequency Ordered    04/16/24 2100  insulin detemir U-100 (Levemir) pen 15 Units         -- SubQ Nightly 04/16/24 0854    04/15/24 1504  insulin aspart U-100 pen 0-5 Units         -- SubQ Before meals & nightly PRN 04/15/24 1404          Hold Oral hypoglycemics while patient is in the hospital.    Hypertension  Chronic, controlled. Latest blood pressure and vitals reviewed-     Temp:  [98 °F (36.7 °C)-101.1 °F (38.4 °C)]   Pulse:  []   Resp:  [16-20]   BP: (133-173)/(76-92)   SpO2:  [97 %-99 %] .   Home meds for hypertension were reviewed and noted below.   Hypertension Medications               carvediloL (COREG) 3.125 MG tablet Take 2 tablets (6.25 mg total) by mouth 2 (two) times daily.    hydroCHLOROthiazide (HYDRODIURIL) 25 MG tablet Take 1 tablet (25 mg total) by mouth once daily.    losartan (COZAAR) 100 MG tablet Take 1 tablet (100 mg total) by mouth once daily.            While  in the hospital, will manage blood pressure as follows; Continue home antihypertensive regimen    Will utilize p.r.n. blood pressure medication only if patient's blood pressure greater than 180/110 and she develops symptoms such as worsening chest pain or shortness of breath.    Elevated procalcitonin  In setting of active infection.  Treatment as stated below        VTE Risk Mitigation (From admission, onward)           Ordered     heparin (porcine) injection 5,000 Units  Every 8 hours         04/15/24 1404     IP VTE HIGH RISK PATIENT  Once         04/15/24 1404     Place sequential compression device  Until discontinued         04/15/24 1404                    Discharge Planning   NAYELI: 4/18/2024     Code Status: Full Code   Is the patient medically ready for discharge?:     Reason for patient still in hospital (select all that apply): Patient trending condition and Treatment  Discharge Plan A: Home with family                  Nima Pompa MD  Department of Hospital Medicine   [facility]

## 2024-04-16 NOTE — PLAN OF CARE
Case Management Assessment     PCP: Dr. Staton  Pharmacy: [facility] [place]    Patient Arrived From: Home  Existing Help at Home: Noelle tam    Barriers to Discharge: None    Discharge Plan:    A. Home with family   B. Other- TBD    CM discussed discharge planning with pt. Pt is independent and doesn't use DME. Pt's family will provide transportation home when discharged.        04/16/24 1619   Discharge Assessment   Assessment Type Discharge Planning Assessment   Confirmed/corrected address, phone number and insurance Yes   Confirmed Demographics Correct on Facesheet   Source of Information patient   When was your last doctors appointment? 12/27/23   Reason For Admission SEPSIS   People in Home parent(s)   Facility Arrived From: HOME   Do you expect to return to your current living situation? Yes   Do you have help at home or someone to help you manage your care at home? Yes   Who are your caregiver(s) and their phone number(s)? Noelle tam [phone]   Prior to hospitilization cognitive status: Alert/Oriented   Current cognitive status: Alert/Oriented   Walking or Climbing Stairs Difficulty no   Dressing/Bathing Difficulty no   Equipment Currently Used at Home none   Readmission within 30 days? No   Patient currently being followed by outpatient case management? No   Do you currently have service(s) that help you manage your care at home? No   Do you take prescription medications? Yes   Do you have prescription coverage? Yes   Coverage BCBS   Do you have any problems affording any of your prescribed medications? No   Is the patient taking medications as prescribed? yes   Who is going to help you get home at discharge? Eva or Caroline- sisters   How do you get to doctors appointments? car, drives self   Are you on dialysis? No   Do you take coumadin? No   Discharge Plan A Home with family   Discharge Plan B Other  (tbd)   DME Needed Upon Discharge  none   Discharge Plan discussed with: Patient    Transition of Care Barriers None   SDOH   (none)   Physical Activity   On average, how many days per week do you engage in moderate to strenuous exercise (like a brisk walk)? 7 days   On average, how many minutes do you engage in exercise at this level? 60 min   Financial Resource Strain   How hard is it for you to pay for the very basics like food, housing, medical care, and heating? Not very   Housing Stability   In the last 12 months, was there a time when you were not able to pay the mortgage or rent on time? N   In the past 12 months, how many times have you moved where you were living? 1   At any time in the past 12 months, were you homeless or living in a shelter (including now)? N   Transportation Needs   In the past 12 months, has lack of transportation kept you from medical appointments or from getting medications? no   In the past 12 months, has lack of transportation kept you from meetings, work, or from getting things needed for daily living? No   Food Insecurity   Within the past 12 months, you worried that your food would run out before you got the money to buy more. Never true   Within the past 12 months, the food you bought just didn't last and you didn't have money to get more. Never true   Stress   Do you feel stress - tense, restless, nervous, or anxious, or unable to sleep at night because your mind is troubled all the time - these days? Only a littl   Social Connections   In a typical week, how many times do you talk on the phone with family, friends, or neighbors? More than 3   How often do you get together with friends or relatives? More than 3   How often do you attend Rastafarian or Jainism services? More than 4   Do you belong to any clubs or organizations such as Rastafarian groups, unions, fraternal or athletic groups, or school groups? No   How often do you attend meetings of the clubs or organizations you belong to? Never   Are you , , , , never , or  living with a partner? Never marrie   Alcohol Use   Q1: How often do you have a drink containing alcohol? Monthly or l   Q2: How many drinks containing alcohol do you have on a typical day when you are drinking? 1 or 2   Q3: How often do you have six or more drinks on one occasion? Never   OTHER   Name(s) of People in Home Kwame- other

## 2024-04-16 NOTE — ASSESSMENT & PLAN NOTE
Arthrocentesis performed in ED by ortho.  Culture sent and still pending we will follow up.  ContinueEmpiric vanc and cefepime

## 2024-04-17 LAB
ALBUMIN SERPL BCP-MCNC: 2 G/DL (ref 3.5–5.2)
ALP SERPL-CCNC: 110 U/L (ref 55–135)
ALT SERPL W/O P-5'-P-CCNC: 11 U/L (ref 10–44)
ANION GAP SERPL CALC-SCNC: 13 MMOL/L (ref 8–16)
AORTIC ROOT ANNULUS: 3.41 CM
AORTIC VALVE CUSP SEPERATION: 2.11 CM
ASCENDING AORTA: 2.8 CM
AST SERPL-CCNC: 14 U/L (ref 10–40)
AV INDEX (PROSTH): 0.82
AV MEAN GRADIENT: 5 MMHG
AV PEAK GRADIENT: 8 MMHG
AV VALVE AREA BY VELOCITY RATIO: 2.62 CM²
AV VALVE AREA: 2.88 CM²
AV VELOCITY RATIO: 0.74
BACTERIA BLD CULT: ABNORMAL
BASOPHILS # BLD AUTO: 0.06 K/UL (ref 0–0.2)
BASOPHILS NFR BLD: 0.4 % (ref 0–1.9)
BILIRUB SERPL-MCNC: 0.4 MG/DL (ref 0.1–1)
BSA FOR ECHO PROCEDURE: 2.08 M2
BUN SERPL-MCNC: 11 MG/DL (ref 6–20)
CALCIUM SERPL-MCNC: 8.6 MG/DL (ref 8.7–10.5)
CHLORIDE SERPL-SCNC: 100 MMOL/L (ref 95–110)
CO2 SERPL-SCNC: 20 MMOL/L (ref 23–29)
CREAT SERPL-MCNC: 0.6 MG/DL (ref 0.5–1.4)
CV ECHO LV RWT: 0.8 CM
DIFFERENTIAL METHOD BLD: ABNORMAL
DOP CALC AO PEAK VEL: 1.4 M/S
DOP CALC AO VTI: 17.9 CM
DOP CALC LVOT AREA: 3.5 CM2
DOP CALC LVOT DIAMETER: 2.12 CM
DOP CALC LVOT PEAK VEL: 1.04 M/S
DOP CALC LVOT STROKE VOLUME: 51.51 CM3
DOP CALC MV VTI: 20.8 CM
DOP CALCLVOT PEAK VEL VTI: 14.6 CM
E WAVE DECELERATION TIME: 168.74 MSEC
E/A RATIO: 0.7
E/E' RATIO: 10.43 M/S
ECHO LV POSTERIOR WALL: 1.54 CM (ref 0.6–1.1)
EOSINOPHIL # BLD AUTO: 0.1 K/UL (ref 0–0.5)
EOSINOPHIL NFR BLD: 0.6 % (ref 0–8)
ERYTHROCYTE [DISTWIDTH] IN BLOOD BY AUTOMATED COUNT: 13.2 % (ref 11.5–14.5)
EST. GFR  (NO RACE VARIABLE): >60 ML/MIN/1.73 M^2
FRACTIONAL SHORTENING: 33 % (ref 28–44)
GLUCOSE SERPL-MCNC: 256 MG/DL (ref 70–110)
HCT VFR BLD AUTO: 34.9 % (ref 37–48.5)
HGB BLD-MCNC: 11.1 G/DL (ref 12–16)
IMM GRANULOCYTES # BLD AUTO: 0.73 K/UL (ref 0–0.04)
IMM GRANULOCYTES NFR BLD AUTO: 4.5 % (ref 0–0.5)
INTERVENTRICULAR SEPTUM: 1.46 CM (ref 0.6–1.1)
IVC DIAMETER: 1.55 CM
IVRT: 102.76 MSEC
LA MAJOR: 4.65 CM
LA MINOR: 3.91 CM
LA WIDTH: 2.5 CM
LEFT ATRIUM SIZE: 1.61 CM
LEFT ATRIUM VOLUME INDEX: 7.1 ML/M2
LEFT ATRIUM VOLUME: 14.53 CM3
LEFT INTERNAL DIMENSION IN SYSTOLE: 2.55 CM (ref 2.1–4)
LEFT VENTRICLE DIASTOLIC VOLUME INDEX: 31.03 ML/M2
LEFT VENTRICLE DIASTOLIC VOLUME: 63.31 ML
LEFT VENTRICLE MASS INDEX: 107 G/M2
LEFT VENTRICLE SYSTOLIC VOLUME INDEX: 11.5 ML/M2
LEFT VENTRICLE SYSTOLIC VOLUME: 23.4 ML
LEFT VENTRICULAR INTERNAL DIMENSION IN DIASTOLE: 3.83 CM (ref 3.5–6)
LEFT VENTRICULAR MASS: 218.94 G
LV LATERAL E/E' RATIO: 10.43 M/S
LV SEPTAL E/E' RATIO: 10.43 M/S
LVOT MG: 2.45 MMHG
LVOT MV: 0.74 CM/S
LYMPHOCYTES # BLD AUTO: 1.9 K/UL (ref 1–4.8)
LYMPHOCYTES NFR BLD: 11.7 % (ref 18–48)
MAGNESIUM SERPL-MCNC: 1.8 MG/DL (ref 1.6–2.6)
MCH RBC QN AUTO: 28.2 PG (ref 27–31)
MCHC RBC AUTO-ENTMCNC: 31.8 G/DL (ref 32–36)
MCV RBC AUTO: 89 FL (ref 82–98)
MONOCYTES # BLD AUTO: 1.2 K/UL (ref 0.3–1)
MONOCYTES NFR BLD: 7.2 % (ref 4–15)
MV MEAN GRADIENT: 3 MMHG
MV PEAK A VEL: 1.04 M/S
MV PEAK E VEL: 0.73 M/S
MV PEAK GRADIENT: 7 MMHG
MV STENOSIS PRESSURE HALF TIME: 48.93 MS
MV VALVE AREA BY CONTINUITY EQUATION: 2.48 CM2
MV VALVE AREA P 1/2 METHOD: 4.5 CM2
NEUTROPHILS # BLD AUTO: 12.2 K/UL (ref 1.8–7.7)
NEUTROPHILS NFR BLD: 75.6 % (ref 38–73)
NRBC BLD-RTO: 0 /100 WBC
OHS CV RV/LV RATIO: 0.9 CM
PHOSPHATE SERPL-MCNC: 3 MG/DL (ref 2.7–4.5)
PISA TR MAX VEL: 3.01 M/S
PLATELET # BLD AUTO: 244 K/UL (ref 150–450)
PMV BLD AUTO: 10.2 FL (ref 9.2–12.9)
POCT GLUCOSE: 254 MG/DL (ref 70–110)
POCT GLUCOSE: 255 MG/DL (ref 70–110)
POCT GLUCOSE: 258 MG/DL (ref 70–110)
POCT GLUCOSE: 286 MG/DL (ref 70–110)
POTASSIUM SERPL-SCNC: 3.6 MMOL/L (ref 3.5–5.1)
PROT SERPL-MCNC: 6.2 G/DL (ref 6–8.4)
PV PEAK GRADIENT: 5 MMHG
PV PEAK VELOCITY: 1.14 M/S
RA MAJOR: 4.74 CM
RA PRESSURE ESTIMATED: 3 MMHG
RA WIDTH: 3.4 CM
RBC # BLD AUTO: 3.93 M/UL (ref 4–5.4)
RIGHT VENTRICULAR END-DIASTOLIC DIMENSION: 3.45 CM
RV TB RVSP: 6 MMHG
RV TISSUE DOPPLER FREE WALL SYSTOLIC VELOCITY 1 (APICAL 4 CHAMBER VIEW): 22.46 CM/S
SINUS: 3.59 CM
SODIUM SERPL-SCNC: 133 MMOL/L (ref 136–145)
STJ: 2.62 CM
TDI LATERAL: 0.07 M/S
TDI SEPTAL: 0.07 M/S
TDI: 0.07 M/S
TR MAX PG: 36 MMHG
TRICUSPID ANNULAR PLANE SYSTOLIC EXCURSION: 2.17 CM
TV REST PULMONARY ARTERY PRESSURE: 39 MMHG
VANCOMYCIN TROUGH SERPL-MCNC: 5 UG/ML (ref 10–22)
WBC # BLD AUTO: 16.09 K/UL (ref 3.9–12.7)
Z-SCORE OF LEFT VENTRICULAR DIMENSION IN END DIASTOLE: -4.62
Z-SCORE OF LEFT VENTRICULAR DIMENSION IN END SYSTOLE: -3

## 2024-04-17 PROCEDURE — 25000003 PHARM REV CODE 250

## 2024-04-17 PROCEDURE — 25000003 PHARM REV CODE 250: Performed by: STUDENT IN AN ORGANIZED HEALTH CARE EDUCATION/TRAINING PROGRAM

## 2024-04-17 PROCEDURE — 11000001 HC ACUTE MED/SURG PRIVATE ROOM

## 2024-04-17 PROCEDURE — 63600175 PHARM REV CODE 636 W HCPCS: Performed by: STUDENT IN AN ORGANIZED HEALTH CARE EDUCATION/TRAINING PROGRAM

## 2024-04-17 PROCEDURE — 97535 SELF CARE MNGMENT TRAINING: CPT

## 2024-04-17 PROCEDURE — 36415 COLL VENOUS BLD VENIPUNCTURE: CPT | Performed by: STUDENT IN AN ORGANIZED HEALTH CARE EDUCATION/TRAINING PROGRAM

## 2024-04-17 PROCEDURE — 36415 COLL VENOUS BLD VENIPUNCTURE: CPT

## 2024-04-17 PROCEDURE — 83735 ASSAY OF MAGNESIUM: CPT | Performed by: STUDENT IN AN ORGANIZED HEALTH CARE EDUCATION/TRAINING PROGRAM

## 2024-04-17 PROCEDURE — 84100 ASSAY OF PHOSPHORUS: CPT | Performed by: STUDENT IN AN ORGANIZED HEALTH CARE EDUCATION/TRAINING PROGRAM

## 2024-04-17 PROCEDURE — 80053 COMPREHEN METABOLIC PANEL: CPT | Performed by: STUDENT IN AN ORGANIZED HEALTH CARE EDUCATION/TRAINING PROGRAM

## 2024-04-17 PROCEDURE — 63600175 PHARM REV CODE 636 W HCPCS

## 2024-04-17 PROCEDURE — 80202 ASSAY OF VANCOMYCIN: CPT

## 2024-04-17 PROCEDURE — 97161 PT EVAL LOW COMPLEX 20 MIN: CPT

## 2024-04-17 PROCEDURE — 97166 OT EVAL MOD COMPLEX 45 MIN: CPT

## 2024-04-17 PROCEDURE — 99223 1ST HOSP IP/OBS HIGH 75: CPT | Mod: ,,, | Performed by: STUDENT IN AN ORGANIZED HEALTH CARE EDUCATION/TRAINING PROGRAM

## 2024-04-17 PROCEDURE — 36415 COLL VENOUS BLD VENIPUNCTURE: CPT | Mod: XB | Performed by: STUDENT IN AN ORGANIZED HEALTH CARE EDUCATION/TRAINING PROGRAM

## 2024-04-17 PROCEDURE — 87040 BLOOD CULTURE FOR BACTERIA: CPT | Performed by: STUDENT IN AN ORGANIZED HEALTH CARE EDUCATION/TRAINING PROGRAM

## 2024-04-17 PROCEDURE — 85025 COMPLETE CBC W/AUTO DIFF WBC: CPT | Performed by: STUDENT IN AN ORGANIZED HEALTH CARE EDUCATION/TRAINING PROGRAM

## 2024-04-17 RX ORDER — HYDROMORPHONE HYDROCHLORIDE 1 MG/ML
1 INJECTION, SOLUTION INTRAMUSCULAR; INTRAVENOUS; SUBCUTANEOUS ONCE AS NEEDED
Status: COMPLETED | OUTPATIENT
Start: 2024-04-17 | End: 2024-04-18

## 2024-04-17 RX ORDER — INSULIN ASPART 100 [IU]/ML
5 INJECTION, SOLUTION INTRAVENOUS; SUBCUTANEOUS
Status: DISCONTINUED | OUTPATIENT
Start: 2024-04-17 | End: 2024-04-18

## 2024-04-17 RX ORDER — KETOROLAC TROMETHAMINE 30 MG/ML
15 INJECTION, SOLUTION INTRAMUSCULAR; INTRAVENOUS ONCE
Status: COMPLETED | OUTPATIENT
Start: 2024-04-17 | End: 2024-04-17

## 2024-04-17 RX ORDER — HYDROMORPHONE HYDROCHLORIDE 1 MG/ML
1 INJECTION, SOLUTION INTRAMUSCULAR; INTRAVENOUS; SUBCUTANEOUS ONCE
Status: DISCONTINUED | OUTPATIENT
Start: 2024-04-17 | End: 2024-04-17

## 2024-04-17 RX ADMIN — VANCOMYCIN HYDROCHLORIDE 1750 MG: 500 INJECTION, POWDER, LYOPHILIZED, FOR SOLUTION INTRAVENOUS at 03:04

## 2024-04-17 RX ADMIN — CEFTRIAXONE 2 G: 2 INJECTION, POWDER, FOR SOLUTION INTRAMUSCULAR; INTRAVENOUS at 08:04

## 2024-04-17 RX ADMIN — INSULIN ASPART 3 UNITS: 100 INJECTION, SOLUTION INTRAVENOUS; SUBCUTANEOUS at 08:04

## 2024-04-17 RX ADMIN — HEPARIN SODIUM 5000 UNITS: 5000 INJECTION INTRAVENOUS; SUBCUTANEOUS at 01:04

## 2024-04-17 RX ADMIN — HEPARIN SODIUM 5000 UNITS: 5000 INJECTION INTRAVENOUS; SUBCUTANEOUS at 02:04

## 2024-04-17 RX ADMIN — ASPIRIN 81 MG: 81 TABLET, COATED ORAL at 08:04

## 2024-04-17 RX ADMIN — INSULIN ASPART 5 UNITS: 100 INJECTION, SOLUTION INTRAVENOUS; SUBCUTANEOUS at 04:04

## 2024-04-17 RX ADMIN — OXYCODONE 5 MG: 5 TABLET ORAL at 08:04

## 2024-04-17 RX ADMIN — CEFEPIME 2 G: 2 INJECTION, POWDER, FOR SOLUTION INTRAVENOUS at 01:04

## 2024-04-17 RX ADMIN — OXYCODONE 5 MG: 5 TABLET ORAL at 03:04

## 2024-04-17 RX ADMIN — CEFEPIME 2 G: 2 INJECTION, POWDER, FOR SOLUTION INTRAVENOUS at 05:04

## 2024-04-17 RX ADMIN — KETOROLAC TROMETHAMINE 15 MG: 30 INJECTION, SOLUTION INTRAMUSCULAR at 08:04

## 2024-04-17 RX ADMIN — ACETAMINOPHEN 650 MG: 325 TABLET ORAL at 05:04

## 2024-04-17 RX ADMIN — CARVEDILOL 6.25 MG: 6.25 TABLET, FILM COATED ORAL at 08:04

## 2024-04-17 RX ADMIN — CARVEDILOL 6.25 MG: 6.25 TABLET, FILM COATED ORAL at 01:04

## 2024-04-17 RX ADMIN — SENNOSIDES AND DOCUSATE SODIUM 1 TABLET: 8.6; 5 TABLET ORAL at 01:04

## 2024-04-17 RX ADMIN — VANCOMYCIN HYDROCHLORIDE 1750 MG: 500 INJECTION, POWDER, LYOPHILIZED, FOR SOLUTION INTRAVENOUS at 02:04

## 2024-04-17 RX ADMIN — ACETAMINOPHEN 650 MG: 325 TABLET ORAL at 07:04

## 2024-04-17 RX ADMIN — HEPARIN SODIUM 5000 UNITS: 5000 INJECTION INTRAVENOUS; SUBCUTANEOUS at 09:04

## 2024-04-17 RX ADMIN — INSULIN ASPART 3 UNITS: 100 INJECTION, SOLUTION INTRAVENOUS; SUBCUTANEOUS at 11:04

## 2024-04-17 RX ADMIN — INSULIN ASPART 3 UNITS: 100 INJECTION, SOLUTION INTRAVENOUS; SUBCUTANEOUS at 04:04

## 2024-04-17 RX ADMIN — LOSARTAN POTASSIUM 100 MG: 50 TABLET, FILM COATED ORAL at 08:04

## 2024-04-17 RX ADMIN — HEPARIN SODIUM 5000 UNITS: 5000 INJECTION INTRAVENOUS; SUBCUTANEOUS at 05:04

## 2024-04-17 RX ADMIN — SENNOSIDES AND DOCUSATE SODIUM 1 TABLET: 8.6; 5 TABLET ORAL at 08:04

## 2024-04-17 RX ADMIN — SODIUM CHLORIDE 500 ML: 9 INJECTION, SOLUTION INTRAVENOUS at 08:04

## 2024-04-17 RX ADMIN — OXYCODONE 5 MG: 5 TABLET ORAL at 12:04

## 2024-04-17 NOTE — PROGRESS NOTES
Pharmacokinetic Assessment Follow Up: IV Vancomycin    Vancomycin serum concentration assessment(s):    The trough level was drawn correctly and can be used to guide therapy at this time. The measurement is below the desired definitive target range of 10 to 20 mcg/mL.    Vancomycin Regimen Plan:    Change regimen to Vancomycin 1750 mg IV every 12 hours with next serum trough concentration measured at 14:00 prior to 4th dose on 4/18/24.    Drug levels (last 3 results):  Recent Labs   Lab Result Units 04/17/24  0154   Vancomycin-Trough ug/mL 5.0*       Pharmacy will continue to follow and monitor vancomycin.    Please contact pharmacy at extension [phone] for questions regarding this assessment.    Thank you for the consult,   Michelle Sorenson       Patient brief summary:  Shahida Ortega is a 53 y.o. female initiated on antimicrobial therapy with IV Vancomycin for treatment of bone/joint infection    Drug Allergies:   Review of patient's allergies indicates:   Allergen Reactions    Penicillins Hives    Amoxicillin     Grass pollen-june grass standard Other (See Comments)       Actual Body Weight:   92.5kg    Renal Function:   Estimated Creatinine Clearance: 111.1 mL/min (based on SCr of 0.7 mg/dL).,     Dialysis Method (if applicable):  N/A    CBC (last 72 hours):  Recent Labs   Lab Result Units 04/15/24  0839 04/16/24  0448   WBC K/uL 15.41* 16.94*   Hemoglobin g/dL 11.8* 11.6*   Hematocrit % 37.0 36.4*   Platelets K/uL 210 164   Gran % % 75.8* 77.8*   Lymph % % 10.9* 10.2*   Mono % % 7.5 7.2   Eosinophil % % 0.3 0.2   Basophil % % 0.6 0.5   Differential Method  Automated Automated       Metabolic Panel (last 72 hours):  Recent Labs   Lab Result Units 04/15/24  0839 04/15/24  1027 04/16/24  0448   Sodium mmol/L 135*  --  133*   Potassium mmol/L 4.1  --  5.2*   Chloride mmol/L 98  --  104   CO2 mmol/L 23  --  14*   Glucose mg/dL 327*  --  243*   Glucose, UA   --  4+*  --    BUN mg/dL 13  --  8   Creatinine mg/dL 0.8  --  0.7    Albumin g/dL 2.7*  --  2.2*   Total Bilirubin mg/dL 0.4  --  0.4   Alkaline Phosphatase U/L 129  --  110   AST U/L 14  --  14   ALT U/L 16  --  11   Magnesium mg/dL  --   --  1.8   Phosphorus mg/dL  --   --  3.2       Vancomycin Administrations:  vancomycin given in the last 96 hours                     vancomycin 1,500 mg in dextrose 5 % (D5W) 250 mL IVPB (Vial-Mate) (mg) 1,500 mg New Bag 04/16/24 1504     1,500 mg New Bag  0246    vancomycin (VANCOCIN) 2,250 mg in dextrose 5 % (D5W) 500 mL IVPB (mg) 2,250 mg New Bag 04/15/24 1507                    Microbiologic Results:  Microbiology Results (last 7 days)       Procedure Component Value Units Date/Time    Blood culture [[id number]] Collected: 04/15/24 2209    Order Status: Completed Specimen: Blood from Peripheral, Antecubital, Left Updated: 04/16/24 2303     Blood Culture, Routine No Growth to date      No Growth to date    Blood culture [[id number]] Collected: 04/15/24 2205    Order Status: Completed Specimen: Blood Updated: 04/16/24 2303     Blood Culture, Routine No Growth to date      No Growth to date    Blood Culture #2 **CANNOT BE ORDERED STAT** [[id number]]  (Abnormal) Collected: 04/15/24 0946    Order Status: Completed Specimen: Blood Updated: 04/16/24 1032     Blood Culture, Routine Gram stain cameron bottle: Gram positive cocci in chains resembling Strep      Results called to and read back by:Lizz Wells  04/16/2024  08:12      STREPTOCOCCUS AGALACTIAE (GROUP B)  Beta-hemolytic streptococci are routinely susceptible to   penicillins,cephalosporins and carbapenems.      Blood Culture #1 **CANNOT BE ORDERED STAT** [[id number]]  (Abnormal) Collected: 04/15/24 0928    Order Status: Completed Specimen: Blood from Peripheral, Antecubital, Left Updated: 04/16/24 1030     Blood Culture, Routine Gram stain cameron bottle: Gram positive cocci in chains resembling Strep      Results called to and read back by: Maya Farah 3W 04/15/2024  20:19      STREPTOCOCCUS  AGALACTIAE (GROUP B)  Susceptibility pending  Beta-hemolytic streptococci are routinely susceptible to   penicillins,cephalosporins and carbapenems.      Aerobic culture (Specify Source) **CANNOT BE ORDERED AS STAT** [[id number]] Collected: 04/15/24 1346    Order Status: Completed Specimen: Shoulder, Right Updated: 04/16/24 1018     Aerobic Bacterial Culture No growth

## 2024-04-17 NOTE — PLAN OF CARE
Problem: Infection Progression (Sepsis/Septic Shock)  Goal: Absence of Infection Signs and Symptoms  4/17/2024 0753 by Radhika Wells RN  Outcome: Ongoing, Progressing  4/16/2024 1845 by Radhika Wells RN  Outcome: Ongoing, Progressing

## 2024-04-17 NOTE — SUBJECTIVE & OBJECTIVE
Interval History:  Spiked T-max of 101.4° overnight.  Reports right shoulder and low back pain.    Review of Systems   Constitutional: Negative.    Respiratory: Negative.     Cardiovascular: Negative.    Gastrointestinal: Negative.    Musculoskeletal:  Positive for arthralgias (Right shoulder pain) and back pain.     Objective:     Vital Signs (Most Recent):  Temp: 98.5 °F (36.9 °C) (04/17/24 1106)  Pulse: 96 (04/17/24 1106)  Resp: 16 (04/17/24 1215)  BP: 123/69 (04/17/24 1106)  SpO2: 99 % (04/17/24 1106) Vital Signs (24h Range):  Temp:  [98.5 °F (36.9 °C)-101.4 °F (38.6 °C)] 98.5 °F (36.9 °C)  Pulse:  [] 96  Resp:  [16-20] 16  SpO2:  [95 %-99 %] 99 %  BP: (119-173)/(69-88) 123/69     Weight: 92.5 kg (203 lb 14.8 oz)  Body mass index is 31.94 kg/m².    Intake/Output Summary (Last 24 hours) at 4/17/2024 1441  Last data filed at 4/17/2024 0802  Gross per 24 hour   Intake 240 ml   Output 800 ml   Net -560 ml         Physical Exam  Constitutional:       General: She is not in acute distress.     Appearance: She is normal weight.   Cardiovascular:      Rate and Rhythm: Normal rate.      Pulses: Normal pulses.   Pulmonary:      Effort: No respiratory distress.      Breath sounds: Normal breath sounds. No wheezing.   Abdominal:      General: Bowel sounds are normal. There is no distension.      Palpations: Abdomen is soft.      Tenderness: There is no abdominal tenderness.   Musculoskeletal:         General: Tenderness (Lower back spinal and paraspinal area.  Right shoulder) present.      Right lower leg: No edema.      Left lower leg: No edema.   Skin:     General: Skin is warm.   Neurological:      Mental Status: She is alert and oriented to person, place, and time. Mental status is at baseline.   Psychiatric:         Mood and Affect: Mood normal.             Significant Labs: All pertinent labs within the past 24 hours have been reviewed.    Significant Imaging: I have reviewed all pertinent imaging  results/findings within the past 24 hours.

## 2024-04-17 NOTE — ASSESSMENT & PLAN NOTE
Patient's FSGs are uncontrolled due to hyperglycemia on current medication regimen.  Last A1c reviewed-   Lab Results   Component Value Date    HGBA1C 11.3 (H) 03/16/2022     Most recent fingerstick glucose reviewed-   Recent Labs   Lab 04/16/24  1622 04/17/24  0659 04/17/24  1105   POCTGLUCOSE 280* 255* 286*       Current correctional scale  Low  Maintain anti-hyperglycemic dose as follows-   Antihyperglycemics (From admission, onward)    Start     Stop Route Frequency Ordered    04/16/24 2100  insulin detemir U-100 (Levemir) pen 15 Units         -- SubQ Nightly 04/16/24 0854    04/15/24 1504  insulin aspart U-100 pen 0-5 Units         -- SubQ Before meals & nightly PRN 04/15/24 1404        Hold Oral hypoglycemics while patient is in the hospital.

## 2024-04-17 NOTE — PT/OT/SLP EVAL
Physical Therapy Evaluation     Patient Name: Shahida Ortega   MRN: [id number]  Recent Surgery: * No surgery found *      Recommendations:     Discharge Recommendations: No Therapy Indicated   Discharge Equipment Recommendations: none       Assessment:     Shahida Ortega is a 53 y.o. female admitted with a medical diagnosis of Sepsis. She presents with the following impairments/functional limitations: decreased upper extremity function.     Rehab Prognosis: Good; patient demonstrates safety with transfers and gait, no further skilled PT needed at this time.    Plan:     During this hospitalization, patient to be seen 1 x/week to address the above listed problems via initial evaluation.    Plan of Care Expires: 04/17/24    Subjective     Chief Complaint: (R) shoulder pain  Patient Comments/Goals: Pt agreeable to PT evaluation  Pain/Comfort:  Pain Rating 1: 5/10  Location - Side 1: Right  Location 1: shoulder  Pain Addressed 1: Reposition    Social History:  Living Environment: Patient lives with their parent(s)   Prior Level of Function: Prior to admission, patient was independent; worked as a  in a school.  Equipment Used at Home: none  DME owned (not currently used): none  Assistance Upon Discharge: family    Objective:     Communicated with Lizz sanchez prior to session. Patient found HOB elevated with peripheral IV, telemetry upon PT entry to room.    General Precautions: Standard,     Orthopedic Precautions:     Braces:  ((R) UE sling recommended, not yet provided)    Respiratory Status: Room air    Exams:  Cognition: Patient is oriented to Person, Place, Time, Situation  RLE ROM: WFL  RLE Strength: WFL  LLE ROM: WFL  LLE Strength: WFL  Sensation:    -       Intact  light/touch BLEs    Functional Mobility:  Gait belt applied - Yes  Bed Mobility  Supine to Sit: supervision for LE management and trunk management  Transfers  Sit to Stand: stand by assistance with no AD  Gait  Patient ambulated 250' with no AD  and supervision. Patient demonstrates steady gait.   Balance  Sitting: independence  Standing: supervision      Therapeutic Activities and Exercises:   Patient educated on role of acute care PT and PT POC and call light usage  Patient educated about importance of OOB mobility and remaining up in chair most of the day.  Pt OOB, ambulated in hallway /s UE sling then to B/S chair.    AM-PAC 6 CLICK MOBILITY  Total Score:24    Patient left up in chair with all lines intact, call button in reach, RN notified, and all needs in reach .    GOALS:   Multidisciplinary Problems       Physical Therapy Goals          Problem: Physical Therapy    Goal Priority Disciplines Outcome Goal Variances Interventions   Physical Therapy Goal     PT, PT/OT Adequate for Care Transition                         History:     Past Medical History:   Diagnosis Date    DM2 (diabetes mellitus, type 2) 05/28/2023    Hypertension     Prolonged Q-T interval on ECG 05/31/2023    Qtc 525 5/31/23       Past Surgical History:   Procedure Laterality Date    ABDOMINAL SURGERY      COLONOSCOPY N/A 06/01/2023    Procedure: COLONOSCOPY;  Surgeon: Thaddeus Carlos MD;  Location: [facility] ENDO;  Service: Endoscopy;  Laterality: N/A;    DIAGNOSTIC LAPAROSCOPY N/A 05/26/2019    Procedure: LAPAROSCOPY, DIAGNOSTIC Drainage of abscess ;  Surgeon: Jose Luis Hanson MD;  Location: [facility] OR;  Service: General;  Laterality: N/A;  Drain Placement    DIAGNOSTIC LAPAROSCOPY N/A 12/27/2020    Procedure: Diagnostic laparoscopy, drainage of intra-abdominal abscess;  Surgeon: Bhupendra Banda MD;  Location: [facility] OR;  Service: General;  Laterality: N/A;    LAPAROSCOPIC LYSIS OF ADHESIONS  05/26/2019    Procedure: LYSIS, ADHESIONS, LAPAROSCOPIC;  Surgeon: Jose Luis Hanson MD;  Location: [facility] OR;  Service: General;;       Time Tracking:     PT Received On: 04/17/24  PT Start Time: 1214  PT Stop Time: 1230  PT Total Time (min): 16 min     Billable Minutes: Evaluation  16    4/17/2024

## 2024-04-17 NOTE — ASSESSMENT & PLAN NOTE
I independently reviewed patient's lab work and images as documented. 52 yo female with DM/HTN admitted for R shoulder pain, found to have GBS bacteremia. ID consulted for bacteremia. Hospital course notable for CT of R shoulder that showed soft tissue edema/probable small joint effusion. She was evaluated by ortho - dry tap, though was able to aspirate small amount of blood that was sent for cx that have been no growth to date (not enough to send for fluid studies). Blcx on admit with GBS, repeat blcx ngtd. TTE without vegetations. MRI of shoulder and spine pending. Prior lumbar spine attempted, incomplete. Concern for OM given back pain, fever, and bacteremia.     Recommendations:  -febrile overnight, blcx x2 (ordered)  -stop cefepime, de-escalate to ceftriaxone 2g iv daily (ordered)  -continue vancomycin pharm to dose  -stop vancomycin in 24-48hr  if repeat cx remain no growth for staph  -MRI C/T/L and R shoulder  -follow up ortho plans

## 2024-04-17 NOTE — PROGRESS NOTES
[facility] Medicine  Progress Note    Patient Name: Shahida Ortega  MRN: [id number]  Patient Class: IP- Inpatient   Admission Date: 4/15/2024  Length of Stay: 2 days  Attending Physician: Nima Pompa,*  Primary Care Provider: Janusz Staton MD        Subjective:     Principal Problem:Sepsis        HPI:  53F with pmh of dm and htn who presents due to 3 day h/o right shoulder pain with associated redness. Patient reports being seen here in the ED for her symptoms on 4/12/24 and was prescribed Valium with relief of back pain but not shoulder. Patient denies taking any medications today for relief. Patient denies any recent injuries or trauma. In ed pt started on abx and fluids for sepsis and ortho consulted who performed arthrocentesis of the should with fluids sent for culture and pending. Pt denies tobacco, alcohol or drug use. Pt denies previous ortho surgerie     Overview/Hospital Course:  53F with pmh of DM and HTN who was admitted for sepsis s/t atraumatic right shoulder pain and erythema and leukocytosis.  CT scan was performed of the right shoulder which reveal a small effusion. Ortho was consulted s/p Aspiration right shoulder joint revealed dry tap.  Small bit of blood was aspirated from subacromial bursa on 4/15 and sent for cultures but unable to send for cell count. Blood cultures 2/2 bottles revealed strep agalactiae.  Repeat blood cultures pending.  Review of records revealed that patient did have strep agalactiae UTI in 01/2024.  On vancomycin and cefepime.  Patient also reports persistent low back pain.  Follow leukocytosis trend.  Urinalysis with 10 WBC and 2+ leukocyte esterase  No shortness of breath/cough/dysuria/abdominal pain/nausea/vomiting.  Echo pending.  MRI L-spine limited secondary to pain.  Consulted ID.  Recommended MRI C/T/L.  Premedicate with analgesic.  Carlos fever overnight.  Repeat blood cultures pending.  Change antibiotics to ceftriaxone and  vancomycin    Interval History:  Spiked T-max of 101.4° overnight.  Reports right shoulder and low back pain.    Review of Systems   Constitutional: Negative.    Respiratory: Negative.     Cardiovascular: Negative.    Gastrointestinal: Negative.    Musculoskeletal:  Positive for arthralgias (Right shoulder pain) and back pain.     Objective:     Vital Signs (Most Recent):  Temp: 98.5 °F (36.9 °C) (04/17/24 1106)  Pulse: 96 (04/17/24 1106)  Resp: 16 (04/17/24 1215)  BP: 123/69 (04/17/24 1106)  SpO2: 99 % (04/17/24 1106) Vital Signs (24h Range):  Temp:  [98.5 °F (36.9 °C)-101.4 °F (38.6 °C)] 98.5 °F (36.9 °C)  Pulse:  [] 96  Resp:  [16-20] 16  SpO2:  [95 %-99 %] 99 %  BP: (119-173)/(69-88) 123/69     Weight: 92.5 kg (203 lb 14.8 oz)  Body mass index is 31.94 kg/m².    Intake/Output Summary (Last 24 hours) at 4/17/2024 1441  Last data filed at 4/17/2024 0802  Gross per 24 hour   Intake 240 ml   Output 800 ml   Net -560 ml         Physical Exam  Constitutional:       General: She is not in acute distress.     Appearance: She is normal weight.   Cardiovascular:      Rate and Rhythm: Normal rate.      Pulses: Normal pulses.   Pulmonary:      Effort: No respiratory distress.      Breath sounds: Normal breath sounds. No wheezing.   Abdominal:      General: Bowel sounds are normal. There is no distension.      Palpations: Abdomen is soft.      Tenderness: There is no abdominal tenderness.   Musculoskeletal:         General: Tenderness (Lower back spinal and paraspinal area.  Right shoulder) present.      Right lower leg: No edema.      Left lower leg: No edema.   Skin:     General: Skin is warm.   Neurological:      Mental Status: She is alert and oriented to person, place, and time. Mental status is at baseline.   Psychiatric:         Mood and Affect: Mood normal.             Significant Labs: All pertinent labs within the past 24 hours have been reviewed.    Significant Imaging: I have reviewed all pertinent imaging  "results/findings within the past 24 hours.    Assessment/Plan:      * Sepsis  This patient does have evidence of infective focus  My overall impression is sepsis.  Source: Skin and Soft Tissue (location shoulder)  Antibiotics given-   Antibiotics (72h ago, onward)      Start     Stop Route Frequency Ordered    04/16/24 0300  vancomycin 1,500 mg in dextrose 5 % (D5W) 250 mL IVPB (Vial-Mate)         -- IV Every 12 hours (non-standard times) 04/15/24 1520    04/15/24 1445  ceFEPIme (MAXIPIME) 2 g in dextrose 5 % in water (D5W) 100 mL IVPB (MB+)         -- IV Every 8 hours (non-standard times) 04/15/24 1338    04/15/24 1437  vancomycin - pharmacy to dose  (vancomycin IVPB (PEDS and ADULTS))        Placed in "And" Linked Group    -- IV pharmacy to manage frequency 04/15/24 1338          Latest lactate reviewed-  Recent Labs   Lab 04/15/24  0946   LACTATE 1.1       Organ dysfunction indicated by  none    Fluid challenge Ideal Body Weight- The patient's ideal body weight is Ideal body weight: 61.6 kg (135 lb 12.9 oz) which will be used to calculate fluid bolus of 30 ml/kg for treatment of septic shock.      Post- resuscitation assessment Yes Perfusion exam was performed within 6 hours of septic shock presentation after bolus shows Adequate tissue perfusion assessed by non-invasive monitoring       Will Not start Pressors- Levophed for MAP of 65  Source control achieved by: abx ordered. BC cultures sent and pending    Bacteremia    2/2 blood cultures with group B Streptococcus agalactiae repeat blood cultures pending  On vancomycin and cefepime, de-escalated to vancomycin and ceftriaxone.  Id on board.  Obtain echocardiogram and MRI C/T/L-spine.  Urine with 10 WBCs and 3+ leukocyte esterase patient denied dysuria.  No cough/abdominal pain//chest pain/nausea/vomiting/diarrhea    Pyogenic arthritis of shoulder region  Arthrocentesis performed in ED by ortho.  Culture sent and still pending we will follow up.  ContinueEmpiric " vanc and cefepime     A-fib  Patient with Paroxysmal (<7 days) atrial fibrillation which is controlled currently with Beta Blocker. Patient is currently in sinus rhythm.[id number] Score: 3  Anticoagulation:  Patient does not appear to be on home anticoagulation.  We will continue aspirin.      Paroxysmal AFib. During episode of sepsis in 2023. Not on anticoagulation because it was a brief episode per cardiology. Rec to look for recurrence with the help of event monitor. If a recurrence noted on event monitor, consider adding anticoagulation     Prolonged Q-T interval on ECG  Noted history.  We will avoid prolonging agents as much as possible      DM2 (diabetes mellitus, type 2)  Patient's FSGs are uncontrolled due to hyperglycemia on current medication regimen.  Last A1c reviewed-   Lab Results   Component Value Date    HGBA1C 11.3 (H) 03/16/2022     Most recent fingerstick glucose reviewed-   Recent Labs   Lab 04/16/24  1622 04/17/24  0659 04/17/24  1105   POCTGLUCOSE 280* 255* 286*       Current correctional scale  Low  Maintain anti-hyperglycemic dose as follows-   Antihyperglycemics (From admission, onward)      Start     Stop Route Frequency Ordered    04/16/24 2100  insulin detemir U-100 (Levemir) pen 15 Units         -- SubQ Nightly 04/16/24 0854    04/15/24 1504  insulin aspart U-100 pen 0-5 Units         -- SubQ Before meals & nightly PRN 04/15/24 1404          Hold Oral hypoglycemics while patient is in the hospital.    Hypertension  Chronic, controlled. Latest blood pressure and vitals reviewed-     Temp:  [98 °F (36.7 °C)-101.1 °F (38.4 °C)]   Pulse:  []   Resp:  [16-20]   BP: (133-173)/(76-92)   SpO2:  [97 %-99 %] .   Home meds for hypertension were reviewed and noted below.   Hypertension Medications               carvediloL (COREG) 3.125 MG tablet Take 2 tablets (6.25 mg total) by mouth 2 (two) times daily.    hydroCHLOROthiazide (HYDRODIURIL) 25 MG tablet Take 1 tablet (25 mg total) by mouth  once daily.    losartan (COZAAR) 100 MG tablet Take 1 tablet (100 mg total) by mouth once daily.            While in the hospital, will manage blood pressure as follows; Continue home antihypertensive regimen    Will utilize p.r.n. blood pressure medication only if patient's blood pressure greater than 180/110 and she develops symptoms such as worsening chest pain or shortness of breath.    Elevated procalcitonin  In setting of active infection.  Treatment as stated below        VTE Risk Mitigation (From admission, onward)           Ordered     heparin (porcine) injection 5,000 Units  Every 8 hours         04/15/24 1404     IP VTE HIGH RISK PATIENT  Once         04/15/24 1404     Place sequential compression device  Until discontinued         04/15/24 1404                    Discharge Planning   NAYELI: 4/18/2024     Code Status: Full Code   Is the patient medically ready for discharge?:     Reason for patient still in hospital (select all that apply): Patient trending condition and Treatment  Discharge Plan A: Home with family                  Nima Pompa MD  Department of [facility] Medicine   [facility] [place] [facility]

## 2024-04-17 NOTE — HPI
52 yo female with DM/HTN who was initially  admitted to [facility] for R shoulder pain with associated redness, and found to have GBS bacteremia. ID consulted for bacteremia. OSH course notable for MRI/CT of R shoulder noted soft tissue edema/joint effusion concerning for septic arthritis and osteomyelitis. MRI imaging also with concern for T5-T11 preverterbral abscess. She was evaluated by OSH ortho - dry tap, though was able to aspirate small amount of blood that was sent for cx that have been no growth to date (not enough to send for fluid studies). Blcx on admit with GBS. She was started on vanc/cefepime. Upon initial ID evaluation at OSH, patient denied toxic habits, reported rash with PCN, or trauma/travel/TB exposure. She denied abdominal/respiratory/dental or  symptoms prior to admission, main complaint was back pain and R shoulder pain. After initial ID evaluation at OSH, Cefepime/Vancomycin were deescalated to Ceftriaxone due to NGTD on cultures.  She was transferred to [facility] for further surgical evaluation. Orthopedic surgery was consulted for concerns of septic arthritis and CTS consulted for VATS evaluation.    Of note, pt had had urine cx obtained 1/2024 that was positive for MSSA and GBS, s/p nitrofurantoin. Also had Ecoli bacteremia in 5/2023, tx with IV and transitioned to cefdinir.

## 2024-04-17 NOTE — PT/OT/SLP EVAL
Occupational Therapy   Evaluation and Treatment    Name: Shahida Ortega  MRN: [id number]  Admitting Diagnosis: Sepsis  Recent Surgery: * No surgery found *      Recommendations:     Discharge Recommendations: Low Intensity Therapy (family able to assist as needed)  Discharge Equipment Recommendations:   (None anticipated.)  Barriers to discharge:   (current functional level)    Assessment:   Initial OT eval/treat complete.  Pt. Groggy and willing to participate; RN reports elevated temp just prior to eval.  SBA for transitional movements and incidental CGA for RUE support during functional transfers and household level ambulation to increase comfort during task as RUE sling still not obtained.  Assist needed for threading RUE into hospital gown d/t limited limb function, pain, decreased ROM/strength.  Pt. With bladder accident this day requiring retrieval of needed items for clean up as well as assist with cleaning BLE feet and lower legs d/t limited trunk mobility and back discomfort.  No DME in use PTA.  Recommend post acute Low Intensity therapy.  Lives with mother and sister that can provide assist as needed.  Attempted to obtain sling on unit as well as contacting house supervisor; house supervisor plans to bring down sling for RUE for comfort with RN made aware.  To benefit from continued acute care OT services to increase independence in self-care/functional transfers.  OT to follow.     Shahida Ortega is a 53 y.o. female with a medical diagnosis of Sepsis.  She presents with below deficits decreasing independence in self-care/functional transfers. Performance deficits affecting function: impaired self care skills, impaired functional mobility, decreased upper extremity function, decreased safety awareness, pain, decreased ROM, edema.      Rehab Prognosis: Good; patient would benefit from acute skilled OT services to address these deficits and reach maximum level of function.       Plan:     Patient to be seen 4  "x/week to address the above listed problems via self-care/home management, therapeutic exercises  Plan of Care Expires: 05/01/24  Plan of Care Reviewed with: patient    Subjective     Chief Complaint: With c/o 5/10 RUE-shoulder/arm pain.  Patient/Family Comments/goals:  Expressed need to toilet.    Occupational Profile:  Lives with mother and younger sister in [facility] with 0 AMINATA; has tub/shower combo and standard toilet.  Previously independent in ADL and IADL; sister cooks and drives to important MD appts.   Equipment Used at Home: none  Assistance upon Discharge: Mother and sister able to assist.     Pain/Comfort:  Pain Rating 1: 5/10  Location - Side 1: Right  Location - Orientation 1: upper  Location 1: shoulder  Pain Addressed 1: Distraction, Nurse notified, Cessation of Activity, Reposition  Pain Rating Post-Intervention 1: 5/10    Patients cultural, spiritual, Lutheran conflicts given the current situation:  (None stated.)    Objective:     Communicated with: Radhika GRANDE RN prior to session.  Patient found up in chair with telemetry, peripheral IV upon OT entry to room.    General Precautions: Standard, fall  Orthopedic Precautions: N/A  Braces:  (RUE sling for comfort; not in room)  Respiratory Status: Room air    Occupational Performance:    Functional Mobility/Transfers:  Sit<>stand bedside chair without AD with SBA and increased time.  Step transfer bedside chair<>BSC with incidental CGA for RUE support and cuing for safe hand placement and forward trunk during transitional movements.      Activities of Daily Living:  Pt. Without RUE sling at bedside and states, "I keep asking about it"; attempted to obtain on unit prior to functional tasks though without success.  Having bladder accident seated at bedside chair with urine soiling gown, pad, and floor.  Pt. Completing void seated at BSC.  Handed wipes and able to clean front/back javier regions standing as well as cleaning inner thighs while seated all with " "setup and SBA.  Assist needed for cleaning feet and donning socks this day d/t back discomfort requiring TOTOL A to don socks this day.  Handed hospital gown and requiring assist to thread RUE d/t decreased limb function, movement, and pain though able to thread LUE and requiring assist with tying at neck.   Assist needed cleaning bedside chair and floor d/t large bladder accident.     Cognitive/Visual Perceptual:  Cognitive/Psychosocial Skills:  -       Oriented to: Person, Place, Time, and Situation   -       Follows Commands/attention:Follows one-step commands  -       Communication: able to make basic needs known and answers questions appropriately  -       Memory: No Deficits noted  -       Safety awareness/insight to disability: impaired   -       Mood/Affect/Coping skills/emotional control: Cooperative and Groggy  Visual/Perceptual:  grossly intact    Physical Exam:  Postural examination/scapula alignment: -       Rounded shoulders  Edema:  Mild to Moderate to RUE forearm and upper arm  Upper Extremity Range of Motion:  -       Right Upper Extremity:  approx. 25% of shoulder flex and 50% elbow flex/ext  -       Left Upper Extremity: WFL  Upper Extremity Strength: -       Right Upper Extremity:  2+/5 proximally and and 3-/5 distally with Pt. Limited by pain and swelling to limb  -       Left Upper Extremity: WFL   Strength: -       Right Upper Extremity:  fair plus  -       Left Upper Extremity: WFL    AMPAC 6 Click ADL:  AMPAC Total Score: 18    Treatment & Education:  Educated on role of OT and POC.   Sit<>stand bedside chair without AD with SBA and increased time.  Step transfer bedside chair<>BSC with incidental CGA for RUE support and cuing for safe hand placement and forward trunk during transitional movements.    Pt. Without RUE sling at bedside and states, "I keep asking about it"; attempted to obtain on unit prior to functional tasks though without success.  Having bladder accident seated at " bedside chair with urine soiling gown, pad, and floor.  Pt. Completing void seated at C.  Handed wipes and able to clean front/back javier regions standing as well as cleaning inner thighs while seated all with setup and SBA.  Assist needed for cleaning feet and donning socks this day d/t back discomfort requiring TOTOL A to don socks this day.  Handed hospital gown and requiring assist to thread RUE d/t decreased limb function, movement, and pain though able to thread LUE and requiring assist with tying at neck.   Assist needed cleaning bedside chair and floor d/t large bladder accident.   Received call light review and importance of calling for assist as needed.     Patient left up in chair with all lines intact, call button in reach, and nursing notified    GOALS:   Multidisciplinary Problems       Occupational Therapy Goals          Problem: Occupational Therapy    Goal Priority Disciplines Outcome Interventions   Occupational Therapy Goal     OT, PT/OT Ongoing, Progressing    Description: Goals to be met by: 5/1/2024     Patient will increase functional independence with ADLs by performing:    UE Dressing with Modified [place] including donning/doffing RUE sling.  LE Dressing with Modified [place].  Grooming while standing at sink with Modified [place].  Toileting from toilet with Modified [place] for hygiene and clothing management.   Toilet transfer to toilet with Modified [place].                         History:     Past Medical History:   Diagnosis Date    DM2 (diabetes mellitus, type 2) 05/28/2023    Hypertension     Prolonged Q-T interval on ECG 05/31/2023    Qtc 525 5/31/23         Past Surgical History:   Procedure Laterality Date    ABDOMINAL SURGERY      COLONOSCOPY N/A 06/01/2023    Procedure: COLONOSCOPY;  Surgeon: Thaddeus Carlos MD;  Location: [facility];  Service: Endoscopy;  Laterality: N/A;    DIAGNOSTIC LAPAROSCOPY N/A 05/26/2019    Procedure: LAPAROSCOPY, DIAGNOSTIC  Drainage of abscess ;  Surgeon: Jose Luis Hanson MD;  Location: [facility] OR;  Service: General;  Laterality: N/A;  Drain Placement    DIAGNOSTIC LAPAROSCOPY N/A 12/27/2020    Procedure: Diagnostic laparoscopy, drainage of intra-abdominal abscess;  Surgeon: Bhupendra Banda MD;  Location: [facility] system OR;  Service: General;  Laterality: N/A;    LAPAROSCOPIC LYSIS OF ADHESIONS  05/26/2019    Procedure: LYSIS, ADHESIONS, LAPAROSCOPIC;  Surgeon: Jose Luis Hanson MD;  Location: Health system OR;  Service: General;;       Time Tracking:     OT Date of Treatment: 04/17/24  OT Start Time: 1608  OT Stop Time: 1649  OT Total Time (min): 41 min    After session, contacting house supervisor for RUE sling retrieval.  Plans to bring sling to room with RN made aware.  Pt. To wear sling for comfort.     Billable Minutes:Evaluation 15  Self Care/Home Management 26 4/17/2024

## 2024-04-17 NOTE — PLAN OF CARE
Problem: Occupational Therapy  Goal: Occupational Therapy Goal  Description: Goals to be met by: 5/1/2024     Patient will increase functional independence with ADLs by performing:    UE Dressing with Modified [place] including donning/doffing RUE sling.  LE Dressing with Modified [place].  Grooming while standing at sink with Modified [place].  Toileting from toilet with Modified [place] for hygiene and clothing management.   Toilet transfer to toilet with Modified [place].    Outcome: Ongoing, Progressing     Initial OT eval/treat complete.  No DME in use PTA.  Recommend post acute Low Intensity therapy.  Lives with mother and sister that can provide assist as needed.  Attempted to obtain sling on unit as well as contacting house supervisor; house supervisor plans to bring down sling for RUE for comfort with RN made aware.  To benefit from continued acute care OT services to increase independence in self-care/functional transfers.  OT to follow.

## 2024-04-17 NOTE — NURSING
[facility]  Nurses Note -- 4 Eyes      4/17/2024       Skin assessed on: Q Shift      [x] No Pressure Injuries Present    []Prevention Measures Documented    [] Yes LDA  for Pressure Injury Previously documented     [] Yes New Pressure Injury Discovered   [] LDA for New Pressure Injury Added      Attending RN:  Maya Farah LPN     Second RN:  Radhika Wells RN

## 2024-04-17 NOTE — PLAN OF CARE
Problem: Adult Inpatient Plan of Care  Goal: Plan of Care Review  4/17/2024 0749 by Maya Farah LPN  Outcome: Ongoing, Progressing  4/17/2024 0739 by Maya Farah LPN  Outcome: Ongoing, Progressing  Goal: Patient-Specific Goal (Individualized)  4/17/2024 0749 by Maya Farah LPN  Outcome: Ongoing, Progressing  4/17/2024 0739 by Maya Farah LPN  Outcome: Ongoing, Progressing  Goal: Absence of Hospital-Acquired Illness or Injury  4/17/2024 0749 by Maya Farah LPN  Outcome: Ongoing, Progressing  4/17/2024 0739 by Maya Farah LPN  Outcome: Ongoing, Progressing  Goal: Optimal Comfort and Wellbeing  4/17/2024 0749 by Maya Farah LPN  Outcome: Ongoing, Progressing  4/17/2024 0739 by Maya Farah LPN  Outcome: Ongoing, Progressing  Goal: Readiness for Transition of Care  4/17/2024 0749 by Maya Farah LPN  Outcome: Ongoing, Progressing  4/17/2024 0739 by Maya Farah LPN  Outcome: Ongoing, Progressing

## 2024-04-17 NOTE — NURSING
[facility]  Nurses Note -- 4 Eyes      4/17/2024       Skin assessed on: Q Shift      [x] No Pressure Injuries Present    [x]Prevention Measures Documented    [] Yes LDA  for Pressure Injury Previously documented     [] Yes New Pressure Injury Discovered   [] LDA for New Pressure Injury Added      Attending RN:  Radhika Wells RN     Second RN:  Maya Farah LPN

## 2024-04-17 NOTE — ASSESSMENT & PLAN NOTE
2/2 blood cultures with group B Streptococcus agalactiae repeat blood cultures pending  On vancomycin and cefepime, de-escalated to vancomycin and ceftriaxone.  Id on board.  Obtain echocardiogram and MRI C/T/L-spine.  Urine with 10 WBCs and 3+ leukocyte esterase patient denied dysuria.  No cough/abdominal pain//chest pain/nausea/vomiting/diarrhea

## 2024-04-17 NOTE — PLAN OF CARE
Problem: Adjustment to Illness (Sepsis/Septic Shock)  Goal: Optimal Coping  4/17/2024 1737 by Radhika Wells, RN  Outcome: Ongoing, Progressing  4/17/2024 1413 by Radhika Wells RN  Outcome: Adequate for Care Transition  4/17/2024 0753 by Radhika Wells, RN  Outcome: Ongoing, Progressing

## 2024-04-17 NOTE — SUBJECTIVE & OBJECTIVE
Past Medical History:   Diagnosis Date    DM2 (diabetes mellitus, type 2) 05/28/2023    Hypertension     Prolonged Q-T interval on ECG 05/31/2023    Qtc 525 5/31/23       Past Surgical History:   Procedure Laterality Date    ABDOMINAL SURGERY      COLONOSCOPY N/A 06/01/2023    Procedure: COLONOSCOPY;  Surgeon: Thaddeus Carlos MD;  Location: [facility] ENDO;  Service: Endoscopy;  Laterality: N/A;    DIAGNOSTIC LAPAROSCOPY N/A 05/26/2019    Procedure: LAPAROSCOPY, DIAGNOSTIC Drainage of abscess ;  Surgeon: Jose Luis Hanson MD;  Location: [facility] OR;  Service: General;  Laterality: N/A;  Drain Placement    DIAGNOSTIC LAPAROSCOPY N/A 12/27/2020    Procedure: Diagnostic laparoscopy, drainage of intra-abdominal abscess;  Surgeon: Bhupendra Banda MD;  Location: [facility] OR;  Service: General;  Laterality: N/A;    LAPAROSCOPIC LYSIS OF ADHESIONS  05/26/2019    Procedure: LYSIS, ADHESIONS, LAPAROSCOPIC;  Surgeon: Jose Luis Hanson MD;  Location: [facility] OR;  Service: General;;       Review of patient's allergies indicates:   Allergen Reactions    Penicillins Hives    Amoxicillin     Grass pollen-yun grass standard Other (See Comments)       Medications:  Current Facility-Administered Medications   Medication Dose Route Frequency Provider Last Rate Last Admin    acetaminophen tablet 650 mg  650 mg Oral Q8H PRN Juan Cameron III, MD   650 mg at 04/16/24 2055    acetaminophen tablet 650 mg  650 mg Oral Q4H PRN Juan Cameron III, MD   650 mg at 04/17/24 0544    aspirin EC tablet 81 mg  81 mg Oral Daily Juan Cameron III, MD   81 mg at 04/17/24 0809    carvediloL tablet 6.25 mg  6.25 mg Oral BID Juan Cameron III, MD   6.25 mg at 04/17/24 0809    ceFEPIme (MAXIPIME) 2 g in dextrose 5 % in water (D5W) 100 mL IVPB (MB+)  2 g Intravenous Q8H Jamal Jameson PA-C   Stopped at 04/17/24 0617    dextrose 10% bolus 125 mL 125 mL  12.5 g Intravenous PRN Juan Cameron III, MD        dextrose 10% bolus 250 mL 250 mL  25 g Intravenous  PRN Juan Cameron III, MD        glucagon (human recombinant) injection 1 mg  1 mg Intramuscular PRN Juan Cameron III, MD        glucose chewable tablet 16 g  16 g Oral PRN Juan Cameron III, MD        glucose chewable tablet 24 g  24 g Oral PRN Juan Cameron III, MD        heparin (porcine) injection 5,000 Units  5,000 Units Subcutaneous Q8H Juan Cameron III, MD   5,000 Units at 04/17/24 0543    HYDROmorphone injection 1 mg  1 mg Intravenous Q4H PRN Juan Cameron III, MD   1 mg at 04/16/24 1859    HYDROmorphone injection 1 mg  1 mg Intravenous Once Nima Pompa MD        insulin aspart U-100 pen 0-5 Units  0-5 Units Subcutaneous QID (AC + HS) PRN Juan Cameron III, MD   3 Units at 04/17/24 0809    insulin detemir U-100 (Levemir) pen 15 Units  15 Units Subcutaneous QHS Nima Pompa MD   15 Units at 04/17/24 0108    losartan tablet 100 mg  100 mg Oral Daily Juan Cameron III, MD   100 mg at 04/17/24 0809    naloxone 0.4 mg/mL injection 0.02 mg  0.02 mg Intravenous PRN Juan Cameron III, MD        oxyCODONE immediate release tablet 5 mg  5 mg Oral Q6H PRN Juan Cameron III, MD   5 mg at 04/17/24 0326    polyethylene glycol packet 17 g  17 g Oral Daily Juan Cameron III, MD   17 g at 04/15/24 1525    senna-docusate 8.6-50 mg per tablet 1 tablet  1 tablet Oral BID Juan Cameron III, MD   1 tablet at 04/17/24 0104    sodium chloride 0.9% flush 10 mL  10 mL Intravenous PRN Juan Cameron III, MD        vancomycin (VANCOCIN) 1,750 mg in dextrose 5 % (D5W) 500 mL IVPB  20 mg/kg Intravenous Q12H Nima Pompa MD   Stopped at 04/17/24 0532    vancomycin - pharmacy to dose   Intravenous pharmacy to manage frequency Jamal Jameson PA-C         Antibiotics (From admission, onward)      Start     Stop Route Frequency Ordered    04/17/24 0300  vancomycin (VANCOCIN) 1,750 mg in dextrose 5 % (D5W) 500 mL IVPB         -- IV Every 12 hours (non-standard  "times) 04/17/24 0256    04/15/24 1445  ceFEPIme (MAXIPIME) 2 g in dextrose 5 % in water (D5W) 100 mL IVPB (MB+)         -- IV Every 8 hours (non-standard times) 04/15/24 1338    04/15/24 1437  vancomycin - pharmacy to dose  (vancomycin IVPB (PEDS and ADULTS))        Placed in "And" Linked Group    -- IV pharmacy to manage frequency 04/15/24 1338          Antifungals (From admission, onward)      None          Antivirals (From admission, onward)      None             Immunization History   Administered Date(s) Administered    COVID-19, MRNA, LN-S, PF (MODERNA FULL 0.5 ML DOSE) 03/16/2021, 04/13/2021    Pneumococcal Conjugate - 13 Valent 12/29/2020       Family History       Problem Relation (Age of Onset)    Diabetes Father          Social History     Socioeconomic History    Marital status: Single   Tobacco Use    Smoking status: Never    Smokeless tobacco: Never   Substance and Sexual Activity    Alcohol use: Not Currently    Drug use: Not Currently    Sexual activity: Not Currently     Social Determinants of Health     Financial Resource Strain: Low Risk  (4/16/2024)    Overall Financial Resource Strain (CARDIA)     Difficulty of Paying Living Expenses: Not very hard   Food Insecurity: No Food Insecurity (4/16/2024)    Hunger Vital Sign     Worried About Running Out of Food in the Last Year: Never true     Ran Out of Food in the Last Year: Never true   Transportation Needs: No Transportation Needs (4/16/2024)    PRAPARE - Transportation     Lack of Transportation (Medical): No     Lack of Transportation (Non-Medical): No   Physical Activity: Sufficiently Active (4/16/2024)    Exercise Vital Sign     Days of Exercise per Week: 7 days     Minutes of Exercise per Session: 60 min   Stress: No Stress Concern Present (4/16/2024)    [employer] - Occupational Stress Questionnaire     Feeling of Stress : Only a little   Social Connections: Moderately Isolated (4/16/2024)    Social Connection " and Isolation Panel [NHANES]     Frequency of Communication with Friends and Family: More than three times a week     Frequency of Social Gatherings with Friends and Family: More than three times a week     Attends [employer] Services: More than 4 times per year     Active Member of Clubs or Organizations: No     Attends Club or Organization Meetings: Never     Marital Status: Never    Housing Stability: Low Risk  (4/16/2024)    Housing Stability Vital Sign     Unable to Pay for Housing in the Last Year: No     Number of Times Moved in the Last Year: 1     Homeless in the Last Year: No     Review of Systems   Genitourinary:  Negative for dysuria.   Musculoskeletal:  Positive for arthralgias, back pain and myalgias.   All other systems reviewed and are negative.    Objective:     Vital Signs (Most Recent):  Temp: 98.5 °F (36.9 °C) (04/17/24 0700)  Pulse: 96 (04/17/24 0700)  Resp: 18 (04/17/24 0700)  BP: 119/73 (04/17/24 0700)  SpO2: 96 % (04/17/24 0700) Vital Signs (24h Range):  Temp:  [98 °F (36.7 °C)-101.4 °F (38.6 °C)] 98.5 °F (36.9 °C)  Pulse:  [] 96  Resp:  [16-20] 18  SpO2:  [95 %-97 %] 96 %  BP: (119-173)/(73-88) 119/73     Weight: 92.5 kg (203 lb 14.8 oz)  Body mass index is 31.94 kg/m².    Estimated Creatinine Clearance: 129.6 mL/min (based on SCr of 0.6 mg/dL).     Physical Exam  Constitutional:       General: She is not in acute distress.     Appearance: She is not ill-appearing or toxic-appearing.   Pulmonary:      Effort: Pulmonary effort is normal. No respiratory distress.   Abdominal:      General: There is no distension.      Palpations: Abdomen is soft.      Tenderness: There is no abdominal tenderness. There is no guarding.   Musculoskeletal:         General: Tenderness (R shoulder) present.      Right lower leg: No edema.      Left lower leg: No edema.   Skin:     General: Skin is warm and dry.   Neurological:      Mental Status: She is alert and oriented to person, place, and time.           Significant Labs:   Microbiology Results (last 7 days)       Procedure Component Value Units Date/Time    Aerobic culture (Specify Source) **CANNOT BE ORDERED AS STAT** [[id number]] Collected: 04/15/24 1346    Order Status: Completed Specimen: Shoulder, Right Updated: 04/17/24 0916     Aerobic Bacterial Culture No growth    Blood Culture #2 **CANNOT BE ORDERED STAT** [[id number]]  (Abnormal) Collected: 04/15/24 0946    Order Status: Completed Specimen: Blood Updated: 04/17/24 0803     Blood Culture, Routine Gram stain cameron bottle: Gram positive cocci in chains resembling Strep      Results called to and read back by:Lizz Wells  04/16/2024  08:12      STREPTOCOCCUS AGALACTIAE (GROUP B)  Beta-hemolytic streptococci are routinely susceptible to   penicillins,cephalosporins and carbapenems.  For susceptibility see order #[id number]      Blood Culture #1 **CANNOT BE ORDERED STAT** [[id number]]  (Abnormal)  (Susceptibility) Collected: 04/15/24 0928    Order Status: Completed Specimen: Blood from Peripheral, Antecubital, Left Updated: 04/17/24 0803     Blood Culture, Routine Gram stain cameron bottle: Gram positive cocci in chains resembling Strep      Results called to and read back by: Maya Farah 3W 04/15/2024  20:19      STREPTOCOCCUS AGALACTIAE (GROUP B)  Beta-hemolytic streptococci are routinely susceptible to   penicillins,cephalosporins and carbapenems.      Blood culture [[id number]] Collected: 04/15/24 2209    Order Status: Completed Specimen: Blood from Peripheral, Antecubital, Left Updated: 04/16/24 2303     Blood Culture, Routine No Growth to date      No Growth to date    Blood culture [[id number]] Collected: 04/15/24 2205    Order Status: Completed Specimen: Blood Updated: 04/16/24 2303     Blood Culture, Routine No Growth to date      No Growth to date            Significant Imaging: I have reviewed all pertinent imaging results/findings within the past 24 hours.

## 2024-04-17 NOTE — CONSULTS
[facility]  Infectious Disease  Consult Note    Patient Name: Shahida Ortega  MRN: [id number]  Admission Date: 4/15/2024  Hospital Length of Stay: 2 days  Attending Physician: Nima Pompa,*  Primary Care Provider: Janusz Staton MD     Isolation Status: No active isolations    Patient information was obtained from patient, past medical records, ER records, and primary team.      Inpatient consult to Infectious Diseases  Consult performed by: Marilee Begum MD  Consult ordered by: Nima Pompa MD        Assessment/Plan:     ID  Bacteremia  I independently reviewed patient's lab work and images as documented. 52 yo female with DM/HTN admitted for R shoulder pain, found to have GBS bacteremia. ID consulted for bacteremia. Hospital course notable for CT of R shoulder that showed soft tissue edema/probable small joint effusion. She was evaluated by ortho - dry tap, though was able to aspirate small amount of blood that was sent for cx that have been no growth to date (not enough to send for fluid studies). Blcx on admit with GBS, repeat blcx ngtd. Labs notable for leukocytosis. TTE without vegetations. MRI of shoulder and spine pending. Prior lumbar spine attempted, incomplete. Concern for OM given back pain, fever, and bacteremia.     Recommendations:  -febrile overnight, blcx x2 (ordered)  -stop cefepime, de-escalate to ceftriaxone 2g iv daily (ordered) - no pseudomonas isolated  -continue vancomycin pharm to dose  -stop vancomycin in 24-48hr  if repeat cx remain no growth for staph  -MRI spine wwo con C/T/L and R shoulder for further evaluation  -follow up ortho plans        Thank you for your consult. I will follow-up with patient. Please contact us if you have any additional questions. Above d/w primary team.       Marilee Begum MD  Infectious Disease  [facility]    Subjective:     Principal Problem: Sepsis    HPI: 52 yo female with DM/HTN admitted for R shoulder  pain, found to have GBS bacteremia. ID consulted for bacteremia. Hospital course notable for CT of R shoulder that showed soft tissue edema/probable small joint effusion. She was evaluated by ortho - dry tap, though was able to aspirate small amoutn of blood that was sent for cx that have been no growth to date (not enough to send for fluid studies). Blcx on admit with GBS. Pt is currently on vanc/cefepime. Pt denied toxic habits, reported rash with PCN, or trauma/travel/TB exposure. She denied abdominal/respiratory/dental or  symptoms prior to admission, main complaint was back pain and R shoulder pain.     Of note, pt had had urince cx obtained 1/2024 that was positive for MSSA and GBS, s/p nitrofurantoin. Also had Ecoli bacteremia in 5/2023, tx with IV and transitioned to cefdinir.          Past Medical History:   Diagnosis Date    DM2 (diabetes mellitus, type 2) 05/28/2023    Hypertension     Prolonged Q-T interval on ECG 05/31/2023    Qtc 525 5/31/23       Past Surgical History:   Procedure Laterality Date    ABDOMINAL SURGERY      COLONOSCOPY N/A 06/01/2023    Procedure: COLONOSCOPY;  Surgeon: Thaddeus Carlos MD;  Location: [facility] ENDO;  Service: Endoscopy;  Laterality: N/A;    DIAGNOSTIC LAPAROSCOPY N/A 05/26/2019    Procedure: LAPAROSCOPY, DIAGNOSTIC Drainage of abscess ;  Surgeon: Jose Luis Hanson MD;  Location: [facility] OR;  Service: General;  Laterality: N/A;  Drain Placement    DIAGNOSTIC LAPAROSCOPY N/A 12/27/2020    Procedure: Diagnostic laparoscopy, drainage of intra-abdominal abscess;  Surgeon: Bhupendra Banda MD;  Location: [facility] OR;  Service: General;  Laterality: N/A;    LAPAROSCOPIC LYSIS OF ADHESIONS  05/26/2019    Procedure: LYSIS, ADHESIONS, LAPAROSCOPIC;  Surgeon: Jose Luis Hanson MD;  Location: [facility] OR;  Service: General;;       Review of patient's allergies indicates:   Allergen Reactions    Penicillins Hives    Amoxicillin     Grass pollen-june grass standard Other (See Comments)        Medications:  Current Facility-Administered Medications   Medication Dose Route Frequency Provider Last Rate Last Admin    acetaminophen tablet 650 mg  650 mg Oral Q8H PRN Juan Cameron III, MD   650 mg at 04/16/24 2055    acetaminophen tablet 650 mg  650 mg Oral Q4H PRN Juan Cameron III, MD   650 mg at 04/17/24 0544    aspirin EC tablet 81 mg  81 mg Oral Daily Juan Cameron III, MD   81 mg at 04/17/24 0809    carvediloL tablet 6.25 mg  6.25 mg Oral BID Juan Cameron III, MD   6.25 mg at 04/17/24 0809    ceFEPIme (MAXIPIME) 2 g in dextrose 5 % in water (D5W) 100 mL IVPB (MB+)  2 g Intravenous Q8H Jamal Jameson PA-C   Stopped at 04/17/24 0617    dextrose 10% bolus 125 mL 125 mL  12.5 g Intravenous PRN Juan Cameron III, MD        dextrose 10% bolus 250 mL 250 mL  25 g Intravenous PRN Juan Cameron III, MD        glucagon (human recombinant) injection 1 mg  1 mg Intramuscular PRN Juan Cameron III, MD        glucose chewable tablet 16 g  16 g Oral PRN Juan Cameron III, MD        glucose chewable tablet 24 g  24 g Oral PRN Juan Cameron III, MD        heparin (porcine) injection 5,000 Units  5,000 Units Subcutaneous Q8H Juan Cameron III, MD   5,000 Units at 04/17/24 0543    HYDROmorphone injection 1 mg  1 mg Intravenous Q4H PRN Juan Cameron III, MD   1 mg at 04/16/24 1859    HYDROmorphone injection 1 mg  1 mg Intravenous Once Nima Pompa MD        insulin aspart U-100 pen 0-5 Units  0-5 Units Subcutaneous QID (AC + HS) PRN Juan aCmeron III, MD   3 Units at 04/17/24 0809    insulin detemir U-100 (Levemir) pen 15 Units  15 Units Subcutaneous QHS Nima Pompa MD   15 Units at 04/17/24 0108    losartan tablet 100 mg  100 mg Oral Daily Juan Cameron III, MD   100 mg at 04/17/24 0809    naloxone 0.4 mg/mL injection 0.02 mg  0.02 mg Intravenous PRN Juan Cameron III, MD        oxyCODONE immediate release tablet 5 mg  5 mg Oral Q6H PRN Rakesh  "Juan ELIZABETH III, MD   5 mg at 04/17/24 0326    polyethylene glycol packet 17 g  17 g Oral Daily Juan Cameron III, MD   17 g at 04/15/24 1525    senna-docusate 8.6-50 mg per tablet 1 tablet  1 tablet Oral BID Juan Cameron III, MD   1 tablet at 04/17/24 0104    sodium chloride 0.9% flush 10 mL  10 mL Intravenous PRN Juan Cameron III, MD        vancomycin (VANCOCIN) 1,750 mg in dextrose 5 % (D5W) 500 mL IVPB  20 mg/kg Intravenous Q12H Nima Pompa MD   Stopped at 04/17/24 0532    vancomycin - pharmacy to dose   Intravenous pharmacy to manage frequency Jamal Jameson PA-C         Antibiotics (From admission, onward)      Start     Stop Route Frequency Ordered    04/17/24 0300  vancomycin (VANCOCIN) 1,750 mg in dextrose 5 % (D5W) 500 mL IVPB         -- IV Every 12 hours (non-standard times) 04/17/24 0256    04/15/24 1445  ceFEPIme (MAXIPIME) 2 g in dextrose 5 % in water (D5W) 100 mL IVPB (MB+)         -- IV Every 8 hours (non-standard times) 04/15/24 1338    04/15/24 1437  vancomycin - pharmacy to dose  (vancomycin IVPB (PEDS and ADULTS))        Placed in "And" Linked Group    -- IV pharmacy to manage frequency 04/15/24 1338          Antifungals (From admission, onward)      None          Antivirals (From admission, onward)      None             Immunization History   Administered Date(s) Administered    COVID-19, MRNA, LN-S, PF (MODERNA FULL 0.5 ML DOSE) 03/16/2021, 04/13/2021    Pneumococcal Conjugate - 13 Valent 12/29/2020       Family History       Problem Relation (Age of Onset)    Diabetes Father          Social History     Socioeconomic History    Marital status: Single   Tobacco Use    Smoking status: Never    Smokeless tobacco: Never   Substance and Sexual Activity    Alcohol use: Not Currently    Drug use: Not Currently    Sexual activity: Not Currently     Social Determinants of Health     Financial Resource Strain: Low Risk  (4/16/2024)    Overall Financial Resource Strain (CARDIA)     " Difficulty of Paying Living Expenses: Not very hard   Food Insecurity: No Food Insecurity (4/16/2024)    Hunger Vital Sign     Worried About Running Out of Food in the Last Year: Never true     Ran Out of Food in the Last Year: Never true   Transportation Needs: No Transportation Needs (4/16/2024)    PRAPARE - Transportation     Lack of Transportation (Medical): No     Lack of Transportation (Non-Medical): No   Physical Activity: Sufficiently Active (4/16/2024)    Exercise Vital Sign     Days of Exercise per Week: 7 days     Minutes of Exercise per Session: 60 min   Stress: No Stress Concern Present (4/16/2024)    [employer] - Occupational Stress Questionnaire     Feeling of Stress : Only a little   Social Connections: Moderately Isolated (4/16/2024)    Social Connection and Isolation Panel [NHANES]     Frequency of Communication with Friends and Family: More than three times a week     Frequency of Social Gatherings with Friends and Family: More than three times a week     Attends Mosque Services: More than 4 times per year     Active Member of Clubs or Organizations: No     Attends Club or Organization Meetings: Never     Marital Status: Never    Housing Stability: Low Risk  (4/16/2024)    Housing Stability Vital Sign     Unable to Pay for Housing in the Last Year: No     Number of Times Moved in the Last Year: 1     Homeless in the Last Year: No     Review of Systems   Genitourinary:  Negative for dysuria.   Musculoskeletal:  Positive for arthralgias, back pain and myalgias.   All other systems reviewed and are negative.    Objective:     Vital Signs (Most Recent):  Temp: 98.5 °F (36.9 °C) (04/17/24 0700)  Pulse: 96 (04/17/24 0700)  Resp: 18 (04/17/24 0700)  BP: 119/73 (04/17/24 0700)  SpO2: 96 % (04/17/24 0700) Vital Signs (24h Range):  Temp:  [98 °F (36.7 °C)-101.4 °F (38.6 °C)] 98.5 °F (36.9 °C)  Pulse:  [] 96  Resp:  [16-20] 18  SpO2:  [95 %-97 %] 96 %  BP:  (119-173)/(73-88) 119/73     Weight: 92.5 kg (203 lb 14.8 oz)  Body mass index is 31.94 kg/m².    Estimated Creatinine Clearance: 129.6 mL/min (based on SCr of 0.6 mg/dL).     Physical Exam  Constitutional:       General: She is not in acute distress.     Appearance: She is not ill-appearing or toxic-appearing.   Pulmonary:      Effort: Pulmonary effort is normal. No respiratory distress.   Abdominal:      General: There is no distension.      Palpations: Abdomen is soft.      Tenderness: There is no abdominal tenderness. There is no guarding.   Musculoskeletal:         General: Tenderness (R shoulder) present.      Right lower leg: No edema.      Left lower leg: No edema.   Skin:     General: Skin is warm and dry.   Neurological:      Mental Status: She is alert and oriented to person, place, and time.          Significant Labs:   Microbiology Results (last 7 days)       Procedure Component Value Units Date/Time    Aerobic culture (Specify Source) **CANNOT BE ORDERED AS STAT** [[id number]] Collected: 04/15/24 1346    Order Status: Completed Specimen: Shoulder, Right Updated: 04/17/24 0916     Aerobic Bacterial Culture No growth    Blood Culture #2 **CANNOT BE ORDERED STAT** [[id number]]  (Abnormal) Collected: 04/15/24 0946    Order Status: Completed Specimen: Blood Updated: 04/17/24 0803     Blood Culture, Routine Gram stain cmaeron bottle: Gram positive cocci in chains resembling Strep      Results called to and read back by:Lizz Wells  04/16/2024  08:12      STREPTOCOCCUS AGALACTIAE (GROUP B)  Beta-hemolytic streptococci are routinely susceptible to   penicillins,cephalosporins and carbapenems.  For susceptibility see order #[id number]      Blood Culture #1 **CANNOT BE ORDERED STAT** [[id number]]  (Abnormal)  (Susceptibility) Collected: 04/15/24 0928    Order Status: Completed Specimen: Blood from Peripheral, Antecubital, Left Updated: 04/17/24 0803     Blood Culture, Routine Gram stain cameron bottle: Gram  positive cocci in chains resembling Strep      Results called to and read back by: Maya Farah 3W 04/15/2024  20:19      STREPTOCOCCUS AGALACTIAE (GROUP B)  Beta-hemolytic streptococci are routinely susceptible to   penicillins,cephalosporins and carbapenems.      Blood culture [[id number]] Collected: 04/15/24 2209    Order Status: Completed Specimen: Blood from Peripheral, Antecubital, Left Updated: 04/16/24 2303     Blood Culture, Routine No Growth to date      No Growth to date    Blood culture [[id number]] Collected: 04/15/24 2205    Order Status: Completed Specimen: Blood Updated: 04/16/24 2303     Blood Culture, Routine No Growth to date      No Growth to date            Significant Imaging: I have reviewed all pertinent imaging results/findings within the past 24 hours.

## 2024-04-17 NOTE — NURSING
[facility] MEWS TRIGGER FOLLOW UP       MEWS Monitoring, Score is: 5  Indication for review: Temp 101.4    Tylenol given by primary RN. Care ongoing.    Bedside Nurse, Maya contacted, no concerns verbalized at this time, instructed to call [phone] for further concerns or assistance..

## 2024-04-18 LAB
ALBUMIN SERPL BCP-MCNC: 1.7 G/DL (ref 3.5–5.2)
ALP SERPL-CCNC: 101 U/L (ref 55–135)
ALT SERPL W/O P-5'-P-CCNC: 13 U/L (ref 10–44)
ANION GAP SERPL CALC-SCNC: 9 MMOL/L (ref 8–16)
AST SERPL-CCNC: 13 U/L (ref 10–40)
BASOPHILS NFR BLD: 0 % (ref 0–1.9)
BILIRUB SERPL-MCNC: 0.4 MG/DL (ref 0.1–1)
BUN SERPL-MCNC: 11 MG/DL (ref 6–20)
CALCIUM SERPL-MCNC: 9 MG/DL (ref 8.7–10.5)
CHLORIDE SERPL-SCNC: 100 MMOL/L (ref 95–110)
CO2 SERPL-SCNC: 23 MMOL/L (ref 23–29)
CREAT SERPL-MCNC: 0.7 MG/DL (ref 0.5–1.4)
DIFFERENTIAL METHOD BLD: ABNORMAL
EOSINOPHIL NFR BLD: 0 % (ref 0–8)
ERYTHROCYTE [DISTWIDTH] IN BLOOD BY AUTOMATED COUNT: 13.3 % (ref 11.5–14.5)
EST. GFR  (NO RACE VARIABLE): >60 ML/MIN/1.73 M^2
GLUCOSE SERPL-MCNC: 259 MG/DL (ref 70–110)
HCT VFR BLD AUTO: 33.3 % (ref 37–48.5)
HGB BLD-MCNC: 10.6 G/DL (ref 12–16)
IMM GRANULOCYTES # BLD AUTO: ABNORMAL K/UL (ref 0–0.04)
IMM GRANULOCYTES NFR BLD AUTO: ABNORMAL % (ref 0–0.5)
LYMPHOCYTES NFR BLD: 10 % (ref 18–48)
MAGNESIUM SERPL-MCNC: 1.7 MG/DL (ref 1.6–2.6)
MCH RBC QN AUTO: 28 PG (ref 27–31)
MCHC RBC AUTO-ENTMCNC: 31.8 G/DL (ref 32–36)
MCV RBC AUTO: 88 FL (ref 82–98)
METAMYELOCYTES NFR BLD MANUAL: 1 %
MONOCYTES NFR BLD: 4 % (ref 4–15)
MYELOCYTES NFR BLD MANUAL: 2 %
NEUTROPHILS NFR BLD: 80 % (ref 38–73)
NEUTS BAND NFR BLD MANUAL: 3 %
NRBC BLD-RTO: 0 /100 WBC
PHOSPHATE SERPL-MCNC: 2.8 MG/DL (ref 2.7–4.5)
PLATELET # BLD AUTO: 220 K/UL (ref 150–450)
PMV BLD AUTO: 10.1 FL (ref 9.2–12.9)
POCT GLUCOSE: 182 MG/DL (ref 70–110)
POCT GLUCOSE: 222 MG/DL (ref 70–110)
POCT GLUCOSE: 268 MG/DL (ref 70–110)
POCT GLUCOSE: 270 MG/DL (ref 70–110)
POCT GLUCOSE: 327 MG/DL (ref 70–110)
POTASSIUM SERPL-SCNC: 3.4 MMOL/L (ref 3.5–5.1)
PROT SERPL-MCNC: 6.7 G/DL (ref 6–8.4)
RBC # BLD AUTO: 3.79 M/UL (ref 4–5.4)
SODIUM SERPL-SCNC: 132 MMOL/L (ref 136–145)
WBC # BLD AUTO: 9.85 K/UL (ref 3.9–12.7)

## 2024-04-18 PROCEDURE — 25000003 PHARM REV CODE 250

## 2024-04-18 PROCEDURE — 25000003 PHARM REV CODE 250: Performed by: STUDENT IN AN ORGANIZED HEALTH CARE EDUCATION/TRAINING PROGRAM

## 2024-04-18 PROCEDURE — 36415 COLL VENOUS BLD VENIPUNCTURE: CPT | Performed by: STUDENT IN AN ORGANIZED HEALTH CARE EDUCATION/TRAINING PROGRAM

## 2024-04-18 PROCEDURE — 97110 THERAPEUTIC EXERCISES: CPT

## 2024-04-18 PROCEDURE — A9585 GADOBUTROL INJECTION: HCPCS | Performed by: STUDENT IN AN ORGANIZED HEALTH CARE EDUCATION/TRAINING PROGRAM

## 2024-04-18 PROCEDURE — 63600175 PHARM REV CODE 636 W HCPCS: Performed by: STUDENT IN AN ORGANIZED HEALTH CARE EDUCATION/TRAINING PROGRAM

## 2024-04-18 PROCEDURE — 99233 SBSQ HOSP IP/OBS HIGH 50: CPT | Mod: ,,, | Performed by: STUDENT IN AN ORGANIZED HEALTH CARE EDUCATION/TRAINING PROGRAM

## 2024-04-18 PROCEDURE — 25500020 PHARM REV CODE 255: Performed by: STUDENT IN AN ORGANIZED HEALTH CARE EDUCATION/TRAINING PROGRAM

## 2024-04-18 PROCEDURE — 83735 ASSAY OF MAGNESIUM: CPT | Performed by: STUDENT IN AN ORGANIZED HEALTH CARE EDUCATION/TRAINING PROGRAM

## 2024-04-18 PROCEDURE — 84100 ASSAY OF PHOSPHORUS: CPT | Performed by: STUDENT IN AN ORGANIZED HEALTH CARE EDUCATION/TRAINING PROGRAM

## 2024-04-18 PROCEDURE — 97535 SELF CARE MNGMENT TRAINING: CPT

## 2024-04-18 PROCEDURE — 11000001 HC ACUTE MED/SURG PRIVATE ROOM

## 2024-04-18 PROCEDURE — 80053 COMPREHEN METABOLIC PANEL: CPT | Performed by: STUDENT IN AN ORGANIZED HEALTH CARE EDUCATION/TRAINING PROGRAM

## 2024-04-18 PROCEDURE — 85027 COMPLETE CBC AUTOMATED: CPT | Performed by: STUDENT IN AN ORGANIZED HEALTH CARE EDUCATION/TRAINING PROGRAM

## 2024-04-18 PROCEDURE — 85007 BL SMEAR W/DIFF WBC COUNT: CPT | Performed by: STUDENT IN AN ORGANIZED HEALTH CARE EDUCATION/TRAINING PROGRAM

## 2024-04-18 RX ORDER — AMLODIPINE BESYLATE 10 MG/1
10 TABLET ORAL DAILY
Status: DISCONTINUED | OUTPATIENT
Start: 2024-04-18 | End: 2024-04-26 | Stop reason: HOSPADM

## 2024-04-18 RX ORDER — GADOBUTROL 604.72 MG/ML
9 INJECTION INTRAVENOUS
Status: COMPLETED | OUTPATIENT
Start: 2024-04-18 | End: 2024-04-18

## 2024-04-18 RX ORDER — INSULIN ASPART 100 [IU]/ML
8 INJECTION, SOLUTION INTRAVENOUS; SUBCUTANEOUS
Status: DISCONTINUED | OUTPATIENT
Start: 2024-04-18 | End: 2024-04-26 | Stop reason: HOSPADM

## 2024-04-18 RX ORDER — BENZONATATE 100 MG/1
100 CAPSULE ORAL 3 TIMES DAILY PRN
Status: DISCONTINUED | OUTPATIENT
Start: 2024-04-18 | End: 2024-04-26 | Stop reason: HOSPADM

## 2024-04-18 RX ADMIN — INSULIN DETEMIR 20 UNITS: 100 INJECTION, SOLUTION SUBCUTANEOUS at 09:04

## 2024-04-18 RX ADMIN — INSULIN ASPART 8 UNITS: 100 INJECTION, SOLUTION INTRAVENOUS; SUBCUTANEOUS at 04:04

## 2024-04-18 RX ADMIN — CEFTRIAXONE 2 G: 2 INJECTION, POWDER, FOR SOLUTION INTRAMUSCULAR; INTRAVENOUS at 07:04

## 2024-04-18 RX ADMIN — OXYCODONE 5 MG: 5 TABLET ORAL at 03:04

## 2024-04-18 RX ADMIN — HYDROMORPHONE HYDROCHLORIDE 1 MG: 1 INJECTION, SOLUTION INTRAMUSCULAR; INTRAVENOUS; SUBCUTANEOUS at 04:04

## 2024-04-18 RX ADMIN — CARVEDILOL 6.25 MG: 6.25 TABLET, FILM COATED ORAL at 09:04

## 2024-04-18 RX ADMIN — HEPARIN SODIUM 5000 UNITS: 5000 INJECTION INTRAVENOUS; SUBCUTANEOUS at 05:04

## 2024-04-18 RX ADMIN — OXYCODONE 5 MG: 5 TABLET ORAL at 07:04

## 2024-04-18 RX ADMIN — INSULIN ASPART 5 UNITS: 100 INJECTION, SOLUTION INTRAVENOUS; SUBCUTANEOUS at 07:04

## 2024-04-18 RX ADMIN — INSULIN ASPART 2 UNITS: 100 INJECTION, SOLUTION INTRAVENOUS; SUBCUTANEOUS at 08:04

## 2024-04-18 RX ADMIN — INSULIN ASPART 3 UNITS: 100 INJECTION, SOLUTION INTRAVENOUS; SUBCUTANEOUS at 04:04

## 2024-04-18 RX ADMIN — VANCOMYCIN HYDROCHLORIDE 1750 MG: 500 INJECTION, POWDER, LYOPHILIZED, FOR SOLUTION INTRAVENOUS at 02:04

## 2024-04-18 RX ADMIN — HEPARIN SODIUM 5000 UNITS: 5000 INJECTION INTRAVENOUS; SUBCUTANEOUS at 01:04

## 2024-04-18 RX ADMIN — INSULIN ASPART 4 UNITS: 100 INJECTION, SOLUTION INTRAVENOUS; SUBCUTANEOUS at 11:04

## 2024-04-18 RX ADMIN — POTASSIUM BICARBONATE 25 MEQ: 977.5 TABLET, EFFERVESCENT ORAL at 08:04

## 2024-04-18 RX ADMIN — LOSARTAN POTASSIUM 100 MG: 50 TABLET, FILM COATED ORAL at 08:04

## 2024-04-18 RX ADMIN — BENZONATATE 100 MG: 100 CAPSULE ORAL at 11:04

## 2024-04-18 RX ADMIN — ACETAMINOPHEN 650 MG: 325 TABLET ORAL at 08:04

## 2024-04-18 RX ADMIN — INSULIN ASPART 5 UNITS: 100 INJECTION, SOLUTION INTRAVENOUS; SUBCUTANEOUS at 11:04

## 2024-04-18 RX ADMIN — CARVEDILOL 6.25 MG: 6.25 TABLET, FILM COATED ORAL at 08:04

## 2024-04-18 RX ADMIN — ASPIRIN 81 MG: 81 TABLET, COATED ORAL at 08:04

## 2024-04-18 RX ADMIN — AMLODIPINE BESYLATE 10 MG: 10 TABLET ORAL at 02:04

## 2024-04-18 RX ADMIN — HEPARIN SODIUM 5000 UNITS: 5000 INJECTION INTRAVENOUS; SUBCUTANEOUS at 09:04

## 2024-04-18 RX ADMIN — GADOBUTROL 9 ML: 604.72 INJECTION INTRAVENOUS at 06:04

## 2024-04-18 RX ADMIN — SENNOSIDES AND DOCUSATE SODIUM 1 TABLET: 8.6; 5 TABLET ORAL at 09:04

## 2024-04-18 NOTE — ASSESSMENT & PLAN NOTE
Patient with Paroxysmal (<7 days) atrial fibrillation which is controlled currently with Beta Blocker. Patient is currently in sinus rhythm.[id number] Score: 3  Anticoagulation:  Patient does not appear to be on home anticoagulation.  We will continue aspirin.      Paroxysmal AFib. During episode of sepsis in 2023. Not on anticoagulation because it was a brief episode per cardiology. Rec to look for recurrence with the help of event monitor. If a recurrence noted on event monitor, consider adding anticoagulation

## 2024-04-18 NOTE — NURSING
RAPID RESPONSE NURSE PROACTIVE ROUNDING NOTE       Time of Visit:     Admit Date: 4/15/2024  LOS: 2  Code Status: Full Code   Date of Visit: 2024  : 1970  Age: 53 y.o.  Sex: female  Race: Black or   Bed: [place] [address] A:   MRN: [id number]  Was the patient discharged from an ICU this admission? No   Was the patient discharged from a PACU within last 24 hours? No   Did the patient receive conscious sedation/general anesthesia in last 24 hours? No   Was the patient in the ED within the past 24 hours? No   Was the patient on NIPPV within the past 24 hours? No   Attending Physician: Nima Pompa,*  Primary Service: Internal Medicine   Time spent at the bedside: 15 -30 min    SITUATION    Notified by Mews score  5   Reason for alert: Mews score 5 - temp 103.1., , /69    Diagnosis: Sepsis   has a past medical history of DM2 (diabetes mellitus, type 2), Hypertension, and Prolonged Q-T interval on ECG.    Last Vitals:  Temp: 99 °F (37.2 °C) (1942)  Pulse: 116 (1942)  Resp: 20 (1942)  BP: 141/69 (1942)  SpO2: 97 % (1942)    24 Hour Vitals Range:  Temp:  [98.5 °F (36.9 °C)-103.1 °F (39.5 °C)]   Pulse:  []   Resp:  [16-20]   BP: (119-170)/(69-87)   SpO2:  [96 %-99 %]     Clinical Issues:    Temp 103.1 ,      ASSESSMENT/INTERVENTIONS    Patient resting in bed. Talking to the nursing assistance. She reports that when her rooms hot she spikes a temp. Temp in room 65 now.  Rechecked her temp 99.3(o) She denies any discomfort. Ongoing monitoring . Mews score 3.    RECOMMENDATIONS  Tylenol , IV fluids, keep room cool at all times per pt's request     Discussed plan of care with bedside RNMaya    PROVIDER ESCALATION    Physician escalation: Yes    Orders received and case discussed with Dr. Ishmael Verdugo .    Disposition:Remain in room 312    FOLLOW UP    Call back the Rapid Response Nurse, Carolyn  at [phone] for additional questions or  concerns.

## 2024-04-18 NOTE — SUBJECTIVE & OBJECTIVE
Interval History:  Patient has spiked a fever of 103.1 F overnight.  Pending MRI spine and right shoulder    Review of Systems   Constitutional: Negative.    Respiratory: Negative.     Cardiovascular: Negative.    Gastrointestinal: Negative.    Genitourinary: Negative.    Musculoskeletal:  Positive for arthralgias, back pain and joint swelling.   Neurological:  Positive for weakness.     Objective:     Vital Signs (Most Recent):  Temp: 97.8 °F (36.6 °C) (04/18/24 1140)  Pulse: 98 (04/18/24 1140)  Resp: 18 (04/18/24 1140)  BP: (!) 173/90 (04/18/24 1140)  SpO2: 99 % (04/18/24 1140) Vital Signs (24h Range):  Temp:  [97.8 °F (36.6 °C)-103.1 °F (39.5 °C)] 97.8 °F (36.6 °C)  Pulse:  [] 98  Resp:  [18-20] 18  SpO2:  [95 %-99 %] 99 %  BP: (139-173)/(69-90) 173/90     Weight: 92 kg (202 lb 13.2 oz)  Body mass index is 31.77 kg/m².    Intake/Output Summary (Last 24 hours) at 4/18/2024 1352  Last data filed at 4/18/2024 0800  Gross per 24 hour   Intake 120 ml   Output --   Net 120 ml         Physical Exam  Constitutional:       General: She is not in acute distress.     Appearance: She is normal weight.   Cardiovascular:      Rate and Rhythm: Normal rate.      Pulses: Normal pulses.   Pulmonary:      Effort: No respiratory distress.      Breath sounds: Normal breath sounds. No wheezing.   Abdominal:      General: Bowel sounds are normal. There is no distension.      Palpations: Abdomen is soft.      Tenderness: There is no abdominal tenderness.   Musculoskeletal:         General: Swelling and tenderness (Right shoulder and lumbar vertebral and paravertebral area) present.      Right lower leg: No edema.      Left lower leg: No edema.   Skin:     General: Skin is warm.   Neurological:      Mental Status: She is alert and oriented to person, place, and time. Mental status is at baseline.   Psychiatric:         Mood and Affect: Mood normal.             Significant Labs: All pertinent labs within the past 24 hours have been  reviewed.    Significant Imaging: I have reviewed all pertinent imaging results/findings within the past 24 hours.

## 2024-04-18 NOTE — ASSESSMENT & PLAN NOTE
I independently reviewed patient's lab work and images as documented. 52 yo female with DM/HTN admitted for R shoulder pain, found to have GBS bacteremia. ID consulted for bacteremia. Hospital course notable for CT of R shoulder that showed soft tissue edema/probable small joint effusion. She was evaluated by ortho - dry tap, though was able to aspirate small amount of blood that was sent for cx that have been no growth to date (not enough to send for fluid studies). Blcx on admit with GBS, repeat blcx ngtd. TTE without vegetations. MRI of shoulder and spine pending. Prior lumbar spine attempted, incomplete. Concern for OM given back pain, fever, and bacteremia.     Recommendations:  -continue ceftriaxone 2g iv daily   -dc vanc, no staph isolated  -MRI C/T/L and R shoulder  -follow up ortho plans  -f/u cx data

## 2024-04-18 NOTE — ASSESSMENT & PLAN NOTE
Chronic, controlled. Latest blood pressure and vitals reviewed-     Temp:  [97.8 °F (36.6 °C)-103.1 °F (39.5 °C)]   Pulse:  []   Resp:  [18-20]   BP: (139-173)/(69-90)   SpO2:  [95 %-99 %] .   Home meds for hypertension were reviewed and noted below.   Hypertension Medications               carvediloL (COREG) 3.125 MG tablet Take 2 tablets (6.25 mg total) by mouth 2 (two) times daily.    hydroCHLOROthiazide (HYDRODIURIL) 25 MG tablet Take 1 tablet (25 mg total) by mouth once daily.    losartan (COZAAR) 100 MG tablet Take 1 tablet (100 mg total) by mouth once daily.            While in the hospital, will manage blood pressure as follows; Continue home antihypertensive regimen.  Add amlodipine    Will utilize p.r.n. blood pressure medication only if patient's blood pressure greater than 180/110 and she develops symptoms such as worsening chest pain or shortness of breath.

## 2024-04-18 NOTE — NURSING
Noted patient heart rate  and standing, temp. 103.1, b/p 162/81, provider was notified.  Patient was given tylenol for elevated temp.

## 2024-04-18 NOTE — ASSESSMENT & PLAN NOTE
Patient's FSGs are uncontrolled due to hyperglycemia on current medication regimen.  Last A1c reviewed-   Lab Results   Component Value Date    HGBA1C 11.3 (H) 03/16/2022     Most recent fingerstick glucose reviewed-   Recent Labs   Lab 04/17/24  1526 04/17/24  1944 04/18/24  0755 04/18/24  1139   POCTGLUCOSE 258* 254* 222* 327*       Current correctional scale  Low  Maintain anti-hyperglycemic dose as follows-   Antihyperglycemics (From admission, onward)    Start     Stop Route Frequency Ordered    04/17/24 1645  insulin aspart U-100 pen 5 Units         -- SubQ 3 times daily with meals 04/17/24 1443    04/16/24 2100  insulin detemir U-100 (Levemir) pen 15 Units         -- SubQ Nightly 04/16/24 0854    04/15/24 1504  insulin aspart U-100 pen 0-5 Units         -- SubQ Before meals & nightly PRN 04/15/24 1404        Hold Oral hypoglycemics while patient is in the hospital.

## 2024-04-18 NOTE — PROGRESS NOTES
[facility]  Infectious Disease  Progress Note    Patient Name: Shahida Ortega  MRN: [id number]  Admission Date: 4/15/2024  Length of Stay: 3 days  Attending Physician: Nima Pompa,*  Primary Care Provider: Janusz Staton MD    Isolation Status: No active isolations  Assessment/Plan:      ID  Bacteremia  I independently reviewed patient's lab work and images as documented. 54 yo female with DM/HTN admitted for R shoulder pain, found to have GBS bacteremia. ID consulted for bacteremia. Hospital course notable for CT of R shoulder that showed soft tissue edema/probable small joint effusion. She was evaluated by ortho - dry tap, though was able to aspirate small amount of blood that was sent for cx that have been no growth to date (not enough to send for fluid studies). Blcx on admit with GBS, repeat blcx ngtd. TTE without vegetations, labs notable for elevated inflammatory marker and interval improvement in leukocytosis. MRI of shoulder and spine pending. Prior lumbar spine attempted, incomplete. Concern for OM given back pain, fever, and bacteremia.     Recommendations:  -continue ceftriaxone 2g iv daily   -dc vanc, no staph isolated  -MRI C/T/L and R shoulder pending  -follow up ortho plans  -f/u cx data        Thank you for your consult. I will follow-up with patient. Please contact us if you have any additional questions. Above d/w primary team.       Marilee Begum MD  Infectious Disease  [facility]    Subjective:     Principal Problem:Sepsis    HPI: 54 yo female with DM/HTN admitted for R shoulder pain, found to have GBS bacteremia. ID consulted for bacteremia. Hospital course notable for CT of R shoulder that showed soft tissue edema/probable small joint effusion. She was evaluated by ortho - dry tap, though was able to aspirate small amoutn of blood that was sent for cx that have been no growth to date (not enough to send for fluid studies). Blcx on admit with GBS. Pt is  currently on vanc/cefepime. Pt denied toxic habits, reported rash with PCN, or trauma/travel/TB exposure. She denied abdominal/respiratory/dental or  symptoms prior to admission, main complaint was back pain and R shoulder pain.     Of note, pt had had urince cx obtained 1/2024 that was positive for MSSA and GBS, s/p nitrofurantoin. Also had Ecoli bacteremia in 5/2023, tx with IV and transitioned to cefdinir.        Interval History: Febrile overnight. Feeling ok today, tolerating abx without issues. Still with some discomfort in R shoulder/back.     Review of Systems   Constitutional:  Positive for fever.   Musculoskeletal:  Positive for arthralgias and back pain.   All other systems reviewed and are negative.    Objective:     Vital Signs (Most Recent):  Temp: 98.5 °F (36.9 °C) (04/18/24 0753)  Pulse: 106 (04/18/24 0753)  Resp: 18 (04/18/24 0753)  BP: 139/81 (04/18/24 0753)  SpO2: 96 % (04/18/24 0753) Vital Signs (24h Range):  Temp:  [98.5 °F (36.9 °C)-103.1 °F (39.5 °C)] 98.5 °F (36.9 °C)  Pulse:  [] 106  Resp:  [16-20] 18  SpO2:  [95 %-99 %] 96 %  BP: (123-170)/(69-87) 139/81     Weight: 92 kg (202 lb 13.2 oz)  Body mass index is 31.77 kg/m².    Estimated Creatinine Clearance: 110.8 mL/min (based on SCr of 0.7 mg/dL).     Physical Exam  Constitutional:       General: She is not in acute distress.     Appearance: She is not ill-appearing or toxic-appearing.   Eyes:      General:         Right eye: No discharge.         Left eye: No discharge.   Pulmonary:      Effort: Pulmonary effort is normal. No respiratory distress.   Musculoskeletal:         General: Swelling (R shoulder) and tenderness (R shoulder, thoracic/lumbar spinal processes) present.   Skin:     General: Skin is warm and dry.   Neurological:      Mental Status: She is alert and oriented to person, place, and time.          Significant Labs:   Microbiology Results (last 7 days)       Procedure Component Value Units Date/Time    Aerobic culture  (Specify Source) **CANNOT BE ORDERED AS STAT** [[id number]] Collected: 04/15/24 1346    Order Status: Completed Specimen: Shoulder, Right Updated: 04/18/24 0830     Aerobic Bacterial Culture No growth    Blood culture [[id number]] Collected: 04/17/24 1840    Order Status: Completed Specimen: Blood from Peripheral, Hand, Right Updated: 04/18/24 0312     Blood Culture, Routine No Growth to date    Narrative:      Collection has been rescheduled by CMO at 04/17/2024 15:09 Reason:   Hard stick pt requested to be drawn drown on left hand   Collection has been rescheduled by CMO at 04/17/2024 15:11 Reason: Rn   Lizz   Collection has been rescheduled by CMO at 04/17/2024 15:09 Reason:   Hard stick pt requested to be drawn drown on left hand   Collection has been rescheduled by CMO at 04/17/2024 15:11 Reason: Rn   Lizz     Blood culture [[id number]] Collected: 04/17/24 1845    Order Status: Completed Specimen: Blood Updated: 04/18/24 0312     Blood Culture, Routine No Growth to date    Narrative:      Collection has been rescheduled by CMO at 04/17/2024 15:09 Reason:   Hard stick pt requested to be drawn drown on left hand   Collection has been rescheduled by CMO at 04/17/2024 15:11 Reason: Rn   Lizz   Collection has been rescheduled by CMO at 04/17/2024 15:09 Reason:   Hard stick pt requested to be drawn drown on left hand   Collection has been rescheduled by CMO at 04/17/2024 15:11 Reason: Rn   Lizz     Blood culture [[id number]] Collected: 04/15/24 2209    Order Status: Completed Specimen: Blood from Peripheral, Antecubital, Left Updated: 04/17/24 2303     Blood Culture, Routine No Growth to date      No Growth to date      No Growth to date    Blood culture [[id number]] Collected: 04/15/24 2205    Order Status: Completed Specimen: Blood Updated: 04/17/24 2303     Blood Culture, Routine No Growth to date      No Growth to date      No Growth to date    Blood Culture #2 **CANNOT BE ORDERED STAT** [[id number]]   (Abnormal) Collected: 04/15/24 0946    Order Status: Completed Specimen: Blood Updated: 04/17/24 0803     Blood Culture, Routine Gram stain cameron bottle: Gram positive cocci in chains resembling Strep      Results called to and read back by:Lizz Wells  04/16/2024  08:12      STREPTOCOCCUS AGALACTIAE (GROUP B)  Beta-hemolytic streptococci are routinely susceptible to   penicillins,cephalosporins and carbapenems.  For susceptibility see order #[id number]      Blood Culture #1 **CANNOT BE ORDERED STAT** [[id number]]  (Abnormal)  (Susceptibility) Collected: 04/15/24 0928    Order Status: Completed Specimen: Blood from Peripheral, Antecubital, Left Updated: 04/17/24 0803     Blood Culture, Routine Gram stain cameron bottle: Gram positive cocci in chains resembling Strep      Results called to and read back by: Maya Farah 3BERLIN 04/15/2024  20:19      STREPTOCOCCUS AGALACTIAE (GROUP B)  Beta-hemolytic streptococci are routinely susceptible to   penicillins,cephalosporins and carbapenems.              Significant Imaging: I have reviewed all pertinent imaging results/findings within the past 24 hours.

## 2024-04-18 NOTE — SUBJECTIVE & OBJECTIVE
Interval History: Febrile overnight. Feeling ok today, tolerating abx without issues. Still with some discomfort in R shoulder/back.     Review of Systems   Constitutional:  Positive for fever.   Musculoskeletal:  Positive for arthralgias and back pain.   All other systems reviewed and are negative.    Objective:     Vital Signs (Most Recent):  Temp: 98.5 °F (36.9 °C) (04/18/24 0753)  Pulse: 106 (04/18/24 0753)  Resp: 18 (04/18/24 0753)  BP: 139/81 (04/18/24 0753)  SpO2: 96 % (04/18/24 0753) Vital Signs (24h Range):  Temp:  [98.5 °F (36.9 °C)-103.1 °F (39.5 °C)] 98.5 °F (36.9 °C)  Pulse:  [] 106  Resp:  [16-20] 18  SpO2:  [95 %-99 %] 96 %  BP: (123-170)/(69-87) 139/81     Weight: 92 kg (202 lb 13.2 oz)  Body mass index is 31.77 kg/m².    Estimated Creatinine Clearance: 110.8 mL/min (based on SCr of 0.7 mg/dL).     Physical Exam  Constitutional:       General: She is not in acute distress.     Appearance: She is not ill-appearing or toxic-appearing.   Eyes:      General:         Right eye: No discharge.         Left eye: No discharge.   Pulmonary:      Effort: Pulmonary effort is normal. No respiratory distress.   Musculoskeletal:         General: Swelling (R shoulder) and tenderness (R shoulder, thoracic/lumbar spinal processes) present.   Skin:     General: Skin is warm and dry.   Neurological:      Mental Status: She is alert and oriented to person, place, and time.          Significant Labs:   Microbiology Results (last 7 days)       Procedure Component Value Units Date/Time    Aerobic culture (Specify Source) **CANNOT BE ORDERED AS STAT** [[id number]] Collected: 04/15/24 1346    Order Status: Completed Specimen: Shoulder, Right Updated: 04/18/24 0830     Aerobic Bacterial Culture No growth    Blood culture [[id number]] Collected: 04/17/24 1840    Order Status: Completed Specimen: Blood from Peripheral, Hand, Right Updated: 04/18/24 0312     Blood Culture, Routine No Growth to date    Narrative:       Collection has been rescheduled by CMO at 04/17/2024 15:09 Reason:   Hard stick pt requested to be drawn drown on left hand   Collection has been rescheduled by CMO at 04/17/2024 15:11 Reason: Rn   Lizz   Collection has been rescheduled by CMO at 04/17/2024 15:09 Reason:   Hard stick pt requested to be drawn drown on left hand   Collection has been rescheduled by CMO at 04/17/2024 15:11 Reason: Jenaro Zamudio     Blood culture [[id number]] Collected: 04/17/24 1845    Order Status: Completed Specimen: Blood Updated: 04/18/24 0312     Blood Culture, Routine No Growth to date    Narrative:      Collection has been rescheduled by CMO at 04/17/2024 15:09 Reason:   Hard stick pt requested to be drawn drown on left hand   Collection has been rescheduled by CMO at 04/17/2024 15:11 Reason: Rn   Lizz   Collection has been rescheduled by CMO at 04/17/2024 15:09 Reason:   Hard stick pt requested to be drawn drown on left hand   Collection has been rescheduled by CMO at 04/17/2024 15:11 Reason: Jenaro Zamudio     Blood culture [[id number]] Collected: 04/15/24 2209    Order Status: Completed Specimen: Blood from Peripheral, Antecubital, Left Updated: 04/17/24 2303     Blood Culture, Routine No Growth to date      No Growth to date      No Growth to date    Blood culture [[id number]] Collected: 04/15/24 2205    Order Status: Completed Specimen: Blood Updated: 04/17/24 2303     Blood Culture, Routine No Growth to date      No Growth to date      No Growth to date    Blood Culture #2 **CANNOT BE ORDERED STAT** [[id number]]  (Abnormal) Collected: 04/15/24 0946    Order Status: Completed Specimen: Blood Updated: 04/17/24 0803     Blood Culture, Routine Gram stain cameron bottle: Gram positive cocci in chains resembling Strep      Results called to and read back by:Lizz Wells  04/16/2024  08:12      STREPTOCOCCUS AGALACTIAE (GROUP B)  Beta-hemolytic streptococci are routinely susceptible to   penicillins,cephalosporins and  carbapenems.  For susceptibility see order #[id number]      Blood Culture #1 **CANNOT BE ORDERED STAT** [[id number]]  (Abnormal)  (Susceptibility) Collected: 04/15/24 0928    Order Status: Completed Specimen: Blood from Peripheral, Antecubital, Left Updated: 04/17/24 0803     Blood Culture, Routine Gram stain cameron bottle: Gram positive cocci in chains resembling Strep      Results called to and read back by: Maya Farah 3W 04/15/2024  20:19      STREPTOCOCCUS AGALACTIAE (GROUP B)  Beta-hemolytic streptococci are routinely susceptible to   penicillins,cephalosporins and carbapenems.              Significant Imaging: I have reviewed all pertinent imaging results/findings within the past 24 hours.

## 2024-04-18 NOTE — PROGRESS NOTES
[facility]  Progress Note    Patient Name: Shahida Ortega  MRN: [id number]  Patient Class: IP- Inpatient   Admission Date: 4/15/2024  Length of Stay: 3 days  Attending Physician: Nima Pompa,*  Primary Care Provider: Janusz Staton MD        Subjective:     Principal Problem:Sepsis        HPI:  53F with pmh of dm and htn who presents due to 3 day h/o right shoulder pain with associated redness. Patient reports being seen here in the ED for her symptoms on 4/12/24 and was prescribed Valium with relief of back pain but not shoulder. Patient denies taking any medications today for relief. Patient denies any recent injuries or trauma. In ed pt started on abx and fluids for sepsis and ortho consulted who performed arthrocentesis of the should with fluids sent for culture and pending. Pt denies tobacco, alcohol or drug use. Pt denies previous ortho surgerie     Overview/Hospital Course:  53F with pmh of DM and HTN who was admitted for sepsis s/t atraumatic right shoulder pain and erythema and leukocytosis.  CT scan was performed of the right shoulder which reveal a small effusion. Ortho was consulted s/p Aspiration right shoulder joint revealed dry tap.  Small bit of blood was aspirated from subacromial bursa on 4/15 and sent for cultures but unable to send for cell count. Blood cultures 2/2 bottles revealed strep agalactiae.  Repeat blood cultures pending.  Review of records revealed that patient did have strep agalactiae UTI in 01/2024.  On vancomycin and cefepime.  Patient also reports persistent low back pain.  Follow leukocytosis trend.  Urinalysis with 10 WBC and 2+ leukocyte esterase  No shortness of breath/cough/dysuria/abdominal pain/nausea/vomiting.  Echo with no vegetations.  MRI L-spine limited secondary to pain.  Consulted ID.  Recommended MRI C/T/L.  Premedicate with analgesic.  Spiked fever again overnight.  Repeat blood cultures no growth to date.  Deescalate  antibiotics to ceftriaxone alone.    Interval History:  Patient has spiked a fever of 103.1 F overnight.  Pending MRI spine and right shoulder    Review of Systems   Constitutional: Negative.    Respiratory: Negative.     Cardiovascular: Negative.    Gastrointestinal: Negative.    Genitourinary: Negative.    Musculoskeletal:  Positive for arthralgias, back pain and joint swelling.   Neurological:  Positive for weakness.     Objective:     Vital Signs (Most Recent):  Temp: 97.8 °F (36.6 °C) (04/18/24 1140)  Pulse: 98 (04/18/24 1140)  Resp: 18 (04/18/24 1140)  BP: (!) 173/90 (04/18/24 1140)  SpO2: 99 % (04/18/24 1140) Vital Signs (24h Range):  Temp:  [97.8 °F (36.6 °C)-103.1 °F (39.5 °C)] 97.8 °F (36.6 °C)  Pulse:  [] 98  Resp:  [18-20] 18  SpO2:  [95 %-99 %] 99 %  BP: (139-173)/(69-90) 173/90     Weight: 92 kg (202 lb 13.2 oz)  Body mass index is 31.77 kg/m².    Intake/Output Summary (Last 24 hours) at 4/18/2024 1352  Last data filed at 4/18/2024 0800  Gross per 24 hour   Intake 120 ml   Output --   Net 120 ml         Physical Exam  Constitutional:       General: She is not in acute distress.     Appearance: She is normal weight.   Cardiovascular:      Rate and Rhythm: Normal rate.      Pulses: Normal pulses.   Pulmonary:      Effort: No respiratory distress.      Breath sounds: Normal breath sounds. No wheezing.   Abdominal:      General: Bowel sounds are normal. There is no distension.      Palpations: Abdomen is soft.      Tenderness: There is no abdominal tenderness.   Musculoskeletal:         General: Swelling and tenderness (Right shoulder and lumbar vertebral and paravertebral area) present.      Right lower leg: No edema.      Left lower leg: No edema.   Skin:     General: Skin is warm.   Neurological:      Mental Status: She is alert and oriented to person, place, and time. Mental status is at baseline.   Psychiatric:         Mood and Affect: Mood normal.             Significant Labs: All pertinent  "labs within the past 24 hours have been reviewed.    Significant Imaging: I have reviewed all pertinent imaging results/findings within the past 24 hours.      Assessment/Plan:      * Sepsis  This patient does have evidence of infective focus  My overall impression is sepsis.  Source: Skin and Soft Tissue (location shoulder)  Antibiotics given-   Antibiotics (72h ago, onward)      Start     Stop Route Frequency Ordered    04/16/24 0300  vancomycin 1,500 mg in dextrose 5 % (D5W) 250 mL IVPB (Vial-Mate)         -- IV Every 12 hours (non-standard times) 04/15/24 1520    04/15/24 1445  ceFEPIme (MAXIPIME) 2 g in dextrose 5 % in water (D5W) 100 mL IVPB (MB+)         -- IV Every 8 hours (non-standard times) 04/15/24 1338    04/15/24 1437  vancomycin - pharmacy to dose  (vancomycin IVPB (PEDS and ADULTS))        Placed in "And" Linked Group    -- IV pharmacy to manage frequency 04/15/24 1338          Latest lactate reviewed-  Recent Labs   Lab 04/15/24  0946   LACTATE 1.1       Organ dysfunction indicated by  none    Fluid challenge Ideal Body Weight- The patient's ideal body weight is Ideal body weight: 61.6 kg (135 lb 12.9 oz) which will be used to calculate fluid bolus of 30 ml/kg for treatment of septic shock.      Post- resuscitation assessment Yes Perfusion exam was performed within 6 hours of septic shock presentation after bolus shows Adequate tissue perfusion assessed by non-invasive monitoring       Will Not start Pressors- Levophed for MAP of 65  Source control achieved by: abx ordered. BC cultures sent and pending    Bacteremia    2/2 blood cultures with group B Streptococcus agalactiae repeat blood cultures pending  On vancomycin and cefepime, de-escalated to vancomycin and ceftriaxone.  Id on board.  Obtain echocardiogram and MRI C/T/L-spine.  Urine with 10 WBCs and 3+ leukocyte esterase patient denied dysuria.  No cough/abdominal pain//chest pain/nausea/vomiting/diarrhea    Pyogenic arthritis of shoulder " region  Arthrocentesis performed in ED by ortho.  Culture sent and still pending we will follow up.  ContinueEmpiric vanc and cefepime     A-fib  Patient with Paroxysmal (<7 days) atrial fibrillation which is controlled currently with Beta Blocker. Patient is currently in sinus rhythm.[id number] Score: 3  Anticoagulation:  Patient does not appear to be on home anticoagulation.  We will continue aspirin.      Paroxysmal AFib. During episode of sepsis in 2023. Not on anticoagulation because it was a brief episode per cardiology. Rec to look for recurrence with the help of event monitor. If a recurrence noted on event monitor, consider adding anticoagulation     Prolonged Q-T interval on ECG  Noted history.  We will avoid prolonging agents as much as possible      DM2 (diabetes mellitus, type 2)  Patient's FSGs are uncontrolled due to hyperglycemia on current medication regimen.  Last A1c reviewed-   Lab Results   Component Value Date    HGBA1C 11.3 (H) 03/16/2022     Most recent fingerstick glucose reviewed-   Recent Labs   Lab 04/17/24  1526 04/17/24  1944 04/18/24  0755 04/18/24  1139   POCTGLUCOSE 258* 254* 222* 327*       Current correctional scale  Low  Maintain anti-hyperglycemic dose as follows-   Antihyperglycemics (From admission, onward)      Start     Stop Route Frequency Ordered    04/17/24 1645  insulin aspart U-100 pen 5 Units         -- SubQ 3 times daily with meals 04/17/24 1443    04/16/24 2100  insulin detemir U-100 (Levemir) pen 15 Units         -- SubQ Nightly 04/16/24 0854    04/15/24 1504  insulin aspart U-100 pen 0-5 Units         -- SubQ Before meals & nightly PRN 04/15/24 1404          Hold Oral hypoglycemics while patient is in the hospital.    Hypertension  Chronic, controlled. Latest blood pressure and vitals reviewed-     Temp:  [97.8 °F (36.6 °C)-103.1 °F (39.5 °C)]   Pulse:  []   Resp:  [18-20]   BP: (139-173)/(69-90)   SpO2:  [95 %-99 %] .   Home meds for hypertension were  reviewed and noted below.   Hypertension Medications               carvediloL (COREG) 3.125 MG tablet Take 2 tablets (6.25 mg total) by mouth 2 (two) times daily.    hydroCHLOROthiazide (HYDRODIURIL) 25 MG tablet Take 1 tablet (25 mg total) by mouth once daily.    losartan (COZAAR) 100 MG tablet Take 1 tablet (100 mg total) by mouth once daily.            While in the hospital, will manage blood pressure as follows; Continue home antihypertensive regimen.  Add amlodipine    Will utilize p.r.n. blood pressure medication only if patient's blood pressure greater than 180/110 and she develops symptoms such as worsening chest pain or shortness of breath.    Elevated procalcitonin  In setting of active infection.  Treatment as stated below        VTE Risk Mitigation (From admission, onward)           Ordered     heparin (porcine) injection 5,000 Units  Every 8 hours         04/15/24 1404     IP VTE HIGH RISK PATIENT  Once         04/15/24 1404     Place sequential compression device  Until discontinued         04/15/24 1404                    Discharge Planning   NAYELI: 4/18/2024     Code Status: Full Code   Is the patient medically ready for discharge?:     Reason for patient still in hospital (select all that apply): Patient trending condition and Treatment  Discharge Plan A: Home with family                  Nima Pompa MD  Department of Hospital Medicine   [facility] [place] [facility]

## 2024-04-18 NOTE — NURSING
[facility], [facility] [place]  Nurses Note -- 4 Eyes      4/18/2024       Skin assessed on: Q Shift      [x] No Pressure Injuries Present    [x]Prevention Measures Documented    [] Yes LDA  for Pressure Injury Previously documented     [] Yes New Pressure Injury Discovered   [] LDA for New Pressure Injury Added      Attending RN:  Radhika Wells RN     Second RN:  Maya Farah LPN

## 2024-04-18 NOTE — NURSING
[facility], [facility] [place]  Nurses Note -- 4 Eyes      4/18/2024       Skin assessed on: Q Shift      [x] No Pressure Injuries Present    []Prevention Measures Documented    [] Yes LDA  for Pressure Injury Previously documented     [] Yes New Pressure Injury Discovered   [] LDA for New Pressure Injury Added      Attending RN:  Maya Farah LPN     Second RN:  Radhika Wells RN

## 2024-04-18 NOTE — PT/OT/SLP PROGRESS
Occupational Therapy   Treatment    Name: Shahida Ortega  MRN: [id number]  Admitting Diagnosis:  Sepsis     The primary encounter diagnosis was Myositis of right shoulder, unspecified myositis type. Diagnoses of Sepsis, Chest pain, and Coag negative Staphylococcus bacteremia were also pertinent to this visit.    Recommendations:     Discharge Recommendations: Low Intensity Therapy (family able to assist as needed)  Discharge Equipment Recommendations:   (None anticipated.)  Barriers to discharge:  None    Assessment:     Shahida Ortega is a 53 y.o. female with a medical diagnosis of Sepsis.  She presents with Performance deficits affecting function are impaired self care skills, impaired functional mobility, decreased upper extremity function, decreased safety awareness, pain, decreased ROM, edema.     Pt making good strides toward OT goals. She reports R shld pain at 5/10 at rest. Pt found with R arm in sling for comfort. No R shld ROM done this date  2/2 awaiting MRI results on shld to  r/o RTC tear.  Pt performed R elbow and wrist/finger AROM ex without pain. G/hygiene performed  with SBA to brush teeth using B hands while RUE remained in sling standing at sink. Pt used LUE with one handed tech to wash face. She performed LE dressing with SBA to don/doff sock using one hand 3 finger tech seated in BS chair. Pt instructed to do R elbow and hand AROM ex several times daily to reduce edema in hand and to prevent joint stiffness. Continue with OT POC.   .   Rehab Prognosis:  Good; patient would benefit from acute skilled OT services to address these deficits and reach maximum level of function.       Plan:     Patient to be seen 4 x/week to address the above listed problems via self-care/home management, therapeutic exercises  Plan of Care Expires: 05/01/24  Plan of Care Reviewed with: patient    Subjective     Chief Complaint: R shld pain 5/10  Patient/Family Comments/goals: pt agreeable.  Pain/Comfort:  Pain Rating 1:  5/10  Location - Side 1: Right  Location - Orientation 1: generalized  Location 1: shoulder  Pain Addressed 1: Reposition, Distraction  Pain Rating Post-Intervention 1: 5/10    Objective:     Communicated with: nurse prior to session.  Patient found up in chair with telemetry upon OT entry to room.    General Precautions: Standard, fall, diabetic, aspiration    Orthopedic Precautions: (MRI pending R shld to r/o RTC tear)  Braces:  (RUE sling for comfort)  Respiratory Status: Room air     Occupational Performance:     Functional Mobility/Transfers:  Patient completed Sit <> Stand Transfer with supervision  with  no assistive device   Functional Mobility: ambulated Indep in room to sink and back to BS chair.     Activities of Daily Living:  Grooming: stand by assistance washed face with LUE only; RUE in sling; used b hands to open toothpaste, apply on toothbrush and brushed teeth with LUE standing at sink; no LOB.  Lower Body Dressing: stand by assistance donned/doffed socks using LUE 3 finger tech seated ion BS chair      [facility] 6 Click ADL: 20    Treatment & Education:  Purpose of OT and POC.  Self care retraining using one hand tech and 3 finger tech as stated above.  AROM ex to R elbow for flex/ext, sup/pro, wrist flex/ext, finger flex/ext. Mod edema in fingers, 30 reps active finger flex/ext pumping ex to reduce. Pt instructed to do ex several times a day. She acknowledged.   RUE supported on pillow while seated in chair coppola edema reduction and protection. Pt educated and acknowledged.     Patient left up in chair with all lines intact and call button in reach    GOALS:   Multidisciplinary Problems       Occupational Therapy Goals          Problem: Occupational Therapy    Goal Priority Disciplines Outcome Interventions   Occupational Therapy Goal     OT, PT/OT Ongoing, Progressing    Description: Goals to be met by: 5/1/2024     Patient will increase functional independence with ADLs by performing:    UE Dressing  with Modified [place] including donning/doffing RUE sling.  LE Dressing with Modified [place].  Grooming while standing at sink with Modified [place].  Toileting from toilet with Modified [place] for hygiene and clothing management.   Toilet transfer to toilet with Modified [place].                         Time Tracking:     OT Date of Treatment: 04/18/24  OT Start Time: 1154  OT Stop Time: 1214  OT Total Time (min): 20 min    Billable Minutes:Self Care/Home Management 10  Therapeutic Exercise 10  Total Time 20    OT/KAY: OT          4/18/2024

## 2024-04-18 NOTE — PROGRESS NOTES
The patient is in a chair.  She reports she is feeling better.    Temp:  [98.5 °F (36.9 °C)-103.1 °F (39.5 °C)] 98.5 °F (36.9 °C)  Pulse:  [] 106  Resp:  [16-20] 18  SpO2:  [95 %-99 %] 96 %  BP: (123-170)/(69-87) 139/81    Physical examination:    Right arm is in a sling.  She still has limited ability to elevate the arm.      Recent Labs   Lab 04/18/24  0400   WBC 9.85   RBC 3.79*   HGB 10.6*   HCT 33.3*      MCV 88   MCH 28.0   MCHC 31.8*         Current Facility-Administered Medications:     acetaminophen tablet 650 mg, 650 mg, Oral, Q8H PRN, Juan Cameron III, MD, 650 mg at 04/17/24 1903    acetaminophen tablet 650 mg, 650 mg, Oral, Q4H PRN, Juan Cameron III, MD, 650 mg at 04/17/24 0544    aspirin EC tablet 81 mg, 81 mg, Oral, Daily, Juan Cameron III, MD, 81 mg at 04/18/24 0841    carvediloL tablet 6.25 mg, 6.25 mg, Oral, BID, Juan Cameron III, MD, 6.25 mg at 04/18/24 0841    cefTRIAXone (ROCEPHIN) 2 g in dextrose 5 % in water (D5W) 100 mL IVPB (MB+), 2 g, Intravenous, Q24H, Marilee Begum MD, Stopped at 04/17/24 2124    dextrose 10% bolus 125 mL 125 mL, 12.5 g, Intravenous, PRN, Juan Cameron III, MD    dextrose 10% bolus 250 mL 250 mL, 25 g, Intravenous, PRN, Juan Cameron III, MD    glucagon (human recombinant) injection 1 mg, 1 mg, Intramuscular, PRN, Juan Cameron III, MD    glucose chewable tablet 16 g, 16 g, Oral, PRN, Juan Cameron III, MD    glucose chewable tablet 24 g, 24 g, Oral, PRN, Juan Cameron III, MD    heparin (porcine) injection 5,000 Units, 5,000 Units, Subcutaneous, Q8H, Juan Cameron III, MD, 5,000 Units at 04/18/24 0554    HYDROmorphone injection 1 mg, 1 mg, Intravenous, Once PRN, Nima Pompa MD    insulin aspart U-100 pen 0-5 Units, 0-5 Units, Subcutaneous, QID (AC + HS) PRN, Juan Cameron III, MD, 2 Units at 04/18/24 0842    insulin aspart U-100 pen 5 Units, 5 Units, Subcutaneous, TIDWM, Nima Pompa MD, 5  Units at 04/18/24 0748    insulin detemir U-100 (Levemir) pen 15 Units, 15 Units, Subcutaneous, QHS, Nima Pompa MD, 15 Units at 04/17/24 2054    losartan tablet 100 mg, 100 mg, Oral, Daily, Juan Cameron III, MD, 100 mg at 04/18/24 0841    naloxone 0.4 mg/mL injection 0.02 mg, 0.02 mg, Intravenous, PRN, Juan Cameron III, MD    oxyCODONE immediate release tablet 5 mg, 5 mg, Oral, Q6H PRN, Juan Cameron III, MD, 5 mg at 04/18/24 0349    polyethylene glycol packet 17 g, 17 g, Oral, Daily, Juan Cameron III, MD, 17 g at 04/15/24 1525    senna-docusate 8.6-50 mg per tablet 1 tablet, 1 tablet, Oral, BID, Juan Cameron III, MD, 1 tablet at 04/17/24 2055    sodium chloride 0.9% flush 10 mL, 10 mL, Intravenous, PRN, Juan Cameron III, MD    vancomycin (VANCOCIN) 1,750 mg in dextrose 5 % (D5W) 500 mL IVPB, 20 mg/kg, Intravenous, Q12H, Nima Pompa MD, Stopped at 04/18/24 0449    Pharmacy to dose Vancomycin consult, , , Once **AND** vancomycin - pharmacy to dose, , Intravenous, pharmacy to manage frequency, Jamal Jameson, PAErnestoC    Assessment and Plan:    53-year-old female with  history of diabetes presents with atraumatic right shoulder pain and erythema and leukocytosis.  CT scan was performed of the right shoulder which reveal a small effusion.  Aspiration left shoulder joint revealed dry tap.  Small bit of blood was aspirated from subacromial bursa on 4/15 and sent for cultures but unable to send for cell count..     Right shoulder culture remains no growth.  Low suspicion for septic arthritis but will follow culture.  Continue sling for right arm.       Right shoulder MR pending     Admission blood cultures positive for strep.  Leukocytosis now resolved.    Samira Whipple MD  [facility]  [phone]  This note has been transcribed with voice recognition software, but not reviewed and may contain unrecognized errors.

## 2024-04-19 PROBLEM — M46.20 VERTEBRAL ABSCESS: Status: ACTIVE | Noted: 2024-04-19

## 2024-04-19 LAB
ANION GAP SERPL CALC-SCNC: 14 MMOL/L (ref 8–16)
ANISOCYTOSIS BLD QL SMEAR: SLIGHT
BACTERIA BLD CULT: NORMAL
BACTERIA BLD CULT: NORMAL
BACTERIA SPEC AEROBE CULT: NO GROWTH
BASOPHILS # BLD AUTO: ABNORMAL K/UL (ref 0–0.2)
BASOPHILS NFR BLD: 2 % (ref 0–1.9)
BUN SERPL-MCNC: 8 MG/DL (ref 6–20)
CALCIUM SERPL-MCNC: 8.3 MG/DL (ref 8.7–10.5)
CHLORIDE SERPL-SCNC: 98 MMOL/L (ref 95–110)
CO2 SERPL-SCNC: 23 MMOL/L (ref 23–29)
CREAT SERPL-MCNC: 0.5 MG/DL (ref 0.5–1.4)
DIFFERENTIAL METHOD BLD: ABNORMAL
EOSINOPHIL # BLD AUTO: ABNORMAL K/UL (ref 0–0.5)
EOSINOPHIL NFR BLD: 1 % (ref 0–8)
ERYTHROCYTE [DISTWIDTH] IN BLOOD BY AUTOMATED COUNT: 13.3 % (ref 11.5–14.5)
EST. GFR  (NO RACE VARIABLE): >60 ML/MIN/1.73 M^2
GLUCOSE SERPL-MCNC: 165 MG/DL (ref 70–110)
HCT VFR BLD AUTO: 36.2 % (ref 37–48.5)
HGB BLD-MCNC: 11.3 G/DL (ref 12–16)
IMM GRANULOCYTES # BLD AUTO: ABNORMAL K/UL (ref 0–0.04)
IMM GRANULOCYTES NFR BLD AUTO: ABNORMAL % (ref 0–0.5)
LYMPHOCYTES # BLD AUTO: ABNORMAL K/UL (ref 1–4.8)
LYMPHOCYTES NFR BLD: 25 % (ref 18–48)
MCH RBC QN AUTO: 27.7 PG (ref 27–31)
MCHC RBC AUTO-ENTMCNC: 31.2 G/DL (ref 32–36)
MCV RBC AUTO: 89 FL (ref 82–98)
MONOCYTES # BLD AUTO: ABNORMAL K/UL (ref 0.3–1)
MONOCYTES NFR BLD: 9 % (ref 4–15)
NEUTROPHILS NFR BLD: 57 % (ref 38–73)
NEUTS BAND NFR BLD MANUAL: 6 %
NRBC BLD-RTO: 0 /100 WBC
PLATELET # BLD AUTO: 238 K/UL (ref 150–450)
PLATELET BLD QL SMEAR: ABNORMAL
PMV BLD AUTO: 9.9 FL (ref 9.2–12.9)
POCT GLUCOSE: 149 MG/DL (ref 70–110)
POCT GLUCOSE: 158 MG/DL (ref 70–110)
POCT GLUCOSE: 169 MG/DL (ref 70–110)
POCT GLUCOSE: 272 MG/DL (ref 70–110)
POTASSIUM SERPL-SCNC: 4.5 MMOL/L (ref 3.5–5.1)
RBC # BLD AUTO: 4.08 M/UL (ref 4–5.4)
SODIUM SERPL-SCNC: 135 MMOL/L (ref 136–145)
WBC # BLD AUTO: 8.03 K/UL (ref 3.9–12.7)

## 2024-04-19 PROCEDURE — 21400001 HC TELEMETRY ROOM

## 2024-04-19 PROCEDURE — 25500020 PHARM REV CODE 255: Performed by: STUDENT IN AN ORGANIZED HEALTH CARE EDUCATION/TRAINING PROGRAM

## 2024-04-19 PROCEDURE — 63600175 PHARM REV CODE 636 W HCPCS: Performed by: STUDENT IN AN ORGANIZED HEALTH CARE EDUCATION/TRAINING PROGRAM

## 2024-04-19 PROCEDURE — 36415 COLL VENOUS BLD VENIPUNCTURE: CPT | Performed by: STUDENT IN AN ORGANIZED HEALTH CARE EDUCATION/TRAINING PROGRAM

## 2024-04-19 PROCEDURE — 80048 BASIC METABOLIC PNL TOTAL CA: CPT | Performed by: STUDENT IN AN ORGANIZED HEALTH CARE EDUCATION/TRAINING PROGRAM

## 2024-04-19 PROCEDURE — 99233 SBSQ HOSP IP/OBS HIGH 50: CPT | Mod: ,,, | Performed by: STUDENT IN AN ORGANIZED HEALTH CARE EDUCATION/TRAINING PROGRAM

## 2024-04-19 PROCEDURE — 25000003 PHARM REV CODE 250: Performed by: STUDENT IN AN ORGANIZED HEALTH CARE EDUCATION/TRAINING PROGRAM

## 2024-04-19 PROCEDURE — A9585 GADOBUTROL INJECTION: HCPCS | Performed by: STUDENT IN AN ORGANIZED HEALTH CARE EDUCATION/TRAINING PROGRAM

## 2024-04-19 PROCEDURE — 85007 BL SMEAR W/DIFF WBC COUNT: CPT | Performed by: STUDENT IN AN ORGANIZED HEALTH CARE EDUCATION/TRAINING PROGRAM

## 2024-04-19 PROCEDURE — 85027 COMPLETE CBC AUTOMATED: CPT | Performed by: STUDENT IN AN ORGANIZED HEALTH CARE EDUCATION/TRAINING PROGRAM

## 2024-04-19 RX ORDER — HYDROMORPHONE HYDROCHLORIDE 1 MG/ML
1 INJECTION, SOLUTION INTRAMUSCULAR; INTRAVENOUS; SUBCUTANEOUS ONCE
Status: COMPLETED | OUTPATIENT
Start: 2024-04-19 | End: 2024-04-19

## 2024-04-19 RX ORDER — GADOBUTROL 604.72 MG/ML
9 INJECTION INTRAVENOUS
Status: COMPLETED | OUTPATIENT
Start: 2024-04-19 | End: 2024-04-19

## 2024-04-19 RX ADMIN — HEPARIN SODIUM 5000 UNITS: 5000 INJECTION INTRAVENOUS; SUBCUTANEOUS at 02:04

## 2024-04-19 RX ADMIN — LOSARTAN POTASSIUM 100 MG: 50 TABLET, FILM COATED ORAL at 08:04

## 2024-04-19 RX ADMIN — CARVEDILOL 6.25 MG: 6.25 TABLET, FILM COATED ORAL at 08:04

## 2024-04-19 RX ADMIN — HEPARIN SODIUM 5000 UNITS: 5000 INJECTION INTRAVENOUS; SUBCUTANEOUS at 05:04

## 2024-04-19 RX ADMIN — INSULIN ASPART 3 UNITS: 100 INJECTION, SOLUTION INTRAVENOUS; SUBCUTANEOUS at 04:04

## 2024-04-19 RX ADMIN — ASPIRIN 81 MG: 81 TABLET, COATED ORAL at 08:04

## 2024-04-19 RX ADMIN — GADOBUTROL 9 ML: 604.72 INJECTION INTRAVENOUS at 09:04

## 2024-04-19 RX ADMIN — AMLODIPINE BESYLATE 10 MG: 10 TABLET ORAL at 08:04

## 2024-04-19 RX ADMIN — INSULIN DETEMIR 20 UNITS: 100 INJECTION, SOLUTION SUBCUTANEOUS at 10:04

## 2024-04-19 RX ADMIN — INSULIN ASPART 8 UNITS: 100 INJECTION, SOLUTION INTRAVENOUS; SUBCUTANEOUS at 12:04

## 2024-04-19 RX ADMIN — INSULIN ASPART 8 UNITS: 100 INJECTION, SOLUTION INTRAVENOUS; SUBCUTANEOUS at 04:04

## 2024-04-19 RX ADMIN — HYDROMORPHONE HYDROCHLORIDE 1 MG: 1 INJECTION, SOLUTION INTRAMUSCULAR; INTRAVENOUS; SUBCUTANEOUS at 08:04

## 2024-04-19 RX ADMIN — SENNOSIDES AND DOCUSATE SODIUM 1 TABLET: 8.6; 5 TABLET ORAL at 10:04

## 2024-04-19 RX ADMIN — CARVEDILOL 6.25 MG: 6.25 TABLET, FILM COATED ORAL at 10:04

## 2024-04-19 RX ADMIN — INSULIN ASPART 8 UNITS: 100 INJECTION, SOLUTION INTRAVENOUS; SUBCUTANEOUS at 08:04

## 2024-04-19 RX ADMIN — HEPARIN SODIUM 5000 UNITS: 5000 INJECTION INTRAVENOUS; SUBCUTANEOUS at 10:04

## 2024-04-19 RX ADMIN — OXYCODONE 5 MG: 5 TABLET ORAL at 01:04

## 2024-04-19 RX ADMIN — OXYCODONE 5 MG: 5 TABLET ORAL at 07:04

## 2024-04-19 NOTE — PROGRESS NOTES
Patient admitted with sepsis which appears to be improving.  Right shoulder pain reportedly improving by patient.  Aspiration right shoulder done in ER still no growth to date    Afebrile vital signs stable   Leukocytosis improved    Exam shows patient is still has decreased range of motion right shoulder approximately 30%.  Diffuse tenderness.  No erythema, no fluctuance.  No obvious abscess.      MRI shows prevertebral/mediastinal fluid probably abscess as source of sepsis.  Agree with neurosurgery for thoracic surgery consult  MRI right shoulder pending.

## 2024-04-19 NOTE — PLAN OF CARE
Problem: Diabetes Comorbidity  Goal: Blood Glucose Level Within Targeted Range  Outcome: Met  Intervention: Monitor and Manage Glycemia  Flowsheets (Taken 4/19/2024 1728)  Glycemic Management:   blood glucose monitored   carbohydrate replacement provided   supplemental insulin given     Problem: Adjustment to Illness (Sepsis/Septic Shock)  Goal: Optimal Coping  Outcome: Met  Intervention: Optimize Psychosocial Adjustment to Illness  Flowsheets (Taken 4/19/2024 1728)  Supportive Measures:   active listening utilized   decision-making supported   goal-setting facilitated   relaxation techniques promoted   verbalization of feelings encouraged   problem-solving facilitated

## 2024-04-19 NOTE — PROGRESS NOTES
[facility]  Infectious Disease  Progress Note    Patient Name: Shahida Ortega  MRN: [id number]  Admission Date: 4/15/2024  Length of Stay: 4 days  Attending Physician: Nima Pompa,*  Primary Care Provider: Janusz Staton MD    Isolation Status: No active isolations  Assessment/Plan:      ID  Bacteremia  T5-T11 preverbal abscess    I independently reviewed patient's lab work and images as documented. 54 yo female with DM/HTN admitted for R shoulder pain, found to have GBS bacteremia. ID consulted for bacteremia. Hospital course notable for CT of R shoulder that showed soft tissue edema/probable small joint effusion. She was evaluated by ortho - dry tap, though was able to aspirate small amount of blood that was sent for cx that have been no growth to date (not enough to send for fluid studies). Blcx on admit with GBS, repeat blcx ngtd. TTE without vegetations. MRI of shoulder pending. Spinal MRI c/f T5-T11  prevertebral abscess.     Recommendations:  -continue ceftriaxone 2g iv daily   -f/u  R shoulder MRI  -follow up ortho plans  -favor NSGY eval   -f/u cx data        Thank you for your consult. I will follow-up with patient. Please contact us if you have any additional questions. Above d/w primary team.           Marilee Begum MD  Infectious Disease  [facility]    Subjective:     Principal Problem:Sepsis    HPI: 54 yo female with DM/HTN admitted for R shoulder pain, found to have GBS bacteremia. ID consulted for bacteremia. Hospital course notable for CT of R shoulder that showed soft tissue edema/probable small joint effusion. She was evaluated by ortho - dry tap, though was able to aspirate small amoutn of blood that was sent for cx that have been no growth to date (not enough to send for fluid studies). Blcx on admit with GBS. Pt is currently on vanc/cefepime. Pt denied toxic habits, reported rash with PCN, or trauma/travel/TB exposure. She denied abdominal/respiratory/dental  or  symptoms prior to admission, main complaint was back pain and R shoulder pain.     Of note, pt had had urince cx obtained 1/2024 that was positive for MSSA and GBS, s/p nitrofurantoin. Also had Ecoli bacteremia in 5/2023, tx with IV and transitioned to cefdinir.        Interval History: Febrile overnight, awaiting MRI of shoulder - MRI of spine c/f prevertebral abscess. Pt reports tolerating antibiotic without adverse reactions.         Review of Systems   Constitutional:  Positive for fever.   Musculoskeletal:  Positive for arthralgias and back pain.   All other systems reviewed and are negative.    Objective:     Vital Signs (Most Recent):  Temp: 98.2 °F (36.8 °C) (04/19/24 0802)  Pulse: 97 (04/19/24 0802)  Resp: 18 (04/19/24 0802)  BP: (!) 140/69 (04/19/24 0802)  SpO2: 99 % (04/19/24 0802) Vital Signs (24h Range):  Temp:  [97.8 °F (36.6 °C)-101.9 °F (38.8 °C)] 98.2 °F (36.8 °C)  Pulse:  [] 97  Resp:  [16-18] 18  SpO2:  [93 %-99 %] 99 %  BP: (103-173)/(55-90) 140/69     Weight: 94 kg (207 lb 3.7 oz)  Body mass index is 32.46 kg/m².    Estimated Creatinine Clearance: 156.8 mL/min (based on SCr of 0.5 mg/dL).     Physical Exam  Constitutional:       General: She is not in acute distress.     Appearance: She is not ill-appearing or toxic-appearing.   Eyes:      General:         Right eye: No discharge.         Left eye: No discharge.   Pulmonary:      Effort: Pulmonary effort is normal. No respiratory distress.   Musculoskeletal:         General: Swelling (R shoulder - improving) and tenderness (thoracic/lumbar) present.   Skin:     General: Skin is warm and dry.   Neurological:      Mental Status: She is alert and oriented to person, place, and time.          Significant Labs:   Microbiology Results (last 7 days)       Procedure Component Value Units Date/Time    Aerobic culture (Specify Source) **CANNOT BE ORDERED AS STAT** [[id number]] Collected: 04/15/24 1346    Order Status: Completed Specimen:  Shoulder, Right Updated: 04/19/24 0614     Aerobic Bacterial Culture No growth    Blood culture [[id number]] Collected: 04/15/24 2205    Order Status: Completed Specimen: Blood Updated: 04/18/24 2303     Blood Culture, Routine No Growth to date      No Growth to date      No Growth to date      No Growth to date    Blood culture [[id number]] Collected: 04/15/24 2209    Order Status: Completed Specimen: Blood from Peripheral, Antecubital, Left Updated: 04/18/24 2303     Blood Culture, Routine No Growth to date      No Growth to date      No Growth to date      No Growth to date    Blood culture [[id number]] Collected: 04/17/24 1845    Order Status: Completed Specimen: Blood Updated: 04/18/24 1903     Blood Culture, Routine No Growth to date      No Growth to date    Narrative:      Collection has been rescheduled by CMO at 04/17/2024 15:09 Reason:   Hard stick pt requested to be drawn drown on left hand   Collection has been rescheduled by CMO at 04/17/2024 15:11 Reason: Rn   Lizz   Collection has been rescheduled by CMO at 04/17/2024 15:09 Reason:   Hard stick pt requested to be drawn drown on left hand   Collection has been rescheduled by CMO at 04/17/2024 15:11 Reason: Rn   Lizz     Blood culture [[id number]] Collected: 04/17/24 1840    Order Status: Completed Specimen: Blood from Peripheral, Hand, Right Updated: 04/18/24 1903     Blood Culture, Routine No Growth to date      No Growth to date    Narrative:      Collection has been rescheduled by CMO at 04/17/2024 15:09 Reason:   Hard stick pt requested to be drawn drown on left hand   Collection has been rescheduled by CMO at 04/17/2024 15:11 Reason: Rn   Lizz   Collection has been rescheduled by CMO at 04/17/2024 15:09 Reason:   Hard stick pt requested to be drawn drown on left hand   Collection has been rescheduled by CMO at 04/17/2024 15:11 Reason: Jenaro Zamudio     Blood Culture #2 **CANNOT BE ORDERED STAT** [[id number]]  (Abnormal) Collected:  04/15/24 0946    Order Status: Completed Specimen: Blood Updated: 04/17/24 0803     Blood Culture, Routine Gram stain cameron bottle: Gram positive cocci in chains resembling Strep      Results called to and read back by:Lizz Wells  04/16/2024  08:12      STREPTOCOCCUS AGALACTIAE (GROUP B)  Beta-hemolytic streptococci are routinely susceptible to   penicillins,cephalosporins and carbapenems.  For susceptibility see order #[id number]      Blood Culture #1 **CANNOT BE ORDERED STAT** [[id number]]  (Abnormal)  (Susceptibility) Collected: 04/15/24 0928    Order Status: Completed Specimen: Blood from Peripheral, Antecubital, Left Updated: 04/17/24 0803     Blood Culture, Routine Gram stain cameron bottle: Gram positive cocci in chains resembling Strep      Results called to and read back by: Maya Farah 3BERLIN 04/15/2024  20:19      STREPTOCOCCUS AGALACTIAE (GROUP B)  Beta-hemolytic streptococci are routinely susceptible to   penicillins,cephalosporins and carbapenems.              Significant Imaging: I have reviewed all pertinent imaging results/findings within the past 24 hours.

## 2024-04-19 NOTE — ASSESSMENT & PLAN NOTE
Patient with Paroxysmal (<7 days) atrial fibrillation which is controlled currently with Beta Blocker. Patient is currently in sinus rhythm.[id number] Score: 3  Anticoagulation:  Patient does not appear to be on home anticoagulation.  We will continue aspirin.  In normal sinus rhythm    Paroxysmal AFib. During episode of sepsis in 2023. Not on anticoagulation because it was a brief episode per cardiology. Rec to look for recurrence with the help of event monitor. If a recurrence noted on event monitor, consider adding anticoagulation

## 2024-04-19 NOTE — ASSESSMENT & PLAN NOTE
2/2 blood cultures with group B Streptococcus agalactiae repeat blood cultures no growth to date.  Continues to spike fever.  On vancomycin and cefepime, de-escalated to  ceftriaxone.  Id on board.  MRI C/T/L-spine T5-T11 prevertebral abscess.  MRI right shoulder pending  Neurosurgery consulted, recommended CT surgery evaluation   Pending CT surgery acceptance for transfer to [facility]

## 2024-04-19 NOTE — ASSESSMENT & PLAN NOTE
"This patient does have evidence of infective focus  My overall impression is sepsis.  Source: Skin and Soft Tissue (location shoulder)  Antibiotics given-   Antibiotics (72h ago, onward)      Start     Stop Route Frequency Ordered    04/17/24 1215  cefTRIAXone (ROCEPHIN) 2 g in dextrose 5 % in water (D5W) 100 mL IVPB (MB+)         -- IV Every 24 hours (non-standard times) 04/17/24 1106          Latest lactate reviewed-  No results for input(s): "LACTATE", "POCLAC" in the last 72 hours.    Organ dysfunction indicated by  none    Fluid challenge Ideal Body Weight- The patient's ideal body weight is Ideal body weight: 61.6 kg (135 lb 12.9 oz) which will be used to calculate fluid bolus of 30 ml/kg for treatment of septic shock.      Post- resuscitation assessment Yes Perfusion exam was performed within 6 hours of septic shock presentation after bolus shows Adequate tissue perfusion assessed by non-invasive monitoring       Will Not start Pressors- Levophed for MAP of 65  Source control achieved by: abx   "

## 2024-04-19 NOTE — ASSESSMENT & PLAN NOTE
T5-T11 preverbal abscess    I independently reviewed patient's lab work and images as documented. 52 yo female with DM/HTN admitted for R shoulder pain, found to have GBS bacteremia. ID consulted for bacteremia. Hospital course notable for CT of R shoulder that showed soft tissue edema/probable small joint effusion. She was evaluated by ortho - dry tap, though was able to aspirate small amount of blood that was sent for cx that have been no growth to date (not enough to send for fluid studies). Blcx on admit with GBS, repeat blcx ngtd. TTE without vegetations. MRI of shoulder pending. Spinal MRI c/f T5-T11  prevertebral abscess.     Recommendations:  -continue ceftriaxone 2g iv daily   -f/u  R shoulder MRI  -follow up ortho plans  -favor NSGY eval   -f/u cx data

## 2024-04-19 NOTE — NURSING
[facility] [facility] [place] 4 [place]      4/18/2024       Skin assessed on: Q Shift      [x] No Pressure Injuries Present    []Prevention Measures Documented    [] Yes LDA  for Pressure Injury Previously documented     [] Yes New Pressure Injury Discovered   [] LDA for New Pressure Injury Added      Attending RN:  Maya Farah LPN     Second RN:  Radhika Wells RN

## 2024-04-19 NOTE — NURSING
"Contacted Reginald in MRI to determine if this patient will be seen today.    "At this time,.MRI has patients from the ED and unable to see the patient."  Patient has hydromorphone ordered for pain during MRI.  Informed patient about above.    Notified Dr. Pompa.    Will continue to f/u.   "

## 2024-04-19 NOTE — PROGRESS NOTES
[facility] Medicine  Progress Note    Patient Name: Shahida Ortega  MRN: [id number]  Patient Class: IP- Inpatient   Admission Date: 4/15/2024  Length of Stay: 4 days  Attending Physician: Nima Pompa,*  Primary Care Provider: Janusz Staton MD        Subjective:     Principal Problem:Sepsis        HPI:  53F with pmh of dm and htn who presents due to 3 day h/o right shoulder pain with associated redness. Patient reports being seen here in the ED for her symptoms on 4/12/24 and was prescribed Valium with relief of back pain but not shoulder. Patient denies taking any medications today for relief. Patient denies any recent injuries or trauma. In ed pt started on abx and fluids for sepsis and ortho consulted who performed arthrocentesis of the should with fluids sent for culture and pending. Pt denies tobacco, alcohol or drug use. Pt denies previous ortho surgerie     Overview/Hospital Course:  53F with pmh of DM and HTN who was admitted for sepsis s/t atraumatic right shoulder pain , back pain  CT scan was performed of the right shoulder which reveal a small effusion. Ortho was consulted s/p Aspiration right shoulder joint on 04/15/2024 revealed dry tap.  Small bit of blood was aspirated from subacromial bursa on 4/15 and sent for cultures but unable to send for cell count. Blood cultures 2/2 bottles revealed strep agalactiae.  Repeat blood cultures no growth to date.  Review of records revealed that patient did have strep agalactiae UTI in 01/2024.  Initiated On vancomycin and cefepime.  Patient also reports persistent back pain.  Leukocytosis improving  TTE with no vegetations.   Consulted ID.  Deescalated antibiotics to ceftriaxone alone.  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  MRI right shoulder pending.  Consulted Neurosurgery who recommended CT surgery consultation.  Awaiting CT surgery evaluation and likely transfer to [facility]    Interval History:  Patient  reports shoulder pain is better.  Continues to have back pain.  T-max of 101.9° overnight.  On ceftriaxone.    Review of Systems   Constitutional: Negative.    Respiratory: Negative.     Cardiovascular: Negative.    Gastrointestinal: Negative.    Genitourinary: Negative.    Musculoskeletal:  Positive for arthralgias and back pain.   Neurological: Negative.      Objective:     Vital Signs (Most Recent):  Temp: 99.5 °F (37.5 °C) (04/19/24 1208)  Pulse: 90 (04/19/24 1208)  Resp: 18 (04/19/24 1208)  BP: (!) 115/59 (04/19/24 1208)  SpO2: 95 % (04/19/24 1208) Vital Signs (24h Range):  Temp:  [98 °F (36.7 °C)-101.9 °F (38.8 °C)] 99.5 °F (37.5 °C)  Pulse:  [] 90  Resp:  [16-18] 18  SpO2:  [93 %-99 %] 95 %  BP: (103-145)/(55-79) 115/59     Weight: 94 kg (207 lb 3.7 oz)  Body mass index is 32.46 kg/m².    Intake/Output Summary (Last 24 hours) at 4/19/2024 1316  Last data filed at 4/19/2024 0400  Gross per 24 hour   Intake 0 ml   Output --   Net 0 ml         Physical Exam  Constitutional:       General: She is not in acute distress.     Appearance: She is normal weight.   Cardiovascular:      Rate and Rhythm: Normal rate.      Pulses: Normal pulses.   Pulmonary:      Effort: No respiratory distress.      Breath sounds: Normal breath sounds. No wheezing.   Abdominal:      General: Bowel sounds are normal. There is no distension.      Palpations: Abdomen is soft.      Tenderness: There is no abdominal tenderness.   Musculoskeletal:         General: Swelling (Right shoulder) and tenderness (Tender to palpation of lower thoracic and lumbar area.  Right shoulder tender to palpation.) present.      Right lower leg: No edema.      Left lower leg: No edema.   Skin:     General: Skin is warm.   Neurological:      Mental Status: She is alert and oriented to person, place, and time. Mental status is at baseline.   Psychiatric:         Mood and Affect: Mood normal.             Significant Labs: All pertinent labs within the past 24  "hours have been reviewed.    Significant Imaging: I have reviewed all pertinent imaging results/findings within the past 24 hours.    Assessment/Plan:      * Sepsis  This patient does have evidence of infective focus  My overall impression is sepsis.  Source: Skin and Soft Tissue (location shoulder)  Antibiotics given-   Antibiotics (72h ago, onward)      Start     Stop Route Frequency Ordered    04/17/24 1215  cefTRIAXone (ROCEPHIN) 2 g in dextrose 5 % in water (D5W) 100 mL IVPB (MB+)         -- IV Every 24 hours (non-standard times) 04/17/24 1106          Latest lactate reviewed-  No results for input(s): "LACTATE", "POCLAC" in the last 72 hours.    Organ dysfunction indicated by  none    Fluid challenge Ideal Body Weight- The patient's ideal body weight is Ideal body weight: 61.6 kg (135 lb 12.9 oz) which will be used to calculate fluid bolus of 30 ml/kg for treatment of septic shock.      Post- resuscitation assessment Yes Perfusion exam was performed within 6 hours of septic shock presentation after bolus shows Adequate tissue perfusion assessed by non-invasive monitoring       Will Not start Pressors- Levophed for MAP of 65  Source control achieved by: abx     Vertebral abscess    MRI C/T/L-spine revealed T5-T11 prevertebral abscess.  Neurosurgery consulted.  Recommend CT surgery evaluation   Pending CT surgery acceptance for transfer to [facility]  On ceftriaxone.    Bacteremia    2/2 blood cultures with group B Streptococcus agalactiae repeat blood cultures no growth to date.  Continues to spike fever.  On vancomycin and cefepime, de-escalated to  ceftriaxone.  Id on board.  MRI C/T/L-spine T5-T11 prevertebral abscess.  MRI right shoulder pending  Neurosurgery consulted, recommended CT surgery evaluation   Pending CT surgery acceptance for transfer to [facility]    Pyogenic arthritis of shoulder region  Arthrocentesis performed in ED by ortho.  Culture sent no growth so far.  Pending MRI right shoulder.  " Continue ceftriaxone.    A-fib  Patient with Paroxysmal (<7 days) atrial fibrillation which is controlled currently with Beta Blocker. Patient is currently in sinus rhythm.[id number] Score: 3  Anticoagulation:  Patient does not appear to be on home anticoagulation.  We will continue aspirin.  In normal sinus rhythm    Paroxysmal AFib. During episode of sepsis in 2023. Not on anticoagulation because it was a brief episode per cardiology. Rec to look for recurrence with the help of event monitor. If a recurrence noted on event monitor, consider adding anticoagulation     Prolonged Q-T interval on ECG  Noted history.  We will avoid prolonging agents as much as possible      DM2 (diabetes mellitus, type 2)  Patient's FSGs are uncontrolled due to hyperglycemia on current medication regimen.  Last A1c reviewed-   Lab Results   Component Value Date    HGBA1C 11.3 (H) 03/16/2022     Most recent fingerstick glucose reviewed-   Recent Labs   Lab 04/17/24  1526 04/17/24  1944 04/18/24  0755 04/18/24  1139   POCTGLUCOSE 258* 254* 222* 327*       Current correctional scale  Low  Maintain anti-hyperglycemic dose as follows-   Antihyperglycemics (From admission, onward)      Start     Stop Route Frequency Ordered    04/17/24 1645  insulin aspart U-100 pen 5 Units         -- SubQ 3 times daily with meals 04/17/24 1443    04/16/24 2100  insulin detemir U-100 (Levemir) pen 15 Units         -- SubQ Nightly 04/16/24 0854    04/15/24 1504  insulin aspart U-100 pen 0-5 Units         -- SubQ Before meals & nightly PRN 04/15/24 1404          Hold Oral hypoglycemics while patient is in the hospital.    Hypertension  Chronic, controlled. Latest blood pressure and vitals reviewed-     Temp:  [97.8 °F (36.6 °C)-103.1 °F (39.5 °C)]   Pulse:  []   Resp:  [18-20]   BP: (139-173)/(69-90)   SpO2:  [95 %-99 %] .   Home meds for hypertension were reviewed and noted below.   Hypertension Medications               carvediloL (COREG) 3.125 MG  tablet Take 2 tablets (6.25 mg total) by mouth 2 (two) times daily.    hydroCHLOROthiazide (HYDRODIURIL) 25 MG tablet Take 1 tablet (25 mg total) by mouth once daily.    losartan (COZAAR) 100 MG tablet Take 1 tablet (100 mg total) by mouth once daily.            While in the hospital, will manage blood pressure as follows; Continue home antihypertensive regimen.  Add amlodipine    Will utilize p.r.n. blood pressure medication only if patient's blood pressure greater than 180/110 and she develops symptoms such as worsening chest pain or shortness of breath.    Elevated procalcitonin  In setting of active infection.  Treatment as stated below        VTE Risk Mitigation (From admission, onward)           Ordered     heparin (porcine) injection 5,000 Units  Every 8 hours         04/15/24 1404     IP VTE HIGH RISK PATIENT  Once         04/15/24 1404     Place sequential compression device  Until discontinued         04/15/24 1404                    Discharge Planning   NAYELI: 4/21/2024     Code Status: Full Code   Is the patient medically ready for discharge?:     Reason for patient still in hospital (select all that apply): Patient trending condition and Treatment  Discharge Plan A: Home with family                  Nima Pompa MD  Department of Hospital Medicine   [facility]

## 2024-04-19 NOTE — SUBJECTIVE & OBJECTIVE
Interval History:  Patient reports shoulder pain is better.  Continues to have back pain.  T-max of 101.9° overnight.  On ceftriaxone.    Review of Systems   Constitutional: Negative.    Respiratory: Negative.     Cardiovascular: Negative.    Gastrointestinal: Negative.    Genitourinary: Negative.    Musculoskeletal:  Positive for arthralgias and back pain.   Neurological: Negative.      Objective:     Vital Signs (Most Recent):  Temp: 99.5 °F (37.5 °C) (04/19/24 1208)  Pulse: 90 (04/19/24 1208)  Resp: 18 (04/19/24 1208)  BP: (!) 115/59 (04/19/24 1208)  SpO2: 95 % (04/19/24 1208) Vital Signs (24h Range):  Temp:  [98 °F (36.7 °C)-101.9 °F (38.8 °C)] 99.5 °F (37.5 °C)  Pulse:  [] 90  Resp:  [16-18] 18  SpO2:  [93 %-99 %] 95 %  BP: (103-145)/(55-79) 115/59     Weight: 94 kg (207 lb 3.7 oz)  Body mass index is 32.46 kg/m².    Intake/Output Summary (Last 24 hours) at 4/19/2024 1316  Last data filed at 4/19/2024 0400  Gross per 24 hour   Intake 0 ml   Output --   Net 0 ml         Physical Exam  Constitutional:       General: She is not in acute distress.     Appearance: She is normal weight.   Cardiovascular:      Rate and Rhythm: Normal rate.      Pulses: Normal pulses.   Pulmonary:      Effort: No respiratory distress.      Breath sounds: Normal breath sounds. No wheezing.   Abdominal:      General: Bowel sounds are normal. There is no distension.      Palpations: Abdomen is soft.      Tenderness: There is no abdominal tenderness.   Musculoskeletal:         General: Swelling (Right shoulder) and tenderness (Tender to palpation of lower thoracic and lumbar area.  Right shoulder tender to palpation.) present.      Right lower leg: No edema.      Left lower leg: No edema.   Skin:     General: Skin is warm.   Neurological:      Mental Status: She is alert and oriented to person, place, and time. Mental status is at baseline.   Psychiatric:         Mood and Affect: Mood normal.             Significant Labs: All  pertinent labs within the past 24 hours have been reviewed.    Significant Imaging: I have reviewed all pertinent imaging results/findings within the past 24 hours.

## 2024-04-19 NOTE — SUBJECTIVE & OBJECTIVE
Interval History: Febrile overnight, awaiting MRI of shoulder - MRI of spine c/f prevertebral abscess. Pt reports tolerating antibiotic without adverse reactions.         Review of Systems   Constitutional:  Positive for fever.   Musculoskeletal:  Positive for arthralgias and back pain.   All other systems reviewed and are negative.    Objective:     Vital Signs (Most Recent):  Temp: 98.2 °F (36.8 °C) (04/19/24 0802)  Pulse: 97 (04/19/24 0802)  Resp: 18 (04/19/24 0802)  BP: (!) 140/69 (04/19/24 0802)  SpO2: 99 % (04/19/24 0802) Vital Signs (24h Range):  Temp:  [97.8 °F (36.6 °C)-101.9 °F (38.8 °C)] 98.2 °F (36.8 °C)  Pulse:  [] 97  Resp:  [16-18] 18  SpO2:  [93 %-99 %] 99 %  BP: (103-173)/(55-90) 140/69     Weight: 94 kg (207 lb 3.7 oz)  Body mass index is 32.46 kg/m².    Estimated Creatinine Clearance: 156.8 mL/min (based on SCr of 0.5 mg/dL).     Physical Exam  Constitutional:       General: She is not in acute distress.     Appearance: She is not ill-appearing or toxic-appearing.   Eyes:      General:         Right eye: No discharge.         Left eye: No discharge.   Pulmonary:      Effort: Pulmonary effort is normal. No respiratory distress.   Musculoskeletal:         General: Swelling (R shoulder - improving) and tenderness (thoracic/lumbar) present.   Skin:     General: Skin is warm and dry.   Neurological:      Mental Status: She is alert and oriented to person, place, and time.          Significant Labs:   Microbiology Results (last 7 days)       Procedure Component Value Units Date/Time    Aerobic culture (Specify Source) **CANNOT BE ORDERED AS STAT** [[id number]] Collected: 04/15/24 1346    Order Status: Completed Specimen: Shoulder, Right Updated: 04/19/24 0614     Aerobic Bacterial Culture No growth    Blood culture [[id number]] Collected: 04/15/24 2205    Order Status: Completed Specimen: Blood Updated: 04/18/24 2303     Blood Culture, Routine No Growth to date      No Growth to date      No  Growth to date      No Growth to date    Blood culture [[id number]] Collected: 04/15/24 2209    Order Status: Completed Specimen: Blood from Peripheral, Antecubital, Left Updated: 04/18/24 2303     Blood Culture, Routine No Growth to date      No Growth to date      No Growth to date      No Growth to date    Blood culture [[id number]] Collected: 04/17/24 1845    Order Status: Completed Specimen: Blood Updated: 04/18/24 1903     Blood Culture, Routine No Growth to date      No Growth to date    Narrative:      Collection has been rescheduled by CMO at 04/17/2024 15:09 Reason:   Hard stick pt requested to be drawn drown on left hand   Collection has been rescheduled by CMO at 04/17/2024 15:11 Reason: Jenaro Zamudio   Collection has been rescheduled by CMO at 04/17/2024 15:09 Reason:   Hard stick pt requested to be drawn drown on left hand   Collection has been rescheduled by CMO at 04/17/2024 15:11 Reason: Jenaro Zamudio     Blood culture [[id number]] Collected: 04/17/24 1840    Order Status: Completed Specimen: Blood from Peripheral, Hand, Right Updated: 04/18/24 1903     Blood Culture, Routine No Growth to date      No Growth to date    Narrative:      Collection has been rescheduled by CMO at 04/17/2024 15:09 Reason:   Hard stick pt requested to be drawn drown on left hand   Collection has been rescheduled by CMO at 04/17/2024 15:11 Reason: Jenaro Zamudio   Collection has been rescheduled by CMO at 04/17/2024 15:09 Reason:   Hard stick pt requested to be drawn drown on left hand   Collection has been rescheduled by CMO at 04/17/2024 15:11 Reason: Jenaro Zamudio     Blood Culture #2 **CANNOT BE ORDERED STAT** [[id number]]  (Abnormal) Collected: 04/15/24 0946    Order Status: Completed Specimen: Blood Updated: 04/17/24 0803     Blood Culture, Routine Gram stain cameron bottle: Gram positive cocci in chains resembling Strep      Results called to and read back by:Lizz Wells  04/16/2024  08:12      STREPTOCOCCUS AGALACTIAE  (GROUP B)  Beta-hemolytic streptococci are routinely susceptible to   penicillins,cephalosporins and carbapenems.  For susceptibility see order #[id number]      Blood Culture #1 **CANNOT BE ORDERED STAT** [[id number]]  (Abnormal)  (Susceptibility) Collected: 04/15/24 0928    Order Status: Completed Specimen: Blood from Peripheral, Antecubital, Left Updated: 04/17/24 0803     Blood Culture, Routine Gram stain cameron bottle: Gram positive cocci in chains resembling Strep      Results called to and read back by: Maya Farah 3W 04/15/2024  20:19      STREPTOCOCCUS AGALACTIAE (GROUP B)  Beta-hemolytic streptococci are routinely susceptible to   penicillins,cephalosporins and carbapenems.              Significant Imaging: I have reviewed all pertinent imaging results/findings within the past 24 hours.

## 2024-04-19 NOTE — PLAN OF CARE
"Pt stated" I'm doing ok." Per MD, pt may transfer to [facility] pending recommendations. CM will assist as needed     04/19/24 2531   Discharge Reassessment   Assessment Type Discharge Planning Reassessment   Did the patient's condition or plan change since previous assessment? No   Discharge Plan discussed with: Patient   Discharge Plan A Home with family   Discharge Plan B Other  (tbd)   DME Needed Upon Discharge  none   Transition of Care Barriers None   Why the patient remains in the hospital Requires continued medical care   Post-Acute Status   Coverage [employer]   Discharge Delays None known at this time       "

## 2024-04-19 NOTE — CONSULTS
Shahida Ortega is admitted for sepsis at [facility]. Neurosurgery was consulted re: prevertebral abscess T5-T11 on MRI. I have reviewed the whole spine MRI w/wo contrast with Dr. Xiao. The infection is within the prevertebral space and does not extend into the epidural space. No neurosurgical intervention related to the infection is indicated. Recommend CT surgery consult for further evaluation.       Kanwal Leahy PA-C  [facility]  Department of Neurosurgery  [phone]

## 2024-04-19 NOTE — NURSING
[facility]  Nurses Note -- 4 Eyes      4/19/2024       Skin assessed on: Q Shift      [x] No Pressure Injuries Present    [x]Prevention Measures Documented    [] Yes LDA  for Pressure Injury Previously documented     [] Yes New Pressure Injury Discovered   [] LDA for New Pressure Injury Added      Attending RN:  Ellyn Nayak RN     Second RN:  MARLEY Donato

## 2024-04-19 NOTE — ASSESSMENT & PLAN NOTE
Arthrocentesis performed in ED by ortho.  Culture sent no growth so far.  Pending MRI right shoulder.  Continue ceftriaxone.

## 2024-04-19 NOTE — ASSESSMENT & PLAN NOTE
MRI C/T/L-spine revealed T5-T11 prevertebral abscess.  Neurosurgery consulted.  Recommend CT surgery evaluation   Pending CT surgery acceptance for transfer to [facility]  On ceftriaxone.

## 2024-04-20 LAB
HIV1+2 IGG SERPL QL IA.RAPID: NORMAL
POCT GLUCOSE: 120 MG/DL (ref 70–110)
POCT GLUCOSE: 168 MG/DL (ref 70–110)
POCT GLUCOSE: 220 MG/DL (ref 70–110)
POCT GLUCOSE: 224 MG/DL (ref 70–110)

## 2024-04-20 PROCEDURE — 63600175 PHARM REV CODE 636 W HCPCS: Performed by: STUDENT IN AN ORGANIZED HEALTH CARE EDUCATION/TRAINING PROGRAM

## 2024-04-20 PROCEDURE — 99233 SBSQ HOSP IP/OBS HIGH 50: CPT | Mod: ,,, | Performed by: STUDENT IN AN ORGANIZED HEALTH CARE EDUCATION/TRAINING PROGRAM

## 2024-04-20 PROCEDURE — 25000003 PHARM REV CODE 250: Performed by: STUDENT IN AN ORGANIZED HEALTH CARE EDUCATION/TRAINING PROGRAM

## 2024-04-20 PROCEDURE — A4216 STERILE WATER/SALINE, 10 ML: HCPCS | Performed by: STUDENT IN AN ORGANIZED HEALTH CARE EDUCATION/TRAINING PROGRAM

## 2024-04-20 PROCEDURE — 21400001 HC TELEMETRY ROOM

## 2024-04-20 PROCEDURE — 86703 HIV-1/HIV-2 1 RESULT ANTBDY: CPT | Performed by: STUDENT IN AN ORGANIZED HEALTH CARE EDUCATION/TRAINING PROGRAM

## 2024-04-20 PROCEDURE — C1751 CATH, INF, PER/CENT/MIDLINE: HCPCS

## 2024-04-20 PROCEDURE — 36410 VNPNXR 3YR/> PHY/QHP DX/THER: CPT

## 2024-04-20 PROCEDURE — 36415 COLL VENOUS BLD VENIPUNCTURE: CPT | Performed by: STUDENT IN AN ORGANIZED HEALTH CARE EDUCATION/TRAINING PROGRAM

## 2024-04-20 RX ORDER — SODIUM CHLORIDE 0.9 % (FLUSH) 0.9 %
10 SYRINGE (ML) INJECTION EVERY 6 HOURS
Status: DISCONTINUED | OUTPATIENT
Start: 2024-04-20 | End: 2024-04-25

## 2024-04-20 RX ORDER — SODIUM CHLORIDE 0.9 % (FLUSH) 0.9 %
10 SYRINGE (ML) INJECTION
Status: DISCONTINUED | OUTPATIENT
Start: 2024-04-20 | End: 2024-04-25

## 2024-04-20 RX ADMIN — HEPARIN SODIUM 5000 UNITS: 5000 INJECTION INTRAVENOUS; SUBCUTANEOUS at 09:04

## 2024-04-20 RX ADMIN — LOSARTAN POTASSIUM 100 MG: 50 TABLET, FILM COATED ORAL at 08:04

## 2024-04-20 RX ADMIN — SENNOSIDES AND DOCUSATE SODIUM 1 TABLET: 8.6; 5 TABLET ORAL at 09:04

## 2024-04-20 RX ADMIN — INSULIN ASPART 8 UNITS: 100 INJECTION, SOLUTION INTRAVENOUS; SUBCUTANEOUS at 04:04

## 2024-04-20 RX ADMIN — Medication 10 ML: at 12:04

## 2024-04-20 RX ADMIN — CARVEDILOL 6.25 MG: 6.25 TABLET, FILM COATED ORAL at 08:04

## 2024-04-20 RX ADMIN — HEPARIN SODIUM 5000 UNITS: 5000 INJECTION INTRAVENOUS; SUBCUTANEOUS at 06:04

## 2024-04-20 RX ADMIN — OXYCODONE 5 MG: 5 TABLET ORAL at 02:04

## 2024-04-20 RX ADMIN — OXYCODONE 5 MG: 5 TABLET ORAL at 09:04

## 2024-04-20 RX ADMIN — INSULIN ASPART 2 UNITS: 100 INJECTION, SOLUTION INTRAVENOUS; SUBCUTANEOUS at 12:04

## 2024-04-20 RX ADMIN — HEPARIN SODIUM 5000 UNITS: 5000 INJECTION INTRAVENOUS; SUBCUTANEOUS at 02:04

## 2024-04-20 RX ADMIN — INSULIN ASPART 2 UNITS: 100 INJECTION, SOLUTION INTRAVENOUS; SUBCUTANEOUS at 04:04

## 2024-04-20 RX ADMIN — INSULIN ASPART 8 UNITS: 100 INJECTION, SOLUTION INTRAVENOUS; SUBCUTANEOUS at 08:04

## 2024-04-20 RX ADMIN — CEFTRIAXONE 2 G: 2 INJECTION, POWDER, FOR SOLUTION INTRAMUSCULAR; INTRAVENOUS at 07:04

## 2024-04-20 RX ADMIN — INSULIN ASPART 8 UNITS: 100 INJECTION, SOLUTION INTRAVENOUS; SUBCUTANEOUS at 12:04

## 2024-04-20 RX ADMIN — CARVEDILOL 6.25 MG: 6.25 TABLET, FILM COATED ORAL at 09:04

## 2024-04-20 RX ADMIN — ASPIRIN 81 MG: 81 TABLET, COATED ORAL at 08:04

## 2024-04-20 RX ADMIN — INSULIN DETEMIR 20 UNITS: 100 INJECTION, SOLUTION SUBCUTANEOUS at 09:04

## 2024-04-20 RX ADMIN — Medication 10 ML: at 04:04

## 2024-04-20 NOTE — ASSESSMENT & PLAN NOTE
Patient with Paroxysmal (<7 days) atrial fibrillation which is controlled currently with Beta Blocker. Patient is currently in sinus rhythm.[id number] Score: 3  Anticoagulation:  Patient does not appear to be on home anticoagulation.  We will continue aspirin.  In normal sinus rhythm    Paroxysmal AFib. During episode of sepsis in 2023. Not on anticoagulation because it was a brief episode per cardiology. Rec to look for recurrence with the help of event monitor. If a recurrence noted on event monitor, consider adding anticoagulation

## 2024-04-20 NOTE — PROGRESS NOTES
[facility]  Infectious Disease  Progress Note    Patient Name: Shahida Ortega  MRN: [id number]  Admission Date: 4/15/2024  Length of Stay: 5 days  Attending Physician: Nima Pompa,*  Primary Care Provider: Janusz Staton MD    Isolation Status: No active isolations  Assessment/Plan:      ID  Bacteremia  T5-T11 preverbal abscess  R septic shoulder    I independently reviewed patient's lab work and images as documented. 52 yo female with DM/HTN admitted for R shoulder pain, found to have GBS bacteremia. ID consulted for bacteremia. Hospital course notable for CT of R shoulder that showed soft tissue edema/probable small joint effusion. She was evaluated by ortho - dry tap, though was able to aspirate small amount of blood that was sent for cx that have been no growth to date (not enough to send for fluid studies). Blcx on admit with GBS, repeat blcx ngtd. TTE without vegetations. Spinal MRI c/f T5-T11  prevertebral abscess. R shoulder MRI c/f AC joint septic arthritis/OM with cellulitis/myositis.     Recommendations:  -continue ceftriaxone 2g iv daily   -pending transfer for surgical evaluation (ortho/thoracic)   -favor cultures to optimize abx selection, though suspect same org  -anticipate prolonged course of abx therapy given spinal/shoulder infection  -consult ID [place] [facility]        Thank you for your consult. I will follow-up with patient. Please contact us if you have any additional questions. Above d/w primary team.       50 minutes of total time spent on the encounter, which includes face to face time and non-face to face time preparing to see the patient (eg, review of tests), obtaining and/or reviewing separately obtained history, documenting clinical information in the electronic or other health record, independently interpreting results (not separately reported) and communicating results to the patient/family/caregiver, or care coordination (not  separately reported).       Marilee Begum MD  Infectious Disease  [facility] [place] - Telemetry    Subjective:     Principal Problem:Sepsis    HPI: 52 yo female with DM/HTN admitted for R shoulder pain, found to have GBS bacteremia. ID consulted for bacteremia. Hospital course notable for CT of R shoulder that showed soft tissue edema/probable small joint effusion. She was evaluated by ortho - dry tap, though was able to aspirate small amoutn of blood that was sent for cx that have been no growth to date (not enough to send for fluid studies). Blcx on admit with GBS. Pt is currently on vanc/cefepime. Pt denied toxic habits, reported rash with PCN, or trauma/travel/TB exposure. She denied abdominal/respiratory/dental or  symptoms prior to admission, main complaint was back pain and R shoulder pain.     Of note, pt had had urine cx obtained 1/2024 that was positive for MSSA and GBS, s/p nitrofurantoin. Also had Ecoli bacteremia in 5/2023, tx with IV and transitioned to cefdinir.        Interval History: No fevers documented overnight. Pending transfer to [facility] for surgical evaluation. Pt reports tolerating abx without issues. Reports back/R shoulder pain is a little better today.       Review of Systems   Constitutional:  Negative for chills and fever.   Musculoskeletal:  Positive for arthralgias and back pain.   All other systems reviewed and are negative.    Objective:     Vital Signs (Most Recent):  Temp: 97.9 °F (36.6 °C) (04/20/24 1116)  Pulse: 87 (04/20/24 1116)  Resp: 19 (04/20/24 1116)  BP: (!) 105/57 (04/20/24 1116)  SpO2: 97 % (04/20/24 1116) Vital Signs (24h Range):  Temp:  [97.9 °F (36.6 °C)-99.8 °F (37.7 °C)] 97.9 °F (36.6 °C)  Pulse:  [] 87  Resp:  [16-19] 19  SpO2:  [94 %-98 %] 97 %  BP: (105-144)/(57-77) 105/57     Weight: 95.2 kg (209 lb 14.1 oz)  Body mass index is 32.87 kg/m².    Estimated Creatinine Clearance: 157.6 mL/min (based on SCr of 0.5 mg/dL).     Physical Exam  Constitutional:        General: She is not in acute distress.     Appearance: She is not ill-appearing or toxic-appearing.   Eyes:      General:         Right eye: No discharge.         Left eye: No discharge.   Pulmonary:      Effort: Pulmonary effort is normal. No respiratory distress.   Musculoskeletal:         General: No tenderness (spinal).      Right lower leg: No edema.      Left lower leg: No edema.   Skin:     General: Skin is warm and dry.      Comments: R shoulder - minimal TTP, in sling  L midline   Neurological:      Mental Status: She is alert and oriented to person, place, and time.          Significant Labs:   Microbiology Results (last 7 days)       Procedure Component Value Units Date/Time    Blood culture [[id number]] Collected: 04/15/24 2209    Order Status: Completed Specimen: Blood from Peripheral, Antecubital, Left Updated: 04/19/24 2303     Blood Culture, Routine No Growth after 4 days.    Blood culture [[id number]] Collected: 04/15/24 2205    Order Status: Completed Specimen: Blood Updated: 04/19/24 2303     Blood Culture, Routine No Growth after 4 days.    Blood culture [[id number]] Collected: 04/17/24 1845    Order Status: Completed Specimen: Blood Updated: 04/19/24 1903     Blood Culture, Routine No Growth to date      No Growth to date      No Growth to date    Narrative:      Collection has been rescheduled by CMO at 04/17/2024 15:09 Reason:   Hard stick pt requested to be drawn drown on left hand   Collection has been rescheduled by CMO at 04/17/2024 15:11 Reason: Rn   Lizz   Collection has been rescheduled by CMO at 04/17/2024 15:09 Reason:   Hard stick pt requested to be drawn drown on left hand   Collection has been rescheduled by CMO at 04/17/2024 15:11 Reason: Rn   Lizz     Blood culture [[id number]] Collected: 04/17/24 1840    Order Status: Completed Specimen: Blood from Peripheral, Hand, Right Updated: 04/19/24 1903     Blood Culture, Routine No Growth to date      No Growth to date       No Growth to date    Narrative:      Collection has been rescheduled by CMO at 04/17/2024 15:09 Reason:   Hard stick pt requested to be drawn drown on left hand   Collection has been rescheduled by CMO at 04/17/2024 15:11 Reason: Jenaro Zamudio   Collection has been rescheduled by CMO at 04/17/2024 15:09 Reason:   Hard stick pt requested to be drawn drown on left hand   Collection has been rescheduled by CMO at 04/17/2024 15:11 Reason: Jenaro Zamudio     Aerobic culture (Specify Source) **CANNOT BE ORDERED AS STAT** [[id number]] Collected: 04/15/24 1346    Order Status: Completed Specimen: Shoulder, Right Updated: 04/19/24 0614     Aerobic Bacterial Culture No growth    Blood Culture #2 **CANNOT BE ORDERED STAT** [[id number]]  (Abnormal) Collected: 04/15/24 0946    Order Status: Completed Specimen: Blood Updated: 04/17/24 0803     Blood Culture, Routine Gram stain cameron bottle: Gram positive cocci in chains resembling Strep      Results called to and read back by:Lizz Wells  04/16/2024  08:12      STREPTOCOCCUS AGALACTIAE (GROUP B)  Beta-hemolytic streptococci are routinely susceptible to   penicillins,cephalosporins and carbapenems.  For susceptibility see order #[id number]      Blood Culture #1 **CANNOT BE ORDERED STAT** [[id number]]  (Abnormal)  (Susceptibility) Collected: 04/15/24 0928    Order Status: Completed Specimen: Blood from Peripheral, Antecubital, Left Updated: 04/17/24 0803     Blood Culture, Routine Gram stain cameron bottle: Gram positive cocci in chains resembling Strep      Results called to and read back by: Maya Farah 3W 04/15/2024  20:19      STREPTOCOCCUS AGALACTIAE (GROUP B)  Beta-hemolytic streptococci are routinely susceptible to   penicillins,cephalosporins and carbapenems.              Significant Imaging: I have reviewed all pertinent imaging results/findings within the past 24 hours.

## 2024-04-20 NOTE — ASSESSMENT & PLAN NOTE
T5-T11 preverbal abscess  R septic shoulder    I independently reviewed patient's lab work and images as documented. 54 yo female with DM/HTN admitted for R shoulder pain, found to have GBS bacteremia. ID consulted for bacteremia. Hospital course notable for CT of R shoulder that showed soft tissue edema/probable small joint effusion. She was evaluated by ortho - dry tap, though was able to aspirate small amount of blood that was sent for cx that have been no growth to date (not enough to send for fluid studies). Blcx on admit with GBS, repeat blcx ngtd. TTE without vegetations. Spinal MRI c/f T5-T11  prevertebral abscess. R shoulder MRI c/f AC joint septic arthritis/OM with cellulitis/myositis.     Recommendations:  -continue ceftriaxone 2g iv daily   -pending transfer for surgical evaluation   -favor cultures to optimize abx selection, though suspect same org  -anticipate prolonged course of abx therapy given spinal/shoulder infection  -consult ID when transferred to [facility] [place] for [place] of care

## 2024-04-20 NOTE — ASSESSMENT & PLAN NOTE
2/2 blood cultures with group B Streptococcus agalactiae repeat blood cultures no growth to date.  Continues to spike fever.  On vancomycin and cefepime, de-escalated to  ceftriaxone.  Id on board.  MRI C/T/L-spine T5-T11 prevertebral abscess.  MRI right shoulder with septic arthritis and osteomyelitis  Neurosurgery consulted, recommended CT surgery evaluation   Pending transfer to [facility]

## 2024-04-20 NOTE — PLAN OF CARE
Problem: Adult Inpatient Plan of Care  Goal: Plan of Care Review  Outcome: Ongoing, Progressing  Goal: Patient-Specific Goal (Individualized)  Outcome: Ongoing, Progressing  Goal: Absence of Hospital-Acquired Illness or Injury  Outcome: Ongoing, Progressing  Goal: Optimal Comfort and Wellbeing  Outcome: Ongoing, Progressing  Goal: Readiness for Transition of Care  Outcome: Ongoing, Progressing     Problem: Bleeding (Sepsis/Septic Shock)  Goal: Absence of Bleeding  Outcome: Ongoing, Progressing     Problem: Glycemic Control Impaired (Sepsis/Septic Shock)  Goal: Blood Glucose Level Within Desired Range  Outcome: Ongoing, Progressing     Problem: Infection Progression (Sepsis/Septic Shock)  Goal: Absence of Infection Signs and Symptoms  Outcome: Ongoing, Progressing     Problem: Nutrition Impaired (Sepsis/Septic Shock)  Goal: Optimal Nutrition Intake  Outcome: Ongoing, Progressing     Problem: Skin Injury Risk Increased  Goal: Skin Health and Integrity  Outcome: Ongoing, Progressing

## 2024-04-20 NOTE — PROVIDER TRANSFER
Outside Transfer Acceptance Note / Regional Referral Center    Referring facility: [facility]   Referring provider: EMILIE MURRELL  Accepting facility: [facility]  Accepting provider: NILDA ALCANTARA  Reason for transfer:  HLOC  Transfer diagnosis: Epidural abscess, septic shoulder, GBS bacteremia  Transfer specialty requested: Cardiothoracic Surgery  Transfer specialty notified: Yes  Transfer level: NUMBER 1-5: 2  Isolation status: No active isolations   Admission class or status: IP- Inpatient    Narrative     53-y/o w with PMH DM, HTN, adm to [facility] on 04/15 with a 3-day h/o pain and redness of the right shoulder.  Patient also, complaining of back pain.     On presentation T 99°, , /72.  Exam was pos for swelling and redness of the right shoulder (see picture) WBC 15.4, Hb 11.8, Plts 210, Na 135, K 4.1, CO2 23 > 14, Cr 0.8, Glu 327, , LA 1.1, BHB 4.4.       CT right shoulder showed a small effusion.  Ortho was consulted.  Aspiration of the right shoulder was dry.  A small amount of blood was aspirated from the subacromial bursa.  Cultures NGTD     BC were obtained and the patient was started on cefepime and vancomycin.  BC subsequently reported pos for strep agalactiae and antibiotics switched to CTX.      Review of records revealed that the patient had a strep agalactiae UTI in January 2024     MRI w/wo contrast (4/18) showed prevertebral and paravertebral signal enhancement spanning T5 through T11 concerning for prevertebral abscess with no definite extension into the epidural space.     Neurosurgery reviewed the images and reported that neurosurgical intervention was not indicated since the infection did not extend into the epidural space.     Thoracic surgery (Dr Newby) consulted and has requested transfer on Sunday for a procedure on Monday or Tuesday.     Labs today WBC 8.03, Hb 11.3, Na 135, K 4.5, CO2 23, Cr 0.5, Glu 165.  Patient  has continued to be febrile with Tmax 103.1° (4/17) > 101.9 (4/18) and 99.5 so far today. BC on 4/15 and 4/17 NGTD     TT E neg for vegetations.  MRI right shoulder pending (today)     Transfer diagnosis:  Epidural abscess, septic shoulder, GBS bacteremia    Instructions      Nael Shepard-  Admit to Hospital Medicine  Upon patient arrival to floor, please send SecureChat to [facility] or call extension [phone] (if no answer, do NOT leave a callback number after the beep, rather please send a SecureChat to [facility]), for Hospital Medicine admit team assignment and for additional admit orders for the patient.  Do not page the attending physician associated with the patient on arrival (this physician may not be on duty at the time of arrival).  Rather, always send a SecureChat to [facility] or call [phone] to reach the triage physician for orders and team assignment.

## 2024-04-20 NOTE — PLAN OF CARE
Problem: Adult Inpatient Plan of Care  Goal: Optimal Comfort and Wellbeing  Intervention: Monitor Pain and Promote Comfort  Flowsheets (Taken 4/20/2024 1023)  Pain Management Interventions:   pain management plan reviewed with patient/caregiver   pillow support provided   quiet environment facilitated   care clustered   medication offered

## 2024-04-20 NOTE — ASSESSMENT & PLAN NOTE
MRI C/T/L-spine revealed T5-T11 prevertebral abscess.  Neurosurgery consulted.  Recommend CT surgery evaluation   Pending transferred to [facility] for VATS with drainage by CT surgery on 04/22/2024  On ceftriaxone.

## 2024-04-20 NOTE — NURSING
[facility]  Nurses Note -- 4 Eyes      4/19/2024       Skin assessed on: Q Shift      [x] No Pressure Injuries Present    []Prevention Measures Documented    [] Yes LDA  for Pressure Injury Previously documented     [] Yes New Pressure Injury Discovered   [] LDA for New Pressure Injury Added      Attending RN:  Dulce Tucker LPN     Second RN:  Ellyn Nayak

## 2024-04-20 NOTE — PLAN OF CARE
Problem: Adult Inpatient Plan of Care  Goal: Plan of Care Review  Outcome: Met  Goal: Patient-Specific Goal (Individualized)  Outcome: Met  Goal: Absence of Hospital-Acquired Illness or Injury  Outcome: Met  Intervention: Identify and Manage Fall Risk  Flowsheets (Taken 4/20/2024 1644)  Safety Promotion/Fall Prevention:   assistive device/personal item within reach   nonskid shoes/socks when out of bed   Fall Risk signage in place   high risk medications identified   medications reviewed  Goal: Optimal Comfort and Wellbeing  Intervention: Monitor Pain and Promote Comfort  Flowsheets (Taken 4/20/2024 1023)  Pain Management Interventions:   pain management plan reviewed with patient/caregiver   pillow support provided   quiet environment facilitated   care clustered   medication offered

## 2024-04-20 NOTE — SUBJECTIVE & OBJECTIVE
Interval History: No fevers documented overnight. Pending transfer to [facility] for surgical evaluation. Pt reports tolerating abx without issues. Reports back/R shoulder pain is a little better today.       Review of Systems   Constitutional:  Negative for chills and fever.   Musculoskeletal:  Positive for arthralgias and back pain.   All other systems reviewed and are negative.    Objective:     Vital Signs (Most Recent):  Temp: 97.9 °F (36.6 °C) (04/20/24 1116)  Pulse: 87 (04/20/24 1116)  Resp: 19 (04/20/24 1116)  BP: (!) 105/57 (04/20/24 1116)  SpO2: 97 % (04/20/24 1116) Vital Signs (24h Range):  Temp:  [97.9 °F (36.6 °C)-99.8 °F (37.7 °C)] 97.9 °F (36.6 °C)  Pulse:  [] 87  Resp:  [16-19] 19  SpO2:  [94 %-98 %] 97 %  BP: (105-144)/(57-77) 105/57     Weight: 95.2 kg (209 lb 14.1 oz)  Body mass index is 32.87 kg/m².    Estimated Creatinine Clearance: 157.6 mL/min (based on SCr of 0.5 mg/dL).     Physical Exam  Constitutional:       General: She is not in acute distress.     Appearance: She is not ill-appearing or toxic-appearing.   Eyes:      General:         Right eye: No discharge.         Left eye: No discharge.   Pulmonary:      Effort: Pulmonary effort is normal. No respiratory distress.   Musculoskeletal:         General: No tenderness (spinal).      Right lower leg: No edema.      Left lower leg: No edema.   Skin:     General: Skin is warm and dry.      Comments: R shoulder - minimal TTP, in sling  L midline   Neurological:      Mental Status: She is alert and oriented to person, place, and time.          Significant Labs:   Microbiology Results (last 7 days)       Procedure Component Value Units Date/Time    Blood culture [[id number]] Collected: 04/15/24 2209    Order Status: Completed Specimen: Blood from Peripheral, Antecubital, Left Updated: 04/19/24 2303     Blood Culture, Routine No Growth after 4 days.    Blood culture [[id number]] Collected: 04/15/24 2205    Order Status: Completed Specimen:  Blood Updated: 04/19/24 2303     Blood Culture, Routine No Growth after 4 days.    Blood culture [[id number]] Collected: 04/17/24 1845    Order Status: Completed Specimen: Blood Updated: 04/19/24 1903     Blood Culture, Routine No Growth to date      No Growth to date      No Growth to date    Narrative:      Collection has been rescheduled by CMO at 04/17/2024 15:09 Reason:   Hard stick pt requested to be drawn drown on left hand   Collection has been rescheduled by CMO at 04/17/2024 15:11 Reason: Jenaro Zamudio   Collection has been rescheduled by CMO at 04/17/2024 15:09 Reason:   Hard stick pt requested to be drawn drown on left hand   Collection has been rescheduled by CMO at 04/17/2024 15:11 Reason: Jenaro Zamudio     Blood culture [[id number]] Collected: 04/17/24 1840    Order Status: Completed Specimen: Blood from Peripheral, Hand, Right Updated: 04/19/24 1903     Blood Culture, Routine No Growth to date      No Growth to date      No Growth to date    Narrative:      Collection has been rescheduled by CMO at 04/17/2024 15:09 Reason:   Hard stick pt requested to be drawn drown on left hand   Collection has been rescheduled by CMO at 04/17/2024 15:11 Reason: Jenaro Zamudio   Collection has been rescheduled by CMO at 04/17/2024 15:09 Reason:   Hard stick pt requested to be drawn drown on left hand   Collection has been rescheduled by CMO at 04/17/2024 15:11 Reason: Jenaro Zamudio     Aerobic culture (Specify Source) **CANNOT BE ORDERED AS STAT** [[id number]] Collected: 04/15/24 1346    Order Status: Completed Specimen: Shoulder, Right Updated: 04/19/24 0614     Aerobic Bacterial Culture No growth    Blood Culture #2 **CANNOT BE ORDERED STAT** [[id number]]  (Abnormal) Collected: 04/15/24 0946    Order Status: Completed Specimen: Blood Updated: 04/17/24 0803     Blood Culture, Routine Gram stain cameron bottle: Gram positive cocci in chains resembling Strep      Results called to and read back by:Lizz Wells  04/16/2024   08:12      STREPTOCOCCUS AGALACTIAE (GROUP B)  Beta-hemolytic streptococci are routinely susceptible to   penicillins,cephalosporins and carbapenems.  For susceptibility see order #[id number]      Blood Culture #1 **CANNOT BE ORDERED STAT** [[id number]]  (Abnormal)  (Susceptibility) Collected: 04/15/24 0928    Order Status: Completed Specimen: Blood from Peripheral, Antecubital, Left Updated: 04/17/24 0803     Blood Culture, Routine Gram stain cameron bottle: Gram positive cocci in chains resembling Strep      Results called to and read back by: Maya Farah 3W 04/15/2024  20:19      STREPTOCOCCUS AGALACTIAE (GROUP B)  Beta-hemolytic streptococci are routinely susceptible to   penicillins,cephalosporins and carbapenems.              Significant Imaging: I have reviewed all pertinent imaging results/findings within the past 24 hours.

## 2024-04-20 NOTE — PROGRESS NOTES
[facility] Medicine  Progress Note    Patient Name: Shahida Ortega  MRN: [id number]  Patient Class: IP- Inpatient   Admission Date: 4/15/2024  Length of Stay: 5 days  Attending Physician: Nima Pompa,*  Primary Care Provider: Janusz Staton MD        Subjective:     Principal Problem:Sepsis        HPI:  53F with pmh of dm and htn who presents due to 3 day h/o right shoulder pain with associated redness. Patient reports being seen here in the ED for her symptoms on 4/12/24 and was prescribed Valium with relief of back pain but not shoulder. Patient denies taking any medications today for relief. Patient denies any recent injuries or trauma. In ed pt started on abx and fluids for sepsis and ortho consulted who performed arthrocentesis of the should with fluids sent for culture and pending. Pt denies tobacco, alcohol or drug use. Pt denies previous ortho surgerie     Overview/Hospital Course:  53F with pmh of DM and HTN who was admitted for sepsis s/t atraumatic right shoulder pain , back pain  CT scan was performed of the right shoulder which reveal a small effusion. Ortho was consulted s/p Aspiration right shoulder joint on 04/15/2024 revealed dry tap.  Small bit of blood was aspirated from subacromial bursa on 4/15 and sent for cultures but unable to send for cell count. Blood cultures 2/2 bottles revealed strep agalactiae.  Repeat blood cultures no growth to date.  Review of records revealed that patient did have strep agalactiae UTI in 01/2024.  Initiated On vancomycin and cefepime.  Patient also reports persistent back pain.  Leukocytosis improving  TTE with no vegetations.   Consulted ID.  Deescalated antibiotics to ceftriaxone alone.  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  MRI right shoulder revealed Acromioclavicular joint effusion concerning for AC joint septic arthritis and osteomyelitis. Moderate glenohumeral joint effusion and synovitis with subacromial  subdeltoid bursitis.Cellulitis of the shoulder with myositis of the deltoid and trapezius musculature.   recommended CT surgery consultation.  Plan for VATS with chest tube placement on 04/22/2024 at [facility].  Plan to transferred to [facility] on 04/21/2024.      Review of records revealed that patient did have large abdominal abscess of unclear etiology in 5/ 2019 and 12/ 2020 (10 x 8 cm) requiring diagnostic laparoscopy with drainage of abscess.  Cultures in 2019 revealed E coli.  Last colonoscopy was in 2023  revealed 5 mm polyp in sigmoid colon and diverticula.  She was also found to have elevated CA 19 9 levels on labs, unclear etiology seen by OBGYN in 2021 Patient denied smoking/alcohol/IV drug abuse. Pending HIV, CA 19 9 Ig G,IgA IgM work up given recurrent abscesses    Interval History:  Patient continues to have right shoulder and back pain.  Fever curve improving.  T-max 99.8° F overnight.  Leukocytosis resolved.  Pending transferred to [facility]    Review of Systems   Constitutional: Negative.    Respiratory: Negative.     Cardiovascular: Negative.    Gastrointestinal: Negative.    Genitourinary: Negative.    Musculoskeletal:  Positive for back pain and joint swelling.   Neurological: Negative.      Objective:     Vital Signs (Most Recent):  Temp: 97.9 °F (36.6 °C) (04/20/24 1116)  Pulse: 91 (04/20/24 1451)  Resp: 18 (04/20/24 1451)  BP: (!) 112/59 (04/20/24 1451)  SpO2: 97 % (04/20/24 1451) Vital Signs (24h Range):  Temp:  [97.9 °F (36.6 °C)-99.8 °F (37.7 °C)] 97.9 °F (36.6 °C)  Pulse:  [] 91  Resp:  [16-19] 18  SpO2:  [94 %-98 %] 97 %  BP: (105-144)/(57-77) 112/59     Weight: 95.2 kg (209 lb 14.1 oz)  Body mass index is 32.87 kg/m².    Intake/Output Summary (Last 24 hours) at 4/20/2024 1507  Last data filed at 4/20/2024 0900  Gross per 24 hour   Intake 120 ml   Output 800 ml   Net -680 ml         Physical Exam  Constitutional:       General: She is not in acute distress.     Appearance:  "She is normal weight.   Cardiovascular:      Rate and Rhythm: Normal rate.      Pulses: Normal pulses.   Pulmonary:      Effort: No respiratory distress.      Breath sounds: Normal breath sounds. No wheezing.   Abdominal:      General: Bowel sounds are normal. There is no distension.      Palpations: Abdomen is soft.      Tenderness: There is no abdominal tenderness.   Musculoskeletal:         General: Swelling (Right shoulder) and tenderness (Lower thoracic and upper lumbar vertebral and paravertebral area.  Right shoulder) present.      Right lower leg: No edema.      Left lower leg: No edema.   Skin:     General: Skin is warm.   Neurological:      Mental Status: She is alert and oriented to person, place, and time. Mental status is at baseline.   Psychiatric:         Mood and Affect: Mood normal.             Significant Labs: All pertinent labs within the past 24 hours have been reviewed.    Significant Imaging: I have reviewed all pertinent imaging results/findings within the past 24 hours.    Assessment/Plan:      * Sepsis  This patient does have evidence of infective focus  My overall impression is sepsis.  Source: Skin and Soft Tissue (location shoulder)  Antibiotics given-   Antibiotics (72h ago, onward)      Start     Stop Route Frequency Ordered    04/17/24 1215  cefTRIAXone (ROCEPHIN) 2 g in dextrose 5 % in water (D5W) 100 mL IVPB (MB+)         -- IV Every 24 hours (non-standard times) 04/17/24 1106          Latest lactate reviewed-  No results for input(s): "LACTATE", "POCLAC" in the last 72 hours.    Organ dysfunction indicated by  none    Fluid challenge Ideal Body Weight- The patient's ideal body weight is Ideal body weight: 61.6 kg (135 lb 12.9 oz) which will be used to calculate fluid bolus of 30 ml/kg for treatment of septic shock.      Post- resuscitation assessment Yes Perfusion exam was performed within 6 hours of septic shock presentation after bolus shows Adequate tissue perfusion assessed by " non-invasive monitoring       Will Not start Pressors- Levophed for MAP of 65  Source control achieved by: abx     Vertebral abscess    MRI C/T/L-spine revealed T5-T11 prevertebral abscess.  Neurosurgery consulted.  Recommend CT surgery evaluation   Pending transferred to [facility] for VATS with drainage by CT surgery on 04/22/2024  On ceftriaxone.    Bacteremia    2/2 blood cultures with group B Streptococcus agalactiae repeat blood cultures no growth to date.  Continues to spike fever.  On vancomycin and cefepime, de-escalated to  ceftriaxone.  Id on board.  MRI C/T/L-spine T5-T11 prevertebral abscess.  MRI right shoulder with septic arthritis and osteomyelitis  Neurosurgery consulted, recommended CT surgery evaluation   Pending transfer to [facility]    Pyogenic arthritis of shoulder region  Arthrocentesis performed in ED by ortho.  Culture sent no growth so far.  Pending MRI right shoulder.  Continue ceftriaxone.    A-fib  Patient with Paroxysmal (<7 days) atrial fibrillation which is controlled currently with Beta Blocker. Patient is currently in sinus rhythm.[id number] Score: 3  Anticoagulation:  Patient does not appear to be on home anticoagulation.  We will continue aspirin.  In normal sinus rhythm    Paroxysmal AFib. During episode of sepsis in 2023. Not on anticoagulation because it was a brief episode per cardiology. Rec to look for recurrence with the help of event monitor. If a recurrence noted on event monitor, consider adding anticoagulation     Prolonged Q-T interval on ECG  Noted history.  We will avoid prolonging agents as much as possible      DM2 (diabetes mellitus, type 2)  Patient's FSGs are uncontrolled due to hyperglycemia on current medication regimen.  Last A1c reviewed-   Lab Results   Component Value Date    HGBA1C 11.3 (H) 03/16/2022     Most recent fingerstick glucose reviewed-   Recent Labs   Lab 04/17/24  1526 04/17/24  1944 04/18/24  0755 04/18/24  1139   POCTGLUCOSE 258* 254*  222* 327*       Current correctional scale  Low  Maintain anti-hyperglycemic dose as follows-   Antihyperglycemics (From admission, onward)      Start     Stop Route Frequency Ordered    04/17/24 1645  insulin aspart U-100 pen 5 Units         -- SubQ 3 times daily with meals 04/17/24 1443    04/16/24 2100  insulin detemir U-100 (Levemir) pen 15 Units         -- SubQ Nightly 04/16/24 0854    04/15/24 1504  insulin aspart U-100 pen 0-5 Units         -- SubQ Before meals & nightly PRN 04/15/24 1404          Hold Oral hypoglycemics while patient is in the hospital.    Hypertension  Chronic, controlled. Latest blood pressure and vitals reviewed-     Temp:  [97.8 °F (36.6 °C)-103.1 °F (39.5 °C)]   Pulse:  []   Resp:  [18-20]   BP: (139-173)/(69-90)   SpO2:  [95 %-99 %] .   Home meds for hypertension were reviewed and noted below.   Hypertension Medications               carvediloL (COREG) 3.125 MG tablet Take 2 tablets (6.25 mg total) by mouth 2 (two) times daily.    hydroCHLOROthiazide (HYDRODIURIL) 25 MG tablet Take 1 tablet (25 mg total) by mouth once daily.    losartan (COZAAR) 100 MG tablet Take 1 tablet (100 mg total) by mouth once daily.            While in the hospital, will manage blood pressure as follows; Continue home antihypertensive regimen.  Add amlodipine    Will utilize p.r.n. blood pressure medication only if patient's blood pressure greater than 180/110 and she develops symptoms such as worsening chest pain or shortness of breath.    Elevated procalcitonin  In setting of active infection.  Treatment as stated below        VTE Risk Mitigation (From admission, onward)           Ordered     heparin (porcine) injection 5,000 Units  Every 8 hours         04/15/24 1404     IP VTE HIGH RISK PATIENT  Once         04/15/24 1404     Place sequential compression device  Until discontinued         04/15/24 1404                    Discharge Planning   NAYELI: 4/21/2024     Code Status: Full Code   Is the patient  medically ready for discharge?:     Reason for patient still in hospital (select all that apply): Patient trending condition and Treatment  Discharge Plan A: Home with family   Discharge Delays: None known at this time              Nima Pompa MD  Department of [facility] Medicine   [facility]

## 2024-04-20 NOTE — PLAN OF CARE
Problem: Adult Inpatient Plan of Care  Goal: Plan of Care Review  4/20/2024 0613 by Dulce Tucker LPN  Outcome: Ongoing, Progressing  4/20/2024 0526 by Dulce Tucker LPN  Outcome: Ongoing, Progressing  Goal: Patient-Specific Goal (Individualized)  4/20/2024 0613 by Dulce Tucker LPN  Outcome: Ongoing, Progressing  4/20/2024 0526 by Dulce Tucker LPN  Outcome: Ongoing, Progressing  Goal: Absence of Hospital-Acquired Illness or Injury  4/20/2024 0613 by Dulce Tucker LPN  Outcome: Ongoing, Progressing  4/20/2024 0526 by Dulce Tucker LPN  Outcome: Ongoing, Progressing  Goal: Optimal Comfort and Wellbeing  4/20/2024 0613 by Dulce Tucker LPN  Outcome: Ongoing, Progressing  4/20/2024 0526 by Dulce Tucker LPN  Outcome: Ongoing, Progressing  Goal: Readiness for Transition of Care  4/20/2024 0613 by Dulce Tucker LPN  Outcome: Ongoing, Progressing  4/20/2024 0526 by Dulce Tucker LPN  Outcome: Ongoing, Progressing     Problem: Bleeding (Sepsis/Septic Shock)  Goal: Absence of Bleeding  4/20/2024 0613 by Dulce Tucker LPN  Outcome: Ongoing, Progressing  4/20/2024 0526 by Dulce Tucker LPN  Outcome: Ongoing, Progressing     Problem: Glycemic Control Impaired (Sepsis/Septic Shock)  Goal: Blood Glucose Level Within Desired Range  4/20/2024 0613 by Dulce Tucker LPN  Outcome: Ongoing, Progressing  4/20/2024 0526 by Dulce Tucker LPN  Outcome: Ongoing, Progressing     Problem: Infection Progression (Sepsis/Septic Shock)  Goal: Absence of Infection Signs and Symptoms  4/20/2024 0613 by Dulce Tucker LPN  Outcome: Ongoing, Progressing  4/20/2024 0526 by Dulce Tucker LPN  Outcome: Ongoing, Progressing     Problem: Nutrition Impaired (Sepsis/Septic Shock)  Goal: Optimal Nutrition Intake  4/20/2024 0613 by Dulce Tucker LPN  Outcome: Ongoing, Progressing  4/20/2024 0526 by Dulce Tucker LPN  Outcome: Ongoing, Progressing     Problem: Skin  Injury Risk Increased  Goal: Skin Health and Integrity  4/20/2024 0613 by Dulce Tucker LPN  Outcome: Ongoing, Progressing  4/20/2024 0526 by Dulce Tucker LPN  Outcome: Ongoing, Progressing

## 2024-04-20 NOTE — SUBJECTIVE & OBJECTIVE
Interval History:  Patient continues to have right shoulder and back pain.  Fever curve improving.  T-max 99.8° F overnight.  Leukocytosis resolved.  Pending transferred to [facility]    Review of Systems   Constitutional: Negative.    Respiratory: Negative.     Cardiovascular: Negative.    Gastrointestinal: Negative.    Genitourinary: Negative.    Musculoskeletal:  Positive for back pain and joint swelling.   Neurological: Negative.      Objective:     Vital Signs (Most Recent):  Temp: 97.9 °F (36.6 °C) (04/20/24 1116)  Pulse: 91 (04/20/24 1451)  Resp: 18 (04/20/24 1451)  BP: (!) 112/59 (04/20/24 1451)  SpO2: 97 % (04/20/24 1451) Vital Signs (24h Range):  Temp:  [97.9 °F (36.6 °C)-99.8 °F (37.7 °C)] 97.9 °F (36.6 °C)  Pulse:  [] 91  Resp:  [16-19] 18  SpO2:  [94 %-98 %] 97 %  BP: (105-144)/(57-77) 112/59     Weight: 95.2 kg (209 lb 14.1 oz)  Body mass index is 32.87 kg/m².    Intake/Output Summary (Last 24 hours) at 4/20/2024 1507  Last data filed at 4/20/2024 0900  Gross per 24 hour   Intake 120 ml   Output 800 ml   Net -680 ml         Physical Exam  Constitutional:       General: She is not in acute distress.     Appearance: She is normal weight.   Cardiovascular:      Rate and Rhythm: Normal rate.      Pulses: Normal pulses.   Pulmonary:      Effort: No respiratory distress.      Breath sounds: Normal breath sounds. No wheezing.   Abdominal:      General: Bowel sounds are normal. There is no distension.      Palpations: Abdomen is soft.      Tenderness: There is no abdominal tenderness.   Musculoskeletal:         General: Swelling (Right shoulder) and tenderness (Lower thoracic and upper lumbar vertebral and paravertebral area.  Right shoulder) present.      Right lower leg: No edema.      Left lower leg: No edema.   Skin:     General: Skin is warm.   Neurological:      Mental Status: She is alert and oriented to person, place, and time. Mental status is at baseline.   Psychiatric:         Mood and  Affect: Mood normal.             Significant Labs: All pertinent labs within the past 24 hours have been reviewed.    Significant Imaging: I have reviewed all pertinent imaging results/findings within the past 24 hours.

## 2024-04-20 NOTE — PROGRESS NOTES
Patient admitted with sepsis, positive blood cultures.   Right shoulder pain improving.   Aspiration right shoulder no growth to date    Afebrile vital signs stable   Leukocytosis improved - 8.03    PE:   NAD, AAOx3, nonlabored respirations. Decreased range of motion right shoulder approximately 30%.  Slight tenderness, improved. No significant tenderness over the AC joint.  No erythema, no fluctuance, no warmth, no significant swelling. NVI to hand.    MRI R shoulder show glenohumeral joint synovitis and bursitis, mild effusion (already sampled). Myositis of deltoid and trapezius, no abscess. Partial thickness rotator cuff tear, biceps tenosynovitis. AC joint edema, signal change in distal clavicle - could represent osteomyelitis.     Other findings MRI C/T/L spine suggestive of T7-8 prevertebral abscess.     - Shoulder pain improving overall. AC joint without significant discomfort, no swelling or erythema.   - PT for shoulder ROM as tolerated.  - Continue IV antibiotics.   - Patient is pending transfer to [facility] for thoracic surgery procedures. Transfer planned for tomorrow. Defer further ortho care to [facility] upon transfer.     Whitney Powers MD  [facility]

## 2024-04-21 LAB
ALBUMIN SERPL BCP-MCNC: 1.8 G/DL (ref 3.5–5.2)
ALP SERPL-CCNC: 72 U/L (ref 55–135)
ALT SERPL W/O P-5'-P-CCNC: 11 U/L (ref 10–44)
ANION GAP SERPL CALC-SCNC: 8 MMOL/L (ref 8–16)
AST SERPL-CCNC: 14 U/L (ref 10–40)
BACTERIA BLD CULT: NORMAL
BACTERIA BLD CULT: NORMAL
BASOPHILS # BLD AUTO: 0.05 K/UL (ref 0–0.2)
BASOPHILS NFR BLD: 0.7 % (ref 0–1.9)
BILIRUB SERPL-MCNC: 0.2 MG/DL (ref 0.1–1)
BUN SERPL-MCNC: 10 MG/DL (ref 6–20)
CALCIUM SERPL-MCNC: 8.9 MG/DL (ref 8.7–10.5)
CANCER AG125 SERPL-ACNC: 23 U/ML (ref 0–30)
CANCER AG19-9 SERPL-ACNC: 42.5 U/ML (ref 0–40)
CHLORIDE SERPL-SCNC: 101 MMOL/L (ref 95–110)
CO2 SERPL-SCNC: 27 MMOL/L (ref 23–29)
CREAT SERPL-MCNC: 0.6 MG/DL (ref 0.5–1.4)
DIFFERENTIAL METHOD BLD: ABNORMAL
EOSINOPHIL # BLD AUTO: 0.1 K/UL (ref 0–0.5)
EOSINOPHIL NFR BLD: 1.9 % (ref 0–8)
ERYTHROCYTE [DISTWIDTH] IN BLOOD BY AUTOMATED COUNT: 13.4 % (ref 11.5–14.5)
EST. GFR  (NO RACE VARIABLE): >60 ML/MIN/1.73 M^2
GLUCOSE SERPL-MCNC: 165 MG/DL (ref 70–110)
HCT VFR BLD AUTO: 31.2 % (ref 37–48.5)
HGB BLD-MCNC: 9.8 G/DL (ref 12–16)
IGA SERPL-MCNC: 204 MG/DL (ref 40–350)
IGG SERPL-MCNC: 1767 MG/DL (ref 650–1600)
IGM SERPL-MCNC: 91 MG/DL (ref 50–300)
IMM GRANULOCYTES # BLD AUTO: 0.3 K/UL (ref 0–0.04)
IMM GRANULOCYTES NFR BLD AUTO: 4.1 % (ref 0–0.5)
LYMPHOCYTES # BLD AUTO: 2.1 K/UL (ref 1–4.8)
LYMPHOCYTES NFR BLD: 28.6 % (ref 18–48)
MCH RBC QN AUTO: 28.2 PG (ref 27–31)
MCHC RBC AUTO-ENTMCNC: 31.4 G/DL (ref 32–36)
MCV RBC AUTO: 90 FL (ref 82–98)
MONOCYTES # BLD AUTO: 0.6 K/UL (ref 0.3–1)
MONOCYTES NFR BLD: 8.5 % (ref 4–15)
NEUTROPHILS # BLD AUTO: 4.1 K/UL (ref 1.8–7.7)
NEUTROPHILS NFR BLD: 56.2 % (ref 38–73)
NRBC BLD-RTO: 0 /100 WBC
PLATELET # BLD AUTO: 237 K/UL (ref 150–450)
PMV BLD AUTO: 9.8 FL (ref 9.2–12.9)
POCT GLUCOSE: 145 MG/DL (ref 70–110)
POCT GLUCOSE: 169 MG/DL (ref 70–110)
POCT GLUCOSE: 169 MG/DL (ref 70–110)
POCT GLUCOSE: 170 MG/DL (ref 70–110)
POTASSIUM SERPL-SCNC: 3.6 MMOL/L (ref 3.5–5.1)
PROT SERPL-MCNC: 6.7 G/DL (ref 6–8.4)
RBC # BLD AUTO: 3.48 M/UL (ref 4–5.4)
SODIUM SERPL-SCNC: 136 MMOL/L (ref 136–145)
WBC # BLD AUTO: 7.33 K/UL (ref 3.9–12.7)

## 2024-04-21 PROCEDURE — 21400001 HC TELEMETRY ROOM

## 2024-04-21 PROCEDURE — 25000003 PHARM REV CODE 250: Performed by: STUDENT IN AN ORGANIZED HEALTH CARE EDUCATION/TRAINING PROGRAM

## 2024-04-21 PROCEDURE — 36415 COLL VENOUS BLD VENIPUNCTURE: CPT | Performed by: STUDENT IN AN ORGANIZED HEALTH CARE EDUCATION/TRAINING PROGRAM

## 2024-04-21 PROCEDURE — 80053 COMPREHEN METABOLIC PANEL: CPT | Performed by: STUDENT IN AN ORGANIZED HEALTH CARE EDUCATION/TRAINING PROGRAM

## 2024-04-21 PROCEDURE — 82784 ASSAY IGA/IGD/IGG/IGM EACH: CPT | Mod: 59 | Performed by: STUDENT IN AN ORGANIZED HEALTH CARE EDUCATION/TRAINING PROGRAM

## 2024-04-21 PROCEDURE — 85025 COMPLETE CBC W/AUTO DIFF WBC: CPT | Performed by: STUDENT IN AN ORGANIZED HEALTH CARE EDUCATION/TRAINING PROGRAM

## 2024-04-21 PROCEDURE — 86304 IMMUNOASSAY TUMOR CA 125: CPT | Performed by: STUDENT IN AN ORGANIZED HEALTH CARE EDUCATION/TRAINING PROGRAM

## 2024-04-21 PROCEDURE — A4216 STERILE WATER/SALINE, 10 ML: HCPCS | Performed by: STUDENT IN AN ORGANIZED HEALTH CARE EDUCATION/TRAINING PROGRAM

## 2024-04-21 PROCEDURE — 63600175 PHARM REV CODE 636 W HCPCS: Performed by: STUDENT IN AN ORGANIZED HEALTH CARE EDUCATION/TRAINING PROGRAM

## 2024-04-21 PROCEDURE — 86301 IMMUNOASSAY TUMOR CA 19-9: CPT | Performed by: STUDENT IN AN ORGANIZED HEALTH CARE EDUCATION/TRAINING PROGRAM

## 2024-04-21 PROCEDURE — 82784 ASSAY IGA/IGD/IGG/IGM EACH: CPT | Performed by: STUDENT IN AN ORGANIZED HEALTH CARE EDUCATION/TRAINING PROGRAM

## 2024-04-21 RX ADMIN — Medication 10 ML: at 12:04

## 2024-04-21 RX ADMIN — ASPIRIN 81 MG: 81 TABLET, COATED ORAL at 08:04

## 2024-04-21 RX ADMIN — SENNOSIDES AND DOCUSATE SODIUM 1 TABLET: 8.6; 5 TABLET ORAL at 09:04

## 2024-04-21 RX ADMIN — SENNOSIDES AND DOCUSATE SODIUM 1 TABLET: 8.6; 5 TABLET ORAL at 08:04

## 2024-04-21 RX ADMIN — LOSARTAN POTASSIUM 100 MG: 50 TABLET, FILM COATED ORAL at 08:04

## 2024-04-21 RX ADMIN — INSULIN ASPART 8 UNITS: 100 INJECTION, SOLUTION INTRAVENOUS; SUBCUTANEOUS at 11:04

## 2024-04-21 RX ADMIN — CARVEDILOL 6.25 MG: 6.25 TABLET, FILM COATED ORAL at 09:04

## 2024-04-21 RX ADMIN — CEFTRIAXONE 2 G: 2 INJECTION, POWDER, FOR SOLUTION INTRAMUSCULAR; INTRAVENOUS at 09:04

## 2024-04-21 RX ADMIN — OXYCODONE 5 MG: 5 TABLET ORAL at 08:04

## 2024-04-21 RX ADMIN — Medication 10 ML: at 05:04

## 2024-04-21 RX ADMIN — POLYETHYLENE GLYCOL 3350 17 G: 17 POWDER, FOR SOLUTION ORAL at 08:04

## 2024-04-21 RX ADMIN — Medication 10 ML: at 11:04

## 2024-04-21 RX ADMIN — INSULIN ASPART 8 UNITS: 100 INJECTION, SOLUTION INTRAVENOUS; SUBCUTANEOUS at 08:04

## 2024-04-21 RX ADMIN — Medication 10 ML: at 04:04

## 2024-04-21 RX ADMIN — HEPARIN SODIUM 5000 UNITS: 5000 INJECTION INTRAVENOUS; SUBCUTANEOUS at 05:04

## 2024-04-21 RX ADMIN — AMLODIPINE BESYLATE 10 MG: 10 TABLET ORAL at 08:04

## 2024-04-21 RX ADMIN — INSULIN DETEMIR 20 UNITS: 100 INJECTION, SOLUTION SUBCUTANEOUS at 09:04

## 2024-04-21 RX ADMIN — CARVEDILOL 6.25 MG: 6.25 TABLET, FILM COATED ORAL at 08:04

## 2024-04-21 RX ADMIN — INSULIN ASPART 8 UNITS: 100 INJECTION, SOLUTION INTRAVENOUS; SUBCUTANEOUS at 04:04

## 2024-04-21 NOTE — ASSESSMENT & PLAN NOTE
Arthrocentesis performed in ED by ortho.  Culture sent no growth so far.  MRI right shoulder revealed acromioclavicular septic arthritis and osteomyelitis, glenohumeral joint effusion and cellulitis of the shoulder with myositis of deltoid and trapezius musculature..  Continue ceftriaxone.    Need ortho and ID consult upon transfer to [facility]

## 2024-04-21 NOTE — NURSING
[facility]  Nurses Note -- 4 Eyes      4/20/2024       Skin assessed on: Q Shift      [x] No Pressure Injuries Present    []Prevention Measures Documented    [] Yes LDA  for Pressure Injury Previously documented     [] Yes New Pressure Injury Discovered   [] LDA for New Pressure Injury Added      Attending RN:  Dulce Tucker LPN     Second RN:  Ellyn Nayak RN

## 2024-04-21 NOTE — PROGRESS NOTES
[facility] Medicine  Progress Note    Patient Name: Shahida Ortega  MRN: [id number]  Patient Class: IP- Inpatient   Admission Date: 4/15/2024  Length of Stay: 6 days  Attending Physician: Nima Pompa,*  Primary Care Provider: Janusz Staton MD        Subjective:     Principal Problem:Sepsis        HPI:  53F with pmh of dm and htn who presents due to 3 day h/o right shoulder pain with associated redness. Patient reports being seen here in the ED for her symptoms on 4/12/24 and was prescribed Valium with relief of back pain but not shoulder. Patient denies taking any medications today for relief. Patient denies any recent injuries or trauma. In ed pt started on abx and fluids for sepsis and ortho consulted who performed arthrocentesis of the should with fluids sent for culture and pending. Pt denies tobacco, alcohol or drug use. Pt denies previous ortho surgerie     Overview/Hospital Course:  53F with pmh of DM and HTN who was admitted for sepsis s/t atraumatic right shoulder pain , back pain  CT scan was performed of the right shoulder which reveal a small effusion. Ortho was consulted s/p Aspiration right shoulder joint on 04/15/2024 revealed dry tap.  Small bit of blood was aspirated from subacromial bursa on 4/15 and sent for cultures but unable to send for cell count. Blood cultures 2/2 bottles revealed strep agalactiae.  Repeat blood cultures no growth to date.  Review of records revealed that patient did have strep agalactiae UTI in 01/2024.  Initiated On vancomycin and cefepime.  Patient also reports persistent back pain.  Leukocytosis improving  TTE with no vegetations.   Consulted ID.  Deescalated antibiotics to ceftriaxone alone.  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  MRI right shoulder revealed Acromioclavicular joint effusion concerning for AC joint septic arthritis and osteomyelitis. Moderate glenohumeral joint effusion and synovitis with subacromial  subdeltoid bursitis.Cellulitis of the shoulder with myositis of the deltoid and trapezius musculature.   recommended CT surgery consultation.  Plan for VATS with chest tube placement on 04/22/2024 at [facility].  Plan to transferred to [facility] on 04/21/2024.  Will need ID, ortho and CT surgery consult at [facility]      Review of records revealed that patient did have large abdominal abscess of unclear etiology in 5/ 2019 and 12/ 2020 (10 x 8 cm) requiring diagnostic laparoscopy with drainage of abscess.  Cultures in 2019 revealed E coli.  Last colonoscopy was in 2023  revealed 5 mm polyp in sigmoid colon and diverticula.  She was also found to have elevated CA 19 9 levels on labs, unclear etiology seen by OBGYN in 2021 Patient denied smoking/alcohol/IV drug abuse. Pending HIV, CA 19 9 Ig G,IgA IgM work up given recurrent abscesses.    NPO after midnight.  Hold DVT prophylaxis    Interval History:  Patient reports right shoulder pain and back pain is improving.  T-max 99.9° F overnight.  No nausea/vomiting    Review of Systems   Constitutional: Negative.    HENT: Negative.     Respiratory: Negative.     Cardiovascular: Negative.    Gastrointestinal: Negative.    Genitourinary: Negative.    Musculoskeletal:  Positive for arthralgias (Right shoulder) and back pain.   Neurological: Negative.      Objective:     Vital Signs (Most Recent):  Temp: 97.6 °F (36.4 °C) (04/21/24 1059)  Pulse: 93 (04/21/24 1153)  Resp: 19 (04/21/24 1059)  BP: 110/63 (04/21/24 1059)  SpO2: 100 % (04/21/24 1059) Vital Signs (24h Range):  Temp:  [97.5 °F (36.4 °C)-99.9 °F (37.7 °C)] 97.6 °F (36.4 °C)  Pulse:  [79-96] 93  Resp:  [18-20] 19  SpO2:  [97 %-100 %] 100 %  BP: (110-169)/(57-79) 110/63     Weight: 95.4 kg (210 lb 5.1 oz)  Body mass index is 32.94 kg/m².    Intake/Output Summary (Last 24 hours) at 4/21/2024 1304  Last data filed at 4/20/2024 2147  Gross per 24 hour   Intake 240 ml   Output 525 ml   Net -285 ml         Physical  "Exam  Constitutional:       General: She is not in acute distress.     Appearance: She is normal weight.   Cardiovascular:      Rate and Rhythm: Normal rate.      Pulses: Normal pulses.   Pulmonary:      Effort: No respiratory distress.      Breath sounds: Normal breath sounds. No wheezing.   Abdominal:      General: Bowel sounds are normal. There is no distension.      Palpations: Abdomen is soft.      Tenderness: There is no abdominal tenderness.   Musculoskeletal:         General: Tenderness (Right shoulder and lower thoracic and upper lumbar back) present.      Right lower leg: No edema.      Left lower leg: No edema.   Skin:     General: Skin is warm.   Neurological:      Mental Status: She is alert and oriented to person, place, and time. Mental status is at baseline.      Cranial Nerves: No cranial nerve deficit.      Motor: No weakness.   Psychiatric:         Mood and Affect: Mood normal.             Significant Labs: All pertinent labs within the past 24 hours have been reviewed.    Significant Imaging: I have reviewed all pertinent imaging results/findings within the past 24 hours.    Assessment/Plan:      * Sepsis  This patient does have evidence of infective focus  My overall impression is sepsis.  Source: Skin and Soft Tissue (location shoulder)  Antibiotics given-   Antibiotics (72h ago, onward)      Start     Stop Route Frequency Ordered    04/17/24 1215  cefTRIAXone (ROCEPHIN) 2 g in dextrose 5 % in water (D5W) 100 mL IVPB (MB+)         -- IV Every 24 hours (non-standard times) 04/17/24 1106          Latest lactate reviewed-  No results for input(s): "LACTATE", "POCLAC" in the last 72 hours.    Organ dysfunction indicated by  none    Fluid challenge Ideal Body Weight- The patient's ideal body weight is Ideal body weight: 61.6 kg (135 lb 12.9 oz) which will be used to calculate fluid bolus of 30 ml/kg for treatment of septic shock.      Post- resuscitation assessment Yes Perfusion exam was performed " within 6 hours of septic shock presentation after bolus shows Adequate tissue perfusion assessed by non-invasive monitoring       Will Not start Pressors- Levophed for MAP of 65  Source control achieved by: abx     Vertebral abscess    MRI C/T/L-spine revealed T5-T11 prevertebral abscess.  Neurosurgery consulted.  Recommend CT surgery evaluation   Pending transferred to [facility] for VATS with drainage by CT surgery on 04/22/2024  On ceftriaxone.  CT surgery consult upon transfer to [facility].  NPO after midnight.  Holding DVT prophylaxis for anticipated procedure in a.m.    Bacteremia    2/2 blood cultures with group B Streptococcus agalactiae repeat blood cultures no growth to date.  Continues to spike fever.  On vancomycin and cefepime, de-escalated to  ceftriaxone.  Id on board.  MRI C/T/L-spine T5-T11 prevertebral abscess.  MRI right shoulder with septic arthritis and osteomyelitis  Neurosurgery consulted, recommended CT surgery evaluation   Pending transfer to [facility]    Pyogenic arthritis of shoulder region  Arthrocentesis performed in ED by ortho.  Culture sent no growth so far.  MRI right shoulder revealed acromioclavicular septic arthritis and osteomyelitis, glenohumeral joint effusion and cellulitis of the shoulder with myositis of deltoid and trapezius musculature..  Continue ceftriaxone.    Need ortho and ID consult upon transfer to [facility]    A-fib  Patient with Paroxysmal (<7 days) atrial fibrillation which is controlled currently with Beta Blocker. Patient is currently in sinus rhythm.[id number] Score: 3  Anticoagulation:  Patient does not appear to be on home anticoagulation.  We will continue aspirin.  In normal sinus rhythm    Paroxysmal AFib. During episode of sepsis in 2023. Not on anticoagulation because it was a brief episode per cardiology. Rec to look for recurrence with the help of event monitor. If a recurrence noted on event monitor, consider adding anticoagulation     Prolonged Q-T  interval on ECG  Noted history.  We will avoid prolonging agents as much as possible      DM2 (diabetes mellitus, type 2)  Patient's FSGs are uncontrolled due to hyperglycemia on current medication regimen.  Last A1c reviewed-   Lab Results   Component Value Date    HGBA1C 11.3 (H) 03/16/2022     Most recent fingerstick glucose reviewed-   Recent Labs   Lab 04/17/24  1526 04/17/24  1944 04/18/24  0755 04/18/24  1139   POCTGLUCOSE 258* 254* 222* 327*       Current correctional scale  Low  Maintain anti-hyperglycemic dose as follows-   Antihyperglycemics (From admission, onward)      Start     Stop Route Frequency Ordered    04/17/24 1645  insulin aspart U-100 pen 5 Units         -- SubQ 3 times daily with meals 04/17/24 1443    04/16/24 2100  insulin detemir U-100 (Levemir) pen 15 Units         -- SubQ Nightly 04/16/24 0854    04/15/24 1504  insulin aspart U-100 pen 0-5 Units         -- SubQ Before meals & nightly PRN 04/15/24 1404          Hold Oral hypoglycemics while patient is in the hospital.    Hypertension  Chronic, controlled. Latest blood pressure and vitals reviewed-     Temp:  [97.8 °F (36.6 °C)-103.1 °F (39.5 °C)]   Pulse:  []   Resp:  [18-20]   BP: (139-173)/(69-90)   SpO2:  [95 %-99 %] .   Home meds for hypertension were reviewed and noted below.   Hypertension Medications               carvediloL (COREG) 3.125 MG tablet Take 2 tablets (6.25 mg total) by mouth 2 (two) times daily.    hydroCHLOROthiazide (HYDRODIURIL) 25 MG tablet Take 1 tablet (25 mg total) by mouth once daily.    losartan (COZAAR) 100 MG tablet Take 1 tablet (100 mg total) by mouth once daily.            While in the hospital, will manage blood pressure as follows; Continue home antihypertensive regimen.  Add amlodipine    Will utilize p.r.n. blood pressure medication only if patient's blood pressure greater than 180/110 and she develops symptoms such as worsening chest pain or shortness of breath.    Elevated procalcitonin  In  setting of active infection.  Treatment as stated below        VTE Risk Mitigation (From admission, onward)           Ordered     IP VTE HIGH RISK PATIENT  Once         04/15/24 1404     Place sequential compression device  Until discontinued         04/15/24 1404                    Discharge Planning   NAYELI: 4/21/2024     Code Status: Full Code   Is the patient medically ready for discharge?:     Reason for patient still in hospital (select all that apply): Patient trending condition and Treatment  Discharge Plan A: Home with family   Discharge Delays: None known at this time              Nima Pompa MD  Department of [facility] Medicine   [facility]

## 2024-04-21 NOTE — ASSESSMENT & PLAN NOTE
MRI C/T/L-spine revealed T5-T11 prevertebral abscess.  Neurosurgery consulted.  Recommend CT surgery evaluation   Pending transferred to [facility] for VATS with drainage by CT surgery on 04/22/2024  On ceftriaxone.  CT surgery consult upon transfer to [facility].  NPO after midnight.  Holding DVT prophylaxis for anticipated procedure in a.m.

## 2024-04-21 NOTE — NURSING
Transfer from [facility] [place] [facility] [place]up:  Note that patient is awake, alert, fully oriented and denies pain at this time.     I contacted Marlen at the Patient Flow Center at [phone] to f/u on the scheduled 04/21/2024 transfer to [facility].  Marlen says that the patient is listed to be transferred today, however there is no time for transfer.     Asked [facility] to contact [facility] when transportation has been arranged.    Informed MD, Charge Jo and the patient.    Will continue to f/u.

## 2024-04-21 NOTE — PLAN OF CARE
Problem: Glycemic Control Impaired (Sepsis/Septic Shock)  Goal: Blood Glucose Level Within Desired Range  Outcome: Met  Intervention: Optimize Glycemic Control  Flowsheets (Taken 4/21/2024 0824)  Glycemic Management:   blood glucose monitored   oral hydration promoted     Problem: Infection Progression (Sepsis/Septic Shock)  Goal: Absence of Infection Signs and Symptoms  Outcome: Met  Intervention: Initiate Sepsis Management  Flowsheets (Taken 4/21/2024 0824)  Infection Prevention:   environmental surveillance performed   single patient room provided   hand hygiene promoted

## 2024-04-21 NOTE — PLAN OF CARE
Problem: Adult Inpatient Plan of Care  Goal: Optimal Comfort and Wellbeing  Outcome: Ongoing, Progressing  Goal: Readiness for Transition of Care  Outcome: Ongoing, Progressing     Problem: Bleeding (Sepsis/Septic Shock)  Goal: Absence of Bleeding  Outcome: Ongoing, Progressing     Problem: Glycemic Control Impaired (Sepsis/Septic Shock)  Goal: Blood Glucose Level Within Desired Range  Outcome: Ongoing, Progressing     Problem: Infection Progression (Sepsis/Septic Shock)  Goal: Absence of Infection Signs and Symptoms  Outcome: Ongoing, Progressing     Problem: Nutrition Impaired (Sepsis/Septic Shock)  Goal: Optimal Nutrition Intake  Outcome: Ongoing, Progressing     Problem: Skin Injury Risk Increased  Goal: Skin Health and Integrity  Outcome: Ongoing, Progressing     Problem: Infection  Goal: Absence of Infection Signs and Symptoms  Outcome: Ongoing, Progressing     Problem: Adult Inpatient Plan of Care  Goal: Optimal Comfort and Wellbeing  Outcome: Ongoing, Progressing  Goal: Readiness for Transition of Care  Outcome: Ongoing, Progressing     Problem: Bleeding (Sepsis/Septic Shock)  Goal: Absence of Bleeding  Outcome: Ongoing, Progressing     Problem: Glycemic Control Impaired (Sepsis/Septic Shock)  Goal: Blood Glucose Level Within Desired Range  Outcome: Ongoing, Progressing     Problem: Infection Progression (Sepsis/Septic Shock)  Goal: Absence of Infection Signs and Symptoms  Outcome: Ongoing, Progressing     Problem: Nutrition Impaired (Sepsis/Septic Shock)  Goal: Optimal Nutrition Intake  Outcome: Ongoing, Progressing     Problem: Skin Injury Risk Increased  Goal: Skin Health and Integrity  Outcome: Ongoing, Progressing     Problem: Infection  Goal: Absence of Infection Signs and Symptoms  Outcome: Ongoing, Progressing     Problem: Adult Inpatient Plan of Care  Goal: Optimal Comfort and Wellbeing  Outcome: Ongoing, Progressing  Goal: Readiness for Transition of Care  Outcome: Ongoing, Progressing      Problem: Bleeding (Sepsis/Septic Shock)  Goal: Absence of Bleeding  Outcome: Ongoing, Progressing     Problem: Glycemic Control Impaired (Sepsis/Septic Shock)  Goal: Blood Glucose Level Within Desired Range  Outcome: Ongoing, Progressing     Problem: Infection Progression (Sepsis/Septic Shock)  Goal: Absence of Infection Signs and Symptoms  Outcome: Ongoing, Progressing     Problem: Nutrition Impaired (Sepsis/Septic Shock)  Goal: Optimal Nutrition Intake  Outcome: Ongoing, Progressing     Problem: Skin Injury Risk Increased  Goal: Skin Health and Integrity  Outcome: Ongoing, Progressing     Problem: Infection  Goal: Absence of Infection Signs and Symptoms  Outcome: Ongoing, Progressing

## 2024-04-21 NOTE — PLAN OF CARE
Chart reviewed - pt not seen. No fevers documented overnight.   [place] [facility] [place]/surgical evaluation.     A/P  54 yo F with DM and HTN admitted for R shoulder pain, found to have GBS bacteremia c/b R septic arthritis and T5-T11 prevertebral abscess. TTE negative and blood cx no growth since 4/17.    Recommendations:  -continue ceftriaxone 2g iv daily  -follow up surgical plans, recommend cultures   -consult [facility] ID when pt is transferred     ID will continue to follow

## 2024-04-21 NOTE — SUBJECTIVE & OBJECTIVE
Interval History:  Patient reports right shoulder pain and back pain is improving.  T-max 99.9° F overnight.  No nausea/vomiting    Review of Systems   Constitutional: Negative.    HENT: Negative.     Respiratory: Negative.     Cardiovascular: Negative.    Gastrointestinal: Negative.    Genitourinary: Negative.    Musculoskeletal:  Positive for arthralgias (Right shoulder) and back pain.   Neurological: Negative.      Objective:     Vital Signs (Most Recent):  Temp: 97.6 °F (36.4 °C) (04/21/24 1059)  Pulse: 93 (04/21/24 1153)  Resp: 19 (04/21/24 1059)  BP: 110/63 (04/21/24 1059)  SpO2: 100 % (04/21/24 1059) Vital Signs (24h Range):  Temp:  [97.5 °F (36.4 °C)-99.9 °F (37.7 °C)] 97.6 °F (36.4 °C)  Pulse:  [79-96] 93  Resp:  [18-20] 19  SpO2:  [97 %-100 %] 100 %  BP: (110-169)/(57-79) 110/63     Weight: 95.4 kg (210 lb 5.1 oz)  Body mass index is 32.94 kg/m².    Intake/Output Summary (Last 24 hours) at 4/21/2024 1304  Last data filed at 4/20/2024 2147  Gross per 24 hour   Intake 240 ml   Output 525 ml   Net -285 ml         Physical Exam  Constitutional:       General: She is not in acute distress.     Appearance: She is normal weight.   Cardiovascular:      Rate and Rhythm: Normal rate.      Pulses: Normal pulses.   Pulmonary:      Effort: No respiratory distress.      Breath sounds: Normal breath sounds. No wheezing.   Abdominal:      General: Bowel sounds are normal. There is no distension.      Palpations: Abdomen is soft.      Tenderness: There is no abdominal tenderness.   Musculoskeletal:         General: Tenderness (Right shoulder and lower thoracic and upper lumbar back) present.      Right lower leg: No edema.      Left lower leg: No edema.   Skin:     General: Skin is warm.   Neurological:      Mental Status: She is alert and oriented to person, place, and time. Mental status is at baseline.      Cranial Nerves: No cranial nerve deficit.      Motor: No weakness.   Psychiatric:         Mood and Affect: Mood  arm pain/injury normal.             Significant Labs: All pertinent labs within the past 24 hours have been reviewed.    Significant Imaging: I have reviewed all pertinent imaging results/findings within the past 24 hours.

## 2024-04-21 NOTE — NURSING
Transfer Center([phone]):  Note that report called to Charge Nurse Darryn at [phone]). [place] [facility] [place] [id number].     [facility] will transport [place] [facility].  Estimated Arrival Time is in 2 - 3 hours.     Patient informed about transfer details [place] [facility].     Will give report to night-shift RN and continue to f/u.

## 2024-04-21 NOTE — PLAN OF CARE
Problem: Adult Inpatient Plan of Care  Goal: Optimal Comfort and Wellbeing  Outcome: Met  Goal: Readiness for Transition of Care  Outcome: Met     Problem: Bleeding (Sepsis/Septic Shock)  Goal: Absence of Bleeding  Outcome: Met     Problem: Glycemic Control Impaired (Sepsis/Septic Shock)  Goal: Blood Glucose Level Within Desired Range  Outcome: Met  Intervention: Optimize Glycemic Control  Flowsheets (Taken 4/21/2024 0824)  Glycemic Management:   blood glucose monitored   oral hydration promoted     Problem: Infection Progression (Sepsis/Septic Shock)  Goal: Absence of Infection Signs and Symptoms  Outcome: Met  Intervention: Initiate Sepsis Management  Flowsheets (Taken 4/21/2024 0824)  Infection Prevention:   environmental surveillance performed   single patient room provided   hand hygiene promoted     Problem: Nutrition Impaired (Sepsis/Septic Shock)  Goal: Optimal Nutrition Intake  Outcome: Met     Problem: Skin Injury Risk Increased  Goal: Skin Health and Integrity  Outcome: Met     Problem: Infection  Goal: Absence of Infection Signs and Symptoms  Outcome: Met

## 2024-04-22 PROBLEM — E11.59 OBESITY, DIABETES, AND HYPERTENSION SYNDROME: Status: ACTIVE | Noted: 2024-04-22

## 2024-04-22 PROBLEM — B95.1 BACTEREMIA DUE TO GROUP B STREPTOCOCCUS: Status: ACTIVE | Noted: 2024-04-16

## 2024-04-22 PROBLEM — D64.9 ANEMIA: Status: ACTIVE | Noted: 2024-04-22

## 2024-04-22 PROBLEM — E66.811 CLASS 1 OBESITY DUE TO EXCESS CALORIES IN ADULT: Status: ACTIVE | Noted: 2024-04-22

## 2024-04-22 PROBLEM — E66.9 OBESITY, DIABETES, AND HYPERTENSION SYNDROME: Status: ACTIVE | Noted: 2024-04-22

## 2024-04-22 PROBLEM — E11.69 OBESITY, DIABETES, AND HYPERTENSION SYNDROME: Status: ACTIVE | Noted: 2024-04-22

## 2024-04-22 PROBLEM — R82.71 GROUP B STREPTOCOCCAL BACTERIURIA: Status: ACTIVE | Noted: 2024-04-16

## 2024-04-22 PROBLEM — I15.2 OBESITY, DIABETES, AND HYPERTENSION SYNDROME: Status: ACTIVE | Noted: 2024-04-22

## 2024-04-22 PROBLEM — E66.09 CLASS 1 OBESITY DUE TO EXCESS CALORIES IN ADULT: Status: ACTIVE | Noted: 2024-04-22

## 2024-04-22 LAB
ALBUMIN SERPL BCP-MCNC: 1.9 G/DL (ref 3.5–5.2)
ALP SERPL-CCNC: 76 U/L (ref 55–135)
ALT SERPL W/O P-5'-P-CCNC: 13 U/L (ref 10–44)
ANION GAP SERPL CALC-SCNC: 8 MMOL/L (ref 8–16)
AST SERPL-CCNC: 16 U/L (ref 10–40)
BASOPHILS # BLD AUTO: 0.04 K/UL (ref 0–0.2)
BASOPHILS NFR BLD: 0.6 % (ref 0–1.9)
BILIRUB SERPL-MCNC: 0.2 MG/DL (ref 0.1–1)
BUN SERPL-MCNC: 9 MG/DL (ref 6–20)
CALCIUM SERPL-MCNC: 8.9 MG/DL (ref 8.7–10.5)
CHLORIDE SERPL-SCNC: 100 MMOL/L (ref 95–110)
CO2 SERPL-SCNC: 28 MMOL/L (ref 23–29)
CREAT SERPL-MCNC: 0.6 MG/DL (ref 0.5–1.4)
CRP SERPL-MCNC: 137.2 MG/L (ref 0–8.2)
DIFFERENTIAL METHOD BLD: ABNORMAL
EOSINOPHIL # BLD AUTO: 0.1 K/UL (ref 0–0.5)
EOSINOPHIL NFR BLD: 1.6 % (ref 0–8)
ERYTHROCYTE [DISTWIDTH] IN BLOOD BY AUTOMATED COUNT: 13.4 % (ref 11.5–14.5)
ERYTHROCYTE [SEDIMENTATION RATE] IN BLOOD BY PHOTOMETRIC METHOD: >120 MM/HR (ref 0–36)
EST. GFR  (NO RACE VARIABLE): >60 ML/MIN/1.73 M^2
GLUCOSE SERPL-MCNC: 131 MG/DL (ref 70–110)
HCT VFR BLD AUTO: 30.9 % (ref 37–48.5)
HGB BLD-MCNC: 9.8 G/DL (ref 12–16)
IMM GRANULOCYTES # BLD AUTO: 0.23 K/UL (ref 0–0.04)
IMM GRANULOCYTES NFR BLD AUTO: 3.4 % (ref 0–0.5)
INR PPP: 1 (ref 0.8–1.2)
LYMPHOCYTES # BLD AUTO: 2.2 K/UL (ref 1–4.8)
LYMPHOCYTES NFR BLD: 32.8 % (ref 18–48)
MCH RBC QN AUTO: 29.2 PG (ref 27–31)
MCHC RBC AUTO-ENTMCNC: 31.7 G/DL (ref 32–36)
MCV RBC AUTO: 92 FL (ref 82–98)
MONOCYTES # BLD AUTO: 0.5 K/UL (ref 0.3–1)
MONOCYTES NFR BLD: 7 % (ref 4–15)
NEUTROPHILS # BLD AUTO: 3.7 K/UL (ref 1.8–7.7)
NEUTROPHILS NFR BLD: 54.6 % (ref 38–73)
NRBC BLD-RTO: 0 /100 WBC
PLATELET # BLD AUTO: 262 K/UL (ref 150–450)
PLATELET BLD QL SMEAR: ABNORMAL
PMV BLD AUTO: 10.3 FL (ref 9.2–12.9)
POCT GLUCOSE: 122 MG/DL (ref 70–110)
POCT GLUCOSE: 152 MG/DL (ref 70–110)
POCT GLUCOSE: 171 MG/DL (ref 70–110)
POCT GLUCOSE: 173 MG/DL (ref 70–110)
POTASSIUM SERPL-SCNC: 3.6 MMOL/L (ref 3.5–5.1)
PROT SERPL-MCNC: 6.8 G/DL (ref 6–8.4)
PROTHROMBIN TIME: 10.9 SEC (ref 9–12.5)
RBC # BLD AUTO: 3.36 M/UL (ref 4–5.4)
SODIUM SERPL-SCNC: 136 MMOL/L (ref 136–145)
WBC # BLD AUTO: 6.82 K/UL (ref 3.9–12.7)

## 2024-04-22 PROCEDURE — 25000003 PHARM REV CODE 250: Performed by: STUDENT IN AN ORGANIZED HEALTH CARE EDUCATION/TRAINING PROGRAM

## 2024-04-22 PROCEDURE — 63600175 PHARM REV CODE 636 W HCPCS: Performed by: STUDENT IN AN ORGANIZED HEALTH CARE EDUCATION/TRAINING PROGRAM

## 2024-04-22 PROCEDURE — 85652 RBC SED RATE AUTOMATED: CPT

## 2024-04-22 PROCEDURE — 85610 PROTHROMBIN TIME: CPT | Performed by: STUDENT IN AN ORGANIZED HEALTH CARE EDUCATION/TRAINING PROGRAM

## 2024-04-22 PROCEDURE — 99223 1ST HOSP IP/OBS HIGH 75: CPT | Mod: ,,, | Performed by: INTERNAL MEDICINE

## 2024-04-22 PROCEDURE — 85025 COMPLETE CBC W/AUTO DIFF WBC: CPT | Performed by: STUDENT IN AN ORGANIZED HEALTH CARE EDUCATION/TRAINING PROGRAM

## 2024-04-22 PROCEDURE — 36415 COLL VENOUS BLD VENIPUNCTURE: CPT

## 2024-04-22 PROCEDURE — A4216 STERILE WATER/SALINE, 10 ML: HCPCS | Performed by: STUDENT IN AN ORGANIZED HEALTH CARE EDUCATION/TRAINING PROGRAM

## 2024-04-22 PROCEDURE — 21400001 HC TELEMETRY ROOM

## 2024-04-22 PROCEDURE — 97535 SELF CARE MNGMENT TRAINING: CPT

## 2024-04-22 PROCEDURE — 86140 C-REACTIVE PROTEIN: CPT

## 2024-04-22 PROCEDURE — 80053 COMPREHEN METABOLIC PANEL: CPT | Performed by: STUDENT IN AN ORGANIZED HEALTH CARE EDUCATION/TRAINING PROGRAM

## 2024-04-22 PROCEDURE — 97110 THERAPEUTIC EXERCISES: CPT

## 2024-04-22 RX ORDER — OXYCODONE HYDROCHLORIDE 5 MG/1
5 TABLET ORAL EVERY 4 HOURS PRN
Status: DISCONTINUED | OUTPATIENT
Start: 2024-04-22 | End: 2024-04-26 | Stop reason: HOSPADM

## 2024-04-22 RX ORDER — MORPHINE SULFATE 4 MG/ML
4 INJECTION, SOLUTION INTRAMUSCULAR; INTRAVENOUS EVERY 4 HOURS PRN
Status: DISCONTINUED | OUTPATIENT
Start: 2024-04-22 | End: 2024-04-26 | Stop reason: HOSPADM

## 2024-04-22 RX ORDER — ACETAMINOPHEN 500 MG
1000 TABLET ORAL EVERY 8 HOURS PRN
Status: DISCONTINUED | OUTPATIENT
Start: 2024-04-22 | End: 2024-04-25

## 2024-04-22 RX ADMIN — CEFTRIAXONE 2 G: 2 INJECTION, POWDER, FOR SOLUTION INTRAMUSCULAR; INTRAVENOUS at 06:04

## 2024-04-22 RX ADMIN — Medication 10 ML: at 06:04

## 2024-04-22 RX ADMIN — Medication 10 ML: at 12:04

## 2024-04-22 RX ADMIN — CARVEDILOL 6.25 MG: 6.25 TABLET, FILM COATED ORAL at 09:04

## 2024-04-22 RX ADMIN — AMLODIPINE BESYLATE 10 MG: 10 TABLET ORAL at 09:04

## 2024-04-22 RX ADMIN — INSULIN DETEMIR 20 UNITS: 100 INJECTION, SOLUTION SUBCUTANEOUS at 09:04

## 2024-04-22 RX ADMIN — POLYETHYLENE GLYCOL 3350 17 G: 17 POWDER, FOR SOLUTION ORAL at 09:04

## 2024-04-22 RX ADMIN — INSULIN ASPART 8 UNITS: 100 INJECTION, SOLUTION INTRAVENOUS; SUBCUTANEOUS at 06:04

## 2024-04-22 RX ADMIN — LOSARTAN POTASSIUM 100 MG: 50 TABLET, FILM COATED ORAL at 09:04

## 2024-04-22 RX ADMIN — INSULIN ASPART 8 UNITS: 100 INJECTION, SOLUTION INTRAVENOUS; SUBCUTANEOUS at 01:04

## 2024-04-22 NOTE — NURSING
Nurses Note -- 4 Eyes      4/22/2024   7:49 AM      Skin assessed during: Transfer      [x] No Altered Skin Integrity Present    []Prevention Measures Documented      [] Yes- Altered Skin Integrity Present or Discovered   [] LDA Added if Not in Epic (Describe Wound)   [] New Altered Skin Integrity was Present on Admit and Documented in LDA   [] Wound Image Taken    Wound Care Consulted? No    Attending Nurse:  MARLEY Layne     Second RN/Staff Member:   MARLEY Thomas

## 2024-04-22 NOTE — HPI
Patient is a 53 y.o. with history of T2DM and HTN who Thoracic Surgery was consulted for evaluation for VATS. Patient was admitted to [facility] on 4/15/2024 with complaints of back pain and right shoulder pain. CT was performed of right shoulder which revealed a small effusion that was aspirated by Ortho. MRI spine was obtained that revealed a T5-T11 prevertebral abscess. Patient was treated for strep agalactiae that grew from initial blood cultures. Repeat blood cultures have had no growth to date and leukocytosis has resolved. [place] [facility] [place] Surgery for possible drainage of prevertebral abscess. Patient denies any history of thoracic or spine surgery. Has an inhaler she uses prn for seasonal allergies. Denies any history of lung disease. Denies smoking history. Denies history of MI/stroke. Not on any blood thinners.

## 2024-04-22 NOTE — PROGRESS NOTES
[facility] - Med Surg ([address] Medicine  Progress Note    Patient Name: Shahida Ortega  MRN: [id number]  Patient Class: IP- Inpatient   Admission Date: 4/15/2024  Length of Stay: 7 days  Attending Physician: Jessica Grey MD  Primary Care Provider: Janusz Staton MD        Subjective:     Principal Problem:Sepsis        HPI:  53F with pmh of dm and htn who presents due to 3 day h/o right shoulder pain with associated redness. Patient reports being seen here in the ED for her symptoms on 4/12/24 and was prescribed Valium with relief of back pain but not shoulder. Patient denies taking any medications today for relief. Patient denies any recent injuries or trauma. In ed pt started on abx and fluids for sepsis and ortho consulted who performed arthrocentesis of the should with fluids sent for culture and pending. Pt denies tobacco, alcohol or drug use. Pt denies previous ortho surgerie     Overview/Hospital Course:  Ms. Ortega was admitted to [facility] for sepsis 2/2 atraumatic right shoulder pain and erythema.  CT right shoulder showed a small effusion.  Ortho was consulted.  Aspiration of the right shoulder 4/15 was dry.  A small amount of blood was aspirated from the subacromial bursa.  Cultures NGTD.  Blood cx were obtained and the patient was started on Cefepime and Vancomycin. They returned positive for Strep agalactiae and antibiotics switched to CTX.  ID was consulted.  Review of records revealed that the patient had a Strep agalactiae UTI in January 2024.  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  MRI right shoulder revealed acromioclavicular joint effusion concerning for AC joint septic arthritis and osteomyelitis, moderate glenohumeral joint effusion and synovitis with subacromial subdeltoid bursitis., as well as cellulitis of the shoulder with myositis of the deltoid and trapezius musculature. Neurosurgery reviewed the images and reported that neurosurgical intervention was not  indicated since the infection did not extend into the epidural space.  Her case was discussed with Dr. Nebwy of CTS at [facility] who requested transfer for VATS.  She was transferred to [facility] on 4/21 for CTS, Ortho, and ID evaluations.    Interval History: Transferred to [facility] overnight.  No complaints today.  Seen by Ortho who plan for surgery in the future pending CTS recs.  Otherwise feels well - discussed adjustment in pain meds to q4h prn for better pain control if it is needed.    Review of Systems   Constitutional:  Negative for chills, fatigue and fever.   Respiratory:  Negative for cough and shortness of breath.    Cardiovascular:  Negative for chest pain, palpitations and leg swelling.   Gastrointestinal:  Negative for abdominal pain, diarrhea, nausea and vomiting.   Genitourinary:  Negative for dysuria and urgency.   Musculoskeletal:  Positive for arthralgias and myalgias.   Neurological:  Negative for dizziness and headaches.   All other systems reviewed and are negative.    Objective:     Vital Signs (Most Recent):  Temp: 98.7 °F (37.1 °C) (04/22/24 1115)  Pulse: 73 (04/22/24 1115)  Resp: 18 (04/22/24 1115)  BP: 131/67 (04/22/24 1115)  SpO2: 99 % (04/22/24 1115) Vital Signs (24h Range):  Temp:  [97.8 °F (36.6 °C)-99 °F (37.2 °C)] 98.7 °F (37.1 °C)  Pulse:  [73-93] 73  Resp:  [16-19] 18  SpO2:  [95 %-100 %] 99 %  BP: (127-142)/(65-91) 131/67     Weight: 95.4 kg (210 lb 5.1 oz)  Body mass index is 32.94 kg/m².    Intake/Output Summary (Last 24 hours) at 4/22/2024 1124  Last data filed at 4/22/2024 0618  Gross per 24 hour   Intake 480 ml   Output 4 ml   Net 476 ml      Physical Exam  Constitutional:       Appearance: Normal appearance. She is obese.   HENT:      Head: Normocephalic and atraumatic.   Cardiovascular:      Rate and Rhythm: Normal rate and regular rhythm.      Heart sounds: No murmur heard.  Pulmonary:      Effort: Pulmonary effort is normal. No respiratory distress.      Breath sounds: Normal  breath sounds. No wheezing or rales.   Abdominal:      General: There is no distension.      Palpations: Abdomen is soft.      Tenderness: There is no abdominal tenderness.   Musculoskeletal:         General: Tenderness (Right shoulder/deltoid wtih limited ROM) present. No deformity.   Skin:     General: Skin is warm and dry.   Neurological:      General: No focal deficit present.      Mental Status: She is alert and oriented to person, place, and time. Mental status is at baseline.         MELD 3.0: 11 at 4/22/2024  2:19 AM  MELD-Na: 6 at 4/22/2024  2:19 AM  Calculated from:  Serum Creatinine: 0.6 mg/dL (Using min of 1 mg/dL) at 4/22/2024  2:19 AM  Serum Sodium: 136 mmol/L at 4/22/2024  2:19 AM  Total Bilirubin: 0.2 mg/dL (Using min of 1 mg/dL) at 4/22/2024  2:19 AM  Serum Albumin: 1.9 g/dL at 4/22/2024  2:19 AM  INR(ratio): 1.0 at 4/22/2024  2:19 AM  Age at listing (hypothetical): 53 years  Sex: Female at 4/22/2024  2:19 AM      Significant Labs:  CBC:  Recent Labs   Lab 04/21/24  0525 04/22/24 0219   WBC 7.33 6.82   HGB 9.8* 9.8*   HCT 31.2* 30.9*    262     CMP:  Recent Labs   Lab 04/21/24  0524 04/22/24 0219    136   K 3.6 3.6    100   CO2 27 28   * 131*   BUN 10 9   CREATININE 0.6 0.6   CALCIUM 8.9 8.9   PROT 6.7 6.8   ALBUMIN 1.8* 1.9*   BILITOT 0.2 0.2   ALKPHOS 72 76   AST 14 16   ALT 11 13   ANIONGAP 8 8     PTINR:  Recent Labs   Lab 04/22/24 0219   INR 1.0       Significant Procedures:   Dobutamine Stress Test with Color Flow: No results found for this or any previous visit.    2D Echo: No results found for this or any previous visit.    Assessment/Plan:      * Sepsis  Multifactorial 2/2 Group B Strep bacteremia, paravertebral abscesses, and septic arthritis of the right shoulder  Management as below    Bacteremia due to group B Streptococcus  Blood cx 4/15 positive for GBS  Blood cx 4/17 NGTD  Likely seeding from septic right shoulder and paravertebral abscesses  2D echo  negative for vegetations  Will discuss with ID if CURTIS is needed, yet given quick clearance of blood cultures might not be required  ID following:  Continue Ceftriaxone    Septic arthritis of shoulder, right  CT right shoulder 4/15 showed a small effusion.    Ortho at  was consulted.    Aspiration of the right shoulder 4/15 was dry.  A small amount of blood was aspirated from the subacromial bursa.  Cultures NGTD.    MRI right shoulder 4/19 revealed acromioclavicular joint effusion concerning for AC joint septic arthritis and osteomyelitis, moderate glenohumeral joint effusion and synovitis with subacromial subdeltoid bursitis, as well as cellulitis of the shoulder with myositis of the deltoid and trapezius musculature  Ortho at  consulted, appreciate assistance  NPO at midnight    Vertebral abscess  MRI C/T/L-spine 4/18 revealed T5-T11 prevertebral abscess.     Neurosurgery consulted.  Recommend CTS evaluation  CTS Dr. Newby agreed to consult for possible VATS  CT chest 4/22 pending  NPO at midnight    Obesity, diabetes, and hypertension syndrome  Noted    Anemia  Patient's anemia is currently controlled. Has not received any PRBCs to date.   Current CBC reviewed-   Lab Results   Component Value Date    HGB 9.8 (L) 04/22/2024    HCT 30.9 (L) 04/22/2024     Monitor serial CBC and transfuse if patient becomes hemodynamically unstable, symptomatic or H/H drops below 7/21.    Class 1 obesity due to excess calories in adult  Body mass index is 32.94 kg/m². Morbid obesity complicates all aspects of disease management from diagnostic modalities to treatment. Weight loss encouraged and health benefits explained to patient.         A-fib  Patient with Paroxysmal (<7 days) atrial fibrillation which is controlled currently with Beta Blocker. Patient is currently in sinus rhythm.[id number] Score: 3  Anticoagulation:  Patient does not appear to be on home anticoagulation.  We will continue aspirin.  In normal sinus  rhythm    Paroxysmal AFib.   During episode of sepsis in 2023.   Not on anticoagulation because it was a brief episode per Cardiology.   Rec to look for recurrence with the help of event monitor.   If a recurrence noted on event monitor, consider adding anticoagulation     Prolonged Q-T interval on ECG  Noted history.    We will avoid prolonging agents as much as possible      DM2 (diabetes mellitus, type 2)  Patient's FSGs are uncontrolled due to hyperglycemia on current medication regimen.  Last A1c reviewed-   Lab Results   Component Value Date    HGBA1C 11.3 (H) 03/16/2022     Most recent fingerstick glucose reviewed-   Recent Labs   Lab 04/21/24  1520 04/21/24  2124 04/22/24  0918 04/22/24  1135   POCTGLUCOSE 169* 170* 122* 173*       Current correctional scale  Low  Maintain anti-hyperglycemic dose as follows-   Antihyperglycemics (From admission, onward)      Start     Stop Route Frequency Ordered    04/18/24 2100  insulin detemir U-100 (Levemir) pen 20 Units         -- SubQ Nightly 04/18/24 1355    04/18/24 1645  insulin aspart U-100 pen 8 Units         -- SubQ 3 times daily with meals 04/18/24 1355    04/15/24 1504  insulin aspart U-100 pen 0-5 Units         -- SubQ Before meals & nightly PRN 04/15/24 1404          Hold Oral hypoglycemics while patient is in the hospital.    Hypertension  Chronic and stable  Continue Norvasc 10mg daily  Continue Coreg 6.25mg BID  Continue Losartan 100mg daily    Elevated procalcitonin  In setting of active infection.    Treatment as stated below        VTE Risk Mitigation (From admission, onward)           Ordered     IP VTE HIGH RISK PATIENT  Once         04/15/24 1404     Place sequential compression device  Until discontinued         04/15/24 1404                    Discharge Planning   NAYELI: 4/26/2024     Code Status: Full Code   Is the patient medically ready for discharge?:     Reason for patient still in hospital (select all that apply): Patient trending  condition  Discharge Plan A: Home with family   Discharge Delays: None known at this time              Jessica Grey MD  Department of Hospital Medicine   [facility] Surg ([place]-)

## 2024-04-22 NOTE — SUBJECTIVE & OBJECTIVE
Interval History: Transferred to [facility] overnight.  No complaints today.  Seen by Ortho who plan for surgery in the future pending CTS recs.  Otherwise feels well - discussed adjustment in pain meds to q4h prn for better pain control if it is needed.    Review of Systems   Constitutional:  Negative for chills, fatigue and fever.   Respiratory:  Negative for cough and shortness of breath.    Cardiovascular:  Negative for chest pain, palpitations and leg swelling.   Gastrointestinal:  Negative for abdominal pain, diarrhea, nausea and vomiting.   Genitourinary:  Negative for dysuria and urgency.   Musculoskeletal:  Positive for arthralgias and myalgias.   Neurological:  Negative for dizziness and headaches.   All other systems reviewed and are negative.    Objective:     Vital Signs (Most Recent):  Temp: 98.7 °F (37.1 °C) (04/22/24 1115)  Pulse: 73 (04/22/24 1115)  Resp: 18 (04/22/24 1115)  BP: 131/67 (04/22/24 1115)  SpO2: 99 % (04/22/24 1115) Vital Signs (24h Range):  Temp:  [97.8 °F (36.6 °C)-99 °F (37.2 °C)] 98.7 °F (37.1 °C)  Pulse:  [73-93] 73  Resp:  [16-19] 18  SpO2:  [95 %-100 %] 99 %  BP: (127-142)/(65-91) 131/67     Weight: 95.4 kg (210 lb 5.1 oz)  Body mass index is 32.94 kg/m².    Intake/Output Summary (Last 24 hours) at 4/22/2024 1124  Last data filed at 4/22/2024 0618  Gross per 24 hour   Intake 480 ml   Output 4 ml   Net 476 ml      Physical Exam  Constitutional:       Appearance: Normal appearance. She is obese.   HENT:      Head: Normocephalic and atraumatic.   Cardiovascular:      Rate and Rhythm: Normal rate and regular rhythm.      Heart sounds: No murmur heard.  Pulmonary:      Effort: Pulmonary effort is normal. No respiratory distress.      Breath sounds: Normal breath sounds. No wheezing or rales.   Abdominal:      General: There is no distension.      Palpations: Abdomen is soft.      Tenderness: There is no abdominal tenderness.   Musculoskeletal:         General: Tenderness (Right  shoulder/deltoid wtih limited ROM) present. No deformity.   Skin:     General: Skin is warm and dry.   Neurological:      General: No focal deficit present.      Mental Status: She is alert and oriented to person, place, and time. Mental status is at baseline.         MELD 3.0: 11 at 4/22/2024  2:19 AM  MELD-Na: 6 at 4/22/2024  2:19 AM  Calculated from:  Serum Creatinine: 0.6 mg/dL (Using min of 1 mg/dL) at 4/22/2024  2:19 AM  Serum Sodium: 136 mmol/L at 4/22/2024  2:19 AM  Total Bilirubin: 0.2 mg/dL (Using min of 1 mg/dL) at 4/22/2024  2:19 AM  Serum Albumin: 1.9 g/dL at 4/22/2024  2:19 AM  INR(ratio): 1.0 at 4/22/2024  2:19 AM  Age at listing (hypothetical): 53 years  Sex: Female at 4/22/2024  2:19 AM      Significant Labs:  CBC:  Recent Labs   Lab 04/21/24  0525 04/22/24 0219   WBC 7.33 6.82   HGB 9.8* 9.8*   HCT 31.2* 30.9*    262     CMP:  Recent Labs   Lab 04/21/24 0524 04/22/24 0219    136   K 3.6 3.6    100   CO2 27 28   * 131*   BUN 10 9   CREATININE 0.6 0.6   CALCIUM 8.9 8.9   PROT 6.7 6.8   ALBUMIN 1.8* 1.9*   BILITOT 0.2 0.2   ALKPHOS 72 76   AST 14 16   ALT 11 13   ANIONGAP 8 8     PTINR:  Recent Labs   Lab 04/22/24 0219   INR 1.0       Significant Procedures:   Dobutamine Stress Test with Color Flow: No results found for this or any previous visit.    2D Echo: No results found for this or any previous visit.

## 2024-04-22 NOTE — ASSESSMENT & PLAN NOTE
Patient's FSGs are uncontrolled due to hyperglycemia on current medication regimen.  Last A1c reviewed-   Lab Results   Component Value Date    HGBA1C 11.3 (H) 03/16/2022     Most recent fingerstick glucose reviewed-   Recent Labs   Lab 04/21/24  1520 04/21/24  2124 04/22/24  0918 04/22/24  1135   POCTGLUCOSE 169* 170* 122* 173*       Current correctional scale  Low  Maintain anti-hyperglycemic dose as follows-   Antihyperglycemics (From admission, onward)    Start     Stop Route Frequency Ordered    04/18/24 2100  insulin detemir U-100 (Levemir) pen 20 Units         -- SubQ Nightly 04/18/24 1355    04/18/24 1645  insulin aspart U-100 pen 8 Units         -- SubQ 3 times daily with meals 04/18/24 1355    04/15/24 1504  insulin aspart U-100 pen 0-5 Units         -- SubQ Before meals & nightly PRN 04/15/24 1404        Hold Oral hypoglycemics while patient is in the hospital.

## 2024-04-22 NOTE — CONSULTS
[facility] Surg ([place] [address])  Thoracic Surgery  Consult Note    Patient Name: Shahida Ortega  MRN: [id number]  Code Status: Full Code  Admission Date: 4/15/2024  Hospital Length of Stay: 7 days  Consult Requesting Physician: Jessica Grey MD  Consulting Physician: Rakesh Blake MD  Primary Care Provider: Janusz Staton MD    Inpatient consult to Cardiothoracic Surgery  Consult performed by: Yoli Miller MD  Consult ordered by: Neisha Newberry PA-C        Subjective:     Reason for Consult: prevertebral abscess    History of Present Illness: Patient is a 53 y.o. with history of T2DM and HTN who Thoracic Surgery was consulted for evaluation for VATS. Patient was admitted to [facility] on 4/15/2024 with complaints of back pain and right shoulder pain. CT was performed of right shoulder which revealed a small effusion that was aspirated by Ortho. MRI spine was obtained that revealed a T5-T11 prevertebral abscess. Patient was treated for strep agalactiae that grew from initial blood cultures. Repeat blood cultures have had no growth to date and leukocytosis has resolved. Patient was transferred to [facility] for evaluation by Thoracic Surgery for possible drainage of prevertebral abscess. Patient denies any history of thoracic or spine surgery. Has an inhaler she uses prn for seasonal allergies. Denies any history of lung disease. Denies smoking history. Denies history of MI/stroke. Not on any blood thinners.     Current Facility-Administered Medications   Medication Dose Route Frequency Provider Last Rate Last Admin    acetaminophen tablet 1,000 mg  1,000 mg Oral Q8H PRN Jessica Grey MD        amLODIPine tablet 10 mg  10 mg Oral Daily Nima Pompa MD   10 mg at 04/22/24 0929    benzonatate capsule 100 mg  100 mg Oral TID PRN Sahara Figueroa PA-C   100 mg at 04/18/24 2348    carvediloL tablet 6.25 mg  6.25 mg Oral BID Juan Cameron III, MD   6.25 mg at 04/22/24 0929    cefTRIAXone (ROCEPHIN)  2 g in dextrose 5 % in water (D5W) 100 mL IVPB (MB+)  2 g Intravenous Q24H Marilee Begum MD   Stopped at 04/21/24 2137    dextrose 10% bolus 125 mL 125 mL  12.5 g Intravenous PRN Juan Cameron III, MD        dextrose 10% bolus 250 mL 250 mL  25 g Intravenous PRN Juan Cameron III, MD        glucagon (human recombinant) injection 1 mg  1 mg Intramuscular PRN Juan Cameron III, MD        glucose chewable tablet 16 g  16 g Oral PRN Juan Cameron III, MD        glucose chewable tablet 24 g  24 g Oral PRN Juan Cameron III, MD        insulin aspart U-100 pen 0-5 Units  0-5 Units Subcutaneous QID (AC + HS) PRN Juan Cameron III, MD   2 Units at 04/20/24 1633    insulin aspart U-100 pen 8 Units  8 Units Subcutaneous TIDWM Nima Pompa MD   8 Units at 04/21/24 1644    insulin detemir U-100 (Levemir) pen 20 Units  20 Units Subcutaneous QHS Nima Pompa MD   20 Units at 04/21/24 2128    losartan tablet 100 mg  100 mg Oral Daily Juan Cameron III, MD   100 mg at 04/22/24 0929    morphine injection 4 mg  4 mg Intravenous Q4H PRN Jessica Grey MD        naloxone 0.4 mg/mL injection 0.02 mg  0.02 mg Intravenous PRN Juan Cameron III, MD        oxyCODONE immediate release tablet 5 mg  5 mg Oral Q4H PRN Jessica Grey MD        polyethylene glycol packet 17 g  17 g Oral Daily Juan Cameron III, MD   17 g at 04/22/24 0929    senna-docusate 8.6-50 mg per tablet 1 tablet  1 tablet Oral BID Juan Cameron III, MD   1 tablet at 04/21/24 2106    sodium chloride 0.9% flush 10 mL  10 mL Intravenous PRN Juan Cameron III, MD        sodium chloride 0.9% flush 10 mL  10 mL Intravenous Q6H Nima Pompa MD   10 mL at 04/22/24 0600    And    sodium chloride 0.9% flush 10 mL  10 mL Intravenous PRN Nima Pompa MD           Review of patient's allergies indicates:   Allergen Reactions    Penicillins Hives     Tolerated cephalosporins 4/2024    Amoxicillin      Grass pollen-june grass standard Other (See Comments)       Past Medical History:   Diagnosis Date    DM2 (diabetes mellitus, type 2) 05/28/2023    Hypertension     Prolonged Q-T interval on ECG 05/31/2023    Qtc 525 5/31/23     Past Surgical History:   Procedure Laterality Date    ABDOMINAL SURGERY      COLONOSCOPY N/A 06/01/2023    Procedure: COLONOSCOPY;  Surgeon: Thaddeus Carlos MD;  Location: [facility] ENDO;  Service: Endoscopy;  Laterality: N/A;    DIAGNOSTIC LAPAROSCOPY N/A 05/26/2019    Procedure: LAPAROSCOPY, DIAGNOSTIC Drainage of abscess ;  Surgeon: Jose Luis Hanson MD;  Location: [facility] OR;  Service: General;  Laterality: N/A;  Drain Placement    DIAGNOSTIC LAPAROSCOPY N/A 12/27/2020    Procedure: Diagnostic laparoscopy, drainage of intra-abdominal abscess;  Surgeon: Bhupendra Banda MD;  Location: [facility] OR;  Service: General;  Laterality: N/A;    LAPAROSCOPIC LYSIS OF ADHESIONS  05/26/2019    Procedure: LYSIS, ADHESIONS, LAPAROSCOPIC;  Surgeon: Jose Luis Hanson MD;  Location: [facility] OR;  Service: General;;     Family History       Problem Relation (Age of Onset)    Diabetes Father          Tobacco Use    Smoking status: Never    Smokeless tobacco: Never   Substance and Sexual Activity    Alcohol use: Not Currently    Drug use: Not Currently    Sexual activity: Not Currently     Review of Systems   Constitutional:  Negative for activity change, chills, fatigue, fever and unexpected weight change.   HENT:  Negative for congestion, sinus pressure, sinus pain and sore throat.    Eyes:  Negative for visual disturbance.   Respiratory:  Negative for cough, chest tightness and shortness of breath.    Cardiovascular:  Negative for chest pain and palpitations.   Gastrointestinal:  Negative for abdominal pain, constipation, diarrhea, nausea and vomiting.   Genitourinary:  Negative for difficulty urinating, flank pain and urgency.   Musculoskeletal:  Negative for arthralgias, back pain and neck pain.        Right  shoulder pain   Neurological:  Negative for dizziness, weakness, light-headedness and headaches.     Objective:     Vital Signs (Most Recent):  Temp: 99 °F (37.2 °C) (04/22/24 0742)  Pulse: 76 (04/22/24 1042)  Resp: 18 (04/22/24 0742)  BP: (!) 142/91 (04/22/24 0742)  SpO2: 95 % (04/22/24 0742) Vital Signs (24h Range):  Temp:  [97.8 °F (36.6 °C)-99 °F (37.2 °C)] 99 °F (37.2 °C)  Pulse:  [76-93] 76  Resp:  [16-19] 18  SpO2:  [95 %-100 %] 95 %  BP: (127-142)/(65-91) 142/91     Weight: 95.4 kg (210 lb 5.1 oz)  Body mass index is 32.94 kg/m².  ECOG Performance Status Grade: 0 - Fully Active    Intake/Output - Last 3 Shifts         04/20 0700 04/21 0659 04/21 0700  04/22 0659 04/22 0700  04/23 0659    P.O. 600 600     Total Intake(mL/kg) 600 (6.3) 600 (6.3)     Urine (mL/kg/hr) 525 (0.2) 5 (0)     Emesis/NG output  1     Stool  0     Total Output 525 6     Net +75 +594            Urine Occurrence 3 x      Stool Occurrence  0 x     Emesis Occurrence  0 x             SpO2: 95 %        Physical Exam  Constitutional:       General: She is not in acute distress.  HENT:      Head: Normocephalic and atraumatic.   Eyes:      Extraocular Movements: Extraocular movements intact.      Conjunctiva/sclera: Conjunctivae normal.      Pupils: Pupils are equal, round, and reactive to light.   Cardiovascular:      Rate and Rhythm: Normal rate and regular rhythm.   Pulmonary:      Effort: Pulmonary effort is normal. No respiratory distress.      Comments: On RA  Abdominal:      General: There is no distension.      Palpations: Abdomen is soft.   Neurological:      General: No focal deficit present.      Mental Status: She is alert.            Significant Labs:  CBC:   Recent Labs   Lab 04/22/24  0219   WBC 6.82   RBC 3.36*   HGB 9.8*   HCT 30.9*      MCV 92   MCH 29.2   MCHC 31.7*     CMP:   Recent Labs   Lab 04/22/24  0219   *   CALCIUM 8.9   ALBUMIN 1.9*   PROT 6.8      K 3.6   CO2 28      BUN 9   CREATININE 0.6    ALKPHOS 76   ALT 13   AST 16   BILITOT 0.2       Significant Diagnostics:  CT: I have reviewed all pertinent results/findings within the past 24 hours    VTE Risk Mitigation (From admission, onward)           Ordered     IP VTE HIGH RISK PATIENT  Once         04/15/24 1404     Place sequential compression device  Until discontinued         04/15/24 1404                    Assessment/Plan:     Vertebral abscess  53 y.o. with history of T2DM and HTN with prevertebral abscess noted on MRI spine. Thoracic Surgery consulted for evaluation for VATS for drainage of abscess.     CT Chest obtained (4/22/2024) which reveals prevertebral soft tissue density measuring 4x1cm.     Given that leukocytosis has resolved and collection is very small on CT, do not recommend surgical intervention at this time    - No plan for surgical intervention  - Recommend CURTIS to assess for infective endocarditis given multiple abscesses in the last couple years  - Rest of care per primary         Thank you for your consult. I will sign off. Please contact us if you have any additional questions.    Yoli Miller MD  Thoracic Surgery  [facility] ([facility] [place]

## 2024-04-22 NOTE — NURSING
[facility] picked up patient for transport to main [place]. Telebox removed. Patient belongings sent with patient.

## 2024-04-22 NOTE — PLAN OF CARE
Shahida Ortega was transferred from [facility] for CTS evaluation for prevertebral abscess. Patient also with group B strep bacteremia as well as septic arthritis and possible osteomyelitis of right shoulder. She was being followed by ortho and ID at OSH and is currently on IV rocephin. Patient evaluated at bedside upon arrival to [facility]. She reports continued right shoulder and back pain which is controlled on current pain regimen. Still unable to lift her R arm due to pain, unchanged from admit. Denies numbness/tingling. Consults placed to CTS, ortho, and ID. Plan for VATS with chest tube placement on 04/22/2024 with CTS. DVT ppx being held for upcoming procedure. NPO at midnight. Remainder of plan of care as described in progress note written by WB provider earlier today.      Neisha Newberry PA-C  [facility] Medicine

## 2024-04-22 NOTE — PLAN OF CARE
Problem: Occupational Therapy  Goal: Occupational Therapy Goal  Description: Goals to be met by: 5/1/2024     Patient will increase functional independence with ADLs by performing:    UE Dressing with Modified [place].  GOAL MET 4/22/2024.  LE Dressing with Modified [place].  GOAL MET 4/22/2024.  Grooming while standing at sink with Modified [place].  GOAL MET 4/22/2024.  Toilet transfer to toilet with Modified [place].  GOAL MET 4/22/2024.  Toileting from toilet with Modified [place] for hygiene and clothing management.  GOAL MET per Patient and RN reporting current independence in task.       Outcome: Met     Pt. Found to be at PLOF demos modified independence to independence with donning underwear/socks, donning hospital gown as would robe, standing grooming/hygiene, and toilet transfer this day.  Pt. Reports no need to toilet at this time though Pt. And RN both report current independence in toilet this day.  Participated in BUE exercises with handout administered for AROM exercises and AAROM for shoulder flex/ext at tolerable range; Pt. Able to perform 2nd set of exercises without cuing.  No acute care OT needs anticipated.  Continued recommendation of post acute Low Intensity therapy.  To discontinue OT.

## 2024-04-22 NOTE — ASSESSMENT & PLAN NOTE
Patient's anemia is currently controlled. Has not received any PRBCs to date.   Current CBC reviewed-   Lab Results   Component Value Date    HGB 9.8 (L) 04/22/2024    HCT 30.9 (L) 04/22/2024     Monitor serial CBC and transfuse if patient becomes hemodynamically unstable, symptomatic or H/H drops below 7/21.

## 2024-04-22 NOTE — PLAN OF CARE
Problem: Pain Acute  Goal: Acceptable Pain Control and Functional Ability  Outcome: Ongoing, Progressing     Problem: Fall Injury Risk  Goal: Absence of Fall and Fall-Related Injury  Outcome: Ongoing, Progressing     Problem: Adult Inpatient Plan of Care  Goal: Optimal Comfort and Wellbeing  Outcome: Ongoing, Progressing  Goal: Readiness for Transition of Care  Outcome: Ongoing, Progressing

## 2024-04-22 NOTE — ASSESSMENT & PLAN NOTE
CT right shoulder 4/15 showed a small effusion.    Ortho at  was consulted.    Aspiration of the right shoulder 4/15 was dry.  A small amount of blood was aspirated from the subacromial bursa.  Cultures NGTD.    MRI right shoulder 4/19 revealed acromioclavicular joint effusion concerning for AC joint septic arthritis and osteomyelitis, moderate glenohumeral joint effusion and synovitis with subacromial subdeltoid bursitis, as well as cellulitis of the shoulder with myositis of the deltoid and trapezius musculature  Ortho at  consulted, appreciate assistance  NPO at midnight

## 2024-04-22 NOTE — CONSULTS
[facility] ([place] [id number])  Infectious Disease  Consult Note    Patient Name: Shahida Ortega  MRN: [id number]  Admission Date: 4/15/2024  Hospital Length of Stay: 7 days  Attending Physician: Jessica Grey MD  Primary Care Provider: Janusz Staton MD     Isolation Status: No active isolations    Patient information was obtained from patient, past medical records, and ER records.      Inpatient consult to Infectious Diseases  Consult performed by: Aamir Ayala DO  Consult ordered by: Neisha Newberry PA-C        Assessment/Plan:     ID  Bacteremia due to group B Streptococcus  T5-T11 preverbal abscess  R septic shoulder    -OSH course notable for MRI/CT of R shoulder noted soft tissue edema/joint effusion concerning for septic arthritis and osteomyelitis. MRI imaging also with concern for T5-T11 preverterbral abscess.   -Evaluated by OSH ortho - dry tap, though was able to aspirate small amount of blood that was sent for cx that have been no growth to date (not enough to send for fluid studies). Blcx on admit with GBS. She was started on vanc/cefepime. After initial ID evaluation at OSH, Cefepime/Vancomycin were deescalated to Ceftriaxone due to NGTD on cultures.  She was transferred to [facility] for further surgical evaluation. Orthopedic surgery was consulted for concerns of septic arthritis and CTS consulted for VATS evaluation.     Of note, pt had had urine cx obtained 1/2024 that was positive for MSSA and GBS, s/p nitrofurantoin. Also had Ecoli bacteremia in 5/2023, tx with IV and transitioned to cefdinir.        Recommendations:  -CURTIS for further source evaluation  -Ortho planning for potential intervention. Followup recs  -CTS recs pending. Per initial discussions, no intervention indicated at this time after risk-benefit evaluation. Followup formal recs.   --Continue ceftriaxone 2g iv daily. Anticipate prolonged course of abx therapy given spinal/shoulder infection. Atleast 4 weeks antibiotics  course expected. OPAT TBD          Thank you for your consult. I will follow-up with patient. Please contact us if you have any additional questions.    Aamir Ayala, DO  Infectious Disease  [facility] - [facility] ([facility])    Subjective:     Principal Problem: Sepsis    HPI: 54 yo female with DM/HTN who was initially  admitted to [facility] for R shoulder pain with associated redness, and found to have GBS bacteremia. ID consulted for bacteremia. OSH course notable for MRI/CT of R shoulder noted soft tissue edema/joint effusion concerning for septic arthritis and osteomyelitis. MRI imaging also with concern for T5-T11 preverterbral abscess. She was evaluated by OSH ortho - dry tap, though was able to aspirate small amount of blood that was sent for cx that have been no growth to date (not enough to send for fluid studies). Blcx on admit with GBS. She was started on vanc/cefepime. Upon initial ID evaluation at OSH, patient denied toxic habits, reported rash with PCN, or trauma/travel/TB exposure. She denied abdominal/respiratory/dental or  symptoms prior to admission, main complaint was back pain and R shoulder pain. After initial ID evaluation at OSH, Cefepime/Vancomycin were deescalated to Ceftriaxone due to NGTD on cultures.  She was transferred to [facility] for further surgical evaluation. Orthopedic surgery was consulted for concerns of septic arthritis and CTS consulted for VATS evaluation.    Of note, pt had had urine cx obtained 1/2024 that was positive for MSSA and GBS, s/p nitrofurantoin. Also had Ecoli bacteremia in 5/2023, tx with IV and transitioned to cefdinir.          Past Medical History:   Diagnosis Date    Anemia 4/22/2024    DM2 (diabetes mellitus, type 2) 05/28/2023    Hypertension     Prolonged Q-T interval on ECG 05/31/2023    Qtc 525 5/31/23       Past Surgical History:   Procedure Laterality Date    ABDOMINAL SURGERY      COLONOSCOPY N/A 06/01/2023    Procedure: COLONOSCOPY;   Surgeon: Thaddeus Carlos MD;  Location: [facility] ENDO;  Service: Endoscopy;  Laterality: N/A;    DIAGNOSTIC LAPAROSCOPY N/A 05/26/2019    Procedure: LAPAROSCOPY, DIAGNOSTIC Drainage of abscess ;  Surgeon: Jose Luis Hanson MD;  Location: [facility] OR;  Service: General;  Laterality: N/A;  Drain Placement    DIAGNOSTIC LAPAROSCOPY N/A 12/27/2020    Procedure: Diagnostic laparoscopy, drainage of intra-abdominal abscess;  Surgeon: Bhupendra Banda MD;  Location: [facility] OR;  Service: General;  Laterality: N/A;    LAPAROSCOPIC LYSIS OF ADHESIONS  05/26/2019    Procedure: LYSIS, ADHESIONS, LAPAROSCOPIC;  Surgeon: Jose Luis Hanson MD;  Location: [facility] OR;  Service: General;;       Review of patient's allergies indicates:   Allergen Reactions    Penicillins Hives     Tolerated cephalosporins 4/2024    Amoxicillin     Grass pollen-june grass standard Other (See Comments)       Medications:  Current Facility-Administered Medications   Medication Dose Route Frequency Provider Last Rate Last Admin    acetaminophen tablet 1,000 mg  1,000 mg Oral Q8H PRN Jessica Grey MD        amLODIPine tablet 10 mg  10 mg Oral Daily Nima Pompa MD   10 mg at 04/22/24 0929    benzonatate capsule 100 mg  100 mg Oral TID PRN Sahara Figueroa PA-C   100 mg at 04/18/24 2348    carvediloL tablet 6.25 mg  6.25 mg Oral BID Juan aCmeron III, MD   6.25 mg at 04/22/24 0929    cefTRIAXone (ROCEPHIN) 2 g in dextrose 5 % in water (D5W) 100 mL IVPB (MB+)  2 g Intravenous Q24H Marilee Begum MD   Stopped at 04/21/24 2137    dextrose 10% bolus 125 mL 125 mL  12.5 g Intravenous PRN Juan Cameron III, MD        dextrose 10% bolus 250 mL 250 mL  25 g Intravenous PRN Juan Cameron III, MD        glucagon (human recombinant) injection 1 mg  1 mg Intramuscular PRN Juan Cameron III, MD        glucose chewable tablet 16 g  16 g Oral PRN Juan Cameron III, MD        glucose chewable tablet 24 g  24 g Oral PRN Juan Cameron III, MD         insulin aspart U-100 pen 0-5 Units  0-5 Units Subcutaneous QID (AC + HS) PRN Juan Cameron III, MD   2 Units at 04/20/24 1633    insulin aspart U-100 pen 8 Units  8 Units Subcutaneous TIDWM Nima Pompa MD   8 Units at 04/22/24 1313    insulin detemir U-100 (Levemir) pen 20 Units  20 Units Subcutaneous QHS Nima Pompa MD   20 Units at 04/21/24 2128    losartan tablet 100 mg  100 mg Oral Daily Juan Cameron III, MD   100 mg at 04/22/24 0929    morphine injection 4 mg  4 mg Intravenous Q4H PRN Jessica Grey MD        naloxone 0.4 mg/mL injection 0.02 mg  0.02 mg Intravenous PRN Juan Cameron III, MD        oxyCODONE immediate release tablet 5 mg  5 mg Oral Q4H PRN Jessica Grey MD        polyethylene glycol packet 17 g  17 g Oral Daily Juan Cameron III, MD   17 g at 04/22/24 0929    senna-docusate 8.6-50 mg per tablet 1 tablet  1 tablet Oral BID Juan Cameron III, MD   1 tablet at 04/21/24 2106    sodium chloride 0.9% flush 10 mL  10 mL Intravenous PRN Juan Cameron III, MD        sodium chloride 0.9% flush 10 mL  10 mL Intravenous Q6H Nima Pompa MD   10 mL at 04/22/24 1200    And    sodium chloride 0.9% flush 10 mL  10 mL Intravenous PRN Nima Pompa MD         Antibiotics (From admission, onward)      Start     Stop Route Frequency Ordered    04/17/24 1215  cefTRIAXone (ROCEPHIN) 2 g in dextrose 5 % in water (D5W) 100 mL IVPB (MB+)         -- IV Every 24 hours (non-standard times) 04/17/24 1106          Antifungals (From admission, onward)      None          Antivirals (From admission, onward)      None             Immunization History   Administered Date(s) Administered    COVID-19, MRNA, LN-S, PF (MODERNA FULL 0.5 ML DOSE) 03/16/2021, 04/13/2021    Pneumococcal Conjugate - 13 Valent 12/29/2020       Family History       Problem Relation (Age of Onset)    Diabetes Father            Objective:     Vital Signs (Most Recent):  Temp:  98.7 °F (37.1 °C) (04/22/24 1115)  Pulse: 80 (04/22/24 1433)  Resp: 18 (04/22/24 1115)  BP: 131/67 (04/22/24 1115)  SpO2: 99 % (04/22/24 1115) Vital Signs (24h Range):  Temp:  [97.8 °F (36.6 °C)-99 °F (37.2 °C)] 98.7 °F (37.1 °C)  Pulse:  [73-87] 80  Resp:  [16-19] 18  SpO2:  [95 %-100 %] 99 %  BP: (127-142)/(65-91) 131/67     Weight: 95.4 kg (210 lb 5.1 oz)  Body mass index is 32.94 kg/m².    Estimated Creatinine Clearance: 131.5 mL/min (based on SCr of 0.6 mg/dL).     Physical Exam  Vitals and nursing note reviewed.   Constitutional:       General: She is not in acute distress.     Appearance: She is not ill-appearing, toxic-appearing or diaphoretic.   HENT:      Head: Normocephalic and atraumatic.      Mouth/Throat:      Mouth: Mucous membranes are moist.   Eyes:      General: No scleral icterus.        Right eye: No discharge.         Left eye: No discharge.   Cardiovascular:      Rate and Rhythm: Normal rate and regular rhythm.      Heart sounds: Normal heart sounds. No murmur heard.  Pulmonary:      Effort: Pulmonary effort is normal. No respiratory distress.      Breath sounds: Normal breath sounds. No wheezing, rhonchi or rales.   Abdominal:      General: Bowel sounds are normal.      Palpations: Abdomen is soft.      Tenderness: There is no abdominal tenderness. There is no guarding or rebound.   Musculoskeletal:      Right shoulder: No bony tenderness. Decreased range of motion.      Cervical back: No rigidity.      Right lower leg: No edema.      Left lower leg: No edema.   Skin:     General: Skin is warm and dry.      Coloration: Skin is not jaundiced.   Neurological:      Mental Status: She is oriented to person, place, and time. Mental status is at baseline.   Psychiatric:         Mood and Affect: Mood normal.         Behavior: Behavior normal.            Microbiology Results (last 7 days)       Procedure Component Value Units Date/Time    Blood culture [[id number]] Collected: 04/17/24 7895    Order  Status: Completed Specimen: Blood Updated: 04/21/24 1903     Blood Culture, Routine No Growth after 4 days.    Narrative:      Collection has been rescheduled by CMO at 04/17/2024 15:09 Reason:   Hard stick pt requested to be drawn drown on left hand   Collection has been rescheduled by CMO at 04/17/2024 15:11 Reason: Rn   Lizz   Collection has been rescheduled by CMO at 04/17/2024 15:09 Reason:   Hard stick pt requested to be drawn drown on left hand   Collection has been rescheduled by CMO at 04/17/2024 15:11 Reason: Rn   Lizz     Blood culture [[id number]] Collected: 04/17/24 1840    Order Status: Completed Specimen: Blood from Peripheral, Hand, Right Updated: 04/21/24 1903     Blood Culture, Routine No Growth after 4 days.    Narrative:      Collection has been rescheduled by CMO at 04/17/2024 15:09 Reason:   Hard stick pt requested to be drawn drown on left hand   Collection has been rescheduled by CMO at 04/17/2024 15:11 Reason: Rn   Lizz   Collection has been rescheduled by CMO at 04/17/2024 15:09 Reason:   Hard stick pt requested to be drawn drown on left hand   Collection has been rescheduled by CMO at 04/17/2024 15:11 Reason: Rn   Lizz     Blood culture [[id number]] Collected: 04/15/24 2209    Order Status: Completed Specimen: Blood from Peripheral, Antecubital, Left Updated: 04/19/24 2303     Blood Culture, Routine No Growth after 4 days.    Blood culture [[id number]] Collected: 04/15/24 2205    Order Status: Completed Specimen: Blood Updated: 04/19/24 2303     Blood Culture, Routine No Growth after 4 days.    Aerobic culture (Specify Source) **CANNOT BE ORDERED AS STAT** [[id number]] Collected: 04/15/24 1346    Order Status: Completed Specimen: Shoulder, Right Updated: 04/19/24 0614     Aerobic Bacterial Culture No growth    Blood Culture #2 **CANNOT BE ORDERED STAT** [[id number]]  (Abnormal) Collected: 04/15/24 0946    Order Status: Completed Specimen: Blood Updated: 04/17/24 0803     Blood  Culture, Routine Gram stain cameron bottle: Gram positive cocci in chains resembling Strep      Results called to and read back by:Lizz Wells  04/16/2024  08:12      STREPTOCOCCUS AGALACTIAE (GROUP B)  Beta-hemolytic streptococci are routinely susceptible to   penicillins,cephalosporins and carbapenems.  For susceptibility see order #[id number]      Blood Culture #1 **CANNOT BE ORDERED STAT** [[id number]]  (Abnormal)  (Susceptibility) Collected: 04/15/24 0928    Order Status: Completed Specimen: Blood from Peripheral, Antecubital, Left Updated: 04/17/24 0803     Blood Culture, Routine Gram stain cameron bottle: Gram positive cocci in chains resembling Strep      Results called to and read back by: Maya Farah 3W 04/15/2024  20:19      STREPTOCOCCUS AGALACTIAE (GROUP B)  Beta-hemolytic streptococci are routinely susceptible to   penicillins,cephalosporins and carbapenems.              Significant Labs: CBC:   Recent Labs   Lab 04/21/24  0525 04/22/24  0219   WBC 7.33 6.82   HGB 9.8* 9.8*   HCT 31.2* 30.9*    262     CMP:   Recent Labs   Lab 04/21/24  0524 04/22/24  0219    136   K 3.6 3.6    100   CO2 27 28   * 131*   BUN 10 9   CREATININE 0.6 0.6   CALCIUM 8.9 8.9   PROT 6.7 6.8   ALBUMIN 1.8* 1.9*   BILITOT 0.2 0.2   ALKPHOS 72 76   AST 14 16   ALT 11 13   ANIONGAP 8 8     All pertinent labs within the past 24 hours have been reviewed.    Significant Imaging: I have reviewed all pertinent imaging results/findings within the past 24 hours.  CT Chest Without Contrast   Final Result      Muscular swelling and fat stranding in the soft tissues of the right shoulder.  May represent infection, inflammation or changes from recent right shoulder aspiration.  See dedicated MRI report.      Prevertebral soft tissue density mass as described in detail above.  Abscess formation is a diagnostic consideration.  See dedicated MRI thoracic spine report for further details.      No acute pulmonary  abnormality.  No pleural effusion is seen.      Electronically signed by resident: Bernard Szymanski   Date:    04/22/2024   Time:    09:10      Electronically signed by: Be Ji   Date:    04/22/2024   Time:    10:11      MRI Shoulder W WO Contrast Right   Final Result      1. Acromioclavicular joint effusion with adjacent signal change of the distal clavicle and the acromion with surrounding soft tissue edema.  Findings are concerning for AC joint septic arthritis and osteomyelitis.   2. Moderate glenohumeral joint effusion and synovitis with subacromial subdeltoid bursitis.  Consider fluid sampling.   3. Cellulitis of the shoulder with myositis of the deltoid and trapezius musculature.   4. Mild posterior subluxation of the humeral head with respect to the glenoid, likely a result of the joint effusion.   5. Low-grade partial-thickness articular sided tear of the conjoined tendon at the footprint.  Background tendinosis of the supraspinatus and infraspinatus.   6. Tenosynovitis of the long head biceps tendon.         Electronically signed by: Erich Yarbrough   Date:    04/19/2024   Time:    23:42      MRI Spine Cervical-Thoracic-Lumbar W W/O Contrast (XPD)   Final Result   Abnormal      1. Abnormal prevertebral/paravertebral signal and enhancement spanning from approximately the T5 through T11 levels with appearance most suggestive for prevertebral abscess centered at approximately the T7-8 level.  Minimal osseous edema is seen at this level with no definite abnormal signal or enhancement within the intervertebral disc space.  Appearance is most suggestive for abscess with no definite epidural extension or epidural abscess seen.   2. Multilevel cervical spondylosis as detailed above.   3. No acute lumbar spine abnormalities identified.   This report was flagged in Epic as abnormal.         Electronically signed by: Lizette Gill MD   Date:    04/18/2024   Time:    19:30      MRI Lumbar Spine Without  Contrast   Final Result      Incomplete exam with suboptimal diagnostic assessment.  Limited findings as above.  Repeat exam with adequate pain control can be obtained as warranted.         Electronically signed by: Steven Valderrama   Date:    04/16/2024   Time:    17:40      CT Shoulder With Contrast Right   Final Result      Soft tissue edema in the muscles and subcutaneous fat surrounding the right shoulder with probable small joint effusion.  Findings could be related to cellulitis and/or myositis in this patient with elevated CRP.  No focal osseous erosion.  Consider correlation with arthrocentesis if there is concern for septic joint.      Nonspecific incompletely imaged soft tissue edema and paraspinal soft tissue fullness in the posterior mediastinum along the lower thoracic spine at roughly T8-T10.  Unclear if these findings are exaggerated by wall thickening of the esophagus.  Suggest correlation with symptoms and consider further evaluation with spine MRI if the patient is experiencing significant back pain or concern for discitis/osteomyelitis.      Other findings discussed in the body of the report.         Electronically signed by: Franklin Patricia MD   Date:    04/15/2024   Time:    12:39      X-ray Shoulder 2 or More Views Right   Final Result      As above.         Electronically signed by: Steven Valderrama   Date:    04/15/2024   Time:    08:08

## 2024-04-22 NOTE — ASSESSMENT & PLAN NOTE
Patient with Paroxysmal (<7 days) atrial fibrillation which is controlled currently with Beta Blocker. Patient is currently in sinus rhythm.[id number] Score: 3  Anticoagulation:  Patient does not appear to be on home anticoagulation.  We will continue aspirin.  In normal sinus rhythm    Paroxysmal AFib.   During episode of sepsis in 2023.   Not on anticoagulation because it was a brief episode per Cardiology.   Rec to look for recurrence with the help of event monitor.   If a recurrence noted on event monitor, consider adding anticoagulation

## 2024-04-22 NOTE — ASSESSMENT & PLAN NOTE
Multifactorial 2/2 Group B Strep bacteremia, paravertebral abscesses, and septic arthritis of the right shoulder  Management as below

## 2024-04-22 NOTE — SUBJECTIVE & OBJECTIVE
Past Medical History:   Diagnosis Date    DM2 (diabetes mellitus, type 2) 05/28/2023    Hypertension     Prolonged Q-T interval on ECG 05/31/2023    Qtc 525 5/31/23       Past Surgical History:   Procedure Laterality Date    ABDOMINAL SURGERY      COLONOSCOPY N/A 06/01/2023    Procedure: COLONOSCOPY;  Surgeon: Thaddeus Carlos MD;  Location: [facility] ENDO;  Service: Endoscopy;  Laterality: N/A;    DIAGNOSTIC LAPAROSCOPY N/A 05/26/2019    Procedure: LAPAROSCOPY, DIAGNOSTIC Drainage of abscess ;  Surgeon: Jose Luis Hanson MD;  Location: [facility] OR;  Service: General;  Laterality: N/A;  Drain Placement    DIAGNOSTIC LAPAROSCOPY N/A 12/27/2020    Procedure: Diagnostic laparoscopy, drainage of intra-abdominal abscess;  Surgeon: Bhupendra Banda MD;  Location: [facility] OR;  Service: General;  Laterality: N/A;    LAPAROSCOPIC LYSIS OF ADHESIONS  05/26/2019    Procedure: LYSIS, ADHESIONS, LAPAROSCOPIC;  Surgeon: Jose Luis Hanson MD;  Location: [facility] OR;  Service: General;;       Review of patient's allergies indicates:   Allergen Reactions    Penicillins Hives     Tolerated cephalosporins 4/2024    Amoxicillin     Grass pollen-june grass standard Other (See Comments)       Current Facility-Administered Medications   Medication Dose Route Frequency Provider Last Rate Last Admin    acetaminophen tablet 650 mg  650 mg Oral Q8H PRN Juan Cameron III, MD   650 mg at 04/18/24 2038    acetaminophen tablet 650 mg  650 mg Oral Q4H PRN Juan Cameron III, MD   650 mg at 04/17/24 0544    amLODIPine tablet 10 mg  10 mg Oral Daily Nima Pompa MD   10 mg at 04/21/24 0811    benzonatate capsule 100 mg  100 mg Oral TID PRN Sahara Figueroa PA-C   100 mg at 04/18/24 2348    carvediloL tablet 6.25 mg  6.25 mg Oral BID Juan Cameron III, MD   6.25 mg at 04/21/24 2106    cefTRIAXone (ROCEPHIN) 2 g in dextrose 5 % in water (D5W) 100 mL IVPB (MB+)  2 g Intravenous Q24H Marilee Begum MD   Stopped at 04/21/24 2137     dextrose 10% bolus 125 mL 125 mL  12.5 g Intravenous PRN Juan Cameron III, MD        dextrose 10% bolus 250 mL 250 mL  25 g Intravenous PRN Juan Cameron III, MD        glucagon (human recombinant) injection 1 mg  1 mg Intramuscular PRN Juan Cameron III, MD        glucose chewable tablet 16 g  16 g Oral PRN Juan Cameron III, MD        glucose chewable tablet 24 g  24 g Oral PRN Juan Cameron III, MD        insulin aspart U-100 pen 0-5 Units  0-5 Units Subcutaneous QID (AC + HS) PRN Juan Cameron III, MD   2 Units at 04/20/24 1633    insulin aspart U-100 pen 8 Units  8 Units Subcutaneous TIDWM Nima Pompa MD   8 Units at 04/21/24 1644    insulin detemir U-100 (Levemir) pen 20 Units  20 Units Subcutaneous QHS Nima Pompa MD   20 Units at 04/21/24 2128    losartan tablet 100 mg  100 mg Oral Daily Juan Cameron III, MD   100 mg at 04/21/24 0811    naloxone 0.4 mg/mL injection 0.02 mg  0.02 mg Intravenous PRN Juan Cameron III, MD        oxyCODONE immediate release tablet 5 mg  5 mg Oral Q6H PRN Juan Cameron III, MD   5 mg at 04/21/24 2010    polyethylene glycol packet 17 g  17 g Oral Daily Juan Cameron III, MD   17 g at 04/21/24 0811    senna-docusate 8.6-50 mg per tablet 1 tablet  1 tablet Oral BID Juan Cameron III, MD   1 tablet at 04/21/24 2106    sodium chloride 0.9% flush 10 mL  10 mL Intravenous PRN Juan Cameron III, MD        sodium chloride 0.9% flush 10 mL  10 mL Intravenous Q6H Nima Pompa MD   10 mL at 04/22/24 0000    And    sodium chloride 0.9% flush 10 mL  10 mL Intravenous PRN Nmia Pompa MD         Family History       Problem Relation (Age of Onset)    Diabetes Father          Tobacco Use    Smoking status: Never    Smokeless tobacco: Never   Substance and Sexual Activity    Alcohol use: Not Currently    Drug use: Not Currently    Sexual activity: Not Currently     ROS  Constitutional: negative for fevers or  "chills  Eyes: negative visual changes or eye discharge  ENT: negative for ear pain or sore throat  Respiratory: negative for shortness of breath or cough  Cardiovascular: negative for chest pain or palpitations  Gastrointestinal: negative for abdominal pain, nausea, or vomiting  Genitourinary: negative for dysuria and flank pain  Neurological: negative for headaches or dizziness  Musculoskeletal: positive for right shoulder pain and decreased range of motion   Objective:     Vital Signs (Most Recent):  Temp: 99 °F (37.2 °C) (04/22/24 0742)  Pulse: 86 (04/22/24 0742)  Resp: 18 (04/22/24 0742)  BP: (!) 142/91 (04/22/24 0742)  SpO2: 95 % (04/22/24 0742) Vital Signs (24h Range):  Temp:  [97.6 °F (36.4 °C)-99 °F (37.2 °C)] 99 °F (37.2 °C)  Pulse:  [77-93] 86  Resp:  [16-19] 18  SpO2:  [95 %-100 %] 95 %  BP: (110-142)/(63-91) 142/91     Weight: 95.4 kg (210 lb 5.1 oz)  Height: 5' 7" (170.2 cm)  Body mass index is 32.94 kg/m².      Intake/Output Summary (Last 24 hours) at 4/22/2024 0745  Last data filed at 4/22/2024 0618  Gross per 24 hour   Intake 600 ml   Output 6 ml   Net 594 ml        Ortho/SPM Exam  General:  no acute distress, appears stated age   Neuro: alert and oriented x3  Psych: normal mood  Head: normocephalic, atraumatic.  Eyes: no scleral icterus  Mouth: moist mucous membranes  CV: extremities warm and well perfused  Pulm: breathing comfortably, equal chest rise bilat  Skin: clean, dry, intact (any exceptions noted in below musculoskeletal exam)    MSK:    RUE:  - Skin intact throughout, no open wounds  - Swelling from the mid-humerus down   - The right shoulder slightly warm to the touch as compared to the contralateral side.  - Mildly TTP over the AC joint, bicipital groove; otherwise minimally tender to palpation about the shoulder  - AROM of the shoulder is limited secondary to pain and stiffness.  - PROM of the shoulder to 100° of abduction and forward flexion.  Pain is elicited at approximately 70-80 " degrees of abduction and forward flexion.  - AROM and PROM of the elbow, wrist, and hand intact without pain  - Axillary/AIN/PIN/Radial/Median/Ulnar Nerves assessed in isolation without deficit  - SILT throughout  - Compartments soft  - Radial artery palpated   - Capillary Refill <3s    LUE:  - Skin intact throughout, no open wounds  - No swelling  - No ecchymosis, erythema, or signs of cellulitis  - NonTTP throughout  - AROM and PROM of the shoulder, elbow, wrist, and hand intact without pain  - Axillary/AIN/PIN/Radial/Median/Ulnar Nerves assessed in isolation without deficit  - SILT throughout  - Compartments soft  - Radial artery palpated   - Capillary Refill <3s    RLE:  - No ecchymosis, erythema, or signs of cellulitis  - NonTTP throughout  - AROM and PROM of the hip, knee, ankle, and foot intact without pain  - TA/EHL/Gastroc/FHL assessed in isolation without deficit  - SILT throughout  - Compartments soft  - DP and PT palpated  - Capillary Refill <3s  - Negative Log roll    LLE:  - No ecchymosis, erythema, or signs of cellulitis  - NonTTP throughout  - AROM and PROM of the hip, knee, ankle, and foot intact without pain  - TA/EHL/Gastroc/FHL assessed in isolation without deficit  - SILT throughout  - Compartments soft  - DP and PT palpated  - Capillary Refill <3s  - Negative Log roll     Significant Labs: All pertinent labs within the past 24 hours have been reviewed.    Significant Imaging: X-Ray: I have reviewed all pertinent results/findings and my personal findings are:  There is no fracture or dislocation.  There is mild inferior subluxation of the humeral head in the glenoid fossa.  MRI: I have reviewed all pertinent results/findings and my personal findings are:  There is a glenohumeral joint effusion most pronounced anteriorly.  There is fluid surrounding the acromioclavicular joint.  The humeral head appears to be subluxed posteroinferiorly in the glenoid fossa.

## 2024-04-22 NOTE — PLAN OF CARE
Problem: Pain Acute  Goal: Acceptable Pain Control and Functional Ability  Outcome: Ongoing, Progressing     Problem: Fall Injury Risk  Goal: Absence of Fall and Fall-Related Injury  Outcome: Ongoing, Progressing     Problem: Adult Inpatient Plan of Care  Goal: Plan of Care Review  Outcome: Ongoing, Progressing  Goal: Patient-Specific Goal (Individualized)  Outcome: Ongoing, Progressing  Goal: Absence of Hospital-Acquired Illness or Injury  Outcome: Ongoing, Progressing  Goal: Optimal Comfort and Wellbeing  Outcome: Ongoing, Progressing  Goal: Readiness for Transition of Care  Outcome: Ongoing, Progressing

## 2024-04-22 NOTE — HPI
Shahida Ortega is a 53 y.o. RHD female with a PMHx of T2DM and HTN who presents as a transfer from [facility] with a prevertebral abscess from T5-T11 and right shoulder pain and decreased range of motion.  The patient was admitted to [facility] on 04/15/2024 with acute onset back pain and right shoulder pain.  At that time, an aspiration of the right shoulder was attempted by Orthopedic surgery attendings at [facility].  This was a dry tap in the glenohumeral joint and the subacromial bursa was aspirated.  There was not enough fluid for cell count; however, the fluid from the subacromial bursa was sent for culture.  At that time, the patient was also growing group B Streptococcus from her blood cultures.  She was admitted to [facility] and placed on broad-spectrum IV antibiotics.  During her stay there, her right shoulder pain improved as did her range of motion.  Cultures from the aspirate of the right shoulder subacromial bursa have been no growth to date.  Once found to have a T5-T11 prevertebral abscess, she was transferred to [facility] for surgical intervention by Cardiothoracic surgery.  She was scheduled to undergo VATS and chest tube placement by CTS for evacuation of this abscess.  On arrival to [facility], she reports that her right shoulder pain is markedly improved from prior.  She states that when she first went to the hospital at [facility], her shoulder pain was 10/10 and is now 1 to 2/10 at rest.    Nonsmoker.    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    No history of IV drug use.    A history of cancer, chemotherapy, or radiation.    Lives with her mother and sister.

## 2024-04-22 NOTE — SUBJECTIVE & OBJECTIVE
Current Facility-Administered Medications   Medication Dose Route Frequency Provider Last Rate Last Admin    acetaminophen tablet 1,000 mg  1,000 mg Oral Q8H PRN Jessica Grey MD        amLODIPine tablet 10 mg  10 mg Oral Daily Nima Pompa MD   10 mg at 04/22/24 0929    benzonatate capsule 100 mg  100 mg Oral TID PRN Sahara Figueroa PA-C   100 mg at 04/18/24 2348    carvediloL tablet 6.25 mg  6.25 mg Oral BID Juan Cameron III, MD   6.25 mg at 04/22/24 0929    cefTRIAXone (ROCEPHIN) 2 g in dextrose 5 % in water (D5W) 100 mL IVPB (MB+)  2 g Intravenous Q24H Marilee Begum MD   Stopped at 04/21/24 2137    dextrose 10% bolus 125 mL 125 mL  12.5 g Intravenous PRN Juan Cameron III, MD        dextrose 10% bolus 250 mL 250 mL  25 g Intravenous PRN Juan Cameron III, MD        glucagon (human recombinant) injection 1 mg  1 mg Intramuscular PRN Juan Cameron III, MD        glucose chewable tablet 16 g  16 g Oral PRN Juan Cameron III, MD        glucose chewable tablet 24 g  24 g Oral PRN Juan Cameron III, MD        insulin aspart U-100 pen 0-5 Units  0-5 Units Subcutaneous QID (AC + HS) PRN Juan Cameron III, MD   2 Units at 04/20/24 1633    insulin aspart U-100 pen 8 Units  8 Units Subcutaneous TIDWM Nima Pompa MD   8 Units at 04/21/24 1644    insulin detemir U-100 (Levemir) pen 20 Units  20 Units Subcutaneous QHS Nima Pompa MD   20 Units at 04/21/24 2128    losartan tablet 100 mg  100 mg Oral Daily Juan Cameron III, MD   100 mg at 04/22/24 0929    morphine injection 4 mg  4 mg Intravenous Q4H PRN Jessica Grey MD        naloxone 0.4 mg/mL injection 0.02 mg  0.02 mg Intravenous PRN Juan Cameron III, MD        oxyCODONE immediate release tablet 5 mg  5 mg Oral Q4H PRN Jessica Grey MD        polyethylene glycol packet 17 g  17 g Oral Daily Juan Cameron III, MD   17 g at 04/22/24 0929    senna-docusate 8.6-50 mg per tablet 1  tablet  1 tablet Oral BID Juan Cameron III, MD   1 tablet at 04/21/24 2106    sodium chloride 0.9% flush 10 mL  10 mL Intravenous PRN Juan Cameron III, MD        sodium chloride 0.9% flush 10 mL  10 mL Intravenous Q6H Nima Pompa MD   10 mL at 04/22/24 0600    And    sodium chloride 0.9% flush 10 mL  10 mL Intravenous PRN Nima Pompa MD           Review of patient's allergies indicates:   Allergen Reactions    Penicillins Hives     Tolerated cephalosporins 4/2024    Amoxicillin     Grass pollen-june grass standard Other (See Comments)       Past Medical History:   Diagnosis Date    DM2 (diabetes mellitus, type 2) 05/28/2023    Hypertension     Prolonged Q-T interval on ECG 05/31/2023    Qtc 525 5/31/23     Past Surgical History:   Procedure Laterality Date    ABDOMINAL SURGERY      COLONOSCOPY N/A 06/01/2023    Procedure: COLONOSCOPY;  Surgeon: Thaddeus Carlos MD;  Location: [facility] ENDO;  Service: Endoscopy;  Laterality: N/A;    DIAGNOSTIC LAPAROSCOPY N/A 05/26/2019    Procedure: LAPAROSCOPY, DIAGNOSTIC Drainage of abscess ;  Surgeon: Jose Luis Hanson MD;  Location: [facility] OR;  Service: General;  Laterality: N/A;  Drain Placement    DIAGNOSTIC LAPAROSCOPY N/A 12/27/2020    Procedure: Diagnostic laparoscopy, drainage of intra-abdominal abscess;  Surgeon: Bhupendra Banda MD;  Location: [facility] OR;  Service: General;  Laterality: N/A;    LAPAROSCOPIC LYSIS OF ADHESIONS  05/26/2019    Procedure: LYSIS, ADHESIONS, LAPAROSCOPIC;  Surgeon: Jose Luis Hanson MD;  Location: [facility] OR;  Service: General;;     Family History       Problem Relation (Age of Onset)    Diabetes Father          Tobacco Use    Smoking status: Never    Smokeless tobacco: Never   Substance and Sexual Activity    Alcohol use: Not Currently    Drug use: Not Currently    Sexual activity: Not Currently     Review of Systems   Constitutional:  Negative for activity change, chills, fatigue, fever and unexpected weight change.    HENT:  Negative for congestion, sinus pressure, sinus pain and sore throat.    Eyes:  Negative for visual disturbance.   Respiratory:  Negative for cough, chest tightness and shortness of breath.    Cardiovascular:  Negative for chest pain and palpitations.   Gastrointestinal:  Negative for abdominal pain, constipation, diarrhea, nausea and vomiting.   Genitourinary:  Negative for difficulty urinating, flank pain and urgency.   Musculoskeletal:  Negative for arthralgias, back pain and neck pain.        Right shoulder pain   Neurological:  Negative for dizziness, weakness, light-headedness and headaches.     Objective:     Vital Signs (Most Recent):  Temp: 99 °F (37.2 °C) (04/22/24 0742)  Pulse: 76 (04/22/24 1042)  Resp: 18 (04/22/24 0742)  BP: (!) 142/91 (04/22/24 0742)  SpO2: 95 % (04/22/24 0742) Vital Signs (24h Range):  Temp:  [97.8 °F (36.6 °C)-99 °F (37.2 °C)] 99 °F (37.2 °C)  Pulse:  [76-93] 76  Resp:  [16-19] 18  SpO2:  [95 %-100 %] 95 %  BP: (127-142)/(65-91) 142/91     Weight: 95.4 kg (210 lb 5.1 oz)  Body mass index is 32.94 kg/m².  ECOG Performance Status Grade: 0 - Fully Active    Intake/Output - Last 3 Shifts         04/20 0700 04/21 0659 04/21 0700 04/22 0659 04/22 0700 04/23 0659    P.O. 600 600     Total Intake(mL/kg) 600 (6.3) 600 (6.3)     Urine (mL/kg/hr) 525 (0.2) 5 (0)     Emesis/NG output  1     Stool  0     Total Output 525 6     Net +75 +594            Urine Occurrence 3 x      Stool Occurrence  0 x     Emesis Occurrence  0 x             SpO2: 95 %        Physical Exam  Constitutional:       General: She is not in acute distress.  HENT:      Head: Normocephalic and atraumatic.   Eyes:      Extraocular Movements: Extraocular movements intact.      Conjunctiva/sclera: Conjunctivae normal.      Pupils: Pupils are equal, round, and reactive to light.   Cardiovascular:      Rate and Rhythm: Normal rate and regular rhythm.   Pulmonary:      Effort: Pulmonary effort is normal. No respiratory  distress.      Comments: On RA  Abdominal:      General: There is no distension.      Palpations: Abdomen is soft.   Neurological:      General: No focal deficit present.      Mental Status: She is alert.            Significant Labs:  CBC:   Recent Labs   Lab 04/22/24 0219   WBC 6.82   RBC 3.36*   HGB 9.8*   HCT 30.9*      MCV 92   MCH 29.2   MCHC 31.7*     CMP:   Recent Labs   Lab 04/22/24 0219   *   CALCIUM 8.9   ALBUMIN 1.9*   PROT 6.8      K 3.6   CO2 28      BUN 9   CREATININE 0.6   ALKPHOS 76   ALT 13   AST 16   BILITOT 0.2       Significant Diagnostics:  CT: I have reviewed all pertinent results/findings within the past 24 hours    VTE Risk Mitigation (From admission, onward)           Ordered     IP VTE HIGH RISK PATIENT  Once         04/15/24 1404     Place sequential compression device  Until discontinued         04/15/24 1404

## 2024-04-22 NOTE — SUBJECTIVE & OBJECTIVE
Past Medical History:   Diagnosis Date    Anemia 4/22/2024    DM2 (diabetes mellitus, type 2) 05/28/2023    Hypertension     Prolonged Q-T interval on ECG 05/31/2023    Qtc 525 5/31/23       Past Surgical History:   Procedure Laterality Date    ABDOMINAL SURGERY      COLONOSCOPY N/A 06/01/2023    Procedure: COLONOSCOPY;  Surgeon: Thaddeus Carlos MD;  Location: [facility] ENDO;  Service: Endoscopy;  Laterality: N/A;    DIAGNOSTIC LAPAROSCOPY N/A 05/26/2019    Procedure: LAPAROSCOPY, DIAGNOSTIC Drainage of abscess ;  Surgeon: Jose Luis Hanson MD;  Location: [facility] OR;  Service: General;  Laterality: N/A;  Drain Placement    DIAGNOSTIC LAPAROSCOPY N/A 12/27/2020    Procedure: Diagnostic laparoscopy, drainage of intra-abdominal abscess;  Surgeon: Bhupendra Banda MD;  Location: [facility] OR;  Service: General;  Laterality: N/A;    LAPAROSCOPIC LYSIS OF ADHESIONS  05/26/2019    Procedure: LYSIS, ADHESIONS, LAPAROSCOPIC;  Surgeon: Jose Luis Hanson MD;  Location: [facility] OR;  Service: General;;       Review of patient's allergies indicates:   Allergen Reactions    Penicillins Hives     Tolerated cephalosporins 4/2024    Amoxicillin     Grass pollen-june grass standard Other (See Comments)       Medications:  Current Facility-Administered Medications   Medication Dose Route Frequency Provider Last Rate Last Admin    acetaminophen tablet 1,000 mg  1,000 mg Oral Q8H PRN Jessica Grey MD        amLODIPine tablet 10 mg  10 mg Oral Daily Nima Pompa MD   10 mg at 04/22/24 0929    benzonatate capsule 100 mg  100 mg Oral TID PRN Sahara Figueroa PA-C   100 mg at 04/18/24 2348    carvediloL tablet 6.25 mg  6.25 mg Oral BID Juan Cameron III, MD   6.25 mg at 04/22/24 0929    cefTRIAXone (ROCEPHIN) 2 g in dextrose 5 % in water (D5W) 100 mL IVPB (MB+)  2 g Intravenous Q24H Marilee Begum MD   Stopped at 04/21/24 2137    dextrose 10% bolus 125 mL 125 mL  12.5 g Intravenous PRN Jaun Cameron III, MD        dextrose  10% bolus 250 mL 250 mL  25 g Intravenous PRN Juan Cameron III, MD        glucagon (human recombinant) injection 1 mg  1 mg Intramuscular PRN Juan Cameron III, MD        glucose chewable tablet 16 g  16 g Oral PRN Juan Cameron III, MD        glucose chewable tablet 24 g  24 g Oral PRN Juan Cameron III, MD        insulin aspart U-100 pen 0-5 Units  0-5 Units Subcutaneous QID (AC + HS) PRN Juan Cameron III, MD   2 Units at 04/20/24 1633    insulin aspart U-100 pen 8 Units  8 Units Subcutaneous TIDWM Nima Pompa MD   8 Units at 04/22/24 1313    insulin detemir U-100 (Levemir) pen 20 Units  20 Units Subcutaneous QHS Nima Pompa MD   20 Units at 04/21/24 2128    losartan tablet 100 mg  100 mg Oral Daily Juan Cameron III, MD   100 mg at 04/22/24 0929    morphine injection 4 mg  4 mg Intravenous Q4H PRN Jessica Grey MD        naloxone 0.4 mg/mL injection 0.02 mg  0.02 mg Intravenous PRN Juan Cameron III, MD        oxyCODONE immediate release tablet 5 mg  5 mg Oral Q4H PRN Jessica Grey MD        polyethylene glycol packet 17 g  17 g Oral Daily Juan Cameron III, MD   17 g at 04/22/24 0929    senna-docusate 8.6-50 mg per tablet 1 tablet  1 tablet Oral BID Juan Cameron III, MD   1 tablet at 04/21/24 2106    sodium chloride 0.9% flush 10 mL  10 mL Intravenous PRN Juan Cameron III, MD        sodium chloride 0.9% flush 10 mL  10 mL Intravenous Q6H Nima Pompa MD   10 mL at 04/22/24 1200    And    sodium chloride 0.9% flush 10 mL  10 mL Intravenous PRN Nima Pompa MD         Antibiotics (From admission, onward)      Start     Stop Route Frequency Ordered    04/17/24 1215  cefTRIAXone (ROCEPHIN) 2 g in dextrose 5 % in water (D5W) 100 mL IVPB (MB+)         -- IV Every 24 hours (non-standard times) 04/17/24 1106          Antifungals (From admission, onward)      None          Antivirals (From admission, onward)      None              Immunization History   Administered Date(s) Administered    COVID-19, MRNA, LN-S, PF (MODERNA FULL 0.5 ML DOSE) 03/16/2021, 04/13/2021    Pneumococcal Conjugate - 13 Valent 12/29/2020       Family History       Problem Relation (Age of Onset)    Diabetes Father            Objective:     Vital Signs (Most Recent):  Temp: 98.7 °F (37.1 °C) (04/22/24 1115)  Pulse: 80 (04/22/24 1433)  Resp: 18 (04/22/24 1115)  BP: 131/67 (04/22/24 1115)  SpO2: 99 % (04/22/24 1115) Vital Signs (24h Range):  Temp:  [97.8 °F (36.6 °C)-99 °F (37.2 °C)] 98.7 °F (37.1 °C)  Pulse:  [73-87] 80  Resp:  [16-19] 18  SpO2:  [95 %-100 %] 99 %  BP: (127-142)/(65-91) 131/67     Weight: 95.4 kg (210 lb 5.1 oz)  Body mass index is 32.94 kg/m².    Estimated Creatinine Clearance: 131.5 mL/min (based on SCr of 0.6 mg/dL).     Physical Exam  Vitals and nursing note reviewed.   Constitutional:       General: She is not in acute distress.     Appearance: She is not ill-appearing, toxic-appearing or diaphoretic.   HENT:      Head: Normocephalic and atraumatic.      Mouth/Throat:      Mouth: Mucous membranes are moist.   Eyes:      General: No scleral icterus.        Right eye: No discharge.         Left eye: No discharge.   Cardiovascular:      Rate and Rhythm: Normal rate and regular rhythm.      Heart sounds: Normal heart sounds. No murmur heard.  Pulmonary:      Effort: Pulmonary effort is normal. No respiratory distress.      Breath sounds: Normal breath sounds. No wheezing, rhonchi or rales.   Abdominal:      General: Bowel sounds are normal.      Palpations: Abdomen is soft.      Tenderness: There is no abdominal tenderness. There is no guarding or rebound.   Musculoskeletal:      Right shoulder: No bony tenderness. Decreased range of motion.      Cervical back: No rigidity.      Right lower leg: No edema.      Left lower leg: No edema.   Skin:     General: Skin is warm and dry.      Coloration: Skin is not jaundiced.   Neurological:      Mental  Status: She is oriented to person, place, and time. Mental status is at baseline.   Psychiatric:         Mood and Affect: Mood normal.         Behavior: Behavior normal.            Microbiology Results (last 7 days)       Procedure Component Value Units Date/Time    Blood culture [[id number]] Collected: 04/17/24 1845    Order Status: Completed Specimen: Blood Updated: 04/21/24 1903     Blood Culture, Routine No Growth after 4 days.    Narrative:      Collection has been rescheduled by CMO at 04/17/2024 15:09 Reason:   Hard stick pt requested to be drawn drown on left hand   Collection has been rescheduled by CMO at 04/17/2024 15:11 Reason: Rn   Lizz   Collection has been rescheduled by CMO at 04/17/2024 15:09 Reason:   Hard stick pt requested to be drawn drown on left hand   Collection has been rescheduled by CMO at 04/17/2024 15:11 Reason: Rn   Lizz     Blood culture [[id number]] Collected: 04/17/24 1840    Order Status: Completed Specimen: Blood from Peripheral, Hand, Right Updated: 04/21/24 1903     Blood Culture, Routine No Growth after 4 days.    Narrative:      Collection has been rescheduled by CMO at 04/17/2024 15:09 Reason:   Hard stick pt requested to be drawn drown on left hand   Collection has been rescheduled by CMO at 04/17/2024 15:11 Reason: Rn   Lizz   Collection has been rescheduled by CMO at 04/17/2024 15:09 Reason:   Hard stick pt requested to be drawn drown on left hand   Collection has been rescheduled by CMO at 04/17/2024 15:11 Reason: Rn   Lizz     Blood culture [[id number]] Collected: 04/15/24 2209    Order Status: Completed Specimen: Blood from Peripheral, Antecubital, Left Updated: 04/19/24 2303     Blood Culture, Routine No Growth after 4 days.    Blood culture [[id number]] Collected: 04/15/24 2205    Order Status: Completed Specimen: Blood Updated: 04/19/24 2303     Blood Culture, Routine No Growth after 4 days.    Aerobic culture (Specify Source) **CANNOT BE ORDERED AS STAT**  [[id number]] Collected: 04/15/24 1346    Order Status: Completed Specimen: Shoulder, Right Updated: 04/19/24 0614     Aerobic Bacterial Culture No growth    Blood Culture #2 **CANNOT BE ORDERED STAT** [[id number]]  (Abnormal) Collected: 04/15/24 0946    Order Status: Completed Specimen: Blood Updated: 04/17/24 0803     Blood Culture, Routine Gram stain cameron bottle: Gram positive cocci in chains resembling Strep      Results called to and read back by:Lizz Wells  04/16/2024  08:12      STREPTOCOCCUS AGALACTIAE (GROUP B)  Beta-hemolytic streptococci are routinely susceptible to   penicillins,cephalosporins and carbapenems.  For susceptibility see order #[id number]      Blood Culture #1 **CANNOT BE ORDERED STAT** [[id number]]  (Abnormal)  (Susceptibility) Collected: 04/15/24 0928    Order Status: Completed Specimen: Blood from Peripheral, Antecubital, Left Updated: 04/17/24 0803     Blood Culture, Routine Gram stain cameron bottle: Gram positive cocci in chains resembling Strep      Results called to and read back by: Maya Farah 3BERLIN 04/15/2024  20:19      STREPTOCOCCUS AGALACTIAE (GROUP B)  Beta-hemolytic streptococci are routinely susceptible to   penicillins,cephalosporins and carbapenems.              Significant Labs: CBC:   Recent Labs   Lab 04/21/24 0525 04/22/24 0219   WBC 7.33 6.82   HGB 9.8* 9.8*   HCT 31.2* 30.9*    262     CMP:   Recent Labs   Lab 04/21/24 0524 04/22/24 0219    136   K 3.6 3.6    100   CO2 27 28   * 131*   BUN 10 9   CREATININE 0.6 0.6   CALCIUM 8.9 8.9   PROT 6.7 6.8   ALBUMIN 1.8* 1.9*   BILITOT 0.2 0.2   ALKPHOS 72 76   AST 14 16   ALT 11 13   ANIONGAP 8 8     All pertinent labs within the past 24 hours have been reviewed.    Significant Imaging: I have reviewed all pertinent imaging results/findings within the past 24 hours.  CT Chest Without Contrast   Final Result      Muscular swelling and fat stranding in the soft tissues of the right shoulder.  May  represent infection, inflammation or changes from recent right shoulder aspiration.  See dedicated MRI report.      Prevertebral soft tissue density mass as described in detail above.  Abscess formation is a diagnostic consideration.  See dedicated MRI thoracic spine report for further details.      No acute pulmonary abnormality.  No pleural effusion is seen.      Electronically signed by resident: Bernard Szymanski   Date:    04/22/2024   Time:    09:10      Electronically signed by: Be Ji   Date:    04/22/2024   Time:    10:11      MRI Shoulder W WO Contrast Right   Final Result      1. Acromioclavicular joint effusion with adjacent signal change of the distal clavicle and the acromion with surrounding soft tissue edema.  Findings are concerning for AC joint septic arthritis and osteomyelitis.   2. Moderate glenohumeral joint effusion and synovitis with subacromial subdeltoid bursitis.  Consider fluid sampling.   3. Cellulitis of the shoulder with myositis of the deltoid and trapezius musculature.   4. Mild posterior subluxation of the humeral head with respect to the glenoid, likely a result of the joint effusion.   5. Low-grade partial-thickness articular sided tear of the conjoined tendon at the footprint.  Background tendinosis of the supraspinatus and infraspinatus.   6. Tenosynovitis of the long head biceps tendon.         Electronically signed by: Erich Yarbrough   Date:    04/19/2024   Time:    23:42      MRI Spine Cervical-Thoracic-Lumbar W W/O Contrast (XPD)   Final Result   Abnormal      1. Abnormal prevertebral/paravertebral signal and enhancement spanning from approximately the T5 through T11 levels with appearance most suggestive for prevertebral abscess centered at approximately the T7-8 level.  Minimal osseous edema is seen at this level with no definite abnormal signal or enhancement within the intervertebral disc space.  Appearance is most suggestive for abscess with no definite epidural  extension or epidural abscess seen.   2. Multilevel cervical spondylosis as detailed above.   3. No acute lumbar spine abnormalities identified.   This report was flagged in Epic as abnormal.         Electronically signed by: Lizette Gill MD   Date:    04/18/2024   Time:    19:30      MRI Lumbar Spine Without Contrast   Final Result      Incomplete exam with suboptimal diagnostic assessment.  Limited findings as above.  Repeat exam with adequate pain control can be obtained as warranted.         Electronically signed by: Steven Valderrama   Date:    04/16/2024   Time:    17:40      CT Shoulder With Contrast Right   Final Result      Soft tissue edema in the muscles and subcutaneous fat surrounding the right shoulder with probable small joint effusion.  Findings could be related to cellulitis and/or myositis in this patient with elevated CRP.  No focal osseous erosion.  Consider correlation with arthrocentesis if there is concern for septic joint.      Nonspecific incompletely imaged soft tissue edema and paraspinal soft tissue fullness in the posterior mediastinum along the lower thoracic spine at roughly T8-T10.  Unclear if these findings are exaggerated by wall thickening of the esophagus.  Suggest correlation with symptoms and consider further evaluation with spine MRI if the patient is experiencing significant back pain or concern for discitis/osteomyelitis.      Other findings discussed in the body of the report.         Electronically signed by: Franklin Patricia MD   Date:    04/15/2024   Time:    12:39      X-ray Shoulder 2 or More Views Right   Final Result      As above.         Electronically signed by: Steven Valderrama   Date:    04/15/2024   Time:    08:08

## 2024-04-22 NOTE — ASSESSMENT & PLAN NOTE
Shahida Ortega is a 53 y.o. right-hand dominant female with T2DM and HTN presenting with pain and decreased range of motion of the right shoulder.  Prior to arrival to [facility], the patient was hospitalized at OSH where blood cultures were positive for group B Streptococcus.  She was found to have a prevertebral abscess at T5-T11 and was transferred to [facility] for surgical intervention by Cardiothoracic surgery.  The right shoulder was aspirated by Orthopedic surgery at OSH but there was not enough fluid for cell count.  This aspirate was sent for analysis and the cultures have been no growth to date.  Clinical and MRI findings as well as history of present illness with recent septicemia and known T5-T11 prevertebral abscess are concerning for septic arthritis of the right shoulder and we will plan to treat it as such.    - NPO midnight   - WBAT right shoulder  - Agree with continued broad spectrum IV abx   - Will discuss surgical timing with staff

## 2024-04-22 NOTE — CONSULTS
[facility] ([place] [address])  Orthopedics  Consult Note    Patient Name: Shahida Ortega  MRN: [id number]  Admission Date: 4/15/2024  Hospital Length of Stay: 7 days  Attending Provider: Jessica Grey MD  Primary Care Provider: Janusz Staton MD    Inpatient consult to Orthopedics  Consult performed by: PALOMA Greco MD  Consult ordered by: Neisha Newberry PA-C        Subjective:     Principal Problem:Sepsis    Chief Complaint:   Chief Complaint   Patient presents with    Arm Pain     Was given muscle relaxer for back on Friday but continues with right shoulder pain & reddness/heat to right shoulder. States she can't move the arm 2/2 pain.         HPI: Shahida Ortega is a 53 y.o. RHD female with a PMHx of T2DM and HTN who presents as a transfer from [facility] with a prevertebral abscess from T5-T11 and right shoulder pain and decreased range of motion.  The patient was admitted to [facility] on 04/15/2024 with acute onset back pain and right shoulder pain.  At that time, an aspiration of the right shoulder was attempted by Orthopedic surgery attendings at [facility].  This was a dry tap in the glenohumeral joint and the subacromial bursa was aspirated.  There was not enough fluid for cell count; however, the fluid from the subacromial bursa was sent for culture.  At that time, the patient was also growing group B Streptococcus from her blood cultures.  She was admitted to [facility] and placed on broad-spectrum IV antibiotics.  During her stay there, her right shoulder pain improved as did her range of motion.  Cultures from the aspirate of the right shoulder subacromial bursa have been no growth to date.  Once found to have a T5-T11 prevertebral abscess, she was transferred to [facility] for surgical intervention by Cardiothoracic surgery.  She was scheduled to undergo VATS and chest tube placement by CTS for evacuation of this abscess.  On arrival to [facility], she reports that her right shoulder pain is markedly improved  from prior.  She states that when she first went to the hospital at [facility], her shoulder pain was 10/10 and is now 1 to 2/10 at rest.    Nonsmoker.    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    No history of IV drug use.    A history of cancer, chemotherapy, or radiation.    Lives with her mother and sister.        Past Medical History:   Diagnosis Date    DM2 (diabetes mellitus, type 2) 05/28/2023    Hypertension     Prolonged Q-T interval on ECG 05/31/2023    Qtc 525 5/31/23       Past Surgical History:   Procedure Laterality Date    ABDOMINAL SURGERY      COLONOSCOPY N/A 06/01/2023    Procedure: COLONOSCOPY;  Surgeon: Thaddeus Carlos MD;  Location: [facility] ENDO;  Service: Endoscopy;  Laterality: N/A;    DIAGNOSTIC LAPAROSCOPY N/A 05/26/2019    Procedure: LAPAROSCOPY, DIAGNOSTIC Drainage of abscess ;  Surgeon: Jose Luis Hanson MD;  Location: [facility] OR;  Service: General;  Laterality: N/A;  Drain Placement    DIAGNOSTIC LAPAROSCOPY N/A 12/27/2020    Procedure: Diagnostic laparoscopy, drainage of intra-abdominal abscess;  Surgeon: Bhupendra Banda MD;  Location: [facility] OR;  Service: General;  Laterality: N/A;    LAPAROSCOPIC LYSIS OF ADHESIONS  05/26/2019    Procedure: LYSIS, ADHESIONS, LAPAROSCOPIC;  Surgeon: Jose Luis Hanson MD;  Location: [facility] OR;  Service: General;;       Review of patient's allergies indicates:   Allergen Reactions    Penicillins Hives     Tolerated cephalosporins 4/2024    Amoxicillin     Grass pollen-june grass standard Other (See Comments)       Current Facility-Administered Medications   Medication Dose Route Frequency Provider Last Rate Last Admin    acetaminophen tablet 650 mg  650 mg Oral Q8H PRN Juan Cameron III, MD   650 mg at 04/18/24 2038    acetaminophen tablet 650 mg  650 mg Oral Q4H PRN Juan Cameron III, MD   650 mg at 04/17/24 0544    amLODIPine tablet 10 mg  10 mg Oral Daily Nima Pompa MD   10 mg at 04/21/24 0811    benzonatate capsule 100  mg  100 mg Oral TID PRN Sahara Figueroa PA-C   100 mg at 04/18/24 2348    carvediloL tablet 6.25 mg  6.25 mg Oral BID Jaun Cameron III, MD   6.25 mg at 04/21/24 2106    cefTRIAXone (ROCEPHIN) 2 g in dextrose 5 % in water (D5W) 100 mL IVPB (MB+)  2 g Intravenous Q24H Marilee Begum MD   Stopped at 04/21/24 2137    dextrose 10% bolus 125 mL 125 mL  12.5 g Intravenous PRN Juan Cameron III, MD        dextrose 10% bolus 250 mL 250 mL  25 g Intravenous PRN Juan Cameron III, MD        glucagon (human recombinant) injection 1 mg  1 mg Intramuscular PRN Juan Cameron III, MD        glucose chewable tablet 16 g  16 g Oral PRN Juan Cameron III, MD        glucose chewable tablet 24 g  24 g Oral PRN Juan Cameron III, MD        insulin aspart U-100 pen 0-5 Units  0-5 Units Subcutaneous QID (AC + HS) PRN Juan Cameron III, MD   2 Units at 04/20/24 1633    insulin aspart U-100 pen 8 Units  8 Units Subcutaneous TIDWM Nima Pompa MD   8 Units at 04/21/24 1644    insulin detemir U-100 (Levemir) pen 20 Units  20 Units Subcutaneous QHS Nima Pompa MD   20 Units at 04/21/24 2128    losartan tablet 100 mg  100 mg Oral Daily Juan Cameron III, MD   100 mg at 04/21/24 0811    naloxone 0.4 mg/mL injection 0.02 mg  0.02 mg Intravenous PRN Juan Cameron III, MD        oxyCODONE immediate release tablet 5 mg  5 mg Oral Q6H PRN Juan Cameron III, MD   5 mg at 04/21/24 2010    polyethylene glycol packet 17 g  17 g Oral Daily Juan Cameron III, MD   17 g at 04/21/24 0811    senna-docusate 8.6-50 mg per tablet 1 tablet  1 tablet Oral BID Juan Cameron III, MD   1 tablet at 04/21/24 2106    sodium chloride 0.9% flush 10 mL  10 mL Intravenous PRN Juan Cameron III, MD        sodium chloride 0.9% flush 10 mL  10 mL Intravenous Q6H Nima Pompa MD   10 mL at 04/22/24 0000    And    sodium chloride 0.9% flush 10 mL  10 mL Intravenous PRN Nima Pompa,  "MD         Family History       Problem Relation (Age of Onset)    Diabetes Father          Tobacco Use    Smoking status: Never    Smokeless tobacco: Never   Substance and Sexual Activity    Alcohol use: Not Currently    Drug use: Not Currently    Sexual activity: Not Currently     ROS  Constitutional: negative for fevers or chills  Eyes: negative visual changes or eye discharge  ENT: negative for ear pain or sore throat  Respiratory: negative for shortness of breath or cough  Cardiovascular: negative for chest pain or palpitations  Gastrointestinal: negative for abdominal pain, nausea, or vomiting  Genitourinary: negative for dysuria and flank pain  Neurological: negative for headaches or dizziness  Musculoskeletal: positive for right shoulder pain and decreased range of motion   Objective:     Vital Signs (Most Recent):  Temp: 99 °F (37.2 °C) (04/22/24 0742)  Pulse: 86 (04/22/24 0742)  Resp: 18 (04/22/24 0742)  BP: (!) 142/91 (04/22/24 0742)  SpO2: 95 % (04/22/24 0742) Vital Signs (24h Range):  Temp:  [97.6 °F (36.4 °C)-99 °F (37.2 °C)] 99 °F (37.2 °C)  Pulse:  [77-93] 86  Resp:  [16-19] 18  SpO2:  [95 %-100 %] 95 %  BP: (110-142)/(63-91) 142/91     Weight: 95.4 kg (210 lb 5.1 oz)  Height: 5' 7" (170.2 cm)  Body mass index is 32.94 kg/m².      Intake/Output Summary (Last 24 hours) at 4/22/2024 0745  Last data filed at 4/22/2024 0618  Gross per 24 hour   Intake 600 ml   Output 6 ml   Net 594 ml        Ortho/SPM Exam  General:  no acute distress, appears stated age   Neuro: alert and oriented x3  Psych: normal mood  Head: normocephalic, atraumatic.  Eyes: no scleral icterus  Mouth: moist mucous membranes  CV: extremities warm and well perfused  Pulm: breathing comfortably, equal chest rise bilat  Skin: clean, dry, intact (any exceptions noted in below musculoskeletal exam)    MSK:    RUE:  - Skin intact throughout, no open wounds  - Swelling from the mid-humerus down   - The right shoulder slightly warm to the " touch as compared to the contralateral side.  - Mildly TTP over the AC joint, bicipital groove; otherwise minimally tender to palpation about the shoulder  - AROM of the shoulder is limited secondary to pain and stiffness.  - PROM of the shoulder to 100° of abduction and forward flexion.  Pain is elicited at approximately 70-80 degrees of abduction and forward flexion.  - AROM and PROM of the elbow, wrist, and hand intact without pain  - Axillary/AIN/PIN/Radial/Median/Ulnar Nerves assessed in isolation without deficit  - SILT throughout  - Compartments soft  - Radial artery palpated   - Capillary Refill <3s    LUE:  - Skin intact throughout, no open wounds  - No swelling  - No ecchymosis, erythema, or signs of cellulitis  - NonTTP throughout  - AROM and PROM of the shoulder, elbow, wrist, and hand intact without pain  - Axillary/AIN/PIN/Radial/Median/Ulnar Nerves assessed in isolation without deficit  - SILT throughout  - Compartments soft  - Radial artery palpated   - Capillary Refill <3s    RLE:  - No ecchymosis, erythema, or signs of cellulitis  - NonTTP throughout  - AROM and PROM of the hip, knee, ankle, and foot intact without pain  - TA/EHL/Gastroc/FHL assessed in isolation without deficit  - SILT throughout  - Compartments soft  - DP and PT palpated  - Capillary Refill <3s  - Negative Log roll    LLE:  - No ecchymosis, erythema, or signs of cellulitis  - NonTTP throughout  - AROM and PROM of the hip, knee, ankle, and foot intact without pain  - TA/EHL/Gastroc/FHL assessed in isolation without deficit  - SILT throughout  - Compartments soft  - DP and PT palpated  - Capillary Refill <3s  - Negative Log roll     Significant Labs: All pertinent labs within the past 24 hours have been reviewed.    Significant Imaging: X-Ray: I have reviewed all pertinent results/findings and my personal findings are:  There is no fracture or dislocation.  There is mild inferior subluxation of the humeral head in the glenoid  fossa.  MRI: I have reviewed all pertinent results/findings and my personal findings are:  There is a glenohumeral joint effusion most pronounced anteriorly.  There is fluid surrounding the acromioclavicular joint.  The humeral head appears to be subluxed posteroinferiorly in the glenoid fossa.  Assessment/Plan:     Septic arthritis of shoulder, right  Shahida Ortega is a 53 y.o. right-hand dominant female with T2DM and HTN presenting with pain and decreased range of motion of the right shoulder.  Prior to arrival to [facility], the patient was hospitalized at OSH where blood cultures were positive for group B Streptococcus.  She was found to have a prevertebral abscess at T5-T11 and was transferred to [facility] for surgical intervention by Cardiothoracic surgery.  The right shoulder was aspirated by Orthopedic surgery at OSH but there was not enough fluid for cell count.  This aspirate was sent for analysis and the cultures have been no growth to date.  Clinical and MRI findings as well as history of present illness with recent septicemia and known T5-T11 prevertebral abscess are concerning for septic arthritis of the right shoulder and we will plan to treat it as such.    - NPO midnight   - WBAT right shoulder  - Agree with continued broad spectrum IV abx   - Will discuss surgical timing with staff      BERLIN Greco MD  Orthopedics  [facility] ([place]

## 2024-04-22 NOTE — ASSESSMENT & PLAN NOTE
Body mass index is 32.94 kg/m². Morbid obesity complicates all aspects of disease management from diagnostic modalities to treatment. Weight loss encouraged and health benefits explained to patient.

## 2024-04-22 NOTE — ASSESSMENT & PLAN NOTE
Blood cx 4/15 positive for GBS  Blood cx 4/17 NGTD  Likely seeding from septic right shoulder and paravertebral abscesses  2D echo negative for vegetations  Will discuss with ID if CURTIS is needed, yet given quick clearance of blood cultures might not be required  ID following:  Continue Ceftriaxone

## 2024-04-22 NOTE — ASSESSMENT & PLAN NOTE
T5-T11 preverbal abscess  R septic shoulder    -OSH course notable for MRI/CT of R shoulder noted soft tissue edema/joint effusion concerning for septic arthritis and osteomyelitis. MRI imaging also with concern for T5-T11 preverterbral abscess.   -Evaluated by OSH ortho - dry tap, though was able to aspirate small amount of blood that was sent for cx that have been no growth to date (not enough to send for fluid studies). Blcx on admit with GBS. She was started on vanc/cefepime. After initial ID evaluation at OSH, Cefepime/Vancomycin were deescalated to Ceftriaxone due to NGTD on cultures.  She was transferred to [facility] for further surgical evaluation. Orthopedic surgery was consulted for concerns of septic arthritis and CTS consulted for VATS evaluation.     Of note, pt had had urine cx obtained 1/2024 that was positive for MSSA and GBS, s/p nitrofurantoin. Also had Ecoli bacteremia in 5/2023, tx with IV and transitioned to cefdinir.        Recommendations:  -CURTIS for further source evaluation  -Ortho planning for potential intervention. Followup recs  -CTS recs pending. Per initial discussions, no intervention indicated at this time after risk-benefit evaluation. Followup formal recs.   --Continue ceftriaxone 2g iv daily. Anticipate prolonged course of abx therapy given spinal/shoulder infection. Atleast 4 weeks antibiotics course expected. OPAT TBD

## 2024-04-22 NOTE — ASSESSMENT & PLAN NOTE
Chronic and stable  Continue Norvasc 10mg daily  Continue Coreg 6.25mg BID  Continue Losartan 100mg daily

## 2024-04-22 NOTE — ASSESSMENT & PLAN NOTE
MRI C/T/L-spine 4/18 revealed T5-T11 prevertebral abscess.    [facility] Neurosurgery consulted.  Recommend CTS evaluation  CTS Dr. Newby agreed to consult for possible VATS  CT chest 4/22 pending  NPO at midnight

## 2024-04-22 NOTE — NURSING
[facility]  Nurses Note -- 4 Eyes      4/21/2024       Skin assessed on: Q Shift      [x] No Pressure Injuries Present    []Prevention Measures Documented    [] Yes LDA  for Pressure Injury Previously documented     [] Yes New Pressure Injury Discovered   [] LDA for New Pressure Injury Added      Attending RN:  Maya Farah LPN     Second RN:  Ellyn Nayak RN

## 2024-04-22 NOTE — PT/OT/SLP PROGRESS
Occupational Therapy   Treatment and Discharge     Name: Shahida Ortega  MRN: [id number]  Admitting Diagnosis:  Sepsis  * Surgery Date in Future *    Recommendations:     Discharge Recommendations: Low Intensity Therapy  Discharge Equipment Recommendations:   (None anticipated.)  Barriers to discharge:  None    Assessment:   Pt. Found to be at PLOF demos modified independence to independence with donning underwear/socks, donning [facility] gown as would robe, standing grooming/hygiene, and toilet transfer this day.  Pt. Reports no need to toilet at this time though Pt. And RN both report current independence in toilet this day.  Participated in BUE exercises with handout administered for AROM exercises and AAROM for shoulder flex/ext at tolerable range; Pt. Able to perform 2nd set of exercises without cuing.  No acute care OT needs anticipated.  Continued recommendation of post acute Low Intensity therapy.  To discontinue OT.  Please re-consult if needed.      Shahdia Ortega is a 53 y.o. female with a medical diagnosis of Sepsis.  She presents with below deficits though able to perform ADL tasks independently. Performance deficits affecting function are pain, decreased ROM, decreased upper extremity function, edema.     Rehab Prognosis:  Good; patient would benefit from acute skilled OT services to address these deficits and reach maximum level of function.       Plan:     Patient has met OT goals.  No acute OT needs anticipated.    Plan of Care Expires: 05/01/24  Plan of Care Reviewed with: patient    Subjective     Chief Complaint: With c/o RUE shoulder pain.   Patient/Family Comments/goals: No goals stated at this time.  Pt. Expressed feeling much better since initial evaluation.   Pain/Comfort:  Pain Rating 1: 5/10  Location - Side 1: Right  Location - Orientation 1: generalized  Location 1: shoulder  Pain Addressed 1: Distraction, Cessation of Activity, Nurse notified, Reposition  Pain Rating Post-Intervention 1:  5/10    Objective:     Communicated with: Stacey SOTELO LPN prior to session.  Patient found up in chair with telemetry, peripheral IV upon OT entry to room.    General Precautions: Standard, diabetic, aspiration (strict I&O's)    Orthopedic Precautions:N/A  Braces:  (RUE sling for comfort as needed)  Respiratory Status: Room air     Occupational Performance:     Functional Mobility/Transfers:  Sit>stand from bedside chair and ambulating bedside chair<>sofa>bathroom with return to bedside chair without AD and independently with no LOB noted.  Also performed step transfer to standard toilet with MOD I placing LUE on sink for pushing into stance.     Activities of Daily Living:  Pt. Able retrieve clothing items placed in room this day.  Donned hospital gown in stance as would robe with no LOB and good carryover of threading weaker UE first independently.  Donned socks seated at bedside chair via cross leg tech independently and donned mesh underwear threading in stance with unilateral UE support and downward reaching and then pulling clothing to waist in stance without LOB; overall independent with LB dressing this day.  Denied need to toilet at this time.         [facility] 6 Click ADL: 24    Treatment & Education:  Sit>stand from bedside chair and ambulating bedside chair<>sofa>bathroom with return to bedside chair without AD and independently with no LOB noted.  Also performed step transfer to standard toilet with MOD I placing LUE on sink for pushing into stance.   Pt. Able retrieve clothing items placed in room this day.  Donned hospital gown in stance as would robe with no LOB and good carryover of threading weaker UE first independently.  Donned socks seated at bedside chair via cross leg tech independently and donned mesh underwear threading in stance with unilateral UE support and downward reaching and then pulling clothing to waist in stance without LOB; overall independent with LB dressing this day.  Denied need to  toilet at this time.     Participated in BUE exercises for shoulder elevation/depression, scapular retraction/relaxation, elbow flex/ext, forearm pronation/supination, and composite finger flex/ext X 10 each with verbal instruction/demos for proper form; Pt. Able to perform 2nd set of 10 without cuing.  Participated in BUE shoulder flex/ext 10 X 2requiring AAROM at RUE with wrist/elbow supported; able to tolerate RUE flexion to approx. 50% of range.  Pt. Administered handout for exercises and verbalized and demos understanding of exercises.  Discussed nursing staff and family assisting with shoulder flex/ext with LPN made aware.      Patient left up in chair with all lines intact, call button in reach, and nursing notified    GOALS:   Multidisciplinary Problems       Occupational Therapy Goals       Not on file              Multidisciplinary Problems (Resolved)          Problem: Occupational Therapy    Goal Priority Disciplines Outcome Interventions   Occupational Therapy Goal   (Resolved)     OT, PT/OT Met    Description: Goals to be met by: 5/1/2024     Patient will increase functional independence with ADLs by performing:    UE Dressing with Modified [place].  GOAL MET 4/22/2024.  LE Dressing with Modified [place].  GOAL MET 4/22/2024.  Grooming while standing at sink with Modified [place].  GOAL MET 4/22/2024.  Toilet transfer to toilet with Modified [place].  GOAL MET 4/22/2024.  Toileting from toilet with Modified [place] for hygiene and clothing management.  GOAL MET per Patient and RN reporting current independence in task.                            Time Tracking:     OT Date of Treatment: 04/22/24  OT Start Time: 1111  OT Stop Time: 1138  OT Total Time (min): 27 min    Billable Minutes:Self Care/Home Management 15  Therapeutic Activity 12    OT/KAY: OT          4/22/2024

## 2024-04-22 NOTE — ASSESSMENT & PLAN NOTE
53 y.o. with history of T2DM and HTN with prevertebral abscess noted on MRI spine. Thoracic Surgery consulted for evaluation for VATS for drainage of abscess.     CT Chest obtained (4/22/2024) which reveals prevertebral soft tissue density measuring 4x1cm.     Given that leukocytosis has resolved and collection is very small on CT, do not recommend surgical intervention at this time    - No plan for surgical intervention  - Recommend CURTIS to assess for infective endocarditis given multiple abscesses in the last couple years  - Rest of care per primary

## 2024-04-23 ENCOUNTER — ANESTHESIA EVENT (OUTPATIENT)
Dept: MEDSURG UNIT | Facility: HOSPITAL | Age: 54
DRG: 853 | End: 2024-04-23
Payer: COMMERCIAL

## 2024-04-23 LAB
POCT GLUCOSE: 111 MG/DL (ref 70–110)
POCT GLUCOSE: 154 MG/DL (ref 70–110)
POCT GLUCOSE: 165 MG/DL (ref 70–110)
POCT GLUCOSE: 203 MG/DL (ref 70–110)

## 2024-04-23 PROCEDURE — 25000003 PHARM REV CODE 250: Performed by: HOSPITALIST

## 2024-04-23 PROCEDURE — A4216 STERILE WATER/SALINE, 10 ML: HCPCS | Performed by: STUDENT IN AN ORGANIZED HEALTH CARE EDUCATION/TRAINING PROGRAM

## 2024-04-23 PROCEDURE — 99499 UNLISTED E&M SERVICE: CPT | Mod: ,,, | Performed by: STUDENT IN AN ORGANIZED HEALTH CARE EDUCATION/TRAINING PROGRAM

## 2024-04-23 PROCEDURE — 21400001 HC TELEMETRY ROOM

## 2024-04-23 PROCEDURE — 63600175 PHARM REV CODE 636 W HCPCS: Performed by: STUDENT IN AN ORGANIZED HEALTH CARE EDUCATION/TRAINING PROGRAM

## 2024-04-23 PROCEDURE — 25000003 PHARM REV CODE 250: Performed by: STUDENT IN AN ORGANIZED HEALTH CARE EDUCATION/TRAINING PROGRAM

## 2024-04-23 PROCEDURE — 99233 SBSQ HOSP IP/OBS HIGH 50: CPT | Mod: ,,, | Performed by: INTERNAL MEDICINE

## 2024-04-23 RX ADMIN — OXYCODONE 5 MG: 5 TABLET ORAL at 11:04

## 2024-04-23 RX ADMIN — CARVEDILOL 6.25 MG: 6.25 TABLET, FILM COATED ORAL at 08:04

## 2024-04-23 RX ADMIN — Medication 10 ML: at 07:04

## 2024-04-23 RX ADMIN — INSULIN ASPART 8 UNITS: 100 INJECTION, SOLUTION INTRAVENOUS; SUBCUTANEOUS at 05:04

## 2024-04-23 RX ADMIN — INSULIN DETEMIR 20 UNITS: 100 INJECTION, SOLUTION SUBCUTANEOUS at 11:04

## 2024-04-23 RX ADMIN — Medication 10 ML: at 05:04

## 2024-04-23 RX ADMIN — AMLODIPINE BESYLATE 10 MG: 10 TABLET ORAL at 08:04

## 2024-04-23 RX ADMIN — CARVEDILOL 6.25 MG: 6.25 TABLET, FILM COATED ORAL at 11:04

## 2024-04-23 RX ADMIN — Medication 10 ML: at 12:04

## 2024-04-23 RX ADMIN — LOSARTAN POTASSIUM 100 MG: 50 TABLET, FILM COATED ORAL at 08:04

## 2024-04-23 RX ADMIN — INSULIN ASPART 2 UNITS: 100 INJECTION, SOLUTION INTRAVENOUS; SUBCUTANEOUS at 05:04

## 2024-04-23 RX ADMIN — Medication 10 ML: at 11:04

## 2024-04-23 RX ADMIN — CEFTRIAXONE 2 G: 2 INJECTION, POWDER, FOR SOLUTION INTRAMUSCULAR; INTRAVENOUS at 06:04

## 2024-04-23 NOTE — ASSESSMENT & PLAN NOTE
Blood cx 4/15 positive for GBS  Blood cx 4/17 NGTD  Likely seeding from septic right shoulder and paravertebral abscesses  2D echo negative for vegetations  Plan for CURTIS today  ID following:  Continue Ceftriaxone

## 2024-04-23 NOTE — PLAN OF CARE
[facility] - Med Surg ([facility])  Discharge Reassessment    Primary Care Provider: Janusz Staton MD    Expected Discharge Date: 4/26/2024    Per MD: Plan for CURTIS today then further plan pending Ortho and ID recs.     Patient not medically ready for discharge.    Reassessment (most recent)       Discharge Reassessment - 04/23/24 1603          Discharge Reassessment    Assessment Type Discharge Planning Assessment     Did the patient's condition or plan change since previous assessment? No     Discharge Plan discussed with: Patient     Communicated NAYELI with patient/caregiver Date not available/Unable to determine     Discharge Plan A Home     Discharge Plan B Home with family     Why the patient remains in the hospital Requires continued medical care        Post-Acute Status    Coverage Payor: [employer] - [facility] -     Discharge Delays None known at this time                   HATTIE Wooten  [facility] CM  [phone]

## 2024-04-23 NOTE — ASSESSMENT & PLAN NOTE
Patient's FSGs are uncontrolled due to hyperglycemia on current medication regimen.  Last A1c reviewed-   Lab Results   Component Value Date    HGBA1C 11.3 (H) 03/16/2022     Most recent fingerstick glucose reviewed-   Recent Labs   Lab 04/22/24  1531 04/22/24 2013 04/23/24  0739 04/23/24  1134   POCTGLUCOSE 152* 171* 165* 111*       Current correctional scale  Low  Maintain anti-hyperglycemic dose as follows-   Antihyperglycemics (From admission, onward)    Start     Stop Route Frequency Ordered    04/18/24 2100  insulin detemir U-100 (Levemir) pen 20 Units         -- SubQ Nightly 04/18/24 1355    04/18/24 1645  insulin aspart U-100 pen 8 Units         -- SubQ 3 times daily with meals 04/18/24 1355    04/15/24 1504  insulin aspart U-100 pen 0-5 Units         -- SubQ Before meals & nightly PRN 04/15/24 1404        Hold Oral hypoglycemics while patient is in the hospital.

## 2024-04-23 NOTE — ASSESSMENT & PLAN NOTE
MRI C/T/L-spine 4/18 revealed T5-T11 prevertebral abscess.    [facility] Neurosurgery consulted.  Recommend CTS evaluation  CTS Dr. Newby agreed to consult for possible VATS.  Per CTS, as leukocytosis has resolved and collection is very small on CT, do not recommend surgical intervention at this time

## 2024-04-23 NOTE — PROGRESS NOTES
[facility] ([place] [id number])  Infectious Disease  Progress Note    Patient Name: Shahida Ortega  MRN: [id number]  Admission Date: 4/15/2024  Length of Stay: 8 days  Attending Physician: Jessica Grey MD  Primary Care Provider: Janusz Staton MD    Isolation Status: No active isolations  Assessment/Plan:      ID  Bacteremia due to group B Streptococcus  T5-T11 preverbal abscess  R septic shoulder    -OSH course notable for MRI/CT of R shoulder noted soft tissue edema/joint effusion concerning for septic arthritis and osteomyelitis. MRI imaging also with concern for T5-T11 preverterbral abscess.   -Evaluated by OSH ortho - dry tap, though was able to aspirate small amount of blood that was sent for cx that have been no growth to date (not enough to send for fluid studies). Blcx on admit with GBS. She was started on vanc/cefepime. After initial ID evaluation at OSH, Cefepime/Vancomycin were deescalated to Ceftriaxone due to NGTD on cultures.  [place] [facility] [place] Orthopedic surgery was consulted for concerns of septic arthritis and CTS consulted for VATS evaluation.     Of note, pt had had urine cx obtained 1/2024 that was positive for MSSA and GBS, s/p nitrofurantoin. Also had Ecoli bacteremia in 5/2023, tx with IV and transitioned to cefdinir.     -Thoracic surgery eval 04/22 noted no intervention indicated at the time      Recommendations:  -CURTIS for further source evaluation  -Ortho planning for potential intervention. Followup recs  -Continue ceftriaxone 2g iv daily. Anticipate prolonged course of abx therapy given spinal/shoulder infection. Atleast 6 weeks antibiotics course expected. OPAT TBD            Thank you for your consult. I will follow-up with patient. Please contact us if you have any additional questions.    Aamir Ayala, DO  Infectious Disease  [facility] ([facility]    Subjective:     Principal Problem:Sepsis    HPI: 52 yo female with  DM/HTN who was initially  admitted to [facility] for R shoulder pain with associated redness, and found to have GBS bacteremia. ID consulted for bacteremia. OSH course notable for MRI/CT of R shoulder noted soft tissue edema/joint effusion concerning for septic arthritis and osteomyelitis. MRI imaging also with concern for T5-T11 preverterbral abscess. She was evaluated by OSH ortho - dry tap, though was able to aspirate small amount of blood that was sent for cx that have been no growth to date (not enough to send for fluid studies). Blcx on admit with GBS. She was started on vanc/cefepime. Upon initial ID evaluation at OSH, patient denied toxic habits, reported rash with PCN, or trauma/travel/TB exposure. She denied abdominal/respiratory/dental or  symptoms prior to admission, main complaint was back pain and R shoulder pain. After initial ID evaluation at OSH, Cefepime/Vancomycin were deescalated to Ceftriaxone due to NGTD on cultures.  [place] [facility] [place]. Orthopedic surgery was consulted for concerns of septic arthritis and CTS consulted for VATS evaluation.    Of note, pt had had urine cx obtained 1/2024 that was positive for MSSA and GBS, s/p nitrofurantoin. Also had Ecoli bacteremia in 5/2023, tx with IV and transitioned to cefdinir.        Interval History: No acute events overnight, afebrile, hemodynamically stable.         Objective:     Vital Signs (Most Recent):  Temp: 98 °F (36.7 °C) (04/23/24 1134)  Pulse: 86 (04/23/24 1501)  Resp: 16 (04/23/24 1134)  BP: 135/76 (04/23/24 1134)  SpO2: 98 % (04/23/24 1134) Vital Signs (24h Range):  Temp:  [98 °F (36.7 °C)-98.8 °F (37.1 °C)] 98 °F (36.7 °C)  Pulse:  [72-86] 86  Resp:  [16-20] 16  SpO2:  [94 %-100 %] 98 %  BP: (122-155)/(60-78) 135/76     Weight: 95.4 kg (210 lb 5.1 oz)  Body mass index is 32.94 kg/m².    Estimated Creatinine Clearance: 131.5 mL/min (based on SCr of 0.6 mg/dL).     Physical Exam  Vitals and  nursing note reviewed.   Constitutional:       General: She is not in acute distress.     Appearance: She is not ill-appearing, toxic-appearing or diaphoretic.   HENT:      Head: Normocephalic and atraumatic.      Mouth/Throat:      Mouth: Mucous membranes are moist.   Eyes:      General: No scleral icterus.        Right eye: No discharge.         Left eye: No discharge.   Cardiovascular:      Heart sounds: Normal heart sounds. No murmur heard.  Pulmonary:      Effort: Pulmonary effort is normal. No respiratory distress.      Breath sounds: Normal breath sounds. No wheezing, rhonchi or rales.   Abdominal:      General: Bowel sounds are normal.      Palpations: Abdomen is soft.      Tenderness: There is no abdominal tenderness. There is no guarding or rebound.   Musculoskeletal:      Right shoulder: No bony tenderness. Decreased range of motion.      Cervical back: No rigidity.      Right lower leg: No edema.      Left lower leg: No edema.   Skin:     General: Skin is warm and dry.      Coloration: Skin is not jaundiced.   Neurological:      Mental Status: She is oriented to person, place, and time. Mental status is at baseline.   Psychiatric:         Mood and Affect: Mood normal.         Behavior: Behavior normal.          Significant Labs: All pertinent labs within the past 24 hours have been reviewed.  LABS:  Recent Labs   Lab 04/19/24  0411 04/21/24  0524 04/22/24  0219   * 136 136   K 4.5 3.6 3.6   CL 98 101 100   CO2 23 27 28   BUN 8 10 9   CREATININE 0.5 0.6 0.6   * 165* 131*   ANIONGAP 14 8 8     Recent Labs   Lab 04/17/24  0651 04/18/24  0400   MG 1.8 1.7   PHOS 3.0 2.8     Recent Labs   Lab 04/18/24  0400 04/21/24  0524 04/22/24  0219   AST 13 14 16   ALT 13 11 13   ALKPHOS 101 72 76   BILITOT 0.4 0.2 0.2   ALBUMIN 1.7* 1.8* 1.9*     POCT Glucose:   Recent Labs   Lab 04/22/24 2013 04/23/24  0739 04/23/24  1134   POCTGLUCOSE 171* 165* 111*    Recent Labs   Lab 04/19/24  0810 04/21/24  0525  04/22/24  0219   WBC 8.03 7.33 6.82   HGB 11.3* 9.8* 9.8*   HCT 36.2* 31.2* 30.9*    237 262   GRAN 57.0 56.2  4.1 54.6  3.7        Micro  Blood Cultures  Lab Results   Component Value Date    LABBLOO No Growth after 4 days. 04/17/2024    LABBLOO No Growth after 4 days. 04/17/2024    LABBLOO No Growth after 4 days. 04/15/2024    LABBLOO No Growth after 4 days. 04/15/2024    LABBLOO  04/15/2024     Gram stain cameron bottle: Gram positive cocci in chains resembling Strep    LABBLOO  04/15/2024     Results called to and read back by:Lizz Wells  04/16/2024  08:12    LABBLOO (A) 04/15/2024     STREPTOCOCCUS AGALACTIAE (GROUP B)  Beta-hemolytic streptococci are routinely susceptible to   penicillins,cephalosporins and carbapenems.  For susceptibility see order #[id number]       Urine Cultures  Lab Results   Component Value Date    LABURIN (A) 01/08/2024     STREPTOCOCCUS AGALACTIAE (GROUP B)  > 100,000 cfu/ml  In case of Penicillin allergy, call lab for further testing.  Beta-hemolytic streptococci are routinely susceptible to   penicillins,cephalosporins and carbapenems.      LABURIN STAPHYLOCOCCUS AUREUS  10,000 - 49,999 cfu/ml   (A) 01/08/2024    LABURIN (A) 05/29/2023     CANDIDA ALBICANS  10,000 - 49,999 cfu/ml  Treatment of asymptomatic candiduria is not recommended (except for   specific populations). Candida isolated in the urine typically   represents colonization. If an indwelling urinary catheter is present  it should be removed or replaced.      LABURIN No significant growth 03/16/2022    LABURIN ESCHERICHIA COLI  50,000 - 99,999 cfu/ml   (A) 09/12/2021         Significant Imaging: I have reviewed all pertinent imaging results/findings within the past 24 hours.  CT Chest Without Contrast   Final Result      Muscular swelling and fat stranding in the soft tissues of the right shoulder.  May represent infection, inflammation or changes from recent right shoulder aspiration.  See dedicated MRI report.       Prevertebral soft tissue density mass as described in detail above.  Abscess formation is a diagnostic consideration.  See dedicated MRI thoracic spine report for further details.      No acute pulmonary abnormality.  No pleural effusion is seen.      Electronically signed by resident: Bernard Szymanski   Date:    04/22/2024   Time:    09:10      Electronically signed by: Be Ji   Date:    04/22/2024   Time:    10:11      MRI Shoulder W WO Contrast Right   Final Result      1. Acromioclavicular joint effusion with adjacent signal change of the distal clavicle and the acromion with surrounding soft tissue edema.  Findings are concerning for AC joint septic arthritis and osteomyelitis.   2. Moderate glenohumeral joint effusion and synovitis with subacromial subdeltoid bursitis.  Consider fluid sampling.   3. Cellulitis of the shoulder with myositis of the deltoid and trapezius musculature.   4. Mild posterior subluxation of the humeral head with respect to the glenoid, likely a result of the joint effusion.   5. Low-grade partial-thickness articular sided tear of the conjoined tendon at the footprint.  Background tendinosis of the supraspinatus and infraspinatus.   6. Tenosynovitis of the long head biceps tendon.         Electronically signed by: Erich Yarbrough   Date:    04/19/2024   Time:    23:42      MRI Spine Cervical-Thoracic-Lumbar W W/O Contrast (XPD)   Final Result   Abnormal      1. Abnormal prevertebral/paravertebral signal and enhancement spanning from approximately the T5 through T11 levels with appearance most suggestive for prevertebral abscess centered at approximately the T7-8 level.  Minimal osseous edema is seen at this level with no definite abnormal signal or enhancement within the intervertebral disc space.  Appearance is most suggestive for abscess with no definite epidural extension or epidural abscess seen.   2. Multilevel cervical spondylosis as detailed above.   3. No acute  lumbar spine abnormalities identified.   This report was flagged in Epic as abnormal.         Electronically signed by: Lizette Gill MD   Date:    04/18/2024   Time:    19:30      MRI Lumbar Spine Without Contrast   Final Result      Incomplete exam with suboptimal diagnostic assessment.  Limited findings as above.  Repeat exam with adequate pain control can be obtained as warranted.         Electronically signed by: Steven Valderrama   Date:    04/16/2024   Time:    17:40      CT Shoulder With Contrast Right   Final Result      Soft tissue edema in the muscles and subcutaneous fat surrounding the right shoulder with probable small joint effusion.  Findings could be related to cellulitis and/or myositis in this patient with elevated CRP.  No focal osseous erosion.  Consider correlation with arthrocentesis if there is concern for septic joint.      Nonspecific incompletely imaged soft tissue edema and paraspinal soft tissue fullness in the posterior mediastinum along the lower thoracic spine at roughly T8-T10.  Unclear if these findings are exaggerated by wall thickening of the esophagus.  Suggest correlation with symptoms and consider further evaluation with spine MRI if the patient is experiencing significant back pain or concern for discitis/osteomyelitis.      Other findings discussed in the body of the report.         Electronically signed by: Franklin Patricia MD   Date:    04/15/2024   Time:    12:39      X-ray Shoulder 2 or More Views Right   Final Result      As above.         Electronically signed by: Steven Valderrama   Date:    04/15/2024   Time:    08:08          Inpatient Medications:  Continuous Infusions:  Current Facility-Administered Medications   Medication Dose Route Frequency Last Rate Last Admin     Scheduled Meds:  Current Facility-Administered Medications   Medication Dose Route Frequency    amLODIPine  10 mg Oral Daily    carvediloL  6.25 mg Oral BID    cefTRIAXone (Rocephin) IV (PEDS and  ADULTS)  2 g Intravenous Q24H    insulin aspart U-100  8 Units Subcutaneous TIDWM    insulin detemir U-100  20 Units Subcutaneous QHS    losartan  100 mg Oral Daily    polyethylene glycol  17 g Oral Daily    senna-docusate 8.6-50 mg  1 tablet Oral BID    sodium chloride 0.9%  10 mL Intravenous Q6H     PRN Meds:  Current Facility-Administered Medications:     acetaminophen, 1,000 mg, Oral, Q8H PRN    benzonatate, 100 mg, Oral, TID PRN    dextrose 10%, 12.5 g, Intravenous, PRN    dextrose 10%, 25 g, Intravenous, PRN    glucagon (human recombinant), 1 mg, Intramuscular, PRN    glucose, 16 g, Oral, PRN    glucose, 24 g, Oral, PRN    insulin aspart U-100, 0-5 Units, Subcutaneous, QID (AC + HS) PRN    morphine, 4 mg, Intravenous, Q4H PRN    naloxone, 0.02 mg, Intravenous, PRN    oxyCODONE, 5 mg, Oral, Q4H PRN    sodium chloride 0.9%, 10 mL, Intravenous, PRN    Flushing PICC/Midline Protocol, , , Until Discontinued **AND** sodium chloride 0.9%, 10 mL, Intravenous, Q6H **AND** sodium chloride 0.9%, 10 mL, Intravenous, PRN

## 2024-04-23 NOTE — ASSESSMENT & PLAN NOTE
CT right shoulder 4/15 showed a small effusion.    Ortho at  was consulted.    Aspiration of the right shoulder 4/15 was dry.  A small amount of blood was aspirated from the subacromial bursa.  Cultures NGTD.    MRI right shoulder 4/19 revealed acromioclavicular joint effusion concerning for AC joint septic arthritis and osteomyelitis, moderate glenohumeral joint effusion and synovitis with subacromial subdeltoid bursitis, as well as cellulitis of the shoulder with myositis of the deltoid and trapezius musculature  Ortho at [facility] [place] consulted, appreciate assistance - will need surgery for septic arthritis, date TBD

## 2024-04-23 NOTE — ASSESSMENT & PLAN NOTE
Patient with Paroxysmal (<7 days) atrial fibrillation which is controlled currently with Beta Blocker. Patient is currently in sinus rhythm.[id number] Score: 3  Anticoagulation:  Patient does not appear to be on home anticoagulation.  We will continue aspirin.  In normal sinus rhythm    Paroxysmal AFib.   During episode of sepsis in 2023.   Not on anticoagulation because it was a brief episode per Cardiology.   Rec to look for recurrence with the help of event monitor.   If a recurrence noted on event monitor, consider adding anticoagulation

## 2024-04-23 NOTE — ASSESSMENT & PLAN NOTE
T5-T11 preverbal abscess  R septic shoulder    -OSH course notable for MRI/CT of R shoulder noted soft tissue edema/joint effusion concerning for septic arthritis and osteomyelitis. MRI imaging also with concern for T5-T11 preverterbral abscess.   -Evaluated by OSH ortho - dry tap, though was able to aspirate small amount of blood that was sent for cx that have been no growth to date (not enough to send for fluid studies). Blcx on admit with GBS. She was started on vanc/cefepime. After initial ID evaluation at OSH, Cefepime/Vancomycin were deescalated to Ceftriaxone due to NGTD on cultures.  [place] [facility] [place] Orthopedic surgery was consulted for concerns of septic arthritis and CTS consulted for VATS evaluation.     Of note, pt had had urine cx obtained 1/2024 that was positive for MSSA and GBS, s/p nitrofurantoin. Also had Ecoli bacteremia in 5/2023, tx with IV and transitioned to cefdinir.     -Thoracic surgery eval 04/22 noted no intervention indicated at the time      Recommendations:  -CURTIS for further source evaluation  -Ortho planning for potential intervention. Followup recs  -Continue ceftriaxone 2g iv daily. Anticipate prolonged course of abx therapy given spinal/shoulder infection. Atleast 6 weeks antibiotics course expected. OPAT TBD

## 2024-04-23 NOTE — SUBJECTIVE & OBJECTIVE
Interval History: No acute events overnight, afebrile, hemodynamically stable.         Objective:     Vital Signs (Most Recent):  Temp: 98 °F (36.7 °C) (04/23/24 1134)  Pulse: 86 (04/23/24 1501)  Resp: 16 (04/23/24 1134)  BP: 135/76 (04/23/24 1134)  SpO2: 98 % (04/23/24 1134) Vital Signs (24h Range):  Temp:  [98 °F (36.7 °C)-98.8 °F (37.1 °C)] 98 °F (36.7 °C)  Pulse:  [72-86] 86  Resp:  [16-20] 16  SpO2:  [94 %-100 %] 98 %  BP: (122-155)/(60-78) 135/76     Weight: 95.4 kg (210 lb 5.1 oz)  Body mass index is 32.94 kg/m².    Estimated Creatinine Clearance: 131.5 mL/min (based on SCr of 0.6 mg/dL).     Physical Exam  Vitals and nursing note reviewed.   Constitutional:       General: She is not in acute distress.     Appearance: She is not ill-appearing, toxic-appearing or diaphoretic.   HENT:      Head: Normocephalic and atraumatic.      Mouth/Throat:      Mouth: Mucous membranes are moist.   Eyes:      General: No scleral icterus.        Right eye: No discharge.         Left eye: No discharge.   Cardiovascular:      Heart sounds: Normal heart sounds. No murmur heard.  Pulmonary:      Effort: Pulmonary effort is normal. No respiratory distress.      Breath sounds: Normal breath sounds. No wheezing, rhonchi or rales.   Abdominal:      General: Bowel sounds are normal.      Palpations: Abdomen is soft.      Tenderness: There is no abdominal tenderness. There is no guarding or rebound.   Musculoskeletal:      Right shoulder: No bony tenderness. Decreased range of motion.      Cervical back: No rigidity.      Right lower leg: No edema.      Left lower leg: No edema.   Skin:     General: Skin is warm and dry.      Coloration: Skin is not jaundiced.   Neurological:      Mental Status: She is oriented to person, place, and time. Mental status is at baseline.   Psychiatric:         Mood and Affect: Mood normal.         Behavior: Behavior normal.          Significant Labs: All pertinent labs within the past 24 hours have been  reviewed.  LABS:  Recent Labs   Lab 04/19/24  0411 04/21/24  0524 04/22/24  0219   * 136 136   K 4.5 3.6 3.6   CL 98 101 100   CO2 23 27 28   BUN 8 10 9   CREATININE 0.5 0.6 0.6   * 165* 131*   ANIONGAP 14 8 8     Recent Labs   Lab 04/17/24  0651 04/18/24  0400   MG 1.8 1.7   PHOS 3.0 2.8     Recent Labs   Lab 04/18/24  0400 04/21/24  0524 04/22/24  0219   AST 13 14 16   ALT 13 11 13   ALKPHOS 101 72 76   BILITOT 0.4 0.2 0.2   ALBUMIN 1.7* 1.8* 1.9*     POCT Glucose:   Recent Labs   Lab 04/22/24 2013 04/23/24  0739 04/23/24  1134   POCTGLUCOSE 171* 165* 111*    Recent Labs   Lab 04/19/24  0810 04/21/24  0525 04/22/24  0219   WBC 8.03 7.33 6.82   HGB 11.3* 9.8* 9.8*   HCT 36.2* 31.2* 30.9*    237 262   GRAN 57.0 56.2  4.1 54.6  3.7        Micro  Blood Cultures  Lab Results   Component Value Date    LABBLOO No Growth after 4 days. 04/17/2024    LABBLOO No Growth after 4 days. 04/17/2024    LABBLOO No Growth after 4 days. 04/15/2024    LABBLOO No Growth after 4 days. 04/15/2024    LABBLOO  04/15/2024     Gram stain cameron bottle: Gram positive cocci in chains resembling Strep    LABBLOO  04/15/2024     Results called to and read back by:Lizz Wells  04/16/2024  08:12    LABBLOO (A) 04/15/2024     STREPTOCOCCUS AGALACTIAE (GROUP B)  Beta-hemolytic streptococci are routinely susceptible to   penicillins,cephalosporins and carbapenems.  For susceptibility see order #[id number]       Urine Cultures  Lab Results   Component Value Date    LABURIN (A) 01/08/2024     STREPTOCOCCUS AGALACTIAE (GROUP B)  > 100,000 cfu/ml  In case of Penicillin allergy, call lab for further testing.  Beta-hemolytic streptococci are routinely susceptible to   penicillins,cephalosporins and carbapenems.      LABURIN STAPHYLOCOCCUS AUREUS  10,000 - 49,999 cfu/ml   (A) 01/08/2024    LABURIN (A) 05/29/2023     CANDIDA ALBICANS  10,000 - 49,999 cfu/ml  Treatment of asymptomatic candiduria is not recommended (except for    specific populations). Candida isolated in the urine typically   represents colonization. If an indwelling urinary catheter is present  it should be removed or replaced.      LABURIN No significant growth 03/16/2022    LABURIN ESCHERICHIA COLI  50,000 - 99,999 cfu/ml   (A) 09/12/2021         Significant Imaging: I have reviewed all pertinent imaging results/findings within the past 24 hours.  CT Chest Without Contrast   Final Result      Muscular swelling and fat stranding in the soft tissues of the right shoulder.  May represent infection, inflammation or changes from recent right shoulder aspiration.  See dedicated MRI report.      Prevertebral soft tissue density mass as described in detail above.  Abscess formation is a diagnostic consideration.  See dedicated MRI thoracic spine report for further details.      No acute pulmonary abnormality.  No pleural effusion is seen.      Electronically signed by resident: Bernard Szymanski   Date:    04/22/2024   Time:    09:10      Electronically signed by: Be Ji   Date:    04/22/2024   Time:    10:11      MRI Shoulder W WO Contrast Right   Final Result      1. Acromioclavicular joint effusion with adjacent signal change of the distal clavicle and the acromion with surrounding soft tissue edema.  Findings are concerning for AC joint septic arthritis and osteomyelitis.   2. Moderate glenohumeral joint effusion and synovitis with subacromial subdeltoid bursitis.  Consider fluid sampling.   3. Cellulitis of the shoulder with myositis of the deltoid and trapezius musculature.   4. Mild posterior subluxation of the humeral head with respect to the glenoid, likely a result of the joint effusion.   5. Low-grade partial-thickness articular sided tear of the conjoined tendon at the footprint.  Background tendinosis of the supraspinatus and infraspinatus.   6. Tenosynovitis of the long head biceps tendon.         Electronically signed by: Erich Yarbrough    Date:    04/19/2024   Time:    23:42      MRI Spine Cervical-Thoracic-Lumbar W W/O Contrast (XPD)   Final Result   Abnormal      1. Abnormal prevertebral/paravertebral signal and enhancement spanning from approximately the T5 through T11 levels with appearance most suggestive for prevertebral abscess centered at approximately the T7-8 level.  Minimal osseous edema is seen at this level with no definite abnormal signal or enhancement within the intervertebral disc space.  Appearance is most suggestive for abscess with no definite epidural extension or epidural abscess seen.   2. Multilevel cervical spondylosis as detailed above.   3. No acute lumbar spine abnormalities identified.   This report was flagged in Epic as abnormal.         Electronically signed by: Lizette Gill MD   Date:    04/18/2024   Time:    19:30      MRI Lumbar Spine Without Contrast   Final Result      Incomplete exam with suboptimal diagnostic assessment.  Limited findings as above.  Repeat exam with adequate pain control can be obtained as warranted.         Electronically signed by: Steven Valderrama   Date:    04/16/2024   Time:    17:40      CT Shoulder With Contrast Right   Final Result      Soft tissue edema in the muscles and subcutaneous fat surrounding the right shoulder with probable small joint effusion.  Findings could be related to cellulitis and/or myositis in this patient with elevated CRP.  No focal osseous erosion.  Consider correlation with arthrocentesis if there is concern for septic joint.      Nonspecific incompletely imaged soft tissue edema and paraspinal soft tissue fullness in the posterior mediastinum along the lower thoracic spine at roughly T8-T10.  Unclear if these findings are exaggerated by wall thickening of the esophagus.  Suggest correlation with symptoms and consider further evaluation with spine MRI if the patient is experiencing significant back pain or concern for discitis/osteomyelitis.      Other  findings discussed in the body of the report.         Electronically signed by: Franklin Patricia MD   Date:    04/15/2024   Time:    12:39      X-ray Shoulder 2 or More Views Right   Final Result      As above.         Electronically signed by: Steven Valderrama   Date:    04/15/2024   Time:    08:08          Inpatient Medications:  Continuous Infusions:  Current Facility-Administered Medications   Medication Dose Route Frequency Last Rate Last Admin     Scheduled Meds:  Current Facility-Administered Medications   Medication Dose Route Frequency    amLODIPine  10 mg Oral Daily    carvediloL  6.25 mg Oral BID    cefTRIAXone (Rocephin) IV (PEDS and ADULTS)  2 g Intravenous Q24H    insulin aspart U-100  8 Units Subcutaneous TIDWM    insulin detemir U-100  20 Units Subcutaneous QHS    losartan  100 mg Oral Daily    polyethylene glycol  17 g Oral Daily    senna-docusate 8.6-50 mg  1 tablet Oral BID    sodium chloride 0.9%  10 mL Intravenous Q6H     PRN Meds:  Current Facility-Administered Medications:     acetaminophen, 1,000 mg, Oral, Q8H PRN    benzonatate, 100 mg, Oral, TID PRN    dextrose 10%, 12.5 g, Intravenous, PRN    dextrose 10%, 25 g, Intravenous, PRN    glucagon (human recombinant), 1 mg, Intramuscular, PRN    glucose, 16 g, Oral, PRN    glucose, 24 g, Oral, PRN    insulin aspart U-100, 0-5 Units, Subcutaneous, QID (AC + HS) PRN    morphine, 4 mg, Intravenous, Q4H PRN    naloxone, 0.02 mg, Intravenous, PRN    oxyCODONE, 5 mg, Oral, Q4H PRN    sodium chloride 0.9%, 10 mL, Intravenous, PRN    Flushing PICC/Midline Protocol, , , Until Discontinued **AND** sodium chloride 0.9%, 10 mL, Intravenous, Q6H **AND** sodium chloride 0.9%, 10 mL, Intravenous, PRN

## 2024-04-23 NOTE — ANESTHESIA PREPROCEDURE EVALUATION
[facility]  Anesthesia Pre-Operative Evaluation         Patient Name: Shahida Ortega  YOB: 1970  MRN: [id number]    SUBJECTIVE:     Pre-operative evaluation for Procedure(s) (LRB):  Transesophageal echo (CURTIS) intra-procedure log documentation (N/A)     04/23/2024    Shahida Ortega is a 53 y.o. female w/ a significant PMHx of HTN, T2DM, and obesity transferred from [facility] with T5-T11 prevertebral abscess in setting of Strep bacteremia (cx negative since 4/17) as well as R septic arthritis.    Patient now presents for the above procedure(s).    Echo 04/15/24    Left Ventricle: The left ventricle is normal in size. There is moderate concentric hypertrophy. There is normal systolic function with a visually estimated ejection fraction of 60 - 65%. Grade I diastolic dysfunction.    Right Ventricle: Normal right ventricular cavity size. Systolic function is normal.    Tricuspid Valve: There is mild regurgitation.    Aorta: Aortic root is mildly dilated measuring 3.59 cm.    Pulmonary Artery: The estimated pulmonary artery systolic pressure is 39 mmHg.    IVC/SVC: Normal venous pressure at 3 mmHg.    Pericardium: There is a trivial posterior effusion.      LDA:        Midline Catheter - Single Lumen 04/20/24 0800 Left basilic vein (medial side of arm) other (see comments) (Active)   Site Assessment Clean;Dry;Intact;No redness;No swelling;No warmth;No drainage 04/22/24 2319   IV Device Securement catheter securement device 04/22/24 2319   Line Status Saline locked 04/22/24 2319   Extremity Circumference (cm) 33 cm 04/20/24 0800   Dressing Type No CHG impregnated dressing/sponge (patient < 2 mos) 04/22/24 2319   Dressing Status Clean;Dry;Intact 04/22/24 2319   Dressing Intervention Integrity maintained 04/22/24 2319   Dressing Change Due 04/27/24 04/22/24 2319   Site Change Due 05/03/24  04/22/24 2319   Reason Not Rotated Not due 04/22/24 2319   Number of days: 3       Prev airway (12/27/20):    Mask Ventilation:  Easy with oral airway    Method of Intubation:  Direct    Blade:  Nieto 2    Laryngeal View Grade: Grade I - full view of chords      Difficult Airway Encountered?: No      Complications:  None      Drips:   Current Facility-Administered Medications   Medication Dose Route Frequency Last Rate Last Admin       Patient Active Problem List   Diagnosis    Intra-abdominal abscess    Postprocedural intraabdominal abscess    Epigastric pain    Elevated CA 19-9 level    Acute cystitis without hematuria    Elevated d-dimer    Elevated procalcitonin    Hypertension    DM2 (diabetes mellitus, type 2)    Prolonged Q-T interval on ECG    A-fib    Septic arthritis of shoulder, right    Sepsis    Bacteremia due to group B Streptococcus    Vertebral abscess    Class 1 obesity due to excess calories in adult    Anemia    Obesity, diabetes, and hypertension syndrome       Review of patient's allergies indicates:   Allergen Reactions    Penicillins Hives     Tolerated cephalosporins 4/2024    Amoxicillin     Grass pollen-june grass standard Other (See Comments)       Current Inpatient Medications:  Current Facility-Administered Medications   Medication Dose Route Frequency    amLODIPine  10 mg Oral Daily    carvediloL  6.25 mg Oral BID    cefTRIAXone (Rocephin) IV (PEDS and ADULTS)  2 g Intravenous Q24H    insulin aspart U-100  8 Units Subcutaneous TIDWM    insulin detemir U-100  20 Units Subcutaneous QHS    losartan  100 mg Oral Daily    polyethylene glycol  17 g Oral Daily    senna-docusate 8.6-50 mg  1 tablet Oral BID    sodium chloride 0.9%  10 mL Intravenous Q6H       No current facility-administered medications on file prior to encounter.     Current Outpatient Medications on File Prior to Encounter   Medication Sig Dispense Refill    aspirin (ECOTRIN) 81 MG EC tablet Take 1 tablet (81 mg total) by  mouth once daily. 90 tablet 3    azelastine (ASTELIN) 137 mcg (0.1 %) nasal spray 1 spray by Nasal route 2 (two) times daily.      cetirizine (ZYRTEC) 10 MG tablet Take 1 tablet (10 mg total) by mouth once daily. 30 tablet 1    cyclobenzaprine (FLEXERIL) 10 MG tablet Take 10 mg by mouth 3 (three) times daily as needed.      diazePAM (VALIUM) 2 MG tablet Take 1 tablet (2 mg total) by mouth every 6 (six) hours as needed (pain). 12 tablet 0    hydroCHLOROthiazide (HYDRODIURIL) 25 MG tablet Take 1 tablet (25 mg total) by mouth once daily. 90 tablet 3    ibuprofen (ADVIL,MOTRIN) 800 MG tablet Take 800 mg by mouth every 8 (eight) hours as needed.      losartan (COZAAR) 100 MG tablet Take 1 tablet (100 mg total) by mouth once daily. 90 tablet 3    metFORMIN (GLUCOPHAGE) 500 MG tablet Take 1 tablet (500 mg total) by mouth 2 (two) times daily with meals. 180 tablet 3    albuterol (PROVENTIL/VENTOLIN HFA) 90 mcg/actuation inhaler Inhale 2 puffs into the lungs every 6 (six) hours as needed.      blood-glucose meter kit To check BG 2 times daily, to use with insurance preferred meter 1 each 0    carvediloL (COREG) 3.125 MG tablet Take 2 tablets (6.25 mg total) by mouth 2 (two) times daily. (Patient not taking: Reported on 4/15/2024) 360 tablet 3    FREESTYLE HARSHA 14 DAY SENSOR Kit Change every 14 days for blood glucose monitoring. (Patient not taking: Reported on 4/15/2024)      ondansetron (ZOFRAN-ODT) 4 MG TbDL Take 4 mg by mouth.      promethazine (PHENERGAN) 25 MG tablet Take 25 mg by mouth every 8 (eight) hours as needed.         Past Surgical History:   Procedure Laterality Date    ABDOMINAL SURGERY      COLONOSCOPY N/A 06/01/2023    Procedure: COLONOSCOPY;  Surgeon: Thaddeus Carlos MD;  Location: [facility] ENDO;  Service: Endoscopy;  Laterality: N/A;    DIAGNOSTIC LAPAROSCOPY N/A 05/26/2019    Procedure: LAPAROSCOPY, DIAGNOSTIC Drainage of abscess ;  Surgeon: Jose Luis Hanson MD;  Location: [facility] OR;  Service: General;   Laterality: N/A;  Drain Placement    DIAGNOSTIC LAPAROSCOPY N/A 12/27/2020    Procedure: Diagnostic laparoscopy, drainage of intra-abdominal abscess;  Surgeon: Bhupendra Banda MD;  Location: [facility] OR;  Service: General;  Laterality: N/A;    LAPAROSCOPIC LYSIS OF ADHESIONS  05/26/2019    Procedure: LYSIS, ADHESIONS, LAPAROSCOPIC;  Surgeon: Jose Luis Hanson MD;  Location: [facility] OR;  Service: General;;       Social History     Socioeconomic History    Marital status: Single   Tobacco Use    Smoking status: Never    Smokeless tobacco: Never   Substance and Sexual Activity    Alcohol use: Not Currently    Drug use: Not Currently    Sexual activity: Not Currently     Social Determinants of Health     Financial Resource Strain: Low Risk  (4/16/2024)    Overall Financial Resource Strain (CARDIA)     Difficulty of Paying Living Expenses: Not very hard   Food Insecurity: No Food Insecurity (4/16/2024)    Hunger Vital Sign     Worried About Running Out of Food in the Last Year: Never true     Ran Out of Food in the Last Year: Never true   Transportation Needs: No Transportation Needs (4/16/2024)    PRAPARE - Transportation     Lack of Transportation (Medical): No     Lack of Transportation (Non-Medical): No   Physical Activity: Sufficiently Active (4/16/2024)    Exercise Vital Sign     Days of Exercise per Week: 7 days     Minutes of Exercise per Session: 60 min   Stress: No Stress Concern Present (4/16/2024)    [employer] [place] [employer] - Occupational Stress Questionnaire     Feeling of Stress : Only a little   Social Connections: Moderately Isolated (4/16/2024)    Social Connection and Isolation Panel [NHANES]     Frequency of Communication with Friends and Family: More than three times a week     Frequency of Social Gatherings with Friends and Family: More than three times a week     Attends [employer] Services: More than 4 times per year     Active Member of Clubs or Organizations: No     Attends Club or  Organization Meetings: Never     Marital Status: Never    Housing Stability: Low Risk  (4/16/2024)    Housing Stability Vital Sign     Unable to Pay for Housing in the Last Year: No     Number of Times Moved in the Last Year: 1     Homeless in the Last Year: No       OBJECTIVE:     Vital Signs Range (Last 24H):  Temp:  [36.7 °C (98 °F)-37.1 °C (98.8 °F)]   Pulse:  [73-83]   Resp:  [16-20]   BP: (122-155)/(60-78)   SpO2:  [94 %-100 %]       Significant Labs:  Lab Results   Component Value Date    WBC 6.82 04/22/2024    HGB 9.8 (L) 04/22/2024    HCT 30.9 (L) 04/22/2024     04/22/2024    ALT 13 04/22/2024    AST 16 04/22/2024     04/22/2024    K 3.6 04/22/2024     04/22/2024    CREATININE 0.6 04/22/2024    BUN 9 04/22/2024    CO2 28 04/22/2024    INR 1.0 04/22/2024    HGBA1C 11.3 (H) 03/16/2022       Diagnostic Studies: No relevant studies.    EKG:   Results for orders placed or performed during the hospital encounter of 01/08/24   EKG 12-lead    Collection Time: 01/08/24  1:13 PM    Narrative    Test Reason : R73.9,    Vent. Rate : 088 BPM     Atrial Rate : 088 BPM     P-R Int : 166 ms          QRS Dur : 090 ms      QT Int : 390 ms       P-R-T Axes : 041 012 012 degrees     QTc Int : 471 ms    Normal sinus rhythm  Moderate voltage criteria for LVH, may be normal variant  Borderline Abnormal ECG  When compared with ECG of 31-MAY-2023 16:22,  Significant changes have occurred  Confirmed by Crystal BARFIELD, Yuri MARTINEZ ([id number]) on 1/8/2024 9:35:17 PM    Referred By: [email or web address]   SELF           Confirmed By:Yuri Rojas MD       2D ECHO:  TTE:  Results for orders placed or performed during the hospital encounter of 04/15/24   Echo   Result Value Ref Range    BSA 2.08 m2    LVOT stroke volume 51.51 cm3    LVIDd 3.83 3.5 - 6.0 cm    LV Systolic Volume 23.40 mL    LV Systolic Volume Index 11.5 mL/m2    LVIDs 2.55 2.1 - 4.0 cm    LV Diastolic Volume 63.31 mL    LV Diastolic Volume Index 31.03 mL/m2    IVS 1.46  (A) 0.6 - 1.1 cm    LVOT diameter 2.12 cm    LVOT area 3.5 cm2    FS 33 28 - 44 %    Left Ventricle Relative Wall Thickness 0.80 cm    Posterior Wall 1.54 (A) 0.6 - 1.1 cm    LV mass 218.94 g    LV Mass Index 107 g/m2    MV Peak E Benson 0.73 m/s    TDI LATERAL 0.07 m/s    TDI SEPTAL 0.07 m/s    E/E' ratio 10.43 m/s    MV Peak A Benson 1.04 m/s    TR Max Benson 3.01 m/s    E/A ratio 0.70     IVRT 102.76 msec    E wave deceleration time 168.74 msec    LV SEPTAL E/E' RATIO 10.43 m/s    LV LATERAL E/E' RATIO 10.43 m/s    LVOT peak benson 1.04 m/s    Left Ventricular Outflow Tract Mean Velocity 0.74 cm/s    Left Ventricular Outflow Tract Mean Gradient 2.45 mmHg    RVDD 3.45 cm    RV S' 22.46 cm/s    TAPSE 2.17 cm    LA size 1.61 cm    Left Atrium Minor Axis 3.91 cm    Left Atrium Major Axis 4.65 cm    RA Major Axis 4.74 cm    AV mean gradient 5 mmHg    AV peak gradient 8 mmHg    Ao peak benson 1.40 m/s    Ao VTI 17.90 cm    LVOT peak VTI 14.60 cm    AV valve area 2.88 cm²    AV Velocity Ratio 0.74     AV index (prosthetic) 0.82     PIO by Velocity Ratio 2.62 cm²    MV mean gradient 3 mmHg    MV peak gradient 7 mmHg    MV stenosis pressure 1/2 time 48.93 ms    MV valve area p 1/2 method 4.50 cm2    MV valve area by continuity eq 2.48 cm2    MV VTI 20.8 cm    Triscuspid Valve Regurgitation Peak Gradient 36 mmHg    PV PEAK VELOCITY 1.14 m/s    PV peak gradient 5 mmHg    Ao root annulus 3.41 cm    Sinus 3.59 cm    STJ 2.62 cm    Ascending aorta 2.80 cm    IVC diameter 1.55 cm    RV/LV Ratio 0.90 cm    Mean e' 0.07 m/s    ZLVIDS -3.00     ZLVIDD -4.62     AORTIC VALVE CUSP SEPERATION 2.11 cm    LA Volume Index 7.1 mL/m2    LA volume 14.53 cm3    LA WIDTH 2.5 cm    RA Width 3.4 cm    TV resting pulmonary artery pressure 39 mmHg    RV TB RVSP 6 mmHg    Est. RA pres 3 mmHg    Narrative      Left Ventricle: The left ventricle is normal in size. There is moderate   concentric hypertrophy. There is normal systolic function with a visually    estimated ejection fraction of 60 - 65%. Grade I diastolic dysfunction.    Right Ventricle: Normal right ventricular cavity size. Systolic   function is normal.    Tricuspid Valve: There is mild regurgitation.    Aorta: Aortic root is mildly dilated measuring 3.59 cm.    Pulmonary Artery: The estimated pulmonary artery systolic pressure is   39 mmHg.    IVC/SVC: Normal venous pressure at 3 mmHg.    Pericardium: There is a trivial posterior effusion.         CURTIS:  No results found for this or any previous visit.    ASSESSMENT/PLAN:           Pre-op Assessment    I have reviewed the Patient Summary Reports.     I have reviewed the Nursing Notes.    I have reviewed the Medications.     Review of Systems  Anesthesia Hx:  No problems with previous Anesthesia   History of prior surgery of interest to airway management or planning:          Denies Family Hx of Anesthesia complications.    Denies Personal Hx of Anesthesia complications.                    Social:  Non-Smoker, No Alcohol Use       Hematology/Oncology:       -- Anemia:                                  Cardiovascular:     Hypertension    Denies CAD.    Dysrhythmias atrial fibrillation   Denies CHF.    no hyperlipidemia                             Pulmonary:    Denies COPD.  Denies Asthma.                    Renal/:   Denies Chronic Renal Disease.                Hepatic/GI:      Denies GERD.             Musculoskeletal:  Denies Arthritis.               Neurological:    Denies CVA.    Denies Seizures.                                Endocrine:  Diabetes, type 2         Obesity / BMI > 30  Psych:  Denies Psychiatric History.                  Physical Exam  General: Well nourished, Cooperative, Alert and Oriented    Airway:  Mallampati: II   Mouth Opening: Normal  TM Distance: Normal  Tongue: Normal  Neck ROM: Normal ROM    Dental:  Intact        Anesthesia Plan  Type of Anesthesia, risks & benefits discussed:    Anesthesia Type: Gen ETT, Gen Natural  Airway  Intra-op Monitoring Plan: Standard ASA Monitors  Post Op Pain Control Plan: multimodal analgesia and IV/PO Opioids PRN  Induction:  IV  Airway Plan: Direct and Video, Post-Induction  Informed Consent: Informed consent signed with the Patient and all parties understand the risks and agree with anesthesia plan.  All questions answered.   ASA Score: 3  Day of Surgery Review of History & Physical: H&P Update referred to the surgeon/provider.    Ready For Surgery From Anesthesia Perspective.     .

## 2024-04-23 NOTE — PLAN OF CARE
Problem: Fall Injury Risk  Goal: Absence of Fall and Fall-Related Injury  Outcome: Progressing     Problem: Adult Inpatient Plan of Care  Goal: Plan of Care Review  Outcome: Progressing  Goal: Patient-Specific Goal (Individualized)  Outcome: Progressing  Goal: Absence of Hospital-Acquired Illness or Injury  Outcome: Progressing  Goal: Optimal Comfort and Wellbeing  Outcome: Progressing  Goal: Readiness for Transition of Care  Outcome: Progressing

## 2024-04-23 NOTE — SUBJECTIVE & OBJECTIVE
Interval History: No acute events overnight.  Feels good today.  Pain is controlled, hasn't needed any pain meds.  Plan for CURTIS today then further plan pending Ortho and ID recs.    Review of Systems   Constitutional:  Negative for chills, fatigue and fever.   Respiratory:  Negative for cough and shortness of breath.    Cardiovascular:  Negative for chest pain, palpitations and leg swelling.   Gastrointestinal:  Negative for abdominal pain, diarrhea, nausea and vomiting.   Genitourinary:  Negative for dysuria and urgency.   Musculoskeletal:  Positive for arthralgias and myalgias.   Neurological:  Negative for dizziness and headaches.   All other systems reviewed and are negative.    Objective:     Vital Signs (Most Recent):  Temp: 98 °F (36.7 °C) (04/23/24 1134)  Pulse: 72 (04/23/24 1134)  Resp: 16 (04/23/24 1134)  BP: 135/76 (04/23/24 1134)  SpO2: 98 % (04/23/24 1134) Vital Signs (24h Range):  Temp:  [98 °F (36.7 °C)-98.8 °F (37.1 °C)] 98 °F (36.7 °C)  Pulse:  [72-83] 72  Resp:  [16-20] 16  SpO2:  [94 %-100 %] 98 %  BP: (122-155)/(60-78) 135/76     Weight: 95.4 kg (210 lb 5.1 oz)  Body mass index is 32.94 kg/m².    Intake/Output Summary (Last 24 hours) at 4/23/2024 1233  Last data filed at 4/22/2024 1836  Gross per 24 hour   Intake 480 ml   Output --   Net 480 ml      Physical Exam  Constitutional:       Appearance: Normal appearance. She is obese.   HENT:      Head: Normocephalic and atraumatic.   Cardiovascular:      Rate and Rhythm: Normal rate and regular rhythm.      Heart sounds: No murmur heard.  Pulmonary:      Effort: Pulmonary effort is normal. No respiratory distress.      Breath sounds: Normal breath sounds. No wheezing or rales.   Abdominal:      General: There is no distension.      Palpations: Abdomen is soft.      Tenderness: There is no abdominal tenderness.   Musculoskeletal:         General: Tenderness (Right shoulder/deltoid wtih limited ROM) present. No deformity.   Skin:     General: Skin is  warm and dry.   Neurological:      General: No focal deficit present.      Mental Status: She is alert and oriented to person, place, and time. Mental status is at baseline.         MELD 3.0: 11 at 4/22/2024  2:19 AM  MELD-Na: 6 at 4/22/2024  2:19 AM  Calculated from:  Serum Creatinine: 0.6 mg/dL (Using min of 1 mg/dL) at 4/22/2024  2:19 AM  Serum Sodium: 136 mmol/L at 4/22/2024  2:19 AM  Total Bilirubin: 0.2 mg/dL (Using min of 1 mg/dL) at 4/22/2024  2:19 AM  Serum Albumin: 1.9 g/dL at 4/22/2024  2:19 AM  INR(ratio): 1.0 at 4/22/2024  2:19 AM  Age at listing (hypothetical): 53 years  Sex: Female at 4/22/2024  2:19 AM      Significant Labs:  CBC:  Recent Labs   Lab 04/22/24 0219   WBC 6.82   HGB 9.8*   HCT 30.9*        CMP:  Recent Labs   Lab 04/22/24 0219      K 3.6      CO2 28   *   BUN 9   CREATININE 0.6   CALCIUM 8.9   PROT 6.8   ALBUMIN 1.9*   BILITOT 0.2   ALKPHOS 76   AST 16   ALT 13   ANIONGAP 8     PTINR:  Recent Labs   Lab 04/22/24 0219   INR 1.0       Significant Procedures:   Dobutamine Stress Test with Color Flow: No results found for this or any previous visit.    2D Echo: No results found for this or any previous visit.

## 2024-04-23 NOTE — PROGRESS NOTES
[facility] - Med Surg ([address] Medicine  Progress Note    Patient Name: Shahida Ortega  MRN: [id number]  Patient Class: IP- Inpatient   Admission Date: 4/15/2024  Length of Stay: 8 days  Attending Physician: Jessica Grey MD  Primary Care Provider: Janusz Staton MD        Subjective:     Principal Problem:Sepsis        HPI:  53F with pmh of dm and htn who presents due to 3 day h/o right shoulder pain with associated redness. Patient reports being seen here in the ED for her symptoms on 4/12/24 and was prescribed Valium with relief of back pain but not shoulder. Patient denies taking any medications today for relief. Patient denies any recent injuries or trauma. In ed pt started on abx and fluids for sepsis and ortho consulted who performed arthrocentesis of the should with fluids sent for culture and pending. Pt denies tobacco, alcohol or drug use. Pt denies previous ortho surgerie     Overview/Hospital Course:  Ms. Ortega was admitted to [facility] for sepsis 2/2 atraumatic right shoulder pain and erythema.  CT right shoulder showed a small effusion.  Ortho was consulted.  Aspiration of the right shoulder 4/15 was dry.  A small amount of blood was aspirated from the subacromial bursa.  Cultures NGTD.  Blood cx were obtained and the patient was started on Cefepime and Vancomycin. They returned positive for Strep agalactiae and antibiotics switched to CTX.  ID was consulted.  Review of records revealed that the patient had a Strep agalactiae UTI in January 2024.  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  MRI right shoulder revealed acromioclavicular joint effusion concerning for AC joint septic arthritis and osteomyelitis, moderate glenohumeral joint effusion and synovitis with subacromial subdeltoid bursitis., as well as cellulitis of the shoulder with myositis of the deltoid and trapezius musculature. Neurosurgery reviewed the images and reported that neurosurgical intervention was not  indicated since the infection did not extend into the epidural space.  Her case was discussed with Dr. Newby of CTS at [facility] who requested transfer for VATS.  She was transferred to [facility] on 4/21 for CTS, Ortho, and ID evaluations.  Per CTS, as leukocytosis has resolved and collection is very small on CT, do not recommend surgical intervention at this time.  CURTIS was ordered to evaluate for endocarditis.    Interval History: No acute events overnight.  Feels good today.  Pain is controlled, hasn't needed any pain meds.  Plan for CURTIS today then further plan pending Ortho and ID recs.    Review of Systems   Constitutional:  Negative for chills, fatigue and fever.   Respiratory:  Negative for cough and shortness of breath.    Cardiovascular:  Negative for chest pain, palpitations and leg swelling.   Gastrointestinal:  Negative for abdominal pain, diarrhea, nausea and vomiting.   Genitourinary:  Negative for dysuria and urgency.   Musculoskeletal:  Positive for arthralgias and myalgias.   Neurological:  Negative for dizziness and headaches.   All other systems reviewed and are negative.    Objective:     Vital Signs (Most Recent):  Temp: 98 °F (36.7 °C) (04/23/24 1134)  Pulse: 72 (04/23/24 1134)  Resp: 16 (04/23/24 1134)  BP: 135/76 (04/23/24 1134)  SpO2: 98 % (04/23/24 1134) Vital Signs (24h Range):  Temp:  [98 °F (36.7 °C)-98.8 °F (37.1 °C)] 98 °F (36.7 °C)  Pulse:  [72-83] 72  Resp:  [16-20] 16  SpO2:  [94 %-100 %] 98 %  BP: (122-155)/(60-78) 135/76     Weight: 95.4 kg (210 lb 5.1 oz)  Body mass index is 32.94 kg/m².    Intake/Output Summary (Last 24 hours) at 4/23/2024 1233  Last data filed at 4/22/2024 1836  Gross per 24 hour   Intake 480 ml   Output --   Net 480 ml      Physical Exam  Constitutional:       Appearance: Normal appearance. She is obese.   HENT:      Head: Normocephalic and atraumatic.   Cardiovascular:      Rate and Rhythm: Normal rate and regular rhythm.      Heart sounds: No murmur  heard.  Pulmonary:      Effort: Pulmonary effort is normal. No respiratory distress.      Breath sounds: Normal breath sounds. No wheezing or rales.   Abdominal:      General: There is no distension.      Palpations: Abdomen is soft.      Tenderness: There is no abdominal tenderness.   Musculoskeletal:         General: Tenderness (Right shoulder/deltoid wtih limited ROM) present. No deformity.   Skin:     General: Skin is warm and dry.   Neurological:      General: No focal deficit present.      Mental Status: She is alert and oriented to person, place, and time. Mental status is at baseline.         MELD 3.0: 11 at 4/22/2024  2:19 AM  MELD-Na: 6 at 4/22/2024  2:19 AM  Calculated from:  Serum Creatinine: 0.6 mg/dL (Using min of 1 mg/dL) at 4/22/2024  2:19 AM  Serum Sodium: 136 mmol/L at 4/22/2024  2:19 AM  Total Bilirubin: 0.2 mg/dL (Using min of 1 mg/dL) at 4/22/2024  2:19 AM  Serum Albumin: 1.9 g/dL at 4/22/2024  2:19 AM  INR(ratio): 1.0 at 4/22/2024  2:19 AM  Age at listing (hypothetical): 53 years  Sex: Female at 4/22/2024  2:19 AM      Significant Labs:  CBC:  Recent Labs   Lab 04/22/24 0219   WBC 6.82   HGB 9.8*   HCT 30.9*        CMP:  Recent Labs   Lab 04/22/24 0219      K 3.6      CO2 28   *   BUN 9   CREATININE 0.6   CALCIUM 8.9   PROT 6.8   ALBUMIN 1.9*   BILITOT 0.2   ALKPHOS 76   AST 16   ALT 13   ANIONGAP 8     PTINR:  Recent Labs   Lab 04/22/24 0219   INR 1.0       Significant Procedures:   Dobutamine Stress Test with Color Flow: No results found for this or any previous visit.    2D Echo: No results found for this or any previous visit.    Assessment/Plan:      * Sepsis  Multifactorial 2/2 Group B Strep bacteremia, paravertebral abscesses, and septic arthritis of the right shoulder  Management as below    Bacteremia due to group B Streptococcus  Blood cx 4/15 positive for GBS  Blood cx 4/17 NGTD  Likely seeding from septic right shoulder and paravertebral abscesses  2D  echo negative for vegetations  Plan for CURTIS today  ID following:  Continue Ceftriaxone    Septic arthritis of shoulder, right  CT right shoulder 4/15 showed a small effusion.    Ortho at  was consulted.    Aspiration of the right shoulder 4/15 was dry.  A small amount of blood was aspirated from the subacromial bursa.  Cultures NGTD.    MRI right shoulder 4/19 revealed acromioclavicular joint effusion concerning for AC joint septic arthritis and osteomyelitis, moderate glenohumeral joint effusion and synovitis with subacromial subdeltoid bursitis, as well as cellulitis of the shoulder with myositis of the deltoid and trapezius musculature  Ortho at [facility] consulted, appreciate assistance - will need surgery for septic arthritis, date TBD    Vertebral abscess  MRI C/T/L-spine 4/18 revealed T5-T11 prevertebral abscess.     Neurosurgery consulted.  Recommend CTS evaluation  CTS Dr. Newby agreed to consult for possible VATS.  Per CTS, as leukocytosis has resolved and collection is very small on CT, do not recommend surgical intervention at this time     Obesity, diabetes, and hypertension syndrome  Noted    Anemia  Patient's anemia is currently controlled. Has not received any PRBCs to date.   Current CBC reviewed-   Lab Results   Component Value Date    HGB 9.8 (L) 04/22/2024    HCT 30.9 (L) 04/22/2024     Monitor serial CBC and transfuse if patient becomes hemodynamically unstable, symptomatic or H/H drops below 7/21.    Class 1 obesity due to excess calories in adult  Body mass index is 32.94 kg/m². Morbid obesity complicates all aspects of disease management from diagnostic modalities to treatment. Weight loss encouraged and health benefits explained to patient.         A-fib  Patient with Paroxysmal (<7 days) atrial fibrillation which is controlled currently with Beta Blocker. Patient is currently in sinus rhythm.[id number] Score: 3  Anticoagulation:  Patient does not appear to be on home anticoagulation.  We  will continue aspirin.  In normal sinus rhythm    Paroxysmal AFib.   During episode of sepsis in 2023.   Not on anticoagulation because it was a brief episode per Cardiology.   Rec to look for recurrence with the help of event monitor.   If a recurrence noted on event monitor, consider adding anticoagulation     Prolonged Q-T interval on ECG  Noted history.    We will avoid prolonging agents as much as possible      DM2 (diabetes mellitus, type 2)  Patient's FSGs are uncontrolled due to hyperglycemia on current medication regimen.  Last A1c reviewed-   Lab Results   Component Value Date    HGBA1C 11.3 (H) 03/16/2022     Most recent fingerstick glucose reviewed-   Recent Labs   Lab 04/22/24  1531 04/22/24  2013 04/23/24  0739 04/23/24  1134   POCTGLUCOSE 152* 171* 165* 111*       Current correctional scale  Low  Maintain anti-hyperglycemic dose as follows-   Antihyperglycemics (From admission, onward)      Start     Stop Route Frequency Ordered    04/18/24 2100  insulin detemir U-100 (Levemir) pen 20 Units         -- SubQ Nightly 04/18/24 1355    04/18/24 1645  insulin aspart U-100 pen 8 Units         -- SubQ 3 times daily with meals 04/18/24 1355    04/15/24 1504  insulin aspart U-100 pen 0-5 Units         -- SubQ Before meals & nightly PRN 04/15/24 1404          Hold Oral hypoglycemics while patient is in the hospital.    Hypertension  Chronic and stable  Continue Norvasc 10mg daily  Continue Coreg 6.25mg BID  Continue Losartan 100mg daily    Elevated procalcitonin  In setting of active infection.    Treatment as stated below        VTE Risk Mitigation (From admission, onward)           Ordered     IP VTE HIGH RISK PATIENT  Once         04/15/24 1404     Place sequential compression device  Until discontinued         04/15/24 1404                    Discharge Planning   NAYELI: 4/26/2024     Code Status: Full Code   Is the patient medically ready for discharge?:     Reason for patient still in hospital (select all  that apply): Patient trending condition  Discharge Plan A: Home with family   Discharge Delays: None known at this time              Jessica Grey MD  Department of Hospital Medicine   [facility] S[facility] ([place]

## 2024-04-24 ENCOUNTER — ANESTHESIA EVENT (OUTPATIENT)
Dept: SURGERY | Facility: HOSPITAL | Age: 54
DRG: 853 | End: 2024-04-24
Payer: COMMERCIAL

## 2024-04-24 ENCOUNTER — ANESTHESIA (OUTPATIENT)
Dept: MEDSURG UNIT | Facility: HOSPITAL | Age: 54
DRG: 853 | End: 2024-04-24
Payer: COMMERCIAL

## 2024-04-24 LAB
POCT GLUCOSE: 102 MG/DL (ref 70–110)
POCT GLUCOSE: 112 MG/DL (ref 70–110)
POCT GLUCOSE: 134 MG/DL (ref 70–110)
POCT GLUCOSE: 168 MG/DL (ref 70–110)
POCT GLUCOSE: 96 MG/DL (ref 70–110)

## 2024-04-24 PROCEDURE — 82962 GLUCOSE BLOOD TEST: CPT | Performed by: ORTHOPAEDIC SURGERY

## 2024-04-24 PROCEDURE — 21400001 HC TELEMETRY ROOM

## 2024-04-24 PROCEDURE — 25000003 PHARM REV CODE 250: Performed by: STUDENT IN AN ORGANIZED HEALTH CARE EDUCATION/TRAINING PROGRAM

## 2024-04-24 PROCEDURE — A4216 STERILE WATER/SALINE, 10 ML: HCPCS | Performed by: STUDENT IN AN ORGANIZED HEALTH CARE EDUCATION/TRAINING PROGRAM

## 2024-04-24 PROCEDURE — 25000003 PHARM REV CODE 250: Performed by: HOSPITALIST

## 2024-04-24 PROCEDURE — 99499 UNLISTED E&M SERVICE: CPT | Mod: RT,,, | Performed by: ORTHOPAEDIC SURGERY

## 2024-04-24 PROCEDURE — 63600175 PHARM REV CODE 636 W HCPCS: Performed by: STUDENT IN AN ORGANIZED HEALTH CARE EDUCATION/TRAINING PROGRAM

## 2024-04-24 RX ADMIN — CEFTRIAXONE 2 G: 2 INJECTION, POWDER, FOR SOLUTION INTRAMUSCULAR; INTRAVENOUS at 09:04

## 2024-04-24 RX ADMIN — LOSARTAN POTASSIUM 100 MG: 50 TABLET, FILM COATED ORAL at 09:04

## 2024-04-24 RX ADMIN — Medication 10 ML: at 06:04

## 2024-04-24 RX ADMIN — CARVEDILOL 6.25 MG: 6.25 TABLET, FILM COATED ORAL at 09:04

## 2024-04-24 RX ADMIN — Medication 10 ML: at 05:04

## 2024-04-24 RX ADMIN — SENNOSIDES AND DOCUSATE SODIUM 1 TABLET: 8.6; 5 TABLET ORAL at 09:04

## 2024-04-24 RX ADMIN — INSULIN ASPART 8 UNITS: 100 INJECTION, SOLUTION INTRAVENOUS; SUBCUTANEOUS at 05:04

## 2024-04-24 RX ADMIN — AMLODIPINE BESYLATE 10 MG: 10 TABLET ORAL at 09:04

## 2024-04-24 RX ADMIN — OXYCODONE 5 MG: 5 TABLET ORAL at 09:04

## 2024-04-24 RX ADMIN — INSULIN DETEMIR 20 UNITS: 100 INJECTION, SOLUTION SUBCUTANEOUS at 09:04

## 2024-04-24 RX ADMIN — Medication 10 ML: at 01:04

## 2024-04-24 NOTE — SUBJECTIVE & OBJECTIVE
Principal Problem:Sepsis    Principal Orthopedic Problem: Glenohumeral joint effusion in setting of Group B Strep bacteremia    Interval History: Afebrile, VSS, NAEON.  Pain has been reportedly well controlled, and patient states she has been able to range her shoulder more with decreased pain.  Mild TTP at AC joint.  NPO since midnight for I&D of the right shoulder today.          Review of patient's allergies indicates:   Allergen Reactions    Penicillins Hives     Tolerated cephalosporins 4/2024    Amoxicillin     Grass pollen-june grass standard Other (See Comments)       Current Facility-Administered Medications   Medication Dose Route Frequency Provider Last Rate Last Admin    acetaminophen tablet 1,000 mg  1,000 mg Oral Q8H PRN Jessica Grey MD        amLODIPine tablet 10 mg  10 mg Oral Daily Nima Pompa MD   10 mg at 04/23/24 0846    benzonatate capsule 100 mg  100 mg Oral TID PRN Sahara Figueroa PA-C   100 mg at 04/18/24 2348    carvediloL tablet 6.25 mg  6.25 mg Oral BID Juan Cameron III, MD   6.25 mg at 04/23/24 2325    cefTRIAXone (ROCEPHIN) 2 g in dextrose 5 % in water (D5W) 100 mL IVPB (MB+)  2 g Intravenous Q24H Marilee Begum MD   Stopped at 04/23/24 1858    dextrose 10% bolus 125 mL 125 mL  12.5 g Intravenous PRN Juan Cameron III, MD        dextrose 10% bolus 250 mL 250 mL  25 g Intravenous PRN Juan Cameron III, MD        glucagon (human recombinant) injection 1 mg  1 mg Intramuscular PRN Juan Cameron III, MD        glucose chewable tablet 16 g  16 g Oral PRN Juan Cameron III, MD        glucose chewable tablet 24 g  24 g Oral PRN Juan Cameron III, MD        insulin aspart U-100 pen 0-5 Units  0-5 Units Subcutaneous QID (AC + HS) PRN Juan Cameron III, MD   2 Units at 04/23/24 1720    insulin aspart U-100 pen 8 Units  8 Units Subcutaneous TIDWM Nima Pompa MD   8 Units at 04/23/24 1720    insulin detemir U-100 (Levemir) pen 20 Units   "20 Units Subcutaneous QHS Nima Pompa MD   20 Units at 04/23/24 2325    losartan tablet 100 mg  100 mg Oral Daily Juan Cameron III, MD   100 mg at 04/23/24 0846    morphine injection 4 mg  4 mg Intravenous Q4H PRN Jessica Grey MD        naloxone 0.4 mg/mL injection 0.02 mg  0.02 mg Intravenous PRN Juan Cameron III, MD        oxyCODONE immediate release tablet 5 mg  5 mg Oral Q4H PRN Jessica Grey MD   5 mg at 04/23/24 2329    polyethylene glycol packet 17 g  17 g Oral Daily Juan Cameron III, MD   17 g at 04/22/24 0929    senna-docusate 8.6-50 mg per tablet 1 tablet  1 tablet Oral BID Juan Cameron III, MD   1 tablet at 04/21/24 2106    sodium chloride 0.9% flush 10 mL  10 mL Intravenous PRN Juan Cameron III, MD        sodium chloride 0.9% flush 10 mL  10 mL Intravenous Q6H Nima Pompa MD   10 mL at 04/24/24 0600    And    sodium chloride 0.9% flush 10 mL  10 mL Intravenous PRN Nima Pompa MD         Objective:     Vital Signs (Most Recent):  Temp: 98.6 °F (37 °C) (04/24/24 0334)  Pulse: 82 (04/24/24 0334)  Resp: 18 (04/24/24 0334)  BP: 132/69 (04/24/24 0334)  SpO2: (!) 94 % (04/24/24 0334) Vital Signs (24h Range):  Temp:  [97.7 °F (36.5 °C)-98.6 °F (37 °C)] 98.6 °F (37 °C)  Pulse:  [72-86] 82  Resp:  [16-18] 18  SpO2:  [94 %-99 %] 94 %  BP: (118-146)/(64-76) 132/69     Weight: 95.4 kg (210 lb 5.1 oz)  Height: 5' 7" (170.2 cm)  Body mass index is 32.94 kg/m².      Intake/Output Summary (Last 24 hours) at 4/24/2024 0705  Last data filed at 4/23/2024 1721  Gross per 24 hour   Intake 490 ml   Output --   Net 490 ml        Ortho/SPM Exam  AAOx4  NAD  Reg rate  No increased WOB    RUE:  Skin intact thoughout  Mild ttp at ac joint   Painful limited ROM at shoulder   FROM elbow, and wrist  SILT M/U/R  Motor intact AIN/PIN/M/U/R  Palpable radial pulse 2+     Significant Labs: All pertinent labs within the past 24 hours have been reviewed.    Significant " Imaging: I have reviewed and interpreted all pertinent imaging results/findings.

## 2024-04-24 NOTE — ASSESSMENT & PLAN NOTE
Patient with Paroxysmal (<7 days) atrial fibrillation which is controlled currently with Beta Blocker. Patient is currently in sinus rhythm.[id number] Score: 3  Anticoagulation:  Patient does not appear to be on home anticoagulation.  We will continue aspirin.  In normal sinus rhythm    Paroxysmal AFib.   During episode of sepsis in 2023.   Not on anticoagulation because it was a brief episode per Cardiology.   Rec to look for recurrence with the help of event monitor.   If a recurrence noted on event monitor, consider adding anticoagulation

## 2024-04-24 NOTE — PROGRESS NOTES
[facility] - [facility] Surg ([place] [address])  Orthopedics  Progress Note    Patient Name: Shahida Ortega  MRN: [id number]  Admission Date: 4/15/2024  Hospital Length of Stay: 9 days  Attending Provider: Jessica Grey MD  Primary Care Provider: Janusz Staton MD  Follow-up For: Procedure(s) (LRB):  VATS, WITH CHEST TUBE INSERTION FOR DRAINAGE OF PLEURAL EFFUSION (Right)    Post-Operative Day: 1 Day Post-Op  Subjective:     Principal Problem:Sepsis    Principal Orthopedic Problem: Glenohumeral joint effusion in setting of Group B Strep bacteremia    Interval History: Afebrile, VSS, NAEON.  Pain has been reportedly well controlled, and patient states she has been able to range her shoulder more with decreased pain.  Mild TTP at AC joint.  NPO since midnight for I&D of the right shoulder today.          Review of patient's allergies indicates:   Allergen Reactions    Penicillins Hives     Tolerated cephalosporins 4/2024    Amoxicillin     Grass pollen-june grass standard Other (See Comments)       Current Facility-Administered Medications   Medication Dose Route Frequency Provider Last Rate Last Admin    acetaminophen tablet 1,000 mg  1,000 mg Oral Q8H PRN Jessica Grey MD        amLODIPine tablet 10 mg  10 mg Oral Daily Nima Pompa MD   10 mg at 04/23/24 0846    benzonatate capsule 100 mg  100 mg Oral TID PRN Sahara Figueroa PA-C   100 mg at 04/18/24 2348    carvediloL tablet 6.25 mg  6.25 mg Oral BID Juan Cameron III, MD   6.25 mg at 04/23/24 2325    cefTRIAXone (ROCEPHIN) 2 g in dextrose 5 % in water (D5W) 100 mL IVPB (MB+)  2 g Intravenous Q24H Marilee Begum MD   Stopped at 04/23/24 1858    dextrose 10% bolus 125 mL 125 mL  12.5 g Intravenous PRN Juan Cameron III, MD        dextrose 10% bolus 250 mL 250 mL  25 g Intravenous PRN Juan Cameron III, MD        glucagon (human recombinant) injection 1 mg  1 mg Intramuscular PRN Juan Cameron III, MD        glucose chewable tablet 16 g   "16 g Oral PRN Juan Cameron III, MD        glucose chewable tablet 24 g  24 g Oral PRN Juan Cameron III, MD        insulin aspart U-100 pen 0-5 Units  0-5 Units Subcutaneous QID (AC + HS) PRN Juan Cameron III, MD   2 Units at 04/23/24 1720    insulin aspart U-100 pen 8 Units  8 Units Subcutaneous TIDWM Nima Pompa MD   8 Units at 04/23/24 1720    insulin detemir U-100 (Levemir) pen 20 Units  20 Units Subcutaneous QHS Nima Pompa MD   20 Units at 04/23/24 2325    losartan tablet 100 mg  100 mg Oral Daily Juan Cameron III, MD   100 mg at 04/23/24 0846    morphine injection 4 mg  4 mg Intravenous Q4H PRN Jessica Grey MD        naloxone 0.4 mg/mL injection 0.02 mg  0.02 mg Intravenous PRN Juan Cameron III, MD        oxyCODONE immediate release tablet 5 mg  5 mg Oral Q4H PRN Jessica Grey MD   5 mg at 04/23/24 2329    polyethylene glycol packet 17 g  17 g Oral Daily Juan Cameron III, MD   17 g at 04/22/24 0929    senna-docusate 8.6-50 mg per tablet 1 tablet  1 tablet Oral BID Juan Cameron III, MD   1 tablet at 04/21/24 2106    sodium chloride 0.9% flush 10 mL  10 mL Intravenous PRN Juan Cameron III, MD        sodium chloride 0.9% flush 10 mL  10 mL Intravenous Q6H Nima Pompa MD   10 mL at 04/24/24 0600    And    sodium chloride 0.9% flush 10 mL  10 mL Intravenous PRN Nima Pompa MD         Objective:     Vital Signs (Most Recent):  Temp: 98.6 °F (37 °C) (04/24/24 0334)  Pulse: 82 (04/24/24 0334)  Resp: 18 (04/24/24 0334)  BP: 132/69 (04/24/24 0334)  SpO2: (!) 94 % (04/24/24 0334) Vital Signs (24h Range):  Temp:  [97.7 °F (36.5 °C)-98.6 °F (37 °C)] 98.6 °F (37 °C)  Pulse:  [72-86] 82  Resp:  [16-18] 18  SpO2:  [94 %-99 %] 94 %  BP: (118-146)/(64-76) 132/69     Weight: 95.4 kg (210 lb 5.1 oz)  Height: 5' 7" (170.2 cm)  Body mass index is 32.94 kg/m².      Intake/Output Summary (Last 24 hours) at 4/24/2024 0776  Last data filed at " 4/23/2024 1721  Gross per 24 hour   Intake 490 ml   Output --   Net 490 ml        Ortho/SPM Exam  AAOx4  NAD  Reg rate  No increased WOB    RUE:  Skin intact thoughout  Mild ttp at ac joint   Painful limited ROM at shoulder   FROM elbow, and wrist  SILT M/U/R  Motor intact AIN/PIN/M/U/R  Palpable radial pulse 2+     Significant Labs: All pertinent labs within the past 24 hours have been reviewed.    Significant Imaging: I have reviewed and interpreted all pertinent imaging results/findings.  Assessment/Plan:     Septic arthritis of shoulder, right  Shahida Ortega is a 53 y.o. right-hand dominant female with T2DM and HTN presenting with pain and decreased range of motion of the right shoulder.  Prior to arrival to [facility], the patient was hospitalized at OSH where blood cultures were positive for group B Streptococcus.  She was found to have a prevertebral abscess at T5-T11 and was transferred to C for surgical intervention by Cardiothoracic surgery.  The right shoulder was aspirated by Orthopedic surgery at OSH but there was not enough fluid for cell count.  This aspirate was sent for analysis and the cultures have been no growth to date.  Clinical and MRI findings as well as history of present illness with recent septicemia and known T5-T11 prevertebral abscess are concerning for septic arthritis of the right shoulder and we will plan to treat it as such.    - NPO since midnight   - WBAT right shoulder  - Abx per primary     » Dispo: to OR today for I&D of the right shoulder          PALOMA Cuba MD  Orthopedics  [facility] - Med Surg ([place])

## 2024-04-24 NOTE — PROGRESS NOTES
[facility] - Med Surg ([address] Medicine  Progress Note    Patient Name: Shahida Ortega  MRN: [id number]  Patient Class: IP- Inpatient   Admission Date: 4/15/2024  Length of Stay: 9 days  Attending Physician: Jessica Grey MD  Primary Care Provider: Janusz Staton MD        Subjective:     Principal Problem:Sepsis        HPI:  53F with pmh of dm and htn who presents due to 3 day h/o right shoulder pain with associated redness. Patient reports being seen here in the ED for her symptoms on 4/12/24 and was prescribed Valium with relief of back pain but not shoulder. Patient denies taking any medications today for relief. Patient denies any recent injuries or trauma. In ed pt started on abx and fluids for sepsis and ortho consulted who performed arthrocentesis of the should with fluids sent for culture and pending. Pt denies tobacco, alcohol or drug use. Pt denies previous ortho surgerie     Overview/Hospital Course:  Ms. Ortega was admitted to [facility] for sepsis 2/2 atraumatic right shoulder pain and erythema.  CT right shoulder showed a small effusion.  Ortho was consulted.  Aspiration of the right shoulder 4/15 was dry.  A small amount of blood was aspirated from the subacromial bursa.  Cultures NGTD.  Blood cx were obtained and the patient was started on Cefepime and Vancomycin. They returned positive for Strep agalactiae and antibiotics switched to CTX.  ID was consulted.  Review of records revealed that the patient had a Strep agalactiae UTI in January 2024.  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  MRI right shoulder revealed acromioclavicular joint effusion concerning for AC joint septic arthritis and osteomyelitis, moderate glenohumeral joint effusion and synovitis with subacromial subdeltoid bursitis., as well as cellulitis of the shoulder with myositis of the deltoid and trapezius musculature. Neurosurgery reviewed the images and reported that neurosurgical intervention was not  indicated since the infection did not extend into the epidural space.  Her case was discussed with Dr. Newby of CTS at [facility] [place] who requested [place] for VATS.  She was transferred to [facility] on 4/21 for CTS, Ortho, and ID evaluations.  Per CTS, as leukocytosis has resolved and collection is very small on CT, do not recommend surgical intervention at this time.  CURTIS was ordered to evaluate for endocarditis.    Interval History: No acute events overnight.  Feels good today.  Plan for I&D of the right shoulder with Ortho today, and CURTIS Friday.  Pain remains controlled.  ID following.     Review of Systems   Constitutional:  Negative for chills, fatigue and fever.   Respiratory:  Negative for cough and shortness of breath.    Cardiovascular:  Negative for chest pain, palpitations and leg swelling.   Gastrointestinal:  Negative for abdominal pain, diarrhea, nausea and vomiting.   Genitourinary:  Negative for dysuria and urgency.   Musculoskeletal:  Positive for arthralgias and myalgias.   Neurological:  Negative for dizziness and headaches.   All other systems reviewed and are negative.    Objective:     Vital Signs (Most Recent):  Temp: 98.1 °F (36.7 °C) (04/24/24 0822)  Pulse: 77 (04/24/24 0822)  Resp: 18 (04/24/24 0822)  BP: 132/70 (04/24/24 0822)  SpO2: 95 % (04/24/24 0822) Vital Signs (24h Range):  Temp:  [97.7 °F (36.5 °C)-98.6 °F (37 °C)] 98.1 °F (36.7 °C)  Pulse:  [71-86] 77  Resp:  [16-18] 18  SpO2:  [94 %-99 %] 95 %  BP: (118-146)/(64-76) 132/70     Weight: 95.4 kg (210 lb 5.1 oz)  Body mass index is 32.94 kg/m².    Intake/Output Summary (Last 24 hours) at 4/24/2024 1017  Last data filed at 4/24/2024 0951  Gross per 24 hour   Intake 560 ml   Output --   Net 560 ml      Physical Exam  Constitutional:       Appearance: Normal appearance. She is obese.   HENT:      Head: Normocephalic and atraumatic.   Cardiovascular:      Rate and Rhythm: Normal rate and regular rhythm.      Heart sounds: No murmur  "heard.  Pulmonary:      Effort: Pulmonary effort is normal. No respiratory distress.      Breath sounds: Normal breath sounds. No wheezing or rales.   Abdominal:      General: There is no distension.      Palpations: Abdomen is soft.      Tenderness: There is no abdominal tenderness.   Musculoskeletal:         General: Tenderness (Right shoulder/deltoid wtih limited ROM) present. No deformity.   Skin:     General: Skin is warm and dry.   Neurological:      General: No focal deficit present.      Mental Status: She is alert and oriented to person, place, and time. Mental status is at baseline.         MELD 3.0: 11 at 4/22/2024  2:19 AM  MELD-Na: 6 at 4/22/2024  2:19 AM  Calculated from:  Serum Creatinine: 0.6 mg/dL (Using min of 1 mg/dL) at 4/22/2024  2:19 AM  Serum Sodium: 136 mmol/L at 4/22/2024  2:19 AM  Total Bilirubin: 0.2 mg/dL (Using min of 1 mg/dL) at 4/22/2024  2:19 AM  Serum Albumin: 1.9 g/dL at 4/22/2024  2:19 AM  INR(ratio): 1.0 at 4/22/2024  2:19 AM  Age at listing (hypothetical): 53 years  Sex: Female at 4/22/2024  2:19 AM      Significant Labs:  CBC:  No results for input(s): "WBC", "HGB", "HCT", "PLT" in the last 48 hours.    CMP:  No results for input(s): "NA", "K", "CL", "CO2", "GLU", "BUN", "CREATININE", "CALCIUM", "PROT", "ALBUMIN", "BILITOT", "ALKPHOS", "AST", "ALT", "ANIONGAP", "EGFRNONAA" in the last 48 hours.    Invalid input(s): "ESTGFAFRICA"    PTINR:  No results for input(s): "INR" in the last 48 hours.      Significant Procedures:   Dobutamine Stress Test with Color Flow: No results found for this or any previous visit.    2D Echo: No results found for this or any previous visit.    Assessment/Plan:      * Sepsis  Multifactorial 2/2 Group B Strep bacteremia, paravertebral abscesses, and septic arthritis of the right shoulder  Management as below    Bacteremia due to group B Streptococcus  Blood cx 4/15 positive for GBS  Blood cx 4/17 NGTD  Likely seeding from septic right shoulder and " paravertebral abscesses  2D echo negative for vegetations  Plan for CURTIS Friday   ID following:  Continue Ceftriaxone    Septic arthritis of shoulder, right  CT right shoulder 4/15 showed a small effusion.    Ortho at  was consulted.    Aspiration of the right shoulder 4/15 was dry.  A small amount of blood was aspirated from the subacromial bursa.  Cultures NGTD.    MRI right shoulder 4/19 revealed acromioclavicular joint effusion concerning for AC joint septic arthritis and osteomyelitis, moderate glenohumeral joint effusion and synovitis with subacromial subdeltoid bursitis, as well as cellulitis of the shoulder with myositis of the deltoid and trapezius musculature  [place] [facility] [place] assistance  Plan for I&D of the right shoulder today with ortho    Vertebral abscess  MRI C/T/L-spine 4/18 revealed T5-T11 prevertebral abscess.     Neurosurgery consulted.  Recommend CTS evaluation  CTS Dr. Newby agreed to consult for possible VATS.  Per CTS, as leukocytosis has resolved and collection is very small on CT, do not recommend surgical intervention at this time     Obesity, diabetes, and hypertension syndrome  Noted    Anemia  Patient's anemia is currently controlled. Has not received any PRBCs to date.   Current CBC reviewed-   Lab Results   Component Value Date    HGB 9.8 (L) 04/22/2024    HCT 30.9 (L) 04/22/2024     Monitor serial CBC and transfuse if patient becomes hemodynamically unstable, symptomatic or H/H drops below 7/21.    Class 1 obesity due to excess calories in adult  Body mass index is 32.94 kg/m². Morbid obesity complicates all aspects of disease management from diagnostic modalities to treatment. Weight loss encouraged and health benefits explained to patient.         A-fib  Patient with Paroxysmal (<7 days) atrial fibrillation which is controlled currently with Beta Blocker. Patient is currently in sinus rhythm.[id number] Score: 3  Anticoagulation:  Patient does not appear to  be on home anticoagulation.  We will continue aspirin.  In normal sinus rhythm    Paroxysmal AFib.   During episode of sepsis in 2023.   Not on anticoagulation because it was a brief episode per Cardiology.   Rec to look for recurrence with the help of event monitor.   If a recurrence noted on event monitor, consider adding anticoagulation     Prolonged Q-T interval on ECG  Noted history.    We will avoid prolonging agents as much as possible      DM2 (diabetes mellitus, type 2)  Patient's FSGs are uncontrolled due to hyperglycemia on current medication regimen.  Last A1c reviewed-   Lab Results   Component Value Date    HGBA1C 11.3 (H) 03/16/2022     Most recent fingerstick glucose reviewed-   Recent Labs   Lab 04/23/24  1134 04/23/24  1629 04/23/24  1931 04/24/24  0820   POCTGLUCOSE 111* 203* 154* 102       Current correctional scale  Low  Maintain anti-hyperglycemic dose as follows-   Antihyperglycemics (From admission, onward)      Start     Stop Route Frequency Ordered    04/18/24 2100  insulin detemir U-100 (Levemir) pen 20 Units         -- SubQ Nightly 04/18/24 1355    04/18/24 1645  insulin aspart U-100 pen 8 Units         -- SubQ 3 times daily with meals 04/18/24 1355    04/15/24 1504  insulin aspart U-100 pen 0-5 Units         -- SubQ Before meals & nightly PRN 04/15/24 1404          Hold Oral hypoglycemics while patient is in the hospital.    Hypertension  Chronic and stable  Continue Norvasc 10mg daily  Continue Coreg 6.25mg BID  Continue Losartan 100mg daily    Elevated procalcitonin  In setting of active infection.    Treatment as stated below        VTE Risk Mitigation (From admission, onward)           Ordered     IP VTE HIGH RISK PATIENT  Once         04/15/24 1404     Place sequential compression device  Until discontinued         04/15/24 1404                    Discharge Planning   NAYELI: 4/26/2024     Code Status: Full Code   Is the patient medically ready for discharge?:     Reason for patient  still in hospital (select all that apply): Patient trending condition  Discharge Plan A: Home   Discharge Delays: None known at this time              Jessica Grey MD  Department of Hospital Medicine   [facility] Surg ([place])

## 2024-04-24 NOTE — ASSESSMENT & PLAN NOTE
Patient's FSGs are uncontrolled due to hyperglycemia on current medication regimen.  Last A1c reviewed-   Lab Results   Component Value Date    HGBA1C 11.3 (H) 03/16/2022     Most recent fingerstick glucose reviewed-   Recent Labs   Lab 04/23/24  1134 04/23/24  1629 04/23/24  1931 04/24/24  0820   POCTGLUCOSE 111* 203* 154* 102       Current correctional scale  Low  Maintain anti-hyperglycemic dose as follows-   Antihyperglycemics (From admission, onward)    Start     Stop Route Frequency Ordered    04/18/24 2100  insulin detemir U-100 (Levemir) pen 20 Units         -- SubQ Nightly 04/18/24 1355    04/18/24 1645  insulin aspart U-100 pen 8 Units         -- SubQ 3 times daily with meals 04/18/24 1355    04/15/24 1504  insulin aspart U-100 pen 0-5 Units         -- SubQ Before meals & nightly PRN 04/15/24 1404        Hold Oral hypoglycemics while patient is in the hospital.

## 2024-04-24 NOTE — ASSESSMENT & PLAN NOTE
T5-T11 preverbal abscess  R septic shoulder    -OSH course notable for MRI/CT of R shoulder noted soft tissue edema/joint effusion concerning for septic arthritis and osteomyelitis. MRI imaging also with concern for T5-T11 preverterbral abscess.   -Evaluated by OSH ortho - dry tap, though was able to aspirate small amount of blood that was sent for cx that have been no growth to date (not enough to send for fluid studies). Blcx on admit with GBS. She was started on vanc/cefepime. After initial ID evaluation at OSH, Cefepime/Vancomycin were deescalated to Ceftriaxone due to NGTD on cultures.  She was transferred to [facility] for further surgical evaluation. Orthopedic surgery was consulted for concerns of septic arthritis and CTS consulted for VATS evaluation.     Of note, pt had had urine cx obtained 1/2024 that was positive for MSSA and GBS, s/p nitrofurantoin. Also had Ecoli bacteremia in 5/2023, tx with IV and transitioned to cefdinir.     -Thoracic surgery eval 04/22 noted no intervention indicated at the time      Recommendations:  -CURTIS for further source evaluation  -Ortho planning for potential intervention. Followup recs  -Continue ceftriaxone 2g iv daily. Anticipate prolonged course of abx therapy given spinal/shoulder infection. Atleast 6 weeks antibiotics course expected. OPAT TBD

## 2024-04-24 NOTE — SUBJECTIVE & OBJECTIVE
Interval History: No acute events overnight, afebrile, hemodynamically stable. Reports improvement in R shoulder ROM. Denies fever, chills, abdominal symptoms.     Objective:     Vital Signs (Most Recent):  Temp: 99.9 °F (37.7 °C) (04/24/24 1102)  Pulse: 74 (04/24/24 1102)  Resp: 18 (04/24/24 1102)  BP: 121/68 (04/24/24 1102)  SpO2: 96 % (04/24/24 1102) Vital Signs (24h Range):  Temp:  [97.7 °F (36.5 °C)-99.9 °F (37.7 °C)] 99.9 °F (37.7 °C)  Pulse:  [71-86] 74  Resp:  [16-18] 18  SpO2:  [94 %-99 %] 96 %  BP: (118-146)/(64-76) 121/68     Weight: 95.4 kg (210 lb 5.1 oz)  Body mass index is 32.94 kg/m².    Estimated Creatinine Clearance: 131.5 mL/min (based on SCr of 0.6 mg/dL).     Physical Exam  Vitals and nursing note reviewed.   Constitutional:       General: She is not in acute distress.     Appearance: She is not ill-appearing, toxic-appearing or diaphoretic.   HENT:      Head: Normocephalic and atraumatic.      Mouth/Throat:      Mouth: Mucous membranes are moist.   Eyes:      General: No scleral icterus.        Right eye: No discharge.         Left eye: No discharge.   Cardiovascular:      Heart sounds: Normal heart sounds. No murmur heard.  Pulmonary:      Effort: Pulmonary effort is normal. No respiratory distress.      Breath sounds: Normal breath sounds. No wheezing, rhonchi or rales.   Abdominal:      General: Bowel sounds are normal.      Palpations: Abdomen is soft.      Tenderness: There is no abdominal tenderness. There is no guarding or rebound.   Musculoskeletal:      Right shoulder: No bony tenderness. Decreased range of motion.      Cervical back: No rigidity.      Right lower leg: No edema.      Left lower leg: No edema.   Skin:     General: Skin is warm and dry.      Coloration: Skin is not jaundiced.   Neurological:      Mental Status: She is oriented to person, place, and time. Mental status is at baseline.   Psychiatric:         Mood and Affect: Mood normal.         Behavior: Behavior normal.           Significant Labs: All pertinent labs within the past 24 hours have been reviewed.  LABS:  Recent Labs   Lab 04/19/24  0411 04/21/24  0524 04/22/24  0219   * 136 136   K 4.5 3.6 3.6   CL 98 101 100   CO2 23 27 28   BUN 8 10 9   CREATININE 0.5 0.6 0.6   * 165* 131*   ANIONGAP 14 8 8     Recent Labs   Lab 04/18/24  0400   MG 1.7   PHOS 2.8     Recent Labs   Lab 04/18/24  0400 04/21/24  0524 04/22/24  0219   AST 13 14 16   ALT 13 11 13   ALKPHOS 101 72 76   BILITOT 0.4 0.2 0.2   ALBUMIN 1.7* 1.8* 1.9*     POCT Glucose:   Recent Labs   Lab 04/24/24  0820 04/24/24  1107 04/24/24  1426   POCTGLUCOSE 102 112* 96    Recent Labs   Lab 04/19/24  0810 04/21/24  0525 04/22/24  0219   WBC 8.03 7.33 6.82   HGB 11.3* 9.8* 9.8*   HCT 36.2* 31.2* 30.9*    237 262   GRAN 57.0 56.2  4.1 54.6  3.7          Micro  Blood Cultures  Lab Results   Component Value Date    LABBLOO No Growth after 4 days. 04/17/2024    LABBLOO No Growth after 4 days. 04/17/2024    LABBLOO No Growth after 4 days. 04/15/2024    LABBLOO No Growth after 4 days. 04/15/2024    LABBLOO  04/15/2024     Gram stain cameron bottle: Gram positive cocci in chains resembling Strep    LABBLOO  04/15/2024     Results called to and read back by:Lizz Wells  04/16/2024  08:12    LABBLOO (A) 04/15/2024     STREPTOCOCCUS AGALACTIAE (GROUP B)  Beta-hemolytic streptococci are routinely susceptible to   penicillins,cephalosporins and carbapenems.  For susceptibility see order #[id number]       Significant Imaging: I have reviewed all pertinent imaging results/findings within the past 24 hours.  CT Chest Without Contrast   Final Result      Muscular swelling and fat stranding in the soft tissues of the right shoulder.  May represent infection, inflammation or changes from recent right shoulder aspiration.  See dedicated MRI report.      Prevertebral soft tissue density mass as described in detail above.  Abscess formation is a diagnostic consideration.   See dedicated MRI thoracic spine report for further details.      No acute pulmonary abnormality.  No pleural effusion is seen.      Electronically signed by resident: Bernard Szymanski   Date:    04/22/2024   Time:    09:10      Electronically signed by: Be Ji   Date:    04/22/2024   Time:    10:11      MRI Shoulder W WO Contrast Right   Final Result      1. Acromioclavicular joint effusion with adjacent signal change of the distal clavicle and the acromion with surrounding soft tissue edema.  Findings are concerning for AC joint septic arthritis and osteomyelitis.   2. Moderate glenohumeral joint effusion and synovitis with subacromial subdeltoid bursitis.  Consider fluid sampling.   3. Cellulitis of the shoulder with myositis of the deltoid and trapezius musculature.   4. Mild posterior subluxation of the humeral head with respect to the glenoid, likely a result of the joint effusion.   5. Low-grade partial-thickness articular sided tear of the conjoined tendon at the footprint.  Background tendinosis of the supraspinatus and infraspinatus.   6. Tenosynovitis of the long head biceps tendon.         Electronically signed by: Erich Yarbrough   Date:    04/19/2024   Time:    23:42      MRI Spine Cervical-Thoracic-Lumbar W W/O Contrast (XPD)   Final Result   Abnormal      1. Abnormal prevertebral/paravertebral signal and enhancement spanning from approximately the T5 through T11 levels with appearance most suggestive for prevertebral abscess centered at approximately the T7-8 level.  Minimal osseous edema is seen at this level with no definite abnormal signal or enhancement within the intervertebral disc space.  Appearance is most suggestive for abscess with no definite epidural extension or epidural abscess seen.   2. Multilevel cervical spondylosis as detailed above.   3. No acute lumbar spine abnormalities identified.   This report was flagged in Epic as abnormal.         Electronically signed by: Lizette  MD Bridget   Date:    04/18/2024   Time:    19:30      MRI Lumbar Spine Without Contrast   Final Result      Incomplete exam with suboptimal diagnostic assessment.  Limited findings as above.  Repeat exam with adequate pain control can be obtained as warranted.         Electronically signed by: Steven Valderrama   Date:    04/16/2024   Time:    17:40      CT Shoulder With Contrast Right   Final Result      Soft tissue edema in the muscles and subcutaneous fat surrounding the right shoulder with probable small joint effusion.  Findings could be related to cellulitis and/or myositis in this patient with elevated CRP.  No focal osseous erosion.  Consider correlation with arthrocentesis if there is concern for septic joint.      Nonspecific incompletely imaged soft tissue edema and paraspinal soft tissue fullness in the posterior mediastinum along the lower thoracic spine at roughly T8-T10.  Unclear if these findings are exaggerated by wall thickening of the esophagus.  Suggest correlation with symptoms and consider further evaluation with spine MRI if the patient is experiencing significant back pain or concern for discitis/osteomyelitis.      Other findings discussed in the body of the report.         Electronically signed by: Franklin Patricia MD   Date:    04/15/2024   Time:    12:39      X-ray Shoulder 2 or More Views Right   Final Result      As above.         Electronically signed by: Steven Valderrama   Date:    04/15/2024   Time:    08:08          Inpatient Medications:  Continuous Infusions:  Current Facility-Administered Medications   Medication Dose Route Frequency Last Rate Last Admin     Scheduled Meds:  Current Facility-Administered Medications   Medication Dose Route Frequency    amLODIPine  10 mg Oral Daily    carvediloL  6.25 mg Oral BID    cefTRIAXone (Rocephin) IV (PEDS and ADULTS)  2 g Intravenous Q24H    insulin aspart U-100  8 Units Subcutaneous TIDWM    insulin detemir U-100  20 Units Subcutaneous  QHS    losartan  100 mg Oral Daily    polyethylene glycol  17 g Oral Daily    senna-docusate 8.6-50 mg  1 tablet Oral BID    sodium chloride 0.9%  10 mL Intravenous Q6H     PRN Meds:  Current Facility-Administered Medications:     acetaminophen, 1,000 mg, Oral, Q8H PRN    benzonatate, 100 mg, Oral, TID PRN    dextrose 10%, 12.5 g, Intravenous, PRN    dextrose 10%, 25 g, Intravenous, PRN    glucagon (human recombinant), 1 mg, Intramuscular, PRN    glucose, 16 g, Oral, PRN    glucose, 24 g, Oral, PRN    insulin aspart U-100, 0-5 Units, Subcutaneous, QID (AC + HS) PRN    morphine, 4 mg, Intravenous, Q4H PRN    naloxone, 0.02 mg, Intravenous, PRN    oxyCODONE, 5 mg, Oral, Q4H PRN    sodium chloride 0.9%, 10 mL, Intravenous, PRN    Flushing PICC/Midline Protocol, , , Until Discontinued **AND** sodium chloride 0.9%, 10 mL, Intravenous, Q6H **AND** sodium chloride 0.9%, 10 mL, Intravenous, PRN

## 2024-04-24 NOTE — ASSESSMENT & PLAN NOTE
Blood cx 4/15 positive for GBS  Blood cx 4/17 NGTD  Likely seeding from septic right shoulder and paravertebral abscesses  2D echo negative for vegetations  Plan for CURTIS Friday   ID following:  Continue Ceftriaxone

## 2024-04-24 NOTE — PROGRESS NOTES
[facility] ([place] [address])  Infectious Disease  Progress Note    Patient Name: Shahida Ortega  MRN: [id number]  Admission Date: 4/15/2024  Length of Stay: 9 days  Attending Physician: Jessica Grey MD  Primary Care Provider: Janusz Staton MD    Isolation Status: No active isolations  Assessment/Plan:      ID  Bacteremia due to group B Streptococcus  T5-T11 preverbal abscess  R septic shoulder    -OSH course notable for MRI/CT of R shoulder noted soft tissue edema/joint effusion concerning for septic arthritis and osteomyelitis. MRI imaging also with concern for T5-T11 preverterbral abscess.   -Evaluated by OSH ortho - dry tap, though was able to aspirate small amount of blood that was sent for cx that have been no growth to date (not enough to send for fluid studies). Blcx on admit with GBS. She was started on vanc/cefepime. After initial ID evaluation at OSH, Cefepime/Vancomycin were deescalated to Ceftriaxone due to NGTD on cultures.  She was transferred to [facility] for further surgical evaluation. Orthopedic surgery was consulted for concerns of septic arthritis and CTS consulted for VATS evaluation.     Of note, pt had had urine cx obtained 1/2024 that was positive for MSSA and GBS, s/p nitrofurantoin. Also had Ecoli bacteremia in 5/2023, tx with IV and transitioned to cefdinir.     -Thoracic surgery eval 04/22 noted no intervention indicated at the time      Recommendations:  -CURTIS for further source evaluation  -Ortho planning for potential intervention. Followup recs  -Continue ceftriaxone 2g iv daily. Anticipate prolonged course of abx therapy given spinal/shoulder infection. Atleast 6 weeks antibiotics course expected. OPAT TBD        Thank you for your consult. I will follow-up with patient. Please contact us if you have any additional questions.    Aamir Ayala, DO  Infectious Disease  [facility] ([facility]    Subjective:     Principal Problem:Sepsis    HPI: 54 yo female with  DM/HTN who was initially  admitted to [facility] for R shoulder pain with associated redness, and found to have GBS bacteremia. ID consulted for bacteremia. OSH course notable for MRI/CT of R shoulder noted soft tissue edema/joint effusion concerning for septic arthritis and osteomyelitis. MRI imaging also with concern for T5-T11 preverterbral abscess. She was evaluated by OSH ortho - dry tap, though was able to aspirate small amount of blood that was sent for cx that have been no growth to date (not enough to send for fluid studies). Blcx on admit with GBS. She was started on vanc/cefepime. Upon initial ID evaluation at OSH, patient denied toxic habits, reported rash with PCN, or trauma/travel/TB exposure. She denied abdominal/respiratory/dental or  symptoms prior to admission, main complaint was back pain and R shoulder pain. After initial ID evaluation at OSH, Cefepime/Vancomycin were deescalated to Ceftriaxone due to NGTD on cultures.  She was transferred to [facility] for further surgical evaluation. Orthopedic surgery was consulted for concerns of septic arthritis and CTS consulted for VATS evaluation.    Of note, pt had had urine cx obtained 1/2024 that was positive for MSSA and GBS, s/p nitrofurantoin. Also had Ecoli bacteremia in 5/2023, tx with IV and transitioned to cefdinir.        Interval History: No acute events overnight, afebrile, hemodynamically stable. Reports improvement in R shoulder ROM. Denies fever, chills, abdominal symptoms.     Objective:     Vital Signs (Most Recent):  Temp: 99.9 °F (37.7 °C) (04/24/24 1102)  Pulse: 74 (04/24/24 1102)  Resp: 18 (04/24/24 1102)  BP: 121/68 (04/24/24 1102)  SpO2: 96 % (04/24/24 1102) Vital Signs (24h Range):  Temp:  [97.7 °F (36.5 °C)-99.9 °F (37.7 °C)] 99.9 °F (37.7 °C)  Pulse:  [71-86] 74  Resp:  [16-18] 18  SpO2:  [94 %-99 %] 96 %  BP: (118-146)/(64-76) 121/68     Weight: 95.4 kg (210 lb 5.1 oz)  Body mass index is 32.94 kg/m².    Estimated Creatinine  Clearance: 131.5 mL/min (based on SCr of 0.6 mg/dL).     Physical Exam  Vitals and nursing note reviewed.   Constitutional:       General: She is not in acute distress.     Appearance: She is not ill-appearing, toxic-appearing or diaphoretic.   HENT:      Head: Normocephalic and atraumatic.      Mouth/Throat:      Mouth: Mucous membranes are moist.   Eyes:      General: No scleral icterus.        Right eye: No discharge.         Left eye: No discharge.   Cardiovascular:      Heart sounds: Normal heart sounds. No murmur heard.  Pulmonary:      Effort: Pulmonary effort is normal. No respiratory distress.      Breath sounds: Normal breath sounds. No wheezing, rhonchi or rales.   Abdominal:      General: Bowel sounds are normal.      Palpations: Abdomen is soft.      Tenderness: There is no abdominal tenderness. There is no guarding or rebound.   Musculoskeletal:      Right shoulder: No bony tenderness. Decreased range of motion.      Cervical back: No rigidity.      Right lower leg: No edema.      Left lower leg: No edema.   Skin:     General: Skin is warm and dry.      Coloration: Skin is not jaundiced.   Neurological:      Mental Status: She is oriented to person, place, and time. Mental status is at baseline.   Psychiatric:         Mood and Affect: Mood normal.         Behavior: Behavior normal.          Significant Labs: All pertinent labs within the past 24 hours have been reviewed.  LABS:  Recent Labs   Lab 04/19/24  0411 04/21/24  0524 04/22/24  0219   * 136 136   K 4.5 3.6 3.6   CL 98 101 100   CO2 23 27 28   BUN 8 10 9   CREATININE 0.5 0.6 0.6   * 165* 131*   ANIONGAP 14 8 8     Recent Labs   Lab 04/18/24  0400   MG 1.7   PHOS 2.8     Recent Labs   Lab 04/18/24  0400 04/21/24  0524 04/22/24  0219   AST 13 14 16   ALT 13 11 13   ALKPHOS 101 72 76   BILITOT 0.4 0.2 0.2   ALBUMIN 1.7* 1.8* 1.9*     POCT Glucose:   Recent Labs   Lab 04/24/24  0820 04/24/24  1107 04/24/24  1426   POCTGLUCOSE 102 112*  96    Recent Labs   Lab 04/19/24  0810 04/21/24  0525 04/22/24  0219   WBC 8.03 7.33 6.82   HGB 11.3* 9.8* 9.8*   HCT 36.2* 31.2* 30.9*    237 262   GRAN 57.0 56.2  4.1 54.6  3.7          Micro  Blood Cultures  Lab Results   Component Value Date    LABBLOO No Growth after 4 days. 04/17/2024    LABBLOO No Growth after 4 days. 04/17/2024    LABBLOO No Growth after 4 days. 04/15/2024    LABBLOO No Growth after 4 days. 04/15/2024    LABBLOO  04/15/2024     Gram stain cameron bottle: Gram positive cocci in chains resembling Strep    LABBLOO  04/15/2024     Results called to and read back by:Lizz Wells  04/16/2024  08:12    LABBLOO (A) 04/15/2024     STREPTOCOCCUS AGALACTIAE (GROUP B)  Beta-hemolytic streptococci are routinely susceptible to   penicillins,cephalosporins and carbapenems.  For susceptibility see order #[id number]       Significant Imaging: I have reviewed all pertinent imaging results/findings within the past 24 hours.  CT Chest Without Contrast   Final Result      Muscular swelling and fat stranding in the soft tissues of the right shoulder.  May represent infection, inflammation or changes from recent right shoulder aspiration.  See dedicated MRI report.      Prevertebral soft tissue density mass as described in detail above.  Abscess formation is a diagnostic consideration.  See dedicated MRI thoracic spine report for further details.      No acute pulmonary abnormality.  No pleural effusion is seen.      Electronically signed by resident: Bernard Szymanski   Date:    04/22/2024   Time:    09:10      Electronically signed by: Be Ji   Date:    04/22/2024   Time:    10:11      MRI Shoulder W WO Contrast Right   Final Result      1. Acromioclavicular joint effusion with adjacent signal change of the distal clavicle and the acromion with surrounding soft tissue edema.  Findings are concerning for AC joint septic arthritis and osteomyelitis.   2. Moderate glenohumeral joint effusion and  synovitis with subacromial subdeltoid bursitis.  Consider fluid sampling.   3. Cellulitis of the shoulder with myositis of the deltoid and trapezius musculature.   4. Mild posterior subluxation of the humeral head with respect to the glenoid, likely a result of the joint effusion.   5. Low-grade partial-thickness articular sided tear of the conjoined tendon at the footprint.  Background tendinosis of the supraspinatus and infraspinatus.   6. Tenosynovitis of the long head biceps tendon.         Electronically signed by: Erich Yarbrough   Date:    04/19/2024   Time:    23:42      MRI Spine Cervical-Thoracic-Lumbar W W/O Contrast (XPD)   Final Result   Abnormal      1. Abnormal prevertebral/paravertebral signal and enhancement spanning from approximately the T5 through T11 levels with appearance most suggestive for prevertebral abscess centered at approximately the T7-8 level.  Minimal osseous edema is seen at this level with no definite abnormal signal or enhancement within the intervertebral disc space.  Appearance is most suggestive for abscess with no definite epidural extension or epidural abscess seen.   2. Multilevel cervical spondylosis as detailed above.   3. No acute lumbar spine abnormalities identified.   This report was flagged in Epic as abnormal.         Electronically signed by: Lizette Gill MD   Date:    04/18/2024   Time:    19:30      MRI Lumbar Spine Without Contrast   Final Result      Incomplete exam with suboptimal diagnostic assessment.  Limited findings as above.  Repeat exam with adequate pain control can be obtained as warranted.         Electronically signed by: Steven Valderrama   Date:    04/16/2024   Time:    17:40      CT Shoulder With Contrast Right   Final Result      Soft tissue edema in the muscles and subcutaneous fat surrounding the right shoulder with probable small joint effusion.  Findings could be related to cellulitis and/or myositis in this patient with elevated CRP.  No  focal osseous erosion.  Consider correlation with arthrocentesis if there is concern for septic joint.      Nonspecific incompletely imaged soft tissue edema and paraspinal soft tissue fullness in the posterior mediastinum along the lower thoracic spine at roughly T8-T10.  Unclear if these findings are exaggerated by wall thickening of the esophagus.  Suggest correlation with symptoms and consider further evaluation with spine MRI if the patient is experiencing significant back pain or concern for discitis/osteomyelitis.      Other findings discussed in the body of the report.         Electronically signed by: Franklin Patricia MD   Date:    04/15/2024   Time:    12:39      X-ray Shoulder 2 or More Views Right   Final Result      As above.         Electronically signed by: Steven Valderrama   Date:    04/15/2024   Time:    08:08          Inpatient Medications:  Continuous Infusions:  Current Facility-Administered Medications   Medication Dose Route Frequency Last Rate Last Admin     Scheduled Meds:  Current Facility-Administered Medications   Medication Dose Route Frequency    amLODIPine  10 mg Oral Daily    carvediloL  6.25 mg Oral BID    cefTRIAXone (Rocephin) IV (PEDS and ADULTS)  2 g Intravenous Q24H    insulin aspart U-100  8 Units Subcutaneous TIDWM    insulin detemir U-100  20 Units Subcutaneous QHS    losartan  100 mg Oral Daily    polyethylene glycol  17 g Oral Daily    senna-docusate 8.6-50 mg  1 tablet Oral BID    sodium chloride 0.9%  10 mL Intravenous Q6H     PRN Meds:  Current Facility-Administered Medications:     acetaminophen, 1,000 mg, Oral, Q8H PRN    benzonatate, 100 mg, Oral, TID PRN    dextrose 10%, 12.5 g, Intravenous, PRN    dextrose 10%, 25 g, Intravenous, PRN    glucagon (human recombinant), 1 mg, Intramuscular, PRN    glucose, 16 g, Oral, PRN    glucose, 24 g, Oral, PRN    insulin aspart U-100, 0-5 Units, Subcutaneous, QID (AC + HS) PRN    morphine, 4 mg, Intravenous, Q4H PRN    naloxone,  0.02 mg, Intravenous, PRN    oxyCODONE, 5 mg, Oral, Q4H PRN    sodium chloride 0.9%, 10 mL, Intravenous, PRN    Flushing PICC/Midline Protocol, , , Until Discontinued **AND** sodium chloride 0.9%, 10 mL, Intravenous, Q6H **AND** sodium chloride 0.9%, 10 mL, Intravenous, PRN

## 2024-04-24 NOTE — PLAN OF CARE
Problem: Pain Acute  Goal: Acceptable Pain Control and Functional Ability  Outcome: Progressing     Problem: Fall Injury Risk  Goal: Absence of Fall and Fall-Related Injury  Outcome: Progressing     Problem: Adult Inpatient Plan of Care  Goal: Plan of Care Review  Outcome: Progressing  Goal: Patient-Specific Goal (Individualized)  Outcome: Progressing  Goal: Absence of Hospital-Acquired Illness or Injury  Outcome: Progressing  Goal: Optimal Comfort and Wellbeing  Outcome: Progressing  Goal: Readiness for Transition of Care  Outcome: Progressing

## 2024-04-24 NOTE — ASSESSMENT & PLAN NOTE
CT right shoulder 4/15 showed a small effusion.    Ortho at  was consulted.    Aspiration of the right shoulder 4/15 was dry.  A small amount of blood was aspirated from the subacromial bursa.  Cultures NGTD.    MRI right shoulder 4/19 revealed acromioclavicular joint effusion concerning for AC joint septic arthritis and osteomyelitis, moderate glenohumeral joint effusion and synovitis with subacromial subdeltoid bursitis, as well as cellulitis of the shoulder with myositis of the deltoid and trapezius musculature  Ortho at [facility] consulted, appreciate assistance  Plan for I&D of the right shoulder today with ortho

## 2024-04-24 NOTE — PROGRESS NOTES
Ssurgery postponed per Dr. Bailey. Report called to floor RN; Pt allowed to eat as she will not have surgery today. Pt transported to [facility] via wheelchair and PCT.

## 2024-04-24 NOTE — ASSESSMENT & PLAN NOTE
Shahida Ortega is a 53 y.o. right-hand dominant female with T2DM and HTN presenting with pain and decreased range of motion of the right shoulder.  Prior to arrival to [facility], the patient was hospitalized at OSH where blood cultures were positive for group B Streptococcus.  She was found to have a prevertebral abscess at T5-T11 and was transferred to [facility] for surgical intervention by Cardiothoracic surgery.  The right shoulder was aspirated by Orthopedic surgery at OSH but there was not enough fluid for cell count.  This aspirate was sent for analysis and the cultures have been no growth to date.  Clinical and MRI findings as well as history of present illness with recent septicemia and known T5-T11 prevertebral abscess are concerning for septic arthritis of the right shoulder and we will plan to treat it as such.    - NPO since midnight   - WBAT right shoulder  - Abx per primary     » Dispo: to OR today for I&D of the right shoulder

## 2024-04-24 NOTE — SUBJECTIVE & OBJECTIVE
Interval History: No acute events overnight.  Feels good today.  Plan for I&D of the right shoulder with Ortho today, and CURTIS Friday.  Pain remains controlled.  ID following.     Review of Systems   Constitutional:  Negative for chills, fatigue and fever.   Respiratory:  Negative for cough and shortness of breath.    Cardiovascular:  Negative for chest pain, palpitations and leg swelling.   Gastrointestinal:  Negative for abdominal pain, diarrhea, nausea and vomiting.   Genitourinary:  Negative for dysuria and urgency.   Musculoskeletal:  Positive for arthralgias and myalgias.   Neurological:  Negative for dizziness and headaches.   All other systems reviewed and are negative.    Objective:     Vital Signs (Most Recent):  Temp: 98.1 °F (36.7 °C) (04/24/24 0822)  Pulse: 77 (04/24/24 0822)  Resp: 18 (04/24/24 0822)  BP: 132/70 (04/24/24 0822)  SpO2: 95 % (04/24/24 0822) Vital Signs (24h Range):  Temp:  [97.7 °F (36.5 °C)-98.6 °F (37 °C)] 98.1 °F (36.7 °C)  Pulse:  [71-86] 77  Resp:  [16-18] 18  SpO2:  [94 %-99 %] 95 %  BP: (118-146)/(64-76) 132/70     Weight: 95.4 kg (210 lb 5.1 oz)  Body mass index is 32.94 kg/m².    Intake/Output Summary (Last 24 hours) at 4/24/2024 1017  Last data filed at 4/24/2024 0951  Gross per 24 hour   Intake 560 ml   Output --   Net 560 ml      Physical Exam  Constitutional:       Appearance: Normal appearance. She is obese.   HENT:      Head: Normocephalic and atraumatic.   Cardiovascular:      Rate and Rhythm: Normal rate and regular rhythm.      Heart sounds: No murmur heard.  Pulmonary:      Effort: Pulmonary effort is normal. No respiratory distress.      Breath sounds: Normal breath sounds. No wheezing or rales.   Abdominal:      General: There is no distension.      Palpations: Abdomen is soft.      Tenderness: There is no abdominal tenderness.   Musculoskeletal:         General: Tenderness (Right shoulder/deltoid wtih limited ROM) present. No deformity.   Skin:     General: Skin is  "warm and dry.   Neurological:      General: No focal deficit present.      Mental Status: She is alert and oriented to person, place, and time. Mental status is at baseline.         MELD 3.0: 11 at 4/22/2024  2:19 AM  MELD-Na: 6 at 4/22/2024  2:19 AM  Calculated from:  Serum Creatinine: 0.6 mg/dL (Using min of 1 mg/dL) at 4/22/2024  2:19 AM  Serum Sodium: 136 mmol/L at 4/22/2024  2:19 AM  Total Bilirubin: 0.2 mg/dL (Using min of 1 mg/dL) at 4/22/2024  2:19 AM  Serum Albumin: 1.9 g/dL at 4/22/2024  2:19 AM  INR(ratio): 1.0 at 4/22/2024  2:19 AM  Age at listing (hypothetical): 53 years  Sex: Female at 4/22/2024  2:19 AM      Significant Labs:  CBC:  No results for input(s): "WBC", "HGB", "HCT", "PLT" in the last 48 hours.    CMP:  No results for input(s): "NA", "K", "CL", "CO2", "GLU", "BUN", "CREATININE", "CALCIUM", "PROT", "ALBUMIN", "BILITOT", "ALKPHOS", "AST", "ALT", "ANIONGAP", "EGFRNONAA" in the last 48 hours.    Invalid input(s): "ESTGFAFRICA"    PTINR:  No results for input(s): "INR" in the last 48 hours.      Significant Procedures:   Dobutamine Stress Test with Color Flow: No results found for this or any previous visit.    2D Echo: No results found for this or any previous visit.  "

## 2024-04-24 NOTE — ANESTHESIA PREPROCEDURE EVALUATION
[facility]  Anesthesia Pre-Operative Evaluation         Patient Name: Shahida Ortega  YOB: 1970  MRN: [id number]    SUBJECTIVE:     Pre-operative evaluation for Procedure(s) (LRB):  Transesophageal echo (CURTIS) intra-procedure log documentation (N/A)     04/24/2024    Shahida Ortega is a 53 y.o. female w/ a significant PMHx of HTN, T2DM, and obesity transferred from [facility] with T5-T11 prevertebral abscess in setting of Strep bacteremia (cx negative since 4/17) as well as R septic arthritis. Surgery postponed till Thursday.     Patient now presents for the above procedure(s).    Echo 04/15/24    Left Ventricle: The left ventricle is normal in size. There is moderate concentric hypertrophy. There is normal systolic function with a visually estimated ejection fraction of 60 - 65%. Grade I diastolic dysfunction.    Right Ventricle: Normal right ventricular cavity size. Systolic function is normal.    Tricuspid Valve: There is mild regurgitation.    Aorta: Aortic root is mildly dilated measuring 3.59 cm.    Pulmonary Artery: The estimated pulmonary artery systolic pressure is 39 mmHg.    IVC/SVC: Normal venous pressure at 3 mmHg.    Pericardium: There is a trivial posterior effusion.      LDA:        Midline Catheter - Single Lumen 04/20/24 0800 Left basilic vein (medial side of arm) other (see comments) (Active)   Site Assessment Clean;Dry;Intact;No redness;No swelling;No warmth;No drainage 04/22/24 2319   IV Device Securement catheter securement device 04/22/24 2319   Line Status Saline locked 04/22/24 2319   Extremity Circumference (cm) 33 cm 04/20/24 0800   Dressing Type No CHG impregnated dressing/sponge (patient < 2 mos) 04/22/24 2319   Dressing Status Clean;Dry;Intact 04/22/24 2319   Dressing Intervention Integrity maintained 04/22/24 2319   Dressing Change Due 04/27/24 04/22/24  2319   Site Change Due 05/03/24 04/22/24 2319   Reason Not Rotated Not due 04/22/24 2319   Number of days: 3       Prev airway (12/27/20):    Mask Ventilation:  Easy with oral airway    Method of Intubation:  Direct    Blade:  Nieto 2    Laryngeal View Grade: Grade I - full view of chords      Difficult Airway Encountered?: No      Complications:  None      Drips:   Current Facility-Administered Medications   Medication Dose Route Frequency Last Rate Last Admin       Patient Active Problem List   Diagnosis    Intra-abdominal abscess    Postprocedural intraabdominal abscess    Epigastric pain    Elevated CA 19-9 level    Acute cystitis without hematuria    Elevated d-dimer    Elevated procalcitonin    Hypertension    DM2 (diabetes mellitus, type 2)    Prolonged Q-T interval on ECG    A-fib    Septic arthritis of shoulder, right    Sepsis    Bacteremia due to group B Streptococcus    Vertebral abscess    Class 1 obesity due to excess calories in adult    Anemia    Obesity, diabetes, and hypertension syndrome       Review of patient's allergies indicates:   Allergen Reactions    Penicillins Hives     Tolerated cephalosporins 4/2024    Amoxicillin     Grass pollen-june grass standard Other (See Comments)       Current Inpatient Medications:  Current Facility-Administered Medications   Medication Dose Route Frequency    amLODIPine  10 mg Oral Daily    carvediloL  6.25 mg Oral BID    cefTRIAXone (Rocephin) IV (PEDS and ADULTS)  2 g Intravenous Q24H    insulin aspart U-100  8 Units Subcutaneous TIDWM    insulin detemir U-100  20 Units Subcutaneous QHS    losartan  100 mg Oral Daily    polyethylene glycol  17 g Oral Daily    senna-docusate 8.6-50 mg  1 tablet Oral BID    sodium chloride 0.9%  10 mL Intravenous Q6H       No current facility-administered medications on file prior to encounter.     Current Outpatient Medications on File Prior to Encounter   Medication Sig Dispense Refill    aspirin (ECOTRIN) 81 MG EC tablet  Take 1 tablet (81 mg total) by mouth once daily. 90 tablet 3    carvediloL (COREG) 3.125 MG tablet Take 2 tablets (6.25 mg total) by mouth 2 (two) times daily. 360 tablet 3    cetirizine (ZYRTEC) 10 MG tablet Take 1 tablet (10 mg total) by mouth once daily. 30 tablet 1    cyclobenzaprine (FLEXERIL) 10 MG tablet Take 10 mg by mouth 3 (three) times daily as needed.      diazePAM (VALIUM) 2 MG tablet Take 1 tablet (2 mg total) by mouth every 6 (six) hours as needed (pain). 12 tablet 0    hydroCHLOROthiazide (HYDRODIURIL) 25 MG tablet Take 1 tablet (25 mg total) by mouth once daily. 90 tablet 3    ibuprofen (ADVIL,MOTRIN) 800 MG tablet Take 800 mg by mouth every 8 (eight) hours as needed.      losartan (COZAAR) 100 MG tablet Take 1 tablet (100 mg total) by mouth once daily. 90 tablet 3    metFORMIN (GLUCOPHAGE) 500 MG tablet Take 1 tablet (500 mg total) by mouth 2 (two) times daily with meals. 180 tablet 3    albuterol (PROVENTIL/VENTOLIN HFA) 90 mcg/actuation inhaler Inhale 2 puffs into the lungs every 6 (six) hours as needed.      azelastine (ASTELIN) 137 mcg (0.1 %) nasal spray 1 spray by Nasal route 2 (two) times daily.      blood-glucose meter kit To check BG 2 times daily, to use with insurance preferred meter 1 each 0    FREESTYLE HARSHA 14 DAY SENSOR Kit Change every 14 days for blood glucose monitoring. (Patient not taking: Reported on 4/15/2024)      ondansetron (ZOFRAN-ODT) 4 MG TbDL Take 4 mg by mouth.      promethazine (PHENERGAN) 25 MG tablet Take 25 mg by mouth every 8 (eight) hours as needed.         Past Surgical History:   Procedure Laterality Date    ABDOMINAL SURGERY      COLONOSCOPY N/A 06/01/2023    Procedure: COLONOSCOPY;  Surgeon: Thaddeus Carlos MD;  Location: [facility] ENDO;  Service: Endoscopy;  Laterality: N/A;    DIAGNOSTIC LAPAROSCOPY N/A 05/26/2019    Procedure: LAPAROSCOPY, DIAGNOSTIC Drainage of abscess ;  Surgeon: Jose Luis Hanson MD;  Location: [facility] OR;  Service: General;  Laterality: N/A;   Drain Placement    DIAGNOSTIC LAPAROSCOPY N/A 12/27/2020    Procedure: Diagnostic laparoscopy, drainage of intra-abdominal abscess;  Surgeon: Bhupendra Banda MD;  Location: [facility] OR;  Service: General;  Laterality: N/A;    LAPAROSCOPIC LYSIS OF ADHESIONS  05/26/2019    Procedure: LYSIS, ADHESIONS, LAPAROSCOPIC;  Surgeon: Jose Luis Hanson MD;  Location: [facility] OR;  Service: General;;    VATS, WITH CHEST TUBE INSERTION FOR DRAINAGE OF PLEURAL EFFUSION Right 4/23/2024    Procedure: VATS, WITH CHEST TUBE INSERTION FOR DRAINAGE OF PLEURAL EFFUSION;  Surgeon: Rakesh Blake MD;  Location: [facility] OR [address] [place] [address];  Service: Cardiothoracic;  Laterality: Right;       Social History     Socioeconomic History    Marital status: Single   Tobacco Use    Smoking status: Never    Smokeless tobacco: Never   Substance and Sexual Activity    Alcohol use: Not Currently    Drug use: Not Currently    Sexual activity: Not Currently     Social Determinants of Health     Financial Resource Strain: Low Risk  (4/16/2024)    Overall Financial Resource Strain (CARDIA)     Difficulty of Paying Living Expenses: Not very hard   Food Insecurity: No Food Insecurity (4/16/2024)    Hunger Vital Sign     Worried About Running Out of Food in the Last Year: Never true     Ran Out of Food in the Last Year: Never true   Transportation Needs: No Transportation Needs (4/16/2024)    PRAPARE - Transportation     Lack of Transportation (Medical): No     Lack of Transportation (Non-Medical): No   Physical Activity: Sufficiently Active (4/16/2024)    Exercise Vital Sign     Days of Exercise per Week: 7 days     Minutes of Exercise per Session: 60 min   Stress: No Stress Concern Present (4/16/2024)    [place] [employer] - Occupational Stress Questionnaire     Feeling of Stress : Only a little   Social Connections: Moderately Isolated (4/16/2024)    Social Connection and Isolation Panel [NHANES]     Frequency of Communication with Friends and  Family: More than three times a week     Frequency of Social Gatherings with Friends and Family: More than three times a week     Attends Jain Services: More than 4 times per year     Active Member of Clubs or Organizations: No     Attends Club or Organization Meetings: Never     Marital Status: Never    Housing Stability: Low Risk  (4/16/2024)    Housing Stability Vital Sign     Unable to Pay for Housing in the Last Year: No     Number of Times Moved in the Last Year: 1     Homeless in the Last Year: No       OBJECTIVE:     Vital Signs Range (Last 24H):  Temp:  [36.5 °C (97.7 °F)-37.7 °C (99.9 °F)]   Pulse:  [69-84]   Resp:  [16-18]   BP: (121-146)/(67-76)   SpO2:  [94 %-100 %]       Significant Labs:  Lab Results   Component Value Date    WBC 6.82 04/22/2024    HGB 9.8 (L) 04/22/2024    HCT 30.9 (L) 04/22/2024     04/22/2024    ALT 13 04/22/2024    AST 16 04/22/2024     04/22/2024    K 3.6 04/22/2024     04/22/2024    CREATININE 0.6 04/22/2024    BUN 9 04/22/2024    CO2 28 04/22/2024    INR 1.0 04/22/2024    HGBA1C 11.3 (H) 03/16/2022       Diagnostic Studies: No relevant studies.    EKG:   Results for orders placed or performed during the hospital encounter of 01/08/24   EKG 12-lead    Collection Time: 01/08/24  1:13 PM    Narrative    Test Reason : R73.9,    Vent. Rate : 088 BPM     Atrial Rate : 088 BPM     P-R Int : 166 ms          QRS Dur : 090 ms      QT Int : 390 ms       P-R-T Axes : 041 012 012 degrees     QTc Int : 471 ms    Normal sinus rhythm  Moderate voltage criteria for LVH, may be normal variant  Borderline Abnormal ECG  When compared with ECG of 31-MAY-2023 16:22,  Significant changes have occurred  Confirmed by Yuri Rojas MD ([id number]) on 1/8/2024 9:35:17 PM    Referred By: [email or web address]   SELF           Confirmed By:Yuri Rojas MD       2D ECHO:  TTE:  Results for orders placed or performed during the hospital encounter of 04/15/24   Echo   Result Value Ref Range     Narrative      Left Ventricle: The left ventricle is normal in size. There is moderate   concentric hypertrophy. There is normal systolic function with a visually   estimated ejection fraction of 60 - 65%. Grade I diastolic dysfunction.    Right Ventricle: Normal right ventricular cavity size. Systolic   function is normal.    Tricuspid Valve: There is mild regurgitation.    Aorta: Aortic root is mildly dilated measuring 3.59 cm.    Pulmonary Artery: The estimated pulmonary artery systolic pressure is   39 mmHg.    IVC/SVC: Normal venous pressure at 3 mmHg.    Pericardium: There is a trivial posterior effusion.         CURTIS:  No results found for this or any previous visit.    ASSESSMENT/PLAN:           Pre-op Assessment    I have reviewed the Patient Summary Reports.     I have reviewed the Nursing Notes.    I have reviewed the Medications.     Review of Systems  Anesthesia Hx:  No problems with previous Anesthesia   History of prior surgery of interest to airway management or planning:          Denies Family Hx of Anesthesia complications.    Denies Personal Hx of Anesthesia complications.                    Social:  Non-Smoker, No Alcohol Use       Hematology/Oncology:       -- Anemia:                                  Cardiovascular:     Hypertension    Denies CAD.    Dysrhythmias atrial fibrillation   Denies CHF.    no hyperlipidemia                       Hypertension     Atrial Fibrillation     Pulmonary:    Denies COPD.  Denies Asthma.                    Renal/:   Denies Chronic Renal Disease.                Hepatic/GI:      Denies GERD.             Musculoskeletal:  Denies Arthritis.               Neurological:    Denies CVA.    Denies Seizures.                                Endocrine:  Diabetes, type 2    Diabetes                    Obesity / BMI > 30  Psych:  Denies Psychiatric History.                  Physical Exam  General: Well nourished, Cooperative, Alert and Oriented    Airway:  Mallampati: II    Mouth Opening: Normal  TM Distance: Normal  Tongue: Normal  Neck ROM: Normal ROM    Dental:  Intact        Anesthesia Plan  Type of Anesthesia, risks & benefits discussed:    Anesthesia Type: Gen ETT, Gen Natural Airway  Intra-op Monitoring Plan: Standard ASA Monitors  Post Op Pain Control Plan: multimodal analgesia and IV/PO Opioids PRN  Induction:  IV  Airway Plan: Direct and Video, Post-Induction  Informed Consent: Informed consent signed with the Patient and all parties understand the risks and agree with anesthesia plan.  All questions answered.   ASA Score: 3  Day of Surgery Review of History & Physical: H&P Update referred to the surgeon/provider.    Ready For Surgery From Anesthesia Perspective.     .

## 2024-04-25 ENCOUNTER — ANESTHESIA (OUTPATIENT)
Dept: SURGERY | Facility: HOSPITAL | Age: 54
DRG: 853 | End: 2024-04-25
Payer: COMMERCIAL

## 2024-04-25 LAB
ALBUMIN SERPL BCP-MCNC: 2.2 G/DL (ref 3.5–5.2)
ALP SERPL-CCNC: 83 U/L (ref 55–135)
ALT SERPL W/O P-5'-P-CCNC: 19 U/L (ref 10–44)
ANION GAP SERPL CALC-SCNC: 11 MMOL/L (ref 8–16)
AST SERPL-CCNC: 28 U/L (ref 10–40)
BASOPHILS # BLD AUTO: 0.03 K/UL (ref 0–0.2)
BASOPHILS NFR BLD: 0.5 % (ref 0–1.9)
BILIRUB SERPL-MCNC: 0.2 MG/DL (ref 0.1–1)
BUN SERPL-MCNC: 6 MG/DL (ref 6–20)
CALCIUM SERPL-MCNC: 9.4 MG/DL (ref 8.7–10.5)
CHLORIDE SERPL-SCNC: 102 MMOL/L (ref 95–110)
CO2 SERPL-SCNC: 24 MMOL/L (ref 23–29)
CREAT SERPL-MCNC: 0.6 MG/DL (ref 0.5–1.4)
CRP SERPL-MCNC: 50.8 MG/L (ref 0–8.2)
DIFFERENTIAL METHOD BLD: ABNORMAL
EOSINOPHIL # BLD AUTO: 0.1 K/UL (ref 0–0.5)
EOSINOPHIL NFR BLD: 1.5 % (ref 0–8)
ERYTHROCYTE [DISTWIDTH] IN BLOOD BY AUTOMATED COUNT: 13.2 % (ref 11.5–14.5)
EST. GFR  (NO RACE VARIABLE): >60 ML/MIN/1.73 M^2
GLUCOSE SERPL-MCNC: 109 MG/DL (ref 70–110)
GRAM STN SPEC: NORMAL
GRAM STN SPEC: NORMAL
HCT VFR BLD AUTO: 33.2 % (ref 37–48.5)
HGB BLD-MCNC: 10.5 G/DL (ref 12–16)
IMM GRANULOCYTES # BLD AUTO: 0.13 K/UL (ref 0–0.04)
IMM GRANULOCYTES NFR BLD AUTO: 2 % (ref 0–0.5)
LYMPHOCYTES # BLD AUTO: 2.4 K/UL (ref 1–4.8)
LYMPHOCYTES NFR BLD: 36.5 % (ref 18–48)
MAGNESIUM SERPL-MCNC: 1.8 MG/DL (ref 1.6–2.6)
MCH RBC QN AUTO: 29.2 PG (ref 27–31)
MCHC RBC AUTO-ENTMCNC: 31.6 G/DL (ref 32–36)
MCV RBC AUTO: 92 FL (ref 82–98)
MONOCYTES # BLD AUTO: 0.6 K/UL (ref 0.3–1)
MONOCYTES NFR BLD: 9.7 % (ref 4–15)
NEUTROPHILS # BLD AUTO: 3.2 K/UL (ref 1.8–7.7)
NEUTROPHILS NFR BLD: 49.8 % (ref 38–73)
NRBC BLD-RTO: 0 /100 WBC
PHOSPHATE SERPL-MCNC: 3.9 MG/DL (ref 2.7–4.5)
PLATELET # BLD AUTO: 416 K/UL (ref 150–450)
PMV BLD AUTO: 9.7 FL (ref 9.2–12.9)
POCT GLUCOSE: 123 MG/DL (ref 70–110)
POCT GLUCOSE: 173 MG/DL (ref 70–110)
POCT GLUCOSE: 187 MG/DL (ref 70–110)
POCT GLUCOSE: 200 MG/DL (ref 70–110)
POTASSIUM SERPL-SCNC: 4.6 MMOL/L (ref 3.5–5.1)
PROT SERPL-MCNC: 7.6 G/DL (ref 6–8.4)
RBC # BLD AUTO: 3.6 M/UL (ref 4–5.4)
SODIUM SERPL-SCNC: 137 MMOL/L (ref 136–145)
WBC # BLD AUTO: 6.5 K/UL (ref 3.9–12.7)

## 2024-04-25 PROCEDURE — 0RBJ4ZZ EXCISION OF RIGHT SHOULDER JOINT, PERCUTANEOUS ENDOSCOPIC APPROACH: ICD-10-PCS | Performed by: ORTHOPAEDIC SURGERY

## 2024-04-25 PROCEDURE — 71000033 HC RECOVERY, INTIAL HOUR: Performed by: ORTHOPAEDIC SURGERY

## 2024-04-25 PROCEDURE — C1751 CATH, INF, PER/CENT/MIDLINE: HCPCS

## 2024-04-25 PROCEDURE — 36415 COLL VENOUS BLD VENIPUNCTURE: CPT | Performed by: HOSPITALIST

## 2024-04-25 PROCEDURE — 29823 SHO ARTHRS SRG XTNSV DBRDMT: CPT | Mod: RT,,, | Performed by: ORTHOPAEDIC SURGERY

## 2024-04-25 PROCEDURE — 63600175 PHARM REV CODE 636 W HCPCS

## 2024-04-25 PROCEDURE — 85025 COMPLETE CBC W/AUTO DIFF WBC: CPT | Performed by: HOSPITALIST

## 2024-04-25 PROCEDURE — 25000003 PHARM REV CODE 250: Performed by: STUDENT IN AN ORGANIZED HEALTH CARE EDUCATION/TRAINING PROGRAM

## 2024-04-25 PROCEDURE — 99233 SBSQ HOSP IP/OBS HIGH 50: CPT | Mod: ,,, | Performed by: INTERNAL MEDICINE

## 2024-04-25 PROCEDURE — 87102 FUNGUS ISOLATION CULTURE: CPT | Performed by: ORTHOPAEDIC SURGERY

## 2024-04-25 PROCEDURE — 94761 N-INVAS EAR/PLS OXIMETRY MLT: CPT

## 2024-04-25 PROCEDURE — 25000003 PHARM REV CODE 250: Performed by: NURSE ANESTHETIST, CERTIFIED REGISTERED

## 2024-04-25 PROCEDURE — 76937 US GUIDE VASCULAR ACCESS: CPT

## 2024-04-25 PROCEDURE — 86140 C-REACTIVE PROTEIN: CPT | Performed by: HOSPITALIST

## 2024-04-25 PROCEDURE — 25000003 PHARM REV CODE 250

## 2024-04-25 PROCEDURE — 63600175 PHARM REV CODE 636 W HCPCS: Performed by: NURSE ANESTHETIST, CERTIFIED REGISTERED

## 2024-04-25 PROCEDURE — 63600175 PHARM REV CODE 636 W HCPCS: Performed by: STUDENT IN AN ORGANIZED HEALTH CARE EDUCATION/TRAINING PROGRAM

## 2024-04-25 PROCEDURE — 82962 GLUCOSE BLOOD TEST: CPT | Performed by: ORTHOPAEDIC SURGERY

## 2024-04-25 PROCEDURE — 87206 SMEAR FLUORESCENT/ACID STAI: CPT | Performed by: ORTHOPAEDIC SURGERY

## 2024-04-25 PROCEDURE — 36573 INSJ PICC RS&I 5 YR+: CPT

## 2024-04-25 PROCEDURE — 87205 SMEAR GRAM STAIN: CPT | Performed by: ORTHOPAEDIC SURGERY

## 2024-04-25 PROCEDURE — 71000015 HC POSTOP RECOV 1ST HR: Performed by: ORTHOPAEDIC SURGERY

## 2024-04-25 PROCEDURE — 83735 ASSAY OF MAGNESIUM: CPT | Performed by: HOSPITALIST

## 2024-04-25 PROCEDURE — 25000003 PHARM REV CODE 250: Performed by: HOSPITALIST

## 2024-04-25 PROCEDURE — 25000003 PHARM REV CODE 250: Performed by: ANESTHESIOLOGY

## 2024-04-25 PROCEDURE — 63600175 PHARM REV CODE 636 W HCPCS: Performed by: ORTHOPAEDIC SURGERY

## 2024-04-25 PROCEDURE — 87075 CULTR BACTERIA EXCEPT BLOOD: CPT | Performed by: ORTHOPAEDIC SURGERY

## 2024-04-25 PROCEDURE — D9220A PRA ANESTHESIA: Mod: ANES,,, | Performed by: SURGERY

## 2024-04-25 PROCEDURE — 80053 COMPREHEN METABOLIC PANEL: CPT | Performed by: HOSPITALIST

## 2024-04-25 PROCEDURE — 27201423 OPTIME MED/SURG SUP & DEVICES STERILE SUPPLY: Performed by: ORTHOPAEDIC SURGERY

## 2024-04-25 PROCEDURE — A4216 STERILE WATER/SALINE, 10 ML: HCPCS | Performed by: STUDENT IN AN ORGANIZED HEALTH CARE EDUCATION/TRAINING PROGRAM

## 2024-04-25 PROCEDURE — 87070 CULTURE OTHR SPECIMN AEROBIC: CPT | Performed by: ORTHOPAEDIC SURGERY

## 2024-04-25 PROCEDURE — 36000711: Performed by: ORTHOPAEDIC SURGERY

## 2024-04-25 PROCEDURE — 87116 MYCOBACTERIA CULTURE: CPT | Performed by: ORTHOPAEDIC SURGERY

## 2024-04-25 PROCEDURE — D9220A PRA ANESTHESIA: Mod: CRNA,,, | Performed by: NURSE ANESTHETIST, CERTIFIED REGISTERED

## 2024-04-25 PROCEDURE — 25000003 PHARM REV CODE 250: Performed by: ORTHOPAEDIC SURGERY

## 2024-04-25 PROCEDURE — 36000710: Performed by: ORTHOPAEDIC SURGERY

## 2024-04-25 PROCEDURE — 37000008 HC ANESTHESIA 1ST 15 MINUTES: Performed by: ORTHOPAEDIC SURGERY

## 2024-04-25 PROCEDURE — 02HV33Z INSERTION OF INFUSION DEVICE INTO SUPERIOR VENA CAVA, PERCUTANEOUS APPROACH: ICD-10-PCS | Performed by: RADIOLOGY

## 2024-04-25 PROCEDURE — A4216 STERILE WATER/SALINE, 10 ML: HCPCS | Performed by: HOSPITALIST

## 2024-04-25 PROCEDURE — 27000221 HC OXYGEN, UP TO 24 HOURS

## 2024-04-25 PROCEDURE — 37000009 HC ANESTHESIA EA ADD 15 MINS: Performed by: ORTHOPAEDIC SURGERY

## 2024-04-25 PROCEDURE — 63600175 PHARM REV CODE 636 W HCPCS: Performed by: ANESTHESIOLOGY

## 2024-04-25 PROCEDURE — 84100 ASSAY OF PHOSPHORUS: CPT | Performed by: HOSPITALIST

## 2024-04-25 PROCEDURE — 21400001 HC TELEMETRY ROOM

## 2024-04-25 PROCEDURE — 99223 1ST HOSP IP/OBS HIGH 75: CPT | Mod: 57,,, | Performed by: ORTHOPAEDIC SURGERY

## 2024-04-25 RX ORDER — FENTANYL CITRATE 50 UG/ML
25 INJECTION, SOLUTION INTRAMUSCULAR; INTRAVENOUS EVERY 5 MIN PRN
Status: DISCONTINUED | OUTPATIENT
Start: 2024-04-25 | End: 2024-04-25 | Stop reason: HOSPADM

## 2024-04-25 RX ORDER — METHOCARBAMOL 500 MG/1
500 TABLET, FILM COATED ORAL 3 TIMES DAILY
Status: DISCONTINUED | OUTPATIENT
Start: 2024-04-25 | End: 2024-04-26 | Stop reason: HOSPADM

## 2024-04-25 RX ORDER — HALOPERIDOL 5 MG/ML
0.5 INJECTION INTRAMUSCULAR EVERY 10 MIN PRN
Status: DISCONTINUED | OUTPATIENT
Start: 2024-04-25 | End: 2024-04-25 | Stop reason: HOSPADM

## 2024-04-25 RX ORDER — HYDROMORPHONE HYDROCHLORIDE 1 MG/ML
0.2 INJECTION, SOLUTION INTRAMUSCULAR; INTRAVENOUS; SUBCUTANEOUS EVERY 5 MIN PRN
Status: DISCONTINUED | OUTPATIENT
Start: 2024-04-25 | End: 2024-04-25 | Stop reason: HOSPADM

## 2024-04-25 RX ORDER — BACITRACIN ZINC 500 UNIT/G
OINTMENT (GRAM) TOPICAL
Status: DISCONTINUED | OUTPATIENT
Start: 2024-04-25 | End: 2024-04-25 | Stop reason: HOSPADM

## 2024-04-25 RX ORDER — LIDOCAINE HYDROCHLORIDE 20 MG/ML
INJECTION, SOLUTION EPIDURAL; INFILTRATION; INTRACAUDAL; PERINEURAL
Status: DISCONTINUED | OUTPATIENT
Start: 2024-04-25 | End: 2024-04-25

## 2024-04-25 RX ORDER — SODIUM CHLORIDE 0.9 % (FLUSH) 0.9 %
10 SYRINGE (ML) INJECTION
Status: DISCONTINUED | OUTPATIENT
Start: 2024-04-25 | End: 2024-04-26 | Stop reason: HOSPADM

## 2024-04-25 RX ORDER — VANCOMYCIN HYDROCHLORIDE 1 G/20ML
INJECTION, POWDER, LYOPHILIZED, FOR SOLUTION INTRAVENOUS
Status: DISCONTINUED | OUTPATIENT
Start: 2024-04-25 | End: 2024-04-25 | Stop reason: HOSPADM

## 2024-04-25 RX ORDER — SODIUM CHLORIDE 0.9 % (FLUSH) 0.9 %
10 SYRINGE (ML) INJECTION EVERY 6 HOURS
Status: DISCONTINUED | OUTPATIENT
Start: 2024-04-25 | End: 2024-04-26 | Stop reason: HOSPADM

## 2024-04-25 RX ORDER — FENTANYL CITRATE 50 UG/ML
INJECTION, SOLUTION INTRAMUSCULAR; INTRAVENOUS
Status: DISCONTINUED | OUTPATIENT
Start: 2024-04-25 | End: 2024-04-25

## 2024-04-25 RX ORDER — PROPOFOL 10 MG/ML
VIAL (ML) INTRAVENOUS
Status: DISCONTINUED | OUTPATIENT
Start: 2024-04-25 | End: 2024-04-25

## 2024-04-25 RX ORDER — EPINEPHRINE 1 MG/ML
INJECTION, SOLUTION, CONCENTRATE INTRAVENOUS
Status: DISCONTINUED | OUTPATIENT
Start: 2024-04-25 | End: 2024-04-25 | Stop reason: HOSPADM

## 2024-04-25 RX ORDER — MIDAZOLAM HYDROCHLORIDE 1 MG/ML
INJECTION INTRAMUSCULAR; INTRAVENOUS
Status: DISCONTINUED | OUTPATIENT
Start: 2024-04-25 | End: 2024-04-25

## 2024-04-25 RX ORDER — ACETAMINOPHEN 500 MG
1000 TABLET ORAL 3 TIMES DAILY
Status: DISCONTINUED | OUTPATIENT
Start: 2024-04-25 | End: 2024-04-26 | Stop reason: HOSPADM

## 2024-04-25 RX ORDER — OXYCODONE HYDROCHLORIDE 5 MG/1
5 TABLET ORAL
Status: DISCONTINUED | OUTPATIENT
Start: 2024-04-25 | End: 2024-04-25 | Stop reason: HOSPADM

## 2024-04-25 RX ORDER — ROCURONIUM BROMIDE 10 MG/ML
INJECTION, SOLUTION INTRAVENOUS
Status: DISCONTINUED | OUTPATIENT
Start: 2024-04-25 | End: 2024-04-25

## 2024-04-25 RX ORDER — ONDANSETRON HYDROCHLORIDE 2 MG/ML
INJECTION, SOLUTION INTRAVENOUS
Status: DISCONTINUED | OUTPATIENT
Start: 2024-04-25 | End: 2024-04-25

## 2024-04-25 RX ORDER — DEXAMETHASONE SODIUM PHOSPHATE 4 MG/ML
INJECTION, SOLUTION INTRA-ARTICULAR; INTRALESIONAL; INTRAMUSCULAR; INTRAVENOUS; SOFT TISSUE
Status: DISCONTINUED | OUTPATIENT
Start: 2024-04-25 | End: 2024-04-25

## 2024-04-25 RX ORDER — PHENYLEPHRINE HCL IN 0.9% NACL 1 MG/10 ML
SYRINGE (ML) INTRAVENOUS
Status: DISCONTINUED | OUTPATIENT
Start: 2024-04-25 | End: 2024-04-25

## 2024-04-25 RX ORDER — CELECOXIB 100 MG/1
200 CAPSULE ORAL DAILY
Status: DISCONTINUED | OUTPATIENT
Start: 2024-04-25 | End: 2024-04-26 | Stop reason: HOSPADM

## 2024-04-25 RX ORDER — CEFAZOLIN SODIUM 1 G/3ML
INJECTION, POWDER, FOR SOLUTION INTRAMUSCULAR; INTRAVENOUS
Status: DISCONTINUED | OUTPATIENT
Start: 2024-04-25 | End: 2024-04-25

## 2024-04-25 RX ORDER — ENOXAPARIN SODIUM 100 MG/ML
40 INJECTION SUBCUTANEOUS EVERY 24 HOURS
Status: DISCONTINUED | OUTPATIENT
Start: 2024-04-25 | End: 2024-04-26 | Stop reason: HOSPADM

## 2024-04-25 RX ORDER — MUPIROCIN 20 MG/G
OINTMENT TOPICAL 2 TIMES DAILY
Status: DISCONTINUED | OUTPATIENT
Start: 2024-04-25 | End: 2024-04-26 | Stop reason: HOSPADM

## 2024-04-25 RX ADMIN — CELECOXIB 200 MG: 100 CAPSULE ORAL at 03:04

## 2024-04-25 RX ADMIN — Medication 10 ML: at 08:04

## 2024-04-25 RX ADMIN — METHOCARBAMOL 500 MG: 500 TABLET ORAL at 08:04

## 2024-04-25 RX ADMIN — OXYCODONE 5 MG: 5 TABLET ORAL at 12:04

## 2024-04-25 RX ADMIN — Medication 200 MCG: at 10:04

## 2024-04-25 RX ADMIN — Medication 100 MCG: at 10:04

## 2024-04-25 RX ADMIN — Medication 10 ML: at 06:04

## 2024-04-25 RX ADMIN — FENTANYL CITRATE 100 MCG: 50 INJECTION INTRAMUSCULAR; INTRAVENOUS at 11:04

## 2024-04-25 RX ADMIN — CARVEDILOL 6.25 MG: 6.25 TABLET, FILM COATED ORAL at 08:04

## 2024-04-25 RX ADMIN — LOSARTAN POTASSIUM 100 MG: 50 TABLET, FILM COATED ORAL at 12:04

## 2024-04-25 RX ADMIN — ROCURONIUM BROMIDE 50 MG: 10 INJECTION, SOLUTION INTRAVENOUS at 10:04

## 2024-04-25 RX ADMIN — ENOXAPARIN SODIUM 40 MG: 40 INJECTION SUBCUTANEOUS at 05:04

## 2024-04-25 RX ADMIN — Medication 200 MCG: at 11:04

## 2024-04-25 RX ADMIN — ACETAMINOPHEN 1000 MG: 500 TABLET ORAL at 08:04

## 2024-04-25 RX ADMIN — LIDOCAINE HYDROCHLORIDE 75 MG: 20 INJECTION, SOLUTION EPIDURAL; INFILTRATION; INTRACAUDAL at 10:04

## 2024-04-25 RX ADMIN — METHOCARBAMOL 500 MG: 500 TABLET ORAL at 03:04

## 2024-04-25 RX ADMIN — AMLODIPINE BESYLATE 10 MG: 10 TABLET ORAL at 12:04

## 2024-04-25 RX ADMIN — ACETAMINOPHEN 1000 MG: 500 TABLET ORAL at 03:04

## 2024-04-25 RX ADMIN — MUPIROCIN: 20 OINTMENT TOPICAL at 08:04

## 2024-04-25 RX ADMIN — CARVEDILOL 6.25 MG: 6.25 TABLET, FILM COATED ORAL at 12:04

## 2024-04-25 RX ADMIN — FENTANYL CITRATE 25 MCG: 50 INJECTION INTRAMUSCULAR; INTRAVENOUS at 12:04

## 2024-04-25 RX ADMIN — DEXAMETHASONE SODIUM PHOSPHATE 12 MG: 4 INJECTION INTRA-ARTICULAR; INTRALESIONAL; INTRAMUSCULAR; INTRAVENOUS; SOFT TISSUE at 10:04

## 2024-04-25 RX ADMIN — PROPOFOL 150 MG: 10 INJECTION, EMULSION INTRAVENOUS at 10:04

## 2024-04-25 RX ADMIN — Medication 10 ML: at 01:04

## 2024-04-25 RX ADMIN — CEFAZOLIN 2 G: 330 INJECTION, POWDER, FOR SOLUTION INTRAMUSCULAR; INTRAVENOUS at 10:04

## 2024-04-25 RX ADMIN — SENNOSIDES AND DOCUSATE SODIUM 1 TABLET: 8.6; 5 TABLET ORAL at 08:04

## 2024-04-25 RX ADMIN — SUGAMMADEX 200 MG: 100 INJECTION, SOLUTION INTRAVENOUS at 11:04

## 2024-04-25 RX ADMIN — CEFTRIAXONE 2 G: 2 INJECTION, POWDER, FOR SOLUTION INTRAMUSCULAR; INTRAVENOUS at 08:04

## 2024-04-25 RX ADMIN — ONDANSETRON 4 MG: 2 INJECTION INTRAMUSCULAR; INTRAVENOUS at 11:04

## 2024-04-25 RX ADMIN — MIDAZOLAM 2 MG: 1 INJECTION INTRAMUSCULAR; INTRAVENOUS at 10:04

## 2024-04-25 RX ADMIN — SENNOSIDES AND DOCUSATE SODIUM 1 TABLET: 8.6; 5 TABLET ORAL at 12:04

## 2024-04-25 RX ADMIN — SODIUM CHLORIDE: 0.9 INJECTION, SOLUTION INTRAVENOUS at 09:04

## 2024-04-25 RX ADMIN — FENTANYL CITRATE 100 MCG: 50 INJECTION INTRAMUSCULAR; INTRAVENOUS at 10:04

## 2024-04-25 RX ADMIN — INSULIN DETEMIR 20 UNITS: 100 INJECTION, SOLUTION SUBCUTANEOUS at 08:04

## 2024-04-25 RX ADMIN — ROCURONIUM BROMIDE 30 MG: 10 INJECTION, SOLUTION INTRAVENOUS at 10:04

## 2024-04-25 NOTE — PLAN OF CARE
Problem: Adult Inpatient Plan of Care  Goal: Plan of Care Review  Outcome: Progressing  Goal: Patient-Specific Goal (Individualized)  Outcome: Progressing  Goal: Absence of Hospital-Acquired Illness or Injury  Outcome: Progressing  Goal: Optimal Comfort and Wellbeing  Outcome: Progressing  Goal: Readiness for Transition of Care  Outcome: Progressing     Problem: Pain Acute  Goal: Acceptable Pain Control and Functional Ability  Outcome: Progressing     Problem: Fall Injury Risk  Goal: Absence of Fall and Fall-Related Injury  Outcome: Progressing     Problem: Adult Inpatient Plan of Care  Goal: Plan of Care Review  Outcome: Progressing  Goal: Patient-Specific Goal (Individualized)  Outcome: Progressing  Goal: Absence of Hospital-Acquired Illness or Injury  Outcome: Progressing  Goal: Optimal Comfort and Wellbeing  Outcome: Progressing  Goal: Readiness for Transition of Care  Outcome: Progressing     Problem: Wound  Goal: Optimal Coping  Outcome: Progressing  Goal: Optimal Functional Ability  Outcome: Progressing  Goal: Absence of Infection Signs and Symptoms  Outcome: Progressing  Goal: Improved Oral Intake  Outcome: Progressing  Goal: Optimal Pain Control and Function  Outcome: Progressing  Goal: Skin Health and Integrity  Outcome: Progressing  Goal: Optimal Wound Healing  Outcome: Progressing

## 2024-04-25 NOTE — PROCEDURES
"Shahida Ortega is a 53 y.o. female patient.    Temp: 97.2 °F (36.2 °C) (04/25/24 1230)  Pulse: 81 (04/25/24 1525)  Resp: 17 (04/25/24 1525)  BP: (!) 148/73 (04/25/24 1525)  SpO2: 97 % (04/25/24 1525)  Weight: 95.4 kg (210 lb 5.1 oz) (04/25/24 0910)  Height: 5' 7" (170.2 cm) (04/25/24 0910)    PICC  Date/Time: 4/25/2024 3:54 PM  Location procedure was performed: [facility] PICC LINE PLACEMENT  Performed by: Michael Lou RN  Assisting provider: Vinicio Norman LPN  Time out: Immediately prior to procedure a time out was called to verify the correct patient, procedure, equipment, support staff and site/side marked as required  Indications: med administration  Anesthesia: local infiltration  Local anesthetic: lidocaine 1% without epinephrine  Anesthetic Total (mL): 3  Preparation: skin prepped with ChloraPrep  Skin prep agent dried: skin prep agent completely dried prior to procedure  Sterile barriers: all five maximum sterile barriers used - cap, mask, sterile gown, sterile gloves, and large sterile sheet  Hand hygiene: hand hygiene performed prior to central venous catheter insertion  Location details: left brachial  Catheter type: double lumen  Catheter size: 5 Fr  Catheter Length: 43cm    Ultrasound guidance: yes  Vessel Caliber: large and patent, compressibility normal  Vascular Doppler: not done  Needle advanced into vessel with real time Ultrasound guidance.  Guidewire confirmed in vessel.  Image recorded and saved.  Sterile sheath used.  no esophageal manometryNumber of attempts: 1  Post-procedure: blood return through all ports, sterile dressing applied and chlorhexidine patch  Technical procedures used: 3CG  Specimens: No  Implants: No  Assessment: placement verified by x-ray  Complications: none          Name Vinicio Norman LPN   4/25/2024    "

## 2024-04-25 NOTE — ASSESSMENT & PLAN NOTE
Multifactorial 2/2 Group B Strep bacteremia, paravertebral abscesses, and septic arthritis of the right shoulder  Will need IV antibiotics and PICC on DC  Management as below

## 2024-04-25 NOTE — SUBJECTIVE & OBJECTIVE
Interval History: Seen yesterday afternoon after Ortho cancelled her procedure.  Informed that I would reach out to PPA to come and speak to her about continued procedural cancellations.  Today seen after returning from the OR.  Feels groggy.  Says pain is controlled.  Discussed need for PICC line and IV antibiotics.  Hopeful to DC home brian with HH after CURTIS.    Review of Systems   Constitutional:  Negative for chills, fatigue and fever.   Respiratory:  Negative for cough and shortness of breath.    Cardiovascular:  Negative for chest pain, palpitations and leg swelling.   Gastrointestinal:  Negative for abdominal pain, diarrhea, nausea and vomiting.   Genitourinary:  Negative for dysuria and urgency.   Musculoskeletal:  Positive for arthralgias and myalgias.   Neurological:  Negative for dizziness and headaches.   All other systems reviewed and are negative.    Objective:     Vital Signs (Most Recent):  Temp: 97.2 °F (36.2 °C) (04/25/24 1230)  Pulse: 72 (04/25/24 1230)  Resp: 17 (04/25/24 1230)  BP: (!) 158/78 (04/25/24 1230)  SpO2: 99 % (04/25/24 1230) Vital Signs (24h Range):  Temp:  [97.2 °F (36.2 °C)-98.6 °F (37 °C)] 97.2 °F (36.2 °C)  Pulse:  [68-79] 72  Resp:  [17-20] 17  SpO2:  [89 %-100 %] 99 %  BP: (114-158)/(55-78) 158/78     Weight: 95.4 kg (210 lb 5.1 oz)  Body mass index is 32.94 kg/m².    Intake/Output Summary (Last 24 hours) at 4/25/2024 1338  Last data filed at 4/25/2024 1147  Gross per 24 hour   Intake 1969.45 ml   Output --   Net 1969.45 ml      Physical Exam  Constitutional:       Appearance: Normal appearance. She is obese.   HENT:      Head: Normocephalic and atraumatic.   Cardiovascular:      Rate and Rhythm: Normal rate and regular rhythm.      Heart sounds: No murmur heard.  Pulmonary:      Effort: Pulmonary effort is normal. No respiratory distress.      Breath sounds: Normal breath sounds. No wheezing or rales.   Abdominal:      General: There is no distension.      Palpations: Abdomen is  "soft.      Tenderness: There is no abdominal tenderness.   Musculoskeletal:         General: Tenderness (Right shoulder/deltoid wtih limited ROM) present. No deformity.   Skin:     General: Skin is warm and dry.   Neurological:      General: No focal deficit present.      Mental Status: She is alert and oriented to person, place, and time. Mental status is at baseline.         MELD 3.0: 11 at 4/22/2024  2:19 AM  MELD-Na: 6 at 4/22/2024  2:19 AM  Calculated from:  Serum Creatinine: 0.6 mg/dL (Using min of 1 mg/dL) at 4/22/2024  2:19 AM  Serum Sodium: 136 mmol/L at 4/22/2024  2:19 AM  Total Bilirubin: 0.2 mg/dL (Using min of 1 mg/dL) at 4/22/2024  2:19 AM  Serum Albumin: 1.9 g/dL at 4/22/2024  2:19 AM  INR(ratio): 1.0 at 4/22/2024  2:19 AM  Age at listing (hypothetical): 53 years  Sex: Female at 4/22/2024  2:19 AM      Significant Labs:  CBC:  Recent Labs   Lab 04/25/24  0404   WBC 6.50   HGB 10.5*   HCT 33.2*          CMP:  Recent Labs   Lab 04/25/24  0404      K 4.6      CO2 24      BUN 6   CREATININE 0.6   CALCIUM 9.4   PROT 7.6   ALBUMIN 2.2*   BILITOT 0.2   ALKPHOS 83   AST 28   ALT 19   ANIONGAP 11       PTINR:  No results for input(s): "INR" in the last 48 hours.      Significant Procedures:   Dobutamine Stress Test with Color Flow: No results found for this or any previous visit.    2D Echo: No results found for this or any previous visit.  "

## 2024-04-25 NOTE — PROGRESS NOTES
Nael nikunj - [facility] S[facility] ([place] [address])  Orthopedics  Progress Note    Patient Name: Shahida Ortega  MRN: [id number]  Admission Date: 4/15/2024  Hospital Length of Stay: 10 days  Attending Provider: Jessica Grey MD  Primary Care Provider: Janusz Staton MD  Follow-up For: Procedure(s) (LRB):  DEBRIDEMENT, SHOULDER, ARTHROSCOPIC; Linvatec, beach chair, 9L NS (Right)    Post-Operative Day: 1 Day Post-Op  Subjective:     Principal Problem:Sepsis    Principal Orthopedic Problem: Glenohumeral joint effusion in setting of Group B Strep bacteremia    Interval History: Afebrile, VSS, NAEON.  Pain has been reportedly well controlled.  Dry tap on reaspiration attempt of the right shoulder yesterday.  Mild TTP at AC joint.  NPO since midnight for I&D of the right shoulder today.          Review of patient's allergies indicates:   Allergen Reactions    Penicillins Hives     Tolerated cephalosporins 4/2024    Amoxicillin     Grass pollen-june grass standard Other (See Comments)       Current Facility-Administered Medications   Medication Dose Route Frequency Provider Last Rate Last Admin    acetaminophen tablet 1,000 mg  1,000 mg Oral Q8H PRN Jessica Grey MD        amLODIPine tablet 10 mg  10 mg Oral Daily Nima Pompa MD   10 mg at 04/24/24 0935    benzonatate capsule 100 mg  100 mg Oral TID PRN Sahara Figueroa PA-C   100 mg at 04/18/24 2348    carvediloL tablet 6.25 mg  6.25 mg Oral BID Juan Cameron III, MD   6.25 mg at 04/24/24 2143    cefTRIAXone (ROCEPHIN) 2 g in dextrose 5 % in water (D5W) 100 mL IVPB (MB+)  2 g Intravenous Q24H Marilee Begum MD   Stopped at 04/24/24 2213    dextrose 10% bolus 125 mL 125 mL  12.5 g Intravenous PRN Juan Cameron III, MD        dextrose 10% bolus 250 mL 250 mL  25 g Intravenous PRN Juan Cameron III, MD        glucagon (human recombinant) injection 1 mg  1 mg Intramuscular PRN Juan Cameron III, MD        glucose chewable tablet 16 g  16 g Oral PRN  "Juan Cameron III, MD        glucose chewable tablet 24 g  24 g Oral PRN Juan Cameron III, MD        insulin aspart U-100 pen 0-5 Units  0-5 Units Subcutaneous QID (AC + HS) PRN Juan Cameron III, MD   2 Units at 04/23/24 1720    insulin aspart U-100 pen 8 Units  8 Units Subcutaneous TIDWM Nima Pompa MD   8 Units at 04/24/24 1755    insulin detemir U-100 (Levemir) pen 20 Units  20 Units Subcutaneous QHS Nima Pompa MD   20 Units at 04/24/24 2149    losartan tablet 100 mg  100 mg Oral Daily Juan Cameron III, MD   100 mg at 04/24/24 0934    morphine injection 4 mg  4 mg Intravenous Q4H PRN Jessica Grey MD        naloxone 0.4 mg/mL injection 0.02 mg  0.02 mg Intravenous PRN Juan Cameron III, MD        oxyCODONE immediate release tablet 5 mg  5 mg Oral Q4H PRN Jessica Grey MD   5 mg at 04/24/24 2143    polyethylene glycol packet 17 g  17 g Oral Daily Juan Cameron III, MD   17 g at 04/22/24 0929    senna-docusate 8.6-50 mg per tablet 1 tablet  1 tablet Oral BID Juan Cameron III, MD   1 tablet at 04/24/24 2143    sodium chloride 0.9% flush 10 mL  10 mL Intravenous PRN Juan Cameron III, MD        sodium chloride 0.9% flush 10 mL  10 mL Intravenous Q6H Nima Pompa MD   10 mL at 04/25/24 0100    And    sodium chloride 0.9% flush 10 mL  10 mL Intravenous PRN Nima Pompa MD         Objective:     Vital Signs (Most Recent):  Temp: 97.8 °F (36.6 °C) (04/25/24 0342)  Pulse: 73 (04/25/24 0342)  Resp: 18 (04/25/24 0342)  BP: (!) 146/74 (04/25/24 0342)  SpO2: 98 % (04/25/24 0342) Vital Signs (24h Range):  Temp:  [97.5 °F (36.4 °C)-99.9 °F (37.7 °C)] 97.8 °F (36.6 °C)  Pulse:  [68-79] 73  Resp:  [17-18] 18  SpO2:  [94 %-100 %] 98 %  BP: (114-146)/(55-74) 146/74     Weight: 95.4 kg (210 lb 5.1 oz)  Height: 5' 7" (170.2 cm)  Body mass index is 32.94 kg/m².      Intake/Output Summary (Last 24 hours) at 4/25/2024 0674  Last data filed at " 4/25/2024 0504  Gross per 24 hour   Intake 1049.45 ml   Output --   Net 1049.45 ml        Ortho/SPM Exam  AAOx4  NAD  Reg rate  No increased WOB    RUE:  Skin intact thoughout  Mild ttp at ac joint   Painful limited ROM at shoulder   FROM elbow, and wrist  SILT M/U/R  Motor intact AIN/PIN/M/U/R  Palpable radial pulse 2+     Significant Labs: All pertinent labs within the past 24 hours have been reviewed.    Significant Imaging: I have reviewed and interpreted all pertinent imaging results/findings.  Assessment/Plan:     Septic arthritis of shoulder, right  Shahida Ortega is a 53 y.o. right-hand dominant female with T2DM and HTN presenting with pain and decreased range of motion of the right shoulder.  Prior to arrival to [facility], the patient was hospitalized at OSH where blood cultures were positive for group B Streptococcus.  She was found to have a prevertebral abscess at T5-T11 and was transferred to C for surgical intervention by Cardiothoracic surgery.  The right shoulder was aspirated by Orthopedic surgery at OSH but there was not enough fluid for cell count.  This aspirate was sent for analysis and the cultures have been no growth to date.  Clinical and MRI findings as well as history of present illness with recent septicemia and known T5-T11 prevertebral abscess are concerning for septic arthritis of the right shoulder and we will plan to treat it as such.    - NPO since midnight   - WBAT RUE, ROMAT  - Abx per primary     » Dispo: to OR today for I&D of the right shoulder          PALOMA Cuba MD  Orthopedics  [facility] - Med Surg ([place]-[address])

## 2024-04-25 NOTE — SUBJECTIVE & OBJECTIVE
Interval History: No acute events overnight, afebrile, hemodynamically stable. Underwent orthopedic procedure(Right shoulder arthroscopy with debridement and irrigation for presumed septic arthritis) today.    Objective:     Vital Signs (Most Recent):  Temp: 97.2 °F (36.2 °C) (04/25/24 1230)  Pulse: 75 (04/25/24 1506)  Resp: 17 (04/25/24 1230)  BP: (!) 158/78 (04/25/24 1230)  SpO2: 99 % (04/25/24 1230) Vital Signs (24h Range):  Temp:  [97.2 °F (36.2 °C)-98.6 °F (37 °C)] 97.2 °F (36.2 °C)  Pulse:  [68-79] 75  Resp:  [17-20] 17  SpO2:  [89 %-100 %] 99 %  BP: (114-158)/(55-78) 158/78     Weight: 95.4 kg (210 lb 5.1 oz)  Body mass index is 32.94 kg/m².    Estimated Creatinine Clearance: 131.5 mL/min (based on SCr of 0.6 mg/dL).     Physical Exam  Vitals and nursing note reviewed.   Constitutional:       General: She is not in acute distress.     Appearance: She is not ill-appearing, toxic-appearing or diaphoretic.   HENT:      Head: Normocephalic and atraumatic.      Mouth/Throat:      Mouth: Mucous membranes are moist.   Eyes:      General: No scleral icterus.        Right eye: No discharge.         Left eye: No discharge.   Cardiovascular:      Heart sounds: Normal heart sounds. No murmur heard.  Pulmonary:      Effort: Pulmonary effort is normal. No respiratory distress.      Breath sounds: Normal breath sounds. No wheezing, rhonchi or rales.   Abdominal:      General: Bowel sounds are normal.      Palpations: Abdomen is soft.      Tenderness: There is no abdominal tenderness. There is no guarding or rebound.   Musculoskeletal:      Right shoulder: No bony tenderness. Decreased range of motion.      Cervical back: No rigidity.      Right lower leg: No edema.      Left lower leg: No edema.      Comments: R shoulder post-surgical wound with dressing c/d/i   Skin:     General: Skin is warm and dry.      Coloration: Skin is not jaundiced.   Neurological:      Mental Status: She is oriented to person, place, and time.  Mental status is at baseline.   Psychiatric:         Mood and Affect: Mood normal.         Behavior: Behavior normal.          Significant Labs: All pertinent labs within the past 24 hours have been reviewed.  LABS:  Recent Labs   Lab 04/21/24 0524 04/22/24 0219 04/25/24  0404    136 137   K 3.6 3.6 4.6    100 102   CO2 27 28 24   BUN 10 9 6   CREATININE 0.6 0.6 0.6   * 131* 109   ANIONGAP 8 8 11     Recent Labs   Lab 04/25/24  0404   MG 1.8   PHOS 3.9     Recent Labs   Lab 04/21/24 0524 04/22/24 0219 04/25/24  0404   AST 14 16 28   ALT 11 13 19   ALKPHOS 72 76 83   BILITOT 0.2 0.2 0.2   ALBUMIN 1.8* 1.9* 2.2*     POCT Glucose:   Recent Labs   Lab 04/24/24  1948 04/25/24  0728 04/25/24  1151   POCTGLUCOSE 168* 123* 173*    Recent Labs   Lab 04/21/24 0525 04/22/24 0219 04/25/24  0404   WBC 7.33 6.82 6.50   HGB 9.8* 9.8* 10.5*   HCT 31.2* 30.9* 33.2*    262 416   GRAN 56.2  4.1 54.6  3.7 49.8  3.2          Micro  Blood Cultures  Lab Results   Component Value Date    LABBLOO No Growth after 4 days. 04/17/2024    LABBLOO No Growth after 4 days. 04/17/2024    LABBLOO No Growth after 4 days. 04/15/2024    LABBLOO No Growth after 4 days. 04/15/2024    LABBLOO  04/15/2024     Gram stain cameron bottle: Gram positive cocci in chains resembling Strep    LABBLOO  04/15/2024     Results called to and read back by:Lizz Wells  04/16/2024  08:12    LABBLOO (A) 04/15/2024     STREPTOCOCCUS AGALACTIAE (GROUP B)  Beta-hemolytic streptococci are routinely susceptible to   penicillins,cephalosporins and carbapenems.  For susceptibility see order #[id number]       Significant Imaging: I have reviewed all pertinent imaging results/findings within the past 24 hours.      Inpatient Medications:  Continuous Infusions:  Current Facility-Administered Medications   Medication Dose Route Frequency Last Rate Last Admin     Scheduled Meds:  Current Facility-Administered Medications   Medication Dose Route  Frequency    acetaminophen  1,000 mg Oral TID    amLODIPine  10 mg Oral Daily    carvediloL  6.25 mg Oral BID    cefTRIAXone (Rocephin) IV (PEDS and ADULTS)  2 g Intravenous Q24H    celecoxib  200 mg Oral Daily    enoxaparin  40 mg Subcutaneous Daily    insulin aspart U-100  8 Units Subcutaneous TIDWM    insulin detemir U-100  20 Units Subcutaneous QHS    losartan  100 mg Oral Daily    methocarbamoL  500 mg Oral TID    polyethylene glycol  17 g Oral Daily    senna-docusate 8.6-50 mg  1 tablet Oral BID    sodium chloride 0.9%  10 mL Intravenous Q6H     PRN Meds:  Current Facility-Administered Medications:     benzonatate, 100 mg, Oral, TID PRN    dextrose 10%, 12.5 g, Intravenous, PRN    dextrose 10%, 25 g, Intravenous, PRN    glucagon (human recombinant), 1 mg, Intramuscular, PRN    glucose, 16 g, Oral, PRN    glucose, 24 g, Oral, PRN    insulin aspart U-100, 0-5 Units, Subcutaneous, QID (AC + HS) PRN    morphine, 4 mg, Intravenous, Q4H PRN    naloxone, 0.02 mg, Intravenous, PRN    oxyCODONE, 5 mg, Oral, Q4H PRN    sodium chloride 0.9%, 10 mL, Intravenous, PRN    Flushing PICC/Midline Protocol, , , Until Discontinued **AND** sodium chloride 0.9%, 10 mL, Intravenous, Q6H **AND** sodium chloride 0.9%, 10 mL, Intravenous, PRN

## 2024-04-25 NOTE — PLAN OF CARE
Pt IV flushed. Pt reports pain relief following administration of medication. R shoulder incisions x3 CDI

## 2024-04-25 NOTE — CONSULTS
D/L PICC placed in left brachial vein, 43 cm in length with 0 cm exposed. Arm circumference 38 cm. Lot#[id number]

## 2024-04-25 NOTE — PLAN OF CARE
On Call CM asked to send a facesheet to [facility] for IV Abx.  Faces[facility] uploaded to [facility] and [facility]    Discharge Plan A and Plan B have been determined by review of patient's clinical status, future medical and therapeutic needs, and coverage/benefits for post-acute care in coordination with multidisciplinary team members.      Suzie Tijerina RN, CCRN-K, [facility]  Neuro-Critical Care   [phone]

## 2024-04-25 NOTE — TRANSFER OF CARE
"Anesthesia Transfer of Care Note    Patient: April Jordan    Procedure(s) Performed: Procedure(s) (LRB):  DEBRIDEMENT, SHOULDER, ARTHROSCOPIC (Right)    Patient location: PACU    Anesthesia Type: general    Transport from OR: Transported from OR on 6-10 L/min O2 by face mask with adequate spontaneous ventilation    Post pain: adequate analgesia    Post assessment: no apparent anesthetic complications    Post vital signs: stable    Level of consciousness: awake    Nausea/Vomiting: no nausea/vomiting    Complications: none    Transfer of care protocol was followed      Last vitals: Visit Vitals  BP (!) 158/78   Pulse 72   Temp 36.2 °C (97.2 °F) (Temporal)   Resp 17   Ht 5' 7" (1.702 m)   Wt 95.4 kg (210 lb 5.1 oz)   LMP 12/14/2020   SpO2 99%   Breastfeeding No   BMI 32.94 kg/m²     "

## 2024-04-25 NOTE — PLAN OF CARE
Problem: Adult Inpatient Plan of Care  Goal: Plan of Care Review  4/25/2024 0519 by Martah Lawrence LPN  Outcome: Progressing  4/25/2024 0519 by Martha Lawrence, LPN  Reactivated  Goal: Patient-Specific Goal (Individualized)  4/25/2024 0519 by Martha Lawrence, LPN  Outcome: Progressing  4/25/2024 0519 by Martha Lawrence, LPN  Reactivated  Goal: Absence of Hospital-Acquired Illness or Injury  4/25/2024 0519 by Martha Lawrence, LPN  Outcome: Progressing  4/25/2024 0519 by Martha Lawrence, LPN  Reactivated  Goal: Optimal Comfort and Wellbeing  4/25/2024 0519 by Martha Lawrence, LPN  Outcome: Progressing  4/25/2024 0519 by Martha Lawrence, LPN  Reactivated  Goal: Readiness for Transition of Care  4/25/2024 0519 by Martha Lawrence LPN  Outcome: Progressing  4/25/2024 0519 by Martha Lawrence, LPN  Reactivated     Problem: Adult Inpatient Plan of Care  Goal: Plan of Care Review  Outcome: Progressing  Goal: Patient-Specific Goal (Individualized)  Outcome: Progressing  Goal: Absence of Hospital-Acquired Illness or Injury  Outcome: Progressing  Goal: Optimal Comfort and Wellbeing  Outcome: Progressing  Goal: Readiness for Transition of Care  Outcome: Progressing     Problem: Pain Acute  Goal: Acceptable Pain Control and Functional Ability  Outcome: Progressing     Problem: Fall Injury Risk  Goal: Absence of Fall and Fall-Related Injury  Outcome: Progressing   Pt AAO X 4; able to express needs.  Pain controlled with PRN meds.  NPO at Midnight for CURTIS.  ( This is the third attempt).  L Upper Midline intact.    Safety maintained.  Bed in low position,  call  light in reach.

## 2024-04-25 NOTE — ASSESSMENT & PLAN NOTE
CT right shoulder 4/15 showed a small effusion.    Ortho at  was consulted.    Aspiration of the right shoulder 4/15 was dry.  A small amount of blood was aspirated from the subacromial bursa.  Cultures NGTD.    MRI right shoulder 4/19 revealed acromioclavicular joint effusion concerning for AC joint septic arthritis and osteomyelitis, moderate glenohumeral joint effusion and synovitis with subacromial subdeltoid bursitis, as well as cellulitis of the shoulder with myositis of the deltoid and trapezius musculature  Ortho at [facility] consulted, appreciate assistance  S/p I&D today with Ortho.  Low concern for active infection as she has been on antibiotics, but feel like she should be treated as septic arthritis

## 2024-04-25 NOTE — NURSING TRANSFER
Nursing Transfer Note      4/25/2024   12:36 PM    Nurse giving handoff:Latonya CRUZ RN  Nurse receiving handoff:Edith AVILA    Reason patient is being transferred: meets criteria    Transfer To: [id number]    Transfer via bed    Transfer with cardiac monitoring    Transported by transport x2    Transfer Vital Signs:  Blood Pressure:158/78  Heart Rate:72  O2:99  Temperature:97.2  Respirations:17    Telemetry: Rhythm SR  Order for Tele Monitor? Yes    Additional Lines: none    4eyes on Skin: yes    Medicines sent: none    Any special needs or follow-up needed: no    Patient belongings transferred with patient: No    Chart send with patient: Yes    Notified: sister    Patient reassessed at: 4/25

## 2024-04-25 NOTE — ASSESSMENT & PLAN NOTE
Patient with Paroxysmal (<7 days) atrial fibrillation which is controlled currently with Beta Blocker. Patient is currently in sinus rhythm.[id number] Score: 3  Anticoagulation:  Patient does not appear to be on home anticoagulation.  We will continue aspirin.  In normal sinus rhythm    Paroxysmal AFib.   During episode of sepsis in 2023.   Not on anticoagulation because it was a brief episode per Cardiology.   Rec to look for recurrence with the help of event monitor.   If a recurrence noted on event monitor, consider adding anticoagulation

## 2024-04-25 NOTE — ASSESSMENT & PLAN NOTE
Shahida Ortega is a 53 y.o. right-hand dominant female with T2DM and HTN presenting with pain and decreased range of motion of the right shoulder.  Prior to arrival to [facility], the patient was hospitalized at OSH where blood cultures were positive for group B Streptococcus.  She was found to have a prevertebral abscess at T5-T11 and was transferred to [facility] for surgical intervention by Cardiothoracic surgery.  The right shoulder was aspirated by Orthopedic surgery at OSH but there was not enough fluid for cell count.  This aspirate was sent for analysis and the cultures have been no growth to date.  Clinical and MRI findings as well as history of present illness with recent septicemia and known T5-T11 prevertebral abscess are concerning for septic arthritis of the right shoulder and we will plan to treat it as such.    - NPO since midnight   - WBAT RUE, ROMAT  - Abx per primary     » Dispo: to OR today for I&D of the right shoulder

## 2024-04-25 NOTE — ANESTHESIA POSTPROCEDURE EVALUATION
Anesthesia Post Evaluation    Patient: April Jordan    Procedure(s) Performed: Procedure(s) (LRB):  DEBRIDEMENT, SHOULDER, ARTHROSCOPIC (Right)    Final Anesthesia Type: general      Patient location during evaluation: PACU  Patient participation: Yes- Able to Participate  Level of consciousness: awake and alert  Post-procedure vital signs: reviewed and stable  Pain management: adequate  Airway patency: patent  SEBASTIAN mitigation strategies: Multimodal analgesia, Preoperative use of mandibular advancement devices or oral appliances, Intraoperative administration of CPAP, nasopharyngeal airway, or oral appliance during sedation, Extubation while patient is awake and Verification of full reversal of neuromuscular block  PONV status at discharge: No PONV  Anesthetic complications: no      Cardiovascular status: blood pressure returned to baseline, hemodynamically stable and stable  Respiratory status: unassisted and spontaneous ventilation  Hydration status: euvolemic  Follow-up not needed.              Vitals Value Taken Time   /78 04/25/24 1232   Temp 36.2 °C (97.2 °F) 04/25/24 1230   Pulse 79 04/25/24 1307   Resp 21 04/25/24 1234   SpO2 99 % 04/25/24 1307   Vitals shown include unfiled device data.      Event Time   Out of Recovery 04/25/2024 12:20:00         Pain/Celsa Score: Pain Rating Prior to Med Admin: 8 (4/25/2024 12:05 PM)  Pain Rating Post Med Admin: 5 (4/25/2024 12:10 PM)  Celsa Score: 9 (4/25/2024 12:20 PM)

## 2024-04-25 NOTE — SUBJECTIVE & OBJECTIVE
Principal Problem:Sepsis    Principal Orthopedic Problem: Glenohumeral joint effusion in setting of Group B Strep bacteremia    Interval History: Afebrile, VSS, NAEON.  Pain has been reportedly well controlled.  Dry tap on reaspiration attempt of the right shoulder yesterday.  Mild TTP at AC joint.  NPO since midnight for I&D of the right shoulder today.          Review of patient's allergies indicates:   Allergen Reactions    Penicillins Hives     Tolerated cephalosporins 4/2024    Amoxicillin     Grass pollen-june grass standard Other (See Comments)       Current Facility-Administered Medications   Medication Dose Route Frequency Provider Last Rate Last Admin    acetaminophen tablet 1,000 mg  1,000 mg Oral Q8H PRN Jessica Grey MD        amLODIPine tablet 10 mg  10 mg Oral Daily Nima Pompa MD   10 mg at 04/24/24 0935    benzonatate capsule 100 mg  100 mg Oral TID PRN Sahara Figueroa PA-C   100 mg at 04/18/24 2348    carvediloL tablet 6.25 mg  6.25 mg Oral BID Juan Cameron III, MD   6.25 mg at 04/24/24 2143    cefTRIAXone (ROCEPHIN) 2 g in dextrose 5 % in water (D5W) 100 mL IVPB (MB+)  2 g Intravenous Q24H Marilee Begum MD   Stopped at 04/24/24 2213    dextrose 10% bolus 125 mL 125 mL  12.5 g Intravenous PRN Juan Cameron III, MD        dextrose 10% bolus 250 mL 250 mL  25 g Intravenous PRN Juan Cameron III, MD        glucagon (human recombinant) injection 1 mg  1 mg Intramuscular PRN Juan Cameron III, MD        glucose chewable tablet 16 g  16 g Oral PRN Juan Cameron III, MD        glucose chewable tablet 24 g  24 g Oral PRN Juan Cameron III, MD        insulin aspart U-100 pen 0-5 Units  0-5 Units Subcutaneous QID (AC + HS) PRN Juan Cameron III, MD   2 Units at 04/23/24 1720    insulin aspart U-100 pen 8 Units  8 Units Subcutaneous TIDWM Nima Pompa MD   8 Units at 04/24/24 1755    insulin detemir U-100 (Levemir) pen 20 Units  20 Units  "Subcutaneous QHS Nima Pompa MD   20 Units at 04/24/24 2149    losartan tablet 100 mg  100 mg Oral Daily Juan Cameron III, MD   100 mg at 04/24/24 0934    morphine injection 4 mg  4 mg Intravenous Q4H PRN Jessica Gery MD        naloxone 0.4 mg/mL injection 0.02 mg  0.02 mg Intravenous PRN Juan Cameron III, MD        oxyCODONE immediate release tablet 5 mg  5 mg Oral Q4H PRN Jessica Grey MD   5 mg at 04/24/24 2143    polyethylene glycol packet 17 g  17 g Oral Daily Juan Cameron III, MD   17 g at 04/22/24 0929    senna-docusate 8.6-50 mg per tablet 1 tablet  1 tablet Oral BID Juan Cameron III, MD   1 tablet at 04/24/24 2143    sodium chloride 0.9% flush 10 mL  10 mL Intravenous PRN Juan Cameron III, MD        sodium chloride 0.9% flush 10 mL  10 mL Intravenous Q6H Nima Pompa MD   10 mL at 04/25/24 0100    And    sodium chloride 0.9% flush 10 mL  10 mL Intravenous PRN Nima Pompa MD         Objective:     Vital Signs (Most Recent):  Temp: 97.8 °F (36.6 °C) (04/25/24 0342)  Pulse: 73 (04/25/24 0342)  Resp: 18 (04/25/24 0342)  BP: (!) 146/74 (04/25/24 0342)  SpO2: 98 % (04/25/24 0342) Vital Signs (24h Range):  Temp:  [97.5 °F (36.4 °C)-99.9 °F (37.7 °C)] 97.8 °F (36.6 °C)  Pulse:  [68-79] 73  Resp:  [17-18] 18  SpO2:  [94 %-100 %] 98 %  BP: (114-146)/(55-74) 146/74     Weight: 95.4 kg (210 lb 5.1 oz)  Height: 5' 7" (170.2 cm)  Body mass index is 32.94 kg/m².      Intake/Output Summary (Last 24 hours) at 4/25/2024 0617  Last data filed at 4/25/2024 0504  Gross per 24 hour   Intake 1049.45 ml   Output --   Net 1049.45 ml        Ortho/SPM Exam  AAOx4  NAD  Reg rate  No increased WOB    RUE:  Skin intact thoughout  Mild ttp at ac joint   Painful limited ROM at shoulder   FROM elbow, and wrist  SILT M/U/R  Motor intact AIN/PIN/M/U/R  Palpable radial pulse 2+     Significant Labs: All pertinent labs within the past 24 hours have been reviewed.    Significant " Imaging: I have reviewed and interpreted all pertinent imaging results/findings.

## 2024-04-25 NOTE — OP NOTE
OP NOTE    DOS:  04/25/2024    Preop Dx: Presumptive septic arthritis right shoulder    Postop Dx: Presumptive septic arthritis right shoulder    Procedure: Right shoulder arthroscopy with debridement and irrigation for presumed septic arthritis    Surgeon: Thaddeus Corral M.D.    Asst:  None, M.D    Anesthesia: General endotracheal    EBL:  Minimal    IVF:  1000 cc crystalloid    Implants: None    Specimens: Fluid for culture    Findings: Not much joint fluid obtained.  Slightly murky.  Some synovitis.    Dispo:  To PACU extubated/stable       Indications for Procedure:      53-year-old female with group B strep bacteremia and prevertebral abscess with acute onset shoulder pain and an MRI showing a small effusion.  We attempted aspiration but did not obtain any fluid.  However, given her acute pain and known bacteremia I think we have to assume that there may be infection of the shoulder.  Taking her to the operating room for arthroscopic irrigation of the shoulder.  The risks, benefits and alternatives to surgery were discussed the patient prior to going to the operating room.  Informed consent was obtained.    Procedure in Detail:    Patient was identified in preoperative holding area the site was marked.  Patient was wheeled to the operating room and placed on the operating table in supine position.  General endotracheal anesthesia was induced and the patient was then sat up in the beach chair positioner.  The right upper extremity was prepped and draped in sterile fashion into the shoulder.  A time-out was undertaken to confirm patient, side, site, surgery, surgeon.  All agreed we proceeded.      I established a posterior portal with a 11 blade and entered the glenohumeral joint with the trocar.  I collected some joint fluid for culture.  I did not have a great deal of fluid I elected for culture rather than cell count.  I placed the camera.  The patient had some mild synovitis in the glenohumeral joint.  I ran  the joint and the labrum and biceps tendon appeared to be intact.  The rotator cuff appeared to be intact.  I then established in anterior portal under direct visualization.  I introduced the shaver and removed some synovium from the anterior aspect of the shoulder of the posterior aspect of the shoulder.  I ran a significant amount of normal saline through this shoulder as well.  I then suctioned out any remaining fluid and took the instrumentation out of the glenohumeral joint.    I established a lateral portal and placed a shaver in the subacromial space as well as the camera from the posterior portal.  There was significant subacromial bursitis.  I removed this with the arthroscopic shaver until I would decompress the subacromial space.  I ran a significant amount of normal saline through this area as well.  Again I suctioned out any remaining fluid out of the subacromial space and then removed the arthroscopic instrumentation.      All 3 portal sites were closed with 3-0 nylon suture in figure-of-eight fashion.  I then took 1 g vancomycin 2 g cefepime powder diluted in 10 cc of saline and injected half into the glenohumeral joint and half into the subacromial space.  Sterile dressings were then applied.      All instrument and sponge counts were reported correct at the end of the case.  There no complications.  The patient was extubated, awakened and taken to the recovery room stable condition.      Plan the patient:     We will follow the cultures and supinate the grows.  The patient has been on antibiotics for her other issues which may lessen the chance was growing something.  I think we should treat as septic arthritis.  Range of motion I then activity as tolerated.    Thaddeus Corral MD

## 2024-04-25 NOTE — ASSESSMENT & PLAN NOTE
Patient's anemia is currently controlled. Has not received any PRBCs to date.   Current CBC reviewed-   Lab Results   Component Value Date    HGB 10.5 (L) 04/25/2024    HCT 33.2 (L) 04/25/2024     Monitor serial CBC and transfuse if patient becomes hemodynamically unstable, symptomatic or H/H drops below 7/21.

## 2024-04-25 NOTE — ANESTHESIA PROCEDURE NOTES
Intubation    Date/Time: 4/25/2024 10:13 AM    Performed by: Vivi Jiménez CRNA  Authorized by: Neo Mukherjee MD    Intubation:     Induction:  Intravenous    Intubated:  Postinduction    Mask Ventilation:  Easy with oral airway    Attempts:  1    Attempted By:  CRNA    Method of Intubation:  Video laryngoscopy    Blade:  Carlisle 3    Laryngeal View Grade: Grade I - full view of cords      Difficult Airway Encountered?: No      Complications:  None    Airway Device:  Oral endotracheal tube    Airway Device Size:  7.0    Style/Cuff Inflation:  Cuffed    Inflation Amount (mL):  8    Tube secured:  22    Secured at:  The lips    Placement Verified By:  Capnometry    Complicating Factors:  Obesity, short neck and poor neck/head extension    Findings Post-Intubation:  BS equal bilateral and atraumatic/condition of teeth unchanged

## 2024-04-25 NOTE — ASSESSMENT & PLAN NOTE
T5-T11 preverbal abscess  R septic shoulder    -OSH course notable for MRI/CT of R shoulder noted soft tissue edema/joint effusion concerning for septic arthritis and osteomyelitis. MRI imaging also with concern for T5-T11 preverterbral abscess.   -Evaluated by OSH ortho - dry tap, though was able to aspirate small amount of blood that was sent for cx that have been no growth to date (not enough to send for fluid studies). Blcx on admit with GBS. She was started on vanc/cefepime. After initial ID evaluation at OSH, Cefepime/Vancomycin were deescalated to Ceftriaxone due to NGTD on cultures.  She was transferred to [facility] for further surgical evaluation. Orthopedic surgery was consulted for concerns of septic arthritis and CTS consulted for VATS evaluation.     Of note, pt had had urine cx obtained 1/2024 that was positive for MSSA and GBS, s/p nitrofurantoin. Also had Ecoli bacteremia in 5/2023, tx with IV and transitioned to cefdinir.     -Thoracic surgery eval 04/22 noted no intervention indicated at the time    -Underwent orthopedic procedure(Right shoulder arthroscopy with debridement and irrigation for presumed septic arthritis) today.     Recommendations:  -CURTIS for further source evaluation    Outpatient Antibiotic Therapy Plan:    Please send referral to [facility].    1) Infection: Bacteremia due to group B Streptococcus    2) Discharge Antibiotics:    Intravenous antibiotics:  Ceftriaxone 2g IV q 24 hours     3) Therapy Duration:  6 weeks course of IV abx from last negative culture date(04/17/24)    Estimated end date of IV antibiotics: 05/29/24    4) Outpatient Followup Imaging:    Repeat MRI Thoracic W W/O Contrast in 2 weeks    5) Outpatient Weekly Labs:    Order the following labs to be drawn on Mondays:   CBC  CMP   CRP    6) Fax Lab Results to Infectious Diseases Provider: Dr. Madi Ray    [facility] Fax Number: [phone]    7) Outpatient Infectious  Diseases Follow-up    Follow-up appointment will be arranged by the ID clinic and will be found in the patient's appointments tab.    Prior to discharge, please ensure the patient's follow-up has been scheduled.    If there is still no follow-up scheduled prior to discharge, please send an EPIC message to Annmarie Gill in Infectious Diseases.

## 2024-04-25 NOTE — ASSESSMENT & PLAN NOTE
Patient's FSGs are uncontrolled due to hyperglycemia on current medication regimen.  Last A1c reviewed-   Lab Results   Component Value Date    HGBA1C 11.3 (H) 03/16/2022     Most recent fingerstick glucose reviewed-   Recent Labs   Lab 04/24/24  1732 04/24/24  1948 04/25/24  0728 04/25/24  1151   POCTGLUCOSE 134* 168* 123* 173*       Current correctional scale  Low  Maintain anti-hyperglycemic dose as follows-   Antihyperglycemics (From admission, onward)    Start     Stop Route Frequency Ordered    04/18/24 2100  insulin detemir U-100 (Levemir) pen 20 Units         -- SubQ Nightly 04/18/24 1355    04/18/24 1645  insulin aspart U-100 pen 8 Units         -- SubQ 3 times daily with meals 04/18/24 1355    04/15/24 1504  insulin aspart U-100 pen 0-5 Units         -- SubQ Before meals & nightly PRN 04/15/24 1404        Hold Oral hypoglycemics while patient is in the hospital.

## 2024-04-25 NOTE — PROGRESS NOTES
[facility] - Med Surg ([place] [address])  Infectious Disease  Progress Note    Patient Name: Shahida Ortega  MRN: [id number]  Admission Date: 4/15/2024  Length of Stay: 10 days  Attending Physician: Jessica Grey MD  Primary Care Provider: Janusz Staton MD    Isolation Status: No active isolations  Assessment/Plan:      ID  Bacteremia due to group B Streptococcus  T5-T11 preverbal abscess  R septic shoulder    -OSH course notable for MRI/CT of R shoulder noted soft tissue edema/joint effusion concerning for septic arthritis and osteomyelitis. MRI imaging also with concern for T5-T11 preverterbral abscess.   -Evaluated by OSH ortho - dry tap, though was able to aspirate small amount of blood that was sent for cx that have been no growth to date (not enough to send for fluid studies). Blcx on admit with GBS. She was started on vanc/cefepime. After initial ID evaluation at OSH, Cefepime/Vancomycin were deescalated to Ceftriaxone due to NGTD on cultures.  She was transferred to [facility] for further surgical evaluation. Orthopedic surgery was consulted for concerns of septic arthritis and CTS consulted for VATS evaluation.     Of note, pt had had urine cx obtained 1/2024 that was positive for MSSA and GBS, s/p nitrofurantoin. Also had Ecoli bacteremia in 5/2023, tx with IV and transitioned to cefdinir.     -Thoracic surgery eval 04/22 noted no intervention indicated at the time    -Underwent orthopedic procedure(Right shoulder arthroscopy with debridement and irrigation for presumed septic arthritis) today.     Recommendations:  -CURTIS for further source evaluation    Outpatient Antibiotic Therapy Plan:    Please send referral to [facility].    1) Infection: Bacteremia due to group B Streptococcus    2) Discharge Antibiotics:    Intravenous antibiotics:  Ceftriaxone 2g IV q 24 hours     3) Therapy Duration:  6 weeks course of IV abx from last negative culture date(04/17/24)    Estimated end date  of IV antibiotics: 05/29/24    4) Outpatient Followup Imaging:    Repeat MRI Thoracic W W/O Contrast in 2 weeks    5) Outpatient Weekly Labs:    Order the following labs to be drawn on Mondays:   CBC  CMP   CRP    6) Fax Lab Results to Infectious Diseases Provider: Dr. Madi Ray    [facility] Fax Number: [phone]    7) Outpatient Infectious Diseases Follow-up    Follow-up appointment will be arranged by the ID clinic and will be found in the patient's appointments tab.    Prior to discharge, please ensure the patient's follow-up has been scheduled.    If there is still no follow-up scheduled prior to discharge, please send an EPIC message to Annmarie Gill in Infectious Diseases.            Thank you for your consult. I will sign off. Please contact us if you have any additional questions.    Aamir Ayala, DO  Infectious Disease  [facility] - Med Surg ([facility])    Subjective:     Principal Problem:Sepsis    HPI: 54 yo female with DM/HTN who was initially  admitted to [facility] for R shoulder pain with associated redness, and found to have GBS bacteremia. ID consulted for bacteremia. OSH course notable for MRI/CT of R shoulder noted soft tissue edema/joint effusion concerning for septic arthritis and osteomyelitis. MRI imaging also with concern for T5-T11 preverterbral abscess. She was evaluated by OSH ortho - dry tap, though was able to aspirate small amount of blood that was sent for cx that have been no growth to date (not enough to send for fluid studies). Blcx on admit with GBS. She was started on vanc/cefepime. Upon initial ID evaluation at OSH, patient denied toxic habits, reported rash with PCN, or trauma/travel/TB exposure. She denied abdominal/respiratory/dental or  symptoms prior to admission, main complaint was back pain and R shoulder pain. After initial ID evaluation at OSH, Cefepime/Vancomycin were deescalated to Ceftriaxone due to NGTD on cultures.  She was  transferred to [facility] for further surgical evaluation. Orthopedic surgery was consulted for concerns of septic arthritis and CTS consulted for VATS evaluation.    Of note, pt had had urine cx obtained 1/2024 that was positive for MSSA and GBS, s/p nitrofurantoin. Also had Ecoli bacteremia in 5/2023, tx with IV and transitioned to cefdinir.        Interval History: No acute events overnight, afebrile, hemodynamically stable. Underwent orthopedic procedure(Right shoulder arthroscopy with debridement and irrigation for presumed septic arthritis) today.    Objective:     Vital Signs (Most Recent):  Temp: 97.2 °F (36.2 °C) (04/25/24 1230)  Pulse: 75 (04/25/24 1506)  Resp: 17 (04/25/24 1230)  BP: (!) 158/78 (04/25/24 1230)  SpO2: 99 % (04/25/24 1230) Vital Signs (24h Range):  Temp:  [97.2 °F (36.2 °C)-98.6 °F (37 °C)] 97.2 °F (36.2 °C)  Pulse:  [68-79] 75  Resp:  [17-20] 17  SpO2:  [89 %-100 %] 99 %  BP: (114-158)/(55-78) 158/78     Weight: 95.4 kg (210 lb 5.1 oz)  Body mass index is 32.94 kg/m².    Estimated Creatinine Clearance: 131.5 mL/min (based on SCr of 0.6 mg/dL).     Physical Exam  Vitals and nursing note reviewed.   Constitutional:       General: She is not in acute distress.     Appearance: She is not ill-appearing, toxic-appearing or diaphoretic.   HENT:      Head: Normocephalic and atraumatic.      Mouth/Throat:      Mouth: Mucous membranes are moist.   Eyes:      General: No scleral icterus.        Right eye: No discharge.         Left eye: No discharge.   Cardiovascular:      Heart sounds: Normal heart sounds. No murmur heard.  Pulmonary:      Effort: Pulmonary effort is normal. No respiratory distress.      Breath sounds: Normal breath sounds. No wheezing, rhonchi or rales.   Abdominal:      General: Bowel sounds are normal.      Palpations: Abdomen is soft.      Tenderness: There is no abdominal tenderness. There is no guarding or rebound.   Musculoskeletal:      Right shoulder: No bony tenderness. Decreased  range of motion.      Cervical back: No rigidity.      Right lower leg: No edema.      Left lower leg: No edema.      Comments: R shoulder post-surgical wound with dressing c/d/i   Skin:     General: Skin is warm and dry.      Coloration: Skin is not jaundiced.   Neurological:      Mental Status: She is oriented to person, place, and time. Mental status is at baseline.   Psychiatric:         Mood and Affect: Mood normal.         Behavior: Behavior normal.          Significant Labs: All pertinent labs within the past 24 hours have been reviewed.  LABS:  Recent Labs   Lab 04/21/24 0524 04/22/24 0219 04/25/24  0404    136 137   K 3.6 3.6 4.6    100 102   CO2 27 28 24   BUN 10 9 6   CREATININE 0.6 0.6 0.6   * 131* 109   ANIONGAP 8 8 11     Recent Labs   Lab 04/25/24  0404   MG 1.8   PHOS 3.9     Recent Labs   Lab 04/21/24 0524 04/22/24 0219 04/25/24  0404   AST 14 16 28   ALT 11 13 19   ALKPHOS 72 76 83   BILITOT 0.2 0.2 0.2   ALBUMIN 1.8* 1.9* 2.2*     POCT Glucose:   Recent Labs   Lab 04/24/24  1948 04/25/24  0728 04/25/24  1151   POCTGLUCOSE 168* 123* 173*    Recent Labs   Lab 04/21/24 0525 04/22/24 0219 04/25/24  0404   WBC 7.33 6.82 6.50   HGB 9.8* 9.8* 10.5*   HCT 31.2* 30.9* 33.2*    262 416   GRAN 56.2  4.1 54.6  3.7 49.8  3.2          Micro  Blood Cultures  Lab Results   Component Value Date    LABBLOO No Growth after 4 days. 04/17/2024    LABBLOO No Growth after 4 days. 04/17/2024    LABBLOO No Growth after 4 days. 04/15/2024    LABBLOO No Growth after 4 days. 04/15/2024    LABBLOO  04/15/2024     Gram stain cameron bottle: Gram positive cocci in chains resembling Strep    LABBLOO  04/15/2024     Results called to and read back by:Lizz Wells  04/16/2024  08:12    LABBLOO (SRIDHAR) 04/15/2024     STREPTOCOCCUS AGALACTIAE (GROUP B)  Beta-hemolytic streptococci are routinely susceptible to   penicillins,cephalosporins and carbapenems.  For susceptibility see order #[id number]        Significant Imaging: I have reviewed all pertinent imaging results/findings within the past 24 hours.      Inpatient Medications:  Continuous Infusions:  Current Facility-Administered Medications   Medication Dose Route Frequency Last Rate Last Admin     Scheduled Meds:  Current Facility-Administered Medications   Medication Dose Route Frequency    acetaminophen  1,000 mg Oral TID    amLODIPine  10 mg Oral Daily    carvediloL  6.25 mg Oral BID    cefTRIAXone (Rocephin) IV (PEDS and ADULTS)  2 g Intravenous Q24H    celecoxib  200 mg Oral Daily    enoxaparin  40 mg Subcutaneous Daily    insulin aspart U-100  8 Units Subcutaneous TIDWM    insulin detemir U-100  20 Units Subcutaneous QHS    losartan  100 mg Oral Daily    methocarbamoL  500 mg Oral TID    polyethylene glycol  17 g Oral Daily    senna-docusate 8.6-50 mg  1 tablet Oral BID    sodium chloride 0.9%  10 mL Intravenous Q6H     PRN Meds:  Current Facility-Administered Medications:     benzonatate, 100 mg, Oral, TID PRN    dextrose 10%, 12.5 g, Intravenous, PRN    dextrose 10%, 25 g, Intravenous, PRN    glucagon (human recombinant), 1 mg, Intramuscular, PRN    glucose, 16 g, Oral, PRN    glucose, 24 g, Oral, PRN    insulin aspart U-100, 0-5 Units, Subcutaneous, QID (AC + HS) PRN    morphine, 4 mg, Intravenous, Q4H PRN    naloxone, 0.02 mg, Intravenous, PRN    oxyCODONE, 5 mg, Oral, Q4H PRN    sodium chloride 0.9%, 10 mL, Intravenous, PRN    Flushing PICC/Midline Protocol, , , Until Discontinued **AND** sodium chloride 0.9%, 10 mL, Intravenous, Q6H **AND** sodium chloride 0.9%, 10 mL, Intravenous, PRN

## 2024-04-26 VITALS
TEMPERATURE: 97 F | HEART RATE: 81 BPM | OXYGEN SATURATION: 97 % | WEIGHT: 210 LBS | SYSTOLIC BLOOD PRESSURE: 122 MMHG | DIASTOLIC BLOOD PRESSURE: 60 MMHG | BODY MASS INDEX: 32.96 KG/M2 | HEIGHT: 67 IN | RESPIRATION RATE: 16 BRPM

## 2024-04-26 LAB
BSA FOR ECHO PROCEDURE: 2.12 M2
POCT GLUCOSE: 111 MG/DL (ref 70–110)
POCT GLUCOSE: 156 MG/DL (ref 70–110)
SINUS: 3.5 CM

## 2024-04-26 PROCEDURE — D9220A PRA ANESTHESIA: Mod: ANES,,, | Performed by: ANESTHESIOLOGY

## 2024-04-26 PROCEDURE — 25000003 PHARM REV CODE 250: Performed by: STUDENT IN AN ORGANIZED HEALTH CARE EDUCATION/TRAINING PROGRAM

## 2024-04-26 PROCEDURE — 37000009 HC ANESTHESIA EA ADD 15 MINS

## 2024-04-26 PROCEDURE — 63600175 PHARM REV CODE 636 W HCPCS: Performed by: STUDENT IN AN ORGANIZED HEALTH CARE EDUCATION/TRAINING PROGRAM

## 2024-04-26 PROCEDURE — 25000003 PHARM REV CODE 250: Performed by: HOSPITALIST

## 2024-04-26 PROCEDURE — 25000003 PHARM REV CODE 250

## 2024-04-26 PROCEDURE — 99222 1ST HOSP IP/OBS MODERATE 55: CPT | Mod: ,,, | Performed by: STUDENT IN AN ORGANIZED HEALTH CARE EDUCATION/TRAINING PROGRAM

## 2024-04-26 PROCEDURE — D9220A PRA ANESTHESIA: Mod: CRNA,,, | Performed by: STUDENT IN AN ORGANIZED HEALTH CARE EDUCATION/TRAINING PROGRAM

## 2024-04-26 PROCEDURE — A4216 STERILE WATER/SALINE, 10 ML: HCPCS | Performed by: HOSPITALIST

## 2024-04-26 PROCEDURE — 99233 SBSQ HOSP IP/OBS HIGH 50: CPT | Mod: ,,, | Performed by: INTERNAL MEDICINE

## 2024-04-26 PROCEDURE — 37000008 HC ANESTHESIA 1ST 15 MINUTES

## 2024-04-26 RX ORDER — INSULIN ASPART 100 [IU]/ML
5 INJECTION, SOLUTION INTRAVENOUS; SUBCUTANEOUS 3 TIMES DAILY
Qty: 12 ML | Refills: 0 | Status: SHIPPED | OUTPATIENT
Start: 2024-04-26 | End: 2024-04-26 | Stop reason: HOSPADM

## 2024-04-26 RX ORDER — METHOCARBAMOL 500 MG/1
500 TABLET, FILM COATED ORAL 3 TIMES DAILY
Qty: 30 TABLET | Refills: 0 | Status: SHIPPED | OUTPATIENT
Start: 2024-04-26 | End: 2024-05-06

## 2024-04-26 RX ORDER — CELECOXIB 200 MG/1
200 CAPSULE ORAL DAILY
Qty: 10 CAPSULE | Refills: 0 | Status: SHIPPED | OUTPATIENT
Start: 2024-04-26 | End: 2024-05-06

## 2024-04-26 RX ORDER — SODIUM CHLORIDE 0.9 % (FLUSH) 0.9 %
10 SYRINGE (ML) INJECTION
Status: CANCELLED | OUTPATIENT
Start: 2024-04-26

## 2024-04-26 RX ORDER — METFORMIN HYDROCHLORIDE 500 MG/1
1000 TABLET ORAL 2 TIMES DAILY WITH MEALS
Qty: 360 TABLET | Refills: 0 | Status: SHIPPED | OUTPATIENT
Start: 2024-04-26 | End: 2024-07-25

## 2024-04-26 RX ORDER — ACETAMINOPHEN 500 MG
1000 TABLET ORAL 3 TIMES DAILY
Qty: 60 TABLET | Refills: 0 | Status: SHIPPED | OUTPATIENT
Start: 2024-04-26 | End: 2024-05-06

## 2024-04-26 RX ORDER — INSULIN ASPART 100 [IU]/ML
0-5 INJECTION, SOLUTION INTRAVENOUS; SUBCUTANEOUS
Start: 2024-04-26 | End: 2024-04-26 | Stop reason: HOSPADM

## 2024-04-26 RX ORDER — LANCETS
1 EACH MISCELLANEOUS 2 TIMES DAILY WITH MEALS
Qty: 100 EACH | Refills: 11 | Status: SHIPPED | OUTPATIENT
Start: 2024-04-26

## 2024-04-26 RX ORDER — LANCING DEVICE
1 EACH MISCELLANEOUS 2 TIMES DAILY WITH MEALS
Qty: 1 EACH | Refills: 0 | Status: SHIPPED | OUTPATIENT
Start: 2024-04-26

## 2024-04-26 RX ORDER — PEN NEEDLE, DIABETIC 30 GX3/16"
1 NEEDLE, DISPOSABLE MISCELLANEOUS 4 TIMES DAILY
Qty: 100 EACH | Refills: 11 | Status: SHIPPED | OUTPATIENT
Start: 2024-04-26

## 2024-04-26 RX ORDER — OXYCODONE HYDROCHLORIDE 5 MG/1
5 TABLET ORAL EVERY 4 HOURS PRN
Qty: 30 TABLET | Refills: 0 | Status: ON HOLD | OUTPATIENT
Start: 2024-04-26 | End: 2024-05-26 | Stop reason: HOSPADM

## 2024-04-26 RX ORDER — INSULIN GLARGINE 100 [IU]/ML
20 INJECTION, SOLUTION SUBCUTANEOUS NIGHTLY
Qty: 18 ML | Refills: 0 | Status: SHIPPED | OUTPATIENT
Start: 2024-04-26 | End: 2024-04-26 | Stop reason: HOSPADM

## 2024-04-26 RX ORDER — GLIMEPIRIDE 4 MG/1
4 TABLET ORAL
Qty: 90 TABLET | Refills: 0 | Status: SHIPPED | OUTPATIENT
Start: 2024-04-26 | End: 2024-07-25

## 2024-04-26 RX ORDER — PEN NEEDLE, DIABETIC 29 G X1/2"
1 NEEDLE, DISPOSABLE MISCELLANEOUS 2 TIMES DAILY WITH MEALS
Qty: 100 EACH | Refills: 11 | Status: SHIPPED | OUTPATIENT
Start: 2024-04-26

## 2024-04-26 RX ORDER — PROPOFOL 10 MG/ML
VIAL (ML) INTRAVENOUS
Status: DISCONTINUED | OUTPATIENT
Start: 2024-04-26 | End: 2024-04-26

## 2024-04-26 RX ORDER — DEXTROSE 4 G
TABLET,CHEWABLE ORAL
Qty: 1 EACH | Refills: 0 | Status: SHIPPED | OUTPATIENT
Start: 2024-04-26

## 2024-04-26 RX ADMIN — PROPOFOL 200 MCG/KG/MIN: 10 INJECTION, EMULSION INTRAVENOUS at 12:04

## 2024-04-26 RX ADMIN — AMLODIPINE BESYLATE 10 MG: 10 TABLET ORAL at 09:04

## 2024-04-26 RX ADMIN — PROPOFOL 20 MG: 10 INJECTION, EMULSION INTRAVENOUS at 12:04

## 2024-04-26 RX ADMIN — Medication 10 ML: at 12:04

## 2024-04-26 RX ADMIN — PROPOFOL 10 MG: 10 INJECTION, EMULSION INTRAVENOUS at 01:04

## 2024-04-26 RX ADMIN — SODIUM CHLORIDE: 0.9 INJECTION, SOLUTION INTRAVENOUS at 12:04

## 2024-04-26 RX ADMIN — ACETAMINOPHEN 1000 MG: 500 TABLET ORAL at 09:04

## 2024-04-26 RX ADMIN — MUPIROCIN: 20 OINTMENT TOPICAL at 10:04

## 2024-04-26 RX ADMIN — CELECOXIB 200 MG: 100 CAPSULE ORAL at 09:04

## 2024-04-26 RX ADMIN — LOSARTAN POTASSIUM 100 MG: 50 TABLET, FILM COATED ORAL at 09:04

## 2024-04-26 RX ADMIN — Medication 10 ML: at 09:04

## 2024-04-26 RX ADMIN — CARVEDILOL 6.25 MG: 6.25 TABLET, FILM COATED ORAL at 09:04

## 2024-04-26 RX ADMIN — METHOCARBAMOL 500 MG: 500 TABLET ORAL at 09:04

## 2024-04-26 NOTE — TRANSFER OF CARE
"Anesthesia Transfer of Care Note    Patient: April Jordan    Procedure(s) Performed: Procedure(s) (LRB):  Transesophageal echo (CURTIS) intra-procedure log documentation (N/A)    Patient location: Cath Lab    Anesthesia Type: general    Transport from OR: Transported from OR on room air with adequate spontaneous ventilation    Post pain: adequate analgesia    Post assessment: no apparent anesthetic complications and tolerated procedure well    Post vital signs: stable    Level of consciousness: lethargic    Nausea/Vomiting: no nausea/vomiting    Complications: none    Transfer of care protocol was followed      Last vitals: Visit Vitals  /64 (BP Location: Left arm, Patient Position: Lying)   Pulse 81   Temp 36 °C (96.8 °F) (Oral)   Resp 18   Ht 5' 7" (1.702 m)   Wt 95.3 kg (210 lb)   LMP 12/14/2020   SpO2 98%   Breastfeeding No   BMI 32.89 kg/m²     "

## 2024-04-26 NOTE — PLAN OF CARE
[facility] - Med Surg ([facility])  Discharge Final Note    Observed d/c orders placed for this patient. Patient has been accepted by [facility] for needs. Per Angie lam/[place], date of service will be 5/2/24. MD aware and approved. Patient updated. Patient d/c to home w/family via private vehicle.    Primary Care Provider: Janusz Staton MD    Expected Discharge Date: 4/26/2024    Final Discharge Note (most recent)       Final Note - 04/26/24 1550          Final Note    Assessment Type Final Discharge Note     Anticipated Discharge Disposition Home-Health Care [facility]     What phone number can be called within the next 1-3 days to see how you are doing after discharge? [phone]        Post-Acute Status    Post-Acute Authorization Home Health;IV Infusion (P)      Home Health Status Referrals Sent (P)      Coverage [facility] - (P)      IV Infusion Status Set-up Complete/Auth obtained (P)      Discharge Delays None known at this time (P)                      Important Message from Medicare             Contact Info       Janusz Staton MD   Specialty: Internal Medicine   Relationship: PCP - General    [address] [place], [place] [address] [id number]   Phone: [phone]       Next Steps: Follow up in 1 week(s)    Janusz Staton MD   Specialty: Internal Medicine   Relationship: PCP - General    [facility] [address]   Phone: [phone]       Next Steps: Follow up on 4/30/2024    Instructions: @2:30 pm          OLESYA White, LMSW    Case Management Department  [facility]

## 2024-04-26 NOTE — CONSULTS
Endoscopy performed and coin removed from esophagus. Pt currently responds to voice. Family at bedside. [facility]  CURTIS Consult      April Jordan  YOB: 1970  Medical Record Number:  [id number]  Attending Physician:  Jessica Grey MD   Date of Admission: 4/15/2024       Hospital Day:  11  Current Principal Problem:  Sepsis      History     Cc: CURTIS to evaluate for endocarditis    HPI  54 y/o F with pmhx of DM and HTN who presented due to 3 day h/o right shoulder pain with associated redness. Patient reports being seen here in the ED for her symptoms on 4/12/24 and was prescribed Valium with relief of back pain but not shoulder. Blood cx were obtained, which returned positive for Strep agalactiae and the patient was placed on antibiotics. She was admitted to [facility] for sepsis 2/2 atraumatic right shoulder pain and erythema.  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  MRI right shoulder revealed acromioclavicular joint effusion concerning for AC joint septic arthritis and osteomyelitis. Neurosurgery reviewed the images and reported that neurosurgical intervention was not indicated since the infection did not extend into the epidural space.  She was transferred to [facility] [place] on 4/21 for CTS, Ortho, and ID evaluations.  Per CTS, as leukocytosis has resolved and collection is very small on CT, do not recommend surgical intervention at this time.   CURTIS was ordered to evaluate for endocarditis.     Today, no acute complaints.    Anticoagulant/antiplatelets: SubQ lovenox  Platelet count: 416  INR: 1.0    History of stroke:  no  Dysphagia or odynophagia:  no  Liver Disease, esophageal disease, or known varices:  no  Upper GI Bleeding:  no  Snoring:  no   Sleep Apnea:  no  Prior neck surgery or radiation:  no  History of anesthetic difficulties:  no  Family history of anesthetic difficulties:  no  Last oral intake: last pm   Able to move neck in all directions:  yes  Use of GLP-1:  no        Medications - Outpatient  Prior to Admission medications    Medication Sig Start  Date End Date Taking? Authorizing Provider   aspirin (ECOTRIN) 81 MG EC tablet Take 1 tablet (81 mg total) by mouth once daily. 6/1/23 5/31/24 Yes Tyrese Wang MD   carvediloL (COREG) 3.125 MG tablet Take 2 tablets (6.25 mg total) by mouth 2 (two) times daily. 6/1/23 5/31/24 Yes Tyrese Wang MD   cetirizine (ZYRTEC) 10 MG tablet Take 1 tablet (10 mg total) by mouth once daily. 12/27/23 12/26/24 Yes Ghassan Louis, NP   cyclobenzaprine (FLEXERIL) 10 MG tablet Take 10 mg by mouth 3 (three) times daily as needed. 4/11/24  Yes Provider, Historical   hydroCHLOROthiazide (HYDRODIURIL) 25 MG tablet Take 1 tablet (25 mg total) by mouth once daily. 6/1/23 5/31/24 Yes Tyrese Wang MD   ibuprofen (ADVIL,MOTRIN) 800 MG tablet Take 800 mg by mouth every 8 (eight) hours as needed. 4/11/24  Yes Provider, Historical   losartan (COZAAR) 100 MG tablet Take 1 tablet (100 mg total) by mouth once daily. 6/1/23 5/31/24 Yes Tyrese Wang MD   metFORMIN (GLUCOPHAGE) 500 MG tablet Take 1 tablet (500 mg total) by mouth 2 (two) times daily with meals. 6/1/23 5/31/24 Yes Tyrese Wang MD   diazePAM (VALIUM) 2 MG tablet Take 1 tablet (2 mg total) by mouth every 6 (six) hours as needed (pain). 4/12/24 4/26/24 Yes Bisi Clarke MD   doxycycline (VIBRAMYCIN) 100 MG Cap Take 1 capsule (100 mg total) by mouth 2 (two) times daily. for 10 days 4/12/24 4/26/24 Yes Bisi Clarke MD   acetaminophen (TYLENOL) 500 MG tablet Take 2 tablets (1,000 mg total) by mouth 3 (three) times daily. for 10 days 4/26/24 5/6/24  Jessica Grey MD   albuterol (PROVENTIL/VENTOLIN HFA) 90 mcg/actuation inhaler Inhale 2 puffs into the lungs every 6 (six) hours as needed.    Provider, Historical   azelastine (ASTELIN) 137 mcg (0.1 %) nasal spray 1 spray by Nasal route 2 (two) times daily. 10/25/23   Provider, Historical   blood sugar diagnostic Strp Use to test blood glucose 2 (two) times daily with meals. 4/26/24   Jessica Grey MD   blood-glucose  "meter Misc Use to test blood glucose 2 (two) times daily with meals. 4/26/24   Jessica Grey MD   celecoxib (CELEBREX) 200 MG capsule Take 1 capsule (200 mg total) by mouth once daily. for 10 days 4/26/24 5/6/24  Jessica Grey MD   dextrose 5 % in water (D5W) PgBk 100 mL with cefTRIAXone 2 gram SolR 2 g Inject 2 g into the vein once daily. 4/26/24 5/29/24  Jessica Grey MD   FREESTYLE HARSHA 14 DAY SENSOR Kit Change every 14 days for blood glucose monitoring. 11/13/23   Provider, Historical   insulin aspart U-100 (NOVOLOG) 100 unit/mL (3 mL) InPn pen Inject 5 Units into the skin 3 (three) times daily. 4/26/24 7/25/24  Jessica Grey MD   insulin aspart U-100 (NOVOLOG) 100 unit/mL (3 mL) InPn pen Inject 0-5 Units into the skin before meals and at bedtime as needed (Hyperglycemia). **LOW CORRECTION DOSE**  Blood Glucose  mg/dL                  Pre-meal                2200  151-200                0 unit                      0 unit  201-250                2 units                    1 unit  251-300                3 units                    1 unit  301-350                4 units                    2 units  >350                     5 units                    3 units  Administer subcutaneously if needed at times designated by monitoring schedule.   DO NOT HOLD correction dose insulin for patients who are  NPO.  "HIGH ALERT MEDICATION" - Administer with meals or TF/TPN. 4/26/24 4/26/25  Jessica Grey MD   insulin (LANTUS SOLOSTAR U-100 INSULIN) glargine 100 units/mL SubQ pen Inject 20 Units into the skin every evening. 4/26/24 7/25/24  Jessica Grey MD   insulin syringe-needle U-100 0.3 mL 30 gauge x 5/16" Syrg 1 each by Misc.(Non-Drug; Combo Route) route 2 (two) times daily with meals. 4/26/24   Jessica Grey MD   lancets Misc Use to check blood glucose 2 (two) times daily with meals. 4/26/24   Jessica Grey MD   lancing device Misc 1 Device by Misc.(Non-Drug; Combo Route) route 2 (two) times " "daily with meals. 4/26/24   Jessica Grey MD   methocarbamoL (ROBAXIN) 500 MG Tab Take 1 tablet (500 mg total) by mouth 3 (three) times daily. for 10 days 4/26/24 5/6/24  Jessica Grey MD   ondansetron (ZOFRAN-ODT) 4 MG TbDL Take 4 mg by mouth. 4/11/24   Provider, Historical   oxyCODONE (ROXICODONE) 5 MG immediate release tablet Take 1 tablet (5 mg total) by mouth every 4 (four) hours as needed. 4/26/24   Jessica Grey MD   pen needle, diabetic 31 gauge x 5/16" Ndle 1 each by Misc.(Non-Drug; Combo Route) route 2 (two) times daily with meals. 4/26/24   Jessica Grey MD   promethazine (PHENERGAN) 25 MG tablet Take 25 mg by mouth every 8 (eight) hours as needed. 4/11/24   Provider, Historical         Medications - Current  Scheduled Meds:  Current Facility-Administered Medications   Medication Dose Route Frequency    acetaminophen  1,000 mg Oral TID    amLODIPine  10 mg Oral Daily    carvediloL  6.25 mg Oral BID    cefTRIAXone (Rocephin) IV (PEDS and ADULTS)  2 g Intravenous Q24H    celecoxib  200 mg Oral Daily    enoxaparin  40 mg Subcutaneous Daily    insulin aspart U-100  8 Units Subcutaneous TIDWM    insulin detemir U-100  20 Units Subcutaneous QHS    losartan  100 mg Oral Daily    methocarbamoL  500 mg Oral TID    mupirocin   Nasal BID    polyethylene glycol  17 g Oral Daily    senna-docusate 8.6-50 mg  1 tablet Oral BID    sodium chloride 0.9%  10 mL Intravenous Q6H     Continuous Infusions:  Current Facility-Administered Medications   Medication Dose Route Frequency Last Rate Last Admin     PRN Meds:.  Current Facility-Administered Medications:     benzonatate, 100 mg, Oral, TID PRN    dextrose 10%, 12.5 g, Intravenous, PRN    dextrose 10%, 25 g, Intravenous, PRN    glucagon (human recombinant), 1 mg, Intramuscular, PRN    glucose, 16 g, Oral, PRN    glucose, 24 g, Oral, PRN    insulin aspart U-100, 0-5 Units, Subcutaneous, QID (AC + HS) PRN    morphine, 4 mg, Intravenous, Q4H PRN    naloxone, " 0.02 mg, Intravenous, PRN    oxyCODONE, 5 mg, Oral, Q4H PRN    sodium chloride 0.9%, 10 mL, Intravenous, PRN    Flushing PICC/Midline Protocol, , , Until Discontinued **AND** sodium chloride 0.9%, 10 mL, Intravenous, Q6H **AND** sodium chloride 0.9%, 10 mL, Intravenous, PRN      Allergies  Review of patient's allergies indicates:   Allergen Reactions    Penicillins Hives     Tolerated cephalosporins 4/2024    Amoxicillin     Grass pollen-june grass standard Other (See Comments)         Past Medical History  Past Medical History:   Diagnosis Date    Anemia 4/22/2024    DM2 (diabetes mellitus, type 2) 05/28/2023    Hypertension     Prolonged Q-T interval on ECG 05/31/2023    Qtc 525 5/31/23         Past Surgical History  Past Surgical History:   Procedure Laterality Date    ABDOMINAL SURGERY      ARTHROSCOPIC DEBRIDEMENT OF SHOULDER Right 4/24/2024    Procedure: DEBRIDEMENT, SHOULDER, ARTHROSCOPIC; Linvatec, beach chair, 9L NS;  Surgeon: Madi Bailey MD;  Location: [address];  Service: Orthopedics;  Laterality: Right;    ARTHROSCOPIC DEBRIDEMENT OF SHOULDER Right 4/25/2024    Procedure: DEBRIDEMENT, SHOULDER, ARTHROSCOPIC;  Surgeon: Thaddeus Corral MD;  Location: [facility] OR [address] [place] [address];  Service: Orthopedics;  Laterality: Right;    COLONOSCOPY N/A 06/01/2023    Procedure: COLONOSCOPY;  Surgeon: Thaddeus Carlos MD;  Location: [facility];  Service: Endoscopy;  Laterality: N/A;    DIAGNOSTIC LAPAROSCOPY N/A 05/26/2019    Procedure: LAPAROSCOPY, DIAGNOSTIC Drainage of abscess ;  Surgeon: Jose Luis Hanson MD;  Location: [facility] OR;  Service: General;  Laterality: N/A;  Drain Placement    DIAGNOSTIC LAPAROSCOPY N/A 12/27/2020    Procedure: Diagnostic laparoscopy, drainage of intra-abdominal abscess;  Surgeon: Bhupendra Banda MD;  Location: [facility] OR;  Service: General;  Laterality: N/A;    LAPAROSCOPIC LYSIS OF ADHESIONS  05/26/2019    Procedure: LYSIS, ADHESIONS, LAPAROSCOPIC;  Surgeon: Jose Luis Hanson MD;   Location: [facility] OR;  Service: General;;    VATS, WITH CHEST TUBE INSERTION FOR DRAINAGE OF PLEURAL EFFUSION Right 4/23/2024    Procedure: VATS, WITH CHEST TUBE INSERTION FOR DRAINAGE OF PLEURAL EFFUSION;  Surgeon: Rakesh Blake MD;  Location: [facility] OR [address] [place] [address];  Service: Cardiothoracic;  Laterality: Right;         Social History  Social History     Socioeconomic History    Marital status: Single   Tobacco Use    Smoking status: Never    Smokeless tobacco: Never   Substance and Sexual Activity    Alcohol use: Not Currently    Drug use: Not Currently    Sexual activity: Not Currently     Social Determinants of Health     Financial Resource Strain: Low Risk  (4/16/2024)    Overall Financial Resource Strain (CARDIA)     Difficulty of Paying Living Expenses: Not very hard   Food Insecurity: No Food Insecurity (4/16/2024)    Hunger Vital Sign     Worried About Running Out of Food in the Last Year: Never true     Ran Out of Food in the Last Year: Never true   Transportation Needs: No Transportation Needs (4/16/2024)    PRAPARE - Transportation     Lack of Transportation (Medical): No     Lack of Transportation (Non-Medical): No   Physical Activity: Sufficiently Active (4/16/2024)    Exercise Vital Sign     Days of Exercise per Week: 7 days     Minutes of Exercise per Session: 60 min   Stress: No Stress Concern Present (4/16/2024)    [employer] [place] [employer] - Occupational Stress Questionnaire     Feeling of Stress : Only a little   Social Connections: Moderately Isolated (4/16/2024)    Social Connection and Isolation Panel [NHANES]     Frequency of Communication with Friends and Family: More than three times a week     Frequency of Social Gatherings with Friends and Family: More than three times a week     Attends Mu-ism Services: More than 4 times per year     Active Member of Clubs or Organizations: No     Attends Club or Organization Meetings: Never     Marital Status: Never    Housing  "Stability: Low Risk  (4/16/2024)    Housing Stability Vital Sign     Unable to Pay for Housing in the Last Year: No     Number of Times Moved in the Last Year: 1     Homeless in the Last Year: No         ROS  10 point ROS performed and negative except as stated in HPI     Physical Examination     Vital Signs  24 Hour VS Range    Temp:  [97.2 °F (36.2 °C)-98.6 °F (37 °C)]   Pulse:  [69-91]   Resp:  [17-20]   BP: (124-160)/(67-84)   SpO2:  [89 %-100 %]       Physical Exam:   Constitutional: no acute distress  HEENT: NCAT, EOMI, no scleral icterus  Cardiovascular: Regular rate and rhythm   Pulmonary: Normal respiratory effort   Neuro: alert and oriented, no focal deficits  Extremities: warm, no edema   MSK: no deformities    Data       Recent Labs   Lab 04/21/24  0525 04/22/24 0219 04/25/24  0404   WBC 7.33 6.82 6.50   HGB 9.8* 9.8* 10.5*   HCT 31.2* 30.9* 33.2*    262 416        Recent Labs   Lab 04/22/24  0219   INR 1.0        Recent Labs   Lab 04/21/24  0524 04/22/24  0219 04/25/24  0404    136 137   K 3.6 3.6 4.6    100 102   CO2 27 28 24   BUN 10 9 6   CREATININE 0.6 0.6 0.6   ANIONGAP 8 8 11   CALCIUM 8.9 8.9 9.4        Recent Labs   Lab 04/21/24  0524 04/22/24 0219 04/25/24  0404   PROT 6.7 6.8 7.6   ALBUMIN 1.8* 1.9* 2.2*   BILITOT 0.2 0.2 0.2   ALKPHOS 72 76 83   AST 14 16 28   ALT 11 13 19        No results for input(s): "TROPONINI" in the last 168 hours.     BNP (pg/mL)   Date Value   01/08/2024 12       No results for input(s): "LABBLOO" in the last 168 hours.         Assessment & Plan     #Sepsis  - blood cultures positive for Strep agalactiae  - proceed with CURTIS to evaluate for endocarditis    TTE 4/17/24:    Left Ventricle: The left ventricle is normal in size. There is moderate concentric hypertrophy. There is normal systolic function with a visually estimated ejection fraction of 60 - 65%. Grade I diastolic dysfunction.    Right Ventricle: Normal right ventricular cavity size. " Systolic function is normal.    Tricuspid Valve: There is mild regurgitation.    Aorta: Aortic root is mildly dilated measuring 3.59 cm.    Pulmonary Artery: The estimated pulmonary artery systolic pressure is 39 mmHg.    IVC/SVC: Normal venous pressure at 3 mmHg.    Pericardium: There is a trivial posterior effusion.      -No absolute contraindications of esophageal stricture, tumor, perforation, laceration,or diverticulum and/or active GI bleed.  -The risks, benefits & alternatives of the procedure were explained to the patient.   -The risks of transesophageal echo include but are not limited to:  Dental trauma, esophageal trauma/perforation, bleeding, laryngospasm/brochospasm, aspiration, sore throat/hoarseness, & dislodgement of the endotracheal tube/nasogastric tube (where applicable).  -The risks of moderate sedation include hypotension, respiratory depression, arrhythmias, bronchospasm, & death.    -Informed consent was obtained. The patient is agreeable to proceed with the procedure and all questions and concerns addressed.    Case was discussed with an attending physician in echocardiography lab prior to procedure.    Spring Humphrey PA-C  [facility]

## 2024-04-26 NOTE — SUBJECTIVE & OBJECTIVE
Interval History: No acute events overnight, afebrile, hemodynamically stable. Reports improving R shoulder mobility       Objective:     Vital Signs (Most Recent):  Temp: 98.3 °F (36.8 °C) (04/26/24 0814)  Pulse: 91 (04/26/24 0814)  Resp: 18 (04/26/24 0814)  BP: (!) 160/82 (04/26/24 0814)  SpO2: 100 % (04/26/24 0814) Vital Signs (24h Range):  Temp:  [97.2 °F (36.2 °C)-98.3 °F (36.8 °C)] 98.3 °F (36.8 °C)  Pulse:  [69-91] 91  Resp:  [17-20] 18  SpO2:  [89 %-100 %] 100 %  BP: (134-160)/(67-84) 160/82     Weight: 95.4 kg (210 lb 5.1 oz)  Body mass index is 32.94 kg/m².    Estimated Creatinine Clearance: 131.5 mL/min (based on SCr of 0.6 mg/dL).     Physical Exam  Vitals and nursing note reviewed.   Constitutional:       General: She is not in acute distress.     Appearance: She is not ill-appearing, toxic-appearing or diaphoretic.   HENT:      Head: Normocephalic and atraumatic.      Mouth/Throat:      Mouth: Mucous membranes are moist.   Eyes:      General: No scleral icterus.        Right eye: No discharge.         Left eye: No discharge.   Cardiovascular:      Heart sounds: Normal heart sounds. No murmur heard.  Pulmonary:      Effort: Pulmonary effort is normal. No respiratory distress.      Breath sounds: Normal breath sounds. No wheezing, rhonchi or rales.   Abdominal:      General: Bowel sounds are normal.      Palpations: Abdomen is soft.      Tenderness: There is no abdominal tenderness. There is no guarding or rebound.   Musculoskeletal:      Right shoulder: No bony tenderness. Decreased range of motion.      Cervical back: No rigidity.      Right lower leg: No edema.      Left lower leg: No edema.      Comments: R shoulder post-surgical wound with dressing c/d/i   Skin:     General: Skin is warm and dry.      Coloration: Skin is not jaundiced.   Neurological:      Mental Status: She is oriented to person, place, and time. Mental status is at baseline.   Psychiatric:         Mood and Affect: Mood normal.          Behavior: Behavior normal.          Significant Labs: All pertinent labs within the past 24 hours have been reviewed.  LABS:  Recent Labs   Lab 04/21/24  0524 04/22/24 0219 04/25/24  0404    136 137   K 3.6 3.6 4.6    100 102   CO2 27 28 24   BUN 10 9 6   CREATININE 0.6 0.6 0.6   * 131* 109   ANIONGAP 8 8 11     Recent Labs   Lab 04/25/24  0404   MG 1.8   PHOS 3.9     Recent Labs   Lab 04/21/24  0524 04/22/24 0219 04/25/24  0404   AST 14 16 28   ALT 11 13 19   ALKPHOS 72 76 83   BILITOT 0.2 0.2 0.2   ALBUMIN 1.8* 1.9* 2.2*     POCT Glucose:   Recent Labs   Lab 04/25/24  1524 04/25/24  1932 04/26/24  0811   POCTGLUCOSE 187* 200* 156*    Recent Labs   Lab 04/21/24 0525 04/22/24 0219 04/25/24  0404   WBC 7.33 6.82 6.50   HGB 9.8* 9.8* 10.5*   HCT 31.2* 30.9* 33.2*    262 416   GRAN 56.2  4.1 54.6  3.7 49.8  3.2          Micro  Blood Cultures  Lab Results   Component Value Date    LABBLOO No Growth after 4 days. 04/17/2024    LABBLOO No Growth after 4 days. 04/17/2024    LABBLOO No Growth after 4 days. 04/15/2024    LABBLOO No Growth after 4 days. 04/15/2024    LABBLOO  04/15/2024     Gram stain cameron bottle: Gram positive cocci in chains resembling Strep    LABBLOO  04/15/2024     Results called to and read back by:Lizz Wells  04/16/2024  08:12    LABBLOO (A) 04/15/2024     STREPTOCOCCUS AGALACTIAE (GROUP B)  Beta-hemolytic streptococci are routinely susceptible to   penicillins,cephalosporins and carbapenems.  For susceptibility see order #[id number]       Significant Imaging: I have reviewed all pertinent imaging results/findings within the past 24 hours.      Inpatient Medications:  Continuous Infusions:  Current Facility-Administered Medications   Medication Dose Route Frequency Last Rate Last Admin     Scheduled Meds:  Current Facility-Administered Medications   Medication Dose Route Frequency    acetaminophen  1,000 mg Oral TID    amLODIPine  10 mg Oral Daily     carvediloL  6.25 mg Oral BID    cefTRIAXone (Rocephin) IV (PEDS and ADULTS)  2 g Intravenous Q24H    celecoxib  200 mg Oral Daily    enoxaparin  40 mg Subcutaneous Daily    insulin aspart U-100  8 Units Subcutaneous TIDWM    insulin detemir U-100  20 Units Subcutaneous QHS    losartan  100 mg Oral Daily    methocarbamoL  500 mg Oral TID    mupirocin   Nasal BID    polyethylene glycol  17 g Oral Daily    senna-docusate 8.6-50 mg  1 tablet Oral BID    sodium chloride 0.9%  10 mL Intravenous Q6H     PRN Meds:  Current Facility-Administered Medications:     benzonatate, 100 mg, Oral, TID PRN    dextrose 10%, 12.5 g, Intravenous, PRN    dextrose 10%, 25 g, Intravenous, PRN    glucagon (human recombinant), 1 mg, Intramuscular, PRN    glucose, 16 g, Oral, PRN    glucose, 24 g, Oral, PRN    insulin aspart U-100, 0-5 Units, Subcutaneous, QID (AC + HS) PRN    morphine, 4 mg, Intravenous, Q4H PRN    naloxone, 0.02 mg, Intravenous, PRN    oxyCODONE, 5 mg, Oral, Q4H PRN    sodium chloride 0.9%, 10 mL, Intravenous, PRN    Flushing PICC/Midline Protocol, , , Until Discontinued **AND** sodium chloride 0.9%, 10 mL, Intravenous, Q6H **AND** sodium chloride 0.9%, 10 mL, Intravenous, PRN

## 2024-04-26 NOTE — ASSESSMENT & PLAN NOTE
CT right shoulder 4/15 showed a small effusion.    Ortho at  was consulted.    Aspiration of the right shoulder 4/15 was dry.  A small amount of blood was aspirated from the subacromial bursa.  Cultures NGTD.    MRI right shoulder 4/19 revealed acromioclavicular joint effusion concerning for AC joint septic arthritis and osteomyelitis, moderate glenohumeral joint effusion and synovitis with subacromial subdeltoid bursitis, as well as cellulitis of the shoulder with myositis of the deltoid and trapezius musculature  Ortho at [facility] [place] consulted, appreciate assistance  S/p I&D  with Ortho.  Low concern for active infection as she has been on antibiotics, but feel like she should be treated as septic arthritis

## 2024-04-26 NOTE — PROGRESS NOTES
[facility] - Med Surg ([address] Medicine  Progress Note    Patient Name: Shahida Ortega  MRN: [id number]  Patient Class: IP- Inpatient   Admission Date: 4/15/2024  Length of Stay: 11 days  Attending Physician: Jessica Grey MD  Primary Care Provider: Janusz Staton MD        Subjective:     Principal Problem:Sepsis        HPI:  53F with pmh of dm and htn who presents due to 3 day h/o right shoulder pain with associated redness. Patient reports being seen here in the ED for her symptoms on 4/12/24 and was prescribed Valium with relief of back pain but not shoulder. Patient denies taking any medications today for relief. Patient denies any recent injuries or trauma. In ed pt started on abx and fluids for sepsis and ortho consulted who performed arthrocentesis of the should with fluids sent for culture and pending. Pt denies tobacco, alcohol or drug use. Pt denies previous ortho surgerie     Overview/Hospital Course:  Ms. Ortega was admitted to [facility] for sepsis 2/2 atraumatic right shoulder pain and erythema.  CT right shoulder showed a small effusion.  Ortho was consulted.  Aspiration of the right shoulder 4/15 was dry.  A small amount of blood was aspirated from the subacromial bursa.  Cultures NGTD.  Blood cx were obtained and the patient was started on Cefepime and Vancomycin. They returned positive for Strep agalactiae and antibiotics switched to CTX.  ID was consulted.  Review of records revealed that the patient had a Strep agalactiae UTI in January 2024.  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  MRI right shoulder revealed acromioclavicular joint effusion concerning for AC joint septic arthritis and osteomyelitis, moderate glenohumeral joint effusion and synovitis with subacromial subdeltoid bursitis., as well as cellulitis of the shoulder with myositis of the deltoid and trapezius musculature. Neurosurgery reviewed the images and reported that neurosurgical intervention was  not indicated since the infection did not extend into the epidural space.  Her case was discussed with Dr. Newby of C[place] at [facility] who requested [place] for VATS.  She was transferred to [facility] on 4/21 for CTS, Ortho, and ID evaluations.  Per CTS, as leukocytosis has resolved and collection is very small on CT, do not recommend surgical intervention at this time.  CURTIS was ordered to evaluate for endocarditis.  She went to the OR with Ortho for an I&D on 4/25, who felt like she should be treated for septic arthritis, though low concern for active shoulder infection as she has been on IV antibiotics.  ID suggested 6 weeks of IV Ceftriaxone until 5/29, with a repeat MRI thoracic in 2 weeks to evaluate healing.  Attempts were made to discharge her on insulin, but as she doesn't get paid until next week, she was unable to afford it.  She was discharged on higher dose Metformin and Amaryl, and told to monitor her sugars and followup with her PCP to be started on insulin given her elevated HbA1c.    Interval History: No acute events overnight. Feels well today.  Plan for CURTIS then DC after.  Verbally discussed medication changes and followups.    Review of Systems   Constitutional:  Negative for chills, fatigue and fever.   Respiratory:  Negative for cough and shortness of breath.    Cardiovascular:  Negative for chest pain, palpitations and leg swelling.   Gastrointestinal:  Negative for abdominal pain, diarrhea, nausea and vomiting.   Genitourinary:  Negative for dysuria and urgency.   Musculoskeletal:  Positive for arthralgias and myalgias.   Neurological:  Negative for dizziness and headaches.   All other systems reviewed and are negative.    Objective:     Vital Signs (Most Recent):  Temp: 98.3 °F (36.8 °C) (04/26/24 0814)  Pulse: 91 (04/26/24 0814)  Resp: 18 (04/26/24 0814)  BP: (!) 160/82 (04/26/24 0814)  SpO2: 100 % (04/26/24 0814) Vital Signs (24h Range):  Temp:  [97.2 °F (36.2 °C)-98.3 °F (36.8 °C)] 98.3 °F (36.8  °C)  Pulse:  [69-91] 91  Resp:  [17-20] 18  SpO2:  [89 %-100 %] 100 %  BP: (134-160)/(67-84) 160/82     Weight: 95.4 kg (210 lb 5.1 oz)  Body mass index is 32.94 kg/m².    Intake/Output Summary (Last 24 hours) at 4/26/2024 1041  Last data filed at 4/25/2024 1230  Gross per 24 hour   Intake 800 ml   Output --   Net 800 ml      Physical Exam  Constitutional:       Appearance: Normal appearance. She is obese.   HENT:      Head: Normocephalic and atraumatic.   Cardiovascular:      Rate and Rhythm: Normal rate and regular rhythm.      Heart sounds: No murmur heard.  Pulmonary:      Effort: Pulmonary effort is normal. No respiratory distress.      Breath sounds: Normal breath sounds. No wheezing or rales.   Abdominal:      General: There is no distension.      Palpations: Abdomen is soft.      Tenderness: There is no abdominal tenderness.   Musculoskeletal:         General: Tenderness (Right shoulder/deltoid wtih limited ROM) present. No deformity.      Comments: PICC LUE   Skin:     General: Skin is warm and dry.   Neurological:      General: No focal deficit present.      Mental Status: She is alert and oriented to person, place, and time. Mental status is at baseline.         MELD 3.0: 11 at 4/22/2024  2:19 AM  MELD-Na: 6 at 4/22/2024  2:19 AM  Calculated from:  Serum Creatinine: 0.6 mg/dL (Using min of 1 mg/dL) at 4/22/2024  2:19 AM  Serum Sodium: 136 mmol/L at 4/22/2024  2:19 AM  Total Bilirubin: 0.2 mg/dL (Using min of 1 mg/dL) at 4/22/2024  2:19 AM  Serum Albumin: 1.9 g/dL at 4/22/2024  2:19 AM  INR(ratio): 1.0 at 4/22/2024  2:19 AM  Age at listing (hypothetical): 53 years  Sex: Female at 4/22/2024  2:19 AM      Significant Labs:  CBC:  Recent Labs   Lab 04/25/24 0404   WBC 6.50   HGB 10.5*   HCT 33.2*          CMP:  Recent Labs   Lab 04/25/24  0404      K 4.6      CO2 24      BUN 6   CREATININE 0.6   CALCIUM 9.4   PROT 7.6   ALBUMIN 2.2*   BILITOT 0.2   ALKPHOS 83   AST 28   ALT 19  "  ANIONGAP 11       PTINR:  No results for input(s): "INR" in the last 48 hours.      Significant Procedures:   Dobutamine Stress Test with Color Flow: No results found for this or any previous visit.    2D Echo: No results found for this or any previous visit.    Assessment/Plan:      * Sepsis  Multifactorial 2/2 Group B Strep bacteremia, paravertebral abscesses, and septic arthritis of the right shoulder  PICC placed in LUE on 4/25  ID plans for 6 weeks of IV Ceftriaxone until 5/29 with repeat MRI thoracic in 2 weeks  Management as below    Bacteremia due to group B Streptococcus  Blood cx 4/15 positive for GBS  Blood cx 4/17 NGTD  Likely seeding from septic right shoulder and paravertebral abscesses  2D echo negative for vegetations  Plan for CURTIS Friday   ID following:  Continue Ceftriaxone    Septic arthritis of shoulder, right  CT right shoulder 4/15 showed a small effusion.    Ortho at  was consulted.    Aspiration of the right shoulder 4/15 was dry.  A small amount of blood was aspirated from the subacromial bursa.  Cultures NGTD.    MRI right shoulder 4/19 revealed acromioclavicular joint effusion concerning for AC joint septic arthritis and osteomyelitis, moderate glenohumeral joint effusion and synovitis with subacromial subdeltoid bursitis, as well as cellulitis of the shoulder with myositis of the deltoid and trapezius musculature  Ortho at [facility] consulted, appreciate assistance  S/p I&D  with Ortho.  Low concern for active infection as she has been on antibiotics, but feel like she should be treated as septic arthritis    Vertebral abscess  MRI C/T/L-spine 4/18 revealed T5-T11 prevertebral abscess.     Neurosurgery consulted.  Recommend CTS evaluation  CTS Dr. Newby agreed to consult for possible VATS.  Per CTS, as leukocytosis has resolved and collection is very small on CT, do not recommend surgical intervention at this time     Obesity, diabetes, and hypertension " syndrome  Noted    Anemia  Patient's anemia is currently controlled. Has not received any PRBCs to date.   Current CBC reviewed-   Lab Results   Component Value Date    HGB 10.5 (L) 04/25/2024    HCT 33.2 (L) 04/25/2024     Monitor serial CBC and transfuse if patient becomes hemodynamically unstable, symptomatic or H/H drops below 7/21.    Class 1 obesity due to excess calories in adult  Body mass index is 32.94 kg/m². Morbid obesity complicates all aspects of disease management from diagnostic modalities to treatment. Weight loss encouraged and health benefits explained to patient.         A-fib  Patient with Paroxysmal (<7 days) atrial fibrillation which is controlled currently with Beta Blocker. Patient is currently in sinus rhythm.[id number] Score: 3  Anticoagulation:  Patient does not appear to be on home anticoagulation.  We will continue aspirin.  In normal sinus rhythm    Paroxysmal AFib.   During episode of sepsis in 2023.   Not on anticoagulation because it was a brief episode per Cardiology.   Rec to look for recurrence with the help of event monitor.   If a recurrence noted on event monitor, consider adding anticoagulation     Prolonged Q-T interval on ECG  Noted history.    We will avoid prolonging agents as much as possible      DM2 (diabetes mellitus, type 2)  Patient's FSGs are uncontrolled due to hyperglycemia on current medication regimen.  Last A1c reviewed-   Lab Results   Component Value Date    HGBA1C 11.3 (H) 03/16/2022     Most recent fingerstick glucose reviewed-   Recent Labs   Lab 04/25/24  1151 04/25/24  1524 04/25/24  1932 04/26/24  0811   POCTGLUCOSE 173* 187* 200* 156*       Current correctional scale  Low  Maintain anti-hyperglycemic dose as follows-   Antihyperglycemics (From admission, onward)      Start     Stop Route Frequency Ordered    04/18/24 2100  insulin detemir U-100 (Levemir) pen 20 Units         -- SubQ Nightly 04/18/24 1355    04/18/24 1645  insulin aspart U-100 pen 8  Units         -- SubQ 3 times daily with meals 04/18/24 1355    04/15/24 1504  insulin aspart U-100 pen 0-5 Units         -- SubQ Before meals & nightly PRN 04/15/24 1404          Hold Oral hypoglycemics while patient is in the hospital.  Unable to afford insulin at the present time  Plan to DC on Amaryl and Metformin with close PCP followup to get started on insulin    Hypertension  Chronic and stable  Continue Norvasc 10mg daily  Continue Coreg 6.25mg BID  Continue Losartan 100mg daily    Elevated procalcitonin  In setting of active infection.    Treatment as stated below        VTE Risk Mitigation (From admission, onward)           Ordered     enoxaparin injection 40 mg  Daily         04/25/24 1250     IP VTE HIGH RISK PATIENT  Once         04/15/24 1404     Place sequential compression device  Until discontinued         04/15/24 1404                    Discharge Planning   NAYELI: 4/26/2024     Code Status: Full Code   Is the patient medically ready for discharge?:     Reason for patient still in hospital (select all that apply): Imaging  Discharge Plan A: Home with family, Home Health, Other (Home Infusion)   Discharge Delays: None known at this time              Jessica Grey MD  Department of Hospital Medicine   [facility] - Med Surg ([place]-)

## 2024-04-26 NOTE — NURSING
AAOx4. Discharged home by personal transportation. Educated on discharge, medications, and follow up appts; voiced understanding. Prescription medications delivered to bedside. Double lumen picc to DEE, intact. Home infusion education completed at bedside.    For information on Fall & Injury Prevention, visit [email or web address]

## 2024-04-26 NOTE — ASSESSMENT & PLAN NOTE
T5-T11 preverbal abscess  R septic shoulder    -OSH course notable for MRI/CT of R shoulder noted soft tissue edema/joint effusion concerning for septic arthritis and osteomyelitis. MRI imaging also with concern for T5-T11 preverterbral abscess.   -Evaluated by OSH ortho - dry tap, though was able to aspirate small amount of blood that was sent for cx that have been no growth to date (not enough to send for fluid studies). Blcx on admit with GBS. She was started on vanc/cefepime. After initial ID evaluation at OSH, Cefepime/Vancomycin were deescalated to Ceftriaxone due to NGTD on cultures.  She was transferred to [facility] [place] surgical evaluation. Orthopedic surgery was consulted for concerns of septic arthritis and CTS consulted for VATS evaluation.     Of note, pt had had urine cx obtained 1/2024 that was positive for MSSA and GBS, s/p nitrofurantoin. Also had Ecoli bacteremia in 5/2023, tx with IV and transitioned to cefdinir.     -Thoracic surgery eval 04/22 noted no intervention indicated at the time    -Underwent orthopedic procedure(Right shoulder arthroscopy with debridement and irrigation for presumed septic arthritis) today.     Recommendations:  -CURTIS for further source evaluation    Outpatient Antibiotic Therapy Plan:    Please send referral to [facility].    1) Infection: Bacteremia due to group B Streptococcus    2) Discharge Antibiotics:    Intravenous antibiotics:  Ceftriaxone 2g IV q 24 hours     3) Therapy Duration:  6 weeks course of IV abx from last negative culture date(04/17/24)    Estimated end date of IV antibiotics: 05/29/24    4) Outpatient Followup Imaging:    Repeat MRI Thoracic W W/O Contrast in 2 weeks    5) Outpatient Weekly Labs:    Order the following labs to be drawn on Mondays:   CBC  CMP   CRP    6) Fax Lab Results to Infectious Diseases Provider: Dr. Madi Ray    [facility] Fax Number: [phone]    7) Outpatient Infectious  Diseases Follow-up    Follow-up appointment will be arranged by the ID clinic and will be found in the patient's appointments tab.    Prior to discharge, please ensure the patient's follow-up has been scheduled.    If there is still no follow-up scheduled prior to discharge, please send an EPIC message to Annmarie Gill in Infectious Diseases.

## 2024-04-26 NOTE — ANESTHESIA POSTPROCEDURE EVALUATION
Anesthesia Post Evaluation    Patient: April Jordan    Procedure(s) Performed: Procedure(s) (LRB):  Transesophageal echo (CURTIS) intra-procedure log documentation (N/A)    Final Anesthesia Type: general      Patient location during evaluation: PACU  Patient participation: Yes- Able to Participate  Level of consciousness: awake and alert and oriented  Post-procedure vital signs: reviewed and stable  Pain management: adequate  Airway patency: patent    PONV status at discharge: No PONV  Anesthetic complications: no      Cardiovascular status: blood pressure returned to baseline, hemodynamically stable and stable  Respiratory status: unassisted, room air and spontaneous ventilation  Hydration status: euvolemic  Follow-up not needed.              Vitals Value Taken Time   /74 04/26/24 1401   Temp 36 °C (96.8 °F) 04/26/24 1318   Pulse 78 04/26/24 1410   Resp 22 04/26/24 1410   SpO2 100 % 04/26/24 1410   Vitals shown include unfiled device data.      No case tracking events are documented in the log.      Pain/Celsa Score: Pain Rating Prior to Med Admin: 0 (4/26/2024  9:57 AM)  Pain Rating Post Med Admin: 0 (4/25/2024  9:00 PM)  Celsa Score: 8 (4/26/2024  1:15 PM)

## 2024-04-26 NOTE — ASSESSMENT & PLAN NOTE
Patient's FSGs are uncontrolled due to hyperglycemia on current medication regimen.  Last A1c reviewed-   Lab Results   Component Value Date    HGBA1C 11.3 (H) 03/16/2022     Most recent fingerstick glucose reviewed-   Recent Labs   Lab 04/25/24  1151 04/25/24  1524 04/25/24  1932 04/26/24  0811   POCTGLUCOSE 173* 187* 200* 156*       Current correctional scale  Low  Maintain anti-hyperglycemic dose as follows-   Antihyperglycemics (From admission, onward)      Start     Stop Route Frequency Ordered    04/18/24 2100  insulin detemir U-100 (Levemir) pen 20 Units         -- SubQ Nightly 04/18/24 1355    04/18/24 1645  insulin aspart U-100 pen 8 Units         -- SubQ 3 times daily with meals 04/18/24 1355    04/15/24 1504  insulin aspart U-100 pen 0-5 Units         -- SubQ Before meals & nightly PRN 04/15/24 1404          Hold Oral hypoglycemics while patient is in the hospital.  Unable to afford insulin at the present time  Plan to DC on Amaryl and Metformin with close PCP followup to get started on insulin

## 2024-04-26 NOTE — ASSESSMENT & PLAN NOTE
Multifactorial 2/2 Group B Strep bacteremia, paravertebral abscesses, and septic arthritis of the right shoulder  PICC placed in LUE on 4/25  ID plans for 6 weeks of IV Ceftriaxone until 5/29 with repeat MRI thoracic in 2 weeks  Management as below

## 2024-04-26 NOTE — ASSESSMENT & PLAN NOTE
Shahida Ortega is a 53 y.o. right-hand dominant female with T2DM and HTN presenting with pain and decreased range of motion of the right shoulder.  Prior to arrival to [facility], the patient was hospitalized at OSH where blood cultures were positive for group B Streptococcus.  She was found to have a prevertebral abscess at T5-T11 and was transferred to [facility] for surgical intervention by Cardiothoracic surgery.  The right shoulder was aspirated by Orthopedic surgery at OSH but there was not enough fluid for cell count.  This aspirate was sent for analysis and the cultures have been no growth to date.  Clinical and MRI findings as well as history of present illness with recent septicemia and known T5-T11 prevertebral abscess are concerning for septic arthritis of the right shoulder and it was treated as such. S/p I&D of right shoulder on 4/25/24.     - Activity as tolerated with RUE  - Multimodal pain control  - Abx per primary     » Dispo: likely discharge on 6 weeks IV abx, pending CURTIS 4/26

## 2024-04-26 NOTE — DISCHARGE SUMMARY
Nael nikunj - Med Surg ([place] [address])  [facility] Medicine  Discharge Summary      Patient Name: Shahida Oretga  MRN: [id number]  [facility]: [id number]  Patient Class: IP- Inpatient  Admission Date: 4/15/2024  Hospital Length of Stay: 11 days  Discharge Date and Time: No discharge date for patient encounter.  Attending Physician: Jessica Grey MD   Discharging Provider: Jessica Grey MD  Primary Care Provider: Janusz Staton MD  [facility] Medicine Team: [facility] A Jessica Grey MD  Primary Care Team: [facility] [place] [facility] A    HPI:   53F with pmh of dm and htn who presents due to 3 day h/o right shoulder pain with associated redness. Patient reports being seen here in the ED for her symptoms on 4/12/24 and was prescribed Valium with relief of back pain but not shoulder. Patient denies taking any medications today for relief. Patient denies any recent injuries or trauma. In ed pt started on abx and fluids for sepsis and ortho consulted who performed arthrocentesis of the should with fluids sent for culture and pending. Pt denies tobacco, alcohol or drug use. Pt denies previous ortho surgerie     Procedure(s) (LRB):  Transesophageal echo (CURTIS) intra-procedure log documentation (N/A)      Hospital Course:   Ms. Ortega was admitted to [facility] for sepsis 2/2 atraumatic right shoulder pain and erythema.  CT right shoulder showed a small effusion.  Ortho was consulted.  Aspiration of the right shoulder 4/15 was dry.  A small amount of blood was aspirated from the subacromial bursa.  Cultures NGTD.  Blood cx were obtained and the patient was started on Cefepime and Vancomycin. They returned positive for Strep agalactiae and antibiotics switched to CTX.  ID was consulted.  Review of records revealed that the patient had a Strep agalactiae UTI in January 2024.  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  MRI right shoulder revealed acromioclavicular joint effusion concerning for AC joint septic arthritis and  osteomyelitis, moderate glenohumeral joint effusion and synovitis with subacromial subdeltoid bursitis., as well as cellulitis of the shoulder with myositis of the deltoid and trapezius musculature. Neurosurgery reviewed the images and reported that neurosurgical intervention was not indicated since the infection did not extend into the epidural space.  Her case was discussed with Dr. Newby of CTS at [facility] who requested transfer for VATS.  She was transferred to [facility] on 4/21 for CTS, Ortho, and ID evaluations.  Per CTS, as leukocytosis has resolved and collection is very small on CT, do not recommend surgical intervention at this time.  CURTIS was ordered to evaluate for endocarditis which was negative.  She went to the OR with Ortho for an I&D on 4/25, who felt like she should be treated for septic arthritis, though low concern for active shoulder infection as she has been on IV antibiotics.  ID suggested 6 weeks of IV Ceftriaxone until 5/29, with a repeat MRI thoracic in 2 weeks to evaluate healing.  Attempts were made to discharge her on insulin, but as she doesn't get paid until next week, she was unable to afford it.  She was discharged on higher dose Metformin and Amaryl, and told to monitor her sugars and followup with her PCP to be started on insulin given her elevated HbA1c.    Ms. Shahida Ortega was deemed appropriate for discharge.  At the time of discharge, the plan of care was discussed with patient/family, who were agreeable and amenable.  All medications were verbally reviewed and discussed with the patient/family.  They endorsed understanding and compliance.  Informed them that these changes will be available on their discharge paperwork, as well.  Outpatient follow-ups were scheduled, or if unable to be scheduled ambulatory referrals were placed, and ER return precautions were given.  All questions were answered to the patient/family's satisfaction.  Ms. Shahida Ortega was subsequently discharged in  stable condition.       Goals of Care Treatment Preferences:  Code Status: Full Code      Consults:   Consults (From admission, onward)          Status Ordering Provider     [facility]  Once        Provider:  (Not yet assigned)    Completed CASIMIRO HENDRIX     Inpatient consult to Infectious Diseases  Once        Provider:  (Not yet assigned)    Completed TRUDY ROSSI     Inpatient consult to Orthopedics  Once        Provider:  (Not yet assigned)    Completed TRUDY ROSSI     Inpatient consult to Cardiothoracic Surgery  Once        Provider:  (Not yet assigned)    Completed TRUDY ROSSI     Inpatient consult to Neurosurgery  Once        Provider:  Kanwal Leahy PA-C    Completed EMILIE MURRELL     Inpatient consult to Infectious Diseases  Once        Provider:  Marilee Begum MD    Completed EMILIE MURRELL     Inpatient consult to Orthopedic Surgery  Once        Provider:  (Not yet assigned)    Completed ESAU FULLER III            No new Assessment & Plan notes have been filed under this hospital service since the last note was generated.  Service: Hospital Medicine    Final Active Diagnoses:    Diagnosis Date Noted POA    PRINCIPAL PROBLEM:  Sepsis [A41.9] 04/15/2024 Yes    Bacteremia due to group B Streptococcus [R78.81, B95.1] 04/16/2024 Yes    Septic arthritis of shoulder, right [M00.9] 04/15/2024 Yes    Vertebral abscess [M46.20] 04/19/2024 Yes    Class 1 obesity due to excess calories in adult [E66.09] 04/22/2024 Yes    Anemia [D64.9] 04/22/2024 Yes    Obesity, diabetes, and hypertension syndrome [E11.69, E66.9, E11.59, I15.2] 04/22/2024 Yes    Prolonged Q-T interval on ECG [R94.31] 05/31/2023 Yes    A-fib [I48.91] 05/31/2023 Yes    DM2 (diabetes mellitus, type 2) [E11.9] 05/28/2023 Yes    Elevated procalcitonin [R79.89] 05/28/2023 Yes    Hypertension [I10] 05/28/2023 Yes      Problems Resolved During this Admission:        Discharged Condition: good    Disposition: Home or Self Care    Follow Up:   Follow-up Information       Janusz Staton MD Follow up in 1 week(s).    Specialty: Internal Medicine  Contact information:  [address] [phone]               Janusz Staton MD Follow up on 4/30/2024.    Specialty: Internal Medicine  Why: @2:30 pm  Contact information:  [address] [place] [id number] [phone]                           Patient Instructions:      MRI Thoracic Spine W WO Contrast   Standing Status: Future Standing Exp. Date: 04/26/25     Order Specific Question Answer Comments   Does the patient have or ever had a pacemaker or a defibrillator (Note: Some facilities may not be able to schedule an MRI for patients with pacemakers and defibrillators. You should contact your local radiology dept to determine if this is the case.)? No    Does the patient have an aneurysm or surgical clip, pump, nerve/brain stimulator, middle/inner ear prosthesis, or other metal implant or foreign object (bullet, shrapnel)? If they have a card related to their implant, ask them to bring it. Issues related to the implant may cause the MRI to be delayed. No    Will the patient require sedation? No    May the Radiologist modify the order per protocol to meet the clinical needs of the patient? Yes    Recist criteria? Yes    Does the patient have on a skin patch for medication with aluminized backing? No      Ambulatory referral/consult to Infectious Disease   Standing Status: Future   Referral Priority: Routine Referral Type: Consultation   Referral Reason: Specialty Services Required   Requested Specialty: Infectious Diseases   Number of Visits Requested: 1     Ambulatory referral/consult to Orthopedics   Standing Status: Future   Referral Priority: Routine Referral Type: Consultation   Requested Specialty: Orthopedic Surgery   Number of Visits Requested: 1     Notify your health care provider if you  "experience any of the following:  temperature >100.4     Notify your health care provider if you experience any of the following:  severe uncontrolled pain     Notify your health care provider if you experience any of the following:  redness, tenderness, or signs of infection (pain, swelling, redness, odor or green/yellow discharge around incision site)     Activity as tolerated       Significant Diagnostic Studies: Labs: CMP   Recent Labs   Lab 04/25/24  0404      K 4.6      CO2 24      BUN 6   CREATININE 0.6   CALCIUM 9.4   PROT 7.6   ALBUMIN 2.2*   BILITOT 0.2   ALKPHOS 83   AST 28   ALT 19   ANIONGAP 11    and CBC   Recent Labs   Lab 04/25/24  0404   WBC 6.50   HGB 10.5*   HCT 33.2*        Microbiology: Blood Culture   Lab Results   Component Value Date    LABBLOO No Growth after 4 days. 04/17/2024       Pending Diagnostic Studies:       None           Medications:  Reconciled Home Medications:      Medication List        START taking these medications      ACCU-CHEK GUIDE TEST STRIPS Strp  Generic drug: blood sugar diagnostic  Use to test blood glucose 2 (two) times daily with meals.     ACCU-CHEK SOFTCLIX LANCETS Misc  Generic drug: lancets  Use to check blood glucose 2 (two) times daily with meals.     acetaminophen 500 MG tablet  Commonly known as: TYLENOL  Take 2 tablets (1,000 mg total) by mouth 3 (three) times daily. for 10 days     celecoxib 200 MG capsule  Commonly known as: CeleBREX  Take 1 capsule (200 mg total) by mouth once daily. for 10 days     dextrose 5 % in water (D5W) PgBk 100 mL with cefTRIAXone 2 gram SolR 2 g  Inject 2 g into the vein once daily.     FREESTYLE HARSHA 14 DAY SENSOR Kit  Generic drug: flash glucose sensor  Change every 14 days for blood glucose monitoring.     glimepiride 4 MG tablet  Commonly known as: AMARYL  Take 1 tablet (4 mg total) by mouth before breakfast.     insulin syringe-needle U-100 0.3 mL 30 gauge x 5/16" Syrg  1 each by Misc.(Non-Drug; " "Combo Route) route 2 (two) times daily with meals.     lancing device Misc  1 Device by Misc.(Non-Drug; Combo Route) route 2 (two) times daily with meals.     methocarbamoL 500 MG Tab  Commonly known as: ROBAXIN  Take 1 tablet (500 mg total) by mouth 3 (three) times daily. for 10 days     oxyCODONE 5 MG immediate release tablet  Commonly known as: ROXICODONE  Take 1 tablet (5 mg total) by mouth every 4 (four) hours as needed.     pen needle, diabetic 31 gauge x 5/16" Ndle  Use to inject insulin 4 (four) times daily.            CHANGE how you take these medications      * blood-glucose meter kit  To check BG 2 times daily, to use with insurance preferred meter  What changed: Another medication with the same name was added. Make sure you understand how and when to take each.     * ACCU-CHEK GUIDE ME GLUCOSE MTR Misc  Generic drug: blood-glucose meter  Use to test blood glucose 2 (two) times daily with meals.  What changed: You were already taking a medication with the same name, and this prescription was added. Make sure you understand how and when to take each.     metFORMIN 500 MG tablet  Commonly known as: GLUCOPHAGE  Take 2 tablets (1,000 mg total) by mouth 2 (two) times daily with meals.  What changed: how much to take           * This list has 2 medication(s) that are the same as other medications prescribed for you. Read the directions carefully, and ask your doctor or other care provider to review them with you.                CONTINUE taking these medications      albuterol 90 mcg/actuation inhaler  Commonly known as: PROVENTIL/VENTOLIN HFA  Inhale 2 puffs into the lungs every 6 (six) hours as needed.     aspirin 81 MG EC tablet  Commonly known as: ECOTRIN  Take 1 tablet (81 mg total) by mouth once daily.     azelastine 137 mcg (0.1 %) nasal spray  Commonly known as: ASTELIN  1 spray by Nasal route 2 (two) times daily.     carvediloL 3.125 MG tablet  Commonly known as: COREG  Take 2 tablets (6.25 mg total) by " mouth 2 (two) times daily.     cetirizine 10 MG tablet  Commonly known as: ZYRTEC  Take 1 tablet (10 mg total) by mouth once daily.     cyclobenzaprine 10 MG tablet  Commonly known as: FLEXERIL  Take 10 mg by mouth 3 (three) times daily as needed.     hydroCHLOROthiazide 25 MG tablet  Commonly known as: HYDRODIURIL  Take 1 tablet (25 mg total) by mouth once daily.     ibuprofen 800 MG tablet  Commonly known as: ADVIL,MOTRIN  Take 800 mg by mouth every 8 (eight) hours as needed.     losartan 100 MG tablet  Commonly known as: COZAAR  Take 1 tablet (100 mg total) by mouth once daily.     ondansetron 4 MG Tbdl  Commonly known as: ZOFRAN-ODT  Take 4 mg by mouth.     promethazine 25 MG tablet  Commonly known as: PHENERGAN  Take 25 mg by mouth every 8 (eight) hours as needed.            STOP taking these medications      diazePAM 2 MG tablet  Commonly known as: VALIUM     doxycycline 100 MG Cap  Commonly known as: VIBRAMYCIN              Indwelling Lines/Drains at time of discharge:   Lines/Drains/Airways       Peripherally Inserted Central Catheter Line  Duration             PICC Double Lumen 04/25/24 1559 left brachial <1 day                    Time spent on the discharge of patient: 35 minutes         Jessica Grey MD  Department of Hospital Medicine  [facility] ([place])

## 2024-04-26 NOTE — DISCHARGE INSTRUCTIONS
Because of your elevated hemoglobin A1c (measurement of how well your diabetes is controlled), you should be on insulin.  As cost is a limitation to you getting insulin until you're paid, will increase your Metformin to 1g twice a day and start Amaryl 4mg daily in the meantime.  Please see your primary care as soon as possible after discharge to get placed on insulin, and make sure you check your sugars at home.

## 2024-04-26 NOTE — SUBJECTIVE & OBJECTIVE
Interval History: No acute events overnight. Feels well today.  Plan for CURTIS then DC after.  Verbally discussed medication changes and followups.    Review of Systems   Constitutional:  Negative for chills, fatigue and fever.   Respiratory:  Negative for cough and shortness of breath.    Cardiovascular:  Negative for chest pain, palpitations and leg swelling.   Gastrointestinal:  Negative for abdominal pain, diarrhea, nausea and vomiting.   Genitourinary:  Negative for dysuria and urgency.   Musculoskeletal:  Positive for arthralgias and myalgias.   Neurological:  Negative for dizziness and headaches.   All other systems reviewed and are negative.    Objective:     Vital Signs (Most Recent):  Temp: 98.3 °F (36.8 °C) (04/26/24 0814)  Pulse: 91 (04/26/24 0814)  Resp: 18 (04/26/24 0814)  BP: (!) 160/82 (04/26/24 0814)  SpO2: 100 % (04/26/24 0814) Vital Signs (24h Range):  Temp:  [97.2 °F (36.2 °C)-98.3 °F (36.8 °C)] 98.3 °F (36.8 °C)  Pulse:  [69-91] 91  Resp:  [17-20] 18  SpO2:  [89 %-100 %] 100 %  BP: (134-160)/(67-84) 160/82     Weight: 95.4 kg (210 lb 5.1 oz)  Body mass index is 32.94 kg/m².    Intake/Output Summary (Last 24 hours) at 4/26/2024 1041  Last data filed at 4/25/2024 1230  Gross per 24 hour   Intake 800 ml   Output --   Net 800 ml      Physical Exam  Constitutional:       Appearance: Normal appearance. She is obese.   HENT:      Head: Normocephalic and atraumatic.   Cardiovascular:      Rate and Rhythm: Normal rate and regular rhythm.      Heart sounds: No murmur heard.  Pulmonary:      Effort: Pulmonary effort is normal. No respiratory distress.      Breath sounds: Normal breath sounds. No wheezing or rales.   Abdominal:      General: There is no distension.      Palpations: Abdomen is soft.      Tenderness: There is no abdominal tenderness.   Musculoskeletal:         General: Tenderness (Right shoulder/deltoid wtih limited ROM) present. No deformity.      Comments: PICC LUE   Skin:     General: Skin  "is warm and dry.   Neurological:      General: No focal deficit present.      Mental Status: She is alert and oriented to person, place, and time. Mental status is at baseline.         MELD 3.0: 11 at 4/22/2024  2:19 AM  MELD-Na: 6 at 4/22/2024  2:19 AM  Calculated from:  Serum Creatinine: 0.6 mg/dL (Using min of 1 mg/dL) at 4/22/2024  2:19 AM  Serum Sodium: 136 mmol/L at 4/22/2024  2:19 AM  Total Bilirubin: 0.2 mg/dL (Using min of 1 mg/dL) at 4/22/2024  2:19 AM  Serum Albumin: 1.9 g/dL at 4/22/2024  2:19 AM  INR(ratio): 1.0 at 4/22/2024  2:19 AM  Age at listing (hypothetical): 53 years  Sex: Female at 4/22/2024  2:19 AM      Significant Labs:  CBC:  Recent Labs   Lab 04/25/24  0404   WBC 6.50   HGB 10.5*   HCT 33.2*          CMP:  Recent Labs   Lab 04/25/24  0404      K 4.6      CO2 24      BUN 6   CREATININE 0.6   CALCIUM 9.4   PROT 7.6   ALBUMIN 2.2*   BILITOT 0.2   ALKPHOS 83   AST 28   ALT 19   ANIONGAP 11       PTINR:  No results for input(s): "INR" in the last 48 hours.      Significant Procedures:   Dobutamine Stress Test with Color Flow: No results found for this or any previous visit.    2D Echo: No results found for this or any previous visit.  "

## 2024-04-26 NOTE — NURSING
Pt transferred from procedure via stretcher.  Vss.  Post op orders and assessment initiated.  Pt aao,  Pt in no acute distress and verbalizes no complaints.

## 2024-04-26 NOTE — PLAN OF CARE
Follow up with Janusz Staton  T[place] Apr 30, 2024  @2:30 pm [address] [facility]  [place] [id number]  [phone]

## 2024-04-26 NOTE — PROGRESS NOTES
[facility] - Med Surg ([place] [address])  Infectious Disease  Progress Note    Patient Name: Shahida Ortega  MRN: [id number]  Admission Date: 4/15/2024  Length of Stay: 11 days  Attending Physician: Jessica Grey MD  Primary Care Provider: Janusz Staton MD    Isolation Status: No active isolations  Assessment/Plan:      ID  Bacteremia due to group B Streptococcus  T5-T11 preverbal abscess  R septic shoulder    -OSH course notable for MRI/CT of R shoulder noted soft tissue edema/joint effusion concerning for septic arthritis and osteomyelitis. MRI imaging also with concern for T5-T11 preverterbral abscess.   -Evaluated by OSH ortho - dry tap, though was able to aspirate small amount of blood that was sent for cx that have been no growth to date (not enough to send for fluid studies). Blcx on admit with GBS. She was started on vanc/cefepime. After initial ID evaluation at OSH, Cefepime/Vancomycin were deescalated to Ceftriaxone due to NGTD on cultures.  She was transferred to [facility] [place] for further surgical evaluation. Orthopedic surgery was consulted for concerns of septic arthritis and CTS consulted for VATS evaluation.     Of note, pt had had urine cx obtained 1/2024 that was positive for MSSA and GBS, s/p nitrofurantoin. Also had Ecoli bacteremia in 5/2023, tx with IV and transitioned to cefdinir.     -Thoracic surgery eval 04/22 noted no intervention indicated at the time    -Underwent orthopedic procedure(Right shoulder arthroscopy with debridement and irrigation for presumed septic arthritis) today.     Recommendations:  -CURTIS for further source evaluation    Outpatient Antibiotic Therapy Plan:    Please send referral to [facility].    1) Infection: Bacteremia due to group B Streptococcus    2) Discharge Antibiotics:    Intravenous antibiotics:  Ceftriaxone 2g IV q 24 hours     3) Therapy Duration:  6 weeks course of IV abx from last negative culture date(04/17/24)    Estimated end date  of IV antibiotics: 05/29/24    4) Outpatient Followup Imaging:    Repeat MRI Thoracic W W/O Contrast in 2 weeks    5) Outpatient Weekly Labs:    Order the following labs to be drawn on Mondays:   CBC  CMP   CRP    6) Fax Lab Results to Infectious Diseases Provider: Dr. Madi Ray    [facility] Fax Number: [phone]    7) Outpatient Infectious Diseases Follow-up    Follow-up appointment will be arranged by the ID clinic and will be found in the patient's appointments tab.    Prior to discharge, please ensure the patient's follow-up has been scheduled.    If there is still no follow-up scheduled prior to discharge, please send an EPIC message to Annmarie Gill in Infectious Diseases.                    Thank you for your consult. I will sign off. Please contact us if you have any additional questions.    Aamir Ayala, DO  Infectious Disease  [facility] - Med Surg ([facility])    Subjective:     Principal Problem:Sepsis    HPI: 54 yo female with DM/HTN who was initially  admitted to [facility] for R shoulder pain with associated redness, and found to have GBS bacteremia. ID consulted for bacteremia. OSH course notable for MRI/CT of R shoulder noted soft tissue edema/joint effusion concerning for septic arthritis and osteomyelitis. MRI imaging also with concern for T5-T11 preverterbral abscess. She was evaluated by OSH ortho - dry tap, though was able to aspirate small amount of blood that was sent for cx that have been no growth to date (not enough to send for fluid studies). Blcx on admit with GBS. She was started on vanc/cefepime. Upon initial ID evaluation at OSH, patient denied toxic habits, reported rash with PCN, or trauma/travel/TB exposure. She denied abdominal/respiratory/dental or  symptoms prior to admission, main complaint was back pain and R shoulder pain. After initial ID evaluation at OSH, Cefepime/Vancomycin were deescalated to Ceftriaxone due to NGTD on cultures.  [place] [facility] [place] Orthopedic surgery was consulted for concerns of septic arthritis and CTS consulted for VATS evaluation.    Of note, pt had had urine cx obtained 1/2024 that was positive for MSSA and GBS, s/p nitrofurantoin. Also had Ecoli bacteremia in 5/2023, tx with IV and transitioned to cefdinir.        Interval History: No acute events overnight, afebrile, hemodynamically stable. Reports improving R shoulder mobility       Objective:     Vital Signs (Most Recent):  Temp: 98.3 °F (36.8 °C) (04/26/24 0814)  Pulse: 91 (04/26/24 0814)  Resp: 18 (04/26/24 0814)  BP: (!) 160/82 (04/26/24 0814)  SpO2: 100 % (04/26/24 0814) Vital Signs (24h Range):  Temp:  [97.2 °F (36.2 °C)-98.3 °F (36.8 °C)] 98.3 °F (36.8 °C)  Pulse:  [69-91] 91  Resp:  [17-20] 18  SpO2:  [89 %-100 %] 100 %  BP: (134-160)/(67-84) 160/82     Weight: 95.4 kg (210 lb 5.1 oz)  Body mass index is 32.94 kg/m².    Estimated Creatinine Clearance: 131.5 mL/min (based on SCr of 0.6 mg/dL).     Physical Exam  Vitals and nursing note reviewed.   Constitutional:       General: She is not in acute distress.     Appearance: She is not ill-appearing, toxic-appearing or diaphoretic.   HENT:      Head: Normocephalic and atraumatic.      Mouth/Throat:      Mouth: Mucous membranes are moist.   Eyes:      General: No scleral icterus.        Right eye: No discharge.         Left eye: No discharge.   Cardiovascular:      Heart sounds: Normal heart sounds. No murmur heard.  Pulmonary:      Effort: Pulmonary effort is normal. No respiratory distress.      Breath sounds: Normal breath sounds. No wheezing, rhonchi or rales.   Abdominal:      General: Bowel sounds are normal.      Palpations: Abdomen is soft.      Tenderness: There is no abdominal tenderness. There is no guarding or rebound.   Musculoskeletal:      Right shoulder: No bony tenderness. Decreased range of motion.      Cervical back: No rigidity.      Right lower leg: No edema.       Left lower leg: No edema.      Comments: R shoulder post-surgical wound with dressing c/d/i   Skin:     General: Skin is warm and dry.      Coloration: Skin is not jaundiced.   Neurological:      Mental Status: She is oriented to person, place, and time. Mental status is at baseline.   Psychiatric:         Mood and Affect: Mood normal.         Behavior: Behavior normal.          Significant Labs: All pertinent labs within the past 24 hours have been reviewed.  LABS:  Recent Labs   Lab 04/21/24 0524 04/22/24 0219 04/25/24  0404    136 137   K 3.6 3.6 4.6    100 102   CO2 27 28 24   BUN 10 9 6   CREATININE 0.6 0.6 0.6   * 131* 109   ANIONGAP 8 8 11     Recent Labs   Lab 04/25/24  0404   MG 1.8   PHOS 3.9     Recent Labs   Lab 04/21/24 0524 04/22/24 0219 04/25/24  0404   AST 14 16 28   ALT 11 13 19   ALKPHOS 72 76 83   BILITOT 0.2 0.2 0.2   ALBUMIN 1.8* 1.9* 2.2*     POCT Glucose:   Recent Labs   Lab 04/25/24  1524 04/25/24  1932 04/26/24  0811   POCTGLUCOSE 187* 200* 156*    Recent Labs   Lab 04/21/24  0525 04/22/24 0219 04/25/24  0404   WBC 7.33 6.82 6.50   HGB 9.8* 9.8* 10.5*   HCT 31.2* 30.9* 33.2*    262 416   GRAN 56.2  4.1 54.6  3.7 49.8  3.2          Micro  Blood Cultures  Lab Results   Component Value Date    LABBLOO No Growth after 4 days. 04/17/2024    LABBLOO No Growth after 4 days. 04/17/2024    LABBLOO No Growth after 4 days. 04/15/2024    LABBLOO No Growth after 4 days. 04/15/2024    LABBLOO  04/15/2024     Gram stain cameron bottle: Gram positive cocci in chains resembling Strep    LABBLOO  04/15/2024     Results called to and read back by:Lizz Wells  04/16/2024  08:12    LABBLOO (A) 04/15/2024     STREPTOCOCCUS AGALACTIAE (GROUP B)  Beta-hemolytic streptococci are routinely susceptible to   penicillins,cephalosporins and carbapenems.  For susceptibility see order #[id number]       Significant Imaging: I have reviewed all pertinent imaging results/findings within  the past 24 hours.      Inpatient Medications:  Continuous Infusions:  Current Facility-Administered Medications   Medication Dose Route Frequency Last Rate Last Admin     Scheduled Meds:  Current Facility-Administered Medications   Medication Dose Route Frequency    acetaminophen  1,000 mg Oral TID    amLODIPine  10 mg Oral Daily    carvediloL  6.25 mg Oral BID    cefTRIAXone (Rocephin) IV (PEDS and ADULTS)  2 g Intravenous Q24H    celecoxib  200 mg Oral Daily    enoxaparin  40 mg Subcutaneous Daily    insulin aspart U-100  8 Units Subcutaneous TIDWM    insulin detemir U-100  20 Units Subcutaneous QHS    losartan  100 mg Oral Daily    methocarbamoL  500 mg Oral TID    mupirocin   Nasal BID    polyethylene glycol  17 g Oral Daily    senna-docusate 8.6-50 mg  1 tablet Oral BID    sodium chloride 0.9%  10 mL Intravenous Q6H     PRN Meds:  Current Facility-Administered Medications:     benzonatate, 100 mg, Oral, TID PRN    dextrose 10%, 12.5 g, Intravenous, PRN    dextrose 10%, 25 g, Intravenous, PRN    glucagon (human recombinant), 1 mg, Intramuscular, PRN    glucose, 16 g, Oral, PRN    glucose, 24 g, Oral, PRN    insulin aspart U-100, 0-5 Units, Subcutaneous, QID (AC + HS) PRN    morphine, 4 mg, Intravenous, Q4H PRN    naloxone, 0.02 mg, Intravenous, PRN    oxyCODONE, 5 mg, Oral, Q4H PRN    sodium chloride 0.9%, 10 mL, Intravenous, PRN    Flushing PICC/Midline Protocol, , , Until Discontinued **AND** sodium chloride 0.9%, 10 mL, Intravenous, Q6H **AND** sodium chloride 0.9%, 10 mL, Intravenous, PRN

## 2024-04-26 NOTE — SUBJECTIVE & OBJECTIVE
Principal Problem:Sepsis    Principal Orthopedic Problem: Glenohumeral joint effusion in setting of Group B Strep bacteremia    Interval History: Afebrile, VSS, NAEON. Tolerated surgery well yesterday. Pain well controlled.  Pending CURTIS today.        Review of patient's allergies indicates:   Allergen Reactions    Penicillins Hives     Tolerated cephalosporins 4/2024    Amoxicillin     Grass pollen-june grass standard Other (See Comments)       Current Facility-Administered Medications   Medication Dose Route Frequency Provider Last Rate Last Admin    acetaminophen tablet 1,000 mg  1,000 mg Oral TID Tony Troncoso MD   1,000 mg at 04/26/24 0958    amLODIPine tablet 10 mg  10 mg Oral Daily Nima Pompa MD   10 mg at 04/26/24 0958    benzonatate capsule 100 mg  100 mg Oral TID PRN Sahara Figueroa PA-C   100 mg at 04/18/24 2348    carvediloL tablet 6.25 mg  6.25 mg Oral BID Juan Cameron III, MD   6.25 mg at 04/26/24 0958    cefTRIAXone (ROCEPHIN) 2 g in dextrose 5 % in water (D5W) 100 mL IVPB (MB+)  2 g Intravenous Q24H Marilee Begum MD   Stopped at 04/25/24 2105    celecoxib capsule 200 mg  200 mg Oral Daily Tony Troncoso MD   200 mg at 04/26/24 0958    dextrose 10% bolus 125 mL 125 mL  12.5 g Intravenous PRN Juan Cameron III, MD        dextrose 10% bolus 250 mL 250 mL  25 g Intravenous PRN Juan Cameron III, MD        enoxaparin injection 40 mg  40 mg Subcutaneous Daily Tnoy Troncoso MD   40 mg at 04/25/24 1723    glucagon (human recombinant) injection 1 mg  1 mg Intramuscular PRN Juan Cameron III, MD        glucose chewable tablet 16 g  16 g Oral PRN Juan Cameron III, MD        glucose chewable tablet 24 g  24 g Oral PRN Juan Cameron III, MD        insulin aspart U-100 pen 0-5 Units  0-5 Units Subcutaneous QID (AC + HS) PRN Juan Cameron III, MD   2 Units at 04/23/24 1720    insulin aspart U-100 pen 8 Units  8 Units Subcutaneous TIDWM Nima Pompa  "MD   8 Units at 04/24/24 1755    insulin detemir U-100 (Levemir) pen 20 Units  20 Units Subcutaneous QHS Nima Pompa MD   20 Units at 04/25/24 2037    losartan tablet 100 mg  100 mg Oral Daily Juan Cameron III, MD   100 mg at 04/26/24 0957    methocarbamoL tablet 500 mg  500 mg Oral TID Tony Troncoso MD   500 mg at 04/26/24 0958    morphine injection 4 mg  4 mg Intravenous Q4H PRN Jessica Grey MD        mupirocin 2 % ointment   Nasal BID Jessica Grey MD   Given at 04/26/24 1046    naloxone 0.4 mg/mL injection 0.02 mg  0.02 mg Intravenous PRN Juan Cameron III, MD        oxyCODONE immediate release tablet 5 mg  5 mg Oral Q4H PRN Jessica Grey MD   5 mg at 04/24/24 2143    polyethylene glycol packet 17 g  17 g Oral Daily Juan Cameron III, MD   17 g at 04/22/24 0929    senna-docusate 8.6-50 mg per tablet 1 tablet  1 tablet Oral BID Juan Cameron III, MD   1 tablet at 04/25/24 2034    sodium chloride 0.9% flush 10 mL  10 mL Intravenous PRN Juan Cameron III, MD        sodium chloride 0.9% flush 10 mL  10 mL Intravenous Q6H Jessica Grey MD   10 mL at 04/26/24 0956    And    sodium chloride 0.9% flush 10 mL  10 mL Intravenous PRN Jessica Grey MD   10 mL at 04/25/24 2033     Objective:     Vital Signs (Most Recent):  Temp: 98.3 °F (36.8 °C) (04/26/24 0814)  Pulse: 75 (04/26/24 1146)  Resp: 18 (04/26/24 0814)  BP: 132/80 (04/26/24 1146)  SpO2: (!) 94 % (04/26/24 1146) Vital Signs (24h Range):  Temp:  [97.2 °F (36.2 °C)-98.3 °F (36.8 °C)] 98.3 °F (36.8 °C)  Pulse:  [69-91] 75  Resp:  [17-18] 18  SpO2:  [89 %-100 %] 94 %  BP: (132-160)/(73-84) 132/80     Weight: 95.4 kg (210 lb 5.1 oz)  Height: 5' 7" (170.2 cm)  Body mass index is 32.94 kg/m².      Intake/Output Summary (Last 24 hours) at 4/26/2024 1203  Last data filed at 4/25/2024 1230  Gross per 24 hour   Intake 0 ml   Output --   Net 0 ml        Ortho/SPM Exam  AAOx4  NAD  Reg rate  No increased " WOB    RUE:  Dressing c/d/i  Mild ttp at ac joint   Mild pain with shoulder ROM  FROM elbow, and wrist  SILT M/U/R  Motor intact AIN/PIN/M/U/R  Palpable radial pulse 2+     Significant Labs: All pertinent labs within the past 24 hours have been reviewed.    Significant Imaging: I have reviewed and interpreted all pertinent imaging results/findings.

## 2024-04-26 NOTE — ASSESSMENT & PLAN NOTE
Patient with Paroxysmal (<7 days) atrial fibrillation which is controlled currently with Beta Blocker. Patient is currently in sinus rhythm.[id number] Score: 3  Anticoagulation:  Patient does not appear to be on home anticoagulation.  We will continue aspirin.  In normal sinus rhythm    Paroxysmal AFib.   During episode of sepsis in 2023.   Not on anticoagulation because it was a brief episode per Cardiology.   Rec to look for recurrence with the help of event monitor.   If a recurrence noted on event monitor, consider adding anticoagulation

## 2024-04-26 NOTE — PROGRESS NOTES
[facility] - [facility] Surg ([place] [address])  Orthopedics  Progress Note    Patient Name: Shahida Ortega  MRN: [id number]  Admission Date: 4/15/2024  Hospital Length of Stay: 11 days  Attending Provider: Jessica Grey MD  Primary Care Provider: Janusz Staton MD  Follow-up For: Procedure(s) (LRB):  DEBRIDEMENT, SHOULDER, ARTHROSCOPIC (Right)    Post-Operative Day: 1 Day Post-Op  Subjective:     Principal Problem:Sepsis    Principal Orthopedic Problem: Glenohumeral joint effusion in setting of Group B Strep bacteremia    Interval History: Afebrile, VSS, NAEON. Tolerated surgery well yesterday. Pain well controlled.  Pending CURTIS today.        Review of patient's allergies indicates:   Allergen Reactions    Penicillins Hives     Tolerated cephalosporins 4/2024    Amoxicillin     Grass pollen-june grass standard Other (See Comments)       Current Facility-Administered Medications   Medication Dose Route Frequency Provider Last Rate Last Admin    acetaminophen tablet 1,000 mg  1,000 mg Oral TID Tony Troncoso MD   1,000 mg at 04/26/24 0958    amLODIPine tablet 10 mg  10 mg Oral Daily Nima Pompa MD   10 mg at 04/26/24 0958    benzonatate capsule 100 mg  100 mg Oral TID PRN Sahara Figueroa PA-C   100 mg at 04/18/24 2348    carvediloL tablet 6.25 mg  6.25 mg Oral BID Juan Cameron III, MD   6.25 mg at 04/26/24 0958    cefTRIAXone (ROCEPHIN) 2 g in dextrose 5 % in water (D5W) 100 mL IVPB (MB+)  2 g Intravenous Q24H Marilee Begum MD   Stopped at 04/25/24 2105    celecoxib capsule 200 mg  200 mg Oral Daily Tony Troncoso MD   200 mg at 04/26/24 0958    dextrose 10% bolus 125 mL 125 mL  12.5 g Intravenous PRN Juan Cameron III, MD        dextrose 10% bolus 250 mL 250 mL  25 g Intravenous PRN Juan Cameron III, MD        enoxaparin injection 40 mg  40 mg Subcutaneous Daily Tony Troncoso MD   40 mg at 04/25/24 1723    glucagon (human recombinant) injection 1 mg  1 mg Intramuscular PRN Rakesh  Juan ELIZABETH III, MD        glucose chewable tablet 16 g  16 g Oral PRN Juan Cameron III, MD        glucose chewable tablet 24 g  24 g Oral PRN Juan Cameron III, MD        insulin aspart U-100 pen 0-5 Units  0-5 Units Subcutaneous QID (AC + HS) PRN Juan Cameron III, MD   2 Units at 04/23/24 1720    insulin aspart U-100 pen 8 Units  8 Units Subcutaneous TIDWM Nima Pompa MD   8 Units at 04/24/24 1755    insulin detemir U-100 (Levemir) pen 20 Units  20 Units Subcutaneous QHS Nima Pompa MD   20 Units at 04/25/24 2037    losartan tablet 100 mg  100 mg Oral Daily Juan Cameron III, MD   100 mg at 04/26/24 0957    methocarbamoL tablet 500 mg  500 mg Oral TID Tony Troncoso MD   500 mg at 04/26/24 0958    morphine injection 4 mg  4 mg Intravenous Q4H PRN Jessica Grey MD        mupirocin 2 % ointment   Nasal BID Jessica Grey MD   Given at 04/26/24 1046    naloxone 0.4 mg/mL injection 0.02 mg  0.02 mg Intravenous PRN Juan Cameron III, MD        oxyCODONE immediate release tablet 5 mg  5 mg Oral Q4H PRN Jessica Grey MD   5 mg at 04/24/24 2143    polyethylene glycol packet 17 g  17 g Oral Daily Juan Cameron III, MD   17 g at 04/22/24 0929    senna-docusate 8.6-50 mg per tablet 1 tablet  1 tablet Oral BID Juan Cameron III, MD   1 tablet at 04/25/24 2034    sodium chloride 0.9% flush 10 mL  10 mL Intravenous PRN Juan Cameron III, MD        sodium chloride 0.9% flush 10 mL  10 mL Intravenous Q6H Jessica Grey MD   10 mL at 04/26/24 0956    And    sodium chloride 0.9% flush 10 mL  10 mL Intravenous PRN Jessica Grey MD   10 mL at 04/25/24 2033     Objective:     Vital Signs (Most Recent):  Temp: 98.3 °F (36.8 °C) (04/26/24 0814)  Pulse: 75 (04/26/24 1146)  Resp: 18 (04/26/24 0814)  BP: 132/80 (04/26/24 1146)  SpO2: (!) 94 % (04/26/24 1146) Vital Signs (24h Range):  Temp:  [97.2 °F (36.2 °C)-98.3 °F (36.8 °C)] 98.3 °F (36.8 °C)  Pulse:  [69-91] 75  Resp:  " [17-18] 18  SpO2:  [89 %-100 %] 94 %  BP: (132-160)/(73-84) 132/80     Weight: 95.4 kg (210 lb 5.1 oz)  Height: 5' 7" (170.2 cm)  Body mass index is 32.94 kg/m².      Intake/Output Summary (Last 24 hours) at 4/26/2024 1203  Last data filed at 4/25/2024 1230  Gross per 24 hour   Intake 0 ml   Output --   Net 0 ml        Ortho/SPM Exam  AAOx4  NAD  Reg rate  No increased WOB    RUE:  Dressing c/d/i  Mild ttp at ac joint   Mild pain with shoulder ROM  FROM elbow, and wrist  SILT M/U/R  Motor intact AIN/PIN/M/U/R  Palpable radial pulse 2+     Significant Labs: All pertinent labs within the past 24 hours have been reviewed.    Significant Imaging: I have reviewed and interpreted all pertinent imaging results/findings.  Assessment/Plan:     Septic arthritis of shoulder, right  Shahida Ortega is a 53 y.o. right-hand dominant female with T2DM and HTN presenting with pain and decreased range of motion of the right shoulder.  [place] [facility] [place] hospitalized at OSH where blood cultures were positive for group B Streptococcus.  She was found to have a prevertebral abscess at T5-T11 and was [place] [facility] [place] by Cardiothoracic surgery.  The right shoulder was aspirated by Orthopedic surgery at OSH but there was not enough fluid for cell count.  This aspirate was sent for analysis and the cultures have been no growth to date.  Clinical and MRI findings as well as history of present illness with recent septicemia and known T5-T11 prevertebral abscess are concerning for septic arthritis of the right shoulder and it was treated as such. S/p I&D of right shoulder on 4/25/24.     - Activity as tolerated with RUE  - Multimodal pain control  - Abx per primary     » Dispo: likely discharge on 6 weeks IV abx, pending CURTIS 4/26          Tony Troncoso MD  Orthopedics  [facility] - Med Surg ([place] [id number])    "

## 2024-04-26 NOTE — PLAN OF CARE
[place] [address] [id number]  [place] [address]  Phone: [phone]    Home Health Orders  Face to Face Encounter    Patient Name: Shahida Ortega  YOB: 1970    PCP: Janusz Staton MD   PCP Address: [address] [place], [place] [address] / [place] [address]  PCP Phone Number: [phone]  PCP Fax: [phone]    Encounter Date: 04/26/2024    Admit To Home Health    Diagnoses:  Active Hospital Problems    Diagnosis  POA    *Sepsis [A41.9]  Yes     Priority: 1 - High    Bacteremia due to group B Streptococcus [R78.81, B95.1]  Yes     Priority: 2     Septic arthritis of shoulder, right [M00.9]  Yes     Priority: 2     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       Follow Up Appointments:  Future Appointments   Date Time Provider Department Center   4/26/2024  1:00 PM INPATIENT, [facility]   5/8/2024  9:00 AM INFECTIOUS DISEASE, [facility]   5/9/2024 10:30 AM Yulia Everett PA-C [facility] [address]   5/29/2024 11:30 AM Madi Ray MD [facility] [place]   6/6/2024 10:00 AM Yulia Everett PA-C [facility] [address]      Follow-up Information       Janusz Staton MD Follow up in 1 week(s).    Specialty: Internal Medicine  Contact information:  [address] [place] [address]  [place] [id number] [phone]                               Allergies:  Review of patient's allergies indicates:   Allergen Reactions    Penicillins Hives     Tolerated cephalosporins 4/2024    Amoxicillin     Grass pollen-yun grass  "standard Other (See Comments)         Medications: Review discharge medications with patient and family and provide education.      Current Discharge Medication List        START taking these medications    Details   acetaminophen (TYLENOL) 500 MG tablet Take 2 tablets (1,000 mg total) by mouth 3 (three) times daily. for 10 days  Qty: 60 tablet, Refills: 0      blood sugar diagnostic Strp Use to test blood glucose 2 (two) times daily with meals.  Qty: 100 strip, Refills: 11      blood-glucose meter Misc Use to test blood glucose 2 (two) times daily with meals.  Qty: 1 each, Refills: 0      celecoxib (CELEBREX) 200 MG capsule Take 1 capsule (200 mg total) by mouth once daily. for 10 days  Qty: 10 capsule, Refills: 0      dextrose 5 % in water (D5W) PgBk 100 mL with cefTRIAXone 2 gram SolR 2 g Inject 2 g into the vein once daily.      glimepiride (AMARYL) 4 MG tablet Take 1 tablet (4 mg total) by mouth before breakfast.  Qty: 90 tablet, Refills: 0      insulin syringe-needle U-100 0.3 mL 30 gauge x 5/16" Syrg 1 each by Misc.(Non-Drug; Combo Route) route 2 (two) times daily with meals.  Qty: 100 each, Refills: 11      lancets Misc Use to check blood glucose 2 (two) times daily with meals.  Qty: 100 each, Refills: 11      lancing device Misc 1 Device by Misc.(Non-Drug; Combo Route) route 2 (two) times daily with meals.  Qty: 1 each, Refills: 0      methocarbamoL (ROBAXIN) 500 MG Tab Take 1 tablet (500 mg total) by mouth 3 (three) times daily. for 10 days  Qty: 30 tablet, Refills: 0      oxyCODONE (ROXICODONE) 5 MG immediate release tablet Take 1 tablet (5 mg total) by mouth every 4 (four) hours as needed.  Qty: 30 tablet, Refills: 0    Comments: Quantity prescribed more than 7 day supply? No      pen needle, diabetic 31 gauge x 5/16" Ndle Use to inject insulin 4 (four) times daily.  Qty: 100 each, Refills: 11    Comments: novolog=3 times daily; lantus=once daily           CONTINUE these medications which have CHANGED    " Details   metFORMIN (GLUCOPHAGE) 500 MG tablet Take 2 tablets (1,000 mg total) by mouth 2 (two) times daily with meals.  Qty: 360 tablet, Refills: 0           CONTINUE these medications which have NOT CHANGED    Details   aspirin (ECOTRIN) 81 MG EC tablet Take 1 tablet (81 mg total) by mouth once daily.  Qty: 90 tablet, Refills: 3      carvediloL (COREG) 3.125 MG tablet Take 2 tablets (6.25 mg total) by mouth 2 (two) times daily.  Qty: 360 tablet, Refills: 3    Comments: .      cetirizine (ZYRTEC) 10 MG tablet Take 1 tablet (10 mg total) by mouth once daily.  Qty: 30 tablet, Refills: 1    Associated Diagnoses: History of seasonal allergies      cyclobenzaprine (FLEXERIL) 10 MG tablet Take 10 mg by mouth 3 (three) times daily as needed.      hydroCHLOROthiazide (HYDRODIURIL) 25 MG tablet Take 1 tablet (25 mg total) by mouth once daily.  Qty: 90 tablet, Refills: 3    Comments: .      ibuprofen (ADVIL,MOTRIN) 800 MG tablet Take 800 mg by mouth every 8 (eight) hours as needed.      losartan (COZAAR) 100 MG tablet Take 1 tablet (100 mg total) by mouth once daily.  Qty: 90 tablet, Refills: 3    Comments: .      albuterol (PROVENTIL/VENTOLIN HFA) 90 mcg/actuation inhaler Inhale 2 puffs into the lungs every 6 (six) hours as needed.      azelastine (ASTELIN) 137 mcg (0.1 %) nasal spray 1 spray by Nasal route 2 (two) times daily.      blood-glucose meter kit To check BG 2 times daily, to use with insurance preferred meter  Qty: 1 each, Refills: 0      FREESTYLE HARSHA 14 DAY SENSOR Kit Change every 14 days for blood glucose monitoring.      ondansetron (ZOFRAN-ODT) 4 MG TbDL Take 4 mg by mouth.      promethazine (PHENERGAN) 25 MG tablet Take 25 mg by mouth every 8 (eight) hours as needed.           STOP taking these medications       diazePAM (VALIUM) 2 MG tablet Comments:   Reason for Stopping:         doxycycline (VIBRAMYCIN) 100 MG Cap Comments:   Reason for Stopping:                 I have seen and examined this patient  within the last 30 days. My clinical findings that support the need for the home health skilled services and home bound status are the following:no   Weakness/numbness causing balance and gait disturbance due to Infection making it taxing to leave home.       Code Status:    Code Status: Full Code      Nursing:   Agency to admit patient within 24 hours of hospital discharge unless specified on physician order or at patient request    SN to complete comprehensive assessment including routine vital signs. Instruct on disease process and s/s of complications to report to MD. Review/verify medication list sent home with the patient at time of discharge  and instruct patient/caregiver as needed. Frequency may be adjusted depending on start of care date.     Skilled nurse to perform up to 3 visits PRN for symptoms related to diagnosis    Notify MD if SBP > 160 or < 90; DBP > 90 or < 50; HR > 120 or < 50; Temp > 101; O2 < 88%    Ok to schedule additional visits based on staff availability and patient request on consecutive days within the home health episode.      When multiple disciplines ordered:    Start of Care occurs on Sunday - Wednesday schedule remaining discipline evaluations as ordered on separate consecutive days following the start of care.    Thursday SOC -schedule subsequent evaluations Friday and Monday the following week.     Friday - Saturday SOC - schedule subsequent discipline evaluations on consecutive days starting Monday of the following week.      Diet:   diabetic diet 2000 calorie      Activities:   activity as tolerated      PICC/Midline Therapy:  SN to perform Infusion Therapy/Central Line Care.  Review Central Line Care & Central Line Flush with patient.  Pull midline/PICC line after completion of IV therapy    Scrub the Hub: Prior to accessing the line, always perform a 30 second alcohol scrub  Each lumen of the central line is to be flushed at least daily with 10 mL Normal Saline and 3 mL  Heparin flush (10 units/mL)  Skilled Nurse (SN) may draw blood from IV access  Blood Draw Procedure:   - Aspirate at least 5 mL of blood   - Discard   - Obtain specimen   - Change injection cap   - Flush with 20 mL Normal Saline followed by a                 3-5 mL Heparin flush (10 units/mL)  Central :   - Sterile dressing changes are done weekly and as needed.   - Use chlor-hexadine scrub to cleanse site, apply Biopatch to insertion site,       apply securement device dressing   - Injection caps are changed weekly and after EVERY lab draw.   - If sterile gauze is under dressing to control oozing,                 dressing change must be performed every 24 hours until gauze is not needed.      Outpatient Antibiotic Therapy Plan:     Please send referral to [facility].     1) Infection: Bacteremia due to group B Streptococcus     2) Discharge Antibiotics:     Intravenous antibiotics:  Ceftriaxone 2g IV q 24 hours      3) Therapy Duration:  6 weeks course of IV abx from last negative culture date(04/17/24)     Estimated end date of IV antibiotics: 05/29/24     4) Outpatient Followup Imaging:     Repeat MRI Thoracic W W/O Contrast in 2 weeks     5) Outpatient Weekly Labs:     Order the following labs to be drawn on Mondays:   CBC  CMP   CRP     6) Fax Lab Results to Infectious Diseases Provider: Dr. Madi Ray     [facility] Fax Number: [phone]     7) Outpatient Infectious Diseases Follow-up     Follow-up appointment will be arranged by the ID clinic and will be found in the patient's appointments tab.     Prior to discharge, please ensure the patient's follow-up has been scheduled.    If there is still no follow-up scheduled prior to discharge, please send an EPIC message to Annmarie Gill in Infectious Diseases.    Diabetes Care:       Check blood sugar:        Fingerstick blood sugar AC and HS    Fingerstick blood sugar every 6 hours if unable to  eat       Report CBG < 60 or > 400 to physician.                                          Insulin Sliding Scale          Glucose  Novolog Insulin Subcutaneous        0 - 60   Orange juice or glucose tablet, hold insulin      No insulin   201-250  2 units   251-300  4 units   301-350  6 units   351-400  8 units   >400   10 units then call physician      For all post-discharge communication and subsequent orders please contact patient's primary care physician. If unable to reach primary care physician or do not receive response within 30 minutes, please contact [facility] on call for clinical staff order clarification      I certify that this patient is confined to her home and needs intermittent skilled nursing care, physical therapy and occupational therapy.      Jessica Grey MD  [facility]

## 2024-04-28 LAB — BACTERIA SPEC AEROBE CULT: NO GROWTH

## 2024-04-30 ENCOUNTER — LAB VISIT (OUTPATIENT)
Dept: LAB | Facility: HOSPITAL | Age: 54
End: 2024-04-30
Attending: INTERNAL MEDICINE
Payer: COMMERCIAL

## 2024-04-30 DIAGNOSIS — A41.9 SYSTEMIC INFECTION: Primary | ICD-10-CM

## 2024-04-30 PROCEDURE — 86140 C-REACTIVE PROTEIN: CPT | Performed by: INTERNAL MEDICINE

## 2024-04-30 PROCEDURE — 85025 COMPLETE CBC W/AUTO DIFF WBC: CPT | Performed by: INTERNAL MEDICINE

## 2024-04-30 PROCEDURE — 80053 COMPREHEN METABOLIC PANEL: CPT | Performed by: INTERNAL MEDICINE

## 2024-05-01 LAB
ALBUMIN SERPL BCP-MCNC: 2.8 G/DL (ref 3.5–5.2)
ALP SERPL-CCNC: 72 U/L (ref 55–135)
ALT SERPL W/O P-5'-P-CCNC: 11 U/L (ref 10–44)
ANION GAP SERPL CALC-SCNC: 10 MMOL/L (ref 8–16)
AST SERPL-CCNC: 14 U/L (ref 10–40)
BASOPHILS # BLD AUTO: 0.02 K/UL (ref 0–0.2)
BASOPHILS NFR BLD: 0.3 % (ref 0–1.9)
BILIRUB SERPL-MCNC: 0.2 MG/DL (ref 0.1–1)
BUN SERPL-MCNC: 12 MG/DL (ref 6–20)
CALCIUM SERPL-MCNC: 9.3 MG/DL (ref 8.7–10.5)
CHLORIDE SERPL-SCNC: 103 MMOL/L (ref 95–110)
CO2 SERPL-SCNC: 26 MMOL/L (ref 23–29)
CREAT SERPL-MCNC: 0.6 MG/DL (ref 0.5–1.4)
CRP SERPL-MCNC: 29.5 MG/L (ref 0–8.2)
DIFFERENTIAL METHOD BLD: ABNORMAL
EOSINOPHIL # BLD AUTO: 0.2 K/UL (ref 0–0.5)
EOSINOPHIL NFR BLD: 2.1 % (ref 0–8)
ERYTHROCYTE [DISTWIDTH] IN BLOOD BY AUTOMATED COUNT: 13.6 % (ref 11.5–14.5)
EST. GFR  (NO RACE VARIABLE): >60 ML/MIN/1.73 M^2
GLUCOSE SERPL-MCNC: 77 MG/DL (ref 70–110)
HCT VFR BLD AUTO: 31 % (ref 37–48.5)
HGB BLD-MCNC: 9.7 G/DL (ref 12–16)
IMM GRANULOCYTES # BLD AUTO: 0.09 K/UL (ref 0–0.04)
IMM GRANULOCYTES NFR BLD AUTO: 1.3 % (ref 0–0.5)
LYMPHOCYTES # BLD AUTO: 2 K/UL (ref 1–4.8)
LYMPHOCYTES NFR BLD: 28.5 % (ref 18–48)
MCH RBC QN AUTO: 29.4 PG (ref 27–31)
MCHC RBC AUTO-ENTMCNC: 31.3 G/DL (ref 32–36)
MCV RBC AUTO: 94 FL (ref 82–98)
MONOCYTES # BLD AUTO: 0.4 K/UL (ref 0.3–1)
MONOCYTES NFR BLD: 5.4 % (ref 4–15)
NEUTROPHILS # BLD AUTO: 4.4 K/UL (ref 1.8–7.7)
NEUTROPHILS NFR BLD: 62.4 % (ref 38–73)
NRBC BLD-RTO: 0 /100 WBC
PLATELET # BLD AUTO: 362 K/UL (ref 150–450)
PMV BLD AUTO: 9.8 FL (ref 9.2–12.9)
POTASSIUM SERPL-SCNC: 3.3 MMOL/L (ref 3.5–5.1)
PROT SERPL-MCNC: 7.5 G/DL (ref 6–8.4)
RBC # BLD AUTO: 3.3 M/UL (ref 4–5.4)
SODIUM SERPL-SCNC: 139 MMOL/L (ref 136–145)
WBC # BLD AUTO: 7.01 K/UL (ref 3.9–12.7)

## 2024-05-02 ENCOUNTER — TELEPHONE (OUTPATIENT)
Dept: INFECTIOUS DISEASES | Facility: CLINIC | Age: 54
End: 2024-05-02
Payer: COMMERCIAL

## 2024-05-02 NOTE — TELEPHONE ENCOUNTER
Called patient to informed her appointment was rescheduled to 5/6/2024 at [facility]. No answer  Left a voice message with appointment info.

## 2024-05-03 LAB — BACTERIA SPEC ANAEROBE CULT: NORMAL

## 2024-05-06 ENCOUNTER — LAB VISIT (OUTPATIENT)
Dept: LAB | Facility: HOSPITAL | Age: 54
End: 2024-05-06
Attending: INTERNAL MEDICINE
Payer: COMMERCIAL

## 2024-05-06 DIAGNOSIS — A40.1 SEPTICEMIA DUE TO GROUP B STREPTOCOCCUS: Primary | ICD-10-CM

## 2024-05-06 LAB
ALBUMIN SERPL BCP-MCNC: 3.3 G/DL (ref 3.5–5.2)
ALP SERPL-CCNC: 79 U/L (ref 55–135)
ALT SERPL W/O P-5'-P-CCNC: 12 U/L (ref 10–44)
ANION GAP SERPL CALC-SCNC: 10 MMOL/L (ref 8–16)
AST SERPL-CCNC: 16 U/L (ref 10–40)
BASOPHILS # BLD AUTO: 0.01 K/UL (ref 0–0.2)
BASOPHILS NFR BLD: 0.2 % (ref 0–1.9)
BILIRUB SERPL-MCNC: 0.2 MG/DL (ref 0.1–1)
BUN SERPL-MCNC: 13 MG/DL (ref 6–20)
CALCIUM SERPL-MCNC: 10.1 MG/DL (ref 8.7–10.5)
CHLORIDE SERPL-SCNC: 102 MMOL/L (ref 95–110)
CO2 SERPL-SCNC: 28 MMOL/L (ref 23–29)
CREAT SERPL-MCNC: 0.7 MG/DL (ref 0.5–1.4)
CRP SERPL-MCNC: 27.1 MG/L (ref 0–8.2)
DIFFERENTIAL METHOD BLD: ABNORMAL
EOSINOPHIL # BLD AUTO: 0.2 K/UL (ref 0–0.5)
EOSINOPHIL NFR BLD: 3.4 % (ref 0–8)
ERYTHROCYTE [DISTWIDTH] IN BLOOD BY AUTOMATED COUNT: 13.8 % (ref 11.5–14.5)
EST. GFR  (NO RACE VARIABLE): >60 ML/MIN/1.73 M^2
GLUCOSE SERPL-MCNC: 54 MG/DL (ref 70–110)
HCT VFR BLD AUTO: 32.3 % (ref 37–48.5)
HGB BLD-MCNC: 10.1 G/DL (ref 12–16)
IMM GRANULOCYTES # BLD AUTO: 0.01 K/UL (ref 0–0.04)
IMM GRANULOCYTES NFR BLD AUTO: 0.2 % (ref 0–0.5)
LYMPHOCYTES # BLD AUTO: 1.9 K/UL (ref 1–4.8)
LYMPHOCYTES NFR BLD: 39.1 % (ref 18–48)
MCH RBC QN AUTO: 28.2 PG (ref 27–31)
MCHC RBC AUTO-ENTMCNC: 31.3 G/DL (ref 32–36)
MCV RBC AUTO: 90 FL (ref 82–98)
MONOCYTES # BLD AUTO: 0.4 K/UL (ref 0.3–1)
MONOCYTES NFR BLD: 7.7 % (ref 4–15)
NEUTROPHILS # BLD AUTO: 2.4 K/UL (ref 1.8–7.7)
NEUTROPHILS NFR BLD: 49.4 % (ref 38–73)
NRBC BLD-RTO: 0 /100 WBC
PLATELET # BLD AUTO: 246 K/UL (ref 150–450)
PMV BLD AUTO: 10.2 FL (ref 9.2–12.9)
POTASSIUM SERPL-SCNC: 3.8 MMOL/L (ref 3.5–5.1)
PROT SERPL-MCNC: 8.3 G/DL (ref 6–8.4)
RBC # BLD AUTO: 3.58 M/UL (ref 4–5.4)
SODIUM SERPL-SCNC: 140 MMOL/L (ref 136–145)
WBC # BLD AUTO: 4.94 K/UL (ref 3.9–12.7)

## 2024-05-06 PROCEDURE — 85025 COMPLETE CBC W/AUTO DIFF WBC: CPT | Performed by: INTERNAL MEDICINE

## 2024-05-06 PROCEDURE — 80053 COMPREHEN METABOLIC PANEL: CPT | Performed by: INTERNAL MEDICINE

## 2024-05-06 PROCEDURE — 86140 C-REACTIVE PROTEIN: CPT | Performed by: INTERNAL MEDICINE

## 2024-05-08 ENCOUNTER — OFFICE VISIT (OUTPATIENT)
Dept: INFECTIOUS DISEASES | Facility: CLINIC | Age: 54
End: 2024-05-08
Payer: COMMERCIAL

## 2024-05-08 VITALS
HEIGHT: 67 IN | SYSTOLIC BLOOD PRESSURE: 143 MMHG | WEIGHT: 199.31 LBS | BODY MASS INDEX: 31.28 KG/M2 | HEART RATE: 70 BPM | DIASTOLIC BLOOD PRESSURE: 82 MMHG

## 2024-05-08 DIAGNOSIS — Z00.00 HEALTH MAINTENANCE EXAMINATION: Primary | ICD-10-CM

## 2024-05-08 PROCEDURE — 99999 PR PBB SHADOW E&M-EST. PATIENT-LVL IV: CPT | Mod: PBBFAC,,,

## 2024-05-08 PROCEDURE — 3008F BODY MASS INDEX DOCD: CPT | Mod: CPTII,S$GLB,, | Performed by: INTERNAL MEDICINE

## 2024-05-08 PROCEDURE — 1111F DSCHRG MED/CURRENT MED MERGE: CPT | Mod: CPTII,S$GLB,, | Performed by: INTERNAL MEDICINE

## 2024-05-08 PROCEDURE — 99213 OFFICE O/P EST LOW 20 MIN: CPT | Mod: S$GLB,,, | Performed by: INTERNAL MEDICINE

## 2024-05-08 PROCEDURE — 3079F DIAST BP 80-89 MM HG: CPT | Mod: CPTII,S$GLB,, | Performed by: INTERNAL MEDICINE

## 2024-05-08 PROCEDURE — 1159F MED LIST DOCD IN RCRD: CPT | Mod: CPTII,S$GLB,, | Performed by: INTERNAL MEDICINE

## 2024-05-08 PROCEDURE — 3077F SYST BP >= 140 MM HG: CPT | Mod: CPTII,S$GLB,, | Performed by: INTERNAL MEDICINE

## 2024-05-08 NOTE — PROGRESS NOTES
INFECTIOUS DISEASE CLINIC  5/8/24     Subjective:      Chief Complaint:   Chief Complaint   Patient presents with    Follow-up       History of Present Illness:    Patient Shahida Ortega is a 53 y.o. female with DM/HTN  who presents today for hospital follow up of iv antibiotics.     Brief history:   Was admitted 4/12 with R shoulder pain, found to have GBS bacteremia. Bcx 4/15 +GBS, repeat 4/17 cleared. MRI/CT of R shoulder noted soft tissue edema/joint effusion concerning for septic arthritis and osteomyelitis and received washout with ortho.  MRI imaging also with concern for T5-T11 preverterbral abscess, no surgical intervention done. D/c'd on IV ceftriaxone per ID recs with planned end date 5/29    Pt reports compliance with abx and denies side effects. Reports back pain has since resolved. Denies issues with picc line.       Review of Symptoms:  Constitutional: Denies fevers, chills, or weakness.  ENT: Denies dysphagia, nasal discharge, ear pain or discharge.  Cardiovascular: Denies chest pain, palpitations, orthopnea, or claudication.  Respiratory: Denies shortness of breath, cough, hemoptysis, or wheezing.  GI: Denies nausea/vomitting, hematochezia, melena, abd pain, or changes in appetite.  : Denies dysuria, incontinence, or hematuria.  Musculoskeletal: Denies joint pain or myalgias.  Skin/breast: Denies rashes, lumps, lesions, or discharge.  Neurologic: Denies headache, dizziness, vertigo, or paresthesias.    Past Medical History:   Diagnosis Date    Anemia 4/22/2024    DM2 (diabetes mellitus, type 2) 05/28/2023    Hypertension     Prolonged Q-T interval on ECG 05/31/2023    Qtc 525 5/31/23       Past Surgical History:   Procedure Laterality Date    ABDOMINAL SURGERY      ARTHROSCOPIC DEBRIDEMENT OF SHOULDER Right 4/24/2024    Procedure: DEBRIDEMENT, SHOULDER, ARTHROSCOPIC; Linvate, beach chair, 9L NS;  Surgeon: Madi Bailey MD;  Location: [facility] OR [address] [place] [address];  Service: Orthopedics;  Laterality:  Right;    ARTHROSCOPIC DEBRIDEMENT OF SHOULDER Right 4/25/2024    Procedure: DEBRIDEMENT, SHOULDER, ARTHROSCOPIC;  Surgeon: Thaddeus Corral MD;  [facility];  Service: Orthopedics;  Laterality: Right;    COLONOSCOPY N/A 06/01/2023    Procedure: COLONOSCOPY;  Surgeon: Thaddeus Carlos MD;  Location: [facility] ENDO;  Service: Endoscopy;  Laterality: N/A;    DIAGNOSTIC LAPAROSCOPY N/A 05/26/2019    Procedure: LAPAROSCOPY, DIAGNOSTIC Drainage of abscess ;  Surgeon: Jose Luis Hanson MD;  Location: [facility] OR;  Service: General;  Laterality: N/A;  Drain Placement    DIAGNOSTIC LAPAROSCOPY N/A 12/27/2020    Procedure: Diagnostic laparoscopy, drainage of intra-abdominal abscess;  Surgeon: Bhupendra Banda MD;  Location: [facility] OR;  Service: General;  Laterality: N/A;    LAPAROSCOPIC LYSIS OF ADHESIONS  05/26/2019    Procedure: LYSIS, ADHESIONS, LAPAROSCOPIC;  Surgeon: Jose Luis Hanson MD;  Location: [facility] OR;  Service: General;;    VATS, WITH CHEST TUBE INSERTION FOR DRAINAGE OF PLEURAL EFFUSION Right 4/23/2024    Procedure: VATS, WITH CHEST TUBE INSERTION FOR DRAINAGE OF PLEURAL EFFUSION;  Surgeon: Rakesh Blake MD;  Location: [facility] OR [address] [place] [address];  Service: Cardiothoracic;  Laterality: Right;       Family History   Problem Relation Name Age of Onset    Diabetes Father         Social History     Socioeconomic History    Marital status: Single   Tobacco Use    Smoking status: Never    Smokeless tobacco: Never   Substance and Sexual Activity    Alcohol use: Not Currently    Drug use: Not Currently    Sexual activity: Not Currently     Social Determinants of Health     Financial Resource Strain: Low Risk  (4/16/2024)    Overall Financial Resource Strain (CARDIA)     Difficulty of Paying Living Expenses: Not very hard   Food Insecurity: No Food Insecurity (4/16/2024)    Hunger Vital Sign     Worried About Running Out of Food in the Last Year: Never true     Ran Out of Food in the Last Year: Never true   Transportation  Needs: No Transportation Needs (4/16/2024)    PRAPARE - Transportation     Lack of Transportation (Medical): No     Lack of Transportation (Non-Medical): No   Physical Activity: Sufficiently Active (4/16/2024)    Exercise Vital Sign     Days of Exercise per Week: 7 days     Minutes of Exercise per Session: 60 min   Stress: No Stress Concern Present (4/16/2024)    [employer] - Occupational Stress Questionnaire     Feeling of Stress : Only a little   Housing Stability: Low Risk  (4/16/2024)    Housing Stability Vital Sign     Unable to Pay for Housing in the Last Year: No     Homeless in the Last Year: No       Review of patient's allergies indicates:   Allergen Reactions    Penicillins Hives     Tolerated cephalosporins 4/2024    Amoxicillin     Grass pollen-june grass standard Other (See Comments)         Objective:   VS (24h):   Vitals:    05/08/24 0859   BP: (!) 143/82   Pulse: 70     [unfilled]  General: Afebrile, alert, comfortable, no acute distress.   HEENT: VILMA. EOMI, no scleral icterus.   Pulmonary: Non labored,clear to auscultation A/P/L. No wheezing, crackles, or rhonchi.  Cardiac: normal S1 & S2 w/o rubs/murmurs/gallops.   Abdominal: Non-tender, non-distended  Extremities: Moves all extremities x 4. No peripheral edema. Picc dressings c/d/I, biopatch present  Skin: No jaundice, rashes, or visible lesions.   Neurological:  Alert and oriented x 4.     Labs:    Glucose   Date Value Ref Range Status   05/06/2024 54 (L) 70 - 110 mg/dL Final   04/30/2024 77 70 - 110 mg/dL Final   04/25/2024 109 70 - 110 mg/dL Final       Calcium   Date Value Ref Range Status   05/06/2024 10.1 8.7 - 10.5 mg/dL Final   04/30/2024 9.3 8.7 - 10.5 mg/dL Final   04/25/2024 9.4 8.7 - 10.5 mg/dL Final       Albumin   Date Value Ref Range Status   05/06/2024 3.3 (L) 3.5 - 5.2 g/dL Final   04/30/2024 2.8 (L) 3.5 - 5.2 g/dL Final   04/25/2024 2.2 (L) 3.5 - 5.2 g/dL Final       Total Protein   Date Value  Ref Range Status   05/06/2024 8.3 6.0 - 8.4 g/dL Final   04/30/2024 7.5 6.0 - 8.4 g/dL Final   04/25/2024 7.6 6.0 - 8.4 g/dL Final       Sodium   Date Value Ref Range Status   05/06/2024 140 136 - 145 mmol/L Final   04/30/2024 139 136 - 145 mmol/L Final   04/25/2024 137 136 - 145 mmol/L Final       Potassium   Date Value Ref Range Status   05/06/2024 3.8 3.5 - 5.1 mmol/L Final   04/30/2024 3.3 (L) 3.5 - 5.1 mmol/L Final   04/25/2024 4.6 3.5 - 5.1 mmol/L Final       CO2   Date Value Ref Range Status   05/06/2024 28 23 - 29 mmol/L Final   04/30/2024 26 23 - 29 mmol/L Final   04/25/2024 24 23 - 29 mmol/L Final       Chloride   Date Value Ref Range Status   05/06/2024 102 95 - 110 mmol/L Final   04/30/2024 103 95 - 110 mmol/L Final   04/25/2024 102 95 - 110 mmol/L Final       BUN   Date Value Ref Range Status   05/06/2024 13 6 - 20 mg/dL Final   04/30/2024 12 6 - 20 mg/dL Final   04/25/2024 6 6 - 20 mg/dL Final       Creatinine   Date Value Ref Range Status   05/06/2024 0.7 0.5 - 1.4 mg/dL Final   04/30/2024 0.6 0.5 - 1.4 mg/dL Final   04/25/2024 0.6 0.5 - 1.4 mg/dL Final       Alkaline Phosphatase   Date Value Ref Range Status   05/06/2024 79 55 - 135 U/L Final   04/30/2024 72 55 - 135 U/L Final   04/25/2024 83 55 - 135 U/L Final       ALT   Date Value Ref Range Status   05/06/2024 12 10 - 44 U/L Final   04/30/2024 11 10 - 44 U/L Final   04/25/2024 19 10 - 44 U/L Final       AST   Date Value Ref Range Status   05/06/2024 16 10 - 40 U/L Final   04/30/2024 14 10 - 40 U/L Final   04/25/2024 28 10 - 40 U/L Final     Comment:     *Result may be interfered by visible hemolysis       Total Bilirubin   Date Value Ref Range Status   05/06/2024 0.2 0.1 - 1.0 mg/dL Final     Comment:     For infants and newborns, interpretation of results should be based  on gestational age, weight and in agreement with clinical  observations.    Premature Infant recommended reference ranges:  Up to 24 hours.............<8.0 mg/dL  Up to 48  "hours............<12.0 mg/dL  3-5 days..................<15.0 mg/dL  6-29 days.................<15.0 mg/dL     04/30/2024 0.2 0.1 - 1.0 mg/dL Final     Comment:     For infants and newborns, interpretation of results should be based  on gestational age, weight and in agreement with clinical  observations.    Premature Infant recommended reference ranges:  Up to 24 hours.............<8.0 mg/dL  Up to 48 hours............<12.0 mg/dL  3-5 days..................<15.0 mg/dL  6-29 days.................<15.0 mg/dL     04/25/2024 0.2 0.1 - 1.0 mg/dL Final     Comment:     For infants and newborns, interpretation of results should be based  on gestational age, weight and in agreement with clinical  observations.    Premature Infant recommended reference ranges:  Up to 24 hours.............<8.0 mg/dL  Up to 48 hours............<12.0 mg/dL  3-5 days..................<15.0 mg/dL  6-29 days.................<15.0 mg/dL         WBC   Date Value Ref Range Status   05/06/2024 4.94 3.90 - 12.70 K/uL Final   04/30/2024 7.01 3.90 - 12.70 K/uL Final   04/25/2024 6.50 3.90 - 12.70 K/uL Final       Hemoglobin   Date Value Ref Range Status   05/06/2024 10.1 (L) 12.0 - 16.0 g/dL Final   04/30/2024 9.7 (L) 12.0 - 16.0 g/dL Final   04/25/2024 10.5 (L) 12.0 - 16.0 g/dL Final       Hematocrit   Date Value Ref Range Status   05/06/2024 32.3 (L) 37.0 - 48.5 % Final   04/30/2024 31.0 (L) 37.0 - 48.5 % Final   04/25/2024 33.2 (L) 37.0 - 48.5 % Final       MCV   Date Value Ref Range Status   05/06/2024 90 82 - 98 fL Final   04/30/2024 94 82 - 98 fL Final   04/25/2024 92 82 - 98 fL Final       Platelets   Date Value Ref Range Status   05/06/2024 246 150 - 450 K/uL Final   04/30/2024 362 150 - 450 K/uL Final   04/25/2024 416 150 - 450 K/uL Final     No results found for: "CHOL"  No results found for: "HDL"  No results found for: "LDLCALC"  No results found for: "TRIG"  No results found for: "CHOLHDL"  No results found for: "RPR"  No results found for: " ""QUANTIFERON"    Medications:  Current Outpatient Medications on File Prior to Visit   Medication Sig Dispense Refill    albuterol (PROVENTIL/VENTOLIN HFA) 90 mcg/actuation inhaler Inhale 2 puffs into the lungs every 6 (six) hours as needed.      aspirin (ECOTRIN) 81 MG EC tablet Take 1 tablet (81 mg total) by mouth once daily. 90 tablet 3    azelastine (ASTELIN) 137 mcg (0.1 %) nasal spray 1 spray by Nasal route 2 (two) times daily.      blood sugar diagnostic Strp Use to test blood glucose 2 (two) times daily with meals. 100 strip 11    blood-glucose meter Misc Use to test blood glucose 2 (two) times daily with meals. 1 each 0    carvediloL (COREG) 3.125 MG tablet Take 2 tablets (6.25 mg total) by mouth 2 (two) times daily. 360 tablet 3    cetirizine (ZYRTEC) 10 MG tablet Take 1 tablet (10 mg total) by mouth once daily. 30 tablet 1    cyclobenzaprine (FLEXERIL) 10 MG tablet Take 10 mg by mouth 3 (three) times daily as needed.      dextrose 5 % in water (D5W) PgBk 100 mL with cefTRIAXone 2 gram SolR 2 g Inject 2 g into the vein once daily.      FREESTYLE HARSHA 14 DAY SENSOR Kit Change every 14 days for blood glucose monitoring.      glimepiride (AMARYL) 4 MG tablet Take 1 tablet (4 mg total) by mouth before breakfast. 90 tablet 0    hydroCHLOROthiazide (HYDRODIURIL) 25 MG tablet Take 1 tablet (25 mg total) by mouth once daily. 90 tablet 3    ibuprofen (ADVIL,MOTRIN) 800 MG tablet Take 800 mg by mouth every 8 (eight) hours as needed.      insulin syringe-needle U-100 0.3 mL 30 gauge x 5/16" Syrg 1 each by Misc.(Non-Drug; Combo Route) route 2 (two) times daily with meals. 100 each 11    lancets Misc Use to check blood glucose 2 (two) times daily with meals. 100 each 11    lancing device Misc 1 Device by Misc.(Non-Drug; Combo Route) route 2 (two) times daily with meals. 1 each 0    losartan (COZAAR) 100 MG tablet Take 1 tablet (100 mg total) by mouth once daily. 90 tablet 3    metFORMIN (GLUCOPHAGE) 500 MG tablet Take " "2 tablets (1,000 mg total) by mouth 2 (two) times daily with meals. 360 tablet 0    ondansetron (ZOFRAN-ODT) 4 MG TbDL Take 4 mg by mouth.      oxyCODONE (ROXICODONE) 5 MG immediate release tablet Take 1 tablet (5 mg total) by mouth every 4 (four) hours as needed. 30 tablet 0    pen needle, diabetic 31 gauge x 5/16" Ndle Use to inject insulin 4 (four) times daily. 100 each 11    promethazine (PHENERGAN) 25 MG tablet Take 25 mg by mouth every 8 (eight) hours as needed.       No current facility-administered medications on file prior to visit.       Antibiotics:   Antibiotics (From admission, onward)      None            HIV: No components found for: "HIV 1/2 AG/AB"  Hepatitis C IgG: No components found for: "HEPATITIS C"  Syphilis: No results found for: "RPR"    Hepatitis A IgG: No components found for: "HEPATITIS A IGG"  Hepatitis Bc IgG: No components found for: "HEPATITIS B CORE IGG"  Hepatitis Bs IgG:  Quantiferon: No results found for: "QUANTIFERON"  VZV IgG: No components found for: "VARICELLA IGG"    No components found for: "SEDIMENTATION RATE"  No components found for: "C-REACTIVE PROTEIN"      Microbiology x 7d:   Microbiology Results (last 7 days)       ** No results found for the last 168 hours. **            Immunization History   Administered Date(s) Administered    COVID-19, MRNA, LN-S, PF (MODERNA FULL 0.5 ML DOSE) 03/16/2021, 04/13/2021    Pneumococcal Conjugate - 13 Valent 12/29/2020         Reviewed records today as well as relevant labs, cultures, and imaging    Assessment:       Iv antibiotics needed as an outpatient  Septic arthritis R shoulder/osteomyelitis s/p washout  GBS bacteremia  Pre-vertebral T spine abscess    Was admitted 4/12 with R shoulder pain, found to have GBS bacteremia. Bcx 4/15 +GBS, repeat 4/17 cleared. MRI/CT of R shoulder noted soft tissue edema/joint effusion concerning for septic arthritis and osteomyelitis and received washout with ortho.  MRI imaging also with concern " for T5-T11 preverterbral abscess, no surgical intervention done. D/c'd on IV ceftriaxone per ID recs with planned end date 5/29    Clinically improving- back pain resolved and inflammatory markers trending down    No toxicities from antimicrobials  Extremely medically complex    Plan:     Continue ceftriaxone per prior plan    Needs MRI done prior to 5/29 ID f/u appt, have asked MA to schedule    - Will monitor drug therapy for toxicity      - The following labs were ordered:    Orders Placed This Encounter   Procedures    Hepatitis C Antibody     Standing Status:   Future     Standing Expiration Date:   7/7/2025     Order Specific Question:   Release to patient     Answer:   Immediate         I have sent communication to the referring physician and/or primary care provider.     The total time for evaluation and management services performed on 5/8/24 was greater than 25 minutes.  This includes face to face time and non-face to face time preparing to see the patient (eg, review of tests), obtaining and/or reviewing separately obtained history, documenting clinical information in the electronic or other health record, independently interpreting results, and communicating results to the patient/family/caregiver, or care coordination.             Mariana Arteaga MD, MPH  Infectious Disease

## 2024-05-13 ENCOUNTER — LAB VISIT (OUTPATIENT)
Dept: LAB | Facility: HOSPITAL | Age: 54
End: 2024-05-13
Attending: OBSTETRICS & GYNECOLOGY
Payer: COMMERCIAL

## 2024-05-13 DIAGNOSIS — M00.811 PYOGENIC BACTERIAL ARTHRITIS OF SHOULDER, RIGHT: Primary | ICD-10-CM

## 2024-05-13 DIAGNOSIS — L03.113 CELLULITIS OF RIGHT HAND EXCLUDING FINGERS AND THUMB: ICD-10-CM

## 2024-05-13 LAB
ALBUMIN SERPL BCP-MCNC: 3.4 G/DL (ref 3.5–5.2)
ALP SERPL-CCNC: 75 U/L (ref 55–135)
ALT SERPL W/O P-5'-P-CCNC: 14 U/L (ref 10–44)
ANION GAP SERPL CALC-SCNC: 8 MMOL/L (ref 8–16)
AST SERPL-CCNC: 14 U/L (ref 10–40)
BASOPHILS # BLD AUTO: 0.02 K/UL (ref 0–0.2)
BASOPHILS NFR BLD: 0.4 % (ref 0–1.9)
BILIRUB SERPL-MCNC: 0.4 MG/DL (ref 0.1–1)
BUN SERPL-MCNC: 12 MG/DL (ref 6–20)
CALCIUM SERPL-MCNC: 9.5 MG/DL (ref 8.7–10.5)
CHLORIDE SERPL-SCNC: 103 MMOL/L (ref 95–110)
CO2 SERPL-SCNC: 29 MMOL/L (ref 23–29)
CREAT SERPL-MCNC: 0.6 MG/DL (ref 0.5–1.4)
CRP SERPL-MCNC: 53.1 MG/L (ref 0–8.2)
DIFFERENTIAL METHOD BLD: ABNORMAL
EOSINOPHIL # BLD AUTO: 0.2 K/UL (ref 0–0.5)
EOSINOPHIL NFR BLD: 3.6 % (ref 0–8)
ERYTHROCYTE [DISTWIDTH] IN BLOOD BY AUTOMATED COUNT: 13.9 % (ref 11.5–14.5)
EST. GFR  (NO RACE VARIABLE): >60 ML/MIN/1.73 M^2
GLUCOSE SERPL-MCNC: 69 MG/DL (ref 70–110)
HCT VFR BLD AUTO: 30.5 % (ref 37–48.5)
HGB BLD-MCNC: 9.5 G/DL (ref 12–16)
IMM GRANULOCYTES # BLD AUTO: 0.01 K/UL (ref 0–0.04)
IMM GRANULOCYTES NFR BLD AUTO: 0.2 % (ref 0–0.5)
LYMPHOCYTES # BLD AUTO: 1.4 K/UL (ref 1–4.8)
LYMPHOCYTES NFR BLD: 28.2 % (ref 18–48)
MCH RBC QN AUTO: 29.1 PG (ref 27–31)
MCHC RBC AUTO-ENTMCNC: 31.1 G/DL (ref 32–36)
MCV RBC AUTO: 94 FL (ref 82–98)
MONOCYTES # BLD AUTO: 0.4 K/UL (ref 0.3–1)
MONOCYTES NFR BLD: 8.5 % (ref 4–15)
NEUTROPHILS # BLD AUTO: 3 K/UL (ref 1.8–7.7)
NEUTROPHILS NFR BLD: 59.1 % (ref 38–73)
NRBC BLD-RTO: 0 /100 WBC
PLATELET # BLD AUTO: 251 K/UL (ref 150–450)
PMV BLD AUTO: 10.3 FL (ref 9.2–12.9)
POTASSIUM SERPL-SCNC: 3.9 MMOL/L (ref 3.5–5.1)
PROT SERPL-MCNC: 7.4 G/DL (ref 6–8.4)
RBC # BLD AUTO: 3.26 M/UL (ref 4–5.4)
SODIUM SERPL-SCNC: 140 MMOL/L (ref 136–145)
WBC # BLD AUTO: 5.07 K/UL (ref 3.9–12.7)

## 2024-05-13 PROCEDURE — 86140 C-REACTIVE PROTEIN: CPT | Performed by: INTERNAL MEDICINE

## 2024-05-13 PROCEDURE — 80053 COMPREHEN METABOLIC PANEL: CPT | Performed by: INTERNAL MEDICINE

## 2024-05-13 PROCEDURE — 85025 COMPLETE CBC W/AUTO DIFF WBC: CPT | Performed by: INTERNAL MEDICINE

## 2024-05-20 ENCOUNTER — EXTERNAL HOME HEALTH (OUTPATIENT)
Dept: HOME HEALTH SERVICES | Facility: HOSPITAL | Age: 54
End: 2024-05-20
Payer: COMMERCIAL

## 2024-05-23 ENCOUNTER — TELEPHONE (OUTPATIENT)
Dept: HEPATOLOGY | Facility: HOSPITAL | Age: 54
End: 2024-05-23
Payer: COMMERCIAL

## 2024-05-23 ENCOUNTER — HOSPITAL ENCOUNTER (OUTPATIENT)
Dept: RADIOLOGY | Facility: HOSPITAL | Age: 54
Discharge: HOME | DRG: 029 | End: 2024-05-23
Attending: HOSPITALIST
Payer: COMMERCIAL

## 2024-05-23 ENCOUNTER — ANESTHESIA EVENT (OUTPATIENT)
Dept: SURGERY | Facility: HOSPITAL | Age: 54
DRG: 029 | End: 2024-05-23
Payer: COMMERCIAL

## 2024-05-23 ENCOUNTER — HOSPITAL ENCOUNTER (INPATIENT)
Facility: HOSPITAL | Age: 54
LOS: 3 days | Discharge: HOME HEALTH | DRG: 029 | End: 2024-05-26
Attending: EMERGENCY MEDICINE | Admitting: STUDENT IN AN ORGANIZED HEALTH CARE EDUCATION/TRAINING PROGRAM
Payer: COMMERCIAL

## 2024-05-23 DIAGNOSIS — R94.31 PROLONGED Q-T INTERVAL ON ECG: ICD-10-CM

## 2024-05-23 DIAGNOSIS — G06.2 EPIDURAL ABSCESS: Primary | ICD-10-CM

## 2024-05-23 DIAGNOSIS — M60.9 MYOSITIS OF RIGHT SHOULDER, UNSPECIFIED MYOSITIS TYPE: ICD-10-CM

## 2024-05-23 DIAGNOSIS — M86.00 ACUTE HEMATOGENOUS OSTEOMYELITIS, UNSPECIFIED SITE: ICD-10-CM

## 2024-05-23 DIAGNOSIS — M86.9 OSTEOMYELITIS, UNSPECIFIED SITE, UNSPECIFIED TYPE: ICD-10-CM

## 2024-05-23 DIAGNOSIS — R07.9 CHEST PAIN: ICD-10-CM

## 2024-05-23 DIAGNOSIS — A41.9 SEPSIS: ICD-10-CM

## 2024-05-23 LAB
ABO + RH BLD: NORMAL
ALBUMIN SERPL BCP-MCNC: 3.2 G/DL (ref 3.5–5.2)
ALP SERPL-CCNC: 70 U/L (ref 55–135)
ALT SERPL W/O P-5'-P-CCNC: 8 U/L (ref 10–44)
ANION GAP SERPL CALC-SCNC: 11 MMOL/L (ref 8–16)
APTT PPP: 29.3 SEC (ref 21–32)
AST SERPL-CCNC: 12 U/L (ref 10–40)
BASOPHILS # BLD AUTO: 0.01 K/UL (ref 0–0.2)
BASOPHILS NFR BLD: 0.2 % (ref 0–1.9)
BILIRUB SERPL-MCNC: 0.2 MG/DL (ref 0.1–1)
BLD GP AB SCN CELLS X3 SERPL QL: NORMAL
BUN SERPL-MCNC: 12 MG/DL (ref 6–20)
CALCIUM SERPL-MCNC: 9.6 MG/DL (ref 8.7–10.5)
CEA SERPL-MCNC: 2.2 NG/ML (ref 0–5)
CHLORIDE SERPL-SCNC: 104 MMOL/L (ref 95–110)
CO2 SERPL-SCNC: 25 MMOL/L (ref 23–29)
CREAT SERPL-MCNC: 0.7 MG/DL (ref 0.5–1.4)
CRP SERPL-MCNC: 5.3 MG/L (ref 0–8.2)
DIFFERENTIAL METHOD BLD: ABNORMAL
EOSINOPHIL # BLD AUTO: 0.2 K/UL (ref 0–0.5)
EOSINOPHIL NFR BLD: 4.4 % (ref 0–8)
ERYTHROCYTE [DISTWIDTH] IN BLOOD BY AUTOMATED COUNT: 13.9 % (ref 11.5–14.5)
ERYTHROCYTE [SEDIMENTATION RATE] IN BLOOD BY PHOTOMETRIC METHOD: 71 MM/HR (ref 0–36)
EST. GFR  (NO RACE VARIABLE): >60 ML/MIN/1.73 M^2
GLUCOSE SERPL-MCNC: 101 MG/DL (ref 70–110)
HCT VFR BLD AUTO: 30.9 % (ref 37–48.5)
HGB BLD-MCNC: 9.7 G/DL (ref 12–16)
IMM GRANULOCYTES # BLD AUTO: 0.03 K/UL (ref 0–0.04)
IMM GRANULOCYTES NFR BLD AUTO: 0.6 % (ref 0–0.5)
INR PPP: 1 (ref 0.8–1.2)
LYMPHOCYTES # BLD AUTO: 1.7 K/UL (ref 1–4.8)
LYMPHOCYTES NFR BLD: 33.9 % (ref 18–48)
MCH RBC QN AUTO: 28.9 PG (ref 27–31)
MCHC RBC AUTO-ENTMCNC: 31.4 G/DL (ref 32–36)
MCV RBC AUTO: 92 FL (ref 82–98)
MONOCYTES # BLD AUTO: 0.4 K/UL (ref 0.3–1)
MONOCYTES NFR BLD: 7.7 % (ref 4–15)
NEUTROPHILS # BLD AUTO: 2.6 K/UL (ref 1.8–7.7)
NEUTROPHILS NFR BLD: 53.2 % (ref 38–73)
NRBC BLD-RTO: 0 /100 WBC
PLATELET # BLD AUTO: 287 K/UL (ref 150–450)
PMV BLD AUTO: 9.6 FL (ref 9.2–12.9)
POCT GLUCOSE: 109 MG/DL (ref 70–110)
POCT GLUCOSE: 112 MG/DL (ref 70–110)
POCT GLUCOSE: 40 MG/DL (ref 70–110)
POCT GLUCOSE: 42 MG/DL (ref 70–110)
POCT GLUCOSE: 57 MG/DL (ref 70–110)
POCT GLUCOSE: 72 MG/DL (ref 70–110)
POTASSIUM SERPL-SCNC: 3.3 MMOL/L (ref 3.5–5.1)
PROT SERPL-MCNC: 7.3 G/DL (ref 6–8.4)
PROTHROMBIN TIME: 11.2 SEC (ref 9–12.5)
RBC # BLD AUTO: 3.36 M/UL (ref 4–5.4)
SODIUM SERPL-SCNC: 140 MMOL/L (ref 136–145)
SPECIMEN OUTDATE: NORMAL
WBC # BLD AUTO: 4.95 K/UL (ref 3.9–12.7)

## 2024-05-23 PROCEDURE — 99284 EMERGENCY DEPT VISIT MOD MDM: CPT | Mod: ,,, | Performed by: STUDENT IN AN ORGANIZED HEALTH CARE EDUCATION/TRAINING PROGRAM

## 2024-05-23 PROCEDURE — 93010 ELECTROCARDIOGRAM REPORT: CPT | Mod: ,,, | Performed by: INTERNAL MEDICINE

## 2024-05-23 PROCEDURE — A9585 GADOBUTROL INJECTION: HCPCS | Performed by: HOSPITALIST

## 2024-05-23 PROCEDURE — 72157 MRI CHEST SPINE W/O & W/DYE: CPT | Mod: 26,,, | Performed by: STUDENT IN AN ORGANIZED HEALTH CARE EDUCATION/TRAINING PROGRAM

## 2024-05-23 PROCEDURE — 86901 BLOOD TYPING SEROLOGIC RH(D): CPT | Performed by: ANESTHESIOLOGY

## 2024-05-23 PROCEDURE — 25000003 PHARM REV CODE 250: Performed by: STUDENT IN AN ORGANIZED HEALTH CARE EDUCATION/TRAINING PROGRAM

## 2024-05-23 PROCEDURE — 86140 C-REACTIVE PROTEIN: CPT | Performed by: EMERGENCY MEDICINE

## 2024-05-23 PROCEDURE — 85652 RBC SED RATE AUTOMATED: CPT | Performed by: EMERGENCY MEDICINE

## 2024-05-23 PROCEDURE — 85025 COMPLETE CBC W/AUTO DIFF WBC: CPT | Performed by: EMERGENCY MEDICINE

## 2024-05-23 PROCEDURE — 99285 EMERGENCY DEPT VISIT HI MDM: CPT | Mod: 25

## 2024-05-23 PROCEDURE — 85730 THROMBOPLASTIN TIME PARTIAL: CPT | Performed by: STUDENT IN AN ORGANIZED HEALTH CARE EDUCATION/TRAINING PROGRAM

## 2024-05-23 PROCEDURE — 63600175 PHARM REV CODE 636 W HCPCS: Performed by: EMERGENCY MEDICINE

## 2024-05-23 PROCEDURE — 72157 MRI CHEST SPINE W/O & W/DYE: CPT | Mod: TC

## 2024-05-23 PROCEDURE — 80053 COMPREHEN METABOLIC PANEL: CPT | Performed by: EMERGENCY MEDICINE

## 2024-05-23 PROCEDURE — 85610 PROTHROMBIN TIME: CPT | Performed by: STUDENT IN AN ORGANIZED HEALTH CARE EDUCATION/TRAINING PROGRAM

## 2024-05-23 PROCEDURE — 25500020 PHARM REV CODE 255: Performed by: HOSPITALIST

## 2024-05-23 PROCEDURE — 80307 DRUG TEST PRSMV CHEM ANLYZR: CPT | Performed by: EMERGENCY MEDICINE

## 2024-05-23 PROCEDURE — 25000003 PHARM REV CODE 250: Performed by: EMERGENCY MEDICINE

## 2024-05-23 PROCEDURE — 93005 ELECTROCARDIOGRAM TRACING: CPT

## 2024-05-23 PROCEDURE — 82378 CARCINOEMBRYONIC ANTIGEN: CPT | Performed by: EMERGENCY MEDICINE

## 2024-05-23 PROCEDURE — 96365 THER/PROPH/DIAG IV INF INIT: CPT

## 2024-05-23 PROCEDURE — 11000001 HC ACUTE MED/SURG PRIVATE ROOM

## 2024-05-23 PROCEDURE — 87040 BLOOD CULTURE FOR BACTERIA: CPT | Performed by: EMERGENCY MEDICINE

## 2024-05-23 RX ORDER — IBUPROFEN 200 MG
16 TABLET ORAL
Status: DISCONTINUED | OUTPATIENT
Start: 2024-05-23 | End: 2024-05-26 | Stop reason: HOSPADM

## 2024-05-23 RX ORDER — GLUCAGON 1 MG
1 KIT INJECTION
Status: DISCONTINUED | OUTPATIENT
Start: 2024-05-23 | End: 2024-05-26 | Stop reason: HOSPADM

## 2024-05-23 RX ORDER — IBUPROFEN 200 MG
24 TABLET ORAL
Status: DISCONTINUED | OUTPATIENT
Start: 2024-05-23 | End: 2024-05-26 | Stop reason: HOSPADM

## 2024-05-23 RX ORDER — ACETAMINOPHEN 325 MG/1
650 TABLET ORAL EVERY 8 HOURS PRN
Status: DISCONTINUED | OUTPATIENT
Start: 2024-05-23 | End: 2024-05-26 | Stop reason: HOSPADM

## 2024-05-23 RX ORDER — TALC
6 POWDER (GRAM) TOPICAL NIGHTLY
Status: DISCONTINUED | OUTPATIENT
Start: 2024-05-23 | End: 2024-05-26 | Stop reason: HOSPADM

## 2024-05-23 RX ORDER — POLYETHYLENE GLYCOL 3350 17 G/17G
17 POWDER, FOR SOLUTION ORAL 2 TIMES DAILY PRN
Status: DISCONTINUED | OUTPATIENT
Start: 2024-05-23 | End: 2024-05-26 | Stop reason: HOSPADM

## 2024-05-23 RX ORDER — CARVEDILOL 6.25 MG/1
6.25 TABLET ORAL 2 TIMES DAILY WITH MEALS
Status: DISCONTINUED | OUTPATIENT
Start: 2024-05-23 | End: 2024-05-25

## 2024-05-23 RX ORDER — INSULIN ASPART 100 [IU]/ML
0-10 INJECTION, SOLUTION INTRAVENOUS; SUBCUTANEOUS
Status: DISCONTINUED | OUTPATIENT
Start: 2024-05-23 | End: 2024-05-26 | Stop reason: HOSPADM

## 2024-05-23 RX ORDER — GADOBUTROL 604.72 MG/ML
10 INJECTION INTRAVENOUS
Status: COMPLETED | OUTPATIENT
Start: 2024-05-23 | End: 2024-05-23

## 2024-05-23 RX ORDER — NALOXONE HCL 0.4 MG/ML
0.02 VIAL (ML) INJECTION
Status: DISCONTINUED | OUTPATIENT
Start: 2024-05-23 | End: 2024-05-26 | Stop reason: HOSPADM

## 2024-05-23 RX ADMIN — Medication 6 MG: at 09:05

## 2024-05-23 RX ADMIN — CEFTRIAXONE 2 G: 2 INJECTION, POWDER, FOR SOLUTION INTRAMUSCULAR; INTRAVENOUS at 01:05

## 2024-05-23 RX ADMIN — GADOBUTROL 10 ML: 604.72 INJECTION INTRAVENOUS at 10:05

## 2024-05-23 RX ADMIN — CARVEDILOL 6.25 MG: 6.25 TABLET, FILM COATED ORAL at 03:05

## 2024-05-23 NOTE — ED NOTES
Report called to Pepe ROACH. All questions answered. Pt awaiting tele box and transport to floor.

## 2024-05-23 NOTE — CONSULTS
[facility]  Neurosurgery    SUBJECTIVE:     History of Present Illness:  Shahida Ortega is a 53 y.o. female past medical history significant for diabetes mellitus type 2 who has been under active treatment for group B strep bacteremia with septic arthritis of the right shoulder, as well as thoracic prevertebral abscess.    The thoracic area was previously imaged 04/18/2024.  Patient has been getting daily Rocephin via a PICC line.  Patient did have to undergo chest tube placement on 04/23/2024 as well as debridement of the left shoulder on 04/24 and 04/25/2024.    Patient had been having back pain.  She states this actually has improved.  She denies any back pain currently.  She denies any weakness in the legs or balance difficulty.  Denies any numbness in the legs.    Patient underwent screening MRI of the thoracic spine to monitor treatment progress.  This revealed a left-sided epidural abscess at T7/8 and she was encouraged to present to the emergency department.    (Not in a hospital admission)      Review of patient's allergies indicates:   Allergen Reactions    Penicillins Hives     Tolerated cephalosporins 4/2024    Amoxicillin     Grass pollen-june grass standard Other (See Comments)       Past Medical History:   Diagnosis Date    Anemia 4/22/2024    DM2 (diabetes mellitus, type 2) 05/28/2023    Hypertension     Prolonged Q-T interval on ECG 05/31/2023    Qtc 525 5/31/23     Past Surgical History:   Procedure Laterality Date    ABDOMINAL SURGERY      ARTHROSCOPIC DEBRIDEMENT OF SHOULDER Right 4/24/2024    Procedure: DEBRIDEMENT, SHOULDER, ARTHROSCOPIC; Linvate, beach chair, 9L NS;  Surgeon: Madi Bailey MD;  Location: [facility] OR [address] [place] [address];  Service: Orthopedics;  Laterality: Right;    ARTHROSCOPIC DEBRIDEMENT OF SHOULDER Right 4/25/2024    Procedure: DEBRIDEMENT, SHOULDER, ARTHROSCOPIC;  Surgeon: Thaddeus Corral MD;  Location: [facility] OR [address] [place] [address];  Service: Orthopedics;  Laterality: Right;     COLONOSCOPY N/A 06/01/2023    Procedure: COLONOSCOPY;  Surgeon: Thaddeus Carlos MD;  Location: [facility] ENDO;  Service: Endoscopy;  Laterality: N/A;    DIAGNOSTIC LAPAROSCOPY N/A 05/26/2019    Procedure: LAPAROSCOPY, DIAGNOSTIC Drainage of abscess ;  Surgeon: Jose Luis Hanson MD;  Location: [facility] OR;  Service: General;  Laterality: N/A;  Drain Placement    DIAGNOSTIC LAPAROSCOPY N/A 12/27/2020    Procedure: Diagnostic laparoscopy, drainage of intra-abdominal abscess;  Surgeon: Bhupendra Banda MD;  Location: [facility] OR;  Service: General;  Laterality: N/A;    LAPAROSCOPIC LYSIS OF ADHESIONS  05/26/2019    Procedure: LYSIS, ADHESIONS, LAPAROSCOPIC;  Surgeon: Jose Luis Hanson MD;  Location: [facility] OR;  Service: General;;    VATS, WITH CHEST TUBE INSERTION FOR DRAINAGE OF PLEURAL EFFUSION Right 4/23/2024    Procedure: VATS, WITH CHEST TUBE INSERTION FOR DRAINAGE OF PLEURAL EFFUSION;  Surgeon: Rakesh Blake MD;  Location: [facility] OR [address] [place] [address];  Service: Cardiothoracic;  Laterality: Right;     Family History   Problem Relation Name Age of Onset    Diabetes Father       Social History     Tobacco Use    Smoking status: Never    Smokeless tobacco: Never   Substance Use Topics    Alcohol use: Not Currently    Drug use: Not Currently      Review of Systems:  As noted in HPI    OBJECTIVE:     Vital Signs (Most Recent):  Temp: 99.5 °F (37.5 °C) (05/23/24 1228)  Pulse: 80 (05/23/24 1228)  Resp: 20 (05/23/24 1228)  BP: (!) 182/77 (05/23/24 1228)  SpO2: 100 % (05/23/24 1228)    Physical Exam:  General: well developed, well nourished, no distress  Head: normocephalic, atraumatic  Neurologic: Alert and oriented. Thought content appropriate  Speech: Fluent  Cranial nerves: face symmetric   Eyes: pupils equal  Pulmonary: normal respirations  Sensory: intact to light touch throughout  Motor Strength: Moves all extremities spontaneously with good tone.  Full strength upper and lower extremities. No abnormal movements seen.      Delt Bi  Tri Wrist ext.  IO  RUE:   5    5   5        5          5    5  LUE:      5    5   5        5          5    5   HF KE EHL DF PF  RLE   5    5     5     5    5  LLE:  5    5     5     5    5    DTR's - 3+ patellar reflexes  Ballard: absent  Clonus: absent    Midline Bony Tenderness: none  Paraspinous muscle tenderness: none    Diagnostic Results:  I have personally reviewed imaging. My impression is as follows.    MRI of the thoracic spine dated 05/23/2024 shows improvement in the prevertebral abscess.  However, on the left side centered about the T7/8 disc space, there is a large epidural abscess which compresses and deforms the contour of the spinal cord.  It pushes the spinal cord from the left to the right.  There is no obvious signal change of the spinal cord.  This extends somewhat superiorly and inferiorly from the disc space and involves the left foramina as well.    ASSESSMENT/PLAN:     Shahida Ortega is a 53 y.o. female who presents with left-sided epidural abscess at T7/8, which is worsening on serial imaging despite antibiotic treatment.  Now involves spinal cord compression at T7/8, and patient has hyperreflexia on exam concerning for myelopathy.  -no emergent intervention.  Patient's motor strength is intact, however exam notable for extremely brisk patellar reflexes concerning for myelopathy  -discussed risks, benefits, indications, alternatives of decompression.  I believe this can be approached in a minimally invasive manner with a left-sided hemilaminectomy and medial facetectomy at T7/8.  This would allow us to decompress the spinal cord and evacuate the abscess.  If the abscess is liquid, some facet can be spared.  Do not think fusion is necessary on the upfront in this case, but I did  the patient she could potentially need fusion at this level in the future  -NPO at midnight. Pre op labs.  -tentatively placed patient on the schedule for tomorrow around noon  -imaging reviewed with the  patient in details of the surgery were discussed with her, all questions were answered    Please feel free to call with any further questions.    Time spent on this encounter: 30 minutes. This includes face-to-face time and non-face to face time preparing to see the patient (eg, review of tests), obtaining and/or reviewing separately obtained history, documenting clinical information in the electronic or other health record, independently interpreting results and communicating results to the patient/family/caregiver, or care coordinator.      Janusz CARO Mangham [facility]  Department of Neurosurgery  [phone]    Disclaimer: This note was dictated by speech recognition. Minor errors in transcription may be present.  Please call with any questions.

## 2024-05-23 NOTE — HPI
Shahida Ortega is a 53 y.o. female who has a past medical history of Anemia, Atrial fibrillation, DM2 (diabetes mellitus, type 2), History of abdominal abscess, History of pleural effusion, Hypertension, and Prolonged Q-T interval on ECG, presented to the ED with CC of Back pain, found to have spinal epidural abscesses, worsening on IV antibiotics at home. Patient has been on Ceftriaxone since 4/30/24, growing GBS previously. Other organisms in past include E.coli in urine and blood. Feeling fairly well, although worsening clinical picture on outpatient MRI, with known discitis/osteomyelitis at the T7-T8 level and interval development of phlegmonous material extending about the epidural space at T7-T8 with a small associated epidural abscess measuring approximately 0.8 x 0.5 cm, contributing to at least moderate spinal canal stenosis at this level (Additional encroachment of the left greater than right neural foramen) and marked improvement and paravertebral phlegmonous material about the paravertebral soft tissues mostly centered at the T7-T8 level. Denies CP or SOB. Admitted to  with NeuroSx consult, and plans for washout 5/24.    done

## 2024-05-23 NOTE — TELEPHONE ENCOUNTER
Informed by Radiology that MRI showed a new small spinal abscess with spinal canal stenosis at T7/T8.  Called and spoke to Ms. Ortega and informed her of the results.  She currently feels fine with no back pain, LE numbness or paresthesias.  I asked that she return to the [facility] for NSGY and ID evaluations.  She reports that she had a bad experience at [facility] and would prefer to go to .  I reviewed on call finder, and NSGY and ID are both available at  today and tomorrow.  I told her that she can go to  for further evaluation.  She endorsed understanding and said she will go to the  ED.  NSGY and ID on call at  added to secure chat from Radiology.    Jessica Grey MD, [facility]  Senior Physician  [facility]

## 2024-05-23 NOTE — ASSESSMENT & PLAN NOTE
NeuroSx consulted  ID consulted  [place] 5/24, tentatively noon  Cultures ordered, please send off

## 2024-05-23 NOTE — NURSING
[facility] [place]  Nurses Note -- 4 Eyes      5/23/2024       Skin assessed on: Q Shift      [x] No Pressure Injuries Present    []Prevention Measures Documented    [] Yes LDA  for Pressure Injury Previously documented     [] Yes New Pressure Injury Discovered   [] LDA for New Pressure Injury Added      Attending RN:  Pepe Santiago, RN     Second RN:  Jo

## 2024-05-23 NOTE — ASSESSMENT & PLAN NOTE
Patient's FSGs are controlled on current medication regimen.  Last A1c reviewed-   Lab Results   Component Value Date    HGBA1C 11.3 (H) 03/16/2022       Repeat A1c  Hold Oral hypoglycemics while patient is in the hospital.  DM diet and ssi

## 2024-05-23 NOTE — ED NOTES
Pt provided with meal try. Pt sitting up in bed eating meal provided. Pt with no complaints at this time

## 2024-05-23 NOTE — NURSING
Pt admitted to Tele. Call light within reach, siderails up 2, wheels locked. Plan of care discussed, all questions answered.

## 2024-05-23 NOTE — ED NOTES
Pt instructed to ER by MD for evaluation and treatment of abnormal MRI. Pt denies pain or weakness to extremities. Pt in no current distress

## 2024-05-23 NOTE — ASSESSMENT & PLAN NOTE
Only had 1 episode last year with infection that quickly resolved, no NSR  On aspirin outpt, resume after sx  Will repeat EKG  Continue BB

## 2024-05-23 NOTE — SUBJECTIVE & OBJECTIVE
Past Medical History:   Diagnosis Date    Anemia 04/22/2024    Atrial fibrillation     DM2 (diabetes mellitus, type 2) 05/28/2023    History of abdominal abscess     History of pleural effusion     Hypertension     Prolonged Q-T interval on ECG 05/31/2023    Qtc 525 5/31/23       Past Surgical History:   Procedure Laterality Date    ABDOMINAL SURGERY      ARTHROSCOPIC DEBRIDEMENT OF SHOULDER Right 4/24/2024    Procedure: DEBRIDEMENT, SHOULDER, ARTHROSCOPIC; Linvatec, beach chair, 9L NS;  Surgeon: Madi Bailey MD;  [facility];  Service: Orthopedics;  Laterality: Right;    ARTHROSCOPIC DEBRIDEMENT OF SHOULDER Right 4/25/2024    Procedure: DEBRIDEMENT, SHOULDER, ARTHROSCOPIC;  Surgeon: Thaddeus Corral MD;  [facility];  Service: Orthopedics;  Laterality: Right;    COLONOSCOPY N/A 06/01/2023    Procedure: COLONOSCOPY;  Surgeon: Thaddeus Carlos MD;  Location: [facility] ENDO;  Service: Endoscopy;  Laterality: N/A;    DIAGNOSTIC LAPAROSCOPY N/A 05/26/2019    Procedure: LAPAROSCOPY, DIAGNOSTIC Drainage of abscess ;  Surgeon: Jose Luis Hanson MD;  Location: [facility] OR;  Service: General;  Laterality: N/A;  Drain Placement    DIAGNOSTIC LAPAROSCOPY N/A 12/27/2020    Procedure: Diagnostic laparoscopy, drainage of intra-abdominal abscess;  Surgeon: Bhupendra Banda MD;  Location: [facility] OR;  Service: General;  Laterality: N/A;    LAPAROSCOPIC LYSIS OF ADHESIONS  05/26/2019    Procedure: LYSIS, ADHESIONS, LAPAROSCOPIC;  Surgeon: Jose Luis Hanson MD;  Location: [facility] OR;  Service: General;;    VATS, WITH CHEST TUBE INSERTION FOR DRAINAGE OF PLEURAL EFFUSION Right 4/23/2024    Procedure: VATS, WITH CHEST TUBE INSERTION FOR DRAINAGE OF PLEURAL EFFUSION;  Surgeon: Rakesh Blake MD;  Location: [facility] OR [address] [place] [address];  Service: Cardiothoracic;  Laterality: Right;       Review of patient's allergies indicates:   Allergen Reactions    Penicillins Hives     Tolerated cephalosporins 4/2024     "Amoxicillin     Grass pollen-june grass standard Other (See Comments)       Current Facility-Administered Medications on File Prior to Encounter   Medication    [COMPLETED] gadobutroL (GADAVIST) injection 10 mL     Current Outpatient Medications on File Prior to Encounter   Medication Sig    albuterol (PROVENTIL/VENTOLIN HFA) 90 mcg/actuation inhaler Inhale 2 puffs into the lungs every 6 (six) hours as needed.    aspirin (ECOTRIN) 81 MG EC tablet Take 1 tablet (81 mg total) by mouth once daily.    azelastine (ASTELIN) 137 mcg (0.1 %) nasal spray 1 spray by Nasal route 2 (two) times daily.    blood sugar diagnostic Strp Use to test blood glucose 2 (two) times daily with meals.    blood-glucose meter Misc Use to test blood glucose 2 (two) times daily with meals.    carvediloL (COREG) 3.125 MG tablet Take 2 tablets (6.25 mg total) by mouth 2 (two) times daily.    cetirizine (ZYRTEC) 10 MG tablet Take 1 tablet (10 mg total) by mouth once daily.    cyclobenzaprine (FLEXERIL) 10 MG tablet Take 10 mg by mouth 3 (three) times daily as needed.    dextrose 5 % in water (D5W) PgBk 100 mL with cefTRIAXone 2 gram SolR 2 g Inject 2 g into the vein once daily.    FREESTYLE HARSHA 14 DAY SENSOR Kit Change every 14 days for blood glucose monitoring.    glimepiride (AMARYL) 4 MG tablet Take 1 tablet (4 mg total) by mouth before breakfast.    hydroCHLOROthiazide (HYDRODIURIL) 25 MG tablet Take 1 tablet (25 mg total) by mouth once daily.    ibuprofen (ADVIL,MOTRIN) 800 MG tablet Take 800 mg by mouth every 8 (eight) hours as needed.    insulin syringe-needle U-100 0.3 mL 30 gauge x 5/16" Syrg 1 each by Misc.(Non-Drug; Combo Route) route 2 (two) times daily with meals.    lancets Misc Use to check blood glucose 2 (two) times daily with meals.    lancing device Misc 1 Device by Misc.(Non-Drug; Combo Route) route 2 (two) times daily with meals.    losartan (COZAAR) 100 MG tablet Take 1 tablet (100 mg total) by mouth once daily.    metFORMIN " "(GLUCOPHAGE) 500 MG tablet Take 2 tablets (1,000 mg total) by mouth 2 (two) times daily with meals.    ondansetron (ZOFRAN) 4 MG tablet Take 1 tablet (4 mg total) by mouth every 8 (eight) hours as needed.    ondansetron (ZOFRAN-ODT) 4 MG TbDL Take 4 mg by mouth.    oxyCODONE (ROXICODONE) 5 MG immediate release tablet Take 1 tablet (5 mg total) by mouth every 4 (four) hours as needed.    pen needle, diabetic 31 gauge x 5/16" Ndle Use to inject insulin 4 (four) times daily.    promethazine (PHENERGAN) 25 MG tablet Take 25 mg by mouth every 8 (eight) hours as needed.     Family History       Problem Relation (Age of Onset)    Diabetes Father          Tobacco Use    Smoking status: Never    Smokeless tobacco: Never   Substance and Sexual Activity    Alcohol use: Yes     Comment: occasionally    Drug use: Never    Sexual activity: Not on file     Review of Systems   Constitutional:  Negative for activity change and appetite change.   HENT:  Negative for sore throat and trouble swallowing.    Eyes:  Negative for visual disturbance.   Respiratory:  Negative for chest tightness and shortness of breath.    Cardiovascular:  Negative for chest pain, palpitations and leg swelling.   Gastrointestinal:  Negative for abdominal distention, abdominal pain, blood in stool, constipation, diarrhea, nausea and vomiting.   Endocrine: Negative for polyuria.   Genitourinary:  Negative for difficulty urinating, dysuria and hematuria.   Musculoskeletal:  Positive for back pain. Negative for neck pain and neck stiffness.   Skin:  Negative for pallor and rash.   Allergic/Immunologic: Negative for immunocompromised state.   Neurological:  Negative for dizziness, seizures, syncope and facial asymmetry.   Psychiatric/Behavioral:  Negative for agitation, behavioral problems and confusion.      Objective:     Vital Signs (Most Recent):  Temp: 99.5 °F (37.5 °C) (05/23/24 1228)  Pulse: 80 (05/23/24 1228)  Resp: 20 (05/23/24 1228)  BP: (!) 182/77 " (05/23/24 1228)  SpO2: 100 % (05/23/24 1228) Vital Signs (24h Range):  Temp:  [99.5 °F (37.5 °C)] 99.5 °F (37.5 °C)  Pulse:  [80] 80  Resp:  [20] 20  SpO2:  [100 %] 100 %  BP: (182)/(77) 182/77     Weight: 90.3 kg (199 lb)  Body mass index is 31.17 kg/m².     Physical Exam  Vitals and nursing note reviewed.   Constitutional:       General: She is not in acute distress.     Appearance: She is well-developed. She is ill-appearing. She is not diaphoretic.   HENT:      Head: Normocephalic and atraumatic.   Eyes:      General: No scleral icterus.     Pupils: Pupils are equal, round, and reactive to light.   Neck:      Thyroid: No thyromegaly.   Cardiovascular:      Rate and Rhythm: Normal rate and regular rhythm.      Heart sounds: No murmur heard.  Pulmonary:      Effort: Pulmonary effort is normal.      Breath sounds: Normal breath sounds. No stridor. No wheezing or rales.   Abdominal:      General: There is no distension.      Palpations: Abdomen is soft. There is no mass.      Tenderness: There is no guarding.   Musculoskeletal:         General: No swelling or deformity. Normal range of motion.      Cervical back: Normal range of motion and neck supple.   Skin:     General: Skin is warm and dry.      Capillary Refill: Capillary refill takes less than 2 seconds.      Coloration: Skin is not jaundiced.      Findings: No bruising.   Neurological:      Mental Status: She is alert and oriented to person, place, and time. Mental status is at baseline.      Cranial Nerves: No cranial nerve deficit.   Psychiatric:         Mood and Affect: Mood normal.         Behavior: Behavior normal.              CRANIAL NERVES     CN III, IV, VI   Pupils are equal, round, and reactive to light.           Recent Results (from the past 24 hour(s))   CBC auto differential    Collection Time: 05/23/24  1:41 PM   Result Value Ref Range    WBC 4.95 3.90 - 12.70 K/uL    RBC 3.36 (L) 4.00 - 5.40 M/uL    Hemoglobin 9.7 (L) 12.0 - 16.0 g/dL     Hematocrit 30.9 (L) 37.0 - 48.5 %    MCV 92 82 - 98 fL    MCH 28.9 27.0 - 31.0 pg    MCHC 31.4 (L) 32.0 - 36.0 g/dL    RDW 13.9 11.5 - 14.5 %    Platelets 287 150 - 450 K/uL    MPV 9.6 9.2 - 12.9 fL    Immature Granulocytes 0.6 (H) 0.0 - 0.5 %    Gran # (ANC) 2.6 1.8 - 7.7 K/uL    Immature Grans (Abs) 0.03 0.00 - 0.04 K/uL    Lymph # 1.7 1.0 - 4.8 K/uL    Mono # 0.4 0.3 - 1.0 K/uL    Eos # 0.2 0.0 - 0.5 K/uL    Baso # 0.01 0.00 - 0.20 K/uL    nRBC 0 0 /100 WBC    Gran % 53.2 38.0 - 73.0 %    Lymph % 33.9 18.0 - 48.0 %    Mono % 7.7 4.0 - 15.0 %    Eosinophil % 4.4 0.0 - 8.0 %    Basophil % 0.2 0.0 - 1.9 %    Differential Method Automated    Comprehensive metabolic panel    Collection Time: 05/23/24  1:41 PM   Result Value Ref Range    Sodium 140 136 - 145 mmol/L    Potassium 3.3 (L) 3.5 - 5.1 mmol/L    Chloride 104 95 - 110 mmol/L    CO2 25 23 - 29 mmol/L    Glucose 101 70 - 110 mg/dL    BUN 12 6 - 20 mg/dL    Creatinine 0.7 0.5 - 1.4 mg/dL    Calcium 9.6 8.7 - 10.5 mg/dL    Total Protein 7.3 6.0 - 8.4 g/dL    Albumin 3.2 (L) 3.5 - 5.2 g/dL    Total Bilirubin 0.2 0.1 - 1.0 mg/dL    Alkaline Phosphatase 70 55 - 135 U/L    AST 12 10 - 40 U/L    ALT 8 (L) 10 - 44 U/L    eGFR >60 >60 mL/min/1.73 m^2    Anion Gap 11 8 - 16 mmol/L   C-reactive protein    Collection Time: 05/23/24  1:41 PM   Result Value Ref Range    CRP 5.3 0.0 - 8.2 mg/L   Sedimentation rate    Collection Time: 05/23/24  1:42 PM   Result Value Ref Range    Sed Rate 71 (H) 0 - 36 mm/Hr       Microbiology Results (last 7 days)       Procedure Component Value Units Date/Time    Blood Culture #2 **CANNOT BE ORDERED STAT** [[id number]] Collected: 05/23/24 1341    Order Status: Sent Specimen: Blood from Peripheral, Forearm, Left Updated: 05/23/24 1353    Blood Culture #1 **CANNOT BE ORDERED STAT** [[id number]] Collected: 05/23/24 1341    Order Status: Sent Specimen: Blood from Peripheral, Antecubital, Left Updated: 05/23/24 1353             Imaging Results     None

## 2024-05-23 NOTE — ADMISSIONCARE
AdmissionCare    Guideline: Neurology, Inpatient    Based on the indications selected for the patient, the bed status of Inpatient was determined to be MET    The following indications were selected as present at the time of evaluation of the patient:      - Neurologic condition, symptom, or finding for which observation care has failed or is not considered appropriate. See General Criteria: Observation Care, General Admission Criteria, or Pediatric General Admission Criteria guideline as appropriate.    AdmissionCare documentation entered by: Holli Chiu    [facility], 27th edition, Copyright © 2023 [facility], [facility] All Rights Reserved.  [id number]:00

## 2024-05-23 NOTE — ED NOTES
Pt CBG checked and noted to be 40. Dr Wang at bedside for evaluation and assessment. Pt sitting up in bed with no complaint talking with MD. Per Dr Wang ok to give pt juice and order up a meal tray. Pt sitting up in bed drinking juice and eating pudding provided.

## 2024-05-23 NOTE — H&P
[facility] Medicine  History & Physical    Patient Name: Shahida Ortega  MRN: [id number]  Patient Class: IP- Inpatient  Admission Date: 5/23/2024  Attending Physician: Tyrese Wang MD   Primary Care Provider: Janusz Staton MD         Patient information was obtained from patient, past medical records, and ER records.     Subjective:     Principal Problem:Vertebral abscess    Chief Complaint:   Chief Complaint   Patient presents with    abnormal MRI     Pt presents to ER after being called by hospital stating she has an abnormal lab. Pt denies CP, SOB or any other issues at this time.         HPI:   Shahida Ortega is a 53 y.o. female who has a past medical history of Anemia, Atrial fibrillation, DM2 (diabetes mellitus, type 2), History of abdominal abscess, History of pleural effusion, Hypertension, and Prolonged Q-T interval on ECG, presented to the ED with CC of Back pain, found to have spinal epidural abscesses, worsening on IV antibiotics at home. Patient has been on Ceftriaxone since 4/30/24, growing GBS previously. Other organisms in past include E.coli in urine and blood. Feeling fairly well, although worsening clinical picture on outpatient MRI, with known discitis/osteomyelitis at the T7-T8 level and interval development of phlegmonous material extending about the epidural space at T7-T8 with a small associated epidural abscess measuring approximately 0.8 x 0.5 cm, contributing to at least moderate spinal canal stenosis at this level (Additional encroachment of the left greater than right neural foramen) and marked improvement and paravertebral phlegmonous material about the paravertebral soft tissues mostly centered at the T7-T8 level. Denies CP or SOB. Admitted to  with NeuroSx consult, and plans for washout 5/24.     Past Medical History:   Diagnosis Date    Anemia 04/22/2024    Atrial fibrillation     DM2 (diabetes mellitus, type 2) 05/28/2023    History of abdominal abscess      History of pleural effusion     Hypertension     Prolonged Q-T interval on ECG 05/31/2023    Qtc 525 5/31/23       Past Surgical History:   Procedure Laterality Date    ABDOMINAL SURGERY      ARTHROSCOPIC DEBRIDEMENT OF SHOULDER Right 4/24/2024    Procedure: DEBRIDEMENT, SHOULDER, ARTHROSCOPIC; [place], beach chair, 9L NS;  Surgeon: Madi Bailey MD;  Location: [address];  Service: Orthopedics;  Laterality: Right;    ARTHROSCOPIC DEBRIDEMENT OF SHOULDER Right 4/25/2024    Procedure: DEBRIDEMENT, SHOULDER, ARTHROSCOPIC;  Surgeon: Thaddeus Corral MD;  Location: [facility] OR [address] [place] [address];  Service: Orthopedics;  Laterality: Right;    COLONOSCOPY N/A 06/01/2023    Procedure: COLONOSCOPY;  Surgeon: Thaddeus Carlos MD;  Location: [facility] ENDO;  Service: Endoscopy;  Laterality: N/A;    DIAGNOSTIC LAPAROSCOPY N/A 05/26/2019    Procedure: LAPAROSCOPY, DIAGNOSTIC Drainage of abscess ;  Surgeon: Jose Luis Hanson MD;  Location: [facility] OR;  Service: General;  Laterality: N/A;  Drain Placement    DIAGNOSTIC LAPAROSCOPY N/A 12/27/2020    Procedure: Diagnostic laparoscopy, drainage of intra-abdominal abscess;  Surgeon: Bhupendra Banda MD;  Location: [facility] OR;  Service: General;  Laterality: N/A;    LAPAROSCOPIC LYSIS OF ADHESIONS  05/26/2019    Procedure: LYSIS, ADHESIONS, LAPAROSCOPIC;  Surgeon: Jose Luis Hanson MD;  Location: [facility] OR;  Service: General;;    VATS, WITH CHEST TUBE INSERTION FOR DRAINAGE OF PLEURAL EFFUSION Right 4/23/2024    Procedure: VATS, WITH CHEST TUBE INSERTION FOR DRAINAGE OF PLEURAL EFFUSION;  Surgeon: Rakesh Blake MD;  Location: [address];  Service: Cardiothoracic;  Laterality: Right;       Review of patient's allergies indicates:   Allergen Reactions    Penicillins Hives     Tolerated cephalosporins 4/2024    Amoxicillin     Grass pollen-june grass standard Other (See Comments)       Current Facility-Administered Medications on File Prior to Encounter   Medication    [COMPLETED]  "gadobutroL (GADAVIST) injection 10 mL     Current Outpatient Medications on File Prior to Encounter   Medication Sig    albuterol (PROVENTIL/VENTOLIN HFA) 90 mcg/actuation inhaler Inhale 2 puffs into the lungs every 6 (six) hours as needed.    aspirin (ECOTRIN) 81 MG EC tablet Take 1 tablet (81 mg total) by mouth once daily.    azelastine (ASTELIN) 137 mcg (0.1 %) nasal spray 1 spray by Nasal route 2 (two) times daily.    blood sugar diagnostic Strp Use to test blood glucose 2 (two) times daily with meals.    blood-glucose meter Misc Use to test blood glucose 2 (two) times daily with meals.    carvediloL (COREG) 3.125 MG tablet Take 2 tablets (6.25 mg total) by mouth 2 (two) times daily.    cetirizine (ZYRTEC) 10 MG tablet Take 1 tablet (10 mg total) by mouth once daily.    cyclobenzaprine (FLEXERIL) 10 MG tablet Take 10 mg by mouth 3 (three) times daily as needed.    dextrose 5 % in water (D5W) PgBk 100 mL with cefTRIAXone 2 gram SolR 2 g Inject 2 g into the vein once daily.    FREESTYLE HARSHA 14 DAY SENSOR Kit Change every 14 days for blood glucose monitoring.    glimepiride (AMARYL) 4 MG tablet Take 1 tablet (4 mg total) by mouth before breakfast.    hydroCHLOROthiazide (HYDRODIURIL) 25 MG tablet Take 1 tablet (25 mg total) by mouth once daily.    ibuprofen (ADVIL,MOTRIN) 800 MG tablet Take 800 mg by mouth every 8 (eight) hours as needed.    insulin syringe-needle U-100 0.3 mL 30 gauge x 5/16" Syrg 1 each by Misc.(Non-Drug; Combo Route) route 2 (two) times daily with meals.    lancets Misc Use to check blood glucose 2 (two) times daily with meals.    lancing device Misc 1 Device by Misc.(Non-Drug; Combo Route) route 2 (two) times daily with meals.    losartan (COZAAR) 100 MG tablet Take 1 tablet (100 mg total) by mouth once daily.    metFORMIN (GLUCOPHAGE) 500 MG tablet Take 2 tablets (1,000 mg total) by mouth 2 (two) times daily with meals.    ondansetron (ZOFRAN) 4 MG tablet Take 1 tablet (4 mg total) by mouth " "every 8 (eight) hours as needed.    ondansetron (ZOFRAN-ODT) 4 MG TbDL Take 4 mg by mouth.    oxyCODONE (ROXICODONE) 5 MG immediate release tablet Take 1 tablet (5 mg total) by mouth every 4 (four) hours as needed.    pen needle, diabetic 31 gauge x 5/16" Ndle Use to inject insulin 4 (four) times daily.    promethazine (PHENERGAN) 25 MG tablet Take 25 mg by mouth every 8 (eight) hours as needed.     Family History       Problem Relation (Age of Onset)    Diabetes Father          Tobacco Use    Smoking status: Never    Smokeless tobacco: Never   Substance and Sexual Activity    Alcohol use: Yes     Comment: occasionally    Drug use: Never    Sexual activity: Not on file     Review of Systems   Constitutional:  Negative for activity change and appetite change.   HENT:  Negative for sore throat and trouble swallowing.    Eyes:  Negative for visual disturbance.   Respiratory:  Negative for chest tightness and shortness of breath.    Cardiovascular:  Negative for chest pain, palpitations and leg swelling.   Gastrointestinal:  Negative for abdominal distention, abdominal pain, blood in stool, constipation, diarrhea, nausea and vomiting.   Endocrine: Negative for polyuria.   Genitourinary:  Negative for difficulty urinating, dysuria and hematuria.   Musculoskeletal:  Positive for back pain. Negative for neck pain and neck stiffness.   Skin:  Negative for pallor and rash.   Allergic/Immunologic: Negative for immunocompromised state.   Neurological:  Negative for dizziness, seizures, syncope and facial asymmetry.   Psychiatric/Behavioral:  Negative for agitation, behavioral problems and confusion.      Objective:     Vital Signs (Most Recent):  Temp: 99.5 °F (37.5 °C) (05/23/24 1228)  Pulse: 80 (05/23/24 1228)  Resp: 20 (05/23/24 1228)  BP: (!) 182/77 (05/23/24 1228)  SpO2: 100 % (05/23/24 1228) Vital Signs (24h Range):  Temp:  [99.5 °F (37.5 °C)] 99.5 °F (37.5 °C)  Pulse:  [80] 80  Resp:  [20] 20  SpO2:  [100 %] 100 %  BP: " (182)/(77) 182/77     Weight: 90.3 kg (199 lb)  Body mass index is 31.17 kg/m².     Physical Exam  Vitals and nursing note reviewed.   Constitutional:       General: She is not in acute distress.     Appearance: She is well-developed. She is ill-appearing. She is not diaphoretic.   HENT:      Head: Normocephalic and atraumatic.   Eyes:      General: No scleral icterus.     Pupils: Pupils are equal, round, and reactive to light.   Neck:      Thyroid: No thyromegaly.   Cardiovascular:      Rate and Rhythm: Normal rate and regular rhythm.      Heart sounds: No murmur heard.  Pulmonary:      Effort: Pulmonary effort is normal.      Breath sounds: Normal breath sounds. No stridor. No wheezing or rales.   Abdominal:      General: There is no distension.      Palpations: Abdomen is soft. There is no mass.      Tenderness: There is no guarding.   Musculoskeletal:         General: No swelling or deformity. Normal range of motion.      Cervical back: Normal range of motion and neck supple.   Skin:     General: Skin is warm and dry.      Capillary Refill: Capillary refill takes less than 2 seconds.      Coloration: Skin is not jaundiced.      Findings: No bruising.   Neurological:      Mental Status: She is alert and oriented to person, place, and time. Mental status is at baseline.      Cranial Nerves: No cranial nerve deficit.   Psychiatric:         Mood and Affect: Mood normal.         Behavior: Behavior normal.              CRANIAL NERVES     CN III, IV, VI   Pupils are equal, round, and reactive to light.           Recent Results (from the past 24 hour(s))   CBC auto differential    Collection Time: 05/23/24  1:41 PM   Result Value Ref Range    WBC 4.95 3.90 - 12.70 K/uL    RBC 3.36 (L) 4.00 - 5.40 M/uL    Hemoglobin 9.7 (L) 12.0 - 16.0 g/dL    Hematocrit 30.9 (L) 37.0 - 48.5 %    MCV 92 82 - 98 fL    MCH 28.9 27.0 - 31.0 pg    MCHC 31.4 (L) 32.0 - 36.0 g/dL    RDW 13.9 11.5 - 14.5 %    Platelets 287 150 - 450 K/uL    MPV  9.6 9.2 - 12.9 fL    Immature Granulocytes 0.6 (H) 0.0 - 0.5 %    Gran # (ANC) 2.6 1.8 - 7.7 K/uL    Immature Grans (Abs) 0.03 0.00 - 0.04 K/uL    Lymph # 1.7 1.0 - 4.8 K/uL    Mono # 0.4 0.3 - 1.0 K/uL    Eos # 0.2 0.0 - 0.5 K/uL    Baso # 0.01 0.00 - 0.20 K/uL    nRBC 0 0 /100 WBC    Gran % 53.2 38.0 - 73.0 %    Lymph % 33.9 18.0 - 48.0 %    Mono % 7.7 4.0 - 15.0 %    Eosinophil % 4.4 0.0 - 8.0 %    Basophil % 0.2 0.0 - 1.9 %    Differential Method Automated    Comprehensive metabolic panel    Collection Time: 05/23/24  1:41 PM   Result Value Ref Range    Sodium 140 136 - 145 mmol/L    Potassium 3.3 (L) 3.5 - 5.1 mmol/L    Chloride 104 95 - 110 mmol/L    CO2 25 23 - 29 mmol/L    Glucose 101 70 - 110 mg/dL    BUN 12 6 - 20 mg/dL    Creatinine 0.7 0.5 - 1.4 mg/dL    Calcium 9.6 8.7 - 10.5 mg/dL    Total Protein 7.3 6.0 - 8.4 g/dL    Albumin 3.2 (L) 3.5 - 5.2 g/dL    Total Bilirubin 0.2 0.1 - 1.0 mg/dL    Alkaline Phosphatase 70 55 - 135 U/L    AST 12 10 - 40 U/L    ALT 8 (L) 10 - 44 U/L    eGFR >60 >60 mL/min/1.73 m^2    Anion Gap 11 8 - 16 mmol/L   C-reactive protein    Collection Time: 05/23/24  1:41 PM   Result Value Ref Range    CRP 5.3 0.0 - 8.2 mg/L   Sedimentation rate    Collection Time: 05/23/24  1:42 PM   Result Value Ref Range    Sed Rate 71 (H) 0 - 36 mm/Hr       Microbiology Results (last 7 days)       Procedure Component Value Units Date/Time    Blood Culture #2 **CANNOT BE ORDERED STAT** [[id number]] Collected: 05/23/24 1341    Order Status: Sent Specimen: Blood from Peripheral, Forearm, Left Updated: 05/23/24 1353    Blood Culture #1 **CANNOT BE ORDERED STAT** [[id number]] Collected: 05/23/24 1341    Order Status: Sent Specimen: Blood from Peripheral, Antecubital, Left Updated: 05/23/24 1353             Imaging Results    None           Assessment/Plan:     * Vertebral abscess  NeuroSx consulted  ID consulted  Washout 5/24, tentatively noon  Cultures ordered, please send off    A-fib  Only had 1  episode last year with infection that quickly resolved, no NSR  On aspirin outpt, resume after sx  Will repeat EKG  Continue BB      Osteomyelitis  Discitis/osteomyelitis at the T7-T8 level    Bacteremia due to group B Streptococcus  Positive cultures on 4/15, likely bacteria (GBS) that will be in abscesses       Prolonged Q-T interval on ECG  Repeat EKG, caution with prolongers      DM2 (diabetes mellitus, type 2)  Patient's FSGs are controlled on current medication regimen.  Last A1c reviewed-   Lab Results   Component Value Date    HGBA1C 11.3 (H) 03/16/2022       Repeat A1c  Hold Oral hypoglycemics while patient is in the hospital.  DM diet and ssi      VTE Risk Mitigation (From admission, onward)           Ordered     IP VTE HIGH RISK PATIENT  Once         05/23/24 1503     Place sequential compression device  Until discontinued         05/23/24 1503                               AdmissionCare    Guideline: Neurology, Inpatient    Based on the indications selected for the patient, the bed status of Inpatient was determined to be MET    The following indications were selected as present at the time of evaluation of the patient:      - Neurologic condition, symptom, or finding for which observation care has failed or is not considered appropriate. See General Criteria: Observation Care, General Admission Criteria, or Pediatric General Admission Criteria guideline as appropriate.    AdmissionCare documentation entered by: Holli Chiu    [facility] [employer], 27th edition, Copyright © 2023 [facility] [employer] [employer], [employer] All Rights Reserved.  [id number]:04:53-05:00    Tyrese Wang MD  Department of Hospital Medicine  [facility] - Emergency Dept

## 2024-05-23 NOTE — ED NOTES
Consent signed by neurosurgery and witness by myself.  scanned into media and consent placed back at bedside with pt.

## 2024-05-23 NOTE — ANESTHESIA PREPROCEDURE EVALUATION
05/23/2024  Shahida Ortega is a 53 y.o., female.      Pre-op Assessment    I have reviewed the Patient Summary Reports.     I have reviewed the Nursing Notes. I have reviewed the NPO Status.   I have reviewed the Medications.     Review of Systems  Anesthesia Hx:   History of prior surgery of interest to airway management or planning:             Social:  Non-Smoker       Hematology/Oncology:       -- Anemia:               Hematology Comments: Strep bacteremia                    Cardiovascular:     Hypertension       CHF        1 episode of Afib in the past    Echo:   Left Ventricle: The left ventricle is normal in size. There is moderate concentric hypertrophy. There is normal systolic function with a visually estimated ejection fraction of 60 - 65%. Grade I diastolic dysfunction.  ·  Right Ventricle: Normal right ventricular cavity size. Systolic function is normal.  ·  Tricuspid Valve: There is mild regurgitation.  ·  Aorta: Aortic root is mildly dilated measuring 3.59 cm.  ·  Pulmonary Artery: The estimated pulmonary artery systolic pressure is 39 mmHg.  ·  IVC/SVC: Normal venous pressure at 3 mmHg.  ·  Pericardium: There is a trivial posterior effusion.                           Pulmonary:         S/p VATS with drainage R pleural effusion 04/24               Hepatic/GI:  Hepatic/GI Normal                 Musculoskeletal:     Osteomyelitis thoracic spine, now with epidural abscess       Spine Disorders: thoracic            Endocrine:  Diabetes               Physical Exam  General: Well nourished, Cooperative, Alert and Oriented    Airway:  Mallampati: II   Mouth Opening: Normal  TM Distance: Normal  Tongue: Normal  Neck ROM: Normal ROM    Dental:  Intact      Wt Readings from Last 3 Encounters:   05/23/24 90.3 kg (199 lb)   05/21/24 90.3 kg (199 lb)   05/08/24 90.4 kg (199 lb 4.7 oz)     Temp Readings from  Last 3 Encounters:   05/23/24 36.7 °C (98 °F) (Oral)   05/21/24 36.8 °C (98.2 °F) (Oral)   04/26/24 36 °C (96.8 °F) (Oral)     BP Readings from Last 3 Encounters:   05/23/24 (!) 158/77   05/21/24 128/78   05/08/24 (!) 143/82     Pulse Readings from Last 3 Encounters:   05/23/24 78   05/21/24 66   05/08/24 70     Lab Results   Component Value Date    WBC 4.95 05/23/2024    HGB 9.7 (L) 05/23/2024    HCT 30.9 (L) 05/23/2024    MCV 92 05/23/2024     05/23/2024       CMP  Sodium   Date Value Ref Range Status   05/23/2024 140 136 - 145 mmol/L Final     Potassium   Date Value Ref Range Status   05/23/2024 3.3 (L) 3.5 - 5.1 mmol/L Final     Chloride   Date Value Ref Range Status   05/23/2024 104 95 - 110 mmol/L Final     CO2   Date Value Ref Range Status   05/23/2024 25 23 - 29 mmol/L Final     Glucose   Date Value Ref Range Status   05/23/2024 101 70 - 110 mg/dL Final     BUN   Date Value Ref Range Status   05/23/2024 12 6 - 20 mg/dL Final     Creatinine   Date Value Ref Range Status   05/23/2024 0.7 0.5 - 1.4 mg/dL Final     Calcium   Date Value Ref Range Status   05/23/2024 9.6 8.7 - 10.5 mg/dL Final     Total Protein   Date Value Ref Range Status   05/23/2024 7.3 6.0 - 8.4 g/dL Final     Albumin   Date Value Ref Range Status   05/23/2024 3.2 (L) 3.5 - 5.2 g/dL Final     Total Bilirubin   Date Value Ref Range Status   05/23/2024 0.2 0.1 - 1.0 mg/dL Final     Comment:     For infants and newborns, interpretation of results should be based  on gestational age, weight and in agreement with clinical  observations.    Premature Infant recommended reference ranges:  Up to 24 hours.............<8.0 mg/dL  Up to 48 hours............<12.0 mg/dL  3-5 days..................<15.0 mg/dL  6-29 days.................<15.0 mg/dL       Alkaline Phosphatase   Date Value Ref Range Status   05/23/2024 70 55 - 135 U/L Final     AST   Date Value Ref Range Status   05/23/2024 12 10 - 40 U/L Final     ALT   Date Value Ref Range Status    05/23/2024 8 (L) 10 - 44 U/L Final     Anion Gap   Date Value Ref Range Status   05/23/2024 11 8 - 16 mmol/L Final     eGFR   Date Value Ref Range Status   05/23/2024 >60 >60 mL/min/1.73 m^2 Final         Anesthesia Plan  Type of Anesthesia, risks & benefits discussed:    Anesthesia Type: Gen ETT  Intra-op Monitoring Plan: Standard ASA Monitors  Post Op Pain Control Plan: multimodal analgesia and IV/PO Opioids PRN  Induction:  IV  Airway Plan: Direct and Video, Post-Induction  Informed Consent: Informed consent signed with the Patient and all parties understand the risks and agree with anesthesia plan.  All questions answered.   ASA Score: 3  Day of Surgery Review of History & Physical: H&P Update referred to the surgeon/provider.    Ready For Surgery From Anesthesia Perspective.     .

## 2024-05-23 NOTE — ED PROVIDER NOTES
Encounter Date: 5/23/2024    SCRIBE #1 NOTE: I, Lionel Yas, am scribing for, and in the presence of,  Woody Brandon MD.       History     Chief Complaint   Patient presents with    abnormal MRI     Pt presents to ER after being called by hospital stating she has an abnormal lab. Pt denies CP, SOB or any other issues at this time.      53 y.o. female with PMHx of DMT2, HTN, presents to the ED for evaluation of an abnormal MRI. Patient reports that the MRI showed a spinal epidural abscess and is currently on an antibiotic via PICC line since 4/30. States that she usually takes it in the afternoon and hasn't taken it today yet. She otherwise endorses compliance. She reports that she is feeling better and is asymptomatic. Denies any other complaints.     The history is provided by the patient. No  was used.     Review of patient's allergies indicates:   Allergen Reactions    Penicillins Hives     Tolerated cephalosporins 4/2024    Amoxicillin     Grass pollen-june grass standard Other (See Comments)     Past Medical History:   Diagnosis Date    Anemia 04/22/2024    Atrial fibrillation     DM2 (diabetes mellitus, type 2) 05/28/2023    History of abdominal abscess     History of pleural effusion     Hypertension     Osteomyelitis 05/23/2024    Prolonged Q-T interval on ECG 05/31/2023    Qtc 525 5/31/23     Past Surgical History:   Procedure Laterality Date    ABDOMINAL SURGERY      ARTHROSCOPIC DEBRIDEMENT OF SHOULDER Right 4/24/2024    Procedure: DEBRIDEMENT, SHOULDER, ARTHROSCOPIC; Linvate, beach chair, 9L NS;  Surgeon: Madi Bailey MD;  Location: [facility] OR [address] [place] [address];  Service: Orthopedics;  Laterality: Right;    ARTHROSCOPIC DEBRIDEMENT OF SHOULDER Right 4/25/2024    Procedure: DEBRIDEMENT, SHOULDER, ARTHROSCOPIC;  Surgeon: Thaddeus Corral MD;  Location: [facility] OR [address] [place] [address];  Service: Orthopedics;  Laterality: Right;    COLONOSCOPY N/A 06/01/2023    Procedure: COLONOSCOPY;   Surgeon: Thaddeus Carlos MD;  Location: [facility] ENDO;  Service: Endoscopy;  Laterality: N/A;    DIAGNOSTIC LAPAROSCOPY N/A 05/26/2019    Procedure: LAPAROSCOPY, DIAGNOSTIC Drainage of abscess ;  Surgeon: Jose Luis Hanson MD;  Location: [facility] OR;  Service: General;  Laterality: N/A;  Drain Placement    DIAGNOSTIC LAPAROSCOPY N/A 12/27/2020    Procedure: Diagnostic laparoscopy, drainage of intra-abdominal abscess;  Surgeon: Bhupendra Banda MD;  Location: [facility] OR;  Service: General;  Laterality: N/A;    LAPAROSCOPIC LYSIS OF ADHESIONS  05/26/2019    Procedure: LYSIS, ADHESIONS, LAPAROSCOPIC;  Surgeon: Jose Luis Hanson MD;  Location: [facility] OR;  Service: General;;    VATS, WITH CHEST TUBE INSERTION FOR DRAINAGE OF PLEURAL EFFUSION Right 4/23/2024    Procedure: VATS, WITH CHEST TUBE INSERTION FOR DRAINAGE OF PLEURAL EFFUSION;  Surgeon: Rakesh Blake MD;  Location: [address];  Service: Cardiothoracic;  Laterality: Right;     Family History   Problem Relation Name Age of Onset    Diabetes Father       Social History     Tobacco Use    Smoking status: Never    Smokeless tobacco: Never   Substance Use Topics    Alcohol use: Yes     Comment: occasionally    Drug use: Never     Review of Systems   Constitutional: Negative.    HENT: Negative.     Eyes: Negative.    Respiratory: Negative.     Cardiovascular: Negative.    Gastrointestinal: Negative.    Genitourinary: Negative.    Musculoskeletal: Negative.    Skin: Negative.    Neurological: Negative.        Physical Exam     Initial Vitals [05/23/24 1228]   BP Pulse Resp Temp SpO2   (!) 182/77 80 20 99.5 °F (37.5 °C) 100 %      MAP       --         Physical Exam    Nursing note and vitals reviewed.  Constitutional: She appears well-developed and well-nourished. She is not diaphoretic. No distress.   HENT:   Head: Normocephalic and atraumatic.   Nose: Nose normal.   Eyes: EOM are normal. Pupils are equal, round, and reactive to light.   Neck: Neck supple. No JVD  present.   Normal range of motion.  Cardiovascular:  Normal rate, regular rhythm, normal heart sounds and intact distal pulses.           Pulmonary/Chest: Breath sounds normal. No stridor. No respiratory distress. She has no wheezes. She has no rales.   Abdominal: Abdomen is soft. Bowel sounds are normal. She exhibits no distension. There is no abdominal tenderness.   Musculoskeletal:         General: No tenderness or edema. Normal range of motion.      Cervical back: Normal range of motion and neck supple.      Comments: PICC line to right upper extremity clean, dry, intact.     Neurological: She is alert and oriented to person, place, and time. She has normal strength.   Ambulatory steady gait.   Skin: Skin is warm and dry. Capillary refill takes less than 2 seconds. No rash noted. No erythema.         ED Course   Procedures  Labs Reviewed   CBC W/ AUTO DIFFERENTIAL - Abnormal; Notable for the following components:       Result Value    RBC 3.36 (*)     Hemoglobin 9.7 (*)     Hematocrit 30.9 (*)     MCHC 31.4 (*)     Immature Granulocytes 0.6 (*)     All other components within normal limits   COMPREHENSIVE METABOLIC PANEL - Abnormal; Notable for the following components:    Potassium 3.3 (*)     Albumin 3.2 (*)     ALT 8 (*)     All other components within normal limits   SEDIMENTATION RATE - Abnormal; Notable for the following components:    Sed Rate 71 (*)     All other components within normal limits   POCT GLUCOSE - Abnormal; Notable for the following components:    POCT Glucose 42 (*)     All other components within normal limits   POCT GLUCOSE - Abnormal; Notable for the following components:    POCT Glucose 40 (*)     All other components within normal limits   C-REACTIVE PROTEIN   PROTIME-INR    Narrative:     Collection has been rescheduled by DS11 at 05/23/2024 14:54 Reason:   Nurse Draw   APTT    Narrative:     Collection has been rescheduled by DS11 at 05/23/2024 14:54 Reason:   Nurse Draw   DRUGS OF  ABUSE SCREEN, BLOOD   HEMOGLOBIN A1C   CEA   CANCER ANTIGEN 19-9   CANCER ANTIGEN 125   CEA   DRUGS OF ABUSE SCREEN, BLOOD   POCT GLUCOSE        ECG Results              EKG 12-lead (Final result)        Collection Time Result Time QRS Duration OHS QTC Calculation    05/23/24 15:43:58 05/24/24 21:30:22 92 479                     Final result by Interface, Lab In [facility] (05/24/24 21:30:28)                   Narrative:    Test Reason : R94.31,    Vent. Rate : 080 BPM     Atrial Rate : 080 BPM     P-R Int : 184 ms          QRS Dur : 092 ms      QT Int : 416 ms       P-R-T Axes : 063 037 013 degrees     QTc Int : 479 ms    Normal sinus rhythm  Cannot rule out Anterior infarct ,age undetermined  Abnormal ECG  When compared with ECG of 08-JAN-2024 13:13,  No significant change was found  Confirmed by Yuri Rojas MD ([id number]) on 5/24/2024 9:30:19 PM    Referred By: [email or web address]   SELF           Confirmed By:Yuri Rojas MD                                     EKG 12-lead (Final result)  Result time 05/24/24 11:39:28      Final result by Unknown User (05/24/24 11:39:28)                                      Imaging Results    None          Medications   cefTRIAXone (ROCEPHIN) 2 g in dextrose 5 % in water (D5W) 100 mL IVPB (MB+) (0 g Intravenous Stopped 5/23/24 1411)     Medical Decision Making  Amount and/or Complexity of Data Reviewed  Labs: ordered. Decision-making details documented in ED Course.    Risk  Decision regarding hospitalization.      MDM:      53-year-old female with past medical history as noted above presenting with concerns for abnormal MRI, spinal epidural abscess.  Differential Diagnosis includes:  Sepsis, spinal epidural abscess, infection, cellulitis, neurologic deficit, intraspinal pathology.  Physical exam as noted above.  ED workup including CBC and CMP pending.  Infectious Disease was contacted earlier requesting continuation of antibiotics.  Neurosurgery evaluated patient recommending NPO after  midnight with operative intervention washout tomorrow for noted fluid collection on MRI earlier today.  She has no focal neuro deficit currently he is ambulatory with steady gait with no further complaints or symptoms currently.  Patient was admitted to Hospital Medicine team for further evaluation and management.  Transferred to the floor in stable condition    Note was created using voice recognition software. Note may have occasional typographical or grammatical errors, garbled syntax, and other bizarre constructions that may not have been identified and edited despite good jaleesa initial review prior to signing.             Scribe Attestation:   Scribe #1: I performed the above scribed service and the documentation accurately describes the services I performed. I attest to the accuracy of the note.                           I, Woody Brandon M.D., personally performed the services described in this documentation. All medical record entries made by the scribe were at my direction and in my presence. I have reviewed the chart and agree that the record reflects my personal performance and is accurate and complete.      Clinical Impression:  Final diagnoses:  [A41.9] Sepsis          ED Disposition Condition    Admit                 Woody Brandon MD  05/26/24 0572

## 2024-05-24 ENCOUNTER — ANESTHESIA (OUTPATIENT)
Dept: SURGERY | Facility: HOSPITAL | Age: 54
DRG: 029 | End: 2024-05-24
Payer: COMMERCIAL

## 2024-05-24 LAB
ALBUMIN SERPL BCP-MCNC: 3.1 G/DL (ref 3.5–5.2)
ALP SERPL-CCNC: 72 U/L (ref 55–135)
ALT SERPL W/O P-5'-P-CCNC: 9 U/L (ref 10–44)
AMPHET+METHAMPHET UR QL: NEGATIVE
ANION GAP SERPL CALC-SCNC: 7 MMOL/L (ref 8–16)
AST SERPL-CCNC: 12 U/L (ref 10–40)
BACTERIA #/AREA URNS HPF: ABNORMAL /HPF
BARBITURATES UR QL SCN>200 NG/ML: NEGATIVE
BASOPHILS # BLD AUTO: 0.02 K/UL (ref 0–0.2)
BASOPHILS NFR BLD: 0.5 % (ref 0–1.9)
BENZODIAZ UR QL SCN>200 NG/ML: NEGATIVE
BILIRUB SERPL-MCNC: 0.3 MG/DL (ref 0.1–1)
BILIRUB UR QL STRIP: NEGATIVE
BUN SERPL-MCNC: 10 MG/DL (ref 6–20)
BZE UR QL SCN: NEGATIVE
CALCIUM SERPL-MCNC: 9.8 MG/DL (ref 8.7–10.5)
CANNABINOIDS UR QL SCN: NEGATIVE
CHLORIDE SERPL-SCNC: 104 MMOL/L (ref 95–110)
CLARITY UR: CLEAR
CO2 SERPL-SCNC: 29 MMOL/L (ref 23–29)
COLOR UR: YELLOW
CREAT SERPL-MCNC: 0.6 MG/DL (ref 0.5–1.4)
CREAT UR-MCNC: 112.5 MG/DL (ref 15–325)
DIFFERENTIAL METHOD BLD: ABNORMAL
EOSINOPHIL # BLD AUTO: 0.2 K/UL (ref 0–0.5)
EOSINOPHIL NFR BLD: 4.3 % (ref 0–8)
ERYTHROCYTE [DISTWIDTH] IN BLOOD BY AUTOMATED COUNT: 14 % (ref 11.5–14.5)
EST. GFR  (NO RACE VARIABLE): >60 ML/MIN/1.73 M^2
GLUCOSE SERPL-MCNC: 98 MG/DL (ref 70–110)
GLUCOSE UR QL STRIP: NEGATIVE
HCT VFR BLD AUTO: 31.1 % (ref 37–48.5)
HGB BLD-MCNC: 9.7 G/DL (ref 12–16)
HGB UR QL STRIP: ABNORMAL
IMM GRANULOCYTES # BLD AUTO: 0.01 K/UL (ref 0–0.04)
IMM GRANULOCYTES NFR BLD AUTO: 0.3 % (ref 0–0.5)
INR PPP: 1 (ref 0.8–1.2)
KETONES UR QL STRIP: NEGATIVE
LEUKOCYTE ESTERASE UR QL STRIP: ABNORMAL
LYMPHOCYTES # BLD AUTO: 1.3 K/UL (ref 1–4.8)
LYMPHOCYTES NFR BLD: 31.3 % (ref 18–48)
MAGNESIUM SERPL-MCNC: 1.8 MG/DL (ref 1.6–2.6)
MCH RBC QN AUTO: 28.2 PG (ref 27–31)
MCHC RBC AUTO-ENTMCNC: 31.2 G/DL (ref 32–36)
MCV RBC AUTO: 90 FL (ref 82–98)
METHADONE UR QL SCN>300 NG/ML: NEGATIVE
MICROSCOPIC COMMENT: ABNORMAL
MONOCYTES # BLD AUTO: 0.3 K/UL (ref 0.3–1)
MONOCYTES NFR BLD: 7.8 % (ref 4–15)
NEUTROPHILS # BLD AUTO: 2.2 K/UL (ref 1.8–7.7)
NEUTROPHILS NFR BLD: 55.8 % (ref 38–73)
NITRITE UR QL STRIP: NEGATIVE
NRBC BLD-RTO: 0 /100 WBC
OHS QRS DURATION: 92 MS
OHS QTC CALCULATION: 479 MS
OPIATES UR QL SCN: NEGATIVE
PCP UR QL SCN>25 NG/ML: NEGATIVE
PH UR STRIP: 7 [PH] (ref 5–8)
PHOSPHATE SERPL-MCNC: 4.1 MG/DL (ref 2.7–4.5)
PLATELET # BLD AUTO: 269 K/UL (ref 150–450)
PMV BLD AUTO: 9.7 FL (ref 9.2–12.9)
POCT GLUCOSE: 117 MG/DL (ref 70–110)
POCT GLUCOSE: 118 MG/DL (ref 70–110)
POCT GLUCOSE: 138 MG/DL (ref 70–110)
POTASSIUM SERPL-SCNC: 3.6 MMOL/L (ref 3.5–5.1)
PROT SERPL-MCNC: 7.1 G/DL (ref 6–8.4)
PROT UR QL STRIP: NEGATIVE
PROTHROMBIN TIME: 11.1 SEC (ref 9–12.5)
RBC # BLD AUTO: 3.44 M/UL (ref 4–5.4)
RBC #/AREA URNS HPF: 9 /HPF (ref 0–4)
SODIUM SERPL-SCNC: 140 MMOL/L (ref 136–145)
SP GR UR STRIP: 1.01 (ref 1–1.03)
SQUAMOUS #/AREA URNS HPF: 8 /HPF
TOXICOLOGY INFORMATION: NORMAL
URN SPEC COLLECT METH UR: ABNORMAL
UROBILINOGEN UR STRIP-ACNC: NEGATIVE EU/DL
WBC # BLD AUTO: 3.99 K/UL (ref 3.9–12.7)
WBC #/AREA URNS HPF: 8 /HPF (ref 0–5)

## 2024-05-24 PROCEDURE — 25000003 PHARM REV CODE 250: Performed by: STUDENT IN AN ORGANIZED HEALTH CARE EDUCATION/TRAINING PROGRAM

## 2024-05-24 PROCEDURE — 11000001 HC ACUTE MED/SURG PRIVATE ROOM

## 2024-05-24 PROCEDURE — 27201423 OPTIME MED/SURG SUP & DEVICES STERILE SUPPLY: Performed by: STUDENT IN AN ORGANIZED HEALTH CARE EDUCATION/TRAINING PROGRAM

## 2024-05-24 PROCEDURE — 37000008 HC ANESTHESIA 1ST 15 MINUTES: Performed by: STUDENT IN AN ORGANIZED HEALTH CARE EDUCATION/TRAINING PROGRAM

## 2024-05-24 PROCEDURE — 83735 ASSAY OF MAGNESIUM: CPT | Performed by: STUDENT IN AN ORGANIZED HEALTH CARE EDUCATION/TRAINING PROGRAM

## 2024-05-24 PROCEDURE — 36000710: Performed by: STUDENT IN AN ORGANIZED HEALTH CARE EDUCATION/TRAINING PROGRAM

## 2024-05-24 PROCEDURE — 99232 SBSQ HOSP IP/OBS MODERATE 35: CPT | Mod: ,,, | Performed by: STUDENT IN AN ORGANIZED HEALTH CARE EDUCATION/TRAINING PROGRAM

## 2024-05-24 PROCEDURE — 63600175 PHARM REV CODE 636 W HCPCS: Performed by: STUDENT IN AN ORGANIZED HEALTH CARE EDUCATION/TRAINING PROGRAM

## 2024-05-24 PROCEDURE — 87205 SMEAR GRAM STAIN: CPT | Performed by: STUDENT IN AN ORGANIZED HEALTH CARE EDUCATION/TRAINING PROGRAM

## 2024-05-24 PROCEDURE — 87070 CULTURE OTHR SPECIMN AEROBIC: CPT | Performed by: STUDENT IN AN ORGANIZED HEALTH CARE EDUCATION/TRAINING PROGRAM

## 2024-05-24 PROCEDURE — 87116 MYCOBACTERIA CULTURE: CPT | Performed by: STUDENT IN AN ORGANIZED HEALTH CARE EDUCATION/TRAINING PROGRAM

## 2024-05-24 PROCEDURE — 84100 ASSAY OF PHOSPHORUS: CPT | Performed by: STUDENT IN AN ORGANIZED HEALTH CARE EDUCATION/TRAINING PROGRAM

## 2024-05-24 PROCEDURE — 69990 MICROSURGERY ADD-ON: CPT | Mod: ,,, | Performed by: STUDENT IN AN ORGANIZED HEALTH CARE EDUCATION/TRAINING PROGRAM

## 2024-05-24 PROCEDURE — 87206 SMEAR FLUORESCENT/ACID STAI: CPT | Performed by: STUDENT IN AN ORGANIZED HEALTH CARE EDUCATION/TRAINING PROGRAM

## 2024-05-24 PROCEDURE — 80053 COMPREHEN METABOLIC PANEL: CPT | Performed by: STUDENT IN AN ORGANIZED HEALTH CARE EDUCATION/TRAINING PROGRAM

## 2024-05-24 PROCEDURE — C1729 CATH, DRAINAGE: HCPCS | Performed by: STUDENT IN AN ORGANIZED HEALTH CARE EDUCATION/TRAINING PROGRAM

## 2024-05-24 PROCEDURE — 94761 N-INVAS EAR/PLS OXIMETRY MLT: CPT

## 2024-05-24 PROCEDURE — 85025 COMPLETE CBC W/AUTO DIFF WBC: CPT | Performed by: STUDENT IN AN ORGANIZED HEALTH CARE EDUCATION/TRAINING PROGRAM

## 2024-05-24 PROCEDURE — 63266 EXCISE INTRSPINL LESION THRC: CPT | Mod: ,,, | Performed by: STUDENT IN AN ORGANIZED HEALTH CARE EDUCATION/TRAINING PROGRAM

## 2024-05-24 PROCEDURE — 009U0ZZ DRAINAGE OF SPINAL CANAL, OPEN APPROACH: ICD-10-PCS | Performed by: STUDENT IN AN ORGANIZED HEALTH CARE EDUCATION/TRAINING PROGRAM

## 2024-05-24 PROCEDURE — 81000 URINALYSIS NONAUTO W/SCOPE: CPT | Mod: 59 | Performed by: EMERGENCY MEDICINE

## 2024-05-24 PROCEDURE — 71000039 HC RECOVERY, EACH ADD'L HOUR: Performed by: STUDENT IN AN ORGANIZED HEALTH CARE EDUCATION/TRAINING PROGRAM

## 2024-05-24 PROCEDURE — 36000711: Performed by: STUDENT IN AN ORGANIZED HEALTH CARE EDUCATION/TRAINING PROGRAM

## 2024-05-24 PROCEDURE — 37000009 HC ANESTHESIA EA ADD 15 MINS: Performed by: STUDENT IN AN ORGANIZED HEALTH CARE EDUCATION/TRAINING PROGRAM

## 2024-05-24 PROCEDURE — 4A10X4G MONITORING OF CENTRAL NERVOUS ELECTRICAL ACTIVITY, INTRAOPERATIVE, EXTERNAL APPROACH: ICD-10-PCS | Performed by: STUDENT IN AN ORGANIZED HEALTH CARE EDUCATION/TRAINING PROGRAM

## 2024-05-24 PROCEDURE — 80307 DRUG TEST PRSMV CHEM ANLYZR: CPT | Performed by: STUDENT IN AN ORGANIZED HEALTH CARE EDUCATION/TRAINING PROGRAM

## 2024-05-24 PROCEDURE — 87075 CULTR BACTERIA EXCEPT BLOOD: CPT | Performed by: STUDENT IN AN ORGANIZED HEALTH CARE EDUCATION/TRAINING PROGRAM

## 2024-05-24 PROCEDURE — 87102 FUNGUS ISOLATION CULTURE: CPT | Performed by: STUDENT IN AN ORGANIZED HEALTH CARE EDUCATION/TRAINING PROGRAM

## 2024-05-24 PROCEDURE — 85610 PROTHROMBIN TIME: CPT | Performed by: STUDENT IN AN ORGANIZED HEALTH CARE EDUCATION/TRAINING PROGRAM

## 2024-05-24 PROCEDURE — 71000033 HC RECOVERY, INTIAL HOUR: Performed by: STUDENT IN AN ORGANIZED HEALTH CARE EDUCATION/TRAINING PROGRAM

## 2024-05-24 RX ORDER — MUPIROCIN 20 MG/G
OINTMENT TOPICAL 2 TIMES DAILY
Status: DISCONTINUED | OUTPATIENT
Start: 2024-05-24 | End: 2024-05-26 | Stop reason: HOSPADM

## 2024-05-24 RX ORDER — SODIUM CHLORIDE 0.9 % (FLUSH) 0.9 %
10 SYRINGE (ML) INJECTION
Status: DISCONTINUED | OUTPATIENT
Start: 2024-05-24 | End: 2024-05-24 | Stop reason: HOSPADM

## 2024-05-24 RX ORDER — PROPOFOL 10 MG/ML
VIAL (ML) INTRAVENOUS CONTINUOUS PRN
Status: DISCONTINUED | OUTPATIENT
Start: 2024-05-24 | End: 2024-05-24

## 2024-05-24 RX ORDER — PROPOFOL 10 MG/ML
VIAL (ML) INTRAVENOUS
Status: DISCONTINUED | OUTPATIENT
Start: 2024-05-24 | End: 2024-05-24

## 2024-05-24 RX ORDER — MIDAZOLAM HYDROCHLORIDE 1 MG/ML
INJECTION INTRAMUSCULAR; INTRAVENOUS
Status: DISCONTINUED | OUTPATIENT
Start: 2024-05-24 | End: 2024-05-24

## 2024-05-24 RX ORDER — HYDROMORPHONE HYDROCHLORIDE 2 MG/ML
0.2 INJECTION, SOLUTION INTRAMUSCULAR; INTRAVENOUS; SUBCUTANEOUS EVERY 5 MIN PRN
Status: DISCONTINUED | OUTPATIENT
Start: 2024-05-24 | End: 2024-05-24 | Stop reason: HOSPADM

## 2024-05-24 RX ORDER — FENTANYL CITRATE 50 UG/ML
INJECTION, SOLUTION INTRAMUSCULAR; INTRAVENOUS
Status: DISCONTINUED | OUTPATIENT
Start: 2024-05-24 | End: 2024-05-24

## 2024-05-24 RX ORDER — KETAMINE HYDROCHLORIDE 100 MG/ML
INJECTION, SOLUTION INTRAMUSCULAR; INTRAVENOUS
Status: DISCONTINUED | OUTPATIENT
Start: 2024-05-24 | End: 2024-05-24

## 2024-05-24 RX ORDER — GENTAMICIN 40 MG/ML
INJECTION, SOLUTION INTRAMUSCULAR; INTRAVENOUS
Status: DISCONTINUED | OUTPATIENT
Start: 2024-05-24 | End: 2024-05-24 | Stop reason: HOSPADM

## 2024-05-24 RX ORDER — HALOPERIDOL 5 MG/ML
0.5 INJECTION INTRAMUSCULAR EVERY 10 MIN PRN
Status: DISCONTINUED | OUTPATIENT
Start: 2024-05-24 | End: 2024-05-24 | Stop reason: HOSPADM

## 2024-05-24 RX ORDER — PHENYLEPHRINE HYDROCHLORIDE 10 MG/ML
INJECTION INTRAVENOUS
Status: DISCONTINUED | OUTPATIENT
Start: 2024-05-24 | End: 2024-05-24

## 2024-05-24 RX ORDER — SUCCINYLCHOLINE CHLORIDE 20 MG/ML
INJECTION INTRAMUSCULAR; INTRAVENOUS
Status: DISCONTINUED | OUTPATIENT
Start: 2024-05-24 | End: 2024-05-24

## 2024-05-24 RX ORDER — OXYCODONE HYDROCHLORIDE 5 MG/1
5 TABLET ORAL
Status: DISCONTINUED | OUTPATIENT
Start: 2024-05-24 | End: 2024-05-24 | Stop reason: HOSPADM

## 2024-05-24 RX ORDER — LIDOCAINE HYDROCHLORIDE 20 MG/ML
INJECTION INTRAVENOUS
Status: DISCONTINUED | OUTPATIENT
Start: 2024-05-24 | End: 2024-05-24

## 2024-05-24 RX ORDER — HYDROMORPHONE HYDROCHLORIDE 2 MG/ML
1 INJECTION, SOLUTION INTRAMUSCULAR; INTRAVENOUS; SUBCUTANEOUS
Status: DISCONTINUED | OUTPATIENT
Start: 2024-05-24 | End: 2024-05-25 | Stop reason: CLARIF

## 2024-05-24 RX ORDER — DEXAMETHASONE SODIUM PHOSPHATE 4 MG/ML
INJECTION, SOLUTION INTRA-ARTICULAR; INTRALESIONAL; INTRAMUSCULAR; INTRAVENOUS; SOFT TISSUE
Status: DISCONTINUED | OUTPATIENT
Start: 2024-05-24 | End: 2024-05-24

## 2024-05-24 RX ORDER — ONDANSETRON HYDROCHLORIDE 2 MG/ML
INJECTION, SOLUTION INTRAVENOUS
Status: DISCONTINUED | OUTPATIENT
Start: 2024-05-24 | End: 2024-05-24

## 2024-05-24 RX ORDER — HYDROMORPHONE HYDROCHLORIDE 5 MG/5ML
1 SOLUTION ORAL
Status: DISCONTINUED | OUTPATIENT
Start: 2024-05-24 | End: 2024-05-24

## 2024-05-24 RX ORDER — EPHEDRINE SULFATE 50 MG/ML
INJECTION, SOLUTION INTRAVENOUS
Status: DISCONTINUED | OUTPATIENT
Start: 2024-05-24 | End: 2024-05-24

## 2024-05-24 RX ADMIN — DEXAMETHASONE SODIUM PHOSPHATE 4 MG: 4 INJECTION, SOLUTION INTRAMUSCULAR; INTRAVENOUS at 11:05

## 2024-05-24 RX ADMIN — REMIFENTANIL HYDROCHLORIDE 0.2 MCG/KG/MIN: 1 INJECTION, POWDER, LYOPHILIZED, FOR SOLUTION INTRAVENOUS at 11:05

## 2024-05-24 RX ADMIN — PROPOFOL 160 MG: 10 INJECTION, EMULSION INTRAVENOUS at 11:05

## 2024-05-24 RX ADMIN — GLYCOPYRROLATE 0.2 MG: 0.2 INJECTION, SOLUTION INTRAMUSCULAR; INTRAVITREAL at 11:05

## 2024-05-24 RX ADMIN — Medication 6 MG: at 08:05

## 2024-05-24 RX ADMIN — PROPOFOL 40 MG: 10 INJECTION, EMULSION INTRAVENOUS at 11:05

## 2024-05-24 RX ADMIN — ONDANSETRON 4 MG: 2 INJECTION, SOLUTION INTRAMUSCULAR; INTRAVENOUS at 02:05

## 2024-05-24 RX ADMIN — EPHEDRINE SULFATE 10 MG: 50 INJECTION INTRAVENOUS at 12:05

## 2024-05-24 RX ADMIN — PHENYLEPHRINE HYDROCHLORIDE 100 MCG: 10 INJECTION INTRAVENOUS at 12:05

## 2024-05-24 RX ADMIN — FENTANYL CITRATE 25 MCG: 50 INJECTION, SOLUTION INTRAMUSCULAR; INTRAVENOUS at 02:05

## 2024-05-24 RX ADMIN — CEFTRIAXONE SODIUM 2 G: 2 INJECTION, POWDER, FOR SOLUTION INTRAMUSCULAR; INTRAVENOUS at 12:05

## 2024-05-24 RX ADMIN — PHENYLEPHRINE HYDROCHLORIDE 100 MCG: 10 INJECTION INTRAVENOUS at 11:05

## 2024-05-24 RX ADMIN — MIDAZOLAM HYDROCHLORIDE 2 MG: 1 INJECTION INTRAMUSCULAR; INTRAVENOUS at 11:05

## 2024-05-24 RX ADMIN — CARVEDILOL 6.25 MG: 6.25 TABLET, FILM COATED ORAL at 08:05

## 2024-05-24 RX ADMIN — PROPOFOL 150 MCG/KG/MIN: 10 INJECTION, EMULSION INTRAVENOUS at 11:05

## 2024-05-24 RX ADMIN — FENTANYL CITRATE 100 MCG: 50 INJECTION, SOLUTION INTRAMUSCULAR; INTRAVENOUS at 11:05

## 2024-05-24 RX ADMIN — KETAMINE HYDROCHLORIDE 30 MG: 100 INJECTION, SOLUTION, CONCENTRATE INTRAMUSCULAR; INTRAVENOUS at 01:05

## 2024-05-24 RX ADMIN — SUCCINYLCHOLINE CHLORIDE 200 MG: 20 INJECTION, SOLUTION INTRAMUSCULAR; INTRAVENOUS at 11:05

## 2024-05-24 RX ADMIN — REMIFENTANIL HYDROCHLORIDE 0.1 MCG/KG/MIN: 1 INJECTION, POWDER, LYOPHILIZED, FOR SOLUTION INTRAVENOUS at 11:05

## 2024-05-24 RX ADMIN — SODIUM CHLORIDE, SODIUM LACTATE, POTASSIUM CHLORIDE, AND CALCIUM CHLORIDE: .6; .31; .03; .02 INJECTION, SOLUTION INTRAVENOUS at 11:05

## 2024-05-24 RX ADMIN — LIDOCAINE HYDROCHLORIDE 80 MG: 20 INJECTION, SOLUTION INTRAVENOUS at 11:05

## 2024-05-24 NOTE — PROGRESS NOTES
[facility] Medicine  Progress Note    Patient Name: Shahida Ortega  MRN: [id number]  Patient Class: IP- Inpatient   Admission Date: 5/23/2024  Length of Stay: 1 days  Attending Physician: Tyrese Wang MD  Primary Care Provider: Janusz Staton MD        Subjective:     Principal Problem:Vertebral abscess        HPI:    Shahida Ortega is a 53 y.o. female who has a past medical history of Anemia, Atrial fibrillation, DM2 (diabetes mellitus, type 2), History of abdominal abscess, History of pleural effusion, Hypertension, and Prolonged Q-T interval on ECG, presented to the ED with CC of Back pain, found to have spinal epidural abscesses, worsening on IV antibiotics at home. Patient has been on Ceftriaxone since 4/30/24, growing GBS previously. Other organisms in past include E.coli in urine and blood. Feeling fairly well, although worsening clinical picture on outpatient MRI, with known discitis/osteomyelitis at the T7-T8 level and interval development of phlegmonous material extending about the epidural space at T7-T8 with a small associated epidural abscess measuring approximately 0.8 x 0.5 cm, contributing to at least moderate spinal canal stenosis at this level (Additional encroachment of the left greater than right neural foramen) and marked improvement and paravertebral phlegmonous material about the paravertebral soft tissues mostly centered at the T7-T8 level. Denies CP or SOB. Admitted to  with NeuroSx consult, and plans for washout 5/24.     Overview/Hospital Course:  Patient found to have epidural abscess after blood infection and septic arthritis of shoulder. Patient has had odd increases in CA 19-9 and  over the years, will check. Odd to get these infections, to some degree, while not being an IVDU or immunocompromised in some way. No hx of drug use and was healthy until a couple years ago. Found to be diabetic with A1c 11 at that time, could be getting infections with uncontrolled  Diabetes, however, her glucoses here on no meds do not reflect that- now having hypoglycemia of 40 not on any insulin, do not usually see this with metformin/orals, will recheck her A1c. Insulinomas do have the potential to raise CA 19-9 and obviously hypoglycemia.... However, infection can drive the glucoses low as well, and is most likely the cause now.    Washout of abscesses 5/24.    Interval History:  NAEON.  No new issues.   Denies complaints.  All questions answered and updates on care given.       ROS:  General: Negative for fevers or chills.  Cardiac: Negative for chest pain or orthopnea   Pulmonary: Negative for dyspnea or wheezing.  GI: Negative for abdominal distention or pain     Vitals:    05/24/24 0247 05/24/24 0600 05/24/24 0739 05/24/24 0744   BP:   (!) 147/84    BP Location:   Right arm    Patient Position:   Sitting    Pulse: 66  75 81   Resp:   16    Temp:   98.1 °F (36.7 °C)    TempSrc:   Oral    SpO2:   98%    Weight:  93.8 kg (206 lb 12.7 oz)     Height:              Body mass index is 32.39 kg/m².      PHYSICAL EXAM:  GENERAL APPEARANCE: alert and cooperative, and appears to be in no acute distress.  HEENT:     HEAD: NC/AT     EYES: PERRL, EOMI.  Vision is grossly intact.  NECK: Neck supple, non-tender without LAD, masses or thyromegaly.  CARDIAC: There is no peripheral edema, cyanosis or pallor.   LUNGS: Clear to auscultation and percussion without rales or wheezing  ABDOMEN: Non-distended. No guarding.  MSK: No joint erythema or tenderness.   EXTREMITIES: No significant deformity or joint abnormality. No edema.   NEUROLOGICAL: CN II-XII grossly intact.   SKIN: Skin normal color, texture and turgor with no lesions or eruptions.  PSYCHIATRIC: Oriented. No tangential speech. No Hyperactive features.        Recent Results (from the past 24 hour(s))   CBC auto differential    Collection Time: 05/23/24  1:41 PM   Result Value Ref Range    WBC 4.95 3.90 - 12.70 K/uL    RBC 3.36 (L) 4.00 - 5.40  M/uL    Hemoglobin 9.7 (L) 12.0 - 16.0 g/dL    Hematocrit 30.9 (L) 37.0 - 48.5 %    MCV 92 82 - 98 fL    MCH 28.9 27.0 - 31.0 pg    MCHC 31.4 (L) 32.0 - 36.0 g/dL    RDW 13.9 11.5 - 14.5 %    Platelets 287 150 - 450 K/uL    MPV 9.6 9.2 - 12.9 fL    Immature Granulocytes 0.6 (H) 0.0 - 0.5 %    Gran # (ANC) 2.6 1.8 - 7.7 K/uL    Immature Grans (Abs) 0.03 0.00 - 0.04 K/uL    Lymph # 1.7 1.0 - 4.8 K/uL    Mono # 0.4 0.3 - 1.0 K/uL    Eos # 0.2 0.0 - 0.5 K/uL    Baso # 0.01 0.00 - 0.20 K/uL    nRBC 0 0 /100 WBC    Gran % 53.2 38.0 - 73.0 %    Lymph % 33.9 18.0 - 48.0 %    Mono % 7.7 4.0 - 15.0 %    Eosinophil % 4.4 0.0 - 8.0 %    Basophil % 0.2 0.0 - 1.9 %    Differential Method Automated    Comprehensive metabolic panel    Collection Time: 05/23/24  1:41 PM   Result Value Ref Range    Sodium 140 136 - 145 mmol/L    Potassium 3.3 (L) 3.5 - 5.1 mmol/L    Chloride 104 95 - 110 mmol/L    CO2 25 23 - 29 mmol/L    Glucose 101 70 - 110 mg/dL    BUN 12 6 - 20 mg/dL    Creatinine 0.7 0.5 - 1.4 mg/dL    Calcium 9.6 8.7 - 10.5 mg/dL    Total Protein 7.3 6.0 - 8.4 g/dL    Albumin 3.2 (L) 3.5 - 5.2 g/dL    Total Bilirubin 0.2 0.1 - 1.0 mg/dL    Alkaline Phosphatase 70 55 - 135 U/L    AST 12 10 - 40 U/L    ALT 8 (L) 10 - 44 U/L    eGFR >60 >60 mL/min/1.73 m^2    Anion Gap 11 8 - 16 mmol/L   Blood Culture #1 **CANNOT BE ORDERED STAT**    Collection Time: 05/23/24  1:41 PM    Specimen: Peripheral, Antecubital, Left; Blood   Result Value Ref Range    Blood Culture, Routine No Growth to date    Blood Culture #2 **CANNOT BE ORDERED STAT**    Collection Time: 05/23/24  1:41 PM    Specimen: Peripheral, Forearm, Left; Blood   Result Value Ref Range    Blood Culture, Routine No Growth to date    C-reactive protein    Collection Time: 05/23/24  1:41 PM   Result Value Ref Range    CRP 5.3 0.0 - 8.2 mg/L   CEA    Collection Time: 05/23/24  1:41 PM   Result Value Ref Range    CEA 2.2 0.0 - 5.0 ng/mL   Sedimentation rate    Collection Time:  05/23/24  1:42 PM   Result Value Ref Range    Sed Rate 71 (H) 0 - 36 mm/Hr   Protime-INR    Collection Time: 05/23/24  3:28 PM   Result Value Ref Range    Prothrombin Time 11.2 9.0 - 12.5 sec    INR 1.0 0.8 - 1.2   APTT    Collection Time: 05/23/24  3:28 PM   Result Value Ref Range    aPTT 29.3 21.0 - 32.0 sec   POCT glucose    Collection Time: 05/23/24  3:33 PM   Result Value Ref Range    POCT Glucose 42 (LL) 70 - 110 mg/dL   POCT glucose    Collection Time: 05/23/24  3:34 PM   Result Value Ref Range    POCT Glucose 40 (LL) 70 - 110 mg/dL   POCT glucose    Collection Time: 05/23/24  3:58 PM   Result Value Ref Range    POCT Glucose 72 70 - 110 mg/dL   POCT glucose    Collection Time: 05/23/24  6:27 PM   Result Value Ref Range    POCT Glucose 112 (H) 70 - 110 mg/dL   Type & Screen    Collection Time: 05/23/24  6:28 PM   Result Value Ref Range    Group & Rh A POS     Indirect [place] NEG     Specimen Outdate 05/26/2024 23:59    POCT glucose    Collection Time: 05/23/24  8:13 PM   Result Value Ref Range    POCT Glucose 57 (L) 70 - 110 mg/dL   POCT glucose    Collection Time: 05/23/24  8:45 PM   Result Value Ref Range    POCT Glucose 109 70 - 110 mg/dL   Phosphorus    Collection Time: 05/24/24  5:35 AM   Result Value Ref Range    Phosphorus 4.1 2.7 - 4.5 mg/dL   Magnesium    Collection Time: 05/24/24  5:35 AM   Result Value Ref Range    Magnesium 1.8 1.6 - 2.6 mg/dL   Comprehensive Metabolic Panel    Collection Time: 05/24/24  5:35 AM   Result Value Ref Range    Sodium 140 136 - 145 mmol/L    Potassium 3.6 3.5 - 5.1 mmol/L    Chloride 104 95 - 110 mmol/L    CO2 29 23 - 29 mmol/L    Glucose 98 70 - 110 mg/dL    BUN 10 6 - 20 mg/dL    Creatinine 0.6 0.5 - 1.4 mg/dL    Calcium 9.8 8.7 - 10.5 mg/dL    Total Protein 7.1 6.0 - 8.4 g/dL    Albumin 3.1 (L) 3.5 - 5.2 g/dL    Total Bilirubin 0.3 0.1 - 1.0 mg/dL    Alkaline Phosphatase 72 55 - 135 U/L    AST 12 10 - 40 U/L    ALT 9 (L) 10 - 44 U/L    eGFR >60 >60 mL/min/1.73 m^2     Anion Gap 7 (L) 8 - 16 mmol/L   CBC Auto Differential    Collection Time: 05/24/24  5:35 AM   Result Value Ref Range    WBC 3.99 3.90 - 12.70 K/uL    RBC 3.44 (L) 4.00 - 5.40 M/uL    Hemoglobin 9.7 (L) 12.0 - 16.0 g/dL    Hematocrit 31.1 (L) 37.0 - 48.5 %    MCV 90 82 - 98 fL    MCH 28.2 27.0 - 31.0 pg    MCHC 31.2 (L) 32.0 - 36.0 g/dL    RDW 14.0 11.5 - 14.5 %    Platelets 269 150 - 450 K/uL    MPV 9.7 9.2 - 12.9 fL    Immature Granulocytes 0.3 0.0 - 0.5 %    Gran # (ANC) 2.2 1.8 - 7.7 K/uL    Immature Grans (Abs) 0.01 0.00 - 0.04 K/uL    Lymph # 1.3 1.0 - 4.8 K/uL    Mono # 0.3 0.3 - 1.0 K/uL    Eos # 0.2 0.0 - 0.5 K/uL    Baso # 0.02 0.00 - 0.20 K/uL    nRBC 0 0 /100 WBC    Gran % 55.8 38.0 - 73.0 %    Lymph % 31.3 18.0 - 48.0 %    Mono % 7.8 4.0 - 15.0 %    Eosinophil % 4.3 0.0 - 8.0 %    Basophil % 0.5 0.0 - 1.9 %    Differential Method Automated    PT/INR    Collection Time: 05/24/24  5:35 AM   Result Value Ref Range    Prothrombin Time 11.1 9.0 - 12.5 sec    INR 1.0 0.8 - 1.2   POCT glucose    Collection Time: 05/24/24  7:41 AM   Result Value Ref Range    POCT Glucose 118 (H) 70 - 110 mg/dL       Microbiology Results (last 7 days)       Procedure Component Value Units Date/Time    Blood Culture #2 **CANNOT BE ORDERED STAT** [[id number]] Collected: 05/23/24 1341    Order Status: Completed Specimen: Blood from Peripheral, Forearm, Left Updated: 05/23/24 2112     Blood Culture, Routine No Growth to date    Blood Culture #1 **CANNOT BE ORDERED STAT** [[id number]] Collected: 05/23/24 1341    Order Status: Completed Specimen: Blood from Peripheral, Antecubital, Left Updated: 05/23/24 2112     Blood Culture, Routine No Growth to date    Gram stain [[id number]]     Order Status: No result Specimen: Abscess     Aerobic culture [[id number]]     Order Status: No result Specimen: Abscess     Culture, Anaerobic [[id number]]     Order Status: No result Specimen: Abscess              Imaging Results    None                 Assessment/Plan:      * Vertebral abscess  NeuroSx consulted  ID consulted  Washout 5/24, tentatively noon  Cultures ordered, please send off    A-fib  Only had 1 episode last year with infection that quickly resolved, no NSR  On aspirin outpt, resume after sx  Will repeat EKG  Continue BB      Osteomyelitis  Discitis/osteomyelitis at the T7-T8 level    Bacteremia due to group B Streptococcus  Positive cultures on 4/15, likely bacteria (GBS) that will be in abscesses       Prolonged Q-T interval on ECG  Repeat EKG, caution with prolongers      DM2 (diabetes mellitus, type 2)  Patient's FSGs are controlled on current medication regimen.  Last A1c reviewed-   Lab Results   Component Value Date    HGBA1C 11.3 (H) 03/16/2022       Repeat A1c  Hold Oral hypoglycemics while patient is in the hospital.  DM diet and ssi      VTE Risk Mitigation (From admission, onward)           Ordered     IP VTE HIGH RISK PATIENT  Once         05/23/24 1503     Place sequential compression device  Until discontinued         05/23/24 1503                    Discharge Planning   NAYELI:      Code Status: Full Code   Is the patient medically ready for discharge?:     Reason for patient still in hospital (select all that apply): Patient trending condition and Treatment                     Tyrese Wang MD  Department of Hospital Medicine   [facility] [place] [facility]

## 2024-05-24 NOTE — PROGRESS NOTES
[facility] [place]  Nurses Note -- 4 Eyes      5/23/2024       Skin assessed on: Q Shift      [x] No Pressure Injuries Present    []Prevention Measures Documented    [] Yes LDA  for Pressure Injury Previously documented     [] Yes New Pressure Injury Discovered   [] LDA for New Pressure Injury Added      Attending RN:  Latha Love RN     Second RN:  MARLEY Cantu

## 2024-05-24 NOTE — NURSING
Note that patient has returned from surgery.   Awake, alert, fully oriented and denies pain at this time.   BP elevated: 178/86.  Left posterior incision site covered with Tegaderm with scant red drainage. Also, left posterior closed/suction 10 Fr MARLEEN drain intact with approximately 20 cc sanguinous drainage in bulb.     Patient denies oral left sided discomfort from biting inside of left cheek during procedure.     Will continue to f/u.

## 2024-05-24 NOTE — CONSULTS
Pt off the floor during rounds- full consult note to follow. Briefly, 52 yo female with recent admission for GBS bacteremia c/b R septic joint, T5-Tll prevertebral abscess (no acute surgical intervention, maintained on ceftriaxone until 5/29) admitted for worsening discitis/epidural abscess. ID consulted for abx recs. Hospital course notable for blood cx on admit no growth to date. Plan for OR today with NSGY. Pt is currently on ceftriaxone.     Recommendations:  -OR cx, please send for cx  -continue ceftriaxone pending cx data      ID will continue to follow. D/w primary team.

## 2024-05-24 NOTE — PLAN OF CARE
Case Management Assessment     PCP: Janusz Staton  Pharmacy: [facility] on [address] in [place]    Patient Arrived From: home   Existing Help at Home: mother    Barriers to Discharge: none    Discharge Plan:    A. Resume home health    B. Home with family    Patient is out of the room for surgery. SW completed initial assessment and discussed discharge planning with patient's mother Kwame via phone. Patient lives with her mother who is her main support. Patient receives IV abx and home health though [facility]. Patient's mom will provide transportation for her to get home when discharge from the hospital.     05/24/24 8918   Discharge Assessment   Assessment Type Discharge Planning Assessment   Confirmed/corrected address, phone number and insurance Yes   Confirmed Demographics Correct on Facesheet   Source of Information family   Communicated NAYELI with patient/caregiver Date not available/Unable to determine   Reason For Admission Vertebral abscess   People in Home parent(s)   Do you expect to return to your current living situation? Yes   Do you have help at home or someone to help you manage your care at home? Yes   Who are your caregiver(s) and their phone number(s)? kwame contreras (Mother)  [phone] (Mobile)   Prior to hospitilization cognitive status: Alert/Oriented   Current cognitive status: Alert/Oriented   Walking or Climbing Stairs Difficulty no   Dressing/Bathing Difficulty no   Equipment Currently Used at Home glucometer;blood pressure machine   Readmission within 30 days? Yes   Patient currently being followed by outpatient case management? No   Do you currently have service(s) that help you manage your care at home? Yes   How Many hours does patient receive services 2   Name and Contact number of agency [facility]/ [facility]   Is the pt/caregiver preference to resume services with current agency Yes   Do you take prescription medications? Yes   Do you have prescription coverage? Yes    Coverage BCBS   Do you have any problems affording any of your prescribed medications? No   Is the patient taking medications as prescribed? yes   Who is going to help you get home at discharge? idris contreras (Mother)  [phone] (Mobile)   How do you get to doctors appointments? car, drives self;family or friend will provide   Are you on dialysis? No   Do you take coumadin? No   Discharge Plan A Home Health   Discharge Plan B Home with family   DME Needed Upon Discharge  none   Discharge Plan discussed with: Parent(s)   Name(s) and Number(s) idris contreras (Mother)  [phone] (Mobile)   Transition of Care Barriers None   OTHER   Name(s) of People in Home idris contreras (Mother)  [phone] (Mobile)

## 2024-05-24 NOTE — HPI
52 yo female with recent admission for GBS bacteremia c/b R septic joint, T5-Tll prevertebral abscess (no acute surgical intervention, maintained on ceftriaxone until 5/29) admitted for worsening discitis/epidural abscess. ID consulted for abx recs. Hospital course notable for blood cx on admit no growth to date.S/p OR with NSGY on 5/24 - noted to have liquid abscess and epidural phlegmon, cx in process. Pt is currently on ceftriaxone. Pt stated that she tolerated her CTX and denied problems with PICC. Denied fevers, chills, worsening back pain or shoulder pain at home.

## 2024-05-24 NOTE — PLAN OF CARE
Problem: Adult Inpatient Plan of Care  Goal: Plan of Care Review  Outcome: Progressing  Goal: Patient-Specific Goal (Individualized)  Outcome: Progressing  Goal: Absence of Hospital-Acquired Illness or Injury  Outcome: Progressing  Goal: Optimal Comfort and Wellbeing  Outcome: Progressing  Goal: Readiness for Transition of Care  Outcome: Progressing     Problem: Diabetes Comorbidity  Goal: Blood Glucose Level Within Targeted Range  Outcome: Progressing     Problem: Sepsis/Septic Shock  Goal: Optimal Coping  Outcome: Progressing  Goal: Absence of Bleeding  Outcome: Progressing  Goal: Blood Glucose Level Within Targeted Range  Outcome: Progressing  Goal: Absence of Infection Signs and Symptoms  Outcome: Progressing  Goal: Optimal Nutrition Intake  Outcome: Progressing     Problem: Infection  Goal: Absence of Infection Signs and Symptoms  Outcome: Progressing     Problem: Wound  Goal: Optimal Coping  Outcome: Progressing  Goal: Optimal Functional Ability  Outcome: Progressing  Goal: Absence of Infection Signs and Symptoms  Outcome: Progressing  Goal: Improved Oral Intake  Outcome: Progressing  Goal: Optimal Pain Control and Function  Outcome: Progressing  Goal: Skin Health and Integrity  Outcome: Progressing  Goal: Optimal Wound Healing  Outcome: Progressing

## 2024-05-24 NOTE — HOSPITAL COURSE
Patient found to have epidural abscess after blood infection and septic arthritis of shoulder. Patient has had odd increases in CA 19-9 and  over the years, will check. Odd to get these infections, to some degree, while not being an IVDU or immunocompromised in some way. No hx of drug use and was healthy until a couple years ago. Found to be diabetic with A1c 11 at that time, could be getting infections with uncontrolled Diabetes, however, her glucoses here on no meds do not reflect that- now having hypoglycemia of 40 not on any insulin, do not usually see this with metformin/orals, will recheck her A1c. Insulinomas do have the potential to raise CA 19-9 and obviously hypoglycemia.... However, infection can drive the glucoses low as well, and is most likely the cause now.    Washout of abscesses 5/24. Epidural phlegmon and epidural abscess cleaned out, no growth yet.  Increasing BP meds.

## 2024-05-24 NOTE — TRANSFER OF CARE
"Anesthesia Transfer of Care Note    Patient: April Jordan    Procedure(s) Performed: Procedure(s) (LRB):  LAMINOFORAMINOTOMY, SPINE, T7/T8, MINIMALLY INVASIVE; with REMOVAL OF ABSCESS (Left)    Patient location: PACU    Anesthesia Type: general    Transport from OR: Transported from OR on 6-10 L/min O2 by face mask with adequate spontaneous ventilation    Post pain: adequate analgesia    Post assessment: no apparent anesthetic complications and tolerated procedure well    Post vital signs: stable    Level of consciousness: awake and alert    Nausea/Vomiting: no nausea/vomiting    Complications: none    Transfer of care protocol was followed      Last vitals: Visit Vitals  BP (!) 164/83 (BP Location: Left arm, Patient Position: Lying)   Pulse 75   Temp 36.4 °C (97.5 °F) (Oral)   Resp 17   Ht 5' 7" (1.702 m)   Wt 93.8 kg (206 lb 12.7 oz)   LMP 12/14/2020   SpO2 99%   Breastfeeding No   BMI 32.39 kg/m²     "

## 2024-05-24 NOTE — PROGRESS NOTES
[facility]  Neurosurgery  Progress Note    Subjective:     Interval History:  No acute events.  Patient says she has been doing okay and ambulating independently.    Post-Op Info:  Procedure(s) (LRB):  LAMINOFORAMINOTOMY, SPINE, MINIMALLY INVASIVE (Left)          Medications:  Continuous Infusions:  Scheduled Meds:   carvediloL  6.25 mg Oral BID WM    cefTRIAXone (Rocephin) IV (PEDS and ADULTS)  2 g Intravenous Q24H    melatonin  6 mg Oral Nightly     PRN Meds:  Current Facility-Administered Medications:     acetaminophen, 650 mg, Oral, Q8H PRN    glucagon (human recombinant), 1 mg, Intramuscular, PRN    glucagon (human recombinant), 1 mg, Intramuscular, PRN    glucose, 16 g, Oral, PRN    glucose, 16 g, Oral, PRN    glucose, 24 g, Oral, PRN    glucose, 24 g, Oral, PRN    insulin aspart U-100, 0-10 Units, Subcutaneous, QID (AC + HS) PRN    naloxone, 0.02 mg, Intravenous, PRN    polyethylene glycol, 17 g, Oral, BID PRN     Review of Systems  Objective:     Weight: 93.8 kg (206 lb 12.7 oz)  Body mass index is 32.39 kg/m².  Vital Signs (Most Recent):  Temp: 98.1 °F (36.7 °C) (05/24/24 0739)  Pulse: 81 (05/24/24 0744)  Resp: 16 (05/24/24 0739)  BP: (!) 147/84 (05/24/24 0739)  SpO2: 98 % (05/24/24 0739) Vital Signs (24h Range):  Temp:  [97.6 °F (36.4 °C)-99.5 °F (37.5 °C)] 98.1 °F (36.7 °C)  Pulse:  [66-81] 81  Resp:  [16-20] 16  SpO2:  [97 %-100 %] 98 %  BP: (142-182)/(67-84) 147/84     Date 05/24/24 0700 - 05/25/24 0659   Shift 1083-8917 9816-8006 1105-9083 24 Hour Total   INTAKE   P.O. 0   0   Shift Total(mL/kg) 0(0)   0(0)   OUTPUT   Shift Total(mL/kg)       Weight (kg) 93.8 93.8 93.8 93.8       Neurosurgery Physical Exam  General: well developed, well nourished, no distress  Head: normocephalic, atraumatic  Neurologic: Alert and oriented. Thought content appropriate  Speech: Fluent  Cranial nerves: face symmetric   Eyes: pupils equal  Pulmonary: normal respirations  Sensory: intact to light touch  throughout  Motor Strength: Moves all extremities spontaneously with good tone.  Full strength upper and lower extremities. No abnormal movements seen.                  Delt Bi Tri Wrist ext.  IO  RUE:      5    5   5        5          5    5  LUE:      5    5   5        5          5    5              HF KE EHL DF PF  RLE      5    5     5     5    5  LLE:      5    5     5     5    5     DTR's - 3+ patellar reflexes  Ballard: absent  Clonus: absent     Midline Bony Tenderness: none  Paraspinous muscle tenderness: none      Significant Labs:  Recent Labs   Lab 05/23/24  1341 05/24/24  0535    98    140   K 3.3* 3.6    104   CO2 25 29   BUN 12 10   CREATININE 0.7 0.6   CALCIUM 9.6 9.8   MG  --  1.8     Recent Labs   Lab 05/23/24  1341 05/24/24  0535   WBC 4.95 3.99   HGB 9.7* 9.7*   HCT 30.9* 31.1*    269     Recent Labs   Lab 05/23/24  1528 05/24/24  0535   INR 1.0 1.0   APTT 29.3  --      Microbiology Results (last 7 days)       Procedure Component Value Units Date/Time    Blood Culture #2 **CANNOT BE ORDERED STAT** [[id number]] Collected: 05/23/24 1341    Order Status: Completed Specimen: Blood from Peripheral, Forearm, Left Updated: 05/23/24 2112     Blood Culture, Routine No Growth to date    Blood Culture #1 **CANNOT BE ORDERED STAT** [[id number]] Collected: 05/23/24 1341    Order Status: Completed Specimen: Blood from Peripheral, Antecubital, Left Updated: 05/23/24 2112     Blood Culture, Routine No Growth to date    Gram stain [[id number]]     Order Status: No result Specimen: Abscess     Aerobic culture [[id number]]     Order Status: No result Specimen: Abscess     Culture, Anaerobic [[id number]]     Order Status: No result Specimen: Abscess             Significant Diagnostics:  MRI of the thoracic spine dated 05/23/2024 shows improvement in the prevertebral abscess.  However, on the left side centered about the T7/8 disc space, there is a large epidural abscess which compresses  and deforms the contour of the spinal cord.  It pushes the spinal cord from the left to the right.  There is no obvious signal change of the spinal cord.  This extends somewhat superiorly and inferiorly from the disc space and involves the left foramina as well.     Assessment/Plan:     Shahida Ortega is a 53 y.o. female who presents with left-sided epidural abscess at T7/8, which is worsening on serial imaging despite antibiotic treatment.  Now involves spinal cord compression at T7/8, and patient has hyperreflexia on exam concerning for myelopathy.  -I again recommended surgery.  I discussed with the patient that one option would be to observe this.  My concern with this is that this abscess is significant in size and is compressing the spinal cord.  It was not present on her MRI from month ago.  She is hyperreflexic on exam which is a suggestion of spinal cord compression and myelopathy.  If we did watch this conservatively, she would need to be on vigilant look out for neurologic compromise and would need to present again to the hospital for evaluation if she began to develop worsening symptoms.  After discussion of the pros and cons of conservative management versus surgery, patient would like to go ahead and decompress her spinal cord.  I agree with her that this is the safest thing to do  -discussed risks, benefits, indications, alternatives of decompression.  I believe this can be approached in a minimally invasive manner with a left-sided hemilaminectomy and medial facetectomy at T7/8.  -labs reviewed, okay.  -remain NPO  -currently on schedule for noon, we will try to get done sooner if possible  -we will leave a drain after surgery.  Likely remove drain tomorrow and I am okay with patient going home tomorrow if she does well with therapy    Active Diagnoses:    Diagnosis Date Noted POA    PRINCIPAL PROBLEM:  Vertebral abscess [M46.20] 04/19/2024 Yes    Osteomyelitis [M86.9] 05/23/2024 Yes    Bacteremia due  to group B Streptococcus [R78.81, B95.1] 04/16/2024 Yes    A-fib [I48.91] 05/31/2023 Yes    Prolonged Q-T interval on ECG [R94.31] 05/31/2023 Yes    DM2 (diabetes mellitus, type 2) [E11.9] 05/28/2023 Yes      Problems Resolved During this Admission:       Janusz Xiao MD  Neurosurgery  [facility] - Telemetry

## 2024-05-24 NOTE — NURSING
Note that patient to surgery via stretcher.   Patient awake, alert, fully oriented and denies pain at this time.   Maintained NPO since midnight.  CHG wipe done.  Signed consent on chart.     Will continue to f/u.

## 2024-05-24 NOTE — SUBJECTIVE & OBJECTIVE
Past Medical History:   Diagnosis Date    Anemia 04/22/2024    Atrial fibrillation     DM2 (diabetes mellitus, type 2) 05/28/2023    History of abdominal abscess     History of pleural effusion     Hypertension     Osteomyelitis 05/23/2024    Prolonged Q-T interval on ECG 05/31/2023    Qtc 525 5/31/23       Past Surgical History:   Procedure Laterality Date    ABDOMINAL SURGERY      ARTHROSCOPIC DEBRIDEMENT OF SHOULDER Right 4/24/2024    Procedure: DEBRIDEMENT, SHOULDER, ARTHROSCOPIC; Linvatec, beach chair, 9L NS;  Surgeon: Madi Bailey MD;  Location: [address];  Service: Orthopedics;  Laterality: Right;    ARTHROSCOPIC DEBRIDEMENT OF SHOULDER Right 4/25/2024    Procedure: DEBRIDEMENT, SHOULDER, ARTHROSCOPIC;  Surgeon: Thaddeus Corral MD;  Location: [facility] OR [address] [place] [address];  Service: Orthopedics;  Laterality: Right;    COLONOSCOPY N/A 06/01/2023    Procedure: COLONOSCOPY;  Surgeon: Thaddeus Carlos MD;  Location: [facility] ENDO;  Service: Endoscopy;  Laterality: N/A;    DIAGNOSTIC LAPAROSCOPY N/A 05/26/2019    Procedure: LAPAROSCOPY, DIAGNOSTIC Drainage of abscess ;  Surgeon: Jose Luis Hanson MD;  Location: [facility] OR;  Service: General;  Laterality: N/A;  Drain Placement    DIAGNOSTIC LAPAROSCOPY N/A 12/27/2020    Procedure: Diagnostic laparoscopy, drainage of intra-abdominal abscess;  Surgeon: Bhupendra Banda MD;  Location: [facility] OR;  Service: General;  Laterality: N/A;    LAPAROSCOPIC LYSIS OF ADHESIONS  05/26/2019    Procedure: LYSIS, ADHESIONS, LAPAROSCOPIC;  Surgeon: Jose Luis Hanson MD;  Location: [facility] OR;  Service: General;;    VATS, WITH CHEST TUBE INSERTION FOR DRAINAGE OF PLEURAL EFFUSION Right 4/23/2024    Procedure: VATS, WITH CHEST TUBE INSERTION FOR DRAINAGE OF PLEURAL EFFUSION;  Surgeon: Rakesh Blake MD;  Location: [facility] OR [address] [place] [address];  Service: Cardiothoracic;  Laterality: Right;       Review of patient's allergies indicates:   Allergen Reactions    Penicillins Hives     Tolerated  "cephalosporins 4/2024    Amoxicillin     Grass pollen-june grass standard Other (See Comments)       Medications:  Medications Prior to Admission   Medication Sig    albuterol (PROVENTIL/VENTOLIN HFA) 90 mcg/actuation inhaler Inhale 2 puffs into the lungs every 6 (six) hours as needed.    aspirin (ECOTRIN) 81 MG EC tablet Take 1 tablet (81 mg total) by mouth once daily.    azelastine (ASTELIN) 137 mcg (0.1 %) nasal spray 1 spray by Nasal route 2 (two) times daily.    blood sugar diagnostic Strp Use to test blood glucose 2 (two) times daily with meals.    blood-glucose meter Misc Use to test blood glucose 2 (two) times daily with meals.    carvediloL (COREG) 3.125 MG tablet Take 2 tablets (6.25 mg total) by mouth 2 (two) times daily.    cetirizine (ZYRTEC) 10 MG tablet Take 1 tablet (10 mg total) by mouth once daily.    cyclobenzaprine (FLEXERIL) 10 MG tablet Take 10 mg by mouth 3 (three) times daily as needed.    dextrose 5 % in water (D5W) PgBk 100 mL with cefTRIAXone 2 gram SolR 2 g Inject 2 g into the vein once daily.    FREESTYLE HARSHA 14 DAY SENSOR Kit Change every 14 days for blood glucose monitoring.    glimepiride (AMARYL) 4 MG tablet Take 1 tablet (4 mg total) by mouth before breakfast.    hydroCHLOROthiazide (HYDRODIURIL) 25 MG tablet Take 1 tablet (25 mg total) by mouth once daily.    ibuprofen (ADVIL,MOTRIN) 800 MG tablet Take 800 mg by mouth every 8 (eight) hours as needed.    insulin syringe-needle U-100 0.3 mL 30 gauge x 5/16" Syrg 1 each by Misc.(Non-Drug; Combo Route) route 2 (two) times daily with meals.    lancets Misc Use to check blood glucose 2 (two) times daily with meals.    lancing device Misc 1 Device by Misc.(Non-Drug; Combo Route) route 2 (two) times daily with meals.    losartan (COZAAR) 100 MG tablet Take 1 tablet (100 mg total) by mouth once daily.    metFORMIN (GLUCOPHAGE) 500 MG tablet Take 2 tablets (1,000 mg total) by mouth 2 (two) times daily with meals.    ondansetron (ZOFRAN) 4 " "MG tablet Take 1 tablet (4 mg total) by mouth every 8 (eight) hours as needed.    ondansetron (ZOFRAN-ODT) 4 MG TbDL Take 4 mg by mouth.    oxyCODONE (ROXICODONE) 5 MG immediate release tablet Take 1 tablet (5 mg total) by mouth every 4 (four) hours as needed.    pen needle, diabetic 31 gauge x 5/16" Ndle Use to inject insulin 4 (four) times daily.    promethazine (PHENERGAN) 25 MG tablet Take 25 mg by mouth every 8 (eight) hours as needed.     Antibiotics (From admission, onward)      Start     Stop Route Frequency Ordered    05/24/24 1300  cefTRIAXone (ROCEPHIN) 2 g in dextrose 5 % in water (D5W) 100 mL IVPB (MB+)         -- IV Every 24 hours (non-standard times) 05/23/24 1500          Antifungals (From admission, onward)      None          Antivirals (From admission, onward)      None             Immunization History   Administered Date(s) Administered    COVID-19, MRNA, LN-S, PF (MODERNA FULL 0.5 ML DOSE) 03/16/2021, 04/13/2021    Pneumococcal Conjugate - 13 Valent 12/29/2020       Family History       Problem Relation (Age of Onset)    Diabetes Father          Social History     Socioeconomic History    Marital status: Single   Tobacco Use    Smoking status: Never    Smokeless tobacco: Never   Substance and Sexual Activity    Alcohol use: Yes     Comment: occasionally    Drug use: Never     Social Determinants of Health     Financial Resource Strain: Medium Risk (5/23/2024)    Overall Financial Resource Strain (CARDIA)     Difficulty of Paying Living Expenses: Somewhat hard   Food Insecurity: Food Insecurity Present (5/23/2024)    Hunger Vital Sign     Worried About Running Out of Food in the Last Year: Sometimes true     Ran Out of Food in the Last Year: Sometimes true   Transportation Needs: No Transportation Needs (4/16/2024)    PRAPARE - Transportation     Lack of Transportation (Medical): No     Lack of Transportation (Non-Medical): No   Physical Activity: Sufficiently Active (5/23/2024)    Exercise Vital " Sign     Days of Exercise per Week: 5 days     Minutes of Exercise per Session: 60 min   Stress: Stress Concern Present (5/23/2024)    [employer] [place] [employer] - Occupational Stress Questionnaire     Feeling of Stress : To some extent   Housing Stability: Low Risk  (5/23/2024)    Housing Stability Vital Sign     Unable to Pay for Housing in the Last Year: No     Homeless in the Last Year: No     Review of Systems   Constitutional:  Negative for chills and fever.   All other systems reviewed and are negative.    Objective:     Vital Signs (Most Recent):  Temp: 98.1 °F (36.7 °C) (05/24/24 0739)  Pulse: 81 (05/24/24 0744)  Resp: 16 (05/24/24 0739)  BP: (!) 147/84 (05/24/24 0739)  SpO2: 98 % (05/24/24 0739) Vital Signs (24h Range):  Temp:  [97.6 °F (36.4 °C)-99.5 °F (37.5 °C)] 98.1 °F (36.7 °C)  Pulse:  [66-81] 81  Resp:  [16-20] 16  SpO2:  [97 %-100 %] 98 %  BP: (142-182)/(67-84) 147/84     Weight: 93.8 kg (206 lb 12.7 oz)  Body mass index is 32.39 kg/m².    Estimated Creatinine Clearance: 130.5 mL/min (based on SCr of 0.6 mg/dL).     Physical Exam  Constitutional:       General: She is not in acute distress.     Appearance: She is not ill-appearing or toxic-appearing.   Pulmonary:      Effort: Pulmonary effort is normal. No respiratory distress.   Abdominal:      General: There is no distension.      Palpations: Abdomen is soft.      Tenderness: There is no abdominal tenderness. There is no guarding.   Musculoskeletal:         General: No tenderness (spinal or R shoulder).      Right lower leg: No edema.      Left lower leg: No edema.      Comments: Back drain - bloody fluid   Skin:     General: Skin is warm and dry.      Comments: L picc     Neurological:      Mental Status: She is alert and oriented to person, place, and time.          Significant Labs:   Microbiology Results (last 7 days)       Procedure Component Value Units Date/Time    Gram stain [[id number]] Collected: 05/24/24 1421    Order  Status: Completed Specimen: Abscess from Back Updated: 05/25/24 0912     Gram Stain Result Rare WBC's      No organisms seen    Narrative:      EPIDURAL ABSCESS    Gram stain [[id number]] Collected: 05/24/24 1421    Order Status: Completed Specimen: Abscess from Back Updated: 05/25/24 0911     Gram Stain Result Rare WBC's      No organisms seen    Narrative:      EPIDURAL PHLEGMON    Aerobic culture [[id number]] Collected: 05/24/24 1421    Order Status: Completed Specimen: Abscess from Back Updated: 05/25/24 0739     Aerobic Bacterial Culture No growth    Narrative:      EPIDURAL ABSCESS    Aerobic culture [[id number]] Collected: 05/24/24 1421    Order Status: Completed Specimen: Abscess from Back Updated: 05/25/24 0739     Aerobic Bacterial Culture No growth    Narrative:      EPIDURAL PHLEGMON    Fungus culture [[id number]] Collected: 05/24/24 1421    Order Status: Sent Specimen: Abscess from Back Updated: 05/24/24 1512    AFB Culture & Smear [[id number]] Collected: 05/24/24 1421    Order Status: Sent Specimen: Abscess from Back Updated: 05/24/24 1512    Culture, Anaerobe [[id number]] Collected: 05/24/24 1421    Order Status: Sent Specimen: Abscess from Back Updated: 05/24/24 1511    Fungus culture [[id number]] Collected: 05/24/24 1421    Order Status: Sent Specimen: Abscess from Back Updated: 05/24/24 1509    AFB Culture & Smear [[id number]] Collected: 05/24/24 1421    Order Status: Sent Specimen: Abscess from Back Updated: 05/24/24 1509    Culture, Anaerobe [[id number]] Collected: 05/24/24 1421    Order Status: Sent Specimen: Abscess from Back Updated: 05/24/24 1508    Blood Culture #2 **CANNOT BE ORDERED STAT** [[id number]] Collected: 05/23/24 1341    Order Status: Completed Specimen: Blood from Peripheral, Forearm, Left Updated: 05/24/24 1503     Blood Culture, Routine No Growth to date      No Growth to date    Blood Culture #1 **CANNOT BE ORDERED STAT** [[id number]] Collected: 05/23/24 1341    Order Status:  Completed Specimen: Blood from Peripheral, Antecubital, Left Updated: 05/24/24 1503     Blood Culture, Routine No Growth to date      No Growth to date    Gram stain [[id number]] Collected: 05/24/24 1421    Order Status: Canceled Specimen: Abscess from Back     Aerobic culture [[id number]] Collected: 05/24/24 1421    Order Status: Canceled Specimen: Abscess from Back     Culture, Anaerobic [[id number]] Collected: 05/24/24 1421    Order Status: Canceled Specimen: Abscess from Back             Significant Imaging: I have reviewed all pertinent imaging results/findings within the past 24 hours.

## 2024-05-24 NOTE — BRIEF OP NOTE
[facility] [place] [facility]  Surgery Department  Operative Note    SUMMARY     Date of Procedure: 5/24/2024     Procedure: Procedure(s) (LRB):  LAMINOFORAMINOTOMY, SPINE, T7/T8, MINIMALLY INVASIVE; with REMOVAL OF ABSCESS (Left)     Surgeons and Role:     * Janusz Xiao MD - Primary    Assisting Surgeon: None    Pre-Operative Diagnosis: Epidural abscess [G06.2]    Post-Operative Diagnosis: Post-Op Diagnosis Codes:     * Epidural abscess [G06.2]    Anesthesia: General    Operative Findings (including complications, if any):  Combination of liquid abscess and epidural phlegmon on the left at T7/8 disc space.  Neuro monitoring stable throughout case.    Description of Technical Procedures:  See op note    Estimated Blood Loss (EBL):  20 cc           Implants: * No implants in log *    Specimens:  2 specimens sent for culture, epidural phlegmon and epidural abscess  Specimen (24h ago, onward)      None                    Condition: Good    Disposition: PACU - hemodynamically stable.    Attestation: Op Note Attestation: I was physically present and scrubbed for the entire procedure.

## 2024-05-24 NOTE — ANESTHESIA PROCEDURE NOTES
Intubation    Date/Time: 5/24/2024 11:41 AM    Performed by: Maya Morin CRNA  Authorized by: Guero Diaz MD    Intubation:     Induction:  Intravenous    Intubated:  Postinduction    Mask Ventilation:  Easy mask    Attempts:  1    Attempted By:  CRNA    Method of Intubation:  Video laryngoscopy    Blade:  Carlisle 3    Laryngeal View Grade: Grade I - full view of cords      Difficult Airway Encountered?: No      Complications:  None    Airway Device:  Oral endotracheal tube    Airway Device Size:  7.0    Style/Cuff Inflation:  Cuffed (inflated to minimal occlusive pressure)    Tube secured:  21    Secured at:  The lips    Placement Verified By:  Capnometry    Complicating Factors:  None    Findings Post-Intubation:  BS equal bilateral and atraumatic/condition of teeth unchanged

## 2024-05-24 NOTE — NURSING
[facility] [place]  Nurses Note -- 4 Eyes      5/24/2024       Skin assessed on: Q Shift      [x] No Pressure Injuries Present    []Prevention Measures Documented    [] Yes LDA  for Pressure Injury Previously documented     [] Yes New Pressure Injury Discovered   [] LDA for New Pressure Injury Added      Attending RN:  Ellyn Nayak RN     Second RN:  MARLEY Kim

## 2024-05-25 DIAGNOSIS — G06.2 EPIDURAL ABSCESS: Primary | ICD-10-CM

## 2024-05-25 LAB
ALBUMIN SERPL BCP-MCNC: 3.2 G/DL (ref 3.5–5.2)
ALP SERPL-CCNC: 70 U/L (ref 55–135)
ALT SERPL W/O P-5'-P-CCNC: 10 U/L (ref 10–44)
ANION GAP SERPL CALC-SCNC: 11 MMOL/L (ref 8–16)
AST SERPL-CCNC: 11 U/L (ref 10–40)
BASOPHILS # BLD AUTO: 0.01 K/UL (ref 0–0.2)
BASOPHILS NFR BLD: 0.1 % (ref 0–1.9)
BILIRUB SERPL-MCNC: 0.3 MG/DL (ref 0.1–1)
BUN SERPL-MCNC: 15 MG/DL (ref 6–20)
CALCIUM SERPL-MCNC: 9.5 MG/DL (ref 8.7–10.5)
CANCER AG125 SERPL-ACNC: 10 U/ML (ref 0–30)
CANCER AG19-9 SERPL-ACNC: 31.5 U/ML (ref 0–40)
CHLORIDE SERPL-SCNC: 104 MMOL/L (ref 95–110)
CO2 SERPL-SCNC: 24 MMOL/L (ref 23–29)
CREAT SERPL-MCNC: 0.7 MG/DL (ref 0.5–1.4)
DIFFERENTIAL METHOD BLD: ABNORMAL
EOSINOPHIL # BLD AUTO: 0 K/UL (ref 0–0.5)
EOSINOPHIL NFR BLD: 0 % (ref 0–8)
ERYTHROCYTE [DISTWIDTH] IN BLOOD BY AUTOMATED COUNT: 13.8 % (ref 11.5–14.5)
EST. GFR  (NO RACE VARIABLE): >60 ML/MIN/1.73 M^2
ESTIMATED AVG GLUCOSE: 189 MG/DL (ref 68–131)
GLUCOSE SERPL-MCNC: 110 MG/DL (ref 70–110)
GRAM STN SPEC: NORMAL
HBA1C MFR BLD: 8.2 % (ref 4–5.6)
HCT VFR BLD AUTO: 31.8 % (ref 37–48.5)
HGB BLD-MCNC: 10.2 G/DL (ref 12–16)
IMM GRANULOCYTES # BLD AUTO: 0.04 K/UL (ref 0–0.04)
IMM GRANULOCYTES NFR BLD AUTO: 0.6 % (ref 0–0.5)
LYMPHOCYTES # BLD AUTO: 1.5 K/UL (ref 1–4.8)
LYMPHOCYTES NFR BLD: 21.1 % (ref 18–48)
MAGNESIUM SERPL-MCNC: 1.8 MG/DL (ref 1.6–2.6)
MCH RBC QN AUTO: 28.7 PG (ref 27–31)
MCHC RBC AUTO-ENTMCNC: 32.1 G/DL (ref 32–36)
MCV RBC AUTO: 90 FL (ref 82–98)
MONOCYTES # BLD AUTO: 0.5 K/UL (ref 0.3–1)
MONOCYTES NFR BLD: 7.4 % (ref 4–15)
NEUTROPHILS # BLD AUTO: 4.9 K/UL (ref 1.8–7.7)
NEUTROPHILS NFR BLD: 70.8 % (ref 38–73)
NRBC BLD-RTO: 0 /100 WBC
PHOSPHATE SERPL-MCNC: 3.9 MG/DL (ref 2.7–4.5)
PLATELET # BLD AUTO: 306 K/UL (ref 150–450)
PMV BLD AUTO: 10 FL (ref 9.2–12.9)
POCT GLUCOSE: 107 MG/DL (ref 70–110)
POCT GLUCOSE: 129 MG/DL (ref 70–110)
POCT GLUCOSE: 143 MG/DL (ref 70–110)
POCT GLUCOSE: 95 MG/DL (ref 70–110)
POTASSIUM SERPL-SCNC: 3.8 MMOL/L (ref 3.5–5.1)
PROT SERPL-MCNC: 7.3 G/DL (ref 6–8.4)
RBC # BLD AUTO: 3.55 M/UL (ref 4–5.4)
SODIUM SERPL-SCNC: 139 MMOL/L (ref 136–145)
WBC # BLD AUTO: 6.86 K/UL (ref 3.9–12.7)

## 2024-05-25 PROCEDURE — 25000003 PHARM REV CODE 250: Performed by: STUDENT IN AN ORGANIZED HEALTH CARE EDUCATION/TRAINING PROGRAM

## 2024-05-25 PROCEDURE — 84100 ASSAY OF PHOSPHORUS: CPT | Performed by: STUDENT IN AN ORGANIZED HEALTH CARE EDUCATION/TRAINING PROGRAM

## 2024-05-25 PROCEDURE — 83036 HEMOGLOBIN GLYCOSYLATED A1C: CPT | Performed by: STUDENT IN AN ORGANIZED HEALTH CARE EDUCATION/TRAINING PROGRAM

## 2024-05-25 PROCEDURE — 85025 COMPLETE CBC W/AUTO DIFF WBC: CPT | Performed by: STUDENT IN AN ORGANIZED HEALTH CARE EDUCATION/TRAINING PROGRAM

## 2024-05-25 PROCEDURE — 36415 COLL VENOUS BLD VENIPUNCTURE: CPT | Performed by: STUDENT IN AN ORGANIZED HEALTH CARE EDUCATION/TRAINING PROGRAM

## 2024-05-25 PROCEDURE — 80053 COMPREHEN METABOLIC PANEL: CPT | Performed by: STUDENT IN AN ORGANIZED HEALTH CARE EDUCATION/TRAINING PROGRAM

## 2024-05-25 PROCEDURE — 83735 ASSAY OF MAGNESIUM: CPT | Performed by: STUDENT IN AN ORGANIZED HEALTH CARE EDUCATION/TRAINING PROGRAM

## 2024-05-25 PROCEDURE — 86304 IMMUNOASSAY TUMOR CA 125: CPT | Performed by: STUDENT IN AN ORGANIZED HEALTH CARE EDUCATION/TRAINING PROGRAM

## 2024-05-25 PROCEDURE — 86301 IMMUNOASSAY TUMOR CA 19-9: CPT | Performed by: STUDENT IN AN ORGANIZED HEALTH CARE EDUCATION/TRAINING PROGRAM

## 2024-05-25 PROCEDURE — 99223 1ST HOSP IP/OBS HIGH 75: CPT | Mod: ,,, | Performed by: STUDENT IN AN ORGANIZED HEALTH CARE EDUCATION/TRAINING PROGRAM

## 2024-05-25 PROCEDURE — 63600175 PHARM REV CODE 636 W HCPCS: Performed by: STUDENT IN AN ORGANIZED HEALTH CARE EDUCATION/TRAINING PROGRAM

## 2024-05-25 PROCEDURE — 11000001 HC ACUTE MED/SURG PRIVATE ROOM

## 2024-05-25 RX ORDER — HYDROMORPHONE HYDROCHLORIDE 1 MG/ML
1 INJECTION, SOLUTION INTRAMUSCULAR; INTRAVENOUS; SUBCUTANEOUS
Status: DISCONTINUED | OUTPATIENT
Start: 2024-05-25 | End: 2024-05-26 | Stop reason: HOSPADM

## 2024-05-25 RX ORDER — CARVEDILOL 6.25 MG/1
6.25 TABLET ORAL ONCE
Status: DISCONTINUED | OUTPATIENT
Start: 2024-05-25 | End: 2024-05-26 | Stop reason: HOSPADM

## 2024-05-25 RX ORDER — CARVEDILOL 12.5 MG/1
12.5 TABLET ORAL 2 TIMES DAILY WITH MEALS
Status: DISCONTINUED | OUTPATIENT
Start: 2024-05-26 | End: 2024-05-26

## 2024-05-25 RX ADMIN — Medication 6 MG: at 08:05

## 2024-05-25 RX ADMIN — ACETAMINOPHEN 650 MG: 325 TABLET ORAL at 08:05

## 2024-05-25 RX ADMIN — CARVEDILOL 6.25 MG: 6.25 TABLET, FILM COATED ORAL at 08:05

## 2024-05-25 RX ADMIN — MUPIROCIN: 20 OINTMENT TOPICAL at 08:05

## 2024-05-25 RX ADMIN — ACETAMINOPHEN 650 MG: 325 TABLET ORAL at 10:05

## 2024-05-25 RX ADMIN — CEFTRIAXONE 2 G: 2 INJECTION, POWDER, FOR SOLUTION INTRAMUSCULAR; INTRAVENOUS at 10:05

## 2024-05-25 RX ADMIN — CEFTRIAXONE 2 G: 2 INJECTION, POWDER, FOR SOLUTION INTRAMUSCULAR; INTRAVENOUS at 08:05

## 2024-05-25 NOTE — CONSULTS
[facility]  Infectious Disease  Consult Note    Patient Name: Shahida Ortega  MRN: [id number]  Admission Date: 5/23/2024  Hospital Length of Stay: 2 days  Attending Physician: Tyrese Wang MD  Primary Care Provider: Janusz Staton MD     Isolation Status: No active isolations    Patient information was obtained from patient, past medical records, ER records, and primary team.      Consults  Assessment/Plan:     Neuro  * Vertebral abscess  I independently reviewed patient's lab work and images as documented. 54 yo female with recent admission for GBS bacteremia c/b R septic joint, T5-Tll prevertebral abscess (no acute surgical intervention, maintained on ceftriaxone until 5/29) admitted for worsening discitis/epidural abscess seen on outpatient MRI. Hospital course notable for blood cx on admit no growth to date.S/p OR with NSGY on 5/24 - noted to have liquid abscess and epidural phlegmon, cx in process, so far no growth to date. Doing well post-op.     Recommendations:  -ceftriaxone 2g IV q12 hr given epidural involvement, anticipate 6w course, louann 7/4 pending repeat MRI   -targeting prior organism isolated  -follow up OR cx - so far no growth to date  -drain mgmt per primary/nsgy    Outpatient Antibiotic Therapy Plan:    Please send referral to [facility].    1) Infection: GBS epidural abscess    2) Discharge Antibiotics:    Intravenous antibiotics:  Ceftriaxone 2g iv q12 hr      3) Therapy Duration:  6w pending repeat MRI    Estimated end date of IV antibiotics: 7/4/24    4) Outpatient Weekly Labs:    Order the following labs to be drawn on Mondays:   CBC  CMP       5) Fax Lab Results to Infectious Diseases Provider: alan    [facility] Fax Number: [phone]    6) Outpatient Infectious Diseases Follow-up    Follow-up appointment will be arranged by the ID clinic and will be found in the patient's appointments tab.    Prior to discharge, please ensure the  patient's follow-up has been scheduled.    If there is still no follow-up scheduled prior to discharge, please send an EPIC message to Annmarie Gill in Infectious Diseases.                Thank you for your consult. I will follow-up with patient. Please contact us if you have any additional questions. Above d/w primary/nsgy team.       Marilee Begum MD  Infectious Disease  [facility] [place] - Telemetry    Subjective:     Principal Problem: Vertebral abscess    HPI: 54 yo female with recent admission for GBS bacteremia c/b R septic joint, T5-Tll prevertebral abscess (no acute surgical intervention, maintained on ceftriaxone until 5/29) admitted for worsening discitis/epidural abscess. ID consulted for abx recs. Hospital course notable for blood cx on admit no growth to date.S/p OR with NSGY on 5/24 - noted to have liquid abscess and epidural phlegmon, cx in process. Pt is currently on ceftriaxone. Pt stated that she tolerated her CTX and denied problems with PICC. Denied fevers, chills, worsening back pain or shoulder pain at home.           Past Medical History:   Diagnosis Date    Anemia 04/22/2024    Atrial fibrillation     DM2 (diabetes mellitus, type 2) 05/28/2023    History of abdominal abscess     History of pleural effusion     Hypertension     Osteomyelitis 05/23/2024    Prolonged Q-T interval on ECG 05/31/2023    Qtc 525 5/31/23       Past Surgical History:   Procedure Laterality Date    ABDOMINAL SURGERY      ARTHROSCOPIC DEBRIDEMENT OF SHOULDER Right 4/24/2024    Procedure: DEBRIDEMENT, SHOULDER, ARTHROSCOPIC; Linvatec, beach chair, 9L NS;  Surgeon: Madi Bailey MD;  Location: [facility] OR [address] [place] [address];  Service: Orthopedics;  Laterality: Right;    ARTHROSCOPIC DEBRIDEMENT OF SHOULDER Right 4/25/2024    Procedure: DEBRIDEMENT, SHOULDER, ARTHROSCOPIC;  Surgeon: Thaddeus Corral MD;  Location: [facility] OR [address] [place] [address];  Service: Orthopedics;  Laterality: Right;    COLONOSCOPY N/A 06/01/2023    Procedure:  COLONOSCOPY;  Surgeon: Thaddeus Carlos MD;  Location: [facility] ENDO;  Service: Endoscopy;  Laterality: N/A;    DIAGNOSTIC LAPAROSCOPY N/A 05/26/2019    Procedure: LAPAROSCOPY, DIAGNOSTIC Drainage of abscess ;  Surgeon: Jose Luis Hanson MD;  Location: [facility] OR;  Service: General;  Laterality: N/A;  Drain Placement    DIAGNOSTIC LAPAROSCOPY N/A 12/27/2020    Procedure: Diagnostic laparoscopy, drainage of intra-abdominal abscess;  Surgeon: Bhupendra Banda MD;  Location: [facility] OR;  Service: General;  Laterality: N/A;    LAPAROSCOPIC LYSIS OF ADHESIONS  05/26/2019    Procedure: LYSIS, ADHESIONS, LAPAROSCOPIC;  Surgeon: Jose Luis aHnson MD;  Location: [facility] OR;  Service: General;;    VATS, WITH CHEST TUBE INSERTION FOR DRAINAGE OF PLEURAL EFFUSION Right 4/23/2024    Procedure: VATS, WITH CHEST TUBE INSERTION FOR DRAINAGE OF PLEURAL EFFUSION;  Surgeon: Rakesh Blake MD;  Location: [address];  Service: Cardiothoracic;  Laterality: Right;       Review of patient's allergies indicates:   Allergen Reactions    Penicillins Hives     Tolerated cephalosporins 4/2024    Amoxicillin     Grass pollen-june grass standard Other (See Comments)       Medications:  Medications Prior to Admission   Medication Sig    albuterol (PROVENTIL/VENTOLIN HFA) 90 mcg/actuation inhaler Inhale 2 puffs into the lungs every 6 (six) hours as needed.    aspirin (ECOTRIN) 81 MG EC tablet Take 1 tablet (81 mg total) by mouth once daily.    azelastine (ASTELIN) 137 mcg (0.1 %) nasal spray 1 spray by Nasal route 2 (two) times daily.    blood sugar diagnostic Strp Use to test blood glucose 2 (two) times daily with meals.    blood-glucose meter Misc Use to test blood glucose 2 (two) times daily with meals.    carvediloL (COREG) 3.125 MG tablet Take 2 tablets (6.25 mg total) by mouth 2 (two) times daily.    cetirizine (ZYRTEC) 10 MG tablet Take 1 tablet (10 mg total) by mouth once daily.    cyclobenzaprine (FLEXERIL) 10 MG tablet Take 10 mg by mouth 3  "(three) times daily as needed.    dextrose 5 % in water (D5W) PgBk 100 mL with cefTRIAXone 2 gram SolR 2 g Inject 2 g into the vein once daily.    FREESTYLE HARSHA 14 DAY SENSOR Kit Change every 14 days for blood glucose monitoring.    glimepiride (AMARYL) 4 MG tablet Take 1 tablet (4 mg total) by mouth before breakfast.    hydroCHLOROthiazide (HYDRODIURIL) 25 MG tablet Take 1 tablet (25 mg total) by mouth once daily.    ibuprofen (ADVIL,MOTRIN) 800 MG tablet Take 800 mg by mouth every 8 (eight) hours as needed.    insulin syringe-needle U-100 0.3 mL 30 gauge x 5/16" Syrg 1 each by Misc.(Non-Drug; Combo Route) route 2 (two) times daily with meals.    lancets Misc Use to check blood glucose 2 (two) times daily with meals.    lancing device Misc 1 Device by Misc.(Non-Drug; Combo Route) route 2 (two) times daily with meals.    losartan (COZAAR) 100 MG tablet Take 1 tablet (100 mg total) by mouth once daily.    metFORMIN (GLUCOPHAGE) 500 MG tablet Take 2 tablets (1,000 mg total) by mouth 2 (two) times daily with meals.    ondansetron (ZOFRAN) 4 MG tablet Take 1 tablet (4 mg total) by mouth every 8 (eight) hours as needed.    ondansetron (ZOFRAN-ODT) 4 MG TbDL Take 4 mg by mouth.    oxyCODONE (ROXICODONE) 5 MG immediate release tablet Take 1 tablet (5 mg total) by mouth every 4 (four) hours as needed.    pen needle, diabetic 31 gauge x 5/16" Ndle Use to inject insulin 4 (four) times daily.    promethazine (PHENERGAN) 25 MG tablet Take 25 mg by mouth every 8 (eight) hours as needed.     Antibiotics (From admission, onward)      Start     Stop Route Frequency Ordered    05/24/24 1300  cefTRIAXone (ROCEPHIN) 2 g in dextrose 5 % in water (D5W) 100 mL IVPB (MB+)         -- IV Every 24 hours (non-standard times) 05/23/24 1500          Antifungals (From admission, onward)      None          Antivirals (From admission, onward)      None             Immunization History   Administered Date(s) Administered    COVID-19, MRNA, LN-S, " PF (MODERNA FULL 0.5 ML DOSE) 03/16/2021, 04/13/2021    Pneumococcal Conjugate - 13 Valent 12/29/2020       Family History       Problem Relation (Age of Onset)    Diabetes Father          Social History     Socioeconomic History    Marital status: Single   Tobacco Use    Smoking status: Never    Smokeless tobacco: Never   Substance and Sexual Activity    Alcohol use: Yes     Comment: occasionally    Drug use: Never     Social Determinants of Health     Financial Resource Strain: Medium Risk (5/23/2024)    Overall Financial Resource Strain (CARDIA)     Difficulty of Paying Living Expenses: Somewhat hard   Food Insecurity: Food Insecurity Present (5/23/2024)    Hunger Vital Sign     Worried About Running Out of Food in the Last Year: Sometimes true     Ran Out of Food in the Last Year: Sometimes true   Transportation Needs: No Transportation Needs (4/16/2024)    PRAPARE - Transportation     Lack of Transportation (Medical): No     Lack of Transportation (Non-Medical): No   Physical Activity: Sufficiently Active (5/23/2024)    Exercise Vital Sign     Days of Exercise per Week: 5 days     Minutes of Exercise per Session: 60 min   Stress: Stress Concern Present (5/23/2024)    [employer] - Occupational Stress Questionnaire     Feeling of Stress : To some extent   Housing Stability: Low Risk  (5/23/2024)    Housing Stability Vital Sign     Unable to Pay for Housing in the Last Year: No     Homeless in the Last Year: No     Review of Systems   Constitutional:  Negative for chills and fever.   All other systems reviewed and are negative.    Objective:     Vital Signs (Most Recent):  Temp: 98.1 °F (36.7 °C) (05/24/24 0739)  Pulse: 81 (05/24/24 0744)  Resp: 16 (05/24/24 0739)  BP: (!) 147/84 (05/24/24 0739)  SpO2: 98 % (05/24/24 0739) Vital Signs (24h Range):  Temp:  [97.6 °F (36.4 °C)-99.5 °F (37.5 °C)] 98.1 °F (36.7 °C)  Pulse:  [66-81] 81  Resp:  [16-20] 16  SpO2:  [97 %-100 %] 98 %  BP:  (142-182)/(67-84) 147/84     Weight: 93.8 kg (206 lb 12.7 oz)  Body mass index is 32.39 kg/m².    Estimated Creatinine Clearance: 130.5 mL/min (based on SCr of 0.6 mg/dL).     Physical Exam  Constitutional:       General: She is not in acute distress.     Appearance: She is not ill-appearing or toxic-appearing.   Pulmonary:      Effort: Pulmonary effort is normal. No respiratory distress.   Abdominal:      General: There is no distension.      Palpations: Abdomen is soft.      Tenderness: There is no abdominal tenderness. There is no guarding.   Musculoskeletal:         General: No tenderness (spinal or R shoulder).      Right lower leg: No edema.      Left lower leg: No edema.      Comments: Back drain - bloody fluid   Skin:     General: Skin is warm and dry.      Comments: L picc     Neurological:      Mental Status: She is alert and oriented to person, place, and time.          Significant Labs:   Microbiology Results (last 7 days)       Procedure Component Value Units Date/Time    Gram stain [[id number]] Collected: 05/24/24 1421    Order Status: Completed Specimen: Abscess from Back Updated: 05/25/24 0912     Gram Stain Result Rare WBC's      No organisms seen    Narrative:      EPIDURAL ABSCESS    Gram stain [[id number]] Collected: 05/24/24 1421    Order Status: Completed Specimen: Abscess from Back Updated: 05/25/24 0911     Gram Stain Result Rare WBC's      No organisms seen    Narrative:      EPIDURAL PHLEGMON    Aerobic culture [[id number]] Collected: 05/24/24 1421    Order Status: Completed Specimen: Abscess from Back Updated: 05/25/24 0739     Aerobic Bacterial Culture No growth    Narrative:      EPIDURAL ABSCESS    Aerobic culture [[id number]] Collected: 05/24/24 1421    Order Status: Completed Specimen: Abscess from Back Updated: 05/25/24 0739     Aerobic Bacterial Culture No growth    Narrative:      EPIDURAL PHLEGMON    Fungus culture [[id number]] Collected: 05/24/24 1421    Order Status: Sent  Specimen: Abscess from Back Updated: 05/24/24 1512    AFB Culture & Smear [[id number]] Collected: 05/24/24 1421    Order Status: Sent Specimen: Abscess from Back Updated: 05/24/24 1512    Culture, Anaerobe [[id number]] Collected: 05/24/24 1421    Order Status: Sent Specimen: Abscess from Back Updated: 05/24/24 1511    Fungus culture [[id number]] Collected: 05/24/24 1421    Order Status: Sent Specimen: Abscess from Back Updated: 05/24/24 1509    AFB Culture & Smear [[id number]] Collected: 05/24/24 1421    Order Status: Sent Specimen: Abscess from Back Updated: 05/24/24 1509    Culture, Anaerobe [[id number]] Collected: 05/24/24 1421    Order Status: Sent Specimen: Abscess from Back Updated: 05/24/24 1508    Blood Culture #2 **CANNOT BE ORDERED STAT** [[id number]] Collected: 05/23/24 1341    Order Status: Completed Specimen: Blood from Peripheral, Forearm, Left Updated: 05/24/24 1503     Blood Culture, Routine No Growth to date      No Growth to date    Blood Culture #1 **CANNOT BE ORDERED STAT** [[id number]] Collected: 05/23/24 1341    Order Status: Completed Specimen: Blood from Peripheral, Antecubital, Left Updated: 05/24/24 1503     Blood Culture, Routine No Growth to date      No Growth to date    Gram stain [[id number]] Collected: 05/24/24 1421    Order Status: Canceled Specimen: Abscess from Back     Aerobic culture [[id number]] Collected: 05/24/24 1421    Order Status: Canceled Specimen: Abscess from Back     Culture, Anaerobic [[id number]] Collected: 05/24/24 1421    Order Status: Canceled Specimen: Abscess from Back             Significant Imaging: I have reviewed all pertinent imaging results/findings within the past 24 hours.

## 2024-05-25 NOTE — ASSESSMENT & PLAN NOTE
I independently reviewed patient's lab work and images as documented. 54 yo female with recent admission for GBS bacteremia c/b R septic joint, T5-Tll prevertebral abscess (no acute surgical intervention, maintained on ceftriaxone until 5/29) admitted for worsening discitis/epidural abscess seen on outpatient MRI. Hospital course notable for blood cx on admit no growth to date.S/p OR with NSGY on 5/24 - noted to have liquid abscess and epidural phlegmon, cx in process, so far no growth to date. Doing well post-op.     Recommendations:  -ceftriaxone 2g IV q12 hr given epidural involvement, anticipate 6w course, louann 7/4 pending repeat MRI  -follow up OR cx - so far no growth to date  -drain mgmt per primary/nsgy    Outpatient Antibiotic Therapy Plan:    Please send referral to [facility].    1) Infection: GBS epidural abscess    2) Discharge Antibiotics:    Intravenous antibiotics:  Ceftriaxone 2g iv q12 hr      3) Therapy Duration:  6w pending repeat MRI    Estimated end date of IV antibiotics: 7/4/24    4) Outpatient Weekly Labs:    Order the following labs to be drawn on Mondays:   CBC  CMP       5) Fax Lab Results to Infectious Diseases Provider: alan    [facility] Fax Number: [phone]    6) Outpatient Infectious Diseases Follow-up    Follow-up appointment will be arranged by the ID clinic and will be found in the patient's appointments tab.    Prior to discharge, please ensure the patient's follow-up has been scheduled.    If there is still no follow-up scheduled prior to discharge, please send an EPIC message to Annmarie Gill in Infectious Diseases.

## 2024-05-25 NOTE — NURSING
[facility] [place]  Nurses Note -- 4 Eyes      5/24/2024       Skin assessed on: Q Shift      [x] No Pressure Injuries Present    []Prevention Measures Documented    [] Yes LDA  for Pressure Injury Previously documented     [] Yes New Pressure Injury Discovered   [] LDA for New Pressure Injury Added      Attending RN:  Melania Yoon RN     Second RN:   Ellyn Nayak RN

## 2024-05-25 NOTE — OP NOTE
[facility]  Neurosurgery  Operative Note    OP Note      Date of Procedure: 5/24/2024     Pre-Operative Diagnosis: Epidural abscess [G06.2]  Left-sided T7/8 epidural abscess with spinal cord compression and myelopathy    Post-Operative Diagnosis: Post-Op Diagnosis Codes:     * Epidural abscess [G06.2]  Left-sided T7/8 epidural abscess with spinal cord compression and myelopathy    Anesthesia: General    Procedures performed:  1) left-sided T7/8 laminectomy and medial facetectomy with evacuation of epidural abscess and phlegmon  2) left-sided approach for T7/8 microdiskectomy  3) use of intraoperative fluoroscopy  4) use of intraoperative neuro monitoring    Surgeon: Janusz Xiao MD    Assistant::  None    Indication for Procedure:  Patient is a 53-year-old female who had a history of group B strep bacteremia.  She has been treated with IV ceftriaxone.  She had improvement of a large prevertebral abscess/phlegmon, but on serial MRI imaging a new left sided T7/8 epidural abscess emanating from the disc space was found and was causing spinal cord compression.  Patient was having subtle symptoms of myelopathy with patellar hyperreflexia.  Risks benefits indications alternatives of minimally invasive approach for laminectomy and evacuation of epidural abscess were discussed.  Signed informed consent was documented    Operative Note:  Patient was identified in preoperative holding using 2 independent identifiers.  She was taken to the operating room where general endotracheal anesthesia was induced without any problems.  She was turned prone onto an open Tucker table with a Car frame attached.  All pressure points were padded appropriately.  The skin on the back was cleansed thoroughly with rubbing alcohol.  Patient was then prepped with DuraPrep x2 and draped in the usual sterile manner.  Baseline neuro monitoring signals were obtained.  A time-out was then held identifying the correct patient, side,  site, procedures to be performed.  All parties agreed and imaging was displayed.    After the time-out, the fluoro unit was brought in to ronel out the midline as well as another line 3 cm to the left of midline centered at the T7/8 disc space.  A 2 cm incision was marked here and was injected with local anesthetic.  The skin was then opened with a 10. Blade knife down through the dermis.  Bovie electrocautery was used to take this down through the fascia.  Sequential dilation was then performed under lateral fluoroscopic guidance to the T7/8 disc space.  A 6 mm deep 18 mm diameter metrx tube was then placed and affixed to the bed attachment.  AP and lateral x-rays were taken to confirm the correct level and side.    The operative microscope was now brought into the field.  Muscular attachments were released from the left T7/T8 lamina and facet.  The high-speed drill was then used to perform a left T7 hemilaminotomy, as well as laminotomy of the most superior portion of the T8 lamina.  Approximately 60% of the left T7/8 facet was also removed with a high-speed drill in order to adequately access the phlegmon/disc space.  I drilled inferiorly just to the superior aspect of the left T8 pedicle in order to access the disc space. The ligamentum flavum was identified and an upgoing curette was used to create a plane between the dura and the ligament.  Ligamentum flavum was then resected with a 2. Kerrison rongeur.  At this point, I was able to visualize the spinal cord and the exiting T7 nerve root as well as the traversing T8 nerve root, so left T7 and T8 foraminotomies were completed.  A combination of blunt hooks and curettes were used to dissect under the spinal cord and identified the T7/8 disc space and ventral epidural space.  There was copious inflammatory phlegmon in this area.  Bipolar electrocautery was used on top of the disc space/phlegmon.  Further probing of this area with curette and blunt hooks revealed  liquid pus.  Culture swabs were used to sample the white pus as well as the inflammatory phlegmon.  The disc space was then entered with the use of curettes and blunt hooks to attempt to break up the remaining phlegmon.  Once no further liquid pus was coming out, the spinal cord appeared decompressed and I considered the goals of the case to be complete.  Irrigation was then performed.  Hemostasis was obtained with the use of Gelfoam soaked in thrombin as well as Surgiflo and cottonoid patties.  The tube was then slowly removed and hemostasis on the muscles was obtained with bipolar electrocautery.  Once the tube was removed, a 10 Taiwanese MARLEEN drain was placed into the epidural space under the muscles.  This was tunneled separately away from the incision.  This was secured at the skin with a 2-0 Vicryl suture.  The wound was then closed in layers, including 0 Vicryl suture on a UR 6 needle for the deep layer, inverted interrupted 2-0 Vicryl sutures for the dermis.  The wound was then cleansed and dried and a sterile dressing was applied consisting of Mastisol, Steri-Strips, Telfa, Tegaderm.  All sponge and needle counts were correct x2 at the end of the case.  There were no complications.  Neuro monitoring was checked numerous times during the surgery and remained stable.  There were no complications.  I was scrubbed and present for the entire case.    EBL:  20 cc    Specimen Sent:  Culture swabs of phlegmon and liquid abscess sent for microbiology    Janusz Xiao  Neurosurgery

## 2024-05-25 NOTE — NURSING
Telemetry box removed per order.   Contacted Telemetry Central to inform of order to d/c Telemetry.

## 2024-05-25 NOTE — PROGRESS NOTES
[facility]  [facility]  Progress Note    Subjective:     Interval History:  No issues overnight.  Patient states she has been ambulating independently throughout the room into the bathroom.  Patient denies any significant pain at this time.  Nurse says that she emptied the drain once during the night with 30 cc output, another 20 cc in the bulb now    Post-Op Info:  Procedure(s) (LRB):  LAMINOFORAMINOTOMY, SPINE, T7/T8, MINIMALLY INVASIVE; with REMOVAL OF ABSCESS (Left)   1 Day Post-Op      Medications:  Continuous Infusions:  Scheduled Meds:   carvediloL  6.25 mg Oral BID WM    cefTRIAXone (Rocephin) IV (PEDS and ADULTS)  2 g Intravenous Q24H    melatonin  6 mg Oral Nightly    mupirocin   Nasal BID     PRN Meds:  Current Facility-Administered Medications:     acetaminophen, 650 mg, Oral, Q8H PRN    glucagon (human recombinant), 1 mg, Intramuscular, PRN    glucagon (human recombinant), 1 mg, Intramuscular, PRN    glucose, 16 g, Oral, PRN    glucose, 16 g, Oral, PRN    glucose, 24 g, Oral, PRN    glucose, 24 g, Oral, PRN    HYDROmorphone, 1 mg, Intravenous, Q3H PRN    insulin aspart U-100, 0-10 Units, Subcutaneous, QID (AC + HS) PRN    naloxone, 0.02 mg, Intravenous, PRN    polyethylene glycol, 17 g, Oral, BID PRN     Review of Systems  Objective:     Weight: 93.8 kg (206 lb 12.7 oz)  Body mass index is 32.39 kg/m².  Vital Signs (Most Recent):  Temp: 98.1 °F (36.7 °C) (05/24/24 1923)  Pulse: 75 (05/25/24 0325)  Resp: 20 (05/24/24 1923)  BP: (!) 167/81 (05/24/24 2051)  SpO2: 99 % (05/24/24 2106) Vital Signs (24h Range):  Temp:  [97.5 °F (36.4 °C)-98.7 °F (37.1 °C)] 98.1 °F (36.7 °C)  Pulse:  [72-83] 75  Resp:  [11-20] 20  SpO2:  [94 %-100 %] 99 %  BP: (147-196)/(81-95) 167/81          Closed/Suction Drain 05/24/24 1408 Tube - 1 Left Back Bulb 10 Fr. (Active)   Dressing Type Transparent (Tegaderm) 05/24/24 2003   Dressing Status Clean;Intact;Dry 05/24/24 2003   Dressing Intervention Integrity maintained  05/24/24 2003   Drainage None 05/24/24 2003   Status To bulb suction 05/24/24 2003   Output (mL) 25 mL 05/25/24 0656     Neurosurgery Physical Exam  General: well developed, well nourished, no distress  Head: normocephalic, atraumatic  Neurologic: Alert and oriented. Thought content appropriate  Speech: Fluent  Cranial nerves: face symmetric   Eyes: pupils equal  Pulmonary: normal respirations  Sensory: intact to light touch throughout  Motor Strength: Moves all extremities spontaneously with good tone.  Full strength upper and lower extremities. No abnormal movements seen.                  Delt Bi Tri Wrist ext.  IO  RUE:      5    5   5        5          5    5  LUE:      5    5   5        5          5    5              HF KE EHL DF PF  RLE      5    5     5     5    5  LLE:      5    5     5     5    5     DTR's - 3+ patellar reflexes  Ballard: absent  Clonus: absent     Left-sided paraspinal dressing dry/intact, scant saturation of dressing.  20 cc bloody output in bulb.    Significant Labs:  Intraoperative cultures not resulted    Recent Labs   Lab 05/23/24  1341 05/24/24  0535    98    140   K 3.3* 3.6    104   CO2 25 29   BUN 12 10   CREATININE 0.7 0.6   CALCIUM 9.6 9.8   MG  --  1.8     Recent Labs   Lab 05/23/24  1341 05/24/24  0535   WBC 4.95 3.99   HGB 9.7* 9.7*   HCT 30.9* 31.1*    269     Recent Labs   Lab 05/23/24  1528 05/24/24  0535   INR 1.0 1.0   APTT 29.3  --      Microbiology Results (last 7 days)       Procedure Component Value Units Date/Time    Gram stain [[id number]] Collected: 05/24/24 1421    Order Status: Sent Specimen: Abscess from Back Updated: 05/24/24 1515    Fungus culture [[id number]] Collected: 05/24/24 1421    Order Status: Sent Specimen: Abscess from Back Updated: 05/24/24 1512    AFB Culture & Smear [[id number]] Collected: 05/24/24 1421    Order Status: Sent Specimen: Abscess from Back Updated: 05/24/24 1512    Gram stain [[id number]] Collected:  05/24/24 1421    Order Status: Sent Specimen: Abscess from Back Updated: 05/24/24 1512    Culture, Anaerobe [[id number]] Collected: 05/24/24 1421    Order Status: Sent Specimen: Abscess from Back Updated: 05/24/24 1511    Aerobic culture [[id number]] Collected: 05/24/24 1421    Order Status: Sent Specimen: Abscess from Back Updated: 05/24/24 1511    Fungus culture [[id number]] Collected: 05/24/24 1421    Order Status: Sent Specimen: Abscess from Back Updated: 05/24/24 1509    AFB Culture & Smear [[id number]] Collected: 05/24/24 1421    Order Status: Sent Specimen: Abscess from Back Updated: 05/24/24 1509    Culture, Anaerobe [[id number]] Collected: 05/24/24 1421    Order Status: Sent Specimen: Abscess from Back Updated: 05/24/24 1508    Aerobic culture [[id number]] Collected: 05/24/24 1421    Order Status: Sent Specimen: Abscess from Back Updated: 05/24/24 1508    Blood Culture #2 **CANNOT BE ORDERED STAT** [[id number]] Collected: 05/23/24 1341    Order Status: Completed Specimen: Blood from Peripheral, Forearm, Left Updated: 05/24/24 1503     Blood Culture, Routine No Growth to date      No Growth to date    Blood Culture #1 **CANNOT BE ORDERED STAT** [[id number]] Collected: 05/23/24 1341    Order Status: Completed Specimen: Blood from Peripheral, Antecubital, Left Updated: 05/24/24 1503     Blood Culture, Routine No Growth to date      No Growth to date    Gram stain [[id number]] Collected: 05/24/24 1421    Order Status: Canceled Specimen: Abscess from Back     Aerobic culture [[id number]] Collected: 05/24/24 1421    Order Status: Canceled Specimen: Abscess from Back     Culture, Anaerobic [[id number]] Collected: 05/24/24 1421    Order Status: Canceled Specimen: Abscess from Back           Significant Diagnostics:  None to review    Assessment/Plan:   Shahida Ortega is a 53 y.o. female postop day 1. Status post left T7/8 laminectomy/medial facetectomy for evacuation of epidural abscess/phlegmon  -postoperative neuro  exam is stable  -drain output overnight is a little bit too much to send her home  -if there is no more than 20 cc during the day today, patient could go home this evening.  Otherwise I will return tomorrow and remove the drain  -continue antibiotics per ID recommendations  -follow up intraoperative cultures  -mobilize ad joaquín, no restrictions      Active Diagnoses:    Diagnosis Date Noted POA    PRINCIPAL PROBLEM:  Vertebral abscess [M46.20] 04/19/2024 Yes    Osteomyelitis [M86.9] 05/23/2024 Yes    Bacteremia due to group B Streptococcus [R78.81, B95.1] 04/16/2024 Yes    A-fib [I48.91] 05/31/2023 Yes    Prolonged Q-T interval on ECG [R94.31] 05/31/2023 Yes    DM2 (diabetes mellitus, type 2) [E11.9] 05/28/2023 Yes      Problems Resolved During this Admission:       Janusz Xiao MD  Neurosurgery  [facility] [place] - Telemetry

## 2024-05-25 NOTE — PROGRESS NOTES
[facility] [place] [facility]  Progress Note    Patient Name: Shahida Ortega  MRN: [id number]  Patient Class: IP- Inpatient   Admission Date: 5/23/2024  Length of Stay: 2 days  Attending Physician: Tyrese Wang MD  Primary Care Provider: Janusz Staton MD        Subjective:     Principal Problem:Vertebral abscess        HPI:    Shahida Ortega is a 53 y.o. female who has a past medical history of Anemia, Atrial fibrillation, DM2 (diabetes mellitus, type 2), History of abdominal abscess, History of pleural effusion, Hypertension, and Prolonged Q-T interval on ECG, presented to the ED with CC of Back pain, found to have spinal epidural abscesses, worsening on IV antibiotics at home. Patient has been on Ceftriaxone since 4/30/24, growing GBS previously. Other organisms in past include E.coli in urine and blood. Feeling fairly well, although worsening clinical picture on outpatient MRI, with known discitis/osteomyelitis at the T7-T8 level and interval development of phlegmonous material extending about the epidural space at T7-T8 with a small associated epidural abscess measuring approximately 0.8 x 0.5 cm, contributing to at least moderate spinal canal stenosis at this level (Additional encroachment of the left greater than right neural foramen) and marked improvement and paravertebral phlegmonous material about the paravertebral soft tissues mostly centered at the T7-T8 level. Denies CP or SOB. Admitted to  with NeuroSx consult, and plans for washout 5/24.     Overview/Hospital Course:  Patient found to have epidural abscess after blood infection and septic arthritis of shoulder. Patient has had odd increases in CA 19-9 and  over the years, will check. Odd to get these infections, to some degree, while not being an IVDU or immunocompromised in some way. No hx of drug use and was healthy until a couple years ago. Found to be diabetic with A1c 11 at that time, could be getting infections with uncontrolled  Diabetes, however, her glucoses here on no meds do not reflect that- now having hypoglycemia of 40 not on any insulin, do not usually see this with metformin/orals, will recheck her A1c. Insulinomas do have the potential to raise CA 19-9 and obviously hypoglycemia.... However, infection can drive the glucoses low as well, and is most likely the cause now.    Washout of abscesses 5/24. Epidural phlegmon and epidural abscess cleaned out, no growth yet.     Interval History:  NAEON.  No new issues.   Denies complaints.  All questions answered and updates on care given.       ROS:  General: Negative for fevers or chills.  Cardiac: Negative for chest pain or orthopnea   Pulmonary: Negative for dyspnea or wheezing.  GI: Negative for abdominal distention or pain   +wound    Vitals:    05/25/24 0017 05/25/24 0325 05/25/24 0728 05/25/24 0729   BP:   (!) 171/80    BP Location:   Right arm    Patient Position:   Lying    Pulse: 72 75 66 70   Resp:   18    Temp:   98.4 °F (36.9 °C)    TempSrc:   Oral    SpO2:   99%    Weight:       Height:              Body mass index is 32.39 kg/m².      PHYSICAL EXAM:  GENERAL APPEARANCE: alert and cooperative, and appears to be in no acute distress.  HEENT:     HEAD: NC/AT     EYES: PERRL, EOMI.  Vision is grossly intact.  NECK: Neck supple, non-tender without LAD, masses or thyromegaly.  CARDIAC: There is no peripheral edema, cyanosis or pallor.   LUNGS: Clear to auscultation and percussion without rales or wheezing  ABDOMEN: Non-distended. No guarding.  MSK: No joint erythema or tenderness.   EXTREMITIES: No significant deformity or joint abnormality. No edema.   NEUROLOGICAL: CN II-XII grossly intact.   SKIN: Skin normal color, texture and turgor with no lesions or eruptions.+ surgical wound c/d/i  PSYCHIATRIC: Oriented. No tangential speech. No Hyperactive features.        Recent Results (from the past 24 hour(s))   Urinalysis, Reflex to Urine Culture Urine, Clean Catch    Collection  Time: 05/24/24  8:20 AM    Specimen: Urine   Result Value Ref Range    Specimen UA Urine, Clean Catch     Color, UA Yellow Yellow, Straw, Rebekah    Appearance, UA Clear Clear    pH, UA 7.0 5.0 - 8.0    Specific Gravity, UA 1.015 1.005 - 1.030    Protein, UA Negative Negative    Glucose, UA Negative Negative    Ketones, UA Negative Negative    Bilirubin (UA) Negative Negative    Occult Blood UA Trace (A) Negative    Nitrite, UA Negative Negative    Urobilinogen, UA Negative <2.0 EU/dL    Leukocytes, UA 2+ (A) Negative   Drug screen panel, emergency    Collection Time: 05/24/24  8:20 AM   Result Value Ref Range    Benzodiazepines Negative Negative    Methadone metabolites Negative Negative    Cocaine (Metab.) Negative Negative    Opiate Scrn, Ur Negative Negative    Barbiturate Screen, Ur Negative Negative    Amphetamine Screen, Ur Negative Negative    THC Negative Negative    Phencyclidine Negative Negative    Creatinine, Urine 112.5 15.0 - 325.0 mg/dL    Toxicology Information SEE COMMENT    Urinalysis Microscopic    Collection Time: 05/24/24  8:20 AM   Result Value Ref Range    RBC, UA 9 (H) 0 - 4 /hpf    WBC, UA 8 (H) 0 - 5 /hpf    Bacteria Rare None-Occ /hpf    Squam Epithel, UA 8 /hpf    Microscopic Comment SEE COMMENT    Aerobic culture    Collection Time: 05/24/24  2:21 PM    Specimen: Back; Abscess   Result Value Ref Range    Aerobic Bacterial Culture No growth    Aerobic culture    Collection Time: 05/24/24  2:21 PM    Specimen: Back; Abscess   Result Value Ref Range    Aerobic Bacterial Culture No growth    POCT glucose    Collection Time: 05/24/24  2:56 PM   Result Value Ref Range    POCT Glucose 117 (H) 70 - 110 mg/dL   POCT glucose    Collection Time: 05/24/24  4:17 PM   Result Value Ref Range    POCT Glucose 138 (H) 70 - 110 mg/dL   Phosphorus    Collection Time: 05/25/24  6:54 AM   Result Value Ref Range    Phosphorus 3.9 2.7 - 4.5 mg/dL   Magnesium    Collection Time: 05/25/24  6:54 AM   Result Value  Ref Range    Magnesium 1.8 1.6 - 2.6 mg/dL   Comprehensive Metabolic Panel    Collection Time: 05/25/24  6:54 AM   Result Value Ref Range    Sodium 139 136 - 145 mmol/L    Potassium 3.8 3.5 - 5.1 mmol/L    Chloride 104 95 - 110 mmol/L    CO2 24 23 - 29 mmol/L    Glucose 110 70 - 110 mg/dL    BUN 15 6 - 20 mg/dL    Creatinine 0.7 0.5 - 1.4 mg/dL    Calcium 9.5 8.7 - 10.5 mg/dL    Total Protein 7.3 6.0 - 8.4 g/dL    Albumin 3.2 (L) 3.5 - 5.2 g/dL    Total Bilirubin 0.3 0.1 - 1.0 mg/dL    Alkaline Phosphatase 70 55 - 135 U/L    AST 11 10 - 40 U/L    ALT 10 10 - 44 U/L    eGFR >60 >60 mL/min/1.73 m^2    Anion Gap 11 8 - 16 mmol/L   CBC Auto Differential    Collection Time: 05/25/24  6:54 AM   Result Value Ref Range    WBC 6.86 3.90 - 12.70 K/uL    RBC 3.55 (L) 4.00 - 5.40 M/uL    Hemoglobin 10.2 (L) 12.0 - 16.0 g/dL    Hematocrit 31.8 (L) 37.0 - 48.5 %    MCV 90 82 - 98 fL    MCH 28.7 27.0 - 31.0 pg    MCHC 32.1 32.0 - 36.0 g/dL    RDW 13.8 11.5 - 14.5 %    Platelets 306 150 - 450 K/uL    MPV 10.0 9.2 - 12.9 fL    Immature Granulocytes 0.6 (H) 0.0 - 0.5 %    Gran # (ANC) 4.9 1.8 - 7.7 K/uL    Immature Grans (Abs) 0.04 0.00 - 0.04 K/uL    Lymph # 1.5 1.0 - 4.8 K/uL    Mono # 0.5 0.3 - 1.0 K/uL    Eos # 0.0 0.0 - 0.5 K/uL    Baso # 0.01 0.00 - 0.20 K/uL    nRBC 0 0 /100 WBC    Gran % 70.8 38.0 - 73.0 %    Lymph % 21.1 18.0 - 48.0 %    Mono % 7.4 4.0 - 15.0 %    Eosinophil % 0.0 0.0 - 8.0 %    Basophil % 0.1 0.0 - 1.9 %    Differential Method Automated    POCT glucose    Collection Time: 05/25/24  7:26 AM   Result Value Ref Range    POCT Glucose 107 70 - 110 mg/dL       Microbiology Results (last 7 days)       Procedure Component Value Units Date/Time    Aerobic culture [[id number]] Collected: 05/24/24 1421    Order Status: Completed Specimen: Abscess from Back Updated: 05/25/24 0739     Aerobic Bacterial Culture No growth    Narrative:      EPIDURAL ABSCESS    Aerobic culture [[id number]] Collected: 05/24/24 1421    Order  Status: Completed Specimen: Abscess from Back Updated: 05/25/24 0739     Aerobic Bacterial Culture No growth    Narrative:      EPIDURAL PHLEGMON    Gram stain [[id number]] Collected: 05/24/24 1421    Order Status: Sent Specimen: Abscess from Back Updated: 05/24/24 1515    Fungus culture [[id number]] Collected: 05/24/24 1421    Order Status: Sent Specimen: Abscess from Back Updated: 05/24/24 1512    AFB Culture & Smear [[id number]] Collected: 05/24/24 1421    Order Status: Sent Specimen: Abscess from Back Updated: 05/24/24 1512    Gram stain [[id number]] Collected: 05/24/24 1421    Order Status: Sent Specimen: Abscess from Back Updated: 05/24/24 1512    Culture, Anaerobe [[id number]] Collected: 05/24/24 1421    Order Status: Sent Specimen: Abscess from Back Updated: 05/24/24 1511    Fungus culture [[id number]] Collected: 05/24/24 1421    Order Status: Sent Specimen: Abscess from Back Updated: 05/24/24 1509    AFB Culture & Smear [[id number]] Collected: 05/24/24 1421    Order Status: Sent Specimen: Abscess from Back Updated: 05/24/24 1509    Culture, Anaerobe [[id number]] Collected: 05/24/24 1421    Order Status: Sent Specimen: Abscess from Back Updated: 05/24/24 1508    Blood Culture #2 **CANNOT BE ORDERED STAT** [[id number]] Collected: 05/23/24 1341    Order Status: Completed Specimen: Blood from Peripheral, Forearm, Left Updated: 05/24/24 1503     Blood Culture, Routine No Growth to date      No Growth to date    Blood Culture #1 **CANNOT BE ORDERED STAT** [[id number]] Collected: 05/23/24 1341    Order Status: Completed Specimen: Blood from Peripheral, Antecubital, Left Updated: 05/24/24 1503     Blood Culture, Routine No Growth to date      No Growth to date    Gram stain [[id number]] Collected: 05/24/24 1421    Order Status: Canceled Specimen: Abscess from Back     Aerobic culture [[id number]] Collected: 05/24/24 1421    Order Status: Canceled Specimen: Abscess from Back     Culture, Anaerobic [[id number]]  Collected: 05/24/24 1421    Order Status: Canceled Specimen: Abscess from Back              Imaging Results    None                Assessment/Plan:      * Vertebral abscess  NeuroSx consulted  ID consulted  Washout 5/24, tentatively noon  Cultures ordered, please send off    A-fib  Only had 1 episode last year with infection that quickly resolved, no NSR  On aspirin outpt, resume after sx  Will repeat EKG  Continue BB      Osteomyelitis  Discitis/osteomyelitis at the T7-T8 level    Bacteremia due to group B Streptococcus  Positive cultures on 4/15, likely bacteria (GBS) that will be in abscesses       Prolonged Q-T interval on ECG  Repeat EKG, caution with prolongers      DM2 (diabetes mellitus, type 2)  Patient's FSGs are controlled on current medication regimen.  Last A1c reviewed-   Lab Results   Component Value Date    HGBA1C 11.3 (H) 03/16/2022       Repeat A1c  Hold Oral hypoglycemics while patient is in the hospital.  DM diet and ssi      VTE Risk Mitigation (From admission, onward)           Ordered     IP VTE HIGH RISK PATIENT  Once         05/23/24 1503     Place sequential compression device  Until discontinued         05/23/24 1503                    Discharge Planning   NAYELI:      Code Status: Full Code   Is the patient medically ready for discharge?:     Reason for patient still in hospital (select all that apply): Patient trending condition and Treatment  Discharge Plan A: Home Health                  Tyrese Wang MD  Department of Hospital Medicine   [facility]

## 2024-05-25 NOTE — PLAN OF CARE
Problem: Sepsis/Septic Shock  Goal: Absence of Infection Signs and Symptoms  Outcome: Met     Problem: Infection  Goal: Absence of Infection Signs and Symptoms  Intervention: Prevent or Manage Infection  Flowsheets (Taken 5/24/2024 2003)  Fever Reduction/Comfort Measures:   cool cloth applied   lightweight clothing   lightweight bedding     Problem: Wound  Goal: Skin Health and Integrity  Outcome: Met  Intervention: Optimize Skin Protection  Flowsheets (Taken 5/24/2024 2003)  Pressure Reduction Devices: positioning supports utilized  Activity Management:   Ankle pumps - L1   Sitting at edge of bed - L2  Head of Bed (HOB) Positioning: HOB elevated

## 2024-05-25 NOTE — PLAN OF CARE
Problem: Diabetes Comorbidity  Goal: Blood Glucose Level Within Targeted Range  Outcome: Met  Intervention: Monitor and Manage Glycemia  Flowsheets (Taken 5/25/2024 1845)  Glycemic Management: blood glucose monitored     Problem: Infection  Goal: Absence of Infection Signs and Symptoms  Outcome: Met  Intervention: Prevent or Manage Infection  Flowsheets (Taken 5/25/2024 1845)  Fever Reduction/Comfort Measures:   lightweight bedding   lightweight clothing

## 2024-05-26 VITALS
RESPIRATION RATE: 18 BRPM | BODY MASS INDEX: 32.73 KG/M2 | DIASTOLIC BLOOD PRESSURE: 69 MMHG | HEIGHT: 67 IN | TEMPERATURE: 98 F | OXYGEN SATURATION: 97 % | SYSTOLIC BLOOD PRESSURE: 145 MMHG | HEART RATE: 71 BPM | WEIGHT: 208.56 LBS

## 2024-05-26 LAB — POCT GLUCOSE: 121 MG/DL (ref 70–110)

## 2024-05-26 PROCEDURE — 94761 N-INVAS EAR/PLS OXIMETRY MLT: CPT

## 2024-05-26 PROCEDURE — 25000003 PHARM REV CODE 250: Performed by: STUDENT IN AN ORGANIZED HEALTH CARE EDUCATION/TRAINING PROGRAM

## 2024-05-26 PROCEDURE — 63600175 PHARM REV CODE 636 W HCPCS: Performed by: STUDENT IN AN ORGANIZED HEALTH CARE EDUCATION/TRAINING PROGRAM

## 2024-05-26 RX ORDER — LOSARTAN POTASSIUM 25 MG/1
50 TABLET ORAL DAILY
Status: DISCONTINUED | OUTPATIENT
Start: 2024-05-26 | End: 2024-05-26 | Stop reason: HOSPADM

## 2024-05-26 RX ORDER — CARVEDILOL 12.5 MG/1
12.5 TABLET ORAL 2 TIMES DAILY WITH MEALS
Status: DISCONTINUED | OUTPATIENT
Start: 2024-05-26 | End: 2024-05-26 | Stop reason: HOSPADM

## 2024-05-26 RX ORDER — HYDROCHLOROTHIAZIDE 25 MG/1
25 TABLET ORAL DAILY
Status: DISCONTINUED | OUTPATIENT
Start: 2024-05-26 | End: 2024-05-26 | Stop reason: HOSPADM

## 2024-05-26 RX ADMIN — CARVEDILOL 12.5 MG: 12.5 TABLET, FILM COATED ORAL at 06:05

## 2024-05-26 RX ADMIN — HYDROCHLOROTHIAZIDE 25 MG: 25 TABLET ORAL at 08:05

## 2024-05-26 RX ADMIN — CEFTRIAXONE 2 G: 2 INJECTION, POWDER, FOR SOLUTION INTRAMUSCULAR; INTRAVENOUS at 08:05

## 2024-05-26 RX ADMIN — ACETAMINOPHEN 650 MG: 325 TABLET ORAL at 08:05

## 2024-05-26 RX ADMIN — LOSARTAN POTASSIUM 50 MG: 25 TABLET, FILM COATED ORAL at 08:05

## 2024-05-26 RX ADMIN — MUPIROCIN: 20 OINTMENT TOPICAL at 08:05

## 2024-05-26 NOTE — PLAN OF CARE
Problem: Adult Inpatient Plan of Care  Goal: Plan of Care Review  Outcome: Progressing  Goal: Patient-Specific Goal (Individualized)  Outcome: Progressing  Goal: Absence of Hospital-Acquired Illness or Injury  Outcome: Progressing  Goal: Optimal Comfort and Wellbeing  Outcome: Progressing  Goal: Readiness for Transition of Care  Outcome: Progressing     Problem: Sepsis/Septic Shock  Goal: Optimal Coping  Outcome: Progressing  Goal: Absence of Bleeding  Outcome: Progressing  Goal: Blood Glucose Level Within Targeted Range  Outcome: Progressing  Goal: Optimal Nutrition Intake  Outcome: Progressing     Problem: Wound  Goal: Optimal Coping  Outcome: Progressing  Goal: Optimal Functional Ability  Outcome: Progressing  Goal: Absence of Infection Signs and Symptoms  Outcome: Progressing  Goal: Improved Oral Intake  Outcome: Progressing  Goal: Optimal Pain Control and Function  Outcome: Progressing  Goal: Optimal Wound Healing  Outcome: Progressing

## 2024-05-26 NOTE — PLAN OF CARE
Case Management Final Discharge Note      Discharge Disposition: [facility] [phone]      [facility] ordered / company name: None    Relevant SDOH / Transition of Care Barriers:  None    Primary Caretaker and contact information: Kwame Ortega (Mother) [phone]       Scheduled followup appointment: Instructed patient to contact PCP to schedule follow up appointment.  [facility] Clinic to contact patient to schedule follow up. Pt to follow up in Neurosurgery Clinic in 2 weeks.     Referrals placed: Referrals placed for Internal Medicine, Infectious Disease and Neurosurgery    Transportation: Private vehicle/family taking patient home        Patient and family educated on discharge services and updated on DC plan. Patient was receiving home health and home infusion services through [facility]; CM sent referral via [facility] to resume services. CM unable to schedule PCP appointment due to weekend discharge and clinic being closed; PCP information added to AVS.  Bedside RN notified, patient clear to discharge from Case Management Perspective.    05/26/24 0915   Final Note   Assessment Type Final Discharge Note   Anticipated Discharge Disposition Home-Health   Hospital Resources/Appts/Education Provided Provided patient/caregiver with written discharge plan information;Post-Acute resouces added to AVS   Post-Acute Status   Post-Acute Authorization Home Health;IV Infusion   Home Health Status Referrals Sent   Coverage [facility] -   IV Infusion Status Referral(s) sent   Discharge Delays None known at this time

## 2024-05-26 NOTE — PROGRESS NOTES
[facility] Medicine  Progress Note    Patient Name: Shahida Ortega  MRN: [id number]  Patient Class: IP- Inpatient   Admission Date: 5/23/2024  Length of Stay: 3 days  Attending Physician: Tyrese Wang MD  Primary Care Provider: Janusz Staton MD        Subjective:     Principal Problem:Vertebral abscess        HPI:    Shahida Ortega is a 53 y.o. female who has a past medical history of Anemia, Atrial fibrillation, DM2 (diabetes mellitus, type 2), History of abdominal abscess, History of pleural effusion, Hypertension, and Prolonged Q-T interval on ECG, presented to the ED with CC of Back pain, found to have spinal epidural abscesses, worsening on IV antibiotics at home. Patient has been on Ceftriaxone since 4/30/24, growing GBS previously. Other organisms in past include E.coli in urine and blood. Feeling fairly well, although worsening clinical picture on outpatient MRI, with known discitis/osteomyelitis at the T7-T8 level and interval development of phlegmonous material extending about the epidural space at T7-T8 with a small associated epidural abscess measuring approximately 0.8 x 0.5 cm, contributing to at least moderate spinal canal stenosis at this level (Additional encroachment of the left greater than right neural foramen) and marked improvement and paravertebral phlegmonous material about the paravertebral soft tissues mostly centered at the T7-T8 level. Denies CP or SOB. Admitted to  with NeuroSx consult, and plans for washout 5/24.     Overview/Hospital Course:  Patient found to have epidural abscess after blood infection and septic arthritis of shoulder. Patient has had odd increases in CA 19-9 and  over the years, will check. Odd to get these infections, to some degree, while not being an IVDU or immunocompromised in some way. No hx of drug use and was healthy until a couple years ago. Found to be diabetic with A1c 11 at that time, could be getting infections with uncontrolled  Diabetes, however, her glucoses here on no meds do not reflect that- now having hypoglycemia of 40 not on any insulin, do not usually see this with metformin/orals, will recheck her A1c. Insulinomas do have the potential to raise CA 19-9 and obviously hypoglycemia.... However, infection can drive the glucoses low as well, and is most likely the cause now.    Washout of abscesses 5/24. Epidural phlegmon and epidural abscess cleaned out, no growth yet.  Increasing BP meds.     Interval History:  NAEON.  No new issues.   Denies complaints.  All questions answered and updates on care given.       ROS:  General: Negative for fevers or chills.  Cardiac: Negative for chest pain or orthopnea   Pulmonary: Negative for dyspnea or wheezing.  GI: Negative for abdominal distention or pain   +wound    Vitals:    05/26/24 0300 05/26/24 0500 05/26/24 0535 05/26/24 0551   BP:   (!) 195/90    BP Location:   Right arm    Patient Position:   Lying    Pulse:   66    Resp:   18    Temp:   98.2 °F (36.8 °C)    TempSrc:   Oral    SpO2: 100% 100% 99%    Weight:    94.6 kg (208 lb 8.9 oz)   Height:              Body mass index is 32.66 kg/m².      PHYSICAL EXAM:  GENERAL APPEARANCE: alert and cooperative, and appears to be in no acute distress.  HEENT:     HEAD: NC/AT     EYES: PERRL, EOMI.  Vision is grossly intact.  NECK: Neck supple, non-tender without LAD, masses or thyromegaly.  CARDIAC: There is no peripheral edema, cyanosis or pallor.   LUNGS: Clear to auscultation and percussion without rales or wheezing  ABDOMEN: Non-distended. No guarding.  MSK: No joint erythema or tenderness.   EXTREMITIES: No significant deformity or joint abnormality. No edema.   NEUROLOGICAL: CN II-XII grossly intact.   SKIN: Skin normal color, texture and turgor with no lesions or eruptions.+ surgical wound c/d/i  PSYCHIATRIC: Oriented. No tangential speech. No Hyperactive features.        Recent Results (from the past 24 hour(s))   Phosphorus    Collection  Time: 05/25/24  6:54 AM   Result Value Ref Range    Phosphorus 3.9 2.7 - 4.5 mg/dL   Magnesium    Collection Time: 05/25/24  6:54 AM   Result Value Ref Range    Magnesium 1.8 1.6 - 2.6 mg/dL   Comprehensive Metabolic Panel    Collection Time: 05/25/24  6:54 AM   Result Value Ref Range    Sodium 139 136 - 145 mmol/L    Potassium 3.8 3.5 - 5.1 mmol/L    Chloride 104 95 - 110 mmol/L    CO2 24 23 - 29 mmol/L    Glucose 110 70 - 110 mg/dL    BUN 15 6 - 20 mg/dL    Creatinine 0.7 0.5 - 1.4 mg/dL    Calcium 9.5 8.7 - 10.5 mg/dL    Total Protein 7.3 6.0 - 8.4 g/dL    Albumin 3.2 (L) 3.5 - 5.2 g/dL    Total Bilirubin 0.3 0.1 - 1.0 mg/dL    Alkaline Phosphatase 70 55 - 135 U/L    AST 11 10 - 40 U/L    ALT 10 10 - 44 U/L    eGFR >60 >60 mL/min/1.73 m^2    Anion Gap 11 8 - 16 mmol/L   CBC Auto Differential    Collection Time: 05/25/24  6:54 AM   Result Value Ref Range    WBC 6.86 3.90 - 12.70 K/uL    RBC 3.55 (L) 4.00 - 5.40 M/uL    Hemoglobin 10.2 (L) 12.0 - 16.0 g/dL    Hematocrit 31.8 (L) 37.0 - 48.5 %    MCV 90 82 - 98 fL    MCH 28.7 27.0 - 31.0 pg    MCHC 32.1 32.0 - 36.0 g/dL    RDW 13.8 11.5 - 14.5 %    Platelets 306 150 - 450 K/uL    MPV 10.0 9.2 - 12.9 fL    Immature Granulocytes 0.6 (H) 0.0 - 0.5 %    Gran # (ANC) 4.9 1.8 - 7.7 K/uL    Immature Grans (Abs) 0.04 0.00 - 0.04 K/uL    Lymph # 1.5 1.0 - 4.8 K/uL    Mono # 0.5 0.3 - 1.0 K/uL    Eos # 0.0 0.0 - 0.5 K/uL    Baso # 0.01 0.00 - 0.20 K/uL    nRBC 0 0 /100 WBC    Gran % 70.8 38.0 - 73.0 %    Lymph % 21.1 18.0 - 48.0 %    Mono % 7.4 4.0 - 15.0 %    Eosinophil % 0.0 0.0 - 8.0 %    Basophil % 0.1 0.0 - 1.9 %    Differential Method Automated    Hemoglobin A1C    Collection Time: 05/25/24  6:54 AM   Result Value Ref Range    Hemoglobin A1C 8.2 (H) 4.0 - 5.6 %    Estimated Avg Glucose 189 (H) 68 - 131 mg/dL   Cancer antigen 19-9    Collection Time: 05/25/24  6:54 AM   Result Value Ref Range    CA 19-9 31.5 0.0 - 40.0 U/mL       Collection Time: 05/25/24  6:54 AM    Result Value Ref Range     10 0 - 30 U/mL   POCT glucose    Collection Time: 05/25/24  7:26 AM   Result Value Ref Range    POCT Glucose 107 70 - 110 mg/dL   POCT glucose    Collection Time: 05/25/24 11:20 AM   Result Value Ref Range    POCT Glucose 129 (H) 70 - 110 mg/dL   POCT glucose    Collection Time: 05/25/24  4:15 PM   Result Value Ref Range    POCT Glucose 95 70 - 110 mg/dL   POCT glucose    Collection Time: 05/25/24  9:10 PM   Result Value Ref Range    POCT Glucose 143 (H) 70 - 110 mg/dL       Microbiology Results (last 7 days)       Procedure Component Value Units Date/Time    Blood Culture #2 **CANNOT BE ORDERED STAT** [[id number]] Collected: 05/23/24 1341    Order Status: Completed Specimen: Blood from Peripheral, Forearm, Left Updated: 05/25/24 1503     Blood Culture, Routine No Growth to date      No Growth to date      No Growth to date    Blood Culture #1 **CANNOT BE ORDERED STAT** [[id number]] Collected: 05/23/24 1341    Order Status: Completed Specimen: Blood from Peripheral, Antecubital, Left Updated: 05/25/24 1503     Blood Culture, Routine No Growth to date      No Growth to date      No Growth to date    AFB Culture & Smear [[id number]] Collected: 05/24/24 1421    Order Status: Sent Specimen: Abscess from Back Updated: 05/25/24 1437    AFB Culture & Smear [[id number]] Collected: 05/24/24 1421    Order Status: Sent Specimen: Abscess from Back Updated: 05/25/24 1437    Gram stain [[id number]] Collected: 05/24/24 1421    Order Status: Completed Specimen: Abscess from Back Updated: 05/25/24 0912     Gram Stain Result Rare WBC's      No organisms seen    Narrative:      EPIDURAL ABSCESS    Gram stain [[id number]] Collected: 05/24/24 1421    Order Status: Completed Specimen: Abscess from Back Updated: 05/25/24 0911     Gram Stain Result Rare WBC's      No organisms seen    Narrative:      EPIDURAL PHLEGMON    Aerobic culture [[id number]] Collected: 05/24/24 1421    Order Status: Completed  Specimen: Abscess from Back Updated: 05/25/24 0739     Aerobic Bacterial Culture No growth    Narrative:      EPIDURAL ABSCESS    Aerobic culture [[id number]] Collected: 05/24/24 1421    Order Status: Completed Specimen: Abscess from Back Updated: 05/25/24 0739     Aerobic Bacterial Culture No growth    Narrative:      EPIDURAL PHLEGMON    Fungus culture [[id number]] Collected: 05/24/24 1421    Order Status: Sent Specimen: Abscess from Back Updated: 05/24/24 1512    Culture, Anaerobe [[id number]] Collected: 05/24/24 1421    Order Status: Sent Specimen: Abscess from Back Updated: 05/24/24 1511    Fungus culture [[id number]] Collected: 05/24/24 1421    Order Status: Sent Specimen: Abscess from Back Updated: 05/24/24 1509    Culture, Anaerobe [[id number]] Collected: 05/24/24 1421    Order Status: Sent Specimen: Abscess from Back Updated: 05/24/24 1508    Gram stain [[id number]] Collected: 05/24/24 1421    Order Status: Canceled Specimen: Abscess from Back     Aerobic culture [[id number]] Collected: 05/24/24 1421    Order Status: Canceled Specimen: Abscess from Back     Culture, Anaerobic [[id number]] Collected: 05/24/24 1421    Order Status: Canceled Specimen: Abscess from Back              Imaging Results    None                Assessment/Plan:      * Vertebral abscess  NeuroSx consulted  ID consulted  Washout 5/24, tentatively noon  Cultures ordered, please send off    A-fib  Only had 1 episode last year with infection that quickly resolved, no NSR  On aspirin outpt, resume after sx  Will repeat EKG  Continue BB      Osteomyelitis  Discitis/osteomyelitis at the T7-T8 level    Bacteremia due to group B Streptococcus  Positive cultures on 4/15, likely bacteria (GBS) that will be in abscesses       Prolonged Q-T interval on ECG  Repeat EKG, caution with prolongers      DM2 (diabetes mellitus, type 2)  Patient's FSGs are controlled on current medication regimen.  Last A1c reviewed-   Lab Results   Component Value Date     HGBA1C 11.3 (H) 03/16/2022       Repeat A1c  Hold Oral hypoglycemics while patient is in the hospital.  DM diet and ssi      VTE Risk Mitigation (From admission, onward)           Ordered     IP VTE HIGH RISK PATIENT  Once         05/23/24 1503     Place sequential compression device  Until discontinued         05/23/24 1503                    Discharge Planning   NAYELI:      Code Status: Full Code   Is the patient medically ready for discharge?:     Reason for patient still in hospital (select all that apply): Patient trending condition and Treatment  Discharge Plan A: Home Health                  Tyrese Wang MD  Department of [facility] Medicine   [facility] [place] [facility] [place]

## 2024-05-26 NOTE — DISCHARGE SUMMARY
[facility]  Discharge Summary      Patient Name: Shahida Ortega  MRN: [id number]  [facility]: [id number]  Patient Class: IP- Inpatient  Admission Date: 5/23/2024  Hospital Length of Stay: 3 days  Discharge Date and Time:  05/26/2024 7:20 AM  Attending Physician: Tyrese Wang MD   Discharging Provider: Tyrese Wang MD  Primary Care Provider: Janusz Staton MD    Primary Care Team: Networked reference to record PCT     HPI:     Shahida Ortega is a 53 y.o. female who has a past medical history of Anemia, Atrial fibrillation, DM2 (diabetes mellitus, type 2), History of abdominal abscess, History of pleural effusion, Hypertension, and Prolonged Q-T interval on ECG, presented to the ED with CC of Back pain, found to have spinal epidural abscesses, worsening on IV antibiotics at home. Patient has been on Ceftriaxone since 4/30/24, growing GBS previously. Other organisms in past include E.coli in urine and blood. Feeling fairly well, although worsening clinical picture on outpatient MRI, with known discitis/osteomyelitis at the T7-T8 level and interval development of phlegmonous material extending about the epidural space at T7-T8 with a small associated epidural abscess measuring approximately 0.8 x 0.5 cm, contributing to at least moderate spinal canal stenosis at this level (Additional encroachment of the left greater than right neural foramen) and marked improvement and paravertebral phlegmonous material about the paravertebral soft tissues mostly centered at the T7-T8 level. Denies CP or SOB. Admitted to  with NeuroSx consult, and plans for washout 5/24.     Procedure(s) (LRB):  LAMINOFORAMINOTOMY, SPINE, T7/T8, MINIMALLY INVASIVE; with REMOVAL OF ABSCESS (Left)      Hospital Course:   Patient found to have epidural abscess after blood infection and septic arthritis of shoulder. Patient has had odd increases in CA 19-9 and  over the years, will check. Odd to get these infections, to some  degree, while not being an IVDU or immunocompromised in some way. No hx of drug use and was healthy until a couple years ago. Found to be diabetic with A1c 11 at that time, could be getting infections with uncontrolled Diabetes, however, her glucoses here on no meds do not reflect that- now having hypoglycemia of 40 not on any insulin, do not usually see this with metformin/orals, will recheck her A1c. Insulinomas do have the potential to raise CA 19-9 and obviously hypoglycemia.... However, infection can drive the glucoses low as well, and is most likely the cause now.    Washout of abscesses 5/24. Epidural phlegmon and epidural abscess cleaned out, no growth yet.  Increasing BP meds.        Will set up home health for you  Ceftriaxone twice daily until 7/4/24  Follow with PCP, Neurosurgery, and Infectious disease  Surgical wound, nurse will assist, but generally:  Surgical incision care:   Use a normal saline solution (salt water) or mild soapy water.   Soak the gauze or cloth in the saline solution or soapy water, and gently dab or wipe the skin with it.   Try to remove all drainage and any dried blood or other matter that may have built up on the skin.  Air-dry the incision or pat it dry with a clean, fresh towel before reapplying the dressing.  Do not scrub or soak the wound.   Do not use rubbing alcohol, hydrogen peroxide, or iodine, which can harm the tissue and slow wound healing.       Thank you for trusting [facility] and me with your care.  We are honored that you entrusted us with your healthcare needs. Your satisfaction is very important to us and we hope you have been very pleased with your experience at [facility]. After your discharge you may receive a survey asking you to rate your hospital experience- Please help us by completing this survey. We hope that you have received the very best care possible during your hospitalization at [facility], as your satisfaction is  our top priority.    Let me know if there is anything more I can do!!              Chari Wang MD        Medical Director        Section Head of         Board-Certified IM Attending      Goals of Care Treatment Preferences:  Code Status: Full Code      Consults:   Consults (From admission, onward)          Status Ordering Provider     Inpatient consult to Infectious Diseases  Once        Provider:  (Not yet assigned)    CHARI Chung     Inpatient consult to Neurosurgery  Once        Provider:  Janusz Xiao MD    Completed JODY HOLLINGSWORTH            No new Assessment & Plan notes have been filed under this hospital service since the last note was generated.  Service: Hospital Medicine    Final Active Diagnoses:    Diagnosis Date Noted POA    PRINCIPAL PROBLEM:  Vertebral abscess [M46.20] 04/19/2024 Yes    A-fib [I48.91] 05/31/2023 Yes    Osteomyelitis [M86.9] 05/23/2024 Yes    Bacteremia due to group B Streptococcus [R78.81, B95.1] 04/16/2024 Yes    Prolonged Q-T interval on ECG [R94.31] 05/31/2023 Yes    DM2 (diabetes mellitus, type 2) [E11.9] 05/28/2023 Yes      Problems Resolved During this Admission:       Discharged Condition: good    Disposition: home hh    Follow Up:    Patient Instructions:      Ambulatory referral/consult to Internal Medicine   Standing Status: Future   Referral Priority: Routine Referral Type: Consultation   Referral Reason: Specialty Services Required   Requested Specialty: Internal Medicine   Number of Visits Requested: 1     Ambulatory referral/consult to Neurosurgery   Standing Status: Future   Referral Priority: Routine Referral Type: Consultation   Referral Reason: Specialty Services Required   Requested Specialty: Neurosurgery   Number of Visits Requested: 1     Ambulatory referral/consult to Infectious Disease   Standing Status: Future   Referral Priority: Routine Referral Type: Consultation   Referral Reason: Specialty Services Required   Requested  Specialty: Infectious Diseases   Number of Visits Requested: 1       Significant Diagnostic Studies:   Recent Results (from the past 100 hour(s))   CBC auto differential    Collection Time: 05/23/24  1:41 PM   Result Value Ref Range    WBC 4.95 3.90 - 12.70 K/uL    RBC 3.36 (L) 4.00 - 5.40 M/uL    Hemoglobin 9.7 (L) 12.0 - 16.0 g/dL    Hematocrit 30.9 (L) 37.0 - 48.5 %    MCV 92 82 - 98 fL    MCH 28.9 27.0 - 31.0 pg    MCHC 31.4 (L) 32.0 - 36.0 g/dL    RDW 13.9 11.5 - 14.5 %    Platelets 287 150 - 450 K/uL    MPV 9.6 9.2 - 12.9 fL    Immature Granulocytes 0.6 (H) 0.0 - 0.5 %    Gran # (ANC) 2.6 1.8 - 7.7 K/uL    Immature Grans (Abs) 0.03 0.00 - 0.04 K/uL    Lymph # 1.7 1.0 - 4.8 K/uL    Mono # 0.4 0.3 - 1.0 K/uL    Eos # 0.2 0.0 - 0.5 K/uL    Baso # 0.01 0.00 - 0.20 K/uL    nRBC 0 0 /100 WBC    Gran % 53.2 38.0 - 73.0 %    Lymph % 33.9 18.0 - 48.0 %    Mono % 7.7 4.0 - 15.0 %    Eosinophil % 4.4 0.0 - 8.0 %    Basophil % 0.2 0.0 - 1.9 %    Differential Method Automated    Comprehensive metabolic panel    Collection Time: 05/23/24  1:41 PM   Result Value Ref Range    Sodium 140 136 - 145 mmol/L    Potassium 3.3 (L) 3.5 - 5.1 mmol/L    Chloride 104 95 - 110 mmol/L    CO2 25 23 - 29 mmol/L    Glucose 101 70 - 110 mg/dL    BUN 12 6 - 20 mg/dL    Creatinine 0.7 0.5 - 1.4 mg/dL    Calcium 9.6 8.7 - 10.5 mg/dL    Total Protein 7.3 6.0 - 8.4 g/dL    Albumin 3.2 (L) 3.5 - 5.2 g/dL    Total Bilirubin 0.2 0.1 - 1.0 mg/dL    Alkaline Phosphatase 70 55 - 135 U/L    AST 12 10 - 40 U/L    ALT 8 (L) 10 - 44 U/L    eGFR >60 >60 mL/min/1.73 m^2    Anion Gap 11 8 - 16 mmol/L   Blood Culture #1 **CANNOT BE ORDERED STAT**    Collection Time: 05/23/24  1:41 PM    Specimen: Peripheral, Antecubital, Left; Blood   Result Value Ref Range    Blood Culture, Routine No Growth to date     Blood Culture, Routine No Growth to date     Blood Culture, Routine No Growth to date    Blood Culture #2 **CANNOT BE ORDERED STAT**    Collection Time:  05/23/24  1:41 PM    Specimen: Peripheral, Forearm, Left; Blood   Result Value Ref Range    Blood Culture, Routine No Growth to date     Blood Culture, Routine No Growth to date     Blood Culture, Routine No Growth to date    C-reactive protein    Collection Time: 05/23/24  1:41 PM   Result Value Ref Range    CRP 5.3 0.0 - 8.2 mg/L   CEA    Collection Time: 05/23/24  1:41 PM   Result Value Ref Range    CEA 2.2 0.0 - 5.0 ng/mL   Sedimentation rate    Collection Time: 05/23/24  1:42 PM   Result Value Ref Range    Sed Rate 71 (H) 0 - 36 mm/Hr   Protime-INR    Collection Time: 05/23/24  3:28 PM   Result Value Ref Range    Prothrombin Time 11.2 9.0 - 12.5 sec    INR 1.0 0.8 - 1.2   APTT    Collection Time: 05/23/24  3:28 PM   Result Value Ref Range    aPTT 29.3 21.0 - 32.0 sec   POCT glucose    Collection Time: 05/23/24  3:33 PM   Result Value Ref Range    POCT Glucose 42 (LL) 70 - 110 mg/dL   POCT glucose    Collection Time: 05/23/24  3:34 PM   Result Value Ref Range    POCT Glucose 40 (LL) 70 - 110 mg/dL   EKG 12-lead    Collection Time: 05/23/24  3:43 PM   Result Value Ref Range    QRS Duration 92 ms    [facility] QTC Calculation 479 ms   POCT glucose    Collection Time: 05/23/24  3:58 PM   Result Value Ref Range    POCT Glucose 72 70 - 110 mg/dL   POCT glucose    Collection Time: 05/23/24  6:27 PM   Result Value Ref Range    POCT Glucose 112 (H) 70 - 110 mg/dL   Type & Screen    Collection Time: 05/23/24  6:28 PM   Result Value Ref Range    Group & Rh A POS     Indirect [place] NEG     Specimen Outdate 05/26/2024 23:59    POCT glucose    Collection Time: 05/23/24  8:13 PM   Result Value Ref Range    POCT Glucose 57 (L) 70 - 110 mg/dL   POCT glucose    Collection Time: 05/23/24  8:45 PM   Result Value Ref Range    POCT Glucose 109 70 - 110 mg/dL   Phosphorus    Collection Time: 05/24/24  5:35 AM   Result Value Ref Range    Phosphorus 4.1 2.7 - 4.5 mg/dL   Magnesium    Collection Time: 05/24/24  5:35 AM   Result Value Ref  Range    Magnesium 1.8 1.6 - 2.6 mg/dL   Comprehensive Metabolic Panel    Collection Time: 05/24/24  5:35 AM   Result Value Ref Range    Sodium 140 136 - 145 mmol/L    Potassium 3.6 3.5 - 5.1 mmol/L    Chloride 104 95 - 110 mmol/L    CO2 29 23 - 29 mmol/L    Glucose 98 70 - 110 mg/dL    BUN 10 6 - 20 mg/dL    Creatinine 0.6 0.5 - 1.4 mg/dL    Calcium 9.8 8.7 - 10.5 mg/dL    Total Protein 7.1 6.0 - 8.4 g/dL    Albumin 3.1 (L) 3.5 - 5.2 g/dL    Total Bilirubin 0.3 0.1 - 1.0 mg/dL    Alkaline Phosphatase 72 55 - 135 U/L    AST 12 10 - 40 U/L    ALT 9 (L) 10 - 44 U/L    eGFR >60 >60 mL/min/1.73 m^2    Anion Gap 7 (L) 8 - 16 mmol/L   CBC Auto Differential    Collection Time: 05/24/24  5:35 AM   Result Value Ref Range    WBC 3.99 3.90 - 12.70 K/uL    RBC 3.44 (L) 4.00 - 5.40 M/uL    Hemoglobin 9.7 (L) 12.0 - 16.0 g/dL    Hematocrit 31.1 (L) 37.0 - 48.5 %    MCV 90 82 - 98 fL    MCH 28.2 27.0 - 31.0 pg    MCHC 31.2 (L) 32.0 - 36.0 g/dL    RDW 14.0 11.5 - 14.5 %    Platelets 269 150 - 450 K/uL    MPV 9.7 9.2 - 12.9 fL    Immature Granulocytes 0.3 0.0 - 0.5 %    Gran # (ANC) 2.2 1.8 - 7.7 K/uL    Immature Grans (Abs) 0.01 0.00 - 0.04 K/uL    Lymph # 1.3 1.0 - 4.8 K/uL    Mono # 0.3 0.3 - 1.0 K/uL    Eos # 0.2 0.0 - 0.5 K/uL    Baso # 0.02 0.00 - 0.20 K/uL    nRBC 0 0 /100 WBC    Gran % 55.8 38.0 - 73.0 %    Lymph % 31.3 18.0 - 48.0 %    Mono % 7.8 4.0 - 15.0 %    Eosinophil % 4.3 0.0 - 8.0 %    Basophil % 0.5 0.0 - 1.9 %    Differential Method Automated    PT/INR    Collection Time: 05/24/24  5:35 AM   Result Value Ref Range    Prothrombin Time 11.1 9.0 - 12.5 sec    INR 1.0 0.8 - 1.2   POCT glucose    Collection Time: 05/24/24  7:41 AM   Result Value Ref Range    POCT Glucose 118 (H) 70 - 110 mg/dL   Urinalysis, Reflex to Urine Culture Urine, Clean Catch    Collection Time: 05/24/24  8:20 AM    Specimen: Urine   Result Value Ref Range    Specimen UA Urine, Clean Catch     Color, UA Yellow Yellow, Straw, Rebekah    Appearance,  UA Clear Clear    pH, UA 7.0 5.0 - 8.0    Specific Gravity, UA 1.015 1.005 - 1.030    Protein, UA Negative Negative    Glucose, UA Negative Negative    Ketones, UA Negative Negative    Bilirubin (UA) Negative Negative    Occult Blood UA Trace (A) Negative    Nitrite, UA Negative Negative    Urobilinogen, UA Negative <2.0 EU/dL    Leukocytes, UA 2+ (A) Negative   Drug screen panel, emergency    Collection Time: 05/24/24  8:20 AM   Result Value Ref Range    Benzodiazepines Negative Negative    Methadone metabolites Negative Negative    Cocaine (Metab.) Negative Negative    Opiate Scrn, Ur Negative Negative    Barbiturate Screen, Ur Negative Negative    Amphetamine Screen, Ur Negative Negative    THC Negative Negative    Phencyclidine Negative Negative    Creatinine, Urine 112.5 15.0 - 325.0 mg/dL    Toxicology Information SEE COMMENT    Urinalysis Microscopic    Collection Time: 05/24/24  8:20 AM   Result Value Ref Range    RBC, UA 9 (H) 0 - 4 /hpf    WBC, UA 8 (H) 0 - 5 /hpf    Bacteria Rare None-Occ /hpf    Squam Epithel, UA 8 /hpf    Microscopic Comment SEE COMMENT    Aerobic culture    Collection Time: 05/24/24  2:21 PM    Specimen: Back; Abscess   Result Value Ref Range    Aerobic Bacterial Culture No growth    Gram stain    Collection Time: 05/24/24  2:21 PM    Specimen: Back; Abscess   Result Value Ref Range    Gram Stain Result Rare WBC's     Gram Stain Result No organisms seen    Aerobic culture    Collection Time: 05/24/24  2:21 PM    Specimen: Back; Abscess   Result Value Ref Range    Aerobic Bacterial Culture No growth    Gram stain    Collection Time: 05/24/24  2:21 PM    Specimen: Back; Abscess   Result Value Ref Range    Gram Stain Result Rare WBC's     Gram Stain Result No organisms seen    POCT glucose    Collection Time: 05/24/24  2:56 PM   Result Value Ref Range    POCT Glucose 117 (H) 70 - 110 mg/dL   POCT glucose    Collection Time: 05/24/24  4:17 PM   Result Value Ref Range    POCT Glucose 138  (H) 70 - 110 mg/dL   Phosphorus    Collection Time: 05/25/24  6:54 AM   Result Value Ref Range    Phosphorus 3.9 2.7 - 4.5 mg/dL   Magnesium    Collection Time: 05/25/24  6:54 AM   Result Value Ref Range    Magnesium 1.8 1.6 - 2.6 mg/dL   Comprehensive Metabolic Panel    Collection Time: 05/25/24  6:54 AM   Result Value Ref Range    Sodium 139 136 - 145 mmol/L    Potassium 3.8 3.5 - 5.1 mmol/L    Chloride 104 95 - 110 mmol/L    CO2 24 23 - 29 mmol/L    Glucose 110 70 - 110 mg/dL    BUN 15 6 - 20 mg/dL    Creatinine 0.7 0.5 - 1.4 mg/dL    Calcium 9.5 8.7 - 10.5 mg/dL    Total Protein 7.3 6.0 - 8.4 g/dL    Albumin 3.2 (L) 3.5 - 5.2 g/dL    Total Bilirubin 0.3 0.1 - 1.0 mg/dL    Alkaline Phosphatase 70 55 - 135 U/L    AST 11 10 - 40 U/L    ALT 10 10 - 44 U/L    eGFR >60 >60 mL/min/1.73 m^2    Anion Gap 11 8 - 16 mmol/L   CBC Auto Differential    Collection Time: 05/25/24  6:54 AM   Result Value Ref Range    WBC 6.86 3.90 - 12.70 K/uL    RBC 3.55 (L) 4.00 - 5.40 M/uL    Hemoglobin 10.2 (L) 12.0 - 16.0 g/dL    Hematocrit 31.8 (L) 37.0 - 48.5 %    MCV 90 82 - 98 fL    MCH 28.7 27.0 - 31.0 pg    MCHC 32.1 32.0 - 36.0 g/dL    RDW 13.8 11.5 - 14.5 %    Platelets 306 150 - 450 K/uL    MPV 10.0 9.2 - 12.9 fL    Immature Granulocytes 0.6 (H) 0.0 - 0.5 %    Gran # (ANC) 4.9 1.8 - 7.7 K/uL    Immature Grans (Abs) 0.04 0.00 - 0.04 K/uL    Lymph # 1.5 1.0 - 4.8 K/uL    Mono # 0.5 0.3 - 1.0 K/uL    Eos # 0.0 0.0 - 0.5 K/uL    Baso # 0.01 0.00 - 0.20 K/uL    nRBC 0 0 /100 WBC    Gran % 70.8 38.0 - 73.0 %    Lymph % 21.1 18.0 - 48.0 %    Mono % 7.4 4.0 - 15.0 %    Eosinophil % 0.0 0.0 - 8.0 %    Basophil % 0.1 0.0 - 1.9 %    Differential Method Automated    Hemoglobin A1C    Collection Time: 05/25/24  6:54 AM   Result Value Ref Range    Hemoglobin A1C 8.2 (H) 4.0 - 5.6 %    Estimated Avg Glucose 189 (H) 68 - 131 mg/dL   Cancer antigen 19-9    Collection Time: 05/25/24  6:54 AM   Result Value Ref Range    CA 19-9 31.5 0.0 - 40.0 U/mL        Collection Time: 05/25/24  6:54 AM   Result Value Ref Range     10 0 - 30 U/mL   POCT glucose    Collection Time: 05/25/24  7:26 AM   Result Value Ref Range    POCT Glucose 107 70 - 110 mg/dL   POCT glucose    Collection Time: 05/25/24 11:20 AM   Result Value Ref Range    POCT Glucose 129 (H) 70 - 110 mg/dL   POCT glucose    Collection Time: 05/25/24  4:15 PM   Result Value Ref Range    POCT Glucose 95 70 - 110 mg/dL   POCT glucose    Collection Time: 05/25/24  9:10 PM   Result Value Ref Range    POCT Glucose 143 (H) 70 - 110 mg/dL       Microbiology Results (last 7 days)       Procedure Component Value Units Date/Time    Blood Culture #2 **CANNOT BE ORDERED STAT** [[id number]] Collected: 05/23/24 1341    Order Status: Completed Specimen: Blood from Peripheral, Forearm, Left Updated: 05/25/24 1503     Blood Culture, Routine No Growth to date      No Growth to date      No Growth to date    Blood Culture #1 **CANNOT BE ORDERED STAT** [[id number]] Collected: 05/23/24 1341    Order Status: Completed Specimen: Blood from Peripheral, Antecubital, Left Updated: 05/25/24 1503     Blood Culture, Routine No Growth to date      No Growth to date      No Growth to date    AFB Culture & Smear [[id number]] Collected: 05/24/24 1421    Order Status: Sent Specimen: Abscess from Back Updated: 05/25/24 1437    AFB Culture & Smear [[id number]] Collected: 05/24/24 1421    Order Status: Sent Specimen: Abscess from Back Updated: 05/25/24 1437    Gram stain [[id number]] Collected: 05/24/24 1421    Order Status: Completed Specimen: Abscess from Back Updated: 05/25/24 0912     Gram Stain Result Rare WBC's      No organisms seen    Narrative:      EPIDURAL ABSCESS    Gram stain [[id number]] Collected: 05/24/24 1421    Order Status: Completed Specimen: Abscess from Back Updated: 05/25/24 0911     Gram Stain Result Rare WBC's      No organisms seen    Narrative:      EPIDURAL PHLEGMON    Aerobic culture [[id number]]  Collected: 05/24/24 1421    Order Status: Completed Specimen: Abscess from Back Updated: 05/25/24 0739     Aerobic Bacterial Culture No growth    Narrative:      EPIDURAL ABSCESS    Aerobic culture [[id number]] Collected: 05/24/24 1421    Order Status: Completed Specimen: Abscess from Back Updated: 05/25/24 0739     Aerobic Bacterial Culture No growth    Narrative:      EPIDURAL PHLEGMON    Fungus culture [[id number]] Collected: 05/24/24 1421    Order Status: Sent Specimen: Abscess from Back Updated: 05/24/24 1512    Culture, Anaerobe [[id number]] Collected: 05/24/24 1421    Order Status: Sent Specimen: Abscess from Back Updated: 05/24/24 1511    Fungus culture [[id number]] Collected: 05/24/24 1421    Order Status: Sent Specimen: Abscess from Back Updated: 05/24/24 1509    Culture, Anaerobe [[id number]] Collected: 05/24/24 1421    Order Status: Sent Specimen: Abscess from Back Updated: 05/24/24 1508    Gram stain [[id number]] Collected: 05/24/24 1421    Order Status: Canceled Specimen: Abscess from Back     Aerobic culture [[id number]] Collected: 05/24/24 1421    Order Status: Canceled Specimen: Abscess from Back     Culture, Anaerobic [[id number]] Collected: 05/24/24 1421    Order Status: Canceled Specimen: Abscess from Back             Imaging Results    None             Pending Diagnostic Studies:       None           Medications:  Reconciled Home Medications:      Medication List        CHANGE how you take these medications      dextrose 5 % in water (D5W) PgBk 100 mL with cefTRIAXone 2 gram SolR 2 g  Inject 2 g into the vein every 12 (twelve) hours.  What changed: when to take this            CONTINUE taking these medications      ACCU-CHEK GUIDE ME GLUCOSE MTR Misc  Generic drug: blood-glucose meter  Use to test blood glucose 2 (two) times daily with meals.     ACCU-CHEK GUIDE TEST STRIPS Strp  Generic drug: blood sugar diagnostic  Use to test blood glucose 2 (two) times daily with meals.     ACCU-CHEK  "SOFTCLIX LANCETS Misc  Generic drug: lancets  Use to check blood glucose 2 (two) times daily with meals.     albuterol 90 mcg/actuation inhaler  Commonly known as: PROVENTIL/VENTOLIN HFA  Inhale 2 puffs into the lungs every 6 (six) hours as needed.     aspirin 81 MG EC tablet  Commonly known as: ECOTRIN  Take 1 tablet (81 mg total) by mouth once daily.     azelastine 137 mcg (0.1 %) nasal spray  Commonly known as: ASTELIN  1 spray by Nasal route 2 (two) times daily.     carvediloL 3.125 MG tablet  Commonly known as: COREG  Take 2 tablets (6.25 mg total) by mouth 2 (two) times daily.     cetirizine 10 MG tablet  Commonly known as: ZYRTEC  Take 1 tablet (10 mg total) by mouth once daily.     cyclobenzaprine 10 MG tablet  Commonly known as: FLEXERIL  Take 10 mg by mouth 3 (three) times daily as needed.     FREESTYLE HARSHA 14 DAY SENSOR Kit  Generic drug: flash glucose sensor  Change every 14 days for blood glucose monitoring.     glimepiride 4 MG tablet  Commonly known as: AMARYL  Take 1 tablet (4 mg total) by mouth before breakfast.     hydroCHLOROthiazide 25 MG tablet  Commonly known as: HYDRODIURIL  Take 1 tablet (25 mg total) by mouth once daily.     insulin syringe-needle U-100 0.3 mL 30 gauge x 5/16" Syrg  1 each by Misc.(Non-Drug; Combo Route) route 2 (two) times daily with meals.     lancing device Misc  1 Device by Misc.(Non-Drug; Combo Route) route 2 (two) times daily with meals.     losartan 100 MG tablet  Commonly known as: COZAAR  Take 1 tablet (100 mg total) by mouth once daily.     metFORMIN 500 MG tablet  Commonly known as: GLUCOPHAGE  Take 2 tablets (1,000 mg total) by mouth 2 (two) times daily with meals.     ondansetron 4 MG tablet  Commonly known as: ZOFRAN  Take 1 tablet (4 mg total) by mouth every 8 (eight) hours as needed.     pen needle, diabetic 31 gauge x 5/16" Ndle  Use to inject insulin 4 (four) times daily.            STOP taking these medications      ibuprofen 800 MG tablet  Commonly " known as: ADVIL,MOTRIN     ondansetron 4 MG Tbdl  Commonly known as: ZOFRAN-ODT     oxyCODONE 5 MG immediate release tablet  Commonly known as: ROXICODONE     promethazine 25 MG tablet  Commonly known as: PHENERGAN              Indwelling Lines/Drains at time of discharge:   Lines/Drains/Airways       Peripherally Inserted Central Catheter Line  Duration             PICC Double Lumen 04/25/24 1559 left brachial 30 days              Drain  Duration                  Closed/Suction Drain 05/24/24 1408 Tube - 1 Left Back Bulb 10 Fr. 1 day                    Time spent on the discharge of patient: 35 minutes         Tyrese Wang MD  Department of Hospital Medicine  [facility] - Telemetry

## 2024-05-26 NOTE — PLAN OF CARE
Problem: Adult Inpatient Plan of Care  Goal: Plan of Care Review  Outcome: Met  Goal: Patient-Specific Goal (Individualized)  Outcome: Met  Goal: Absence of Hospital-Acquired Illness or Injury  Outcome: Met  Goal: Optimal Comfort and Wellbeing  Outcome: Met  Goal: Readiness for Transition of Care  Outcome: Met     Problem: Sepsis/Septic Shock  Goal: Optimal Coping  Outcome: Met  Goal: Absence of Bleeding  Outcome: Met  Goal: Blood Glucose Level Within Targeted Range  Outcome: Met  Goal: Optimal Nutrition Intake  Outcome: Met     Problem: Wound  Goal: Optimal Coping  Outcome: Met  Goal: Optimal Functional Ability  Outcome: Met  Goal: Absence of Infection Signs and Symptoms  Outcome: Met  Goal: Improved Oral Intake  Outcome: Met  Goal: Optimal Pain Control and Function  Outcome: Met  Goal: Optimal Wound Healing  Outcome: Met

## 2024-05-26 NOTE — PLAN OF CARE
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       Follow Up Appointments:  Future Appointments   Date Time Provider Department Center   6/6/2024 10:00 AM Yulia Everett PA-C [facility] [place]       Allergies:  Review of patient's allergies indicates:   Allergen Reactions    Penicillins Hives     Tolerated cephalosporins 4/2024    Amoxicillin     Grass pollen-june grass standard Other (See Comments)       Medications: Review discharge medications with patient and family and provide education.    Current Facility-Administered Medications   Medication Dose Route Frequency Provider Last Rate Last Admin    acetaminophen tablet 650 mg  650 mg Oral Q8H PRN Tyrese Wang MD   650 mg at 05/25/24 2215    carvediloL tablet 12.5 mg  12.5 mg Oral BID WM Tyrese Wang MD   12.5 mg at 05/26/24 0640    carvediloL tablet 6.25 mg  6.25 mg Oral Once Tyrese Wang MD        cefTRIAXone (ROCEPHIN) 2 g in dextrose 5 % in water (D5W) 100 mL IVPB (MB+)  2 g Intravenous Q12H Marilee Begum MD   Stopped at 05/25/24 2114    glucagon (human recombinant) injection 1 mg  1 mg Intramuscular PRN Tyrese Wang MD        glucagon (human recombinant) injection 1 mg  1 mg Intramuscular PRN Tyrese Wang MD        glucose chewable tablet 16 g  16 g  Oral PRN Tyrese Wang MD        glucose chewable tablet 16 g  16 g Oral PRN Tyrese Wang MD        glucose chewable tablet 24 g  24 g Oral PRN Tyrese Wang MD        glucose chewable tablet 24 g  24 g Oral PRN Tyrese Wang MD        hydroCHLOROthiazide tablet 25 mg  25 mg Oral Daily Tyrese Wang MD        HYDROmorphone injection 1 mg  1 mg Intravenous Q3H PRN Tyrese Wang MD        insulin aspart U-100 pen 0-10 Units  0-10 Units Subcutaneous QID (AC + HS) PRN Tyrese Wang MD        losartan tablet 50 mg  50 mg Oral Daily Tyrese Wang MD        melatonin tablet 6 mg  6 mg Oral Nightly Tyrese Wang MD   6 mg at 05/25/24 2034    mupirocin 2 % ointment   Nasal BID Tyrese Wang MD   Given at 05/25/24 2034    naloxone 0.4 mg/mL injection 0.02 mg  0.02 mg Intravenous PRN Tyrese Wang MD        polyethylene glycol packet 17 g  17 g Oral BID PRN Tyrese Wang MD         Current Discharge Medication List        CONTINUE these medications which have CHANGED    Details   dextrose 5 % in water (D5W) PgBk 100 mL with cefTRIAXone 2 gram SolR 2 g Inject 2 g into the vein every 12 (twelve) hours.           CONTINUE these medications which have NOT CHANGED    Details   albuterol (PROVENTIL/VENTOLIN HFA) 90 mcg/actuation inhaler Inhale 2 puffs into the lungs every 6 (six) hours as needed.      aspirin (ECOTRIN) 81 MG EC tablet Take 1 tablet (81 mg total) by mouth once daily.  Qty: 90 tablet, Refills: 3      azelastine (ASTELIN) 137 mcg (0.1 %) nasal spray 1 spray by Nasal route 2 (two) times daily.      blood sugar diagnostic Strp Use to test blood glucose 2 (two) times daily with meals.  Qty: 100 strip, Refills: 11      blood-glucose meter Misc Use to test blood glucose 2 (two) times daily with meals.  Qty: 1 each, Refills: 0      carvediloL (COREG) 3.125 MG tablet Take 2 tablets (6.25 mg total) by mouth 2 (two) times daily.  Qty: 360 tablet, Refills: 3    Comments: .      cetirizine (ZYRTEC) 10 MG tablet Take 1 tablet (10  "mg total) by mouth once daily.  Qty: 30 tablet, Refills: 1    Associated Diagnoses: History of seasonal allergies      cyclobenzaprine (FLEXERIL) 10 MG tablet Take 10 mg by mouth 3 (three) times daily as needed.      FREESTYLE HARSHA 14 DAY SENSOR Kit Change every 14 days for blood glucose monitoring.      glimepiride (AMARYL) 4 MG tablet Take 1 tablet (4 mg total) by mouth before breakfast.  Qty: 90 tablet, Refills: 0      hydroCHLOROthiazide (HYDRODIURIL) 25 MG tablet Take 1 tablet (25 mg total) by mouth once daily.  Qty: 90 tablet, Refills: 3    Comments: .      insulin syringe-needle U-100 0.3 mL 30 gauge x 5/16" Syrg 1 each by Misc.(Non-Drug; Combo Route) route 2 (two) times daily with meals.  Qty: 100 each, Refills: 11      lancets Misc Use to check blood glucose 2 (two) times daily with meals.  Qty: 100 each, Refills: 11      lancing device Misc 1 Device by Misc.(Non-Drug; Combo Route) route 2 (two) times daily with meals.  Qty: 1 each, Refills: 0      losartan (COZAAR) 100 MG tablet Take 1 tablet (100 mg total) by mouth once daily.  Qty: 90 tablet, Refills: 3    Comments: .      metFORMIN (GLUCOPHAGE) 500 MG tablet Take 2 tablets (1,000 mg total) by mouth 2 (two) times daily with meals.  Qty: 360 tablet, Refills: 0      ondansetron (ZOFRAN) 4 MG tablet Take 1 tablet (4 mg total) by mouth every 8 (eight) hours as needed.  Qty: 12 tablet, Refills: 0      pen needle, diabetic 31 gauge x 5/16" Ndle Use to inject insulin 4 (four) times daily.  Qty: 100 each, Refills: 11    Comments: novolog=3 times daily; lantus=once daily           STOP taking these medications       ibuprofen (ADVIL,MOTRIN) 800 MG tablet Comments:   Reason for Stopping:         ondansetron (ZOFRAN-ODT) 4 MG TbDL Comments:   Reason for Stopping:         oxyCODONE (ROXICODONE) 5 MG immediate release tablet Comments:   Reason for Stopping:         promethazine (PHENERGAN) 25 MG tablet Comments:   Reason for Stopping:                 I have seen and " examined this patient within the last 30 days. My clinical findings that support the need for the home health skilled services and home bound status are the following:no   Weakness/numbness causing balance and gait disturbance due to Infection, Weakness/Debility, and Surgery making it taxing to leave home.  Requiring assistive device to leave home due to unsteady gait caused by  Infection, Weakness/Debility, and Surgery.     Diet:   cardiac diet and diabetic diet 2000 calorie    Labs:  -ceftriaxone 2g IV q12 hr given epidural involvement, anticipate 6w course, louann 7/4 pending repeat MRI         -targeting prior organism isolated  -follow up OR cx - so far no growth to date  -drain mgmt per primary/nsgy     Outpatient Antibiotic Therapy Plan:     Please send referral to [facility].     1) Infection: GBS epidural abscess     2) Discharge Antibiotics:     Intravenous antibiotics:  Ceftriaxone 2g iv q12 hr        3) Therapy Duration:  6w pending repeat MRI     Estimated end date of IV antibiotics: 7/4/24     4) Outpatient Weekly Labs:     Order the following labs to be drawn on Mondays:   CBC  CMP         5) Fax Lab Results to Infectious Diseases Provider: alan     [facility] Fax Number: [phone]     6) Outpatient Infectious Diseases Follow-up     Follow-up appointment will be arranged by the ID clinic and will be found in the patient's appointments tab.     Prior to discharge, please ensure the patient's follow-up has been scheduled.    If there is still no follow-up scheduled prior to discharge, please send an EPIC message to Annmarie Gill in Infectious Diseases.       Referrals/ Consults  Physical Therapy to evaluate and treat. Evaluate for home safety and equipment needs; Establish/upgrade home exercise program. Perform / instruct on therapeutic exercises, gait training, transfer training, and Range of Motion.  Occupational Therapy to evaluate and treat. Evaluate  home environment for safety and equipment needs. Perform/Instruct on transfers, ADL training, ROM, and therapeutic exercises.   to evaluate for community resources/long-range planning.  Aide to provide assistance with personal care, ADLs, and vital signs.    Activities:   activity as tolerated    Nursing:   Agency to admit patient within 24 hours of hospital discharge unless specified on physician order or at patient request    SN to complete comprehensive assessment including routine vital signs. Instruct on disease process and s/s of complications to report to MD. Review/verify medication list sent home with the patient at time of discharge  and instruct patient/caregiver as needed. Frequency may be adjusted depending on start of care date.     Skilled nurse to perform up to 3 visits PRN for symptoms related to diagnosis    Notify MD if SBP > 160 or < 90; DBP > 90 or < 50; HR > 120 or < 50; Temp > 101; O2 < 88%; Other:       Ok to schedule additional visits based on staff availability and patient request on consecutive days within the home health episode.    When multiple disciplines ordered:    Start of Care occurs on Sunday - Wednesday schedule remaining discipline evaluations as ordered on separate consecutive days following the start of care.    Thursday SOC -schedule subsequent evaluations Friday and Monday the following week.     Friday - Saturday SOC - schedule subsequent discipline evaluations on consecutive days starting Monday of the following week.    For all post-discharge communication, lab results, vitals, and subsequent orders- please contact patient's PCP.  If unable to get ahold of PCP, and question is about blood pressure, diuresis, or arrhythmia attempt to contact cardiologist.  If unable to get ahold of PCP, and question is about dialysis, please attempt to contact nephrologist.  If unable to get ahold of PCP, and question is about antibiotics or wound care, please contact  infectious disease doctor.  If unable to get ahold of PCP, and question is about oxygen titration, CPAP or BIPAP, please contact pulmonologist.   If unable to get ahold of PCP or the above physicians in a reasonable time, please contact  on-call for clarification.    Home Health Aide:  Nursing Twice weekly  Physical Therapy Three times weekly  Occupational Therapy Three times weekly  Medical Social Work Weekly  Home Health Aide Twice weekly    Wound Care Orders  Yes surgical, spine    Surgical incision care:   Use a normal saline solution (salt water) or mild soapy water.   Soak the gauze or cloth in the saline solution or soapy water, and gently dab or wipe the skin with it.   Try to remove all drainage and any dried blood or other matter that may have built up on the skin.  Air-dry the incision or pat it dry with a clean, fresh towel before reapplying the dressing.  Do not scrub or soak the wound.   Do not use rubbing alcohol, hydrogen peroxide, or iodine, which can harm the tissue and slow wound healing.        I certify that this patient is confined to her home and needs intermittent skilled nursing care, physical therapy, and occupational therapy.              Tyrese Wang MD        Medical Director        Section Head of Hospital Medicine        Board-Certified Internal Medicine Attending

## 2024-05-26 NOTE — NURSING
Discharge Preparation:  Note that patient awake, alert, fully oriented and denies pain at this time.   Patient dressed and reports that she is ready for discharge.   Case management coordination of service complete.    Left brachial PICC remaining intact for adm of outpatient antibiotics.  No Tele.     Will contact Virtual RN for d/c education.    Will contact Hospital transport for w/c to parking area.

## 2024-05-26 NOTE — PLAN OF CARE
Chart reviewed, pt not seen - surgical drains removed this morning. No fevers documented overnight.   OR cx so far no growth to date, no leukocytosis on lab work.     Recommendations:  -ceftriaxone 2g iv q12hr x6w, louann 7/4  -please see OPAT note from yesterday  -ID will arrange follow up    Will sign off, please call with questions, new culture data or clinical changes.

## 2024-05-26 NOTE — NURSING
[facility]  Nurses Note -- 4 Eyes      5/25/2024       Skin assessed on: Q Shift      [x] No Pressure Injuries Present    [x]Prevention Measures Documented    [] Yes LDA  for Pressure Injury Previously documented     [] Yes New Pressure Injury Discovered   [] LDA for New Pressure Injury Added      Attending RN:  Latasha Marti LPN     Second RN:  Ellyn NayakRN

## 2024-05-26 NOTE — DISCHARGE INSTRUCTIONS
Will set up home health for you  Ceftriaxone twice daily until 7/4/24  Follow with PCP, Neurosurgery, and Infectious disease  Surgical wound, nurse will assist, but generally:  Surgical incision care:   Use a normal saline solution (salt water) or mild soapy water.   Soak the gauze or cloth in the saline solution or soapy water, and gently dab or wipe the skin with it.   Try to remove all drainage and any dried blood or other matter that may have built up on the skin.  Air-dry the incision or pat it dry with a clean, fresh towel before reapplying the dressing.  Do not scrub or soak the wound.   Do not use rubbing alcohol, hydrogen peroxide, or iodine, which can harm the tissue and slow wound healing.       Thank you for trusting [facility] and me with your care.  We are honored that you entrusted us with your healthcare needs. Your satisfaction is very important to us and we hope you have been very pleased with your experience at [facility]. After your discharge you may receive a survey asking you to rate your hospital experience- Please help us by completing this survey. We hope that you have received the very best care possible during your hospitalization at [facility], as your satisfaction is our top priority.    Let me know if there is anything more I can do!!              Tyrese Wang MD        Medical Director        Section Head of         Board-Certified IM Attending      PATIENT GENERAL DISCHARGE INFORMATION   Things that YOU are responsible for to Manage Your Care At Home:  1. Getting your prescriptions filled.  2. Taking you medications as directed. (DO NOT MISS ANY DOSES!)  3. Going to your follow-up doctor appointments.                 *This is important because it allows the doctor to monitor your progress and make changes.      If no one calls you for your appointments, please call ([phone]) and reschedule this appointment.   After discharge, if you need  assistance, you can call [facility] for 24/7 assistance at [phone]  If you are experience any signs or symptoms, Call your doctor or Call 911 and come to your nearest Emergency Room.    You should receive a call from [facility] within 48-72 hours to help manage your care after discharge.   Please try to make sure that you answer your phone for this important phone call.

## 2024-05-27 LAB
ACID FAST MOD KINY STN SPEC: NORMAL
ACID FAST MOD KINY STN SPEC: NORMAL
BACTERIA BLD CULT: NORMAL
BACTERIA BLD CULT: NORMAL
FUNGUS SPEC CULT: NORMAL
MYCOBACTERIUM SPEC QL CULT: NORMAL
MYCOBACTERIUM SPEC QL CULT: NORMAL

## 2024-05-27 NOTE — ANESTHESIA POSTPROCEDURE EVALUATION
Anesthesia Post Evaluation    Patient: April Jordan    Procedure(s) Performed: Procedure(s) (LRB):  LAMINOFORAMINOTOMY, SPINE, T7/T8, MINIMALLY INVASIVE; with REMOVAL OF ABSCESS (Left)    Final Anesthesia Type: general      Patient location during evaluation: PACU  Patient participation: Yes- Able to Participate  Level of consciousness: awake and alert and oriented  Post-procedure vital signs: reviewed and stable  Pain management: adequate  Airway patency: patent    PONV status at discharge: No PONV  Anesthetic complications: no      Cardiovascular status: blood pressure returned to baseline, hemodynamically stable and stable  Respiratory status: unassisted, spontaneous ventilation and room air  Hydration status: euvolemic  Follow-up not needed.              Vitals Value Taken Time   /69 05/26/24 0809   Temp 36.7 °C (98.1 °F) 05/26/24 0809   Pulse 71 05/26/24 0809   Resp 18 05/26/24 0809   SpO2 97 % 05/26/24 0823         Event Time   Out of Recovery 05/24/2024 16:03:54         Pain/Celsa Score: Pain Rating Prior to Med Admin: 2 (5/26/2024  8:49 AM)

## 2024-05-28 LAB
AMPHETAMINES SERPL QL: NEGATIVE
BACTERIA SPEC AEROBE CULT: NO GROWTH
BACTERIA SPEC AEROBE CULT: NO GROWTH
BACTERIA SPEC ANAEROBE CULT: NORMAL
BACTERIA SPEC ANAEROBE CULT: NORMAL
BARBITURATES SERPL QL SCN: NEGATIVE
BENZODIAZ SERPL QL SCN: NEGATIVE
BZE SERPL QL: NEGATIVE
CARBOXYTHC SERPL QL SCN: NEGATIVE
ETHANOL SERPL QL SCN: NEGATIVE
METHADONE SERPL QL SCN: NEGATIVE
OPIATES SERPL QL SCN: NEGATIVE
PCP SERPL QL SCN: NEGATIVE
PROPOXYPH SERPL QL: NEGATIVE

## 2024-06-03 ENCOUNTER — LAB VISIT (OUTPATIENT)
Dept: LAB | Facility: HOSPITAL | Age: 54
End: 2024-06-03
Attending: STUDENT IN AN ORGANIZED HEALTH CARE EDUCATION/TRAINING PROGRAM
Payer: COMMERCIAL

## 2024-06-03 ENCOUNTER — TELEPHONE (OUTPATIENT)
Dept: INFECTIOUS DISEASES | Facility: CLINIC | Age: 54
End: 2024-06-03
Payer: COMMERCIAL

## 2024-06-03 DIAGNOSIS — M00.811 PYOGENIC BACTERIAL ARTHRITIS OF SHOULDER, RIGHT: ICD-10-CM

## 2024-06-03 DIAGNOSIS — R78.81 BACTEREMIA: ICD-10-CM

## 2024-06-03 DIAGNOSIS — A40.1 SEPTICEMIA DUE TO GROUP B STREPTOCOCCUS: Primary | ICD-10-CM

## 2024-06-03 DIAGNOSIS — M46.24 OSTEOMYELITIS OF VERTEBRA OF THORACIC REGION: ICD-10-CM

## 2024-06-03 DIAGNOSIS — L03.113 CELLULITIS OF RIGHT HAND EXCLUDING FINGERS AND THUMB: ICD-10-CM

## 2024-06-03 LAB
ALBUMIN SERPL BCP-MCNC: 3.6 G/DL (ref 3.5–5.2)
ALP SERPL-CCNC: 73 U/L (ref 55–135)
ALT SERPL W/O P-5'-P-CCNC: 10 U/L (ref 10–44)
ANION GAP SERPL CALC-SCNC: 8 MMOL/L (ref 8–16)
AST SERPL-CCNC: 13 U/L (ref 10–40)
BASOPHILS # BLD AUTO: 0.02 K/UL (ref 0–0.2)
BASOPHILS NFR BLD: 0.5 % (ref 0–1.9)
BILIRUB SERPL-MCNC: 0.3 MG/DL (ref 0.1–1)
BUN SERPL-MCNC: 13 MG/DL (ref 6–20)
CALCIUM SERPL-MCNC: 9.9 MG/DL (ref 8.7–10.5)
CHLORIDE SERPL-SCNC: 103 MMOL/L (ref 95–110)
CO2 SERPL-SCNC: 29 MMOL/L (ref 23–29)
CREAT SERPL-MCNC: 0.6 MG/DL (ref 0.5–1.4)
DIFFERENTIAL METHOD BLD: ABNORMAL
EOSINOPHIL # BLD AUTO: 0.1 K/UL (ref 0–0.5)
EOSINOPHIL NFR BLD: 3.5 % (ref 0–8)
ERYTHROCYTE [DISTWIDTH] IN BLOOD BY AUTOMATED COUNT: 13.7 % (ref 11.5–14.5)
EST. GFR  (NO RACE VARIABLE): >60 ML/MIN/1.73 M^2
FUNGUS SPEC CULT: NORMAL
FUNGUS SPEC CULT: NORMAL
GLUCOSE SERPL-MCNC: 40 MG/DL (ref 70–110)
HCT VFR BLD AUTO: 32.8 % (ref 37–48.5)
HGB BLD-MCNC: 10.1 G/DL (ref 12–16)
IMM GRANULOCYTES # BLD AUTO: 0.01 K/UL (ref 0–0.04)
IMM GRANULOCYTES NFR BLD AUTO: 0.3 % (ref 0–0.5)
LYMPHOCYTES # BLD AUTO: 1.4 K/UL (ref 1–4.8)
LYMPHOCYTES NFR BLD: 36.4 % (ref 18–48)
MCH RBC QN AUTO: 28.1 PG (ref 27–31)
MCHC RBC AUTO-ENTMCNC: 30.8 G/DL (ref 32–36)
MCV RBC AUTO: 91 FL (ref 82–98)
MONOCYTES # BLD AUTO: 0.3 K/UL (ref 0.3–1)
MONOCYTES NFR BLD: 7.8 % (ref 4–15)
NEUTROPHILS # BLD AUTO: 2 K/UL (ref 1.8–7.7)
NEUTROPHILS NFR BLD: 51.5 % (ref 38–73)
NRBC BLD-RTO: 0 /100 WBC
PLATELET # BLD AUTO: 227 K/UL (ref 150–450)
PMV BLD AUTO: 10.8 FL (ref 9.2–12.9)
POTASSIUM SERPL-SCNC: 3.7 MMOL/L (ref 3.5–5.1)
PROT SERPL-MCNC: 7.3 G/DL (ref 6–8.4)
RBC # BLD AUTO: 3.59 M/UL (ref 4–5.4)
SODIUM SERPL-SCNC: 140 MMOL/L (ref 136–145)
WBC # BLD AUTO: 3.96 K/UL (ref 3.9–12.7)

## 2024-06-03 PROCEDURE — 80053 COMPREHEN METABOLIC PANEL: CPT | Performed by: STUDENT IN AN ORGANIZED HEALTH CARE EDUCATION/TRAINING PROGRAM

## 2024-06-03 PROCEDURE — 85025 COMPLETE CBC W/AUTO DIFF WBC: CPT | Performed by: STUDENT IN AN ORGANIZED HEALTH CARE EDUCATION/TRAINING PROGRAM

## 2024-06-03 NOTE — TELEPHONE ENCOUNTER
Glucose collected today at 11am was 40. Called patient, states she's felling well, no complains.   Advice patient to seek medical care if she develops any symptoms.   Patient verbalized understanding.

## 2024-06-07 ENCOUNTER — OFFICE VISIT (OUTPATIENT)
Dept: INFECTIOUS DISEASES | Facility: CLINIC | Age: 54
End: 2024-06-07
Payer: COMMERCIAL

## 2024-06-07 DIAGNOSIS — Z79.2 RECEIVING INTRAVENOUS ANTIBIOTIC TREATMENT AS OUTPATIENT: ICD-10-CM

## 2024-06-07 DIAGNOSIS — Z79.899 HIGH RISK MEDICATIONS (NOT ANTICOAGULANTS) LONG-TERM USE: ICD-10-CM

## 2024-06-07 DIAGNOSIS — B95.1 BACTEREMIA DUE TO GROUP B STREPTOCOCCUS: Primary | ICD-10-CM

## 2024-06-07 DIAGNOSIS — M86.9 OSTEOMYELITIS, UNSPECIFIED SITE, UNSPECIFIED TYPE: ICD-10-CM

## 2024-06-07 DIAGNOSIS — G06.2 EPIDURAL ABSCESS: ICD-10-CM

## 2024-06-07 DIAGNOSIS — Z79.2 ANTIBIOTIC LONG-TERM USE: ICD-10-CM

## 2024-06-07 DIAGNOSIS — R78.81 BACTEREMIA DUE TO GROUP B STREPTOCOCCUS: Primary | ICD-10-CM

## 2024-06-07 PROCEDURE — 99213 OFFICE O/P EST LOW 20 MIN: CPT | Mod: 95,,, | Performed by: STUDENT IN AN ORGANIZED HEALTH CARE EDUCATION/TRAINING PROGRAM

## 2024-06-07 PROCEDURE — 1160F RVW MEDS BY RX/DR IN RCRD: CPT | Mod: CPTII,95,, | Performed by: STUDENT IN AN ORGANIZED HEALTH CARE EDUCATION/TRAINING PROGRAM

## 2024-06-07 PROCEDURE — 1159F MED LIST DOCD IN RCRD: CPT | Mod: CPTII,95,, | Performed by: STUDENT IN AN ORGANIZED HEALTH CARE EDUCATION/TRAINING PROGRAM

## 2024-06-07 PROCEDURE — 1111F DSCHRG MED/CURRENT MED MERGE: CPT | Mod: CPTII,95,, | Performed by: STUDENT IN AN ORGANIZED HEALTH CARE EDUCATION/TRAINING PROGRAM

## 2024-06-07 PROCEDURE — 3052F HG A1C>EQUAL 8.0%<EQUAL 9.0%: CPT | Mod: CPTII,95,, | Performed by: STUDENT IN AN ORGANIZED HEALTH CARE EDUCATION/TRAINING PROGRAM

## 2024-06-07 PROCEDURE — 4010F ACE/ARB THERAPY RXD/TAKEN: CPT | Mod: CPTII,95,, | Performed by: STUDENT IN AN ORGANIZED HEALTH CARE EDUCATION/TRAINING PROGRAM

## 2024-06-07 PROCEDURE — G2211 COMPLEX E/M VISIT ADD ON: HCPCS | Mod: 95,,, | Performed by: STUDENT IN AN ORGANIZED HEALTH CARE EDUCATION/TRAINING PROGRAM

## 2024-06-07 NOTE — PROGRESS NOTES
The patient location is: [place]  The chief complaint leading to consultation is: f/u    Visit type: audiovisual    Face to Face time with patient: 8.28 min  20 minutes of total time spent on the encounter, which includes face to face time and non-face to face time preparing to see the patient (eg, review of tests), Obtaining and/or reviewing separately obtained history, Documenting clinical information in the electronic or other health record, Independently interpreting results (not separately reported) and communicating results to the patient/family/caregiver, or Care coordination (not separately reported).         Each patient to whom he or she provides medical services by telemedicine is:  (1) informed of the relationship between the physician and patient and the respective role of any other health care provider with respect to management of the patient; and (2) notified that he or she may decline to receive medical services by telemedicine and may withdraw from such care at any time.    Notes:       INFECTIOUS DISEASE CLINIC  06/07/2024     Subjective:      Chief Complaint:   Chief Complaint   Patient presents with    Follow-up       History of Present Illness:    This is a 53 y.o. female with prior admission GBS bacteremia c/b R septic joint, T5-T11 prevertebral abscess with recent admission for worsening discitis on outpatient MRI s/p washout on 5/24  who is referred to my clinic for follow up.   Patient known to ID, please see prior notes for full details. Doing well since discharge, denies issues with abx or PICC line. Reports prior surgical site is healing nicely.         Review of Systems   Constitutional: Negative for chills and fever.   All other systems reviewed and are negative.        Past Medical History:   Diagnosis Date    Anemia 04/22/2024    Atrial fibrillation     DM2 (diabetes mellitus, type 2) 05/28/2023    History of abdominal abscess     History of pleural effusion     Hypertension      Osteomyelitis 05/23/2024    Prolonged Q-T interval on ECG 05/31/2023    Qtc 525 5/31/23     Past Surgical History:   Procedure Laterality Date    ABDOMINAL SURGERY      ARTHROSCOPIC DEBRIDEMENT OF SHOULDER Right 4/24/2024    Procedure: DEBRIDEMENT, SHOULDER, ARTHROSCOPIC; Linvate, beach chair, 9L NS;  Surgeon: Madi Bailey MD;  [facility];  Service: Orthopedics;  Laterality: Right;    ARTHROSCOPIC DEBRIDEMENT OF SHOULDER Right 4/25/2024    Procedure: DEBRIDEMENT, SHOULDER, ARTHROSCOPIC;  Surgeon: Thaddeus Corral MD;  [facility];  Service: Orthopedics;  Laterality: Right;    COLONOSCOPY N/A 06/01/2023    Procedure: COLONOSCOPY;  Surgeon: Thaddeus Carlos MD;  Location: [facility] ENDO;  Service: Endoscopy;  Laterality: N/A;    DIAGNOSTIC LAPAROSCOPY N/A 05/26/2019    Procedure: LAPAROSCOPY, DIAGNOSTIC Drainage of abscess ;  Surgeon: Jose Luis Hanson MD;  Location: [facility] OR;  Service: General;  Laterality: N/A;  Drain Placement    DIAGNOSTIC LAPAROSCOPY N/A 12/27/2020    Procedure: Diagnostic laparoscopy, drainage of intra-abdominal abscess;  Surgeon: Bhupendra Banda MD;  Location: [facility] OR;  Service: General;  Laterality: N/A;    LAMINOFORAMINOTOMY OF SPINE USING MINIMALLY INVASIVE TECHNIQUE Left 5/24/2024    Procedure: LAMINOFORAMINOTOMY, SPINE, T7/T8, MINIMALLY INVASIVE; with REMOVAL OF ABSCESS;  Surgeon: Janusz Xiao MD;  Location: [facility] OR;  Service: Neurosurgery;  Laterality: Left;  Left T7/T8 laminectomy with evacuation of epidural abscess, need neuromonitoring, microscope, 18mm METRx tubes, culture swabs. request noon start.  NEURO MONITORING VALERIE MARIA -[phone]    LAPAROSCOPIC LYSIS OF ADHESIONS  05/26/2019    Procedure: LYSIS, ADHESIONS, LAPAROSCOPIC;  Surgeon: Jose Luis Hanson MD;  Location: [facility] OR;  Service: General;;    VATS, WITH CHEST TUBE INSERTION FOR DRAINAGE OF PLEURAL EFFUSION Right 4/23/2024    Procedure: VATS, WITH CHEST TUBE INSERTION FOR  DRAINAGE OF PLEURAL EFFUSION;  Surgeon: Rakesh Blake MD;  Location: [facility] OR [address] [place] [address];  Service: Cardiothoracic;  Laterality: Right;     Family History   Problem Relation Name Age of Onset    Diabetes Father       Social History     Tobacco Use    Smoking status: Never    Smokeless tobacco: Never   Substance Use Topics    Alcohol use: Yes     Comment: occasionally    Drug use: Never       Review of patient's allergies indicates:   Allergen Reactions    Penicillins Hives     Tolerated cephalosporins 4/2024    Amoxicillin     Grass pollen-june grass standard Other (See Comments)         Objective:   VS (24h): There were no vitals filed for this visit.      Physical Exam  HENT:      Head: Normocephalic and atraumatic.   Eyes:      General:         Right eye: No discharge.         Left eye: No discharge.   Pulmonary:      Effort: Pulmonary effort is normal. No respiratory distress.   Neurological:      Mental Status: She is alert and oriented to person, place, and time.           Labs: reviewed      Micro:   5/24 OR cx ngtd  5/23 blcx ngtd  4/25 R shoulder cx ngtd  4/15 blcx GbS    Radiology:   5/23 MRI wwo thoracic  Impression:     Redemonstration of imaging findings in keeping with discitis/osteomyelitis at the T7-T8 level.  Interval development of phlegmonous material extending about the epidural space at T7-T8 with a small associated epidural abscess measuring approximately 0.8 x 0.5 cm, contributing to at least moderate spinal canal stenosis at this level.  Additional encroachment of the left greater than right neural foramen.     Marked improvement and paravertebral phlegmonous material about the paravertebral soft tissues mostly centered at the T7-T8 level.    Immunization History   Administered Date(s) Administered    COVID-19, MRNA, LN-S, PF (MODERNA FULL 0.5 ML DOSE) 03/16/2021, 04/13/2021    Pneumococcal Conjugate - 13 Valent 12/29/2020         Assessment:     1. Bacteremia due to group B  Streptococcus    2. Osteomyelitis, unspecified site, unspecified type    3. Epidural abscess    4. Receiving intravenous antibiotic treatment as outpatient    5. Antibiotic long-term use    6. High risk medications (not anticoagulants) long-term use       53 y.o. female with prior admission GBS bacteremia c/b R septic joint, T5-T11 prevertebral abscess with recent admission for worsening discitis on outpatient MRI s/p washout on 5/24  who is referred to my clinic for follow up.  During operation, pt was noted to have liquid abscess and epidural phlegmon. OR cx no growth to date. Blood cx at last admission no growth to date. Discussed last blood work, pt asymptomatic, she said when she checked her BG, it was 120 at home. Denies ongoing s/s of infection, tolerating abx without issues and denies problems with PICC.     Plan:     -continue ceftriaxone 2g q12 hours x6w, louann 7/4  -repeat MRI scheduled prior to completion of abx (~7/1)  -weekly lab work and dressing changes while on therapy  -follow up with ortho and neurosurgery          Follow up in 4 weeks, scheduled 7/5    Management of epidural abscess was discussed with patient. Patient was given ample time for questions, all questions answered. Strict return precautions given to patient. Visit today addressed a complex issue that requires longitudinal care and follow up.         Marilee Begum MD  Infectious Disease

## 2024-06-11 ENCOUNTER — HOSPITAL ENCOUNTER (EMERGENCY)
Facility: HOSPITAL | Age: 54
Discharge: HOME | End: 2024-06-11
Attending: EMERGENCY MEDICINE
Payer: COMMERCIAL

## 2024-06-11 ENCOUNTER — OFFICE VISIT (OUTPATIENT)
Dept: NEUROSURGERY | Facility: CLINIC | Age: 54
End: 2024-06-11
Payer: COMMERCIAL

## 2024-06-11 VITALS
SYSTOLIC BLOOD PRESSURE: 144 MMHG | RESPIRATION RATE: 18 BRPM | HEART RATE: 88 BPM | TEMPERATURE: 99 F | WEIGHT: 200.38 LBS | HEIGHT: 67 IN | DIASTOLIC BLOOD PRESSURE: 77 MMHG | OXYGEN SATURATION: 99 % | BODY MASS INDEX: 31.45 KG/M2

## 2024-06-11 VITALS
BODY MASS INDEX: 31.45 KG/M2 | SYSTOLIC BLOOD PRESSURE: 167 MMHG | HEART RATE: 74 BPM | OXYGEN SATURATION: 98 % | HEIGHT: 67 IN | WEIGHT: 200.38 LBS | DIASTOLIC BLOOD PRESSURE: 79 MMHG

## 2024-06-11 DIAGNOSIS — Z95.828 S/P PICC CENTRAL LINE PLACEMENT: Primary | ICD-10-CM

## 2024-06-11 DIAGNOSIS — Z98.890 S/P LAMINECTOMY: Primary | ICD-10-CM

## 2024-06-11 DIAGNOSIS — M86.9 OSTEOMYELITIS, UNSPECIFIED SITE, UNSPECIFIED TYPE: ICD-10-CM

## 2024-06-11 LAB
ALBUMIN SERPL BCP-MCNC: 3.8 G/DL (ref 3.5–5.2)
ALP SERPL-CCNC: 75 U/L (ref 55–135)
ALT SERPL W/O P-5'-P-CCNC: 13 U/L (ref 10–44)
ANION GAP SERPL CALC-SCNC: 11 MMOL/L (ref 8–16)
AST SERPL-CCNC: 14 U/L (ref 10–40)
BASOPHILS # BLD AUTO: 0.01 K/UL (ref 0–0.2)
BASOPHILS NFR BLD: 0.2 % (ref 0–1.9)
BILIRUB SERPL-MCNC: 0.2 MG/DL (ref 0.1–1)
BUN SERPL-MCNC: 13 MG/DL (ref 6–20)
CALCIUM SERPL-MCNC: 10.1 MG/DL (ref 8.7–10.5)
CHLORIDE SERPL-SCNC: 101 MMOL/L (ref 95–110)
CO2 SERPL-SCNC: 27 MMOL/L (ref 23–29)
CREAT SERPL-MCNC: 0.7 MG/DL (ref 0.5–1.4)
DIFFERENTIAL METHOD BLD: ABNORMAL
EOSINOPHIL # BLD AUTO: 0.2 K/UL (ref 0–0.5)
EOSINOPHIL NFR BLD: 3.2 % (ref 0–8)
ERYTHROCYTE [DISTWIDTH] IN BLOOD BY AUTOMATED COUNT: 14 % (ref 11.5–14.5)
EST. GFR  (NO RACE VARIABLE): >60 ML/MIN/1.73 M^2
GLUCOSE SERPL-MCNC: 93 MG/DL (ref 70–110)
HCT VFR BLD AUTO: 36.1 % (ref 37–48.5)
HGB BLD-MCNC: 11.6 G/DL (ref 12–16)
IMM GRANULOCYTES # BLD AUTO: 0.01 K/UL (ref 0–0.04)
IMM GRANULOCYTES NFR BLD AUTO: 0.2 % (ref 0–0.5)
LYMPHOCYTES # BLD AUTO: 1.9 K/UL (ref 1–4.8)
LYMPHOCYTES NFR BLD: 39.4 % (ref 18–48)
MCH RBC QN AUTO: 28.7 PG (ref 27–31)
MCHC RBC AUTO-ENTMCNC: 32.1 G/DL (ref 32–36)
MCV RBC AUTO: 89 FL (ref 82–98)
MONOCYTES # BLD AUTO: 0.3 K/UL (ref 0.3–1)
MONOCYTES NFR BLD: 7.2 % (ref 4–15)
NEUTROPHILS # BLD AUTO: 2.4 K/UL (ref 1.8–7.7)
NEUTROPHILS NFR BLD: 49.8 % (ref 38–73)
NRBC BLD-RTO: 0 /100 WBC
PLATELET # BLD AUTO: 220 K/UL (ref 150–450)
PMV BLD AUTO: 10.1 FL (ref 9.2–12.9)
POTASSIUM SERPL-SCNC: 3.8 MMOL/L (ref 3.5–5.1)
PROT SERPL-MCNC: 8.5 G/DL (ref 6–8.4)
RBC # BLD AUTO: 4.04 M/UL (ref 4–5.4)
SODIUM SERPL-SCNC: 139 MMOL/L (ref 136–145)
WBC # BLD AUTO: 4.72 K/UL (ref 3.9–12.7)

## 2024-06-11 PROCEDURE — 3077F SYST BP >= 140 MM HG: CPT | Mod: CPTII,S$GLB,, | Performed by: PHYSICIAN ASSISTANT

## 2024-06-11 PROCEDURE — 36569 INSJ PICC 5 YR+ W/O IMAGING: CPT

## 2024-06-11 PROCEDURE — C1751 CATH, INF, PER/CENT/MIDLINE: HCPCS

## 2024-06-11 PROCEDURE — 1159F MED LIST DOCD IN RCRD: CPT | Mod: CPTII,S$GLB,, | Performed by: PHYSICIAN ASSISTANT

## 2024-06-11 PROCEDURE — 99284 EMERGENCY DEPT VISIT MOD MDM: CPT | Mod: 25

## 2024-06-11 PROCEDURE — 3078F DIAST BP <80 MM HG: CPT | Mod: CPTII,S$GLB,, | Performed by: PHYSICIAN ASSISTANT

## 2024-06-11 PROCEDURE — 80053 COMPREHEN METABOLIC PANEL: CPT | Performed by: EMERGENCY MEDICINE

## 2024-06-11 PROCEDURE — 4010F ACE/ARB THERAPY RXD/TAKEN: CPT | Mod: CPTII,S$GLB,, | Performed by: PHYSICIAN ASSISTANT

## 2024-06-11 PROCEDURE — 1160F RVW MEDS BY RX/DR IN RCRD: CPT | Mod: CPTII,S$GLB,, | Performed by: PHYSICIAN ASSISTANT

## 2024-06-11 PROCEDURE — 3052F HG A1C>EQUAL 8.0%<EQUAL 9.0%: CPT | Mod: CPTII,S$GLB,, | Performed by: PHYSICIAN ASSISTANT

## 2024-06-11 PROCEDURE — 99024 POSTOP FOLLOW-UP VISIT: CPT | Mod: S$GLB,,, | Performed by: PHYSICIAN ASSISTANT

## 2024-06-11 PROCEDURE — 85025 COMPLETE CBC W/AUTO DIFF WBC: CPT | Performed by: EMERGENCY MEDICINE

## 2024-06-11 NOTE — PROCEDURES
"Shahida Ortega is a 53 y.o. female patient.    Temp: 98.5 °F (36.9 °C) (06/11/24 1022)  Pulse: 76 (06/11/24 1022)  Resp: 18 (06/11/24 1022)  BP: (!) 168/73 (06/11/24 1022)  SpO2: 98 % (06/11/24 1022)  Weight: 90.9 kg (200 lb 6.4 oz) (06/11/24 1022)  Height: 5' 7" (170.2 cm) (06/11/24 1022)    PICC  Date/Time: 6/11/2024 4:30 PM  Performed by: Dustin Lindsay RN  Consent Done: Yes  Time out: Immediately prior to procedure a time out was called to verify the correct patient, procedure, equipment, support staff and site/side marked as required  Indications: med administration  Anesthesia: local infiltration  Local anesthetic: lidocaine 1% without epinephrine  Anesthetic Total (mL): 1  Preparation: skin prepped with ChloraPrep  Skin prep agent dried: skin prep agent completely dried prior to procedure  Sterile barriers: all five maximum sterile barriers used - cap, mask, sterile gown, sterile gloves, and large sterile sheet  Hand hygiene: hand hygiene performed prior to central venous catheter insertion  Location details: right basilic  Catheter type: double lumen  Catheter size: 5 Fr  Catheter Length: 41cm    Ultrasound guidance: yes  Vessel Caliber: medium and large and patent, compressibility normal  Needle advanced into vessel with real time Ultrasound guidance.  Guidewire confirmed in vessel.  Sterile sheath used.  Number of attempts: 1  Post-procedure: blood return through all ports, chlorhexidine patch and sterile dressing applied  Estimated blood loss (mL): 0            Name Dustin Lindsay   6/11/2024    "

## 2024-06-11 NOTE — PROGRESS NOTES
Wound Check   Neurosurgery     Shahida Ortega is a 53 y.o. female who presents to clinic today for 2 week wound check, s/p left T7/8 laminectomy/medial facetectomy for evacuation of epidural abscess/phlegmon with Dr. Xiao.  Denies fevers, chills, night sweats or N/V. Further denies wound drainage or swelling.  She has kept up with her appointments with infectious disease and has roughly 3 weeks left of IV antibiotics.  She he accidentally pulled out her PICC line today and plans to go to the ER after this appointment to have it replaced.    She denies any significant back pain, numbness, weakness, b/b dysfunction.  She has been ambulating daily and having regular bowel movements.  No gait disturbance.    Physical Exam:   General: well developed, well nourished, no distress  Neurologic: Alert and oriented. Thought content appropriate.   GCS: Motor: 6/Verbal: 5/Eyes: 4 GCS Total: 15   Mental Status: Awake, Alert, Oriented x3   Cranial nerves: face symmetric, tongue midline, pupils equal, round, reactive to light with accomodation, EOMI.   Motor Strength: moves all extremities with good strength and tone .  5/5 BLE   Sensation: response to light touch throughout b/l lower extremity  No gait disturbances     Incision is clean, dry and intact with no signs of erythema, swelling or purulent drainage.  Steri-Strips per intact on exam and removed in clinic. All skin edges are completely approximated.       Vitals:    06/11/24 0952   BP: (!) 167/79   Pulse: 74             Assessment/Plan:   Shahida Ortega is a 53 y.o. female who presents for 2 week wound check, s/p left T7/8 laminectomy/medial facetectomy for evacuation of epidural abscess/phlegmon with Dr. Xiao.  She was recovering very well from surgery with no significant pain.  She does not need refills.  Advised of the importance of maintaining follow up with infectious disease, even [place].  She verbalized  understanding.     -Keep incision open to air   -Tylenol may be taken for mild-moderate pain.   -OK to resume home blood thinner, if applicable.     -Can shower and get incision wet, just pat dry and no vigorous scrubbing. Do not submerge incision for another 4 weeks.   -No lifting more than 10 lbs or excessive bending/twisting.   -Can drive after when no longer taking narcotics   -Follow up with Dr. Xiao in 6 weeks   -Keep your infectious disease follow-ups   -Encouraged patient to call if they have any questions or concerns prior to next follow up appt        Kanwal Leahy PA-C  [facility]  Department of Neurosurgery  [phone]

## 2024-06-11 NOTE — PATIENT INSTRUCTIONS
-Keep incision open to air   -Tylenol may be taken for mild-moderate pain.   -OK to resume home blood thinner, if applicable.     -Can shower and get incision wet, just pat dry and no vigorous scrubbing. Do not submerge incision for another 4 weeks.   -No lifting more than 10 lbs or excessive bending/twisting.   -Can drive after when no longer taking narcotics   -Follow up with Dr. Xiao in 6 weeks   -Keep your infectious disease follow-ups   -Please call with any questions or concerns prior to your next appointment.

## 2024-06-11 NOTE — ED PROVIDER NOTES
Encounter Date: 6/11/2024    SCRIBE #1 NOTE: Lionel DARLING, am scribing for, and in the presence of,  Woody Brandon MD.       History     Chief Complaint   Patient presents with    Vascular Access Problem     52 yo fem to triage for accidental removal of PICC LINE. VSS, NAD, AAOx4     53 y.o. female with PMHx of afib, anemia, DMT2, HTN, presents to the ED for evaluation of an accidental PICC line removal. Patient reports that she is receiving Rocephin BID via a PICC line in the LUE 2/2 a epidural abscess since 4/30. Patient reports that her currently PICC line came out overnight as she was sleeping. States that she was at her post-op follow up PTA, and notes that her surgical scar is well healing. Patient's last dose of Rocephin was last night. Denies fever, chills, numbness, weakness, paresthesia or any associated symptoms.     The history is provided by the patient. No  was used.     Review of patient's allergies indicates:   Allergen Reactions    Penicillins Hives     Tolerated cephalosporins 4/2024    Amoxicillin     Grass pollen-june grass standard Other (See Comments)     Past Medical History:   Diagnosis Date    Anemia 04/22/2024    Atrial fibrillation     DM2 (diabetes mellitus, type 2) 05/28/2023    History of abdominal abscess     History of pleural effusion     Hypertension     Osteomyelitis 05/23/2024    Prolonged Q-T interval on ECG 05/31/2023    Qtc 525 5/31/23     Past Surgical History:   Procedure Laterality Date    ABDOMINAL SURGERY      ARTHROSCOPIC DEBRIDEMENT OF SHOULDER Right 4/24/2024    Procedure: DEBRIDEMENT, SHOULDER, ARTHROSCOPIC; Linvate, [place] chair, 9L NS;  Surgeon: Madi Bailey MD;  Location: [facility] OR [address] [place] [address];  Service: Orthopedics;  Laterality: Right;    ARTHROSCOPIC DEBRIDEMENT OF SHOULDER Right 4/25/2024    Procedure: DEBRIDEMENT, SHOULDER, ARTHROSCOPIC;  Surgeon: Thaddeus Corral MD;  Location: [facility] OR [address] [place] [address];  Service: Orthopedics;   Laterality: Right;    COLONOSCOPY N/A 06/01/2023    Procedure: COLONOSCOPY;  Surgeon: Thaddeus Carlos MD;  Location: [facility] ENDO;  Service: Endoscopy;  Laterality: N/A;    DIAGNOSTIC LAPAROSCOPY N/A 05/26/2019    Procedure: LAPAROSCOPY, DIAGNOSTIC Drainage of abscess ;  Surgeon: Jose Luis Hanson MD;  Location: [facility] OR;  Service: General;  Laterality: N/A;  Drain Placement    DIAGNOSTIC LAPAROSCOPY N/A 12/27/2020    Procedure: Diagnostic laparoscopy, drainage of intra-abdominal abscess;  Surgeon: Bhupendra Banda MD;  Location: [facility] OR;  Service: General;  Laterality: N/A;    LAMINOFORAMINOTOMY OF SPINE USING MINIMALLY INVASIVE TECHNIQUE Left 5/24/2024    Procedure: LAMINOFORAMINOTOMY, SPINE, T7/T8, MINIMALLY INVASIVE; with REMOVAL OF ABSCESS;  Surgeon: Janusz Xiao MD;  Location: [facility] OR;  Service: Neurosurgery;  Laterality: Left;  Left T7/T8 laminectomy with evacuation of epidural abscess, need neuromonitoring, microscope, 18mm METRx tubes, culture swabs. request noon start.  NEURO MONITORING VALERIE BRAGA [phone]    LAPAROSCOPIC LYSIS OF ADHESIONS  05/26/2019    Procedure: LYSIS, ADHESIONS, LAPAROSCOPIC;  Surgeon: Jose Luis Hanson MD;  Location: [facility] OR;  Service: General;;    VATS, WITH CHEST TUBE INSERTION FOR DRAINAGE OF PLEURAL EFFUSION Right 4/23/2024    Procedure: VATS, WITH CHEST TUBE INSERTION FOR DRAINAGE OF PLEURAL EFFUSION;  Surgeon: Rakesh Blake MD;  Location: [address];  Service: Cardiothoracic;  Laterality: Right;     Family History   Problem Relation Name Age of Onset    Diabetes Father       Social History     Tobacco Use    Smoking status: Never    Smokeless tobacco: Never   Substance Use Topics    Alcohol use: Not Currently     Comment: occasionally    Drug use: Never     Review of Systems   Constitutional: Negative.  Negative for chills and fever.   HENT: Negative.     Eyes: Negative.    Respiratory: Negative.     Cardiovascular: Negative.    Gastrointestinal:  Negative.    Genitourinary: Negative.    Musculoskeletal: Negative.    Skin: Negative.    Neurological: Negative.  Negative for weakness and numbness.       Physical Exam     Initial Vitals [06/11/24 1022]   BP Pulse Resp Temp SpO2   (!) 168/73 76 18 98.5 °F (36.9 °C) 98 %      MAP       --         Physical Exam    Nursing note and vitals reviewed.  Constitutional: She appears well-developed and well-nourished. She is not diaphoretic. No distress.   HENT:   Head: Normocephalic and atraumatic.   Eyes: Pupils are equal, round, and reactive to light. Right eye exhibits no discharge. Left eye exhibits no discharge.   Neck: No tracheal deviation present.   Normal range of motion.  Cardiovascular:  Normal rate and regular rhythm.           Pulmonary/Chest: No stridor. No respiratory distress.   Musculoskeletal:         General: Tenderness (small area of tenderness with some mild ecchymosis over the left medial portion of upper arm associated with previous PICC, distal pulses intact) present. No edema. Normal range of motion.      Cervical back: Normal range of motion.      Comments: Surgical incisions clean, dry, intact appear well healed.     Neurological: She is alert and oriented to person, place, and time. She has normal strength.   Skin: Skin is warm and dry.         ED Course   Procedures  Labs Reviewed   CBC W/ AUTO DIFFERENTIAL - Abnormal; Notable for the following components:       Result Value    Hemoglobin 11.6 (*)     Hematocrit 36.1 (*)     All other components within normal limits   COMPREHENSIVE METABOLIC PANEL - Abnormal; Notable for the following components:    Total Protein 8.5 (*)     All other components within normal limits          Imaging Results              X-Ray Chest 1 View (Final result)  Result time 06/11/24 17:21:32      Final result by Lizette Gill MD (06/11/24 17:21:32)                   Impression:      No acute cardiopulmonary process identified.  Right-sided PICC line in  place.      Electronically signed by: Lizette Gill MD  Date:    06/11/2024  Time:    17:21               Narrative:    EXAMINATION:  XR CHEST 1 VIEW    CLINICAL HISTORY:  picc;    TECHNIQUE:  Single frontal view of the chest was performed.    COMPARISON:  04/25/2024.    FINDINGS:  Cardiac silhouette is normal in size.  Right-sided PICC line distal tip is seen over the SVC.  Lungs are symmetrically expanded.  No evidence of focal consolidative process, pneumothorax, or significant pleural effusion.  No acute osseous abnormality identified.                                       Medications - No data to display  Medical Decision Making  Amount and/or Complexity of Data Reviewed  Labs: ordered. Decision-making details documented in ED Course.  Radiology: ordered.      MDM:    53-year-old female with past medical history as noted above presenting with PICC line placement.  Differential Diagnosis includes:  DVT, PICC line occlusion, poor venous access.  Physical exam as noted above.  ED workup notable for x-ray of pick showing appropriate placement.  Additional lab work was drawn for her weekly labs.  Patient does have Rocephin at home.  She appears well after PICC placement in the other arm and educated on proper care and management moving forward.  Do not suspect any additional surgical or medical emergency. Discussed diagnosis and further treatment with patient, including f/u.  Return precautions given and all questions answered.  Patient in understanding of plan.  Pt discharged to home improved and stable.        Note was created using voice recognition software. Note may have occasional typographical or grammatical errors, garbled syntax, and other bizarre constructions that may not have been identified and edited despite good jaleesa initial review prior to signing.             Scribe Attestation:   Scribe #1: I performed the above scribed service and the documentation accurately describes the services I performed.  I attest to the accuracy of the note.                           I, Woody Brandon M.D., personally performed the services described in this documentation. All medical record entries made by the scribe were at my direction and in my presence. I have reviewed the chart and agree that the record reflects my personal performance and is accurate and complete.      Clinical Impression:  Final diagnoses:  [Z95.828] S/P PICC central line placement (Primary)          ED Disposition Condition    Discharge Stable          ED Prescriptions    None       Follow-up Information       Follow up With Specialties Details Why Contact Info    [facility] - Emergency Dept Emergency Medicine Go to  If symptoms worsen [address] [facility] [place] [id number] [phone]    Janusz Staton MD Internal Medicine In 1 week As needed [address] [facility] [address]  [phone]               Woody Brandon MD  06/16/24 0348

## 2024-06-13 LAB
ACID FAST MOD KINY STN SPEC: NORMAL
MYCOBACTERIUM SPEC QL CULT: NORMAL

## 2024-06-17 ENCOUNTER — LAB VISIT (OUTPATIENT)
Dept: LAB | Facility: HOSPITAL | Age: 54
End: 2024-06-17
Payer: COMMERCIAL

## 2024-06-17 ENCOUNTER — TELEPHONE (OUTPATIENT)
Dept: EMERGENCY MEDICINE | Facility: HOSPITAL | Age: 54
End: 2024-06-17
Payer: COMMERCIAL

## 2024-06-17 DIAGNOSIS — A40.1 SEPTICEMIA DUE TO GROUP B STREPTOCOCCUS: Primary | ICD-10-CM

## 2024-06-17 LAB
ALBUMIN SERPL BCP-MCNC: 3.5 G/DL (ref 3.5–5.2)
ALP SERPL-CCNC: 71 U/L (ref 55–135)
ALT SERPL W/O P-5'-P-CCNC: 14 U/L (ref 10–44)
ANION GAP SERPL CALC-SCNC: 10 MMOL/L (ref 8–16)
AST SERPL-CCNC: 16 U/L (ref 10–40)
BILIRUB SERPL-MCNC: 0.2 MG/DL (ref 0.1–1)
BUN SERPL-MCNC: 12 MG/DL (ref 6–20)
CALCIUM SERPL-MCNC: 9.6 MG/DL (ref 8.7–10.5)
CHLORIDE SERPL-SCNC: 103 MMOL/L (ref 95–110)
CO2 SERPL-SCNC: 28 MMOL/L (ref 23–29)
CREAT SERPL-MCNC: 0.6 MG/DL (ref 0.5–1.4)
EST. GFR  (NO RACE VARIABLE): >60 ML/MIN/1.73 M^2
GLUCOSE SERPL-MCNC: 33 MG/DL (ref 70–110)
POTASSIUM SERPL-SCNC: 3.2 MMOL/L (ref 3.5–5.1)
PROT SERPL-MCNC: 7.3 G/DL (ref 6–8.4)
SODIUM SERPL-SCNC: 141 MMOL/L (ref 136–145)

## 2024-06-17 PROCEDURE — 80053 COMPREHEN METABOLIC PANEL: CPT

## 2024-06-18 NOTE — TELEPHONE ENCOUNTER
Was notified by lab that patient has a critical glucose of 33 on outpatient CMP.  Spoke with patient over the phone patient states she is asymptomatic states she was checked her sugar since the blood draw this morning which was 145.  Recommend evaluation at the ED if she develops any symptoms.  Patient states that she was okay and will monitor her blood sugar.

## 2024-06-20 ENCOUNTER — DOCUMENT SCAN (OUTPATIENT)
Dept: HOME HEALTH SERVICES | Facility: HOSPITAL | Age: 54
End: 2024-06-20
Payer: COMMERCIAL

## 2024-06-24 ENCOUNTER — LAB VISIT (OUTPATIENT)
Dept: LAB | Facility: HOSPITAL | Age: 54
End: 2024-06-24
Payer: COMMERCIAL

## 2024-06-24 DIAGNOSIS — A40.1 SEPTICEMIA DUE TO GROUP B STREPTOCOCCUS: Primary | ICD-10-CM

## 2024-06-24 LAB
ALBUMIN SERPL BCP-MCNC: 3.6 G/DL (ref 3.5–5.2)
ALP SERPL-CCNC: 83 U/L (ref 55–135)
ALT SERPL W/O P-5'-P-CCNC: 25 U/L (ref 10–44)
ANION GAP SERPL CALC-SCNC: 10 MMOL/L (ref 8–16)
AST SERPL-CCNC: 22 U/L (ref 10–40)
BASOPHILS # BLD AUTO: 0.01 K/UL (ref 0–0.2)
BASOPHILS NFR BLD: 0.3 % (ref 0–1.9)
BILIRUB SERPL-MCNC: 0.2 MG/DL (ref 0.1–1)
BUN SERPL-MCNC: 10 MG/DL (ref 6–20)
CALCIUM SERPL-MCNC: 9.2 MG/DL (ref 8.7–10.5)
CHLORIDE SERPL-SCNC: 101 MMOL/L (ref 95–110)
CO2 SERPL-SCNC: 29 MMOL/L (ref 23–29)
CREAT SERPL-MCNC: 0.6 MG/DL (ref 0.5–1.4)
DIFFERENTIAL METHOD BLD: ABNORMAL
EOSINOPHIL # BLD AUTO: 0.2 K/UL (ref 0–0.5)
EOSINOPHIL NFR BLD: 5.1 % (ref 0–8)
ERYTHROCYTE [DISTWIDTH] IN BLOOD BY AUTOMATED COUNT: 14 % (ref 11.5–14.5)
EST. GFR  (NO RACE VARIABLE): >60 ML/MIN/1.73 M^2
GLUCOSE SERPL-MCNC: 87 MG/DL (ref 70–110)
HCT VFR BLD AUTO: 36 % (ref 37–48.5)
HGB BLD-MCNC: 10.9 G/DL (ref 12–16)
IMM GRANULOCYTES # BLD AUTO: 0.01 K/UL (ref 0–0.04)
IMM GRANULOCYTES NFR BLD AUTO: 0.3 % (ref 0–0.5)
LYMPHOCYTES # BLD AUTO: 1.5 K/UL (ref 1–4.8)
LYMPHOCYTES NFR BLD: 39.8 % (ref 18–48)
MCH RBC QN AUTO: 28.2 PG (ref 27–31)
MCHC RBC AUTO-ENTMCNC: 30.3 G/DL (ref 32–36)
MCV RBC AUTO: 93 FL (ref 82–98)
MONOCYTES # BLD AUTO: 0.3 K/UL (ref 0.3–1)
MONOCYTES NFR BLD: 8.6 % (ref 4–15)
NEUTROPHILS # BLD AUTO: 1.7 K/UL (ref 1.8–7.7)
NEUTROPHILS NFR BLD: 45.9 % (ref 38–73)
NRBC BLD-RTO: 0 /100 WBC
PLATELET # BLD AUTO: 207 K/UL (ref 150–450)
PMV BLD AUTO: 10.7 FL (ref 9.2–12.9)
POTASSIUM SERPL-SCNC: 3.8 MMOL/L (ref 3.5–5.1)
PROT SERPL-MCNC: 7 G/DL (ref 6–8.4)
RBC # BLD AUTO: 3.87 M/UL (ref 4–5.4)
SODIUM SERPL-SCNC: 140 MMOL/L (ref 136–145)
WBC # BLD AUTO: 3.74 K/UL (ref 3.9–12.7)

## 2024-06-24 PROCEDURE — 85025 COMPLETE CBC W/AUTO DIFF WBC: CPT

## 2024-06-24 PROCEDURE — 80053 COMPREHEN METABOLIC PANEL: CPT

## 2024-06-24 NOTE — PLAN OF CARE
SW met with patient to discuss preference for home health agency. Patient stated that she did not have any knowledge of home health agencies. SW informed patient that can look at her insurance and choose one that is in network. Patient was in agreement and said ok. JAY JAY sent referral to [facility].        05/28/19 6641   Post-Acute Status   Post-Acute Authorization Home Health/Hospice   Home Health/Hospice Status Referrals Sent  ([facility])      Detail Level: Detailed Add 1585x Cpt? (Do Not Bill If You Billed For The Procedure Placing The Sutures. This Is An Add-On Code That Must Be Billed With An E/M Visit Code): No

## 2024-07-01 ENCOUNTER — HOSPITAL ENCOUNTER (OUTPATIENT)
Dept: RADIOLOGY | Facility: OTHER | Age: 54
Discharge: HOME | End: 2024-07-01
Attending: STUDENT IN AN ORGANIZED HEALTH CARE EDUCATION/TRAINING PROGRAM
Payer: COMMERCIAL

## 2024-07-01 DIAGNOSIS — G06.2 EPIDURAL ABSCESS: ICD-10-CM

## 2024-07-01 PROCEDURE — A9585 GADOBUTROL INJECTION: HCPCS | Performed by: STUDENT IN AN ORGANIZED HEALTH CARE EDUCATION/TRAINING PROGRAM

## 2024-07-01 PROCEDURE — 25500020 PHARM REV CODE 255: Performed by: STUDENT IN AN ORGANIZED HEALTH CARE EDUCATION/TRAINING PROGRAM

## 2024-07-01 PROCEDURE — 72157 MRI CHEST SPINE W/O & W/DYE: CPT | Mod: TC

## 2024-07-01 PROCEDURE — 72157 MRI CHEST SPINE W/O & W/DYE: CPT | Mod: 26,,, | Performed by: RADIOLOGY

## 2024-07-01 RX ORDER — GADOBUTROL 604.72 MG/ML
10 INJECTION INTRAVENOUS
Status: COMPLETED | OUTPATIENT
Start: 2024-07-01 | End: 2024-07-01

## 2024-07-01 RX ADMIN — GADOBUTROL 10 ML: 604.72 INJECTION INTRAVENOUS at 11:07

## 2024-07-02 ENCOUNTER — DOCUMENT SCAN (OUTPATIENT)
Dept: HOME HEALTH SERVICES | Facility: HOSPITAL | Age: 54
End: 2024-07-02
Payer: COMMERCIAL

## 2024-07-05 ENCOUNTER — OFFICE VISIT (OUTPATIENT)
Dept: INFECTIOUS DISEASES | Facility: CLINIC | Age: 54
End: 2024-07-05
Payer: COMMERCIAL

## 2024-07-05 DIAGNOSIS — M86.9 OSTEOMYELITIS, UNSPECIFIED SITE, UNSPECIFIED TYPE: Primary | ICD-10-CM

## 2024-07-05 DIAGNOSIS — M46.20 VERTEBRAL ABSCESS: ICD-10-CM

## 2024-07-05 RX ORDER — LINEZOLID 600 MG/1
600 TABLET, FILM COATED ORAL 2 TIMES DAILY
Qty: 42 TABLET | Refills: 0 | Status: SHIPPED | OUTPATIENT
Start: 2024-07-05 | End: 2024-07-26

## 2024-07-05 NOTE — PROGRESS NOTES
The patient location is: home  The chief complaint leading to consultation is: f/u    Visit type: audiovisual    Face to Face time with patient: 13.29  40 minutes of total time spent on the encounter, which includes face to face time and non-face to face time preparing to see the patient (eg, review of tests), Obtaining and/or reviewing separately obtained history, Documenting clinical information in the electronic or other health record, Independently interpreting results (not separately reported) and communicating results to the patient/family/caregiver, or Care coordination (not separately reported).         Each patient to whom he or she provides medical services by telemedicine is:  (1) informed of the relationship between the physician and patient and the respective role of any other health care provider with respect to management of the patient; and (2) notified that he or she may decline to receive medical services by telemedicine and may withdraw from such care at any time.    Notes:         INFECTIOUS DISEASE CLINIC  07/05/2024     Subjective:      Chief Complaint:   No chief complaint on file.      History of Present Illness:    This is a 53 y.o. female with prior admission GBS bacteremia c/b R septic joint, T5-T11 prevertebral abscess with recent admission for worsening discitis on outpatient MRI s/p washout on 5/24  who is referred to my clinic for follow up.   Patient known to ID, please see prior notes for full details. Doing well since discharge, denies issues with abx or PICC line. Reports prior surgical site is healing nicely.         Interval history   7/5/24: Doing well since last visit. Tolerated iv abx and denied issues with new PICC. Had to get new picc placed last month. Denied fevers, chills, back/shoulder pain, or n/v/d. Pt anxious to get line removed.     Review of Systems   Constitutional: Negative for chills and fever.   All other systems reviewed and are negative.        Past Medical  History:   Diagnosis Date    Anemia 04/22/2024    Atrial fibrillation     DM2 (diabetes mellitus, type 2) 05/28/2023    History of abdominal abscess     History of pleural effusion     Hypertension     Osteomyelitis 05/23/2024    Prolonged Q-T interval on ECG 05/31/2023    Qtc 525 5/31/23     Past Surgical History:   Procedure Laterality Date    ABDOMINAL SURGERY      ARTHROSCOPIC DEBRIDEMENT OF SHOULDER Right 4/24/2024    Procedure: DEBRIDEMENT, SHOULDER, ARTHROSCOPIC; Linvatec, beach chair, 9L NS;  Surgeon: Madi Bailey MD;  Location: [facility] OR [address] [place] [address];  Service: Orthopedics;  Laterality: Right;    ARTHROSCOPIC DEBRIDEMENT OF SHOULDER Right 4/25/2024    Procedure: DEBRIDEMENT, SHOULDER, ARTHROSCOPIC;  Surgeon: Thaddeus Corral MD;  [facility];  Service: Orthopedics;  Laterality: Right;    COLONOSCOPY N/A 06/01/2023    Procedure: COLONOSCOPY;  Surgeon: Thaddeus Carlos MD;  Location: [facility] ENDO;  Service: Endoscopy;  Laterality: N/A;    DIAGNOSTIC LAPAROSCOPY N/A 05/26/2019    Procedure: LAPAROSCOPY, DIAGNOSTIC Drainage of abscess ;  Surgeon: Jose Luis Hanson MD;  Location: [facility] OR;  Service: General;  Laterality: N/A;  Drain Placement    DIAGNOSTIC LAPAROSCOPY N/A 12/27/2020    Procedure: Diagnostic laparoscopy, drainage of intra-abdominal abscess;  Surgeon: Bhupendra Banda MD;  Location: [facility] OR;  Service: General;  Laterality: N/A;    LAMINOFORAMINOTOMY OF SPINE USING MINIMALLY INVASIVE TECHNIQUE Left 5/24/2024    Procedure: LAMINOFORAMINOTOMY, SPINE, T7/T8, MINIMALLY INVASIVE; with REMOVAL OF ABSCESS;  Surgeon: Janusz Xiao MD;  Location: [facility] OR;  Service: Neurosurgery;  Laterality: Left;  Left T7/T8 laminectomy with evacuation of epidural abscess, need neuromonitoring, microscope, 18mm METRx tubes, culture swabs. request noon start.  NEURO MONITORING VALERIE BRAGA [phone]    LAPAROSCOPIC LYSIS OF ADHESIONS  05/26/2019    Procedure: LYSIS, ADHESIONS, LAPAROSCOPIC;   Surgeon: Jose Luis Hanson MD;  Location: [facility] OR;  Service: General;;    VATS, WITH CHEST TUBE INSERTION FOR DRAINAGE OF PLEURAL EFFUSION Right 4/23/2024    Procedure: VATS, WITH CHEST TUBE INSERTION FOR DRAINAGE OF PLEURAL EFFUSION;  Surgeon: Rakesh Blake MD;  Location: [facility] OR [address] [place] [address];  Service: Cardiothoracic;  Laterality: Right;     Family History   Problem Relation Name Age of Onset    Diabetes Father       Social History     Tobacco Use    Smoking status: Never    Smokeless tobacco: Never   Substance Use Topics    Alcohol use: Not Currently     Comment: occasionally    Drug use: Never       Review of patient's allergies indicates:   Allergen Reactions    Penicillins Hives     Tolerated cephalosporins 4/2024    Amoxicillin     Grass pollen-june grass standard Other (See Comments)         Objective:   VS (24h): There were no vitals filed for this visit.      Physical Exam  HENT:      Head: Normocephalic and atraumatic.   Eyes:      General:         Right eye: No discharge.         Left eye: No discharge.   Pulmonary:      Effort: Pulmonary effort is normal. No respiratory distress.   Neurological:      Mental Status: She is alert and oriented to person, place, and time.           Labs: reviewed      Micro:   5/24 OR cx ngtd  5/23 blcx ngtd  4/25 R shoulder cx ngtd  4/15 blcx GbS    Radiology:   5/23 MRI wwo thoracic  Impression:     Redemonstration of imaging findings in keeping with discitis/osteomyelitis at the T7-T8 level.  Interval development of phlegmonous material extending about the epidural space at T7-T8 with a small associated epidural abscess measuring approximately 0.8 x 0.5 cm, contributing to at least moderate spinal canal stenosis at this level.  Additional encroachment of the left greater than right neural foramen.     Marked improvement and paravertebral phlegmonous material about the paravertebral soft tissues mostly centered at the T7-T8 level.    MRI WWO thor 7/1/24    Impression:      Interval laminectomy on the left at T7-8 for evacuation of an epidural abscess and phlegmon.  Mass effect upon the thecal sac and spinal cord at this level has resolved.  No new epidural focal fluid collection.  Fluid with surrounding enhancement tracks along the posterior soft tissues at the left laminectomy site for which considerations would include seroma or hematoma.  Abscess could have this appearance in the appropriate clinical setting.  Close follow-up recommended.     Continued findings of osteomyelitis and discitis at T7-8.    Immunization History   Administered Date(s) Administered    COVID-19, MRNA, LN-S, PF (MODERNA FULL 0.5 ML DOSE) 03/16/2021, 04/13/2021    Pneumococcal Conjugate - 13 Valent 12/29/2020         Assessment:     1. Osteomyelitis, unspecified site, unspecified type  - linezolid (ZYVOX) 600 mg Tab; Take 1 tablet (600 mg total) by mouth 2 (two) times daily. for 21 days  Dispense: 42 tablet; Refill: 0  - CBC Auto Differential; Standing  - Comprehensive Metabolic Panel; Standing  - MRI Thoracic Spine With Contrast; Future    2. Vertebral abscess  - CBC Auto Differential; Standing  - Comprehensive Metabolic Panel; Standing  - MRI Thoracic Spine With Contrast; Future         53 y.o. female with prior admission GBS bacteremia c/b R septic joint, T5-T11 prevertebral abscess with recent admission for worsening discitis on outpatient MRI s/p washout on 5/24  who is referred to my clinic for follow up.  During operation, pt was noted to have liquid abscess and epidural phlegmon. OR cx no growth to date. Reviewed available blood work and recent MRI. Given continued abnormal findings on MRI though fortunately she seems to be improving/lack of worsening symptoms, shared decision making with patient to transition to PO abx.      Plan:     -stop ceftriaxone,pull PICC   -discussed with infusion company  -transition to PO linezolid 600 mg BID to target prior strep x 3w (NIRANJAN 7/26)   -discussed potential  adverse reactions with patient   -if linezolid is cost-prohibitive, alternative would be cefadroxil 1g bid (pt will let me know)   -weekly blood work while on therapy  -repeat MRI later this month prior to completion of of abx therapy          Follow up in 3 weeks, virtual per pt request    Management of epidural abscess was discussed with patient. Patient was given ample time for questions, all questions answered. Strict return precautions given to patient. Visit today addressed a complex issue that requires longitudinal care and follow up.         Marilee Begum MD  Infectious Disease

## 2024-07-10 ENCOUNTER — LAB VISIT (OUTPATIENT)
Dept: LAB | Facility: HOSPITAL | Age: 54
End: 2024-07-10
Attending: STUDENT IN AN ORGANIZED HEALTH CARE EDUCATION/TRAINING PROGRAM
Payer: COMMERCIAL

## 2024-07-10 DIAGNOSIS — M86.9 OSTEOMYELITIS, UNSPECIFIED SITE, UNSPECIFIED TYPE: ICD-10-CM

## 2024-07-10 DIAGNOSIS — M46.20 VERTEBRAL ABSCESS: ICD-10-CM

## 2024-07-10 LAB
ALBUMIN SERPL BCP-MCNC: 3.4 G/DL (ref 3.5–5.2)
ALP SERPL-CCNC: 75 U/L (ref 55–135)
ALT SERPL W/O P-5'-P-CCNC: 18 U/L (ref 10–44)
ANION GAP SERPL CALC-SCNC: 6 MMOL/L (ref 8–16)
AST SERPL-CCNC: 14 U/L (ref 10–40)
BASOPHILS # BLD AUTO: 0.01 K/UL (ref 0–0.2)
BASOPHILS NFR BLD: 0.2 % (ref 0–1.9)
BILIRUB SERPL-MCNC: 0.2 MG/DL (ref 0.1–1)
BUN SERPL-MCNC: 15 MG/DL (ref 6–20)
CALCIUM SERPL-MCNC: 9.3 MG/DL (ref 8.7–10.5)
CHLORIDE SERPL-SCNC: 106 MMOL/L (ref 95–110)
CO2 SERPL-SCNC: 29 MMOL/L (ref 23–29)
CREAT SERPL-MCNC: 0.7 MG/DL (ref 0.5–1.4)
DIFFERENTIAL METHOD BLD: ABNORMAL
EOSINOPHIL # BLD AUTO: 0.1 K/UL (ref 0–0.5)
EOSINOPHIL NFR BLD: 2.6 % (ref 0–8)
ERYTHROCYTE [DISTWIDTH] IN BLOOD BY AUTOMATED COUNT: 13.5 % (ref 11.5–14.5)
EST. GFR  (NO RACE VARIABLE): >60 ML/MIN/1.73 M^2
GLUCOSE SERPL-MCNC: 109 MG/DL (ref 70–110)
HCT VFR BLD AUTO: 34.8 % (ref 37–48.5)
HGB BLD-MCNC: 11.1 G/DL (ref 12–16)
IMM GRANULOCYTES # BLD AUTO: 0.01 K/UL (ref 0–0.04)
IMM GRANULOCYTES NFR BLD AUTO: 0.2 % (ref 0–0.5)
LYMPHOCYTES # BLD AUTO: 2 K/UL (ref 1–4.8)
LYMPHOCYTES NFR BLD: 46.2 % (ref 18–48)
MCH RBC QN AUTO: 28.5 PG (ref 27–31)
MCHC RBC AUTO-ENTMCNC: 31.9 G/DL (ref 32–36)
MCV RBC AUTO: 89 FL (ref 82–98)
MONOCYTES # BLD AUTO: 0.3 K/UL (ref 0.3–1)
MONOCYTES NFR BLD: 6.6 % (ref 4–15)
NEUTROPHILS # BLD AUTO: 1.9 K/UL (ref 1.8–7.7)
NEUTROPHILS NFR BLD: 44.2 % (ref 38–73)
NRBC BLD-RTO: 0 /100 WBC
PLATELET # BLD AUTO: 181 K/UL (ref 150–450)
PMV BLD AUTO: 10.1 FL (ref 9.2–12.9)
POTASSIUM SERPL-SCNC: 3.9 MMOL/L (ref 3.5–5.1)
PROT SERPL-MCNC: 7.1 G/DL (ref 6–8.4)
RBC # BLD AUTO: 3.9 M/UL (ref 4–5.4)
SODIUM SERPL-SCNC: 141 MMOL/L (ref 136–145)
WBC # BLD AUTO: 4.22 K/UL (ref 3.9–12.7)

## 2024-07-10 PROCEDURE — 80053 COMPREHEN METABOLIC PANEL: CPT | Performed by: STUDENT IN AN ORGANIZED HEALTH CARE EDUCATION/TRAINING PROGRAM

## 2024-07-10 PROCEDURE — 85025 COMPLETE CBC W/AUTO DIFF WBC: CPT | Performed by: STUDENT IN AN ORGANIZED HEALTH CARE EDUCATION/TRAINING PROGRAM

## 2024-07-10 PROCEDURE — 36415 COLL VENOUS BLD VENIPUNCTURE: CPT | Performed by: STUDENT IN AN ORGANIZED HEALTH CARE EDUCATION/TRAINING PROGRAM

## 2024-07-15 PROBLEM — A41.9 SEPSIS: Status: RESOLVED | Noted: 2024-04-15 | Resolved: 2024-07-15

## 2024-07-17 ENCOUNTER — LAB VISIT (OUTPATIENT)
Dept: LAB | Facility: HOSPITAL | Age: 54
End: 2024-07-17
Attending: STUDENT IN AN ORGANIZED HEALTH CARE EDUCATION/TRAINING PROGRAM
Payer: COMMERCIAL

## 2024-07-17 DIAGNOSIS — M46.20 VERTEBRAL ABSCESS: ICD-10-CM

## 2024-07-17 DIAGNOSIS — M86.9 OSTEOMYELITIS, UNSPECIFIED SITE, UNSPECIFIED TYPE: ICD-10-CM

## 2024-07-17 LAB
ALBUMIN SERPL BCP-MCNC: 3.6 G/DL (ref 3.5–5.2)
ALP SERPL-CCNC: 88 U/L (ref 55–135)
ALT SERPL W/O P-5'-P-CCNC: 38 U/L (ref 10–44)
ANION GAP SERPL CALC-SCNC: 8 MMOL/L (ref 8–16)
AST SERPL-CCNC: 34 U/L (ref 10–40)
BASOPHILS # BLD AUTO: 0.02 K/UL (ref 0–0.2)
BASOPHILS NFR BLD: 0.5 % (ref 0–1.9)
BILIRUB SERPL-MCNC: 0.3 MG/DL (ref 0.1–1)
BUN SERPL-MCNC: 17 MG/DL (ref 6–20)
CALCIUM SERPL-MCNC: 9.7 MG/DL (ref 8.7–10.5)
CHLORIDE SERPL-SCNC: 103 MMOL/L (ref 95–110)
CO2 SERPL-SCNC: 29 MMOL/L (ref 23–29)
CREAT SERPL-MCNC: 0.7 MG/DL (ref 0.5–1.4)
DIFFERENTIAL METHOD BLD: ABNORMAL
EOSINOPHIL # BLD AUTO: 0.2 K/UL (ref 0–0.5)
EOSINOPHIL NFR BLD: 4.2 % (ref 0–8)
ERYTHROCYTE [DISTWIDTH] IN BLOOD BY AUTOMATED COUNT: 13.4 % (ref 11.5–14.5)
EST. GFR  (NO RACE VARIABLE): >60 ML/MIN/1.73 M^2
GLUCOSE SERPL-MCNC: 103 MG/DL (ref 70–110)
HCT VFR BLD AUTO: 37.2 % (ref 37–48.5)
HGB BLD-MCNC: 11.5 G/DL (ref 12–16)
IMM GRANULOCYTES # BLD AUTO: 0.01 K/UL (ref 0–0.04)
IMM GRANULOCYTES NFR BLD AUTO: 0.2 % (ref 0–0.5)
LYMPHOCYTES # BLD AUTO: 1.7 K/UL (ref 1–4.8)
LYMPHOCYTES NFR BLD: 42.6 % (ref 18–48)
MCH RBC QN AUTO: 27.8 PG (ref 27–31)
MCHC RBC AUTO-ENTMCNC: 30.9 G/DL (ref 32–36)
MCV RBC AUTO: 90 FL (ref 82–98)
MONOCYTES # BLD AUTO: 0.3 K/UL (ref 0.3–1)
MONOCYTES NFR BLD: 6.9 % (ref 4–15)
NEUTROPHILS # BLD AUTO: 1.9 K/UL (ref 1.8–7.7)
NEUTROPHILS NFR BLD: 45.6 % (ref 38–73)
NRBC BLD-RTO: 0 /100 WBC
PLATELET # BLD AUTO: 203 K/UL (ref 150–450)
PMV BLD AUTO: 10.1 FL (ref 9.2–12.9)
POTASSIUM SERPL-SCNC: 3.9 MMOL/L (ref 3.5–5.1)
PROT SERPL-MCNC: 7.5 G/DL (ref 6–8.4)
RBC # BLD AUTO: 4.13 M/UL (ref 4–5.4)
SODIUM SERPL-SCNC: 140 MMOL/L (ref 136–145)
WBC # BLD AUTO: 4.06 K/UL (ref 3.9–12.7)

## 2024-07-17 PROCEDURE — 80053 COMPREHEN METABOLIC PANEL: CPT | Performed by: STUDENT IN AN ORGANIZED HEALTH CARE EDUCATION/TRAINING PROGRAM

## 2024-07-17 PROCEDURE — 36415 COLL VENOUS BLD VENIPUNCTURE: CPT | Performed by: STUDENT IN AN ORGANIZED HEALTH CARE EDUCATION/TRAINING PROGRAM

## 2024-07-17 PROCEDURE — 85025 COMPLETE CBC W/AUTO DIFF WBC: CPT | Performed by: STUDENT IN AN ORGANIZED HEALTH CARE EDUCATION/TRAINING PROGRAM

## 2024-07-25 ENCOUNTER — PATIENT MESSAGE (OUTPATIENT)
Dept: INFECTIOUS DISEASES | Facility: CLINIC | Age: 54
End: 2024-07-25
Payer: COMMERCIAL

## 2024-07-26 ENCOUNTER — OFFICE VISIT (OUTPATIENT)
Dept: INFECTIOUS DISEASES | Facility: CLINIC | Age: 54
End: 2024-07-26
Payer: COMMERCIAL

## 2024-07-26 DIAGNOSIS — M86.9 OSTEOMYELITIS, UNSPECIFIED SITE, UNSPECIFIED TYPE: Primary | ICD-10-CM

## 2024-07-26 DIAGNOSIS — Z79.2 ANTIBIOTIC LONG-TERM USE: ICD-10-CM

## 2024-07-26 DIAGNOSIS — Z79.899 HIGH RISK MEDICATIONS (NOT ANTICOAGULANTS) LONG-TERM USE: ICD-10-CM

## 2024-07-26 DIAGNOSIS — G06.2 EPIDURAL ABSCESS: ICD-10-CM

## 2024-07-26 NOTE — PROGRESS NOTES
The patient location is: [place]  The chief complaint leading to consultation is: f/u    Visit type: audiovisual    Face to Face time with patient: 8.16 min  30 minutes of total time spent on the encounter, which includes face to face time and non-face to face time preparing to see the patient (eg, review of tests), Obtaining and/or reviewing separately obtained history, Documenting clinical information in the electronic or other health record, Independently interpreting results (not separately reported) and communicating results to the patient/family/caregiver, or Care coordination (not separately reported).         Each patient to whom he or she provides medical services by telemedicine is:  (1) informed of the relationship between the physician and patient and the respective role of any other health care provider with respect to management of the patient; and (2) notified that he or she may decline to receive medical services by telemedicine and may withdraw from such care at any time.    Notes:           [facility]ASE [facility]  07/26/2024     Subjective:      Chief Complaint:   Chief Complaint   Patient presents with    Follow-up       History of Present Illness:    This is a 53 y.o. female with prior admission GBS bacteremia c/b R septic joint, T5-T11 prevertebral abscess with recent admission for worsening discitis on outpatient MRI s/p washout on 5/24  who is referred to my clinic for follow up.   Patient known to ID, please see prior notes for full details. Doing well since discharge, denies issues with abx or PICC line. Reports prior surgical site is healing nicely.         Interval history   7/5/24: Doing well since last visit. Tolerated iv abx and denied issues with new PICC. Had to get new picc placed last month. Denied fevers, chills, back/shoulder pain, or n/v/d. Pt anxious to get line removed.   7/26/24: Doing great since last seen - tolerating linezolid without adverse reactions, has a few more days  left. Had difficulty obtaining prescription from pharmacy but was able to get it. No fevers, chills, back pain, weakness, or n/v/d. No issues with ambulating - back at work. Missed MRI appt due to sick family member.     Review of Systems   Constitutional: Negative for chills and fever.   All other systems reviewed and are negative.        Past Medical History:   Diagnosis Date    Anemia 04/22/2024    Atrial fibrillation     DM2 (diabetes mellitus, type 2) 05/28/2023    History of abdominal abscess     History of pleural effusion     Hypertension     Osteomyelitis 05/23/2024    Prolonged Q-T interval on ECG 05/31/2023    Qtc 525 5/31/23     Past Surgical History:   Procedure Laterality Date    ABDOMINAL SURGERY      ARTHROSCOPIC DEBRIDEMENT OF SHOULDER Right 4/24/2024    Procedure: DEBRIDEMENT, SHOULDER, ARTHROSCOPIC; [facility] [place] [facility], 9L NS;  Surgeon: Madi Bailey MD;  Location: [address];  Service: Orthopedics;  Laterality: Right;    ARTHROSCOPIC DEBRIDEMENT OF SHOULDER Right 4/25/2024    Procedure: DEBRIDEMENT, SHOULDER, ARTHROSCOPIC;  Surgeon: Thaddeus Corral MD;  Location: [facility] OR [address] [place] [address];  Service: Orthopedics;  Laterality: Right;    COLONOSCOPY N/A 06/01/2023    Procedure: COLONOSCOPY;  Surgeon: Thaddeus Carlos MD;  Location: [facility];  Service: Endoscopy;  Laterality: N/A;    DIAGNOSTIC LAPAROSCOPY N/A 05/26/2019    Procedure: LAPAROSCOPY, DIAGNOSTIC Drainage of abscess ;  Surgeon: Jose Luis Hanson MD;  Location: [facility] OR;  Service: General;  Laterality: N/A;  Drain Placement    DIAGNOSTIC LAPAROSCOPY N/A 12/27/2020    Procedure: Diagnostic laparoscopy, drainage of intra-abdominal abscess;  Surgeon: Bhupendra Banda MD;  Location: [facility] OR;  Service: General;  Laterality: N/A;    LAMINOFORAMINOTOMY OF SPINE USING MINIMALLY INVASIVE TECHNIQUE Left 5/24/2024    Procedure: LAMINOFORAMINOTOMY, SPINE, T7/T8, MINIMALLY INVASIVE; with REMOVAL OF ABSCESS;  Surgeon: Janusz Xiao MD;   Location: [facility] OR;  Service: Neurosurgery;  Laterality: Left;  Left T7/T8 laminectomy with evacuation of epidural abscess, need neuromonitoring, microscope, 18mm METRx tubes, culture swabs. request noon start.  NEURO MONITORING VALERIE BRAGA [phone]    LAPAROSCOPIC LYSIS OF ADHESIONS  05/26/2019    Procedure: LYSIS, ADHESIONS, LAPAROSCOPIC;  Surgeon: Jose Luis Hanson MD;  Location: [facility] OR;  Service: General;;    VATS, WITH CHEST TUBE INSERTION FOR DRAINAGE OF PLEURAL EFFUSION Right 4/23/2024    Procedure: VATS, WITH CHEST TUBE INSERTION FOR DRAINAGE OF PLEURAL EFFUSION;  Surgeon: Rakesh Blake MD;  Location: [facility] OR [address] [place] [address];  Service: Cardiothoracic;  Laterality: Right;     Family History   Problem Relation Name Age of Onset    Diabetes Father       Social History     Tobacco Use    Smoking status: Never    Smokeless tobacco: Never   Substance Use Topics    Alcohol use: Not Currently     Comment: occasionally    Drug use: Never       Review of patient's allergies indicates:   Allergen Reactions    Penicillins Hives     Tolerated cephalosporins 4/2024    Amoxicillin     Grass pollen-june grass standard Other (See Comments)         Objective:   VS (24h): There were no vitals filed for this visit.      Physical Exam  HENT:      Head: Normocephalic and atraumatic.   Eyes:      General:         Right eye: No discharge.         Left eye: No discharge.   Pulmonary:      Effort: Pulmonary effort is normal. No respiratory distress.   Neurological:      Mental Status: She is alert and oriented to person, place, and time.           Labs: reviewed      Micro:   5/24 OR cx ngtd  5/23 blcx ngtd  4/25 R shoulder cx ngtd  4/15 blcx GbS    Radiology:   5/23 MRI wwo thoracic  Impression:     Redemonstration of imaging findings in keeping with discitis/osteomyelitis at the T7-T8 level.  Interval development of phlegmonous material extending about the epidural space at T7-T8 with a small associated epidural abscess  measuring approximately 0.8 x 0.5 cm, contributing to at least moderate spinal canal stenosis at this level.  Additional encroachment of the left greater than right neural foramen.     Marked improvement and paravertebral phlegmonous material about the paravertebral soft tissues mostly centered at the T7-T8 level.    MRI WWO thor 7/1/24    Impression:     Interval laminectomy on the left at T7-8 for evacuation of an epidural abscess and phlegmon.  Mass effect upon the thecal sac and spinal cord at this level has resolved.  No new epidural focal fluid collection.  Fluid with surrounding enhancement tracks along the posterior soft tissues at the left laminectomy site for which considerations would include seroma or hematoma.  Abscess could have this appearance in the appropriate clinical setting.  Close follow-up recommended.     Continued findings of osteomyelitis and discitis at T7-8.    Immunization History   Administered Date(s) Administered    COVID-19, MRNA, LN-S, PF (MODERNA FULL 0.5 ML DOSE) 03/16/2021, 04/13/2021    Pneumococcal Conjugate - 13 Valent 12/29/2020         Assessment:     1. Osteomyelitis, unspecified site, unspecified type    2. Epidural abscess    3. Antibiotic long-term use    4. High risk medications (not anticoagulants) long-term use           53 y.o. female with prior admission GBS bacteremia c/b R septic joint, T5-T11 prevertebral abscess with recent admission for worsening discitis on outpatient MRI s/p washout on 5/24  who is referred to my clinic for follow up.  During operation, pt was noted to have liquid abscess and epidural phlegmon. OR cx no growth to date. Completing 3 more weeks of therapy (~9 weeks total) - denies interval worsening symptoms c/f ongoing infection. Shared decision making to stop abx after completing last dose with repeat MRI in a few months. Suspect prior mild fluid may by sterile given prolonged exposure to abx - in addition her lab work was reassuring.      Plan:     -continue linezolid, once completed, stop and monitor off  -MRI in 3 months  -pt to let me know if she experiences any red flag symptoms          Follow up in 3 months, [facility]    Management of epidural abscess was discussed with patient. Patient was given ample time for questions, all questions answered. Strict return precautions given to patient. Visit today addressed a complex issue that requires longitudinal care and follow up.         Marilee Begum MD  Infectious Disease

## 2024-07-31 ENCOUNTER — DOCUMENT SCAN (OUTPATIENT)
Dept: HOME HEALTH SERVICES | Facility: HOSPITAL | Age: 54
End: 2024-07-31
Payer: COMMERCIAL

## 2024-08-01 ENCOUNTER — EXTERNAL HOME HEALTH (OUTPATIENT)
Dept: HOME HEALTH SERVICES | Facility: HOSPITAL | Age: 54
End: 2024-08-01
Payer: COMMERCIAL

## 2024-09-30 DIAGNOSIS — M86.9 INFLAMMATION OF BONE: ICD-10-CM

## 2024-09-30 DIAGNOSIS — M46.20 SPINAL ABSCESS: Primary | ICD-10-CM

## 2024-10-07 ENCOUNTER — HOSPITAL ENCOUNTER (OUTPATIENT)
Dept: RADIOLOGY | Facility: HOSPITAL | Age: 54
Discharge: HOME | End: 2024-10-07
Attending: STUDENT IN AN ORGANIZED HEALTH CARE EDUCATION/TRAINING PROGRAM
Payer: COMMERCIAL

## 2024-10-07 DIAGNOSIS — M86.9 OSTEOMYELITIS, UNSPECIFIED SITE, UNSPECIFIED TYPE: ICD-10-CM

## 2024-10-07 DIAGNOSIS — M46.20 VERTEBRAL ABSCESS: ICD-10-CM

## 2024-10-07 PROCEDURE — A9585 GADOBUTROL INJECTION: HCPCS | Performed by: STUDENT IN AN ORGANIZED HEALTH CARE EDUCATION/TRAINING PROGRAM

## 2024-10-07 PROCEDURE — 72157 MRI CHEST SPINE W/O & W/DYE: CPT | Mod: 26,,, | Performed by: RADIOLOGY

## 2024-10-07 PROCEDURE — 25500020 PHARM REV CODE 255: Performed by: STUDENT IN AN ORGANIZED HEALTH CARE EDUCATION/TRAINING PROGRAM

## 2024-10-07 PROCEDURE — 72157 MRI CHEST SPINE W/O & W/DYE: CPT | Mod: TC

## 2024-10-07 RX ORDER — GADOBUTROL 604.72 MG/ML
10 INJECTION INTRAVENOUS
Status: COMPLETED | OUTPATIENT
Start: 2024-10-07 | End: 2024-10-07

## 2024-10-07 RX ADMIN — GADOBUTROL 10 ML: 604.72 INJECTION INTRAVENOUS at 11:10

## 2024-10-15 ENCOUNTER — OFFICE VISIT (OUTPATIENT)
Facility: CLINIC | Age: 54
End: 2024-10-15
Payer: COMMERCIAL

## 2024-10-15 ENCOUNTER — LAB VISIT (OUTPATIENT)
Dept: LAB | Facility: HOSPITAL | Age: 54
End: 2024-10-15
Payer: COMMERCIAL

## 2024-10-15 VITALS
RESPIRATION RATE: 18 BRPM | SYSTOLIC BLOOD PRESSURE: 160 MMHG | HEART RATE: 73 BPM | WEIGHT: 213.5 LBS | OXYGEN SATURATION: 100 % | DIASTOLIC BLOOD PRESSURE: 88 MMHG | BODY MASS INDEX: 33.51 KG/M2 | TEMPERATURE: 98 F | HEIGHT: 67 IN

## 2024-10-15 DIAGNOSIS — Z00.00 ANNUAL PHYSICAL EXAM: ICD-10-CM

## 2024-10-15 DIAGNOSIS — R06.02 SOB (SHORTNESS OF BREATH): ICD-10-CM

## 2024-10-15 DIAGNOSIS — Z76.89 ENCOUNTER TO ESTABLISH CARE: ICD-10-CM

## 2024-10-15 DIAGNOSIS — Z12.39 ENCOUNTER FOR SCREENING FOR MALIGNANT NEOPLASM OF BREAST, UNSPECIFIED SCREENING MODALITY: ICD-10-CM

## 2024-10-15 DIAGNOSIS — E11.65 UNCONTROLLED TYPE 2 DIABETES MELLITUS WITH HYPERGLYCEMIA, WITHOUT LONG-TERM CURRENT USE OF INSULIN: ICD-10-CM

## 2024-10-15 DIAGNOSIS — I10 HYPERTENSION, UNSPECIFIED TYPE: ICD-10-CM

## 2024-10-15 DIAGNOSIS — A41.9 SEPSIS: ICD-10-CM

## 2024-10-15 DIAGNOSIS — Z76.89 ENCOUNTER TO ESTABLISH CARE: Primary | ICD-10-CM

## 2024-10-15 DIAGNOSIS — Z88.9 HISTORY OF SEASONAL ALLERGIES: ICD-10-CM

## 2024-10-15 DIAGNOSIS — M86.9 OSTEOMYELITIS, UNSPECIFIED SITE, UNSPECIFIED TYPE: ICD-10-CM

## 2024-10-15 PROBLEM — D64.9 ANEMIA, UNSPECIFIED: Status: ACTIVE | Noted: 2024-04-30

## 2024-10-15 LAB
ALBUMIN SERPL BCP-MCNC: 3.7 G/DL (ref 3.5–5.2)
ALP SERPL-CCNC: 87 U/L (ref 55–135)
ALT SERPL W/O P-5'-P-CCNC: 17 U/L (ref 10–44)
ANION GAP SERPL CALC-SCNC: 10 MMOL/L (ref 8–16)
AST SERPL-CCNC: 15 U/L (ref 10–40)
BASOPHILS # BLD AUTO: 0.02 K/UL (ref 0–0.2)
BASOPHILS NFR BLD: 0.4 % (ref 0–1.9)
BILIRUB SERPL-MCNC: 0.3 MG/DL (ref 0.1–1)
BUN SERPL-MCNC: 15 MG/DL (ref 6–20)
CALCIUM SERPL-MCNC: 9.8 MG/DL (ref 8.7–10.5)
CHLORIDE SERPL-SCNC: 104 MMOL/L (ref 95–110)
CHOLEST SERPL-MCNC: 187 MG/DL (ref 120–199)
CHOLEST/HDLC SERPL: 3.3 {RATIO} (ref 2–5)
CO2 SERPL-SCNC: 27 MMOL/L (ref 23–29)
CREAT SERPL-MCNC: 0.7 MG/DL (ref 0.5–1.4)
DIFFERENTIAL METHOD BLD: ABNORMAL
EOSINOPHIL # BLD AUTO: 0.1 K/UL (ref 0–0.5)
EOSINOPHIL NFR BLD: 2.3 % (ref 0–8)
ERYTHROCYTE [DISTWIDTH] IN BLOOD BY AUTOMATED COUNT: 14.6 % (ref 11.5–14.5)
EST. GFR  (NO RACE VARIABLE): >60 ML/MIN/1.73 M^2
GLUCOSE SERPL-MCNC: 156 MG/DL (ref 70–110)
HCT VFR BLD AUTO: 39.7 % (ref 37–48.5)
HDLC SERPL-MCNC: 56 MG/DL (ref 40–75)
HDLC SERPL: 29.9 % (ref 20–50)
HGB BLD-MCNC: 12.3 G/DL (ref 12–16)
IMM GRANULOCYTES # BLD AUTO: 0.02 K/UL (ref 0–0.04)
IMM GRANULOCYTES NFR BLD AUTO: 0.4 % (ref 0–0.5)
LDLC SERPL CALC-MCNC: 55.6 MG/DL (ref 63–159)
LYMPHOCYTES # BLD AUTO: 1.8 K/UL (ref 1–4.8)
LYMPHOCYTES NFR BLD: 33.4 % (ref 18–48)
MCH RBC QN AUTO: 27.6 PG (ref 27–31)
MCHC RBC AUTO-ENTMCNC: 31 G/DL (ref 32–36)
MCV RBC AUTO: 89 FL (ref 82–98)
MONOCYTES # BLD AUTO: 0.4 K/UL (ref 0.3–1)
MONOCYTES NFR BLD: 8.4 % (ref 4–15)
NEUTROPHILS # BLD AUTO: 2.9 K/UL (ref 1.8–7.7)
NEUTROPHILS NFR BLD: 55.1 % (ref 38–73)
NONHDLC SERPL-MCNC: 131 MG/DL
NRBC BLD-RTO: 0 /100 WBC
PLATELET # BLD AUTO: 218 K/UL (ref 150–450)
PMV BLD AUTO: 11.2 FL (ref 9.2–12.9)
POTASSIUM SERPL-SCNC: 4 MMOL/L (ref 3.5–5.1)
PROT SERPL-MCNC: 8.1 G/DL (ref 6–8.4)
RBC # BLD AUTO: 4.46 M/UL (ref 4–5.4)
SODIUM SERPL-SCNC: 141 MMOL/L (ref 136–145)
TRIGL SERPL-MCNC: 377 MG/DL (ref 30–150)
TSH SERPL DL<=0.005 MIU/L-ACNC: 0.47 UIU/ML (ref 0.4–4)
WBC # BLD AUTO: 5.24 K/UL (ref 3.9–12.7)

## 2024-10-15 PROCEDURE — 3044F HG A1C LEVEL LT 7.0%: CPT | Mod: CPTII,S$GLB,,

## 2024-10-15 PROCEDURE — 3066F NEPHROPATHY DOC TX: CPT | Mod: CPTII,S$GLB,,

## 2024-10-15 PROCEDURE — 82570 ASSAY OF URINE CREATININE: CPT

## 2024-10-15 PROCEDURE — 99203 OFFICE O/P NEW LOW 30 MIN: CPT | Mod: 25,S$GLB,,

## 2024-10-15 PROCEDURE — 4010F ACE/ARB THERAPY RXD/TAKEN: CPT | Mod: CPTII,S$GLB,,

## 2024-10-15 PROCEDURE — 3079F DIAST BP 80-89 MM HG: CPT | Mod: CPTII,S$GLB,,

## 2024-10-15 PROCEDURE — 3061F NEG MICROALBUMINURIA REV: CPT | Mod: CPTII,S$GLB,,

## 2024-10-15 PROCEDURE — 1159F MED LIST DOCD IN RCRD: CPT | Mod: CPTII,S$GLB,,

## 2024-10-15 PROCEDURE — 99386 PREV VISIT NEW AGE 40-64: CPT | Mod: S$GLB,,,

## 2024-10-15 PROCEDURE — 84443 ASSAY THYROID STIM HORMONE: CPT

## 2024-10-15 PROCEDURE — 80061 LIPID PANEL: CPT

## 2024-10-15 PROCEDURE — 3077F SYST BP >= 140 MM HG: CPT | Mod: CPTII,S$GLB,,

## 2024-10-15 PROCEDURE — 3008F BODY MASS INDEX DOCD: CPT | Mod: CPTII,S$GLB,,

## 2024-10-15 PROCEDURE — 83036 HEMOGLOBIN GLYCOSYLATED A1C: CPT

## 2024-10-15 PROCEDURE — 80053 COMPREHEN METABOLIC PANEL: CPT

## 2024-10-15 PROCEDURE — 85025 COMPLETE CBC W/AUTO DIFF WBC: CPT

## 2024-10-15 PROCEDURE — 99999 PR PBB SHADOW E&M-EST. PATIENT-LVL V: CPT | Mod: PBBFAC,,,

## 2024-10-15 PROCEDURE — 36415 COLL VENOUS BLD VENIPUNCTURE: CPT

## 2024-10-15 RX ORDER — METFORMIN HYDROCHLORIDE 500 MG/1
1000 TABLET ORAL 2 TIMES DAILY WITH MEALS
Qty: 360 TABLET | Refills: 0 | Status: SHIPPED | OUTPATIENT
Start: 2024-10-15 | End: 2025-01-13

## 2024-10-15 RX ORDER — LOSARTAN POTASSIUM 100 MG/1
100 TABLET ORAL DAILY
Qty: 90 TABLET | Refills: 3 | Status: SHIPPED | OUTPATIENT
Start: 2024-10-15 | End: 2025-10-15

## 2024-10-15 RX ORDER — LEVOCETIRIZINE DIHYDROCHLORIDE 5 MG/1
5 TABLET, FILM COATED ORAL NIGHTLY
Qty: 30 TABLET | Refills: 11 | Status: SHIPPED | OUTPATIENT
Start: 2024-10-15 | End: 2025-10-15

## 2024-10-15 RX ORDER — ASPIRIN 81 MG/1
81 TABLET ORAL DAILY
Qty: 90 TABLET | Refills: 3 | Status: SHIPPED | OUTPATIENT
Start: 2024-10-15 | End: 2025-10-15

## 2024-10-15 RX ORDER — HYDROCHLOROTHIAZIDE 25 MG/1
25 TABLET ORAL DAILY
Qty: 90 TABLET | Refills: 3 | Status: SHIPPED | OUTPATIENT
Start: 2024-10-15 | End: 2025-10-15

## 2024-10-15 RX ORDER — GLIMEPIRIDE 4 MG/1
4 TABLET ORAL
Qty: 90 TABLET | Refills: 0 | Status: SHIPPED | OUTPATIENT
Start: 2024-10-15 | End: 2025-01-13

## 2024-10-15 RX ORDER — ALBUTEROL SULFATE 90 UG/1
2 INHALANT RESPIRATORY (INHALATION) EVERY 6 HOURS PRN
Qty: 18 G | Refills: 2 | Status: SHIPPED | OUTPATIENT
Start: 2024-10-15

## 2024-10-15 NOTE — PROGRESS NOTES
SUBJECTIVE     History of Present Illness               PAST MEDICAL HISTORY:  Past Medical History:   Diagnosis Date    Anemia 04/22/2024    Atrial fibrillation     DM2 (diabetes mellitus, type 2) 05/28/2023    History of abdominal abscess     History of pleural effusion     Hypertension     Osteomyelitis 05/23/2024    Prolonged Q-T interval on ECG 05/31/2023    Qtc 525 5/31/23       PAST SURGICAL HISTORY:  Past Surgical History:   Procedure Laterality Date    ABDOMINAL SURGERY      ARTHROSCOPIC DEBRIDEMENT OF SHOULDER Right 4/24/2024    Procedure: DEBRIDEMENT, SHOULDER, ARTHROSCOPIC; Linvatec, beach chair, 9L NS;  Surgeon: Madi Bailey MD;  Location: [facility] OR [address] [place] [address];  Service: Orthopedics;  Laterality: Right;    ARTHROSCOPIC DEBRIDEMENT OF SHOULDER Right 4/25/2024    Procedure: DEBRIDEMENT, SHOULDER, ARTHROSCOPIC;  Surgeon: Thaddeus Corral MD;  Location: [facility] OR [address] [place] [address];  Service: Orthopedics;  Laterality: Right;    COLONOSCOPY N/A 06/01/2023    Procedure: COLONOSCOPY;  Surgeon: Thaddeus Carlos MD;  Location: [facility] ENDO;  Service: Endoscopy;  Laterality: N/A;    DIAGNOSTIC LAPAROSCOPY N/A 05/26/2019    Procedure: LAPAROSCOPY, DIAGNOSTIC Drainage of abscess ;  Surgeon: Jose Luis Hanson MD;  Location: [facility] OR;  Service: General;  Laterality: N/A;  Drain Placement    DIAGNOSTIC LAPAROSCOPY N/A 12/27/2020    Procedure: Diagnostic laparoscopy, drainage of intra-abdominal abscess;  Surgeon: Bhupendra Banda MD;  Location: [facility] OR;  Service: General;  Laterality: N/A;    LAMINOFORAMINOTOMY OF SPINE USING MINIMALLY INVASIVE TECHNIQUE Left 5/24/2024    Procedure: LAMINOFORAMINOTOMY, SPINE, T7/T8, MINIMALLY INVASIVE; with REMOVAL OF ABSCESS;  Surgeon: Janusz Xiao MD;  Location: [facility] OR;  Service: Neurosurgery;  Laterality: Left;  Left T7/T8 laminectomy with evacuation of epidural abscess, need neuromonitoring, microscope, 18mm METRx tubes, culture swabs. request noon start.  NEURO MONITORING VALERIE  MARIA -[phone]    LAPAROSCOPIC LYSIS OF ADHESIONS  05/26/2019    Procedure: LYSIS, ADHESIONS, LAPAROSCOPIC;  Surgeon: Jose Luis Hanson MD;  Location: [facility] OR;  Service: General;;    VATS, WITH CHEST TUBE INSERTION FOR DRAINAGE OF PLEURAL EFFUSION Right 4/23/2024    Procedure: VATS, WITH CHEST TUBE INSERTION FOR DRAINAGE OF PLEURAL EFFUSION;  Surgeon: Rakesh Blake MD;  Location: [facility] OR [address] [place] [address];  Service: Cardiothoracic;  Laterality: Right;       SOCIAL HISTORY:  Social History     Socioeconomic History    Marital status: Single   Tobacco Use    Smoking status: Never    Smokeless tobacco: Never   Substance and Sexual Activity    Alcohol use: Not Currently     Comment: occasionally    Drug use: Never    Sexual activity: Yes     Partners: Male     Social Drivers of Health     Financial Resource Strain: Medium Risk (5/23/2024)    Overall Financial Resource Strain (CARDIA)     Difficulty of Paying Living Expenses: Somewhat hard   Food Insecurity: Food Insecurity Present (5/23/2024)    Hunger Vital Sign     Worried About Running Out of Food in the Last Year: Sometimes true     Ran Out of Food in the Last Year: Sometimes true   Transportation Needs: No Transportation Needs (4/16/2024)    PRAPARE - Transportation     Lack of Transportation (Medical): No     Lack of Transportation (Non-Medical): No   Physical Activity: Insufficiently Active (6/11/2024)    Received from [facility]    Exercise Vital Sign     Days of Exercise per Week: 3 days     Minutes of Exercise per Session: 30 min   Stress: No Stress Concern Present (6/11/2024)    Received from [facility]    [employer] - Occupational Stress Questionnaire     Feeling of Stress : Only a little   Recent Concern: Stress - Stress Concern Present (5/23/2024)    [employer] - Occupational Stress Questionnaire     Feeling of Stress : To some extent   Housing  "Stability: Unknown (6/11/2024)    Received from [facility]    Housing Stability Vital Sign     Unable to Pay for Housing in the Last Year: No     Homeless in the Last Year: No       FAMILY HISTORY:  Family History   Problem Relation Name Age of Onset    Diabetes Father         ALLERGIES AND MEDICATIONS: updated and reviewed.  Review of patient's allergies indicates:   Allergen Reactions    Penicillins Hives     Tolerated cephalosporins 4/2024    Amoxicillin     Grass pollen-june grass standard Other (See Comments)     Current Outpatient Medications   Medication Sig Dispense Refill    albuterol (PROVENTIL/VENTOLIN HFA) 90 mcg/actuation inhaler Inhale 2 puffs into the lungs every 6 (six) hours as needed.      azelastine (ASTELIN) 137 mcg (0.1 %) nasal spray 1 spray by Nasal route 2 (two) times daily.      blood sugar diagnostic Strp Use to test blood glucose 2 (two) times daily with meals. 100 strip 11    blood-glucose meter Misc Use to test blood glucose 2 (two) times daily with meals. 1 each 0    FREESTYLE HARSHA 14 DAY SENSOR Kit Change every 14 days for blood glucose monitoring.      insulin syringe-needle U-100 0.3 mL 30 gauge x 5/16" Syrg 1 each by Misc.(Non-Drug; Combo Route) route 2 (two) times daily with meals. 100 each 11    lancets Misc Use to check blood glucose 2 (two) times daily with meals. 100 each 11    lancing device Misc 1 Device by Misc.(Non-Drug; Combo Route) route 2 (two) times daily with meals. 1 each 0    pen needle, diabetic 31 gauge x 5/16" Ndle Use to inject insulin 4 (four) times daily. 100 each 11    aspirin (ECOTRIN) 81 MG EC tablet Take 1 tablet (81 mg total) by mouth once daily. 90 tablet 3    carvediloL (COREG) 3.125 MG tablet Take 2 tablets (6.25 mg total) by mouth 2 (two) times daily. 360 tablet 3    cetirizine (ZYRTEC) 10 MG tablet Take 1 tablet (10 mg total) by mouth once daily. (Patient not taking: Reported on 10/15/2024) 30 tablet 1    glimepiride (AMARYL) " "4 MG tablet Take 1 tablet (4 mg total) by mouth before breakfast. 90 tablet 0    hydroCHLOROthiazide (HYDRODIURIL) 25 MG tablet Take 1 tablet (25 mg total) by mouth once daily. 90 tablet 3    losartan (COZAAR) 100 MG tablet Take 1 tablet (100 mg total) by mouth once daily. 90 tablet 3    metFORMIN (GLUCOPHAGE) 500 MG tablet Take 2 tablets (1,000 mg total) by mouth 2 (two) times daily with meals. 360 tablet 0     No current facility-administered medications for this visit.       ***    OBJECTIVE     Physical Exam  Vitals:    10/15/24 1309   BP: (!) 180/90   Pulse: 73   Resp: 18   Temp: 98.1 °F (36.7 °C)    Body mass index is 33.44 kg/m².  Weight: 96.9 kg (213 lb 8.3 oz)   Height: 5' 7" (170.2 cm)     Physical Exam             ***    Health Maintenance         Date Due Completion Date    Hepatitis C Screening Never done ---    Cervical Cancer Screening Never done ---    Diabetes Urine Screening Never done ---    Foot Exam Never done ---    Eye Exam Never done ---    Mammogram Never done ---    Pneumococcal Vaccines (Age 0-64) (2 of 2 - PPSV23 or PCV20) 02/23/2021 12/29/2020    Lipid Panel 07/10/2024 7/10/2023    Hemoglobin A1c 08/25/2024 5/25/2024    COVID-19 Vaccine (5 - 2024-25 season) 09/01/2024 10/17/2022    Shingles Vaccine (2 of 2) 11/18/2024 9/23/2024    Colorectal Cancer Screening 06/01/2030 6/1/2023    TETANUS VACCINE 09/23/2034 9/23/2024    RSV Vaccine (Age 60+ and Pregnant patients) (1 - 1-dose 75+ series) 10/09/2047 ---              ASSESSMENT     54 y.o. female with     1. Osteomyelitis, unspecified site, unspecified type    2. Sepsis        PLAN:     1. Osteomyelitis, unspecified site, unspecified type  ***  - Ambulatory referral/consult to Internal Medicine    2. Sepsis  ***  - Ambulatory referral/consult to Internal Medicine      Assessment & Plan              RTC in ***     No follow-ups on file.    This note was generated with the assistance of ambient listening technology. Verbal consent was " obtained by the patient and accompanying visitor(s) for the recording of patient appointment to facilitate this note. I attest to having reviewed and edited the generated note for accuracy, though some syntax or spelling errors may persist. Please contact the author of this note for any clarification.      Ghassan JEREZ  10/15/2024 1:17 PM         deformity. Normal strength in upper extremities.  Extremities: No lower extremity edema.  Neurological: Alert & oriented x3. No slurred speech. Normal gait.  Psychiatric: Normal mood. Normal affect. Good insight. Good judgment.  Skin: Warm. Dry. No rash. Skin warm and dry.            Health Maintenance         Date Due Completion Date    Hepatitis C Screening Never done ---    Cervical Cancer Screening Never done ---    Foot Exam Never done ---    Eye Exam Never done ---    Mammogram Never done ---    Pneumococcal Vaccines (Age 0-64) (2 of 2 - PPSV23 or PCV20) 02/23/2021 12/29/2020    COVID-19 Vaccine (5 - 2024-25 season) 10/15/2025 (Originally 9/1/2024) 10/17/2022    Shingles Vaccine (2 of 2) 11/18/2024 9/23/2024    Hemoglobin A1c 04/15/2025 10/15/2024    Diabetes Urine Screening 10/15/2025 10/15/2024    Lipid Panel 10/15/2025 10/15/2024    Colorectal Cancer Screening 06/01/2030 6/1/2023    TETANUS VACCINE 09/23/2034 9/23/2024    RSV Vaccine (Age 60+ and Pregnant patients) (1 - 1-dose 75+ series) 10/09/2047 ---              ASSESSMENT     54 y.o. female with     1. Encounter to establish care    2. Annual physical exam    3. Osteomyelitis, unspecified site, unspecified type    4. Hypertension, unspecified type    5. SOB (shortness of breath)    6. Uncontrolled type 2 diabetes mellitus with hyperglycemia, without long-term current use of insulin    7. History of seasonal allergies    8. Encounter for screening for malignant neoplasm of breast, unspecified screening modality        PLAN:     1. Encounter to establish care  - Discussed age and gender appropriate screenings at this visit and encouraged a healthy diet low in simple carbohydrates, and increased physical activity.  Counseled on medically appropriate vaccines based on age and current health condition.  Screening test reviewed and discussed with patient.    - Hemoglobin A1C; Future  - Lipid Panel; Future  - TSH; Future  - Comprehensive Metabolic Panel;  Future  - CBC Auto Differential; Future    2. Annual physical exam  See above  - Hemoglobin A1C; Future  - Lipid Panel; Future  - TSH; Future  - Comprehensive Metabolic Panel; Future  - CBC Auto Differential; Future      3. Osteomyelitis, unspecified site, unspecified type  resolved  - Ambulatory referral/consult to Internal Medicine    4 Hypertension, unspecified type  Uncontrolled, denies CP, palpitations  Restart current medications  - aspirin (ECOTRIN) 81 MG EC tablet; Take 1 tablet (81 mg total) by mouth once daily.  Dispense: 90 tablet; Refill: 3  - hydroCHLOROthiazide (HYDRODIURIL) 25 MG tablet; Take 1 tablet (25 mg total) by mouth once daily.  Dispense: 90 tablet; Refill: 3  - losartan (COZAAR) 100 MG tablet; Take 1 tablet (100 mg total) by mouth once daily.  Dispense: 90 tablet; Refill: 3    5. SOB (shortness of breath)  - Stable; no acute issues  - The current medical regimen is effective;  continue present plan and medications.   - albuterol (PROVENTIL/VENTOLIN HFA) 90 mcg/actuation inhaler; Inhale 2 puffs into the lungs every 6 (six) hours as needed for Shortness of Breath.  Dispense: 18 g; Refill: 2    6. Uncontrolled type 2 diabetes mellitus with hyperglycemia, without long-term current use of insulin  - Stable; no acute issues  - The current medical regimen is effective;  continue present plan and medications.   - Ambulatory referral/consult to Podiatry; Future  - Diabetic Eye Screening Photo; Future  - Microalbumin/creatinine urine ratio  - glimepiride (AMARYL) 4 MG tablet; Take 1 tablet (4 mg total) by mouth before breakfast.  Dispense: 90 tablet; Refill: 0  - metFORMIN (GLUCOPHAGE) 500 MG tablet; Take 2 tablets (1,000 mg total) by mouth 2 (two) times daily with meals.  Dispense: 360 tablet; Refill: 0    7. History of seasonal allergies    - levocetirizine (XYZAL) 5 MG tablet; Take 1 tablet (5 mg total) by mouth every evening.  Dispense: 30 tablet; Refill: 11    8. Encounter for screening for  malignant neoplasm of breast, unspecified screening modality  Due,ordered  - Mammo Digital Screening Bilat w/ Angel; Future      Assessment & Plan    Reviewed patient's history of hypertension, diabetes, recent osteomyelitis, and septic arthritis of the right shoulder  Ordered annual labs to assess current health status  Evaluated medication regimen and made adjustments based on patient's recent non-adherence and reported symptoms  Considered potential use of GLP-1 agonists depending on A1C results  Assessed blood pressure, finding it elevated at 160/88 mmHg, likely due to recent medication non-adherence  Performed focused physical exam, including neurological assessment, with no significant findings noted    HYPERTENSION:  - Educated on importance of medication adherence for managing hypertension.  - Instructed on proper technique for blood pressure measurement at home.  - Ms. Ortega to log blood pressure daily and bring log to follow-up visit.  - Ms. Ortega to reduce salt intake in diet.  - Continued hydrochlorothiazide 25 mg daily.  - Continued losartan.    DIABETES:  - Educated on importance of medication adherence for managing diabetes.  - Discussed dietary modifications, emphasizing reduction of sugar intake.  - Explained the impact of certain foods (bread, rice, pasta) on blood sugar.  - Discussed sugar-free alternatives for beverages like coffee.  - Ms. Ortega to bring glucometer to follow-up visit.  - Ms. Ortega to avoid high-sugar items.  - Ms. Ortega to choose sugar-free alternatives for beverages.  - Continued metformin twice daily.  - Ordered annual labs: A1C.  - Ordered microalbumin urine test.  - Referred to Podiatry for diabetic foot exam.  - Referred for diabetic eye screening.      ALLERGIES:  - Emphasized the importance of avoiding allergens and proper hand hygiene for allergy management.  - Ms. Ortega to wear a mask and practice hand washing to manage allergies if unable to avoid allergens.  -  Started Xyzal for allergies, to be taken at night.    EXERCISE RECOMMENDATIONS:  - Recommend continuing current exercise regimen of walking 5 miles daily.  - Recommend considering incorporation of strength training with light weights.    SHORTNESS OF BREATH:  - Refilled albuterol inhaler with 2 refills for shortness of breath.    MEDICATIONS/SUPPLEMENTS:  - Restarted all previously prescribed medications for hypertension and diabetes.  - Continued aspirin 81 mg daily.    LABS:  - Ordered annual labs: thyroid function, liver function, kidney function, complete blood count.    MAMMOGRAM:  - Ordered mammogram.    FOLLOW UP:  - Follow up in 2 weeks to discuss lab results and treatment plan.  - Ms. Ortega to bring completed blood pressure log and glucometer to follow-up visit.             Follow up in about 2 years (around 10/15/2026).    This note was generated with the assistance of ambient listening technology. Verbal consent was obtained by the patient and accompanying visitor(s) for the recording of patient appointment to facilitate this note. I attest to having reviewed and edited the generated note for accuracy, though some syntax or spelling errors may persist. Please contact the author of this note for any clarification.      Ghassan GRIGSBY-C  10/24/2024 1:17 PM

## 2024-10-16 LAB
ALBUMIN/CREAT UR: 15.4 UG/MG (ref 0–30)
CREAT UR-MCNC: 143 MG/DL (ref 15–325)
ESTIMATED AVG GLUCOSE: 151 MG/DL (ref 68–131)
HBA1C MFR BLD: 6.9 % (ref 4–5.6)
MICROALBUMIN UR DL<=1MG/L-MCNC: 22 UG/ML

## 2024-10-29 ENCOUNTER — HOSPITAL ENCOUNTER (OUTPATIENT)
Dept: RADIOLOGY | Facility: HOSPITAL | Age: 54
Discharge: HOME | End: 2024-10-29
Payer: COMMERCIAL

## 2024-10-29 DIAGNOSIS — Z12.39 ENCOUNTER FOR SCREENING FOR MALIGNANT NEOPLASM OF BREAST, UNSPECIFIED SCREENING MODALITY: ICD-10-CM

## 2024-10-29 PROCEDURE — 77067 SCR MAMMO BI INCL CAD: CPT | Mod: TC

## 2024-10-29 PROCEDURE — 77067 SCR MAMMO BI INCL CAD: CPT | Mod: 26,,, | Performed by: RADIOLOGY

## 2024-10-29 PROCEDURE — 77063 BREAST TOMOSYNTHESIS BI: CPT | Mod: 26,,, | Performed by: RADIOLOGY

## 2024-10-29 NOTE — PLAN OF CARE
Dear Dr. Rm, you have received a request to complete a Medical Opinion Form on [employer].  The request has been scanned to the [employer] Forms Encounter and can be found in your In Basket CC'd Charts Folder.  The paperwork can be found under the media tab in the patient's chart.    Please complete the Medical Opinion Form within 14 days.      Once the Medical Opinion Form is completed, please fax to Melonie Garcai to [phone] and forward the original to Melonie Garcia, Medical Information Department at [facility].    A Pre-Payment of $26.00 has been received for the Medical Opinion Form.  Refer to attached scan(s) of Report Request.    Thank you  Melonie      Problem: Adult Inpatient Plan of Care  Goal: Patient-Specific Goal (Individualization)  Outcome: Ongoing (interventions implemented as appropriate)  Continue lung expansion therapy via EDD Sethi, RRT

## 2024-11-12 ENCOUNTER — OFFICE VISIT (OUTPATIENT)
Facility: CLINIC | Age: 54
End: 2024-11-12
Payer: COMMERCIAL

## 2024-11-12 VITALS
HEIGHT: 67 IN | BODY MASS INDEX: 34.08 KG/M2 | RESPIRATION RATE: 18 BRPM | HEART RATE: 80 BPM | TEMPERATURE: 97 F | WEIGHT: 217.13 LBS | OXYGEN SATURATION: 100 % | DIASTOLIC BLOOD PRESSURE: 84 MMHG | SYSTOLIC BLOOD PRESSURE: 140 MMHG

## 2024-11-12 DIAGNOSIS — I10 HYPERTENSION, UNSPECIFIED TYPE: ICD-10-CM

## 2024-11-12 DIAGNOSIS — R11.0 NAUSEA: ICD-10-CM

## 2024-11-12 DIAGNOSIS — E11.65 UNCONTROLLED TYPE 2 DIABETES MELLITUS WITH HYPERGLYCEMIA, WITHOUT LONG-TERM CURRENT USE OF INSULIN: ICD-10-CM

## 2024-11-12 DIAGNOSIS — Z71.2 ENCOUNTER TO DISCUSS TEST RESULTS: Primary | Chronic | ICD-10-CM

## 2024-11-12 PROCEDURE — 3061F NEG MICROALBUMINURIA REV: CPT | Mod: CPTII,S$GLB,,

## 2024-11-12 PROCEDURE — 99214 OFFICE O/P EST MOD 30 MIN: CPT | Mod: S$GLB,,,

## 2024-11-12 PROCEDURE — 3008F BODY MASS INDEX DOCD: CPT | Mod: CPTII,S$GLB,,

## 2024-11-12 PROCEDURE — 3077F SYST BP >= 140 MM HG: CPT | Mod: CPTII,S$GLB,,

## 2024-11-12 PROCEDURE — 1160F RVW MEDS BY RX/DR IN RCRD: CPT | Mod: CPTII,S$GLB,,

## 2024-11-12 PROCEDURE — 3066F NEPHROPATHY DOC TX: CPT | Mod: CPTII,S$GLB,,

## 2024-11-12 PROCEDURE — 3044F HG A1C LEVEL LT 7.0%: CPT | Mod: CPTII,S$GLB,,

## 2024-11-12 PROCEDURE — 4010F ACE/ARB THERAPY RXD/TAKEN: CPT | Mod: CPTII,S$GLB,,

## 2024-11-12 PROCEDURE — 3079F DIAST BP 80-89 MM HG: CPT | Mod: CPTII,S$GLB,,

## 2024-11-12 PROCEDURE — 99999 PR PBB SHADOW E&M-EST. PATIENT-LVL V: CPT | Mod: PBBFAC,,,

## 2024-11-12 PROCEDURE — 1159F MED LIST DOCD IN RCRD: CPT | Mod: CPTII,S$GLB,,

## 2024-11-12 RX ORDER — ONDANSETRON 4 MG/1
8 TABLET, FILM COATED ORAL EVERY 6 HOURS PRN
Qty: 30 TABLET | Refills: 1 | Status: SHIPPED | OUTPATIENT
Start: 2024-11-12

## 2024-11-12 NOTE — PROGRESS NOTES
SUBJECTIVE     History of Present Illness    CHIEF COMPLAINT:  Ms. Ortega presents for a follow-up visit to discuss blood pressure management and recent lab results.    HPI:  Ms. Ortega's blood pressure is currently 140/84, which is close to but not quite at the target range. She is taking losartan and hydrochlorothiazide for blood pressure management. She reports adhering to dietary modifications, including decreasing salt intake and avoiding fried foods. She has been exercising regularly, walking approximately 2.7 miles 3 days a week. She has occasional nausea, particularly during periods of increased body temperature, and requests a refill of her anti-nausea medication. Her dietary habits include consuming fast food for breakfast and cereals, which may contribute to hidden sodium and sugar intake. She attempts to manage her blood sugar through diet, as she previously had a severe hypoglycemic episode resulting in hospitalization.    She denies chest pain, palpitations, and shortness of breath.    MEDICATIONS:  Ms. Ortega is on Losartan, Hydrochlorothiazide, Glimepiride, and Metformin.    MEDICAL HISTORY:  Ms. Ortega has a history of hypertension and Type 2 Diabetes Mellitus. Ms. Ortega's last Pap smear is upcoming and needs to be scheduled.     TEST RESULTS:  Four weeks ago, the patient underwent several labs. Her glucose was slightly elevated, though she had eaten before the test. Her Hemoglobin A1C was 6.9. Complete blood count, liver and kidney function tests, and thyroid test were all within normal limits. The cholesterol panel showed overall cholesterol within acceptable range. Microalbumin test detected no proteinuria.    IMAGING:  Ms. Ortega underwent a mammogram.    ALLERGIES:  She reports allergies to grass and penicillin.    SOCIAL HISTORY:  Ms. Ortega works at [employer].      ROS:  General: -fever, -chills, -fatigue, -weight gain, -weight loss  Eyes: -vision changes, -redness, -discharge  ENT: -ear pain,  -nasal congestion, -sore throat  Cardiovascular: -chest pain, -palpitations, -lower extremity edema  Respiratory: -cough, -shortness of breath  Gastrointestinal: -abdominal pain, +nausea, -vomiting, -diarrhea, -constipation, -blood in stool  Genitourinary: -dysuria, -hematuria, -frequency  Musculoskeletal: -joint pain, -muscle pain  Skin: -rash, -lesion  Neurological: -headache, -dizziness, -numbness, -tingling  Psychiatric: -anxiety, -depression, -sleep difficulty           PAST MEDICAL HISTORY:  Past Medical History:   Diagnosis Date    Anemia 04/22/2024    Atrial fibrillation     DM2 (diabetes mellitus, type 2) 05/28/2023    History of abdominal abscess     History of pleural effusion     Hypertension     Osteomyelitis 05/23/2024    Prolonged Q-T interval on ECG 05/31/2023    Qtc 525 5/31/23       ALLERGIES AND MEDICATIONS: updated and reviewed.  Review of patient's allergies indicates:   Allergen Reactions    Penicillins Hives and Rash     Tolerated cephalosporins 4/2024    Amoxicillin     Grass pollen     Grass pollen-june grass standard Other (See Comments)     Current Outpatient Medications   Medication Sig Dispense Refill    albuterol (PROVENTIL/VENTOLIN HFA) 90 mcg/actuation inhaler Inhale 2 puffs into the lungs every 6 (six) hours as needed for Shortness of Breath. 18 g 2    aspirin (ECOTRIN) 81 MG EC tablet Take 1 tablet (81 mg total) by mouth once daily. 90 tablet 3    azelastine (ASTELIN) 137 mcg (0.1 %) nasal spray 1 spray by Nasal route 2 (two) times daily.      blood sugar diagnostic Strp Use to test blood glucose 2 (two) times daily with meals. 100 strip 11    blood-glucose meter Misc Use to test blood glucose 2 (two) times daily with meals. 1 each 0    FREESTYLE HARSHA 14 DAY SENSOR Kit Change every 14 days for blood glucose monitoring.      glimepiride (AMARYL) 4 MG tablet Take 1 tablet (4 mg total) by mouth before breakfast. 90 tablet 0    hydroCHLOROthiazide (HYDRODIURIL) 25 MG tablet Take 1  "tablet (25 mg total) by mouth once daily. 90 tablet 3    insulin syringe-needle U-100 0.3 mL 30 gauge x 5/16" Syrg 1 each by Misc.(Non-Drug; Combo Route) route 2 (two) times daily with meals. 100 each 11    lancets Misc Use to check blood glucose 2 (two) times daily with meals. 100 each 11    lancing device Misc 1 Device by Misc.(Non-Drug; Combo Route) route 2 (two) times daily with meals. 1 each 0    levocetirizine (XYZAL) 5 MG tablet Take 1 tablet (5 mg total) by mouth every evening. 30 tablet 11    losartan (COZAAR) 100 MG tablet Take 1 tablet (100 mg total) by mouth once daily. 90 tablet 3    metFORMIN (GLUCOPHAGE) 500 MG tablet Take 2 tablets (1,000 mg total) by mouth 2 (two) times daily with meals. 360 tablet 0    pen needle, diabetic 31 gauge x 5/16" Ndle Use to inject insulin 4 (four) times daily. 100 each 11    carvediloL (COREG) 3.125 MG tablet Take 2 tablets (6.25 mg total) by mouth 2 (two) times daily. 360 tablet 3    ondansetron (ZOFRAN) 4 MG tablet Take 2 tablets (8 mg total) by mouth every 6 (six) hours as needed for Nausea. 30 tablet 1     No current facility-administered medications for this visit.           OBJECTIVE     Physical Exam  Vitals:    11/12/24 0901   BP: (!) 140/84   Pulse: 80   Resp: 18   Temp: 96.9 °F (36.1 °C)    Body mass index is 34.01 kg/m².  Weight: 98.5 kg (217 lb 2.5 oz)   Height: 5' 7" (170.2 cm)     Physical Exam    Vitals: Blood pressure: 140/84.  General: No acute distress. Well-developed. Well-nourished.  Eyes: EOMI. Sclerae anicteric.  HENT: Normocephalic. Atraumatic. Nares patent. Moist oral mucosa.  Ears:Bilateral EACs clear.  Cardiovascular: Regular rate. Regular rhythm.   Respiratory: Normal respiratory effort.   Abdomen: Soft. Non-tender. Non-distended.   Musculoskeletal: No  obvious deformity.  Extremities: No lower extremity edema.  Neurological: Alert & oriented x3. No slurred speech. Normal gait.  Psychiatric: Normal mood. Normal affect. Good insight. Good " judgment.  Skin: Warm. Dry. No rash.           Health Maintenance         Date Due Completion Date    Hepatitis C Screening Never done ---    Cervical Cancer Screening Never done ---    Foot Exam Never done ---    Eye Exam Never done ---    Pneumococcal Vaccines (Age 0-64) (2 of 2 - PPSV23 or PCV20) 02/23/2021 12/29/2020    COVID-19 Vaccine (5 - 2024-25 season) 10/15/2025 (Originally 9/1/2024) 10/17/2022    Shingles Vaccine (2 of 2) 11/18/2024 9/23/2024    Hemoglobin A1c 04/15/2025 10/15/2024    Diabetes Urine Screening 10/15/2025 10/15/2024    Lipid Panel 10/15/2025 10/15/2024    Mammogram 10/29/2025 10/29/2024    Colorectal Cancer Screening 06/01/2030 6/1/2023    TETANUS VACCINE 09/23/2034 9/23/2024    RSV Vaccine (Age 60+ and Pregnant patients) (1 - 1-dose 75+ series) 10/09/2047 ---              ASSESSMENT     54 y.o. female with     1. Encounter to discuss test results    2. Hypertension, unspecified type    3. Uncontrolled type 2 diabetes mellitus with hyperglycemia, without long-term current use of insulin    4. Nausea        PLAN:     1. Encounter to discuss test results  - Discussed recent lab results,   - All questions/concerns addressed  - Pt voiced understanding      2. Hypertension, unspecified type  Uncontrolled /82 in clinic today   Advised to maintain a low Na diet(<2g/day), exercise, and keep BP log to present to next visit   Continue current meds,  F/u 2 weeks for bp check  3. Uncontrolled type 2 diabetes mellitus with hyperglycemia, without long-term current use of insulin  - Controlled; no acute issues  - The current medical regimen is effective;  continue present plan and medications.     4. Nausea    - ondansetron (ZOFRAN) 4 MG tablet; Take 2 tablets (8 mg total) by mouth every 6 (six) hours as needed for Nausea.  Dispense: 30 tablet; Refill: 1      Assessment & Plan    Reviewed blood pressure of 140/84, noting it is close to but not quite at goal  Considered adding amlodipine to current  regimen of losartan and hydrochlorothiazide  Decided against medication change, opting to focus on sodium reduction first  Evaluated recent lab results, noting slightly elevated glucose (6.9 A1C) but still within controlled range  Assessed cholesterol levels, determining elevated reading likely due to patient eating before lab draw  Reviewed current exercise regimen, finding it adequate but recommending dietary modifications    HYPERTENSION:  - Ms. Ortega to reduce sodium intake, particularly from fast food and processed breakfast items.  - Continued losartan and hydrochlorothiazide at current doses.    DIABETES:  - Discussed impact of hidden salts and sugars in diet on blood pressure and blood sugar.  - Educated on low sugar fruits suitable for diabetic diet: berries,   - Provided information on protein-rich, sugar-free yogurt options for breakfast.  - Recommend switching to low-carb wraps instead of bread for breakfast.  - Ms. Ortega to incorporate more low-sugar fruits like berries into diet.    NAUSEA:  - Recommend opting for small sips of fluids and dry foods (crackers) when experiencing nausea.  - Started Zofran 4mg every 6 hours as needed for nausea, provided 30 tablets with 1 refill.    WEIGHT MANAGEMENT:  - Ms. Ortega to continue current walking regimen.    WOMEN'S HEALTH:  - Schedule Pap smear at patient's convenience.    FOLLOW UP:  - Follow up in 2 weeks for nurse visit to check blood pressure.           MERI Rizzo  11/12/2024 12:47 PM      Follow up in about 2 weeks (around 11/26/2024).      This note was generated with the assistance of ambient listening technology. Verbal consent was obtained by the patient and accompanying visitor(s) for the recording of patient appointment to facilitate this note. I attest to having reviewed and edited the generated note for accuracy, though some syntax or spelling errors may persist. Please contact the author of this note for any  clarification.

## 2024-11-21 ENCOUNTER — PATIENT MESSAGE (OUTPATIENT)
Dept: ADMINISTRATIVE | Facility: HOSPITAL | Age: 54
End: 2024-11-21
Payer: COMMERCIAL

## 2024-11-26 ENCOUNTER — TELEPHONE (OUTPATIENT)
Dept: ADMINISTRATIVE | Facility: HOSPITAL | Age: 54
End: 2024-11-26
Payer: COMMERCIAL

## 2024-11-26 ENCOUNTER — PATIENT OUTREACH (OUTPATIENT)
Dept: ADMINISTRATIVE | Facility: HOSPITAL | Age: 54
End: 2024-11-26
Payer: COMMERCIAL

## 2024-11-26 VITALS — SYSTOLIC BLOOD PRESSURE: 130 MMHG | DIASTOLIC BLOOD PRESSURE: 82 MMHG

## 2025-02-04 ENCOUNTER — HOSPITAL ENCOUNTER (EMERGENCY)
Facility: HOSPITAL | Age: 55
Discharge: HOME | End: 2025-02-04
Attending: EMERGENCY MEDICINE
Payer: COMMERCIAL

## 2025-02-04 VITALS
RESPIRATION RATE: 20 BRPM | SYSTOLIC BLOOD PRESSURE: 184 MMHG | DIASTOLIC BLOOD PRESSURE: 94 MMHG | BODY MASS INDEX: 32.96 KG/M2 | WEIGHT: 210 LBS | HEART RATE: 85 BPM | HEIGHT: 67 IN | OXYGEN SATURATION: 98 % | TEMPERATURE: 98 F

## 2025-02-04 DIAGNOSIS — R51.9 HEADACHE: ICD-10-CM

## 2025-02-04 DIAGNOSIS — R03.0 ELEVATED BLOOD PRESSURE READING: ICD-10-CM

## 2025-02-04 DIAGNOSIS — R11.2 NAUSEA AND VOMITING, UNSPECIFIED VOMITING TYPE: Primary | ICD-10-CM

## 2025-02-04 LAB
ALBUMIN SERPL BCP-MCNC: 4 G/DL (ref 3.5–5.2)
ALP SERPL-CCNC: 84 U/L (ref 40–150)
ALT SERPL W/O P-5'-P-CCNC: 14 U/L (ref 10–44)
ANION GAP SERPL CALC-SCNC: 12 MMOL/L (ref 8–16)
AST SERPL-CCNC: 15 U/L (ref 10–40)
BASOPHILS # BLD AUTO: 0.02 K/UL (ref 0–0.2)
BASOPHILS NFR BLD: 0.4 % (ref 0–1.9)
BILIRUB SERPL-MCNC: 0.4 MG/DL (ref 0.1–1)
BUN SERPL-MCNC: 13 MG/DL (ref 6–20)
CALCIUM SERPL-MCNC: 9.7 MG/DL (ref 8.7–10.5)
CHLORIDE SERPL-SCNC: 99 MMOL/L (ref 95–110)
CO2 SERPL-SCNC: 26 MMOL/L (ref 23–29)
CREAT SERPL-MCNC: 0.8 MG/DL (ref 0.5–1.4)
DIFFERENTIAL METHOD BLD: NORMAL
EOSINOPHIL # BLD AUTO: 0.1 K/UL (ref 0–0.5)
EOSINOPHIL NFR BLD: 1.2 % (ref 0–8)
ERYTHROCYTE [DISTWIDTH] IN BLOOD BY AUTOMATED COUNT: 13.5 % (ref 11.5–14.5)
EST. GFR  (NO RACE VARIABLE): >60 ML/MIN/1.73 M^2
GLUCOSE SERPL-MCNC: 200 MG/DL (ref 70–110)
HCT VFR BLD AUTO: 41 % (ref 37–48.5)
HGB BLD-MCNC: 13.6 G/DL (ref 12–16)
IMM GRANULOCYTES # BLD AUTO: 0.01 K/UL (ref 0–0.04)
IMM GRANULOCYTES NFR BLD AUTO: 0.2 % (ref 0–0.5)
LYMPHOCYTES # BLD AUTO: 1.6 K/UL (ref 1–4.8)
LYMPHOCYTES NFR BLD: 31.1 % (ref 18–48)
MCH RBC QN AUTO: 28.5 PG (ref 27–31)
MCHC RBC AUTO-ENTMCNC: 33.2 G/DL (ref 32–36)
MCV RBC AUTO: 86 FL (ref 82–98)
MONOCYTES # BLD AUTO: 0.4 K/UL (ref 0.3–1)
MONOCYTES NFR BLD: 7.1 % (ref 4–15)
NEUTROPHILS # BLD AUTO: 3.1 K/UL (ref 1.8–7.7)
NEUTROPHILS NFR BLD: 60 % (ref 38–73)
NRBC BLD-RTO: 0 /100 WBC
OHS QRS DURATION: 102 MS
OHS QTC CALCULATION: 477 MS
PLATELET # BLD AUTO: 190 K/UL (ref 150–450)
PMV BLD AUTO: 11.2 FL (ref 9.2–12.9)
POTASSIUM SERPL-SCNC: 3.6 MMOL/L (ref 3.5–5.1)
PROT SERPL-MCNC: 8.2 G/DL (ref 6–8.4)
RBC # BLD AUTO: 4.78 M/UL (ref 4–5.4)
SODIUM SERPL-SCNC: 137 MMOL/L (ref 136–145)
WBC # BLD AUTO: 5.08 K/UL (ref 3.9–12.7)

## 2025-02-04 PROCEDURE — 63600175 PHARM REV CODE 636 W HCPCS: Mod: TB,JZ | Performed by: EMERGENCY MEDICINE

## 2025-02-04 PROCEDURE — 85025 COMPLETE CBC W/AUTO DIFF WBC: CPT | Performed by: EMERGENCY MEDICINE

## 2025-02-04 PROCEDURE — 80053 COMPREHEN METABOLIC PANEL: CPT | Performed by: EMERGENCY MEDICINE

## 2025-02-04 PROCEDURE — 93010 ELECTROCARDIOGRAM REPORT: CPT | Mod: ,,, | Performed by: INTERNAL MEDICINE

## 2025-02-04 PROCEDURE — 96375 TX/PRO/DX INJ NEW DRUG ADDON: CPT

## 2025-02-04 PROCEDURE — 93005 ELECTROCARDIOGRAM TRACING: CPT

## 2025-02-04 PROCEDURE — 99285 EMERGENCY DEPT VISIT HI MDM: CPT | Mod: 25

## 2025-02-04 PROCEDURE — 96365 THER/PROPH/DIAG IV INF INIT: CPT

## 2025-02-04 RX ORDER — METOCLOPRAMIDE HYDROCHLORIDE 5 MG/ML
5 INJECTION INTRAMUSCULAR; INTRAVENOUS
Status: COMPLETED | OUTPATIENT
Start: 2025-02-04 | End: 2025-02-04

## 2025-02-04 RX ORDER — METOCLOPRAMIDE 10 MG/1
5 TABLET ORAL EVERY 6 HOURS PRN
Qty: 15 TABLET | Refills: 0 | Status: SHIPPED | OUTPATIENT
Start: 2025-02-04

## 2025-02-04 RX ORDER — MAGNESIUM SULFATE 1 G/100ML
1 INJECTION INTRAVENOUS ONCE
Status: COMPLETED | OUTPATIENT
Start: 2025-02-04 | End: 2025-02-04

## 2025-02-04 RX ORDER — KETOROLAC TROMETHAMINE 30 MG/ML
15 INJECTION, SOLUTION INTRAMUSCULAR; INTRAVENOUS
Status: COMPLETED | OUTPATIENT
Start: 2025-02-04 | End: 2025-02-04

## 2025-02-04 RX ORDER — IBUPROFEN 400 MG/1
400 TABLET ORAL EVERY 6 HOURS PRN
Qty: 20 TABLET | Refills: 0 | Status: SHIPPED | OUTPATIENT
Start: 2025-02-04

## 2025-02-04 RX ADMIN — METOCLOPRAMIDE 5 MG: 5 INJECTION, SOLUTION INTRAMUSCULAR; INTRAVENOUS at 07:02

## 2025-02-04 RX ADMIN — KETOROLAC TROMETHAMINE 15 MG: 30 INJECTION, SOLUTION INTRAMUSCULAR; INTRAVENOUS at 07:02

## 2025-02-04 RX ADMIN — MAGNESIUM SULFATE IN DEXTROSE 1 G: 10 INJECTION, SOLUTION INTRAVENOUS at 07:02

## 2025-02-04 NOTE — ED TRIAGE NOTES
Pt arrived to ED with c/o headache, nausea, and intermittent  blurry vision since yesterday at 3pm. Pt has a Hx of HTN under medication. Denies any numbness, tingling sensation, weakness, chest pain, SOB. Pt is alert and oriented. Hypertensive on arrival to ED.

## 2025-02-04 NOTE — DISCHARGE INSTRUCTIONS
Please take medications as needed as prescribed for nausea and headache.  Return if you get worse or if new symptoms develop such as repeat episodes of intractable headache despite medication use, worsening vision changes, chest pain, shortness of breath, extremity swelling.  Please follow up with your regular prescribing doctor this week regarding elevated blood pressure to determine if you need an adjustment of your blood pressure medication therapy.

## 2025-02-04 NOTE — ED PROVIDER NOTES
Encounter Date: 2/4/2025    SCRIBE #1 NOTE: I, Willow Gonzalez, am scribing for, and in the presence of,  Benigno Villanueva Jr., MD. I have scribed the following portions of the note - Other sections scribed: HPI,ROS.       History     Chief Complaint   Patient presents with    Headache     Pt arrived to ED with CC headache, nausea, and blurry vision since yesterday at 3pm. Hypertensive in triage. Hx HTN, compliant with meds. Denies other complaints.      CC: Headache    HPI: 54 yo F, with a PMHx of HTN, T2DM, anemia, AFIB, presents to ED with complaint of headache with associated nausea and blurred vision that began yesterday. Patient denies past history of headaches or similar episodes. Patient suspects her new diet(salty food) recently is the possible cause of symptoms. She states she is compliant with her antihypertensives. Denies taking medication prior to arrival. Denies any recent falls/ mechanical trauma. No other aggravating/alleviating factors. Denies chest pain, dyspnea, double vision, loss of vision, vomiting, diarrhea, abdominal pain, unsteady gait, or other associated symptoms.     The history is provided by the patient. No  was used.     Review of patient's allergies indicates:   Allergen Reactions    Penicillins Hives and Rash     Tolerated cephalosporins 4/2024    Amoxicillin     Grass pollen     Grass pollen-june grass standard Other (See Comments)     Past Medical History:   Diagnosis Date    Anemia 04/22/2024    Atrial fibrillation     DM2 (diabetes mellitus, type 2) 05/28/2023    History of abdominal abscess     History of pleural effusion     Hypertension     Osteomyelitis 05/23/2024    Prolonged Q-T interval on ECG 05/31/2023    Qtc 525 5/31/23     Past Surgical History:   Procedure Laterality Date    ABDOMINAL SURGERY      ARTHROSCOPIC DEBRIDEMENT OF SHOULDER Right 4/24/2024    Procedure: DEBRIDEMENT, SHOULDER, ARTHROSCOPIC; [facility], 9L NS;  Surgeon:  Madi Bailey MD;  [facility];  Service: Orthopedics;  Laterality: Right;    ARTHROSCOPIC DEBRIDEMENT OF SHOULDER Right 4/25/2024    Procedure: DEBRIDEMENT, SHOULDER, ARTHROSCOPIC;  Surgeon: Thaddeus Corral MD;  Location: [facility] OR [facility] FLR;  Service: Orthopedics;  Laterality: Right;    COLONOSCOPY N/A 06/01/2023    Procedure: COLONOSCOPY;  Surgeon: Thaddeus Carlos MD;  Location: [facility] ENDO;  Service: Endoscopy;  Laterality: N/A;    DIAGNOSTIC LAPAROSCOPY N/A 05/26/2019    Procedure: LAPAROSCOPY, DIAGNOSTIC Drainage of abscess ;  Surgeon: Jose Luis Hanson MD;  Location: [facility] OR;  Service: General;  Laterality: N/A;  Drain Placement    DIAGNOSTIC LAPAROSCOPY N/A 12/27/2020    Procedure: Diagnostic laparoscopy, drainage of intra-abdominal abscess;  Surgeon: Bhupendra Banda MD;  Location: [facility] OR;  Service: General;  Laterality: N/A;    LAMINOFORAMINOTOMY OF SPINE USING MINIMALLY INVASIVE TECHNIQUE Left 5/24/2024    Procedure: LAMINOFORAMINOTOMY, SPINE, T7/T8, MINIMALLY INVASIVE; with REMOVAL OF ABSCESS;  Surgeon: Janusz Xiao MD;  Location: [facility] OR;  Service: Neurosurgery;  Laterality: Left;  Left T7/T8 laminectomy with evacuation of epidural abscess, need neuromonitoring, microscope, 18mm METRx tubes, culture swabs. request noon start.  NEURO MONITORING VALERIE BARILLASUX [phone]    LAPAROSCOPIC LYSIS OF ADHESIONS  05/26/2019    Procedure: LYSIS, ADHESIONS, LAPAROSCOPIC;  Surgeon: Jose Luis Hanson MD;  Location: [facility] OR;  Service: General;;    VATS, WITH CHEST TUBE INSERTION FOR DRAINAGE OF PLEURAL EFFUSION Right 4/23/2024    Procedure: VATS, WITH CHEST TUBE INSERTION FOR DRAINAGE OF PLEURAL EFFUSION;  Surgeon: Rakesh Blake MD;  [facility];  Service: Cardiothoracic;  Laterality: Right;     Family History   Problem Relation Name Age of Onset    Diabetes Father       Social History     Tobacco Use    Smoking status: Never    Smokeless tobacco: Never    Substance Use Topics    Alcohol use: Not Currently     Comment: occasionally    Drug use: Never     Review of Systems   Constitutional:  Negative for fever.   HENT:  Negative for sore throat.    Eyes:  Positive for visual disturbance (blurred (mild)).   Respiratory:  Negative for shortness of breath.    Cardiovascular:  Negative for chest pain.   Gastrointestinal:  Positive for nausea. Negative for abdominal pain, constipation, diarrhea and vomiting.   Genitourinary:  Negative for difficulty urinating.   Musculoskeletal:  Negative for back pain.   Skin:  Negative for rash.   Neurological:  Positive for headaches. Negative for weakness and numbness.   All other systems reviewed and are negative.      Physical Exam     Initial Vitals [02/04/25 0630]   BP Pulse Resp Temp SpO2   (!) 199/92 81 18 97.5 °F (36.4 °C) 97 %      MAP       --         Physical Exam    Nursing note and vitals reviewed.  Constitutional: She appears well-developed and well-nourished. She is not diaphoretic. No distress.   HENT:   Head: Normocephalic and atraumatic.   Right Ear: External ear normal.   Left Ear: External ear normal.   Nose: Nose normal. Mouth/Throat: Oropharynx is clear and moist.   No temporal artery tenderness to palpation.   Eyes: Conjunctivae and EOM are normal. Pupils are equal, round, and reactive to light. Right eye exhibits no discharge. Left eye exhibits no discharge. No scleral icterus.   Neck: Neck supple.   Normal range of motion.  Cardiovascular:  Normal rate, regular rhythm, normal heart sounds and intact distal pulses.           No murmur heard.  Pulmonary/Chest: Breath sounds normal. No respiratory distress. She has no wheezes. She has no rhonchi. She has no rales.   Abdominal: Abdomen is soft. Bowel sounds are normal. She exhibits no distension and no mass. There is no abdominal tenderness. There is no rebound and no guarding.   Musculoskeletal:         General: No tenderness or edema. Normal range of motion.       Cervical back: Normal range of motion and neck supple.     Neurological: She is alert and oriented to person, place, and time. She has normal strength. No cranial nerve deficit or sensory deficit.   Patient ambulates with a normal gait.  GCS is 15.  Patient is alert and oriented to person, place, time, and events.  Cranial nerves II-XII intact by exam.  Strength and sensation equal and intact in all 4 extremities.  There is no evidence of ataxia.     Skin: Skin is warm and dry. No rash noted. No erythema. No pallor.   Psychiatric: She has a normal mood and affect. Her behavior is normal. Judgment and thought content normal.         ED Course   Procedures  Labs Reviewed   COMPREHENSIVE METABOLIC PANEL - Abnormal       Result Value    Sodium 137      Potassium 3.6      Chloride 99      CO2 26      Glucose 200 (*)     BUN 13      Creatinine 0.8      Calcium 9.7      Total Protein 8.2      Albumin 4.0      Total Bilirubin 0.4      Alkaline Phosphatase 84      AST 15      ALT 14      eGFR >60      Anion Gap 12     CBC W/ AUTO DIFFERENTIAL    WBC 5.08      RBC 4.78      Hemoglobin 13.6      Hematocrit 41.0      MCV 86      MCH 28.5      MCHC 33.2      RDW 13.5      Platelets 190      MPV 11.2      Immature Granulocytes 0.2      Gran # (ANC) 3.1      Immature Grans (Abs) 0.01      Lymph # 1.6      Mono # 0.4      Eos # 0.1      Baso # 0.02      nRBC 0      Gran % 60.0      Lymph % 31.1      Mono % 7.1      Eosinophil % 1.2      Basophil % 0.4      Differential Method Automated       EKG Readings: (Independently Interpreted)   Initial Reading: No STEMI. Ectopy: No Ectopy.   EKG reviewed and interpreted by me shows sinus rhythm at a rate of 77 beats per minute.  The RI, QRS intervals are normal.  QTC is prolonged at 477 milliseconds.  There is a normal axis.  There is normal R-wave progression across the precordium.  There are nonspecific ST and T-wave abnormalities in the inferior and lateral leads.         Imaging Results               CT Head Without Contrast (Final result)  Result time 02/04/25 07:43:55      Final result by Bryant Lubin MD (02/04/25 07:43:55)                   Impression:      No acute intracranial findings.      Electronically signed by: Bryant Lubin  Date:    02/04/2025  Time:    07:43               Narrative:    EXAMINATION:  CT HEAD WITHOUT CONTRAST    CLINICAL HISTORY:  Headache, new or worsening (Age >= 50y);    TECHNIQUE:  Low dose axial images were obtained through the head.  Coronal and sagittal reformations were also performed. Contrast was not administered.    COMPARISON:  CT head performed 05/28/2023.    FINDINGS:  Blood: No acute intracranial hemorrhage.    Parenchyma: No definite loss of gray-white differentiation to suggest acute or subacute transcortical infarct. Few nonspecific areas of white matter hypoattenuation may reflect sequela of chronic small vessel ischemic disease.    Ventricles/Extra-axial spaces: No abnormal extra-axial fluid collection. Basal cisterns are patent.    Vessels: Mild atherosclerotic calcification.    Orbits: Unremarkable.    Scalp: Unremarkable.    Skull: There are no depressed skull fractures or destructive bone lesions.    Sinuses and mastoids: Relatively minor paranasal sinus mucosal thickening.    Other findings: None                                    X-Rays:   Independently Interpreted Readings:   Other Readings:  CT of the head without contrast reveals no evidence of intracranial hemorrhage or mass effect.    Medications   magnesium sulfate in dextrose IVPB (premix) 1 g (0 g Intravenous Stopped 2/4/25 0824)   metoclopramide injection 5 mg (5 mg Intravenous Given 2/4/25 0720)   ketorolac injection 15 mg (15 mg Intravenous Given 2/4/25 0721)     Medical Decision Making  This is the emergent evaluation of a 54-year-old female presents emergency department with bitemporal headache with mild blurriness of vision bilaterally that started yesterday.  Patient notes  that her blood pressure has been elevated.  She reports eating a salty meal yesterday.  She did not take blood pressure medicines this morning but states she took them last night.  Differential diagnosis at the time of initial evaluation included, but was not limited to:  Tension headache, migraine headache, cluster headache  I also considered intracranial hemorrhage or mass, though this is less likely.  I do not suspect hypertensive crisis.  Patient's neurologic exam is very reassuring.    Visual acuity: 20/30 in the left eye, 20/25 in the right eye, & 20/25 both eyes.    Labs reassuring.  No acute findings on CT scan of the head.  Patient reports feeling relief of symptoms.  She appears stable.  We discussed follow up to have an eye exam and determine if she needs glasses given mild visual acuity decreased in both eyes.  I do not think there is any to acutely lower her blood pressure is I do not think this represents hypertensive urgency with any convincing signs of end-organ dysfunction.  I did advise her to check her blood pressure regularly and follow up with her prescribing provider to determine whether antihypertensive medication regimen needs adjustment.  Advised return for new or worsening symptoms such as repeat episodes of intractable headache despite medication use, worsening vision changes, chest pain, shortness of breath, extremity swelling.    Amount and/or Complexity of Data Reviewed  External Data Reviewed: notes.  Labs: ordered. Decision-making details documented in ED Course.     Details: No significant metabolic derangement.  Glucose is elevated without evidence of DKA.  No NOVA, transaminitis, or hyperbilirubinemia.  No leukocytosis or left shift.  No anemia.  Normal platelet count.  Radiology: ordered. Decision-making details documented in ED Course.  ECG/medicine tests: ordered and independent interpretation performed. Decision-making details documented in ED Course.    Risk  Prescription drug  management.            Scribe Attestation:   Scribe #1: I performed the above scribed service and the documentation accurately describes the services I performed. I attest to the accuracy of the note.                             I, Benigno Villanueva MD, personally performed the services described in this documentation. All medical record entries made by the scribe were at my direction and in my presence. I have reviewed the chart and agree that the record reflects my personal performance and is accurate and complete.      DISCLAIMER: This note was prepared with [employer] voice recognition transcription software. Garbled syntax, mangled pronouns, and other bizarre constructions may be attributed to that software system.    Clinical Impression:  Final diagnoses:  [R51.9] Headache  [R11.2] Nausea and vomiting, unspecified vomiting type (Primary)  [R03.0] Elevated blood pressure reading          ED Disposition Condition    Discharge Stable          ED Prescriptions       Medication Sig Dispense Start Date End Date Auth. Provider    ibuprofen (ADVIL,MOTRIN) 400 MG tablet Take 1 tablet (400 mg total) by mouth every 6 (six) hours as needed for Other (headache). 20 tablet 2/4/2025 -- Benigno Villanueva Jr., MD    metoclopramide HCl (REGLAN) 10 MG tablet Take 0.5 tablets (5 mg total) by mouth every 6 (six) hours as needed (headache or nausea). 15 tablet 2/4/2025 -- Beingno Villanueva Jr., MD          Follow-up Information       Follow up With Specialties Details Why Contact Info    Antoinette Kendall MD Family Medicine Schedule an appointment as soon as possible for a visit in 3 days  [address] [place] [id number]  [phone]               Benigno Villanueva Jr., MD  02/04/25 0877

## 2025-02-10 NOTE — ASSESSMENT & PLAN NOTE
Chronic and stable  Continue Norvasc 10mg daily  Continue Coreg 6.25mg BID  Continue Losartan 100mg daily   Patient CMP was not obtained as requested, and labs were just drawn 2 days ago, but unable to add it for unknown reason.    Repeat on return to office this spring.

## 2025-02-25 ENCOUNTER — OFFICE VISIT (OUTPATIENT)
Facility: CLINIC | Age: 55
End: 2025-02-25
Payer: COMMERCIAL

## 2025-02-25 VITALS
HEART RATE: 72 BPM | DIASTOLIC BLOOD PRESSURE: 80 MMHG | HEIGHT: 67 IN | SYSTOLIC BLOOD PRESSURE: 140 MMHG | RESPIRATION RATE: 18 BRPM | BODY MASS INDEX: 35.38 KG/M2 | WEIGHT: 225.44 LBS | TEMPERATURE: 96 F | OXYGEN SATURATION: 98 %

## 2025-02-25 DIAGNOSIS — E11.65 UNCONTROLLED TYPE 2 DIABETES MELLITUS WITH HYPERGLYCEMIA, WITHOUT LONG-TERM CURRENT USE OF INSULIN: Primary | ICD-10-CM

## 2025-02-25 DIAGNOSIS — E11.65 UNCONTROLLED TYPE 2 DIABETES MELLITUS WITH HYPERGLYCEMIA, WITHOUT LONG-TERM CURRENT USE OF INSULIN: ICD-10-CM

## 2025-02-25 DIAGNOSIS — I10 HYPERTENSION, UNSPECIFIED TYPE: ICD-10-CM

## 2025-02-25 DIAGNOSIS — Z12.4 ENCOUNTER FOR PAPANICOLAOU SMEAR FOR CERVICAL CANCER SCREENING: Primary | ICD-10-CM

## 2025-02-25 DIAGNOSIS — Z12.4 ENCOUNTER FOR PAPANICOLAOU SMEAR FOR CERVICAL CANCER SCREENING: ICD-10-CM

## 2025-02-25 PROCEDURE — 88175 CYTOPATH C/V AUTO FLUID REDO: CPT

## 2025-02-25 PROCEDURE — 99214 OFFICE O/P EST MOD 30 MIN: CPT | Mod: S$GLB,,,

## 2025-02-25 PROCEDURE — 4010F ACE/ARB THERAPY RXD/TAKEN: CPT | Mod: CPTII,S$GLB,,

## 2025-02-25 PROCEDURE — 3077F SYST BP >= 140 MM HG: CPT | Mod: CPTII,S$GLB,,

## 2025-02-25 PROCEDURE — 3008F BODY MASS INDEX DOCD: CPT | Mod: CPTII,S$GLB,,

## 2025-02-25 PROCEDURE — 1159F MED LIST DOCD IN RCRD: CPT | Mod: CPTII,S$GLB,,

## 2025-02-25 PROCEDURE — 87624 HPV HI-RISK TYP POOLED RSLT: CPT

## 2025-02-25 PROCEDURE — 3079F DIAST BP 80-89 MM HG: CPT | Mod: CPTII,S$GLB,,

## 2025-02-25 PROCEDURE — 1160F RVW MEDS BY RX/DR IN RCRD: CPT | Mod: CPTII,S$GLB,,

## 2025-02-25 PROCEDURE — 99999 PR PBB SHADOW E&M-EST. PATIENT-LVL V: CPT | Mod: PBBFAC,,,

## 2025-02-25 RX ORDER — CHLORTHALIDONE 25 MG/1
25 TABLET ORAL DAILY
Qty: 30 TABLET | Refills: 11 | Status: SHIPPED | OUTPATIENT
Start: 2025-02-25 | End: 2026-02-25

## 2025-02-25 RX ORDER — GLIMEPIRIDE 4 MG/1
4 TABLET ORAL
Qty: 90 TABLET | Refills: 0 | Status: SHIPPED | OUTPATIENT
Start: 2025-02-25 | End: 2025-05-26

## 2025-03-04 ENCOUNTER — RESULTS FOLLOW-UP (OUTPATIENT)
Facility: CLINIC | Age: 55
End: 2025-03-04

## 2025-03-05 NOTE — PROGRESS NOTES
SUBJECTIVE     History of Present Illness    CHIEF COMPLAINT:  Ms. Ortega presents today for a pap smear.    RECENT EMERGENCY ROOM VISIT:  She had an emergency room visit a couple weeks ago for severe sinus-related headaches. She discontinued her medication due to an unspecified sensation.    HYPERTENSION:  She takes Losartan and hydrochlorothiazide for blood pressure management and limits salt intake due to associated headaches.    DIABETES:  She takes glimepiride 4 mg for diabetes management. Her most recent fasting blood sugar was 117 mg/dL.    DERMATOLOGIC:  She experiences frequent rashes and uses nystatin for treatment.    GYNECOLOGIC HISTORY:  She denies history of abnormal pap smears. She is not currently using birth control or estrogen therapy.      ROS:  General: -fever, -chills, -fatigue, -weight gain, -weight loss  Eyes: -vision changes, -redness, -discharge  ENT: -ear pain, -nasal congestion, -sore throat  Cardiovascular: -chest pain, -palpitations, -lower extremity edema  Respiratory: -cough, -shortness of breath  Gastrointestinal: -abdominal pain, -nausea, -vomiting, -diarrhea, -constipation, -blood in stool  Genitourinary: -dysuria, -hematuria, -frequency  Musculoskeletal: -joint pain, -muscle pain  Skin: +rash, -lesion  Neurological: +headache, -dizziness, -numbness, -tingling  Psychiatric: -anxiety, -depression, -sleep difficulty  Head: +head pain           PAST MEDICAL HISTORY:  Past Medical History:   Diagnosis Date    Anemia 04/22/2024    Atrial fibrillation     DM2 (diabetes mellitus, type 2) 05/28/2023    History of abdominal abscess     History of pleural effusion     Hypertension     Osteomyelitis 05/23/2024    Prolonged Q-T interval on ECG 05/31/2023    Qtc 525 5/31/23       ALLERGIES AND MEDICATIONS: updated and reviewed.  Review of patient's allergies indicates:   Allergen Reactions    Penicillins Hives and Rash     Tolerated cephalosporins 4/2024    Amoxicillin     Grass pollen     Grass  "pollen-yun grass standard Other (See Comments)     Current Medications[1]        OBJECTIVE     Physical Exam  Vitals:    02/25/25 0807   BP: (!) 140/80   Pulse: 72   Resp: 18   Temp: 96.4 °F (35.8 °C)    Body mass index is 35.31 kg/m².  Weight: 102.3 kg (225 lb 6.7 oz)   Height: 5' 7" (170.2 cm)     Physical Exam  Vitals reviewed. Exam conducted with a chaperone present.   Constitutional:       General: She is not in acute distress.  HENT:      Right Ear: External ear normal.      Left Ear: External ear normal.      Nose: Nose normal.      Mouth/Throat:      Mouth: Mucous membranes are moist.   Eyes:      Extraocular Movements: Extraocular movements intact.      Conjunctiva/sclera: Conjunctivae normal.      Pupils: Pupils are equal, round, and reactive to light.   Pulmonary:      Effort: Pulmonary effort is normal.   Abdominal:      General: There is no distension.      Palpations: Abdomen is soft.   Genitourinary:     General: Normal vulva.      Exam position: Lithotomy position.      Labia:         Right: No rash, tenderness, lesion or injury.         Left: No tenderness, lesion or injury.       Vagina: Normal. No vaginal discharge, erythema, tenderness, bleeding or lesions.      Cervix: Normal. No cervical motion tenderness, discharge, friability, lesion, erythema or eversion.      Uterus: Normal. Not deviated, not enlarged, not fixed and not tender.       Adnexa: Right adnexa normal and left adnexa normal.        Right: No mass, tenderness or fullness.          Left: No mass, tenderness or fullness.     Musculoskeletal:         General: No swelling. Normal range of motion.      Cervical back: Normal range of motion.   Skin:     General: Skin is warm and dry.      Findings: No rash.   Neurological:      General: No focal deficit present.      Mental Status: She is alert and oriented to person, place, and time.   Psychiatric:         Mood and Affect: Mood normal.         Behavior: Behavior normal.           "       Health Maintenance         Date Due Completion Date    Hepatitis C Screening Never done ---    Foot Exam Never done ---    Diabetic Eye Exam Never done ---    Pneumococcal Vaccines (Age 50+) (2 of 2 - PPSV23) 02/23/2021 12/29/2020    Shingles Vaccine (2 of 2) 11/18/2024 9/23/2024    COVID-19 Vaccine (5 - 2024-25 season) 10/15/2025 (Originally 9/1/2024) 10/17/2022    Hemoglobin A1c 04/15/2025 10/15/2024    Diabetes Urine Screening 10/15/2025 10/15/2024    Lipid Panel 10/15/2025 10/15/2024    Mammogram 10/29/2025 10/29/2024    Cervical Cancer Screening 02/25/2030 2/25/2025    Colorectal Cancer Screening 06/01/2030 6/1/2023    TETANUS VACCINE 09/23/2034 9/23/2024    RSV Vaccine (Age 60+ and Pregnant patients) (1 - 1-dose 75+ series) 10/09/2047 ---              ASSESSMENT     54 y.o. female with     1. Encounter for Papanicolaou smear for cervical cancer screening    2. Uncontrolled type 2 diabetes mellitus with hyperglycemia, without long-term current use of insulin    3. Hypertension, unspecified type        PLAN:     1. Encounter for Papanicolaou smear for cervical cancer screening  - Pap and HPV done today.  -   Screening tests as ordered.  - Diet and exercise encouraged.     Counseling: injury prevention: Driving under the influence of alcohol  Seatbelts  Stress management techniques  Indications for and frequency of periodic gynecologic exam  Reviewed current Pap guidelines. Explained new understanding of natural history of cervical disease and improved Paps. Recommended guideline concordant care.     2. Uncontrolled type 2 diabetes mellitus with hyperglycemia, without long-term current use of insulin    - glimepiride (AMARYL) 4 MG tablet; Take 1 tablet (4 mg total) by mouth before breakfast.  Dispense: 90 tablet; Refill: 0    3. Hypertension, unspecified type    - chlorthalidone (HYGROTEN) 25 MG Tab; Take 1 tablet (25 mg total) by mouth once daily.  Dispense: 30 tablet; Refill: 11      Assessment & Plan     Assessed patient's blood pressure (140/80) and determined current medication regimen insufficient  Evaluated breast health and performed pap smear for cancer and HPV screening    HYPERTENSION:  - Monitored the patient's blood pressure, which was 140/80 this morning, indicating elevated levels.  - Evaluated current blood pressure medication as ineffective, n  - Discontinued hydrochlorothiazide 25 mg and initiated chlorthalidone 25 mg,   - Continued losartan for hypertension management.  - Considered changing losartan to olmesartan in the future if needed.  - Instructed the patient to continue monitoring blood pressure daily.  - Ms. Ortega to maintain low-salt diet and avoid fried foods.  -f/u 2 weeks  DIABETES:  - Monitored the patient's last blood sugar reading, which was 117 mg/dL early in the morning.  - Evaluated the blood sugar reading as good, noting that it falls within the target range of  mg/dL.  - Discussed the importance of blood sugar control for diabetic patients.  - Continued glimepiride 4 mg for diabetes management.      WEIGHT MANAGEMENT:  - Ms. Ortega to continue diet and exercise regimen.    OTHER INSTRUCTIONS:  - Ms. Ortega to wear seatbelt.  - Ms. Ortega to use condoms to prevent STDs.         SEB Rizzo-DENNIS  03/04/2025 7:10 PM      No follow-ups on file.    I spent a total of 30 minutes on the day of the visit.  This includes face to face time and non-face to face time preparing to see the patient (eg, review of tests), obtaining and/or reviewing separately obtained history, documenting clinical information in the electronic or other health record, independently interpreting results and communicating results to the patient/family/caregiver, or care coordinator.   This note was generated with the assistance of ambient listening technology. Verbal consent was obtained by the patient and accompanying visitor(s) for the recording of patient appointment to facilitate this note.  "I attest to having reviewed and edited the generated note for accuracy, though some syntax or spelling errors may persist. Please contact the author of this note for any clarification.                 [1]   Current Outpatient Medications   Medication Sig Dispense Refill    albuterol (PROVENTIL/VENTOLIN HFA) 90 mcg/actuation inhaler Inhale 2 puffs into the lungs every 6 (six) hours as needed for Shortness of Breath. 18 g 2    aspirin (ECOTRIN) 81 MG EC tablet Take 1 tablet (81 mg total) by mouth once daily. 90 tablet 3    azelastine (ASTELIN) 137 mcg (0.1 %) nasal spray 1 spray by Nasal route 2 (two) times daily.      blood sugar diagnostic Strp Use to test blood glucose 2 (two) times daily with meals. 100 strip 11    blood-glucose meter Misc Use to test blood glucose 2 (two) times daily with meals. 1 each 0    FREESTYLE HARSHA 14 DAY SENSOR Kit Change every 14 days for blood glucose monitoring.      ibuprofen (ADVIL,MOTRIN) 400 MG tablet Take 1 tablet (400 mg total) by mouth every 6 (six) hours as needed for Other (headache). 20 tablet 0    lancets Misc Use to check blood glucose 2 (two) times daily with meals. 100 each 11    lancing device Misc 1 Device by Misc.(Non-Drug; Combo Route) route 2 (two) times daily with meals. 1 each 0    levocetirizine (XYZAL) 5 MG tablet Take 1 tablet (5 mg total) by mouth every evening. 30 tablet 11    losartan (COZAAR) 100 MG tablet Take 1 tablet (100 mg total) by mouth once daily. 90 tablet 3    pen needle, diabetic 31 gauge x 5/16" Ndle Use to inject insulin 4 (four) times daily. 100 each 11    carvediloL (COREG) 3.125 MG tablet Take 2 tablets (6.25 mg total) by mouth 2 (two) times daily. 360 tablet 3    chlorthalidone (HYGROTEN) 25 MG Tab Take 1 tablet (25 mg total) by mouth once daily. 30 tablet 11    glimepiride (AMARYL) 4 MG tablet Take 1 tablet (4 mg total) by mouth before breakfast. 90 tablet 0    insulin syringe-needle U-100 0.3 mL 30 gauge x 5/16" Syrg 1 each by " Misc.(Non-Drug; Combo Route) route 2 (two) times daily with meals. (Patient not taking: Reported on 2/25/2025) 100 each 11    metFORMIN (GLUCOPHAGE) 500 MG tablet Take 2 tablets (1,000 mg total) by mouth 2 (two) times daily with meals. 360 tablet 0    metoclopramide HCl (REGLAN) 10 MG tablet Take 0.5 tablets (5 mg total) by mouth every 6 (six) hours as needed (headache or nausea). (Patient not taking: Reported on 2/25/2025) 15 tablet 0     No current facility-administered medications for this visit.

## 2025-03-17 ENCOUNTER — PATIENT MESSAGE (OUTPATIENT)
Dept: ADMINISTRATIVE | Facility: HOSPITAL | Age: 55
End: 2025-03-17
Payer: COMMERCIAL

## 2025-04-07 ENCOUNTER — PATIENT MESSAGE (OUTPATIENT)
Dept: ADMINISTRATIVE | Facility: HOSPITAL | Age: 55
End: 2025-04-07
Payer: COMMERCIAL

## 2025-04-15 ENCOUNTER — OFFICE VISIT (OUTPATIENT)
Facility: CLINIC | Age: 55
End: 2025-04-15
Payer: COMMERCIAL

## 2025-04-15 ENCOUNTER — LAB VISIT (OUTPATIENT)
Dept: LAB | Facility: HOSPITAL | Age: 55
End: 2025-04-15
Payer: COMMERCIAL

## 2025-04-15 ENCOUNTER — PATIENT OUTREACH (OUTPATIENT)
Dept: ADMINISTRATIVE | Facility: OTHER | Age: 55
End: 2025-04-15
Payer: COMMERCIAL

## 2025-04-15 DIAGNOSIS — E11.65 UNCONTROLLED TYPE 2 DIABETES MELLITUS WITH HYPERGLYCEMIA, WITHOUT LONG-TERM CURRENT USE OF INSULIN: ICD-10-CM

## 2025-04-15 DIAGNOSIS — Z11.59 NEED FOR HEPATITIS C SCREENING TEST: ICD-10-CM

## 2025-04-15 DIAGNOSIS — I10 HYPERTENSION, UNSPECIFIED TYPE: ICD-10-CM

## 2025-04-15 DIAGNOSIS — E11.65 UNCONTROLLED TYPE 2 DIABETES MELLITUS WITH HYPERGLYCEMIA, WITHOUT LONG-TERM CURRENT USE OF INSULIN: Primary | ICD-10-CM

## 2025-04-15 DIAGNOSIS — G44.209 TENSION HEADACHE: ICD-10-CM

## 2025-04-15 LAB
EAG (OHS): 217 MG/DL (ref 68–131)
HBA1C MFR BLD: 9.2 % (ref 4–5.6)
HCV AB SERPL QL IA: NORMAL

## 2025-04-15 PROCEDURE — 1160F RVW MEDS BY RX/DR IN RCRD: CPT | Mod: CPTII,S$GLB,,

## 2025-04-15 PROCEDURE — 3046F HEMOGLOBIN A1C LEVEL >9.0%: CPT | Mod: CPTII,S$GLB,,

## 2025-04-15 PROCEDURE — 1159F MED LIST DOCD IN RCRD: CPT | Mod: CPTII,S$GLB,,

## 2025-04-15 PROCEDURE — 86803 HEPATITIS C AB TEST: CPT

## 2025-04-15 PROCEDURE — 99214 OFFICE O/P EST MOD 30 MIN: CPT | Mod: S$GLB,,,

## 2025-04-15 PROCEDURE — 3079F DIAST BP 80-89 MM HG: CPT | Mod: CPTII,S$GLB,,

## 2025-04-15 PROCEDURE — 3075F SYST BP GE 130 - 139MM HG: CPT | Mod: CPTII,S$GLB,,

## 2025-04-15 PROCEDURE — 99999 PR PBB SHADOW E&M-EST. PATIENT-LVL V: CPT | Mod: PBBFAC,,,

## 2025-04-15 PROCEDURE — 3008F BODY MASS INDEX DOCD: CPT | Mod: CPTII,S$GLB,,

## 2025-04-15 PROCEDURE — 4010F ACE/ARB THERAPY RXD/TAKEN: CPT | Mod: CPTII,S$GLB,,

## 2025-04-15 PROCEDURE — 83036 HEMOGLOBIN GLYCOSYLATED A1C: CPT

## 2025-04-15 RX ORDER — METFORMIN HYDROCHLORIDE 500 MG/1
1000 TABLET ORAL 2 TIMES DAILY WITH MEALS
Qty: 360 TABLET | Refills: 0 | Status: SHIPPED | OUTPATIENT
Start: 2025-04-15 | End: 2025-07-14

## 2025-04-15 RX ORDER — IBUPROFEN 400 MG/1
400 TABLET ORAL EVERY 6 HOURS PRN
Qty: 20 TABLET | Refills: 0 | Status: SHIPPED | OUTPATIENT
Start: 2025-04-15 | End: 2025-04-15

## 2025-04-15 RX ORDER — LOSARTAN POTASSIUM 100 MG/1
100 TABLET ORAL DAILY
Qty: 90 TABLET | Refills: 3 | Status: SHIPPED | OUTPATIENT
Start: 2025-04-15 | End: 2026-04-15

## 2025-04-15 RX ORDER — IBUPROFEN 400 MG/1
400 TABLET ORAL EVERY 6 HOURS PRN
Qty: 20 TABLET | Refills: 0 | Status: SHIPPED | OUTPATIENT
Start: 2025-04-15

## 2025-04-15 NOTE — PROGRESS NOTES
CHW - Initial Contact    This Community Health Worker updated the Social Determinant of Health questionnaire with patient during clinic visit today.    Pt identified barriers of most importance are: Patient did not report any barriers.    Referrals to community agencies completed with patient/caregiver consent outside of [facility] include: No.  Referrals were put through [facility] - no:   Support and Services: No.  Other information discussed the patient needs / wants help with: SDOH updated. Patient did not report any barriers at this time. Patient's case will be closed.   Follow up required:No.

## 2025-04-17 VITALS
RESPIRATION RATE: 18 BRPM | HEIGHT: 67 IN | TEMPERATURE: 96 F | BODY MASS INDEX: 34.46 KG/M2 | DIASTOLIC BLOOD PRESSURE: 86 MMHG | SYSTOLIC BLOOD PRESSURE: 138 MMHG | WEIGHT: 219.56 LBS | HEART RATE: 73 BPM | OXYGEN SATURATION: 97 %

## 2025-04-17 NOTE — PROGRESS NOTES
SUBJECTIVE     History of Present Illness    CHIEF COMPLAINT:  Ms. Ortega presents today for diabetes follow up    DIABETES:  She reports morning blood sugar readings around 117. She continues Metformin 1000 mg twice daily. A1C was 6.9 in October.    HYPERTENSION:  She continues Losartan and a diuretic medication. She mentions taking a combination medication that includes Losartan but is uncertain about the specific components.    CURRENT SYMPTOMS:  She reports a persistent cough that began after a recent family outing to a seafood restaurant, noting that other family members also developed symptoms after the visit. She denies chills, fever, fatigue, runny nose, sore throat, or shortness of breath. She experiences headaches when overheated, describing them as similar to migraines, with history of ER visit for severe headache.    IMMUNIZATIONS:  She has completed both doses of the shingles vaccine series at [facility].      ROS:  General: -fever, -chills, -fatigue, -weight gain, -weight loss  Eyes: -vision changes, -redness, -discharge  ENT: -ear pain, -nasal congestion, -sore throat  Cardiovascular: -chest pain, -palpitations, -lower extremity edema  Respiratory: +cough, -shortness of breath  Gastrointestinal: -abdominal pain, -nausea, -vomiting, -diarrhea, -constipation, -blood in stool  Genitourinary: -dysuria, -hematuria, -frequency  Musculoskeletal: -joint pain, -muscle pain  Skin: -rash, -lesion  Neurological: +headache, -dizziness, -numbness, -tingling, +migraines  Psychiatric: -anxiety, -depression, -sleep difficulty           PAST MEDICAL HISTORY:  Past Medical History:   Diagnosis Date    Anemia 04/22/2024    Atrial fibrillation     DM2 (diabetes mellitus, type 2) 05/28/2023    History of abdominal abscess     History of pleural effusion     Hypertension     Osteomyelitis 05/23/2024    Prolonged Q-T interval on ECG 05/31/2023    Qtc 525 5/31/23       ALLERGIES AND MEDICATIONS: updated and reviewed.  Review  "of patient's allergies indicates:   Allergen Reactions    Penicillins Hives and Rash     Tolerated cephalosporins 4/2024    Amoxicillin     Grass pollen     Grass pollen-june grass standard Other (See Comments)     Current Medications[1]        OBJECTIVE     Physical Exam  Vitals:    04/15/25 0808   BP: (!) 140/80   Pulse: 73   Resp: 18   Temp: 96.4 °F (35.8 °C)    Body mass index is 34.39 kg/m².  Weight: 99.6 kg (219 lb 9.3 oz)   Height: 5' 7" (170.2 cm)     Physical Exam  Vitals reviewed.   Constitutional:       General: She is not in acute distress.  HENT:      Right Ear: External ear normal.      Left Ear: External ear normal.      Nose: Nose normal.      Mouth/Throat:      Mouth: Mucous membranes are moist.   Eyes:      Extraocular Movements: Extraocular movements intact.      Conjunctiva/sclera: Conjunctivae normal.      Pupils: Pupils are equal, round, and reactive to light.   Pulmonary:      Effort: Pulmonary effort is normal.   Abdominal:      General: There is no distension.      Palpations: Abdomen is soft.   Musculoskeletal:         General: No swelling. Normal range of motion.      Cervical back: Normal range of motion.   Feet:      Right foot:      Protective Sensation: 5 sites tested.  5 sites sensed.      Skin integrity: Skin integrity normal.      Toenail Condition: Right toenails are normal.      Left foot:      Protective Sensation: 5 sites tested.  5 sites sensed.      Skin integrity: Skin integrity normal.      Toenail Condition: Left toenails are normal.   Skin:     General: Skin is warm and dry.      Findings: No rash.   Neurological:      General: No focal deficit present.      Mental Status: She is alert and oriented to person, place, and time.   Psychiatric:         Mood and Affect: Mood normal.         Behavior: Behavior normal.            Health Maintenance         Date Due Completion Date    Diabetic Eye Exam Never done ---    Pneumococcal Vaccines (Age 50+) (2 of 2 - PPSV23) 02/23/2021 " 12/29/2020    Shingles Vaccine (2 of 2) 11/18/2024 9/23/2024    COVID-19 Vaccine (5 - 2024-25 season) 10/15/2025 (Originally 9/1/2024) 10/17/2022    Hemoglobin A1c 07/15/2025 4/15/2025    Diabetes Urine Screening 10/15/2025 10/15/2024    Lipid Panel 10/15/2025 10/15/2024    Mammogram 10/29/2025 10/29/2024    Foot Exam 04/15/2026 4/15/2025    Cervical Cancer Screening 02/25/2030 2/25/2025    Colorectal Cancer Screening 06/01/2030 6/1/2023    TETANUS VACCINE 09/23/2034 9/23/2024    RSV Vaccine (Age 60+ and Pregnant patients) (1 - 1-dose 75+ series) 10/09/2047 ---              ASSESSMENT     54 y.o. female with     1. Uncontrolled type 2 diabetes mellitus with hyperglycemia, without long-term current use of insulin    2. Hypertension, unspecified type    3. Tension headache    4. Need for hepatitis C screening test        PLAN:     1. Uncontrolled type 2 diabetes mellitus with hyperglycemia, without long-term current use of insulin  - Stable; no acute issues  - The current medical regimen is effective;  continue present plan and medications.   - metFORMIN (GLUCOPHAGE) 500 MG tablet; Take 2 tablets (1,000 mg total) by mouth 2 (two) times daily with meals.  Dispense: 360 tablet; Refill: 0  - Hemoglobin A1C; Future  - Foot Exam Performed  - Diabetic Eye Screening Photo; Future    2. Hypertension, unspecified type  Controlled, pt denies SOB, palpitations, CP   Advised to maintain a low Na diet(<2g/day), exercise, and keep BP log to present to next visit   - losartan (COZAAR) 100 MG tablet; Take 1 tablet (100 mg total) by mouth once daily.  Dispense: 90 tablet; Refill: 3    3. Tension headache  - Stable; no acute issues  - The current medical regimen is effective;  continue present plan and medications.   - ibuprofen (ADVIL,MOTRIN) 400 MG tablet; Take 1 tablet (400 mg total) by mouth every 6 (six) hours as needed for Other (headache).  Dispense: 20 tablet; Refill: 0    4. Need for hepatitis C screening test  Due, ordered  -  Hepatitis C Antibody; Future      Assessment & Plan    Morning glucose of 117 mg/dL, within target range of 130 mg/dL or less.  BP readings of 140/80 mmHg and 138/86 mmHg.  Last A1C from October (6.9%), near target range of 7% or below.  Performed diabetic foot exam, finding no concerning issues.    TYPE 2 DIABETES MELLITUS:  - Instructed the patient on daily foot care, including checking toes, palpating pulses, inspecting for edema, and examining interdigital spaces.  - Advised the patient to trim toenails regularly to prevent complications.  - Ordered hemoglobin A1c test to assess diabetes control.  - Referred the patient to an ophthalmologist for diabetic retinopathy screening.  - Noted that the patient has been monitoring glucose levels, with a reported morning blood sugar of 117 mg/dL.  - Evaluated the patient's glucose as acceptable, with a target of 130 mg/dL or less in the mornings.  - Performed a comprehensive foot exam, assessing pulses, edema,  skin integrity ,and sensation.  - Prescribed and refilled Metformin 500 mg, 1000 mg twice daily.  - Continued metformin 1000 mg twice daily as part of long-term oral hypoglycemic therapy.  - Noted that the patient forgot to bring glucometer to appointment.    ESSENTIAL HYPERTENSION:  - Educated the patient on warning signs for significant cephalgia, recommending emergency department evaluation for thunderclap headaches due to hypertension.  -continued losartan 100 mg  - Continued chlorthalidone 25 mg daily.  - Measured blood pressure at 140/80 initially, rechecked at 138/86.    - Recommend continuing to monitor salt intake in diet.    HEADACHE:  - Discussed non-pharmacological management strategies, including using cool compresses and warm hydrotherapy for managing cephalgia.  - Refilled ibuprofen 400 mg as needed for cephalgia.  - Noted that the patient reports occasional cephalgia, possibly related to hyperthermia.  - Advised the patient about severe cephalgia  symptoms that require immediate medical attention.    FOLLOW-UP AND GENERAL INSTRUCTIONS:  - Follow up in 3 months.  - Contact the office via [facility] (formerly known as [employer]) for lab results once available.  - Ordered hepatitis test (if patient desires).         MERI Rizzo  04/17/2025 11:45 AM      Follow up in about 3 months (around 7/15/2025).  I spent a total of 30 minutes on the day of the visit.  This includes face to face time and non-face to face time preparing to see the patient (eg, review of tests), obtaining and/or reviewing separately obtained history, documenting clinical information in the electronic or other health record, independently interpreting results and communicating results to the patient/family/caregiver, or care coordinator.     This note was generated with the assistance of ambient listening technology. Verbal consent was obtained by the patient and accompanying visitor(s) for the recording of patient appointment to facilitate this note. I attest to having reviewed and edited the generated note for accuracy, though some syntax or spelling errors may persist. Please contact the author of this note for any clarification.                 [1]   Current Outpatient Medications   Medication Sig Dispense Refill    albuterol (PROVENTIL/VENTOLIN HFA) 90 mcg/actuation inhaler Inhale 2 puffs into the lungs every 6 (six) hours as needed for Shortness of Breath. 18 g 2    aspirin (ECOTRIN) 81 MG EC tablet Take 1 tablet (81 mg total) by mouth once daily. 90 tablet 3    azelastine (ASTELIN) 137 mcg (0.1 %) nasal spray 1 spray by Nasal route 2 (two) times daily.      blood sugar diagnostic Strp Use to test blood glucose 2 (two) times daily with meals. 100 strip 11    blood-glucose meter Misc Use to test blood glucose 2 (two) times daily with meals. 1 each 0    chlorthalidone (HYGROTEN) 25 MG Tab Take 1 tablet (25 mg total) by mouth once daily. 30 tablet 11    FREESTYLE HARSHA 14 DAY SENSOR Kit Change every 14  "days for blood glucose monitoring.      glimepiride (AMARYL) 4 MG tablet Take 1 tablet (4 mg total) by mouth before breakfast. 90 tablet 0    lancets Misc Use to check blood glucose 2 (two) times daily with meals. 100 each 11    lancing device Misc 1 Device by Misc.(Non-Drug; Combo Route) route 2 (two) times daily with meals. 1 each 0    levocetirizine (XYZAL) 5 MG tablet Take 1 tablet (5 mg total) by mouth every evening. 30 tablet 11    pen needle, diabetic 31 gauge x 5/16" Ndle Use to inject insulin 4 (four) times daily. 100 each 11    carvediloL (COREG) 3.125 MG tablet Take 2 tablets (6.25 mg total) by mouth 2 (two) times daily. 360 tablet 3    ibuprofen (ADVIL,MOTRIN) 400 MG tablet Take 1 tablet (400 mg total) by mouth every 6 (six) hours as needed for Other (headache). 20 tablet 0    insulin syringe-needle U-100 0.3 mL 30 gauge x 5/16" Syrg 1 each by Misc.(Non-Drug; Combo Route) route 2 (two) times daily with meals. (Patient not taking: Reported on 4/15/2025) 100 each 11    losartan (COZAAR) 100 MG tablet Take 1 tablet (100 mg total) by mouth once daily. 90 tablet 3    metFORMIN (GLUCOPHAGE) 500 MG tablet Take 2 tablets (1,000 mg total) by mouth 2 (two) times daily with meals. 360 tablet 0    metoclopramide HCl (REGLAN) 10 MG tablet Take 0.5 tablets (5 mg total) by mouth every 6 (six) hours as needed (headache or nausea). (Patient not taking: Reported on 4/15/2025) 15 tablet 0     No current facility-administered medications for this visit.     "

## 2025-05-14 ENCOUNTER — PATIENT MESSAGE (OUTPATIENT)
Facility: CLINIC | Age: 55
End: 2025-05-14
Payer: COMMERCIAL

## 2025-05-21 ENCOUNTER — RESULTS FOLLOW-UP (OUTPATIENT)
Facility: CLINIC | Age: 55
End: 2025-05-21

## (undated) DEVICE — KIT POSITIONING WILSON FRAME

## (undated) DEVICE — GAUZE WOVEN STRL 12-PLY 4X4IN

## (undated) DEVICE — SUT VICRYL+ 2-0 SH 18IN

## (undated) DEVICE — DRAPE SMARTDRAPE MICROSCOPE

## (undated) DEVICE — PAD ABDOMINAL STERILE 8X10IN

## (undated) DEVICE — SUT 2-0 VICRYL / CT-1

## (undated) DEVICE — DRAPE LAP T SHT W/ INSTR PAD

## (undated) DEVICE — TRAY MINOR ORTHO OMC

## (undated) DEVICE — GLOVE BIOGEL 7.5

## (undated) DEVICE — DRAPE STERI INSTRUMENT 1018

## (undated) DEVICE — Device

## (undated) DEVICE — TROCAR ENDOPATH XCEL 11MM 10CM

## (undated) DEVICE — DRAPE OPMI STERILE

## (undated) DEVICE — SYR 10CC LUER LOCK

## (undated) DEVICE — SUT VICRYL 3-0 27 SH

## (undated) DEVICE — TROCAR KII BLLN 12MM 10CM

## (undated) DEVICE — APPLICATOR CHLORAPREP ORN 26ML

## (undated) DEVICE — DRAPE INCISE IOBAN 2 23X17IN

## (undated) DEVICE — COVER SNAP KAP 26IN

## (undated) DEVICE — BLADE ELECTRO EDGE INSULATED

## (undated) DEVICE — SUPPORT ULNA NERVE PROTECTOR

## (undated) DEVICE — TOWEL OR DISP STRL BLUE 4/PK

## (undated) DEVICE — TAPE SURG MEDIPORE 6X72IN

## (undated) DEVICE — MARKER SKIN RULER STERILE

## (undated) DEVICE — EVACUATOR WOUND BULB 100CC

## (undated) DEVICE — CORD FOR BIPOLAR FORCEPS 12

## (undated) DEVICE — SUT VICRYL+ 27 UR-6 VIOL

## (undated) DEVICE — SUT MCRYL PLUS 4-0 PS2 27IN

## (undated) DEVICE — NDL HYPO STD REG BVL 18GX1.5IN

## (undated) DEVICE — ADHESIVE MASTISOL VIAL 48/BX

## (undated) DEVICE — SUT ETHILON 4-0 PS2 18 BLK

## (undated) DEVICE — TAPE SURGICAL MICROPORE 3IN

## (undated) DEVICE — SWAB CULTURETTE II DUAL

## (undated) DEVICE — ELECTRODE REM PLYHSV RETURN 9

## (undated) DEVICE — CONTAINER SPECIMEN STRL 4OZ

## (undated) DEVICE — DRESSING TRANS 2X2 TEGADERM

## (undated) DEVICE — BLANKET UPPER BODY 78.7X29.9IN

## (undated) DEVICE — GAUZE SPONGE 4X4 12PLY

## (undated) DEVICE — BLANKET LOWER BODY 55.9X40.2IN

## (undated) DEVICE — SYR 50CC LL

## (undated) DEVICE — SUT 0 30IN ETHIBOND EXCEL G

## (undated) DEVICE — STAPLER SKIN PROXIMATE WIDE

## (undated) DEVICE — SPONGE COTTON TRAY 4X4IN

## (undated) DEVICE — DRAPE TOP 53X102IN

## (undated) DEVICE — PAD CURAD NONADH 3X4IN

## (undated) DEVICE — DRAPE C-ARMOR EQUIPMENT COVER

## (undated) DEVICE — DRAIN SIL RND HUBLSS 19F TRCR

## (undated) DEVICE — GLOVE BIOGEL ORTHOPEDIC 7.5

## (undated) DEVICE — BLADE SURG CARBON STEEL SZ11

## (undated) DEVICE — SEE MEDLINE ITEM 157117

## (undated) DEVICE — PACK ENDOSCOPY GENERAL

## (undated) DEVICE — SUT SILK 0 STRANDS 30IN BLK

## (undated) DEVICE — NDL SPINAL 18GX3.5 SPINOCAN

## (undated) DEVICE — PENCIL BAYONET BOVIE

## (undated) DEVICE — TROCAR ENDOPATH XCEL 5X100MM

## (undated) DEVICE — SOL IRR NACL .9% 3000ML

## (undated) DEVICE — GLOVE SIGNATURE ESSNTL LTX 8

## (undated) DEVICE — NDL INSUF ULTRA VERESS 120MM

## (undated) DEVICE — CORD BIPOLAR 12 FOOT

## (undated) DEVICE — DRAPE ABDOMINAL TIBURON 14X11

## (undated) DEVICE — SUT ETHILON 2-0 FSLX 30 BLK

## (undated) DEVICE — DRESSING SURGICAL 1/2X1/2

## (undated) DEVICE — ADAPTER SWIVEL

## (undated) DEVICE — ELECTRODE BLADE INSULATED 1 IN

## (undated) DEVICE — DRAPE STERI U-SHAPED 47X51IN

## (undated) DEVICE — COVER OVERHEAD SURG LT BLUE

## (undated) DEVICE — TRAY NEURO OMC

## (undated) DEVICE — TUBE FRAZIER 5MM 2FT SOFT TIP

## (undated) DEVICE — SEE MEDLINE ITEM 152622

## (undated) DEVICE — SEE MEDLINE ITEM 157110

## (undated) DEVICE — TUBING INSUFFLATION 10

## (undated) DEVICE — DRESSING SURGICAL 3/4X3/4

## (undated) DEVICE — TAPE SURG DURAPORE 2 X10YD

## (undated) DEVICE — DRAPE U SPLIT SHEET 54X76IN

## (undated) DEVICE — STRIP MEDI WND CLSR 1/2X4IN

## (undated) DEVICE — SUT D SPECIAL VICRYL 2-0

## (undated) DEVICE — SUT MONOCRYL 4.0 PS2 CP496G

## (undated) DEVICE — GOWN SMART IMP BREATHABLE XXLG

## (undated) DEVICE — SUT ETHICON 3-0 BLK MONO PS

## (undated) DEVICE — SOCKINETTE IMPERVIOUS 12X48IN

## (undated) DEVICE — TRAY MINOR GEN SURG OMC

## (undated) DEVICE — NDL HYPO REG 25G X 1 1/2

## (undated) DEVICE — DRESSING ADH ISLAND 2.5 X 3

## (undated) DEVICE — SEE L#120831

## (undated) DEVICE — SOL NACL IRR 1000ML BTL

## (undated) DEVICE — DRESSING TRANS 4X4 TEGADERM

## (undated) DEVICE — SUT VICRYL PLUS 4-0 P3 18IN

## (undated) DEVICE — TAPE SILK 3IN

## (undated) DEVICE — DURAPREP SURG SCRUB 26ML

## (undated) DEVICE — NDL SAFETY 22G X 1.5 ECLIPSE

## (undated) DEVICE — SUT CTD VICRYL 2-0 CR/CT-2

## (undated) DEVICE — KIT ANTIFOG W/SPONG & FLUID

## (undated) DEVICE — DISSECTOR 5MM ENDOPATH

## (undated) DEVICE — SYR IRRIGATION BULB STER 60ML

## (undated) DEVICE — GLOVE SENSICARE PI GRN 8

## (undated) DEVICE — GOWN NONREINF SET-IN SLV 2XL

## (undated) DEVICE — SHAVER ULTRAFFR 4.2MM

## (undated) DEVICE — SLEEVE SCD EXPRESS CALF MEDIUM

## (undated) DEVICE — SHEARS HARMONIC 36CM HD 1000I

## (undated) DEVICE — ADHESIVE DERMABOND MINI HV

## (undated) DEVICE — GAUZE DRAIN N WVN 6PLY 4X4IN

## (undated) DEVICE — COLLECTOR SPECIMEN ANAEROBIC

## (undated) DEVICE — SUT MONOCRYL UD 4-0 27 PS-1

## (undated) DEVICE — SUT SILK 0 BLK BR FSL 18 IN

## (undated) DEVICE — DRAPE SHOULDER BEACH CHAIR

## (undated) DEVICE — IRRIGATOR ENDOSCOPY DISP.

## (undated) DEVICE — DRAIN CHANNEL ROUND 10FR

## (undated) DEVICE — DRAPE THREE-QTR REINF 53X77IN

## (undated) DEVICE — NDL SPINAL 20GX3.5 HUB

## (undated) DEVICE — BUR BONE CUT MICRO TPS 3X3.8MM

## (undated) DEVICE — SYR ONLY LUER LOCK 20CC

## (undated) DEVICE — KIT ANTIFOG

## (undated) DEVICE — SUT 0 VICRYL / CT-1

## (undated) DEVICE — GLOVE SURG BIOGEL LATEX SZ 7.5

## (undated) DEVICE — PAD HYPERTHERMIA 9 1/4 X 11

## (undated) DEVICE — GLOVE SIGNATURE ESSNTL LTX 6.5

## (undated) DEVICE — CONTAINER SPECIMEN OR STER 4OZ

## (undated) DEVICE — GOWN AERO CHROME W/ TOWEL XL

## (undated) DEVICE — BNDG COFLEX FOAM LF2 ST 4X5YD

## (undated) DEVICE — DRAPE THREE-QUARTER 53X77IN

## (undated) DEVICE — SYR 30CC LUER LOCK

## (undated) DEVICE — GLOVE SENSICARE PI GRN 6.5

## (undated) DEVICE — DRESSING GAUZE OIL EMUL 3X8

## (undated) DEVICE — TUBING SUC UNIV W/CONN 12FT

## (undated) DEVICE — KIT SPINAL PATIENT CARE JACK